# Patient Record
Sex: MALE | Race: WHITE | Employment: OTHER | URBAN - METROPOLITAN AREA
[De-identification: names, ages, dates, MRNs, and addresses within clinical notes are randomized per-mention and may not be internally consistent; named-entity substitution may affect disease eponyms.]

---

## 2017-01-04 ENCOUNTER — OFFICE VISIT (OUTPATIENT)
Dept: VASCULAR SURGERY | Age: 77
End: 2017-01-04

## 2017-01-04 ENCOUNTER — PATIENT OUTREACH (OUTPATIENT)
Dept: FAMILY MEDICINE CLINIC | Age: 77
End: 2017-01-04

## 2017-01-04 VITALS
HEART RATE: 68 BPM | WEIGHT: 216 LBS | DIASTOLIC BLOOD PRESSURE: 70 MMHG | HEIGHT: 70 IN | RESPIRATION RATE: 18 BRPM | BODY MASS INDEX: 30.92 KG/M2 | SYSTOLIC BLOOD PRESSURE: 140 MMHG

## 2017-01-04 DIAGNOSIS — I71.40 AAA (ABDOMINAL AORTIC ANEURYSM) WITHOUT RUPTURE: Primary | ICD-10-CM

## 2017-01-04 DIAGNOSIS — I72.4 FEMORAL ARTERY ANEURYSM, LEFT (HCC): ICD-10-CM

## 2017-01-04 DIAGNOSIS — I73.9 PAD (PERIPHERAL ARTERY DISEASE) (HCC): ICD-10-CM

## 2017-01-04 NOTE — MR AVS SNAPSHOT
Visit Information Date & Time Provider Department Dept. Phone Encounter #  
 1/4/2017  9:45 AM Jan Carballo MD [facility] [phone] [id number] Follow-up Instructions Return in about 4 months (around 5/4/2017). Follow-up and Disposition History Your Appointments 1/10/2017  7:30 AM  
[place] [address] [address] [place] [address]) Appt Note: F/U Mini Stroke [address] [facility] [address]  
  
   
 [address] [facility]  
  
    
 2/22/2017 10:00 AM  
Follow Up with Ritu Guerra MD  
Cardio Specialist at [facility] [place] [address]) Appt Note: 4 month follow up  
 [facility] [address] [place] [address]  
  
    
 2/28/2017  9:00 AM  
[place] [address] [facility] [address]) Appt Note: Return in about 3 months (around 2/28/2017) for rov, labs prior [address] [facility] [address]  
  
   
 [address] [facility] 5/10/2017  8:00 AM  
PROCEDURE with [facility] IMAGING 1  
[facility] (BEATA SCHEDULING) Appt Note: endograft 5 mos moore - same day appt [address] [address]  
[phone] [address] [place] [address] 5/10/2017  9:00 AM  
PROCEDURE with [facility] IMAGING 1  
[facility] (BEATA SCHEDULING) Appt Note: sfa 5 mos moore - same day appt [address] [address]  
[phone] 5/10/2017 10:00 AM  
PROCEDURE with [facility] NONIMAGING  
[facility] (BEATA SCHEDULING) Appt Note: anitra 5 mos moore - same day appt [address] [address]  
[phone] [address] [address] 5/10/2017  2:00 PM  
Follow Up with Tao Weller MD  
[facility] (BEATA SCHEDULING) Appt Note: 4 MONTH FU SAME DAY AS STUDIES AT [facility] WITH PREP; SCHEDULE ERROR; 4 MONTH FU SAME DAY AS STUDIES AT [facility] WITH PREP SCHEDULE ERROR  
 [address] [facility] [address] [address]  
[phone]  
  
   
 [address] [facility] [address] [address] Upcoming Health Maintenance Date Due  
 EYE EXAM RETINAL OR DILATED Q1 9/30/1950 GLAUCOMA SCREENING Q2Y 11/12/2016 HEMOGLOBIN A1C Q6M 3/13/2017 FOOT EXAM Q1 6/13/2017 MEDICARE YEARLY EXAM 6/14/2017 MICROALBUMIN Q1 9/13/2017 LIPID PANEL Q1 9/13/2017 COLONOSCOPY 6/29/2019 DTaP/Tdap/Td series (2 - Td) 9/14/2020 Allergies as of 1/4/2017  Review Complete On: 1/4/2017 By: Tao Weller MD  
  
 Severity Noted Reaction Type Reactions Ace Inhibitors  03/27/2016    Other (comments) Per pt, Cardiologist states he cannot have ACE inhibitors Current Immunizations  Reviewed on 3/28/2016 Name Date Influenza High Dose Vaccine PF 9/13/2016, 11/10/2015, 10/7/2015 Influenza Vaccine 11/12/2014, 12/16/2013  8:04 AM  
 Influenza Vaccine Split 12/14/2012 Influenza Vaccine Whole 9/14/2010 Pneumococcal Conjugate (PCV-13) 10/7/2015 Pneumococcal Vaccine (Unspecified Type) 9/14/2010 TDAP Vaccine 9/14/2010 Zoster 9/14/2010 Not reviewed this visit You Were Diagnosed With   
  
 Codes Comments AAA (abdominal aortic aneurysm) without rupture (HCC)    -  Primary ICD-10-CM: I71.4 ICD-9-CM: 441.4 Femoral artery aneurysm, left (HCC)     ICD-10-CM: I72.4 ICD-9-CM: 442.3 PAD (peripheral artery disease) (HCC)     ICD-10-CM: I73.9 ICD-9-CM: 443. 9 Vitals  BP Pulse Resp Height(growth percentile) Weight(growth percentile) BMI  
 140/70 (BP 1 Location: Left arm, BP Patient Position: Sitting) 68 18 5' 10\" (1.778 m) 216 lb (98 kg) 30.99 kg/m2 Smoking Status Former Smoker Vitals History BMI and BSA Data Body Mass Index Body Surface Area 30.99 kg/m 2 2.2 m 2 Preferred Pharmacy Pharmacy Name Phone Nina [address] [place] [address], [address] [place] [address] [address] [phone] Your Updated Medication List  
  
   
This list is accurate as of: 1/4/17 10:21 AM.  Always use your most recent med list.  
  
  
  
  
 aspirin-dipyridamole  mg per SR capsule Commonly known as:  AGGRENOX Take 1 Cap by mouth two (2) times a day. atorvastatin 80 mg tablet Commonly known as:  LIPITOR Take 1 Tab by mouth nightly. omega-3 fatty acids-vitamin e 1,000 mg Cap Commonly known as:  FISH OIL Take 1 Cap by mouth two (2) times a day. Follow-up Instructions Return in about 4 months (around 5/4/2017). To-Do List   
 05/04/2017 Imaging:  D[facility] SINGLE LEVEL AMB   
  
 05/04/2017 Imaging:  DUPLEX AORTA ILIAC GRAFT COMPLETE AMB   
  
 05/04/2017 Imaging:  LOWER EXT ART PVR MULT LEVEL SEG PRESSURES AMB [facility]! Dear Darren Crenshaw: Thank you for requesting a Squee account. Our records indicate that you already have an active Squee account. You can access your account anytime at [email or web address] Did you know that you can access your hospital and ER discharge instructions at any time in Squee? You can also review all of your test results from your hospital stay or ER visit. Additional Information If you have questions, please visit the Frequently Asked Questions section of the Squee website at [email or web address] Remember, Squee is NOT to be used for urgent needs. For medical emergencies, dial 911. Now available from your iPhone and Android! Please provide this summary of care documentation to your next provider. Your primary care clinician is listed as [id number] [facility]. If you have any questions after today's visit, please call [phone].

## 2017-01-04 NOTE — PROGRESS NOTES
Nurse Navigator  POST HOSPITALIZATION NOTE  Admitted at [facility]/[address] on 12/30/16 for TIA  Discharged to Home on 01/01/17      Attempted to reach patient for post hosp f/u. Unable to reach.  Left message on voicemail with contact info      Noted patient currently scheduled for f/u appt with PCP Dr. Laura Olivera on 01/10/17 at 7:30am      Will call patient again for f/u

## 2017-01-04 NOTE — PROGRESS NOTES
[address] [place]    Chief Complaint   Patient presents with    Leg Pain    Swelling       History and Physical    Kristian Grace is a 64 y.o. 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 Patient also had open left common femoral artery aneurysm repair with an SFA patch endarterectomy. Patient seems to be doing quite well and is walking a lot better than previous. He no longer complains of any claudication. No abdominal discomfort or back discomfort. He did unfortunately recently have a stroke. But this is had very small amounts of residual effects and he is doing very well at this current time.     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 ([address] 3/26/2016     Past Surgical History   Procedure Laterality Date    Endoscopy, colon, diagnostic      Hx orthopaedic  2008     Right Leg Amputated    Pr cardiac surg procedure unlist  2005     Quadruple Bypass    Pr cardiac surg procedure unlist  2011     Aortic pig valve placed    Colonoscopy N/A 6/29/2016     COLONOSCOPY performed by Adrián Chavez MD at [address] [place] [address] Pr abdomen surgery proc unlisted  2011     3 stents placed into abdomen (groin)    Hx aaa repair       Patient Active Problem List   Diagnosis Code    Pure hypercholesterolemia E78.00    Coronary artery disease involving native coronary artery without angina pectoris I25.10    Hypertension I10    Diabetes mellitus type 2, controlled ([facility] E11.9    CKD (chronic kidney disease), stage III N18.3    Advance directive in chart Z78.9    PAD (peripheral artery disease) ([facility]) I73.9    AAA (abdominal aortic aneurysm) without rupture ([facility]) I71.4    Femoral artery aneurysm, left ([facility]) I72.4     Current Outpatient Prescriptions   Medication Sig Dispense Refill    atorvastatin (LIPITOR) 80 mg tablet Take 1 Tab by mouth nightly. 30 Tab 0    aspirin-dipyridamole (AGGRENOX)  mg per SR capsule Take 1 Cap by mouth two (2) times a day. 60 Cap 0    omega-3 fatty acids-vitamin e (FISH OIL) 1,000 mg cap Take 1 Cap by mouth two (2) times a day. 180 Cap 4     Allergies   Allergen Reactions    Ace Inhibitors Other (comments)     Per pt, Cardiologist states he cannot have ACE inhibitors     Social History     Social History    Marital status:      Spouse name: N/A    Number of children: N/A    Years of education: N/A     Occupational History    Not on file. Social History Main Topics    Smoking status: Former Smoker     Quit date: 9/14/1987    Smokeless tobacco: Never Used    Alcohol use Yes      Comment: wine 1-2x per week    Drug use: No    Sexual activity: Not Currently     Other Topics Concern    Not on file     Social History Narrative      Family History   Problem Relation Age of Onset    Hypertension Mother     Hypertension Father        Physical Exam:    Visit Vitals    /70 (BP 1 Location: Left arm, BP Patient Position: Sitting)    Pulse 68    Resp 18    Ht 5' 10\" (1.778 m)    Wt 216 lb (98 kg)    BMI 30.99 kg/m2      General: Well-appearing male in no acute distress  HEENT: EOMI no scleral icterus is noted he is wearing eyeglasses  Pulmonary: No increased work of breathing is noted  Extremities: Warm and perfused bilaterally 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   Neuro: Cranial nerves II through XII grossly intact    Impression and Plan:  Quincy King is a 64 y.o. male with abdominal aortic aneurysm now repaired with extension on the left side. He also had a left common femoral artery aneurysm now repaired open. He had SFA stenosis which is improved. He does continue to have a narrowing that is identified on CTA. But this does not seem to bother him as he is walking much further. Symptomatically he seems to be doing quite well. Overall I will have him come back in 4 months time with an endograft ultrasound and ultrasounds of his lower extremities. We reviewed the plan with the patient and the patient understands. Follow-up Disposition:  Return in about 4 months (around 5/4/2017). Jaskaran Malloy MD    PLEASE NOTE:  This document has been produced using voice recognition software. Unrecognized errors in transcription may be present.

## 2017-01-05 ENCOUNTER — PATIENT OUTREACH (OUTPATIENT)
Dept: FAMILY MEDICINE CLINIC | Age: 77
End: 2017-01-05

## 2017-01-05 NOTE — PROGRESS NOTES
Nurse Navigator  POST HOSPITALIZATION NOTE  Admitted at [facility]/[address] on 12/30/16 for TIA  Discharged to Home on 01/01/17        Attempted to reach patient again for post hosp f/u. Unable to reach.  Left message on voicemail with contact info        Noted patient currently scheduled for f/u appt with PCP Dr. Shelly Palacio on 01/10/17 at 7:30am      Letter sent

## 2017-01-05 NOTE — LETTER
1/5/2017 12:26 PM 
 
Mr. Diaz Matias Du Yinka Buckner [address]  Tomas [place], 
 
 
I am a Nurse Navigator working with your primary care provider (PCP), Dr. Joenathan Meckel. Part of my job is to follow up with patients who have been in the hospital to see how they are feeling, answer any questions they may have about their visit, and also make sure they have a follow-up appointment to see their primary care doctor. I have been unable to reach you by telephone and wanted to make sure that you follow-up with Dr. Joenathan Meckel to discuss about your recent visit to the hospital.   
 
 
You are currently scheduled for an appointment with Dr. Joenathan Meckel on:  
Tuesday, 1/10/17 at 7:30am.  
 
 
If you have any questions or concerns, please call [phone]. Thank you for allowing us to participate in your care! Sincerely, Kareen OAKLEYN, RN   Nurse Navigator [address] Office   [phone] Fax   [phone]

## 2017-01-10 ENCOUNTER — OFFICE VISIT (OUTPATIENT)
Dept: FAMILY MEDICINE CLINIC | Age: 77
End: 2017-01-10

## 2017-01-10 VITALS
HEIGHT: 70 IN | DIASTOLIC BLOOD PRESSURE: 71 MMHG | BODY MASS INDEX: 30.69 KG/M2 | TEMPERATURE: 97.2 F | WEIGHT: 214.4 LBS | RESPIRATION RATE: 18 BRPM | HEART RATE: 67 BPM | OXYGEN SATURATION: 100 % | SYSTOLIC BLOOD PRESSURE: 152 MMHG

## 2017-01-10 DIAGNOSIS — I72.4 FEMORAL ARTERY ANEURYSM, LEFT (HCC): ICD-10-CM

## 2017-01-10 DIAGNOSIS — I73.9 PAD (PERIPHERAL ARTERY DISEASE) (HCC): ICD-10-CM

## 2017-01-10 DIAGNOSIS — I25.10 CORONARY ARTERY DISEASE INVOLVING NATIVE CORONARY ARTERY OF NATIVE HEART WITHOUT ANGINA PECTORIS: ICD-10-CM

## 2017-01-10 DIAGNOSIS — I71.40 AAA (ABDOMINAL AORTIC ANEURYSM) WITHOUT RUPTURE: ICD-10-CM

## 2017-01-10 DIAGNOSIS — I10 ESSENTIAL HYPERTENSION: ICD-10-CM

## 2017-01-10 DIAGNOSIS — E11.21 CONTROLLED TYPE 2 DIABETES MELLITUS WITH DIABETIC NEPHROPATHY, WITHOUT LONG-TERM CURRENT USE OF INSULIN (HCC): Primary | ICD-10-CM

## 2017-01-10 DIAGNOSIS — E78.00 PURE HYPERCHOLESTEROLEMIA: ICD-10-CM

## 2017-01-10 DIAGNOSIS — N18.30 CKD (CHRONIC KIDNEY DISEASE), STAGE III (HCC): ICD-10-CM

## 2017-01-10 RX ORDER — ATORVASTATIN CALCIUM 20 MG/1
20 TABLET, FILM COATED ORAL DAILY
Qty: 90 TAB | Refills: 4 | Status: SHIPPED | OUTPATIENT
Start: 2017-01-10 | End: 2017-08-23 | Stop reason: ALTCHOICE

## 2017-01-10 RX ORDER — ASPIRIN 325 MG
325 TABLET ORAL DAILY
Qty: 90 TAB | Refills: 4 | Status: SHIPPED | OUTPATIENT
Start: 2017-01-10 | End: 2018-04-16

## 2017-01-10 NOTE — PROGRESS NOTES
1. Have you been to the ER, urgent care clinic since your last visit? Hospitalized since your last visit? NO    2. Have you seen or consulted any other health care providers outside of the [address] [place] [address] since your last visit? Include any pap smears or colon screening.  No

## 2017-01-10 NOTE — PROGRESS NOTES
Jazmyne Yin is a 64 y.o.  male and presents with    Chief Complaint   Patient presents with    Ischemic Stroke    Chronic Kidney Disease    Diabetes    Hypertension    Coronary Artery Disease    Cholesterol Problem     Adm to hosp 12/30 with cva  D/c 1/1  Contacted multiple by NN 1/4 and 1/5  Here for transition of care. hosp records reviewed  Med reconciled -- note he is not taking lipitor 80/d and can't afford aggrenox  Currently taking lipitor 20/d and ASA 1/d        Subjective:    Cardiovascular Review:  The patient has diabetes, hypertension, hyperlipidemia, coronary artery disease and history of prior stroke. Diet and Lifestyle: not attempting to follow a low fat, low cholesterol diet, not attempting to follow a low sodium diet  Home BP Monitoring: is not measured at home. Pertinent ROS: taking medications as instructed, no medication side effects noted, no TIA's, no chest pain on exertion, no dyspnea on exertion, no swelling of ankles. Diabetes Mellitus:  He has diabetes mellitus, and  diabetes, hypertension, hyperlipidemia, coronary artery disease and history of prior stroke. Diabetic ROS - diabetic diet compliance: compliant most of the time.    Lab review: labs are reviewed, up to date and normal.   Hx of renal insuff ongoing      Additional Concerns:          Patient Active Problem List    Diagnosis Date Noted    AAA (abdominal aortic aneurysm) without rupture ([facility] 01/04/2017    Femoral artery aneurysm, left ([address] 01/04/2017    PAD (peripheral artery disease) ([facility] 09/21/2016    Advance directive in chart 06/13/2016    Hypertension 03/27/2016    Diabetes mellitus type 2, controlled ([address] 03/27/2016    CKD (chronic kidney disease), stage III 03/27/2016    Coronary artery disease involving native coronary artery without angina pectoris 01/27/2016    Pure hypercholesterolemia 09/14/2012     Current Outpatient Prescriptions   Medication Sig Dispense Refill    atorvastatin (LIPITOR) 80 mg tablet Take 1 Tab by mouth nightly. 30 Tab 0    aspirin-dipyridamole (AGGRENOX)  mg per SR capsule Take 1 Cap by mouth two (2) times a day. 60 Cap 0    omega-3 fatty acids-vitamin e (FISH OIL) 1,000 mg cap Take 1 Cap by mouth two (2) times a day. 180 Cap 4     Allergies   Allergen Reactions    Ace Inhibitors Other (comments)     Per pt, Cardiologist states he cannot have ACE inhibitors     Past Medical History   Diagnosis Date    Advance directive in chart 6/13/2016    CAD (coronary artery disease)     Cancer ([address] 2003     Colon    CKD (chronic kidney disease), stage III 3/27/2016    CVA (cerebral vascular accident) ([address] 3/26/2016    Femoral artery aneurysm, left ([address]     Heart attack ([address] 2008    Hyperlipidemia     Hypertension 3/27/2016    Iliac artery aneurysm, left ([address]     Pure hypercholesterolemia 9/14/2012    PVD (peripheral vascular disease) ([address] 3/27/2016    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 ([address] 3/26/2016     Past Surgical History   Procedure Laterality Date    Endoscopy, colon, diagnostic      Hx orthopaedic  2008     Right Leg Amputated    Pr cardiac surg procedure unlist  2005     Quadruple Bypass    Pr cardiac surg procedure unlist  2011     Aortic pig valve placed    Colonoscopy N/A 6/29/2016     COLONOSCOPY performed by Beatriz Ibarra MD at [address] [place] [address] Pr abdomen surgery proc unlisted  2011     3 stents placed into abdomen (groin)    Hx aaa repair       Family History   Problem Relation Age of Onset    Hypertension Mother     Hypertension Father      Social History   Substance Use Topics    Smoking status: Former Smoker     Quit date: 9/14/1987    Smokeless tobacco: Never Used    Alcohol use Yes      Comment: wine 1-2x per week       ROS       All other systems reviewed and are negative.       Objective:  Vitals:    01/10/17 0749   BP: 152/71   Pulse: 67   Resp: 18   Temp: 97.2 °F (36.2 °C)   TempSrc: Oral SpO2: 100%   Weight: 214 lb 6.4 oz (97.3 kg)   Height: 5' 10\" (1.778 m)   PainSc:   0 - No pain                 alert, well appearing, and in no distress, oriented to person, place, and time and normal appearing weight  Mental status - alert, oriented to person, place, and time  Chest - clear to auscultation, no wheezes, rales or rhonchi, symmetric air entry  Heart - normal rate, regular rhythm, normal S1, S2, no murmurs, rubs, clicks or gallops  Abdomen - soft, nontender, nondistended, no masses or organomegaly  Neurological - alert, oriented, normal speech, no focal findings or movement disorder noted, screening mental status exam normal        LABS   Component      Latest Ref Rng & Units 12/30/2016 12/30/2016 12/30/2016 12/30/2016           6:30 PM  5:26 PM  5:26 PM  5:26 PM   WBC      4.6 - 13.2 K/uL       RBC      4.70 - 5.50 M/uL       HGB      13.0 - 16.0 g/dL       HCT      36.0 - 48.0 %       MCV      74.0 - 97.0 FL       MCH      24.0 - 34.0 PG       MCHC      31.0 - 37.0 g/dL       RDW      11.6 - 14.5 %       PLATELET      513 - 494 K/uL       MPV      9.2 - 11.8 FL       NEUTROPHILS      40 - 73 %       LYMPHOCYTES      21 - 52 %       MONOCYTES      3 - 10 %       EOSINOPHILS      0 - 5 %       BASOPHILS      0 - 2 %       ABS. NEUTROPHILS      1.8 - 8.0 K/UL       ABS. LYMPHOCYTES      0.9 - 3.6 K/UL       ABS. MONOCYTES      0.05 - 1.2 K/UL       ABS. EOSINOPHILS      0.0 - 0.4 K/UL       ABS.  BASOPHILS      0.0 - 0.06 K/UL       DF             Sodium      136 - 145 mmol/L       Potassium      3.5 - 5.5 mmol/L       Chloride      100 - 108 mmol/L       CO2      21 - 32 mmol/L       Anion gap      3.0 - 18 mmol/L       Glucose      74 - 99 mg/dL       BUN      7.0 - 18 MG/DL       Creatinine      0.6 - 1.3 MG/DL       BUN/Creatinine ratio      12 - 20         GFR est AA      >60 ml/min/1.73m2       GFR est non-AA      >60 ml/min/1.73m2       Calcium      8.5 - 10.1 MG/DL       Color       YELLOW Appearance       CLEAR      Specific gravity      1.005 - 1.030   1.017      pH (UA)      5.0 - 8.0   6.5      Protein      NEG mg/dL TRACE (A)      Glucose      NEG mg/dL NEGATIVE      Ketone      NEG mg/dL NEGATIVE      Bilirubin      NEG   NEGATIVE      Blood      NEG   NEGATIVE      Urobilinogen      0.2 - 1.0 EU/dL 0.2      Nitrites      NEG   NEGATIVE      Leukocyte Esterase      NEG   NEGATIVE      Protein, total      6.4 - 8.2 g/dL   7.0    Albumin      3.4 - 5.0 g/dL   4.0    Globulin      2.0 - 4.0 g/dL   3.0    A-G Ratio      0.8 - 1.7     1.3    Bilirubin, total      0.2 - 1.0 MG/DL   0.4    Bilirubin, direct      0.0 - 0.2 MG/DL   <0.1    Alk. phosphatase      45 - 117 U/L   101    AST      15 - 37 U/L   22    ALT      16 - 61 U/L   21    CK      39 - 308 U/L    134   CK - MB      0.5 - 3.6 ng/ml    1.5   CK-MB Index      0.0 - 4.0 %    1.1   Troponin-I, Qt.      0.0 - 0.045 NG/ML    <0.02   Aspirin test      ARU  451       Component      Latest Ref Rng & Units 12/30/2016 12/30/2016           5:26 PM  5:26 PM   WBC      4.6 - 13.2 K/uL  11.8   RBC      4.70 - 5.50 M/uL  3.80 (L)   HGB      13.0 - 16.0 g/dL  12.8 (L)   HCT      36.0 - 48.0 %  37.2   MCV      74.0 - 97.0 FL  97.9 (H)   MCH      24.0 - 34.0 PG  33.7   MCHC      31.0 - 37.0 g/dL  34.4   RDW      11.6 - 14.5 %  14.4   PLATELET      268 - 074 K/uL  267   MPV      9.2 - 11.8 FL  10.9   NEUTROPHILS      40 - 73 %  67   LYMPHOCYTES      21 - 52 %  19 (L)   MONOCYTES      3 - 10 %  9   EOSINOPHILS      0 - 5 %  4   BASOPHILS      0 - 2 %  1   ABS. NEUTROPHILS      1.8 - 8.0 K/UL  8.0   ABS. LYMPHOCYTES      0.9 - 3.6 K/UL  2.3   ABS. MONOCYTES      0.05 - 1.2 K/UL  1.0   ABS. EOSINOPHILS      0.0 - 0.4 K/UL  0.4   ABS.  BASOPHILS      0.0 - 0.06 K/UL  0.1 (H)   DF        AUTOMATED   Sodium      136 - 145 mmol/L 139    Potassium      3.5 - 5.5 mmol/L 4.5    Chloride      100 - 108 mmol/L 104    CO2      21 - 32 mmol/L 29    Anion gap      3.0 - 18 mmol/L 6    Glucose      74 - 99 mg/dL 96    BUN      7.0 - 18 MG/DL 29 (H)    Creatinine      0.6 - 1.3 MG/DL 1.71 (H)    BUN/Creatinine ratio      12 - 20   17    GFR est AA      >60 ml/min/1.73m2 47 (L)    GFR est non-AA      >60 ml/min/1.73m2 39 (L)    Calcium      8.5 - 10.1 MG/DL 9.4    Color           Appearance           Specific gravity      1.005 - 1.030       pH (UA)      5.0 - 8.0       Protein      NEG mg/dL     Glucose      NEG mg/dL     Ketone      NEG mg/dL     Bilirubin      NEG       Blood      NEG       Urobilinogen      0.2 - 1.0 EU/dL     Nitrites      NEG       Leukocyte Esterase      NEG       Protein, total      6.4 - 8.2 g/dL     Albumin      3.4 - 5.0 g/dL     Globulin      2.0 - 4.0 g/dL     A-G Ratio      0.8 - 1.7       Bilirubin, total      0.2 - 1.0 MG/DL     Bilirubin, direct      0.0 - 0.2 MG/DL     Alk. phosphatase      45 - 117 U/L     AST      15 - 37 U/L     ALT      16 - 61 U/L     CK      39 - 308 U/L     CK - MB      0.5 - 3.6 ng/ml     CK-MB Index      0.0 - 4.0 %     Troponin-I, Qt.      0.0 - 0.045 NG/ML     Aspirin test      ARU       TESTS    EKG  NSR    Rhythm NSR    MRI nothing acute    MRA nothing acute    Echo normal    Carotids normal      Assessment/Plan:    Diabetes - well controlled, stable  Hypertension - well controlled, stable  Hyperlipidemia - well controlled, stable  Kidney dz stable  CAD  Stable  vasc diz stable  Stroke -- have had long dw him regarding aggrenox and lipitor. He can't afford aggrenox. Already on lipitor 20 and not taking 80. Lab review: labs are reviewed, up to date and normal      I have discussed the diagnosis with the patient and the intended plan as seen in the above orders. The patient has received an after-visit summary and questions were answered concerning future plans. I have discussed medication side effects and warnings with the patient as well.  I have reviewed the plan of care with the patient, accepted their input and they are in agreement with the treatment goals.      Follow-up Disposition: Not on File

## 2017-01-10 NOTE — MR AVS SNAPSHOT
Visit Information Date & Time Provider Department Dept. Phone Encounter #  
 1/10/2017  7:30 AM Joaquim Mcmahan[address] [phone] [id number] Follow-up Instructions Return in about 7 weeks (around 2/28/2017) for already scheduled, labs prior. Follow-up and Disposition History Your Appointments 2/22/2017 10:00 AM  
Follow Up with Caroline Negro MD  
Cardio Specialist at [facility]) Appt Note: 4 month follow up  
 [address] [address] [facility]  
  
   
 [address]  
  
    
 2/28/2017  9:00 AM  
ROUTINE CARE with Harish JuarezMartin,  [address]) Appt Note: Return in about 3 months (around 2/28/2017) for rov, labs prior [address] 222 [facility]  
  
   
 [address] [facility] 5/10/2017  8:00 AM  
PROCEDURE with BSVVS IMAGING 1  
[facility] (BEATA SCHEDULING) Appt Note: endograft 5 mos moore - same day appt [address] [address]  
[phone] [address] 500 [facility] [address] 5/10/2017  9:00 AM  
PROCEDURE with BSVVS IMAGING 1  
[facility] (BEATA SCHEDULING) Appt Note: sfa 5 mos moore - same day appt [address] [address]  
[phone] 5/10/2017 10:00 AM  
PROCEDURE with BSVVS NONIMAGING  
BS [facility] (BEATA SCHEDULING) Appt Note: anitra 5 mos moore - same day appt [address] [address]  
[phone] [address] 500 [facility] [address] 5/10/2017  2:00 PM  
Follow Up with Michael Pinon MD  
[facility] (BEATA [facility]) Appt Note: 4 MONTH FU SAME DAY AS STUDIES AT [facility]; SCHEDULE ERROR; 4 MONTH FU SAME DAY AS STUDIES AT [facility] WITH PREP SCHEDULE ERROR  
 [address] [place] [address]  
[phone]  
  
   
 [address] [facility] [address] [place] [address] Upcoming Health Maintenance Date Due  
 EYE EXAM RETINAL OR DILATED Q1 9/30/1950 GLAUCOMA SCREENING Q2Y 11/12/2016 HEMOGLOBIN A1C Q6M 3/13/2017 FOOT EXAM Q1 6/13/2017 MEDICARE YEARLY EXAM 6/14/2017 MICROALBUMIN Q1 9/13/2017 LIPID PANEL Q1 9/13/2017 COLONOSCOPY 6/29/2019 DTaP/Tdap/Td series (2 - Td) 9/14/2020 Allergies as of 1/10/2017  Review Complete On: 1/10/2017 By: Crystal Marc.,  Severity Noted Reaction Type Reactions Ace Inhibitors  03/27/2016    Other (comments) Per pt, Cardiologist states he cannot have ACE inhibitors Current Immunizations  Reviewed on 3/28/2016 Name Date Influenza High Dose Vaccine PF 9/13/2016, 11/10/2015, 10/7/2015 Influenza Vaccine 11/12/2014, 12/16/2013  8:04 AM  
 Influenza Vaccine Split 12/14/2012 Influenza Vaccine Whole 9/14/2010 Pneumococcal Conjugate (PCV-13) 10/7/2015 Pneumococcal Vaccine (Unspecified Type) 9/14/2010 TDAP Vaccine 9/14/2010 Zoster 9/14/2010 Not reviewed this visit You Were Diagnosed With   
  
 Codes Comments Controlled type 2 diabetes mellitus with diabetic nephropathy, without long-term current use of insulin (HCC)    -  Primary ICD-10-CM: E11.21 
ICD-9-CM: 250.40, 583.81   
 PAD (peripheral artery disease) ([facility] [address]     ICD-10-CM: I73.9 ICD-9-CM: 443.9 AAA (abdominal aortic aneurysm) without rupture (HCC)     ICD-10-CM: I71.4 ICD-9-CM: 441.4 Femoral artery aneurysm, left (HCC)     ICD-10-CM: I72.4 ICD-9-CM: 219. 3 Pure hypercholesterolemia     ICD-10-CM: E78.00 ICD-9-CM: 272.0 Coronary artery disease involving native coronary artery of native heart without angina pectoris     ICD-10-CM: I25.10 ICD-9-CM: 414.01   
 Essential hypertension     ICD-10-CM: I10 
ICD-9-CM: 401.9 CKD (chronic kidney disease), stage III     ICD-10-CM: N18.3 ICD-9-CM: 225. 3 Vitals BP Pulse Temp Resp Height(growth percentile) Weight(growth percentile) 152/71 (BP 1 Location: Left arm, BP Patient Position: Sitting) 67 97.2 °F (36.2 °C) (Oral) 18 5' 10\" (1.778 m) 214 lb 6.4 oz (97.3 kg) SpO2 BMI Smoking Status 100% 30.76 kg/m2 Former Smoker BMI and BSA Data Body Mass Index Body Surface Area 30.76 kg/m 2 2.19 m 2 Preferred Pharmacy Pharmacy Name Phone [facility] [address] [place] [address]. [address] [phone] Your Updated Medication List  
  
   
This list is accurate as of: 1/10/17  8:18 AM.  Always use your most recent med list.  
  
  
  
  
 aspirin 325 mg tablet Commonly known as:  ASPIRIN Take 1 Tab by mouth daily. atorvastatin 20 mg tablet Commonly known as:  LIPITOR Take 1 Tab by mouth daily. omega-3 fatty acids-vitamin e 1,000 mg Cap Commonly known as:  FISH OIL Take 1 Cap by mouth two (2) times a day. Prescriptions Sent to Pharmacy Refills  
 atorvastatin (LIPITOR) 20 mg tablet 4 Sig: Take 1 Tab by mouth daily. Class: Normal  
 Pharmacy: [facility] [address] [place] [address]. [address] Ph #: [phone] Route: Oral  
 aspirin (ASPIRIN) 325 mg tablet 4 Sig: Take 1 Tab by mouth daily. Class: Normal  
 Pharmacy: [facility] [address] [place] [address]. [address] Ph #: [phone] Route: Oral  
  
Follow-up Instructions Return in about 7 weeks (around 2/28/2017) for already scheduled, labs prior. To-Do List   
 02/09/2017 Lab:  CBC WITH AUTOMATED DIFF   
  
 02/09/2017 Lab:  HEMOGLOBIN A1C WITH EAG   
  
 02/09/2017 Lab:  LIPID PANEL   
  
 02/09/2017 Lab: METABOLIC PANEL, COMPREHENSIVE   
  
 02/09/2017 Lab:  MICROALBUMIN, UR, RAND W/ MICROALBUMIN/CREA RATIO   
  
 02/09/2017 Lab:  TSH 3RD GENERATION   
  
 02/09/2017 Lab:  URINALYSIS W/ RFLX MICROSCOPIC Introducing [facility] SERVICES! Dear Long Davila: Thank you for requesting a Ulule account. Our records indicate that you already have an active Ulule account. You can access your account anytime at [email or web address] Did you know that you can access your hospital and ER discharge instructions at any time in Ulule? You can also review all of your test results from your hospital stay or ER visit. Additional Information If you have questions, please visit the Frequently Asked Questions section of the Ulule website at [email or web address] Remember, Ulule is NOT to be used for urgent needs. For medical emergencies, dial 911. Now available from your iPhone and Android! Please provide this summary of care documentation to your next provider. Your primary care clinician is listed as [phone] [facility]. If you have any questions after today's visit, please call [phone].

## 2017-01-11 ENCOUNTER — TELEPHONE (OUTPATIENT)
Dept: FAMILY MEDICINE CLINIC | Age: 77
End: 2017-01-11

## 2017-01-11 NOTE — TELEPHONE ENCOUNTER
Pt fell in the snow and is requesting an order for in home PT through [address] The fax # is [phone].

## 2017-01-13 NOTE — TELEPHONE ENCOUNTER
Appointment was scheduled for the face to face encounter for Thursday, January 19th @ 8:30am. Awaiting arrival

## 2017-01-18 ENCOUNTER — OFFICE VISIT (OUTPATIENT)
Dept: FAMILY MEDICINE CLINIC | Age: 77
End: 2017-01-18

## 2017-01-18 VITALS
BODY MASS INDEX: 30.75 KG/M2 | SYSTOLIC BLOOD PRESSURE: 136 MMHG | DIASTOLIC BLOOD PRESSURE: 76 MMHG | WEIGHT: 214.8 LBS | TEMPERATURE: 97.3 F | OXYGEN SATURATION: 96 % | RESPIRATION RATE: 18 BRPM | HEIGHT: 70 IN | HEART RATE: 57 BPM

## 2017-01-18 DIAGNOSIS — E11.21 CONTROLLED TYPE 2 DIABETES MELLITUS WITH DIABETIC NEPHROPATHY, WITHOUT LONG-TERM CURRENT USE OF INSULIN (HCC): Primary | ICD-10-CM

## 2017-01-18 DIAGNOSIS — I73.9 PAD (PERIPHERAL ARTERY DISEASE) (HCC): ICD-10-CM

## 2017-01-18 DIAGNOSIS — I25.10 CORONARY ARTERY DISEASE INVOLVING NATIVE CORONARY ARTERY OF NATIVE HEART WITHOUT ANGINA PECTORIS: ICD-10-CM

## 2017-01-18 NOTE — PROGRESS NOTES
Amy Vincent is a 64 y.o.  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  States his left leg was weak because he pulled a muscle doing rehab-- ( he was doing PT in his home by himself)  In past was [facility]    His wife is concerned that if he falls outside she is unable to pick him up so she is interested in techniques for her to be able to transport him    He currently walks with a cane and drives a car    She currently uses a walker and is very unsteady on her feet      Will first try [facility] as outpatient and go from there    More than 1/2 of this 25 minute visit was spent in counselling and coordination of care, as described above.

## 2017-01-18 NOTE — MR AVS SNAPSHOT
Visit Information Date & Time Provider Department Dept. Phone Encounter #  
 1/18/2017  8:30 AM Eugenia Mena, Espinoza [facility] [phone] [id number] Your Appointments 2/22/2017 10:00 AM  
Follow Up with Prince Cheatham MD  
Cardio Specialist at [facility]) Appt Note: 4 month follow up  
 [address]  
  
   
 [address]  
  
    
 2/28/2017  9:00 AM  
[place] [address] [facility]) Appt Note: Return in about 3 months (around 2/28/2017) for rov, labs prior [address] [place]  
  
   
 [address] [place] [address] [address] 5/10/2017  8:00 AM  
PROCEDURE with BSVVS IMAGING 1  
BS Vein/Vascular Spec-[facility] (BEATA SCHEDULING) Appt Note: endograft 5 mos moore - same day appt [address] [address]  
[phone] [address] 500 [facility] [address] 5/10/2017  9:00 AM  
PROCEDURE with BSVVS IMAGING 1  
BS Vein/Vascular Spec-Chesp (BEATA SCHEDULING) Appt Note: sfa 5 mos moore - same day appt [address] [address]  
[phone] 5/10/2017 10:00 AM  
PROCEDURE with BSVVS NONIMAGING  
BS Vein/Vascular Spec-[facility]sp (BEATA SCHEDULING) Appt Note: anitra 5 mos moore - same day appt [address] [address]  
[phone] [address] 500 [facility] [address] 5/10/2017  2:00 PM  
Follow Up with Esperanza Verduzco MD  
[facility] Vein/[facility] (BEATA SCHEDULING) Appt Note: 4 MONTH FU SAME DAY AS STUDIES AT [facility] WITH PREP; SCHEDULE ERROR; 4 MONTH FU SAME DAY AS STUDIES AT [facility] WITH PREP SCHEDULE ERROR  
 333 [facility] 701 [facility] [address] [facility] [address] Upcoming Health Maintenance Date Due  
 EYE EXAM RETINAL OR DILATED Q1 9/30/1950 GLAUCOMA SCREENING Q2Y 11/12/2016 HEMOGLOBIN A1C Q6M 3/13/2017 FOOT EXAM Q1 6/13/2017 MEDICARE YEARLY EXAM 6/14/2017 MICROALBUMIN Q1 9/13/2017 LIPID PANEL Q1 9/13/2017 COLONOSCOPY 6/29/2019 DTaP/Tdap/Td series (2 - Td) 9/14/2020 Allergies as of 1/18/2017  Review Complete On: 1/10/2017 By: Eddie Ballesteros., DO Severity Noted Reaction Type Reactions Ace Inhibitors  03/27/2016    Other (comments) Per pt, Cardiologist states he cannot have ACE inhibitors Current Immunizations  Reviewed on 3/28/2016 Name Date Influenza High Dose Vaccine PF 9/13/2016, 11/10/2015, 10/7/2015 Influenza Vaccine 11/12/2014, 12/16/2013  8:04 AM  
 Influenza Vaccine Split 12/14/2012 Influenza Vaccine Whole 9/14/2010 Pneumococcal Conjugate (PCV-13) 10/7/2015 Pneumococcal Vaccine (Unspecified Type) 9/14/2010 TDAP Vaccine 9/14/2010 Zoster 9/14/2010 Not reviewed this visit You Were Diagnosed With   
  
 Codes Comments Controlled type 2 diabetes mellitus with diabetic nephropathy, without long-term current use of insulin (HCC)    -  Primary ICD-10-CM: E11.21 
ICD-9-CM: 250.40, 583.81   
 PAD (peripheral artery disease) ([facility] [address]     ICD-10-CM: I73.9 ICD-9-CM: 443.9 Coronary artery disease involving native coronary artery of native heart without angina pectoris     ICD-10-CM: I25.10 ICD-9-CM: 414.01 Vitals BP Pulse Temp Resp Height(growth percentile) Weight(growth percentile) 136/76 (BP 1 Location: Left arm, BP Patient Position: Sitting) (!) 57 97.3 °F (36.3 °C) (Oral) 18 5' 10\" (1.778 m) 214 lb 12.8 oz (97.4 kg) SpO2 BMI Smoking Status 96% 30.82 kg/m2 Former Smoker BMI and BSA Data Body Mass Index Body Surface Area  
 30.82 kg/m 2 2.19 m 2 Preferred Pharmacy Pharmacy Name Phone [facility] [address] [place], [place] [address] [phone] Your Updated Medication List  
  
   
This list is accurate as of: 1/18/17  8:59 AM.  Always use your most recent med list.  
  
  
  
  
 aspirin 325 mg tablet Commonly known as:  ASPIRIN Take 1 Tab by mouth daily. atorvastatin 20 mg tablet Commonly known as:  LIPITOR Take 1 Tab by mouth daily. omega-3 fatty acids-vitamin e 1,000 mg Cap Commonly known as:  FISH OIL Take 1 Cap by mouth two (2) times a day. We Performed the Following REFERRAL TO PHYSICAL THERAPY [[id number] Custom] Comments:  
 Please evaluate patient for gait and strength training and rehab . Wilma Barton Referral Information Referral ID Referred By Referred To  
  
 [id number] [facility] PHYSICAL THERAPY   
   [address] Kristie Galeazzi, [address] Phone: [phone] Visits Status Start Date End Date 1 New Request 1/18/17 1/18/18 If your referral has a status of pending review or denied, additional information will be sent to support the outcome of this decision. Introducing [facility] [place] [facility]! Dear Adam Syed: Thank you for requesting a Healthonomy account. Our records indicate that you already have an active Healthonomy account. You can access your account anytime at [email or web address] Did you know that you can access your hospital and ER discharge instructions at any time in Healthonomy? You can also review all of your test results from your hospital stay or ER visit. Additional Information If you have questions, please visit the Frequently Asked Questions section of the Healthonomy website at [email or web address] Remember, Healthonomy is NOT to be used for urgent needs. For medical emergencies, dial 911. Now available from your iPhone and Android! Please provide this summary of care documentation to your next provider. Your primary care clinician is listed as [phone] [facility]. If you have any questions after today's visit, please call [phone].

## 2017-01-18 NOTE — PROGRESS NOTES
Shelby Mcghee 1500 [facility]  Face to Face Encounter    Patients Name: Hermelinda Persaud    YOB: 1940    Primary Diagnosis: amputation    Date of Face to Face:   1/18/2017                                  Face to Face Encounter findings are related to primary reason for home care:   yes. 1. I certify that the patient needs intermittent care as follows: physical therapy: strengthening and balance training    2. I certify that this patient is homebound, that is: 1) patient requires the use of a cane device, special transportation, or assistance of another to leave the home; or 2) patient's condition makes leaving the home medically contraindicated; and 3) patient has a normal inability to leave the home and leaving the home requires considerable and taxing effort. Patient may leave the home for infrequent and short duration for medical reasons, and occasional absences for non-medical reasons. Homebound status is due to the following functional limitations:     3. I certify that this patient is under my care and that I, or a nurse practitioner or  [id number], or clinical nurse specialist, or certified nurse midwife, working with me, had a Face-to-Face Encounter that meets the physician Face-to-Face Encounter requirements. The following are the clinical findings from the [address] [place] [address] encounter that support the need for skilled services and is a summary of the encounter:     See attached progess note      Niko Burgess., DO  1/18/2017      THE FOLLOWING TO BE COMPLETED BY THE COMMUNITY PHYSICIAN:    I concur with the findings described above from the F2F encounter that this patient is homebound and in need of a skilled service.     Certifying Physician: _____________________________________      Printed Certifying Physician Name: _____________________________________    Date: _________________

## 2017-02-06 ENCOUNTER — HOSPITAL ENCOUNTER (OUTPATIENT)
Dept: PHYSICAL THERAPY | Age: 77
Discharge: HOME | End: 2017-02-06
Payer: MEDICARE

## 2017-02-06 PROCEDURE — G8978 MOBILITY CURRENT STATUS: HCPCS

## 2017-02-06 PROCEDURE — G8979 MOBILITY GOAL STATUS: HCPCS

## 2017-02-06 PROCEDURE — 97162 PT EVAL MOD COMPLEX 30 MIN: CPT

## 2017-02-06 NOTE — PROGRESS NOTES
PHYSICAL THERAPY - DAILY TREATMENT NOTE    Patient Name: Cat Lockwood        Date: 2017  : 1940   YES Patient  Verified  Visit #:      8  Insurance: Payor: [address] [place] [address] / Plan: ELISA. [address] / Product Type: Managed Care Medicare /      In time: 8:45 Out time: 9:30   Total Treatment Time: 39     Medicare Time Tracking (below)   Total Timed Codes (min):  0 1:1 Treatment Time:  0     TREATMENT AREA =  DM, PAD, CAD, gait instability, weakness    SUBJECTIVE    Pain Level (on 0 to 10 scale):  0  / 10   Medication Changes/New allergies or changes in medical history, any new surgeries or procedures? NO    If yes, update Summary List   Subjective Functional Status/Changes:  []  No changes reported     Pt reports decreased balance and increased weakness since having 3 TIAs 2016, 1 TIA 2017. Pt reports that he began using [facility] after most recent TIA. Pt reports that he underwent HHPT for 2 weeks. Pt reports that he is still doing some of the exercises prescribed, but has stopped doing some of them because he pulled a muscle in his left calf. Pt reports that he had a fall during snowstorm after returning home from hospital 2017. Pt reports that he had some pain, but no injuries. Pt reports that he has had R BK prosthesis since . Pt reports that he has power ankle on R LE prosthesis. Pt reports that he underwent R BK amputation due to blood clots and PVD. Pt reports that he has had stent placed in R LE due to PVD. Pt reports that he underwent CABG  and AVR . Pt reports that he had colon cancer and underwent resection . Pt reports that he would like to get back to playing golf. Pt reports that he has been unable to play over last 2-3 months.            OBJECTIVE    Physical Therapy Evaluation  Neurologic    Posture: [] Poor    [x] Fair    [] Good    Describe: Protracted head/shoulders, increased thoracic kyphosis, decreased lumbar lordosis    Gait: [] Normal    [x] Abnormal    Device: SPC, R BKA     Describe: Decreased gait speed, decreased step length/otis, wide LAUREL    ROM:                             AROM    PROM   Shoulder Left Right Left Right   [facility]     Ext [facility] [place] WFL     ABD [facility] [place]     ER [facility] [place] WFL     IR WFL WFL              AROM    PROM   Knee Left Right Left Right   Ext [facility] [place]     Flex [facility]               AROM                           PROM  Hip Left Right Left Right   Flex [facility] WFL     Ext [facility] WFL     ABD [facility] [place]     ER WFL WFL     IR WFL WFL                                              AROM      PROM   Ankle Left Right Left Right   Ext WFL NA     Flex WFL NA       Strength (MMT):  Shoulder L (1-5) R (1-5)   Shoulder Flexion 5 5   Shoulder Ext 5 5   Shoulder ABD 5 5   Shoulder ADD 5 5   Shoulder IR 5 5   Shoulder ER 5 5                                             Hip L (1-5) R (1-5)   Hip Flexion 4 4   Hip Ext     Hip ABD     Hip ADD     Hip ER     Hip IR       Knee L (1-5) R (1-5)   Knee Flexion 5 4   Knee Extension 5 4   Ankle PF 5 NA   Ankle DF 5 NA   Other       Tone: WNL    Motor Control: WNL    Sensation: Denies numbness/tingling    Reflexes: [x] Not Tested   Left Right   Biceps (C5)     Brachioradiais (C6)     Triceps (C7)     Knee Jerk (L4)     Ankle Jerk (SI)       Balance/ Equilibrium:              Left            Right  Tracks Across Midline  yes yes   Reaches Across Midline yes yes         Sitting Balance: Static:  [x] Good    [] Fair    [] Poor     Dynamic:   [x] Good    [] Fair    [] Poor        Standing Balance: Static:   [] Good    [x] Fair    [] Poor     Dynamic:   [] Good    [x] Fair    [] Poor        Protective Extension:  [x] Present    [] Delayed    [] Absent         Romber\"/30\"      Foam stance: 8\"/NT      Sharpened Romberg: NT      Rail stance: NT      Single Leg Stance:  NT        Functional Mobility      Bed Mobility:      Scooting: NT       Rolling: NT       Sit-Supine: NT      Transfers: Sit-Stand: mod I       Floor-Stand: NT      Gait:       Tandem: NT       Backwards: NT       Braiding: NT      Elevations:       Curbs: NT      Ramps: NT      Stairs: DGI    Behavior: [x] Cooperative    [] Impulsive    [] Agitated    [] Perseverative    [] Confused   Oriented x: 4    Cognition: [] One Step Commands   [x] Multiple Commands   [] Displays Neglect [] R  [] L    Other:       Impaired Judgement: [] Y    [x] N      Impaired Vision:  [x] Y    [] N      Safety Awareness Deficits  [] Y    [x] N      Impaired Hearing  [x] Y    [] N      Able to Express Needs [x] Y    [] N    Optional Tests:       Dynamic Gait Index (24pt scale): 17/24 (with SPC)       Functional Gait Assessment (30pt scale):       Orantes Balance Scale (56pt scale):     Other test /comments: Decreased L  strength    1 min Patient Education:  YES  Reviewed HEP   []  Progressed/Changed HEP based on:   Cont HEP prescribed by HHPT; requested that pt bring copy of HEP to next visit     Other Objective/Functional Measures:    See eval     Post Treatment Pain Level (on 0 to 10) scale:   0  / 10     ASSESSMENT    Assessment/Changes in Function:     Justification for Eval Code Complexity:  Patient History : HIGH - DM, CAD, MI, CABG, AVR, TIA x 4, visual/hearing impairment, R BKA, L LE stent due to clots, PVD  Examination HIGH - See objective  Clinical Presentation: MEDIUM  Clinical Decision Making : MEDIUM FOTO 56/1000    See plan of care     []  See Progress Note/Recertification   Patient will continue to benefit from skilled PT services: see plan of care   Progress toward goals / Updated goals:    See plan of care     PLAN    [x]  Upgrade activities as tolerated YES Continue plan of care   []  Discharge due to :    []  Other:      Therapist: Alonso Russell, PT    Date: 2/6/2017 Time: 8:34 AM

## 2017-02-06 NOTE — PROGRESS NOTES
[address]  [facility] PHYSICAL THERAPY  [address] [facility] [place] [address], [address] - Phone: [phone]  Fax: [phone] / [address]  Patient Name: Johan Tavarez : 1940   Medical   Diagnosis: Gait instability [R26.81] Treatment Diagnosis: Gait instability [R26.81], muscle weakness [M62.81]   Onset Date: 2016     Referral Source: Nneka Mulligan., DO Start of Care [facility]): 2017   Prior Hospitalization: See medical history Provider #: [id number]   Prior Level of Function: Independent with ADLs, ambulation with R BK prosthesis; playing golf   Comorbidities: DM, PAD, CAD, MI, depression, visual impairment, hearing impairment, colon cancer - s/p chemo and surgical resection, TIA x 4, HA, hernia, urinary frequency, CABG, AVR, L LE stent due to blood clots/PVD, R 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   ===========================================================================================  Assessment / key information:  Patient is a 64 y.o. male who presents with complaints of decreased balance and increased weakness since having 3 TIAs 2016, 1 TIA 2017. Patient demonstrates impaired posture, decreased L  strength, decreased B hip strength (4/5), decreased balance and impaired ADL/IADL function, functional mobility and gait. Romberg EO/EC = 30\"/30\", foam stance EO = 8\" indicating decreased static standing balance. DGI (Dynamic Gait Index) = 17/24 with SPC, indicating decreased dynamic standing balance and increased fall risk. FOTO (Functional Status) = 56/100, indicating 44% functional limitation (moderate). Patient would benefit from skilled PT services to address these issues and improve function.   Thank you for this referral.  ==========================================================================================  Eval Complexity: History: HIGH Complexity :3+ comorbidities / personal factors will impact the outcome/ POC Exam:HIGH Complexity : 4+ Standardized tests and measures addressing body structure, function, activity limitation and / or participation in recreation  Presentation: MEDIUM Complexity : Evolving with changing characteristics  Clinical Decision Making:MEDIUM Complexity : FOTO score of 26-74Overall Complexity:MEDIUM      Problem List: pain affecting function, decrease ROM, decrease strength, impaired gait/ balance, decrease ADL/ functional abilitiies, decrease activity tolerance, decrease flexibility/ joint mobility and decrease transfer abilities   Treatment Plan may include any combination of the following: Therapeutic exercise, Therapeutic activities, Neuromuscular re-education, Physical agent/modality, Gait/balance training, Manual therapy, Patient education, Functional mobility training and Stair training  Patient / Family readiness to learn indicated by: asking questions, trying to perform skills and interest  Persons(s) to be included in education: patient (P)  Barriers to Learning/Limitations: yes;  sensory deficits-vision/hearing/speech, financial and other $40 copay per visit  Measures taken:    Patient Goal (s): \"More strength and balance. \"   Patient self reported health status: fair  Rehabilitation Potential: good   Short Term Goals: To be accomplished in  4  weeks:  1. Patient will demonstrate compliance with HEP. 2. Patient will maintain foam stance eyes open 30\" to increase safety in challenging environments. 3. Patient will score greater than or equal to 19/24 on  DGI to indicate increased balance/decreased fall risk.  Long Term Goals: To be accomplished in  8  weeks:  1. Patient will demonstrate independence with HEP.   2. Patient will maintain foam stance eyes closed 30\" to increase safety in challenging environments. 3. Patient will score greater than or equal to 59/100 on FOTO to indicate increased function/decreased functional limitation. Frequency / Duration:   Patient to be seen  1  times per week for 8  weeks: (frequency limited due to $40 copay and patient schedule)  Patient / Caregiver education and instruction: self care, activity modification and exercises  G-Codes (GP): Mobility [id number] Current  CK= 40-59%   Goal  CK= 40-59%. The severity rating is based on the FOTO Score    Therapist Signature: Evelina Gray PT Date: 3/7/4362   Certification Period: 2/6/2017-5/5/2017 Time: 9:53 AM   ===========================================================================================  I certify that the above Physical Therapy Services are being furnished while the patient is under my care. I agree with the treatment plan and certify that this therapy is necessary. Physician Signature:        Date:       Time:     Please sign and return to [employer] or you may fax the signed copy to [phone]. Thank you.

## 2017-02-09 DIAGNOSIS — I10 ESSENTIAL HYPERTENSION: ICD-10-CM

## 2017-02-09 DIAGNOSIS — I71.40 AAA (ABDOMINAL AORTIC ANEURYSM) WITHOUT RUPTURE: ICD-10-CM

## 2017-02-09 DIAGNOSIS — I25.10 CORONARY ARTERY DISEASE INVOLVING NATIVE CORONARY ARTERY OF NATIVE HEART WITHOUT ANGINA PECTORIS: ICD-10-CM

## 2017-02-09 DIAGNOSIS — N18.30 CKD (CHRONIC KIDNEY DISEASE), STAGE III (HCC): ICD-10-CM

## 2017-02-09 DIAGNOSIS — I73.9 PAD (PERIPHERAL ARTERY DISEASE) (HCC): ICD-10-CM

## 2017-02-09 DIAGNOSIS — I72.4 FEMORAL ARTERY ANEURYSM, LEFT (HCC): ICD-10-CM

## 2017-02-09 DIAGNOSIS — E11.21 CONTROLLED TYPE 2 DIABETES MELLITUS WITH DIABETIC NEPHROPATHY, WITHOUT LONG-TERM CURRENT USE OF INSULIN (HCC): ICD-10-CM

## 2017-02-09 DIAGNOSIS — E78.00 PURE HYPERCHOLESTEROLEMIA: ICD-10-CM

## 2017-02-15 ENCOUNTER — APPOINTMENT (OUTPATIENT)
Dept: PHYSICAL THERAPY | Age: 77
End: 2017-02-15
Payer: MEDICARE

## 2017-02-20 ENCOUNTER — TELEPHONE (OUTPATIENT)
Dept: CARDIOLOGY CLINIC | Age: 77
End: 2017-02-20

## 2017-02-22 ENCOUNTER — OFFICE VISIT (OUTPATIENT)
Dept: CARDIOLOGY CLINIC | Age: 77
End: 2017-02-22

## 2017-02-22 ENCOUNTER — HOSPITAL ENCOUNTER (OUTPATIENT)
Dept: PHYSICAL THERAPY | Age: 77
Discharge: HOME | End: 2017-02-22
Payer: MEDICARE

## 2017-02-22 VITALS
OXYGEN SATURATION: 98 % | WEIGHT: 219 LBS | DIASTOLIC BLOOD PRESSURE: 93 MMHG | BODY MASS INDEX: 31.35 KG/M2 | SYSTOLIC BLOOD PRESSURE: 164 MMHG | HEIGHT: 70 IN | HEART RATE: 88 BPM

## 2017-02-22 DIAGNOSIS — I71.40 AAA (ABDOMINAL AORTIC ANEURYSM) WITHOUT RUPTURE: Primary | ICD-10-CM

## 2017-02-22 DIAGNOSIS — N18.30 CKD (CHRONIC KIDNEY DISEASE), STAGE III (HCC): ICD-10-CM

## 2017-02-22 DIAGNOSIS — E78.00 PURE HYPERCHOLESTEROLEMIA: ICD-10-CM

## 2017-02-22 DIAGNOSIS — I35.0 AORTIC STENOSIS, MODERATE: ICD-10-CM

## 2017-02-22 DIAGNOSIS — I10 ESSENTIAL HYPERTENSION: ICD-10-CM

## 2017-02-22 DIAGNOSIS — E11.21 CONTROLLED TYPE 2 DIABETES MELLITUS WITH DIABETIC NEPHROPATHY, WITHOUT LONG-TERM CURRENT USE OF INSULIN (HCC): ICD-10-CM

## 2017-02-22 DIAGNOSIS — Z95.2 S/P AVR (AORTIC VALVE REPLACEMENT): ICD-10-CM

## 2017-02-22 DIAGNOSIS — I72.4 FEMORAL ARTERY ANEURYSM, LEFT (HCC): ICD-10-CM

## 2017-02-22 DIAGNOSIS — I73.9 PAD (PERIPHERAL ARTERY DISEASE) (HCC): ICD-10-CM

## 2017-02-22 DIAGNOSIS — I51.89 DIASTOLIC DYSFUNCTION: ICD-10-CM

## 2017-02-22 DIAGNOSIS — Z95.1 S/P CABG (CORONARY ARTERY BYPASS GRAFT): ICD-10-CM

## 2017-02-22 PROCEDURE — 97112 NEUROMUSCULAR REEDUCATION: CPT

## 2017-02-22 PROCEDURE — 97110 THERAPEUTIC EXERCISES: CPT

## 2017-02-22 NOTE — PROGRESS NOTES
PHYSICAL THERAPY - DAILY TREATMENT NOTE    Patient Name: Jose Eduardo Stephenson        Date: 2017  : 1940   YES Patient  Verified  Visit #:   2   of   8  Insurance: Payor: [address] [place] [address] / Plan: ELISA. [address] / Product Type: Managed Care Medicare /      In time: 8:05 Out time: 9:00   Total Treatment Time: 54     Medicare Time Tracking (below)   Total Timed Codes (min):  55 1:1 Treatment Time:  55     TREATMENT AREA =  DM, PAD, CAD, gait instability, weakness    SUBJECTIVE    Pain Level (on 0 to 10 scale):  0  / 10   Medication Changes/New allergies or changes in medical history, any new surgeries or procedures? NO    If yes, update Summary List   Subjective Functional Status/Changes:  []  No changes reported     Patient brings in current LE HEP. OBJECTIVE    30 min Therapeutic Exercise:  [x]  See flow sheet   Rationale:      increase ROM, increase strength, improve coordination, improve balance and increase proprioception to improve the patients ability to perform ADL's, gait, and functional mobility with increased safety and independence. 25 min Neuromuscular Re-ed:    Rationale:      increase ROM, increase strength, improve coordination, improve balance and increase proprioception to improve the patients ability to perform ADL's, gait, and functional mobility with increased safety and independence. min Patient Education:  YES  Reviewed HEP   []  Progressed/Changed HEP based on:   EO/EC balance exercises per handout; H/L hip abd     Other Objective/Functional Measures:    Patient ambulates with SBQC but he tends to carry QC instead of using it properly. He also holds it in his R UE frequently. Patient was educated in proper gait with [facility] [place].    VC's for correct form with supine bridge.     EO ROM 30\"  EC MSR(instep) R 30\"/ L 3\", 4\"    EC ROM 30\" (mild sway)   Post Treatment Pain Level (on 0 to 10) scale:   0  / 10     ASSESSMENT    Assessment/Changes in Function:     Tolerated exercises without c/o's.     []  See Progress Note/Recertification   Patient will continue to benefit from skilled PT services to modify and progress therapeutic interventions, address functional mobility deficits, address ROM deficits, address strength deficits, analyze and address soft tissue restrictions, analyze and cue movement patterns, analyze and modify body mechanics/ergonomics, assess and modify postural abnormalities, address imbalance/dizziness and instruct in home and community integration to attain remaining goals. Progress toward goals / Updated goals: · Short Term Goals: To be accomplished in 4 weeks:  1. Patient will demonstrate compliance with HEP. Initiated HEP  2. Patient will maintain foam stance eyes open 30\" to increase safety in challenging environments. EO foam stance 8\"  3. Patient will score greater than or equal to 19/24 on DGI to indicate increased balance/decreased fall risk. Began EO/EC balance exercises to improve safety with gait.      PLAN    [x]  Upgrade activities as tolerated YES Continue plan of care   []  Discharge due to :    []  Other:      Therapist: Beverly Kam PT    Date: 2/22/2017 Time: 8:28 AM     Future Appointments  Date Time Provider Susannah Beltre   2/22/2017 10:00 AM Chrissy Bergman MD [address] [place] [address]   5/10/2017 8:00 AM [facility] 1 [facility] BEATA SCHED   5/10/2017 9:00 AM [facility] IM[facility] [address]   5/10/2017 10:00 AM [facility] NONIMAGING [facility] BE[facility] SCHED   5/10/2017 2:00 PM Promise Pearson  Godwin Lobato

## 2017-02-22 NOTE — MR AVS SNAPSHOT
Visit Information Date & Time Provider Department Dept. Phone Encounter #  
 2/22/2017 10:00 AM Teresa Gutierrez  [facility] Specialist at [facility] [address] [phone] [id number] Follow-up Instructions Return in about 6 months (around 8/22/2017). Your Appointments 5/10/2017  8:00 AM  
PROCEDURE with [facility] 1  
[facility] (BEATA SCHEDULING) Appt Note: endograft 5 mos moore - same day appt [address] [address]  
[phone] [address] [facility] [place] [address] 5/10/2017  9:00 AM  
PROCEDURE with [facility] 1  
[facility] (BEATA SCHEDULING) Appt Note: sfa 5 mos moore - same day appt [address] [address]  
[phone] 5/10/2017 10:00 AM  
PROCEDURE with [facility] NONIMAGING  
[facility] (BEATA SCHEDULING) Appt Note: anitra 5 mos moore - same day appt [address] [address]  
[phone] [address] [facility] [place] [address] 5/10/2017  2:00 PM  
Follow Up with Mike Acosta MD  
[facility] (BEATA SCHEDULING) Appt Note: 4 MONTH FU SAME DAY AS STUDIES AT [facility] WITH PREP; SCHEDULE ERROR; 4 MONTH FU SAME DAY AS STUDIES AT [facility] WITH PREP SCHEDULE ERROR  
 [address] [facility] [address] [address]  
[phone]  
  
   
 [address] [facility] [address] [address] Upcoming Health Maintenance Date Due  
 EYE EXAM RETINAL OR DILATED Q1 9/30/1950 GLAUCOMA SCREENING Q2Y 11/12/2016 HEMOGLOBIN A1C Q6M 3/13/2017 FOOT EXAM Q1 6/13/2017 MEDICARE YEARLY EXAM 6/14/2017 MICROALBUMIN Q1 9/13/2017 LIPID PANEL Q1 9/13/2017 COLONOSCOPY 6/29/2019 DTaP/Tdap/Td series (2 - Td) 9/14/2020 Allergies as of 2/22/2017  Review Complete On: 2/22/2017 By: Aparna Pichardo RN  
  
 Severity Noted Reaction Type Reactions Ace Inhibitors  03/27/2016    Other (comments) Per pt, Cardiologist states he cannot have ACE inhibitors Current Immunizations  Reviewed on 3/28/2016 Name Date Influenza High Dose Vaccine PF 9/13/2016, 11/10/2015, 10/7/2015 Influenza Vaccine 11/12/2014, 12/16/2013  8:04 AM  
 Influenza Vaccine Split 12/14/2012 Influenza Vaccine Whole 9/14/2010 Pneumococcal Conjugate (PCV-13) 10/7/2015 Pneumococcal Vaccine (Unspecified Type) 9/14/2010 TDAP Vaccine 9/14/2010 Zoster 9/14/2010 Not reviewed this visit Vitals BP  
  
  
  
  
  
 (!) 164/93 BMI and BSA Data Body Mass Index Body Surface Area  
 31.42 kg/m 2 2.21 m 2 Preferred Pharmacy Pharmacy Name Phone [facility] [address] [place] [address]. [address] [phone] Your Updated Medication List  
  
   
This list is accurate as of: 2/22/17 10:34 AM.  Always use your most recent med list.  
  
  
  
  
 aspirin 325 mg tablet Commonly known as:  ASPIRIN Take 1 Tab by mouth daily. atorvastatin 20 mg tablet Commonly known as:  LIPITOR Take 1 Tab by mouth daily. omega-3 fatty acids-vitamin e 1,000 mg Cap Commonly known as:  FISH OIL Take 1 Cap by mouth two (2) times a day. Follow-up Instructions Return in about 6 months (around 8/22/2017). To-Do List   
 03/07/2017 8:00 AM  
  Appointment with Angela Amaro at [facility] PT [address] ([phone])  
  
 03/21/2017 8:00 AM  
  Appointment with Kareen Tamayo, PT at [facility] PT [address] ([phone]) Introducing hospitals & HEALTH SERVICES! Dear Khloe Garcia: Thank you for requesting a [employer] account. Our records indicate that you already have an active [employer] account. You can access your account anytime at [email or web address] Did you know that you can access your hospital and ER discharge instructions at any time in [employer]? You can also review all of your test results from your hospital stay or ER visit. Additional Information If you have questions, please visit the Frequently Asked Questions section of the [employer] website at [email or web address] Remember, [employer] is NOT to be used for urgent needs. For medical emergencies, dial 911. Now available from your iPhone and Android! Please provide this summary of care documentation to your next provider. Your primary care clinician is listed as [id number] [facility]. If you have any questions after today's visit, please call [phone].

## 2017-02-22 NOTE — PROGRESS NOTES
1. Have you been to the ER, urgent care clinic since your last visit? Hospitalized since your last visit? No    2. Have you seen or consulted any other health care providers outside of the [address] [place] [address] since your last visit? Include any pap smears or colon screening.  No

## 2017-02-26 PROBLEM — I35.0 AORTIC STENOSIS, MODERATE: Status: ACTIVE | Noted: 2017-02-26

## 2017-02-26 PROBLEM — I51.89 DIASTOLIC DYSFUNCTION: Status: ACTIVE | Noted: 2017-02-26

## 2017-02-26 NOTE — PROGRESS NOTES
Subjective:      Joslyn Dominguez is in the office today for cardiac reevaluation. He is a 66-year-old man that was seen in October of 2016 when he was about to undergo a revascularization of his left lower extremity. He had that procedure done by Dr. Margo Escobar and is much improved. He reports that his lower extremity swelling has decreased to 60-80% of normal, which is a marked improvement. The patient has a history of known coronary artery disease and prior coronary bypass grafting done in 2005. He also had aortic valve replacement 2011. A recent echocardiogram done in December of 2016 demonstrated adequate function of the aortic prosthesis. Calculated area was 0.9 cm. The patient suffered a neurological event in December of 2016. He is followed by Dr. Tabitha Rivers in that regard. The patient had a MRI of brain with contrast on 12/30/2016. There was an old area of infarction involving the right frontal lobe extending into the corona radiata white matter. There was also an area of old infarction in the right parietal region. There was also an abnormal signal in the right sub-insular region, which was felt to possibly represent an area of old hemorrhagic infarct. There was no acute abnormality at that time. The patient has had no chest pain. In fact, he has not used nitroglycerin or sublingual nitroglycerin in two to three years. He experiences shortness of breath with bending over, but otherwise, has no limiting shortness of breath. He says his weight has been stable since December. He is presently doing some physical therapy related to his CVA with hopeful improvement of his weakness and balance problems.                       Patient Active Problem List    Diagnosis Date Noted    Diastolic dysfunction 38/20/0580    AAA (abdominal aortic aneurysm) without rupture ([facility] 01/04/2017    Femoral artery aneurysm, left ([facility] 01/04/2017    PAD (peripheral artery disease) ([facility] 09/21/2016    Advance directive in chart 06/13/2016    S/P CABG (coronary artery bypass graft) 04/13/2016    S/P AVR (aortic valve replacement) 04/13/2016    Hypertension 03/27/2016    Diabetes mellitus type 2, controlled ([address] 03/27/2016    CKD (chronic kidney disease), stage III 03/27/2016    Coronary artery disease involving native coronary artery without angina pectoris 01/27/2016    Pure hypercholesterolemia 09/14/2012     Current Outpatient Prescriptions   Medication Sig Dispense Refill    atorvastatin (LIPITOR) 20 mg tablet Take 1 Tab by mouth daily. 90 Tab 4    aspirin (ASPIRIN) 325 mg tablet Take 1 Tab by mouth daily. 90 Tab 4    omega-3 fatty acids-vitamin e (FISH OIL) 1,000 mg cap Take 1 Cap by mouth two (2) times a day.  180 Cap 4     Allergies   Allergen Reactions    Ace Inhibitors Other (comments)     Per pt, Cardiologist states he cannot have ACE inhibitors     Past Medical History:   Diagnosis Date    Advance directive in chart 6/13/2016    CAD (coronary artery disease)     Cancer ([address] 2003    Colon    CKD (chronic kidney disease), stage III 3/27/2016    CVA (cerebral vascular accident) ([address] 3/26/2016    Femoral artery aneurysm, left ([address]     Heart attack ([address] 2008    Hyperlipidemia     Hypertension 3/27/2016    Iliac artery aneurysm, left ([address]     Pure hypercholesterolemia 9/14/2012    PVD (peripheral vascular disease) ([address] 3/27/2016    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 ([address] 3/26/2016     Past Surgical History:   Procedure Laterality Date    ABDOMEN SURGERY PROC UNLISTED  2011    3 stents placed into abdomen (groin)    CARDIAC SURG PROCEDURE UNLIST  2005    Quadruple Bypass    CARDIAC SURG PROCEDURE UNLIST  2011    Aortic pig valve placed    COLONOSCOPY N/A 6/29/2016    COLONOSCOPY performed by Jaron Torres MD at [facility] ENDOSCOPY    ENDOSCOPY, COLON, DIAGNOSTIC      HX AAA REPAIR      HX ORTHOPAEDIC  2008    Right Leg Amputated     Family History   Problem Relation Age of Onset    Hypertension Mother     Hypertension Father      History   Smoking Status    Former Smoker    Quit date: 1987   Smokeless Tobacco    Never Used          Review of Systems, additional:  Constitutional: negative  Eyes: negative  Respiratory: negative  Cardiovascular: negative  Gastrointestinal: negative  Musculoskeletal:weakness  Neurological: negative  Behvioral/Psych: negative  Endocrine: negative  ENT: negative    Objective:     Visit Vitals    BP (!) 164/93    Pulse 88    Ht 5' 10\" (1.778 m)    Wt 99.3 kg (219 lb)    SpO2 98%    BMI 31.42 kg/m2     General:  alert, cooperative, no distress   Chest Wall: inspection normal - no chest wall deformities or tenderness, respiratory effort normal   Lung: clear to auscultation bilaterally   Heart:  normal rate and regular rhythm, S1 and S2 normal, systolic murmur 2/6 at 2nd right intercostal space and radiates to carotids   Abdomen: soft, non-tender. Bowel sounds normal. No masses,  no organomegaly   Extremities: Left extremity normal. There is a R BKA  no cyanosis . There is 1+ ankle edema on left Skin: no rashes   Neuro: alert, oriented, normal speech, no focal findings or movement disorder noted     EK2016; Sinus rhythm with PACs. LAD. Assessment/Plan:       ICD-10-CM ICD-9-CM    1. AAA (abdominal aortic aneurysm) without rupture ([facility]) I71.4 441.4    2. PAD (peripheral artery disease) ([facility]) I73.9 443.9    3. Femoral artery aneurysm, left ([facility]) I72.4 442.3    4. Pure hypercholesterolemia E78.00 272.0    5. CKD (chronic kidney disease), stage III N18.3 585.3    6. Controlled type 2 diabetes mellitus with diabetic nephropathy, without long-term current use of insulin ([facility]) E11.21 250.40      583.81    7. Essential hypertension, elevated in office today. He will follow up with Dr Mauro Asif 401.9    8.  Aortic stenosis, moderate, reasonable valvular parameters by recent Echo 2016 I35.0 424.1    9. Diastolic dysfunction T30.0 429.9    10. S/P AVR (aortic valve replacement), [place], [place] 2011 Z95.2 V43.3    11. S/P CABG (coronary artery bypass graft), [facility] [place] 2005, stable. No SL NTG times 2 to 3 years per patient. Will continue present cardiac Rx and see in RT in 6 mos.  Z95.1 V45.81    12     CVA, followed by Dr Doyle Bar

## 2017-02-27 DIAGNOSIS — E11.21 CONTROLLED TYPE 2 DIABETES MELLITUS WITH DIABETIC NEPHROPATHY, WITHOUT LONG-TERM CURRENT USE OF INSULIN (HCC): ICD-10-CM

## 2017-02-27 DIAGNOSIS — I25.10 CORONARY ARTERY DISEASE INVOLVING NATIVE CORONARY ARTERY OF NATIVE HEART WITHOUT ANGINA PECTORIS: ICD-10-CM

## 2017-02-27 DIAGNOSIS — E78.00 PURE HYPERCHOLESTEROLEMIA: ICD-10-CM

## 2017-02-27 DIAGNOSIS — I10 ESSENTIAL HYPERTENSION: ICD-10-CM

## 2017-02-27 DIAGNOSIS — N18.30 CKD (CHRONIC KIDNEY DISEASE), STAGE III (HCC): ICD-10-CM

## 2017-03-01 ENCOUNTER — OFFICE VISIT (OUTPATIENT)
Dept: FAMILY MEDICINE CLINIC | Age: 77
End: 2017-03-01

## 2017-03-01 ENCOUNTER — HOSPITAL ENCOUNTER (OUTPATIENT)
Dept: LAB | Age: 77
Discharge: HOME | End: 2017-03-01
Payer: MEDICARE

## 2017-03-01 VITALS
TEMPERATURE: 97.7 F | SYSTOLIC BLOOD PRESSURE: 160 MMHG | HEART RATE: 59 BPM | OXYGEN SATURATION: 97 % | DIASTOLIC BLOOD PRESSURE: 83 MMHG | RESPIRATION RATE: 16 BRPM | HEIGHT: 70 IN

## 2017-03-01 DIAGNOSIS — Z95.1 S/P CABG (CORONARY ARTERY BYPASS GRAFT): ICD-10-CM

## 2017-03-01 DIAGNOSIS — E78.00 PURE HYPERCHOLESTEROLEMIA: ICD-10-CM

## 2017-03-01 DIAGNOSIS — F01.50 VASCULAR DEMENTIA WITHOUT BEHAVIORAL DISTURBANCE (HCC): ICD-10-CM

## 2017-03-01 DIAGNOSIS — E11.21 CONTROLLED TYPE 2 DIABETES MELLITUS WITH DIABETIC NEPHROPATHY, WITHOUT LONG-TERM CURRENT USE OF INSULIN (HCC): Primary | ICD-10-CM

## 2017-03-01 DIAGNOSIS — I10 ESSENTIAL HYPERTENSION: ICD-10-CM

## 2017-03-01 DIAGNOSIS — I25.10 CORONARY ARTERY DISEASE INVOLVING NATIVE CORONARY ARTERY OF NATIVE HEART WITHOUT ANGINA PECTORIS: ICD-10-CM

## 2017-03-01 DIAGNOSIS — N18.30 CKD (CHRONIC KIDNEY DISEASE), STAGE III (HCC): ICD-10-CM

## 2017-03-01 LAB
ALBUMIN SERPL BCP-MCNC: 4.3 G/DL (ref 3.4–5)
ALBUMIN/GLOB SERPL: 1.5 {RATIO} (ref 0.8–1.7)
ALP SERPL-CCNC: 95 U/L (ref 45–117)
ALT SERPL-CCNC: 18 U/L (ref 16–61)
ANION GAP BLD CALC-SCNC: 6 MMOL/L (ref 3–18)
AST SERPL W P-5'-P-CCNC: 16 U/L (ref 15–37)
BASOPHILS # BLD AUTO: 0.1 K/UL (ref 0–0.06)
BASOPHILS # BLD: 1 % (ref 0–2)
BILIRUB SERPL-MCNC: 0.5 MG/DL (ref 0.2–1)
BUN SERPL-MCNC: 23 MG/DL (ref 7–18)
BUN/CREAT SERPL: 15 (ref 12–20)
CALCIUM SERPL-MCNC: 9.8 MG/DL (ref 8.5–10.1)
CHLORIDE SERPL-SCNC: 104 MMOL/L (ref 100–108)
CHOLEST SERPL-MCNC: 139 MG/DL
CO2 SERPL-SCNC: 32 MMOL/L (ref 21–32)
CREAT SERPL-MCNC: 1.52 MG/DL (ref 0.6–1.3)
DIFFERENTIAL METHOD BLD: ABNORMAL
EOSINOPHIL # BLD: 0.2 K/UL (ref 0–0.4)
EOSINOPHIL NFR BLD: 2 % (ref 0–5)
ERYTHROCYTE [DISTWIDTH] IN BLOOD BY AUTOMATED COUNT: 14.7 % (ref 11.6–14.5)
ERYTHROCYTE [SEDIMENTATION RATE] IN BLOOD: 5 MM/HR (ref 0–20)
FOLATE SERPL-MCNC: >20 NG/ML (ref 3.1–17.5)
GLOBULIN SER CALC-MCNC: 2.8 G/DL (ref 2–4)
GLUCOSE SERPL-MCNC: 105 MG/DL (ref 74–99)
HCT VFR BLD AUTO: 42 % (ref 36–48)
HDLC SERPL-MCNC: 36 MG/DL (ref 40–60)
HDLC SERPL: 3.9 {RATIO} (ref 0–5)
HGB BLD-MCNC: 13.9 G/DL (ref 13–16)
LDLC SERPL CALC-MCNC: 53 MG/DL (ref 0–100)
LIPID PROFILE,FLP: ABNORMAL
LYMPHOCYTES # BLD AUTO: 20 % (ref 21–52)
LYMPHOCYTES # BLD: 1.8 K/UL (ref 0.9–3.6)
MCH RBC QN AUTO: 33.2 PG (ref 24–34)
MCHC RBC AUTO-ENTMCNC: 33.1 G/DL (ref 31–37)
MCV RBC AUTO: 100.2 FL (ref 74–97)
MONOCYTES # BLD: 0.8 K/UL (ref 0.05–1.2)
MONOCYTES NFR BLD AUTO: 9 % (ref 3–10)
NEUTS SEG # BLD: 6 K/UL (ref 1.8–8)
NEUTS SEG NFR BLD AUTO: 68 % (ref 40–73)
PLATELET # BLD AUTO: 191 K/UL (ref 135–420)
PMV BLD AUTO: 12 FL (ref 9.2–11.8)
POTASSIUM SERPL-SCNC: 5 MMOL/L (ref 3.5–5.5)
PROT SERPL-MCNC: 7.1 G/DL (ref 6.4–8.2)
RBC # BLD AUTO: 4.19 M/UL (ref 4.7–5.5)
SODIUM SERPL-SCNC: 142 MMOL/L (ref 136–145)
TRIGL SERPL-MCNC: 250 MG/DL (ref ?–150)
TSH SERPL DL<=0.05 MIU/L-ACNC: 1.54 UIU/ML (ref 0.36–3.74)
VIT B12 SERPL-MCNC: 445 PG/ML (ref 211–911)
VLDLC SERPL CALC-MCNC: 50 MG/DL
WBC # BLD AUTO: 8.9 K/UL (ref 4.6–13.2)

## 2017-03-01 PROCEDURE — 87086 URINE CULTURE/COLONY COUNT: CPT | Performed by: FAMILY MEDICINE

## 2017-03-01 PROCEDURE — 82652 VIT D 1 25-DIHYDROXY: CPT | Performed by: FAMILY MEDICINE

## 2017-03-01 PROCEDURE — 84443 ASSAY THYROID STIM HORMONE: CPT | Performed by: FAMILY MEDICINE

## 2017-03-01 PROCEDURE — 80061 LIPID PANEL: CPT | Performed by: FAMILY MEDICINE

## 2017-03-01 PROCEDURE — 80053 COMPREHEN METABOLIC PANEL: CPT | Performed by: FAMILY MEDICINE

## 2017-03-01 PROCEDURE — 85652 RBC SED RATE AUTOMATED: CPT | Performed by: FAMILY MEDICINE

## 2017-03-01 PROCEDURE — 36415 COLL VENOUS BLD VENIPUNCTURE: CPT | Performed by: FAMILY MEDICINE

## 2017-03-01 PROCEDURE — 85025 COMPLETE CBC W/AUTO DIFF WBC: CPT | Performed by: FAMILY MEDICINE

## 2017-03-01 PROCEDURE — 82607 VITAMIN B-12: CPT | Performed by: FAMILY MEDICINE

## 2017-03-01 NOTE — PROGRESS NOTES
Latesha Avila is a 64 y.o. male accompanied by wife to clinic for f/u strokes. Pt was in a wheelchair today instead of self ambulating. Pt states that he has \"fallen quite a few times since his stroke\" and that he is having issues with memory. Pt would like discuss a referral to urology for frequent urination at night.

## 2017-03-01 NOTE — MR AVS SNAPSHOT
Visit Information Date & Time Provider Department Dept. Phone Encounter #  
 3/1/2017  9:00 AM Potter Aus[address] Espinoza [facility] [phone] [id number] Follow-up Instructions Return in about 1 week (around 3/8/2017) for EOV, labs today. Your Appointments 5/10/2017  8:00 AM  
PROCEDURE with [facility]  
[facility] (BEATA SCHEDULING) Appt Note: endograft 5 mos moore - same day appt [address] [address]  
[phone] [address] [address] 5/10/2017  9:00 AM  
PROCEDURE with [facility] 1  
[facility] (BEATA SCHEDULING) Appt Note: sfa 5 mos moore - same day appt [address] [address]  
[phone] 5/10/2017 10:00 AM  
PROCEDURE with [facility] NONIMAGING  
[facility] (BEATA SCHEDULING) Appt Note: anitra 5 mos moore - same day appt [address] [address]  
[phone] [address] [address] 5/10/2017  2:00 PM  
Follow Up with Esau Lesches, MD  
[facility] (BEATA SCHEDULING) Appt Note: 4 MONTH FU SAME DAY AS STUDIES AT [facility] WITH PREP; SCHEDULE ERROR; 4 MONTH FU SAME DAY AS STUDIES AT [facility] WITH PREP SCHEDULE ERROR  
 [facility] [address] [place] [phone]  
[phone]  
  
   
 [facility] [address]  
  
    
 8/23/2017  8:45 AM  
Follow Up with Radha Phoenix MD  
Cardio Specialist at [address]) Appt Note: 6 m f/u  
 [facility] [address] [address] [address] Upcoming Health Maintenance Date Due  
 EYE EXAM RETINAL OR DILATED Q1 9/30/1950 GLAUCOMA SCREENING Q2Y 11/12/2016 HEMOGLOBIN A1C Q6M 3/13/2017 FOOT EXAM Q1 6/13/2017 MEDICARE YEARLY EXAM 6/14/2017 MICROALBUMIN Q1 9/13/2017 LIPID PANEL Q1 9/13/2017 COLONOSCOPY 6/29/2019 DTaP/Tdap/Td series (2 - Td) 9/14/2020 Allergies as of 3/1/2017  Review Complete On: 2/26/2017 By: Niko Carlson MD  
  
 Severity Noted Reaction Type Reactions Ace Inhibitors  03/27/2016    Other (comments) Per pt, Cardiologist states he cannot have ACE inhibitors Current Immunizations  Reviewed on 3/28/2016 Name Date Influenza High Dose Vaccine PF 9/13/2016, 11/10/2015, 10/7/2015 Influenza Vaccine 11/12/2014, 12/16/2013  8:04 AM  
 Influenza Vaccine Split 12/14/2012 Influenza Vaccine Whole 9/14/2010 Pneumococcal Conjugate (PCV-13) 10/7/2015 Pneumococcal Vaccine (Unspecified Type) 9/14/2010 TDAP Vaccine 9/14/2010 Zoster 9/14/2010 Not reviewed this visit You Were Diagnosed With   
  
 Codes Comments Controlled type 2 diabetes mellitus with diabetic nephropathy, without long-term current use of insulin ([facility]    -  Primary ICD-10-CM: E11.21 
ICD-9-CM: 250.40, 583.81 S/P CABG (coronary artery bypass graft)     ICD-10-CM: Z95.1 ICD-9-CM: V45.81 Essential hypertension     ICD-10-CM: I10 
ICD-9-CM: 401.9 CKD (chronic kidney disease), stage III     ICD-10-CM: N18.3 ICD-9-CM: 961. 3 Pure hypercholesterolemia     ICD-10-CM: E78.00 ICD-9-CM: 272.0 Coronary artery disease involving native coronary artery of native heart without angina pectoris     ICD-10-CM: I25.10 ICD-9-CM: 414.01 Vascular dementia without behavioral disturbance     ICD-10-CM: F01.50 ICD-9-CM: 290.40 Vitals BP  
  
  
  
  
  
 160/83 (BP 1 Location: Right arm, BP Patient Position: Sitting) Preferred Pharmacy Pharmacy Name Phone [facility] [address] [phone] Your Updated Medication List  
  
   
 This list is accurate as of: 3/1/17 10:14 AM.  Always use your most recent med list.  
  
  
  
  
 aspirin 325 mg tablet Commonly known as:  ASPIRIN Take 1 Tab by mouth daily. atorvastatin 20 mg tablet Commonly known as:  LIPITOR Take 1 Tab by mouth daily. omega-3 fatty acids-vitamin e 1,000 mg Cap Commonly known as:  FISH OIL Take 1 Cap by mouth two (2) times a day. We Performed the Following REFERRAL TO NEUROLOGY [CYQ90 Custom] Comments:  
 Please evaluate patient for dementia  . Having increasing mobility and memory issues. Note he is still driving. .  1st available is fine. REFERRAL TO PHYSICIAL MEDICINE REHAB [RGD74 Custom] Comments:  
 Please evaluate patient for increasing problems with mobility. Also may want eval for power scooter. Note he also feels he is becomeing demented. . 1st available is fine. Follow-up Instructions Return in about 1 week (around 3/8/2017) for EOV, labs today. To-Do List   
 03/01/2017 Lab:  CBC WITH AUTOMATED DIFF   
  
 03/01/2017 Microbiology:  CULTURE, URINE   
  
 03/01/2017 Lab:  LIPID PANEL   
  
 03/01/2017 Lab:  METABOLIC PANEL, COMPREHENSIVE   
  
 03/01/2017 Lab:  SED RATE (ESR)   
  
 03/01/2017 Lab:  TSH 3RD GENERATION   
  
 03/01/2017 Lab:  URINALYSIS W/ RFLX MICROSCOPIC   
  
 03/01/2017 Lab:  VITAMIN B12 & FOLATE   
  
 03/01/2017 Lab:  VITAMIN D, 1, 25 DIHYDROXY   
  
 03/07/2017 8:00 AM  
  Appointment with Patrick Larkin at [facility] PT [address] ([phone])  
  
 03/21/2017 8:00 AM  
  Appointment with Oracio Fuller PT at [facility] PT [address]  ([phone]) Referral Information Referral ID Referred By Referred To  
  
 [id number] Rach Dominguez, [address] [place] [address], MD   
   [address] [facility] [address] Phone: [phone] Fax: [phone] Visits Status Start Date End Date 1 New Request 3/1/17 3/1/18 If your referral has a status of pending review or denied, additional information will be sent to support the outcome of this decision. Referral ID Referred By Referred To  
 [id number] Bijan Holm   
   [address]  
   [facility] [address] Phone: [phone] Fax: [phone] Visits Status Start Date End Date 1 New Request 3/1/17 3/1/18 If your referral has a status of pending review or denied, additional information will be sent to support the outcome of this decision. Introducing \[facility]\"" & HEALTH SERVICES! Dear Nickolas Solares: Thank you for requesting a [employer] account. Our records indicate that you already have an active [employer] account. You can access your account anytime at [email or web address] Did you know that you can access your hospital and ER discharge instructions at any time in [employer]? You can also review all of your test results from your hospital stay or ER visit. Additional Information If you have questions, please visit the Frequently Asked Questions section of the [employer] website at [email or web address] Remember, [employer] is NOT to be used for urgent needs. For medical emergencies, dial 911. Now available from your iPhone and Android! Please provide this summary of care documentation to your next provider. Your primary care clinician is listed as [address] [facility]. If you have any questions after today's visit, please call [phone].

## 2017-03-01 NOTE — PROGRESS NOTES
Renato Garcia is a 64 y.o.  male and presents with    Chief Complaint   Patient presents with    Ischemic Stroke    Cholesterol Problem    Hypertension    Diabetes    Chronic Kidney Disease    Dementia    Urinary Frequency     In with wife with variety of issues  1. He feels that his mind is going. Forgetful. Poor short term memory. States he has had multiple strokes in past.  k  2. Didn't have stroke in december -- had ?? tia  3. Had a fall - has bee referred to PT and [facility]  4. Has incrasing urinary frequency worried about bladder problems  5. Has qukestions about mobile chair  6. Interested in home visits assistance      Subjective:    Cardiovascular Review:  The patient has diabetes, hypertension and hyperlipidemia. Diet and Lifestyle: not attempting to follow a low fat, low cholesterol diet, not attempting to follow a low sodium diet  Home BP Monitoring: is not measured at home. Pertinent ROS: taking medications as instructed, no medication side effects noted, no TIA's, no chest pain on exertion, no dyspnea on exertion, no swelling of ankles. Diabetes Mellitus:  He has diabetes mellitus, and  diabetes, hypertension and hyperlipidemia. Diabetic ROS - diabetic diet compliance: compliant all of the time.    Lab review: labs are reviewed, up to date and normal.   Renal disease ongoing      Additional Concerns:          Patient Active Problem List    Diagnosis Date Noted    Diastolic dysfunction 42/83/4287    AAA (abdominal aortic aneurysm) without rupture ([address] 01/04/2017    Femoral artery aneurysm, left ([address] 01/04/2017    PAD (peripheral artery disease) ([address] 09/21/2016    Advance directive in chart 06/13/2016    S/P CABG (coronary artery bypass graft) 04/13/2016    S/P AVR (aortic valve replacement) 04/13/2016    Hypertension 03/27/2016    Diabetes mellitus type 2, controlled ([address] 03/27/2016    CKD (chronic kidney disease), stage III 03/27/2016    Coronary artery disease involving native coronary artery without angina pectoris 01/27/2016    Pure hypercholesterolemia 09/14/2012     Current Outpatient Prescriptions   Medication Sig Dispense Refill    atorvastatin (LIPITOR) 20 mg tablet Take 1 Tab by mouth daily. 90 Tab 4    aspirin (ASPIRIN) 325 mg tablet Take 1 Tab by mouth daily. 90 Tab 4    omega-3 fatty acids-vitamin e (FISH OIL) 1,000 mg cap Take 1 Cap by mouth two (2) times a day.  180 Cap 4     Allergies   Allergen Reactions    Ace Inhibitors Other (comments)     Per pt, Cardiologist states he cannot have ACE inhibitors     Past Medical History:   Diagnosis Date    Advance directive in chart 6/13/2016    CAD (coronary artery disease)     Cancer ([address] 2003    Colon    CKD (chronic kidney disease), stage III 3/27/2016    CVA (cerebral vascular accident) ([address] 3/26/2016    Femoral artery aneurysm, left ([address]     Heart attack ([address] 2008    Hyperlipidemia     Hypertension 3/27/2016    Iliac artery aneurysm, left ([address]     Pure hypercholesterolemia 9/14/2012    PVD (peripheral vascular disease) ([address] 3/27/2016    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 ([address] 3/26/2016     Past Surgical History:   Procedure Laterality Date    ABDOMEN SURGERY PROC UNLISTED  2011    3 stents placed into abdomen (groin)    CARDIAC SURG PROCEDURE UNLIST  2005    Quadruple Bypass    CARDIAC SURG PROCEDURE UNLIST  2011    Aortic pig valve placed    COLONOSCOPY N/A 6/29/2016    [facility] ENDOSCOPY    ENDOSCOPY, COLON, DIAGNOSTIC      HX AAA REPAIR      HX ORTHOPAEDIC  2008    Right Leg Amputated     Family History   Problem Relation Age of Onset    Hypertension Mother     Hypertension Father      Social History   Substance Use Topics    Smoking status: Former Smoker     Quit date: 9/14/1987    Smokeless tobacco: Never Used    Alcohol use Yes      Comment: wine 1-2x per week       ROS       All other systems reviewed and are negative. Objective:  Vitals:    03/01/17 0928   BP: 160/83   Pulse: (!) 59   Resp: 16   Temp: 97.7 °F (36.5 °C)   TempSrc: Oral   SpO2: 97%   Height: 5' 10\" (1.778 m)   PainSc:   0 - No pain                 alert, well appearing, and in no distress, oriented to person, place, and time and normal appearing weight  Chest - clear to auscultation, no wheezes, rales or rhonchi, symmetric air entry  Heart - normal rate, regular rhythm, normal S1, S2, no murmurs, rubs, clicks or gallops  Abdomen - soft, nontender, nondistended, no masses or organomegaly        LABS   Not done  TESTS      Assessment/Plan:    Diabetes - control uncertain  Hypertension - control uncertain  Hyperlipidemia - control uncertain  S/p cva in past  Nothing recent  ? ? Dementia will ask neurology to eval  Renal disease ongoing  Worsening mobility will ask PMR to eval he is also interested in power scooter and they can eval for that  Note he is currently getting PT and this may or may not be helpful  Urinary issues will check ua and cs today and f/u 1 wk  May want home instead or [employer] or may need assited living in near future. Lab review: orders written for new lab studies as appropriate; see orders      I have discussed the diagnosis with the patient and the intended plan as seen in the above orders. The patient has received an after-visit summary and questions were answered concerning future plans. I have discussed medication side effects and warnings with the patient as well. I have reviewed the plan of care with the patient, accepted their input and they are in agreement with the treatment goals. Follow-up Disposition:  Return in about 1 week (around 3/8/2017) for EOV, labs today. More than 1/2 of this 45 minute visit was spent in counselling and coordination of care, as described above.

## 2017-03-02 LAB
1,25(OH)2D3 SERPL-MCNC: 38 PG/ML (ref 19.9–79.3)
BACTERIA SPEC CULT: NORMAL
SERVICE CMNT-IMP: NORMAL

## 2017-03-07 ENCOUNTER — HOSPITAL ENCOUNTER (OUTPATIENT)
Dept: PHYSICAL THERAPY | Age: 77
Discharge: HOME | End: 2017-03-07
Payer: MEDICARE

## 2017-03-07 PROCEDURE — 97112 NEUROMUSCULAR REEDUCATION: CPT

## 2017-03-07 PROCEDURE — 97110 THERAPEUTIC EXERCISES: CPT

## 2017-03-07 NOTE — PROGRESS NOTES
PHYSICAL THERAPY - DAILY TREATMENT NOTE    Patient Name: Jese Suarez        Date: 3/7/2017  : 1940   YES Patient  Verified  Visit #:   3   of   8  Insurance: Payor: [address] [place] [address] / Plan: ELISA. [address] / Product Type: Managed Care Medicare /      In time: 8:00 Out time: 8:45   Total Treatment Time: 39     Medicare Time Tracking (below)   Total Timed Codes (min):  45 1:1 Treatment Time:  30     TREATMENT AREA =  DM, PAD, CAD, gait instability, weakness    SUBJECTIVE    Pain Level (on 0 to 10 scale):  0  / 10   Medication Changes/New allergies or changes in medical history, any new surgeries or procedures? NO    If yes, update Summary List   Subjective Functional Status/Changes:  []  No changes reported     Pt reports that he has tried some of the exercises we gave him, but not all of them. Pt states that his L leg gets weak and gives out on him when he's exercising. OBJECTIVE    35  (20) min Therapeutic Exercise:  [x]  See flow sheet   Rationale:      increase ROM, increase strength, improve coordination, improve balance and increase proprioception to promote increased functional mobility and increased activity tolerance with ADL's. 10 min Neuromuscular Re-ed: Static standing EO/EC balance   Rationale:    improve coordination, improve balance and increase proprioception to improve the patients ability to perform ADLs, transfers and gait safely and independently. min Patient Education:  YES  Reviewed HEP   []  Progressed/Changed HEP based on:   Patient with no questions, continue same HEP     Other Objective/Functional Measures:    Pt challenged with current balance HEP, did not progress this visit:   EO ROM 30\" (2 attempts)   EC Stance 30\"     VCing to increase use of L LE with HL hip abd. Pt demo's difficulty with bed mobility and requires min A with positioning of LE's for ther-ex.       Post Treatment Pain Level (on 0 to 10) scale:   0  / 10 ASSESSMENT    Assessment/Changes in Function:     Pt demo's L>R LE ms weakness with ther-ex. []  See Progress Note/Recertification   Patient will continue to benefit from skilled PT services to modify and progress therapeutic interventions, address functional mobility deficits, address ROM deficits, address strength deficits, analyze and address soft tissue restrictions, analyze and cue movement patterns, analyze and modify body mechanics/ergonomics, assess and modify postural abnormalities, address imbalance/dizziness and instruct in home and community integration to attain remaining goals. Progress toward goals / Updated goals:    1. Patient will demonstrate compliance with HEP. Pt reporting partial compliance with HEP  2. Patient will maintain foam stance eyes open 30\" to increase safety in challenging environments. EO foam stance 8\"  3. Patient will score greater than or equal to 19/24 on DGI to indicate increased balance/decreased fall risk. Began EO/EC balance exercises to improve safety with gait.      PLAN    []  Upgrade activities as tolerated YES Continue plan of care   []  Discharge due to :    []  Other:      Therapist: Alvarez Truong PTA    Date: 3/7/2017 Time: 8:23 AM     Future Appointments  Date Time Provider Susannah Beltre   3/8/2017 8:00 AM Caitie Johnson,  [facility] BEATA SCHED   3/21/2017 8:00 AM Jackelin Carbone PT [facility]   5/10/2017 8:00 AM [facility] 1 [address]   5/10/2017 9:00 AM [facility] 1 [address]   5/10/2017 10:00 AM [facility] NONIMAGING [facility] BEATA SCHED   5/10/2017 2:00 PM Monica Davis MD [facility] BEATA SCHED   8/23/2017 8:45 AM Zara Calero MD [address] [place] [address]

## 2017-03-08 ENCOUNTER — OFFICE VISIT (OUTPATIENT)
Dept: FAMILY MEDICINE CLINIC | Age: 77
End: 2017-03-08

## 2017-03-08 VITALS
TEMPERATURE: 97 F | HEIGHT: 70 IN | SYSTOLIC BLOOD PRESSURE: 160 MMHG | DIASTOLIC BLOOD PRESSURE: 74 MMHG | BODY MASS INDEX: 30.72 KG/M2 | OXYGEN SATURATION: 98 % | RESPIRATION RATE: 16 BRPM | HEART RATE: 62 BPM | WEIGHT: 214.6 LBS

## 2017-03-08 DIAGNOSIS — E78.00 PURE HYPERCHOLESTEROLEMIA: ICD-10-CM

## 2017-03-08 DIAGNOSIS — R35.0 URINARY FREQUENCY: Primary | ICD-10-CM

## 2017-03-08 DIAGNOSIS — I10 ESSENTIAL HYPERTENSION: ICD-10-CM

## 2017-03-08 DIAGNOSIS — F01.50 VASCULAR DEMENTIA WITHOUT BEHAVIORAL DISTURBANCE (HCC): ICD-10-CM

## 2017-03-08 DIAGNOSIS — I25.10 CORONARY ARTERY DISEASE INVOLVING NATIVE CORONARY ARTERY OF NATIVE HEART WITHOUT ANGINA PECTORIS: ICD-10-CM

## 2017-03-08 DIAGNOSIS — I71.40 AAA (ABDOMINAL AORTIC ANEURYSM) WITHOUT RUPTURE: ICD-10-CM

## 2017-03-08 DIAGNOSIS — I51.89 DIASTOLIC DYSFUNCTION: ICD-10-CM

## 2017-03-08 DIAGNOSIS — E11.21 CONTROLLED TYPE 2 DIABETES MELLITUS WITH DIABETIC NEPHROPATHY, WITHOUT LONG-TERM CURRENT USE OF INSULIN (HCC): ICD-10-CM

## 2017-03-08 DIAGNOSIS — I73.9 PAD (PERIPHERAL ARTERY DISEASE) (HCC): ICD-10-CM

## 2017-03-08 DIAGNOSIS — N18.30 CKD (CHRONIC KIDNEY DISEASE), STAGE III (HCC): ICD-10-CM

## 2017-03-08 DIAGNOSIS — I72.4 FEMORAL ARTERY ANEURYSM, LEFT (HCC): ICD-10-CM

## 2017-03-08 NOTE — PROGRESS NOTES
1. Have you been to the ER, urgent care clinic since your last visit? Hospitalized since your last visit? No    2. Have you seen or consulted any other health care providers outside of the [address] [place] [address] since your last visit? Include any pap smears or colon screening.  No

## 2017-03-08 NOTE — PROGRESS NOTES
Diaz Bocanegra is a 64 y.o.  male and presents with    Chief Complaint   Patient presents with    Diabetes    Hypertension    Chronic Kidney Disease    Coronary Artery Disease    Cold extremity    Dementia    Urinary Frequency           Subjective:  1. ongiong problems with urinary frequency here for f/u  2. Still worried about dementia has been referred to neurology  3. Balance gettilng PT  Notes reviewed today    Cardiovascular Review:  The patient has diabetes, hypertension, hyperlipidemia, coronary artery disease and peripheral vascular disease. Diet and Lifestyle: not attempting to follow a low fat, low cholesterol diet, not attempting to follow a low sodium diet  Home BP Monitoring: is not measured at home. Pertinent ROS: taking medications as instructed, not taking medications regularly as instructed, no medication side effects noted, no TIA's, no chest pain on exertion, no dyspnea on exertion, no swelling of ankles. Diabetes Mellitus:  He has diabetes mellitus, and  diabetes, hypertension, hyperlipidemia, coronary artery disease and peripheral vascular disease. Diabetic ROS - diabetic diet compliance: compliant most of the time.    Lab review: labs are reviewed, up to date and normal.   Chronic renal disease here for f/u    Additional Concerns:          Patient Active Problem List    Diagnosis Date Noted    Urinary frequency 03/08/2017    Vascular dementia without behavioral disturbance 00/72/4344    Diastolic dysfunction 22/43/1517    AAA (abdominal aortic aneurysm) without rupture ([address] 01/04/2017    Femoral artery aneurysm, left ([address] 01/04/2017    PAD (peripheral artery disease) ([address] 09/21/2016    Advance directive in chart 06/13/2016    Hypertension 03/27/2016    Diabetes mellitus type 2, controlled ([address] 03/27/2016    CKD (chronic kidney disease), stage III 03/27/2016    Coronary artery disease involving native coronary artery without angina pectoris 01/27/2016    Pure hypercholesterolemia 09/14/2012     Current Outpatient Prescriptions   Medication Sig Dispense Refill    atorvastatin (LIPITOR) 20 mg tablet Take 1 Tab by mouth daily. 90 Tab 4    aspirin (ASPIRIN) 325 mg tablet Take 1 Tab by mouth daily. 90 Tab 4    omega-3 fatty acids-vitamin e (FISH OIL) 1,000 mg cap Take 1 Cap by mouth two (2) times a day. 180 Cap 4     Allergies   Allergen Reactions    Ace Inhibitors Other (comments)     Per pt, Cardiologist states he cannot have ACE inhibitors     Past Medical History:   Diagnosis Date    Advance directive in chart 6/13/2016    CAD (coronary artery disease)     Cancer ([address] 2003    Colon    CKD (chronic kidney disease), stage III 3/27/2016    CVA (cerebral vascular accident) ([address] 3/26/2016    Femoral artery aneurysm, left ([address]     Heart attack ([address] 2008    Hyperlipidemia     Hypertension 3/27/2016    Iliac artery aneurysm, left ([address]     Pure hypercholesterolemia 9/14/2012    PVD (peripheral vascular disease) ([address] 3/27/2016    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 ([address] 3/26/2016     Past Surgical History:   Procedure Laterality Date    ABDOMEN SURGERY PROC UNLISTED  2011    3 stents placed into abdomen (groin)    CARDIAC SURG PROCEDURE UNLIST  2005    Quadruple Bypass    CARDIAC SURG PROCEDURE UNLIST  2011    Aortic pig valve placed    COLONOSCOPY N/A 6/29/2016    COLON[facility] performed [facility] Nat Curling[facility] at [facility] ENDOSCOPY    ENDOSCOPY, COLON, DIAGNOSTIC      HX AAA REPAIR      HX ORTHOPAEDIC  2008    Right Leg Amputated     Family History   Problem Relation Age of Onset    Hypertension Mother     Hypertension Father      Social History   Substance Use Topics    Smoking status: Former Smoker     Quit date: 9/14/1987    Smokeless tobacco: Never Used    Alcohol use Yes      Comment: wine 1-2x per week       ROS       All other systems reviewed and are negative.       Objective:  Vitals:    03/08/17 0824   BP: 160/74   Pulse: 62   Resp: 16   Temp: 97 °F (36.1 °C)   TempSrc: Oral   SpO2: 98%   Weight: 214 lb 9.6 oz (97.3 kg)   Height: 5' 10\" (1.778 m)   PainSc:   0 - No pain                 alert, well appearing, and in no distress, oriented to person, place, and time and normal appearing weight  Chest - clear to auscultation, no wheezes, rales or rhonchi, symmetric air entry  Heart - normal rate, regular rhythm, normal S1, S2, no murmurs, rubs, clicks or gallops  Abdomen - soft, nontender, nondistended, no masses or organomegaly        LABS   Component      Latest Ref Rng & Units 3/1/2017 3/1/2017 3/1/2017 3/1/2017          10:33 AM 10:33 AM 10:33 AM 10:33 AM   WBC      4.6 - 13.2 K/uL   8.9    RBC      4.70 - 5.50 M/uL   4.19 (L)    HGB      13.0 - 16.0 g/dL   13.9    HCT      36.0 - 48.0 %   42.0    MCV      74.0 - 97.0 FL   100.2 (H)    MCH      24.0 - 34.0 PG   33.2    MCHC      31.0 - 37.0 g/dL   33.1    RDW      11.6 - 14.5 %   14.7 (H)    PLATELET      869 - 714 K/uL   191    MPV      9.2 - 11.8 FL   12.0 (H)    NEUTROPHILS      40 - 73 %   68    LYMPHOCYTES      21 - 52 %   20 (L)    MONOCYTES      3 - 10 %   9    EOSINOPHILS      0 - 5 %   2    BASOPHILS      0 - 2 %   1    ABS. NEUTROPHILS      1.8 - 8.0 K/UL   6.0    ABS. LYMPHOCYTES      0.9 - 3.6 K/UL   1.8    ABS. MONOCYTES      0.05 - 1.2 K/UL   0.8    ABS. EOSINOPHILS      0.0 - 0.4 K/UL   0.2    ABS.  BASOPHILS      0.0 - 0.06 K/UL   0.1 (H)    DF         AUTOMATED    Sodium      136 - 145 mmol/L       Potassium      3.5 - 5.5 mmol/L       Chloride      100 - 108 mmol/L       CO2      21 - 32 mmol/L       Anion gap      3.0 - 18 mmol/L       Glucose      74 - 99 mg/dL       BUN      7.0 - 18 MG/DL       Creatinine      0.6 - 1.3 MG/DL       BUN/Creatinine ratio      12 - 20         GFR est AA      >60 ml/min/1.73m2       GFR est non-AA      >60 ml/min/1.73m2       Calcium      8.5 - 10.1 MG/DL       Bilirubin, total      0.2 - 1.0 MG/DL       ALT      16 - 61 U/L AST      15 - 37 U/L       Alk. phosphatase      45 - 117 U/L       Protein, total      6.4 - 8.2 g/dL       Albumin      3.4 - 5.0 g/dL       Globulin      2.0 - 4.0 g/dL       A-G Ratio      0.8 - 1.7         Cholesterol, total      <200 MG/DL       Triglyceride      <150 MG/DL       HDL Cholesterol      40 - 60 MG/DL       LDL, calculated      0 - 100 MG/DL       VLDL, calculated      MG/DL       CHOL/HDL Ratio      0 - 5.0         Vitamin B12      211 - 911 pg/mL       Folate      3.10 - 17.50 ng/mL       Sed rate, automated      0 - 20 mm/hr  5     TSH      0.36 - 3.74 uIU/mL    1.54   Calcitriol (Vit D 1, 25 di-OH)      19.9 - 79.3 pg/mL 38.0        Component      Latest Ref Rng & Units 3/1/2017 3/1/2017 3/1/2017          10:33 AM 10:33 AM 10:33 AM   WBC      4.6 - 13.2 K/uL      RBC      4.70 - 5.50 M/uL      HGB      13.0 - 16.0 g/dL      HCT      36.0 - 48.0 %      MCV      74.0 - 97.0 FL      MCH      24.0 - 34.0 PG      MCHC      31.0 - 37.0 g/dL      RDW      11.6 - 14.5 %      PLATELET      941 - 106 K/uL      MPV      9.2 - 11.8 FL      NEUTROPHILS      40 - 73 %      LYMPHOCYTES      21 - 52 %      MONOCYTES      3 - 10 %      EOSINOPHILS      0 - 5 %      BASOPHILS      0 - 2 %      ABS. NEUTROPHILS      1.8 - 8.0 K/UL      ABS. LYMPHOCYTES      0.9 - 3.6 K/UL      ABS. MONOCYTES      0.05 - 1.2 K/UL      ABS. EOSINOPHILS      0.0 - 0.4 K/UL      ABS.  BASOPHILS      0.0 - 0.06 K/UL      DF            Sodium      136 - 145 mmol/L 142     Potassium      3.5 - 5.5 mmol/L 5.0     Chloride      100 - 108 mmol/L 104     CO2      21 - 32 mmol/L 32     Anion gap      3.0 - 18 mmol/L 6     Glucose      74 - 99 mg/dL 105 (H)     BUN      7.0 - 18 MG/DL 23 (H)     Creatinine      0.6 - 1.3 MG/DL 1.52 (H)     BUN/Creatinine ratio      12 - 20   15     GFR est AA      >60 ml/min/1.73m2 54 (L)     GFR est non-AA      >60 ml/min/1.73m2 45 (L)     Calcium      8.5 - 10.1 MG/DL 9.8     Bilirubin, total      0.2 - 1.0 MG/DL 0.5     ALT      16 - 61 U/L 18     AST      15 - 37 U/L 16     Alk. phosphatase      45 - 117 U/L 95     Protein, total      6.4 - 8.2 g/dL 7.1     Albumin      3.4 - 5.0 g/dL 4.3     Globulin      2.0 - 4.0 g/dL 2.8     A-G Ratio      0.8 - 1.7   1.5     Cholesterol, total      <200 MG/DL  139    Triglyceride      <150 MG/DL  250 (H)    HDL Cholesterol      40 - 60 MG/DL  36 (L)    LDL, calculated      0 - 100 MG/DL  53    VLDL, calculated      MG/DL  50    CHOL/HDL Ratio      0 - 5.0    3.9    Vitamin B12      211 - 911 pg/mL   445   Folate      3.10 - 17.50 ng/mL   >20.0 (H)   Sed rate, automated      0 - 20 mm/hr      TSH      0.36 - 3.74 uIU/mL      Calcitriol (Vit D 1, 25 di-OH)      19.9 - 79.3 pg/mL        TESTS      Assessment/Plan:    Diabetes - off all meds doing well on just diet at this time  Hypertension - isolated systolic. Will observe. I don't want to lower and make him pass out. Steph when getting PT  Hyperlipidemia - stable  Kidney dz stable  vasc dz stable  Cad  Stable  Dementia awaiting neurology [place] no evidence of inf will refer to urology      Lab review: labs are reviewed, up to date and normal      I have discussed the diagnosis with the patient and the intended plan as seen in the above orders. The patient has received an after-visit summary and questions were answered concerning future plans. I have discussed medication side effects and warnings with the patient as well. I have reviewed the plan of care with the patient, accepted their input and they are in agreement with the treatment goals. Follow-up Disposition:  Return in about 3 months (around 6/8/2017) for physical, labs prior, EKG next visit. More than 1/2 of this 45 minute visit was spent in counselling and coordination of care, as described above.

## 2017-03-08 NOTE — MR AVS SNAPSHOT
Visit Information Date & Time Provider Department Dept. Phone Encounter #  
 3/8/2017  8:00 AM Espinoza Valdez [facility] [phone] [id number] Follow-up Instructions Return in about 3 months (around 6/8/2017) for physical, labs prior, EKG next visit. Follow-up and Disposition History Your Appointments 5/10/2017  8:00 AM  
PROCEDURE with [facility] IMAGING 1  
[facility] (BEATA SCHEDULING) Appt Note: endograft 5 mos moore - same day appt [address] [address]  
[phone] [address] [facility] [address] 5/10/2017  9:00 AM  
PROCEDURE with [facility] IMAGING 1  
[facility] (BEATA SCHEDULING) Appt Note: sfa 5 mos moore - same day appt [address] [address]  
[phone] 5/10/2017 10:00 AM  
PROCEDURE with [facility] NONIMAGING  
[facility] (BEATA SCHEDULING) Appt Note: anitra 5 mos moore - same day appt [address] [address]  
[phone] [address] [facility] [address] 5/10/2017  2:00 PM  
Follow Up with Katerina Otto MD  
[facility] (BEATA SCHEDULING) Appt Note: 4 MONTH FU SAME DAY AS STUDIES AT [facility] WITH PREP; SCHEDULE ERROR; 4 MONTH FU SAME DAY AS STUDIES AT [facility] WITH PREP SCHEDULE ERROR  
 [address] [facility] [address] [facility] [address]  
[phone]  
  
   
 [facility] [address]  
  
    
 8/23/2017  8:45 AM  
Follow Up with Tracie Reynolds MD  
Cardio Specialist at [facility]) Appt Note: 6 m f/u  
 [address] [address]  
  
   
 [address] Upcoming Health Maintenance Date Due  
 EYE EXAM RETINAL OR DILATED Q1 9/30/1950 GLAUCOMA SCREENING Q2Y 11/12/2016 HEMOGLOBIN A1C Q6M 3/13/2017 FOOT EXAM Q1 6/13/2017 MEDICARE YEARLY EXAM 6/14/2017 MICROALBUMIN Q1 9/13/2017 LIPID PANEL Q1 3/1/2018 COLONOSCOPY 6/29/2019 DTaP/Tdap/Td series (2 - Td) 9/14/2020 Allergies as of 3/8/2017  Review Complete On: 3/8/2017 By: Duke Cash., DO Severity Noted Reaction Type Reactions Ace Inhibitors  03/27/2016    Other (comments) Per pt, Cardiologist states he cannot have ACE inhibitors Current Immunizations  Reviewed on 3/28/2016 Name Date Influenza High Dose Vaccine PF 9/13/2016, 11/10/2015, 10/7/2015 Influenza Vaccine 11/12/2014, 12/16/2013  8:04 AM  
 Influenza Vaccine Split 12/14/2012 Influenza Vaccine Whole 9/14/2010 Pneumococcal Conjugate (PCV-13) 10/7/2015 Pneumococcal Vaccine (Unspecified Type) 9/14/2010 TDAP Vaccine 9/14/2010 Zoster 9/14/2010 Not reviewed this visit You Were Diagnosed With   
  
 Codes Comments Urinary frequency    -  Primary ICD-10-CM: R35.0 ICD-9-CM: 788.41 Vascular dementia without behavioral disturbance     ICD-10-CM: F01.50 ICD-9-CM: 290.40   
 AAA (abdominal aortic aneurysm) without rupture (HCC)     ICD-10-CM: I71.4 ICD-9-CM: 441.4 Femoral artery aneurysm, left (HCC)     ICD-10-CM: I72.4 ICD-9-CM: 442.3 PAD (peripheral artery disease) (HCC)     ICD-10-CM: I73.9 ICD-9-CM: 443.9 Controlled type 2 diabetes mellitus with diabetic nephropathy, without long-term current use of insulin (HCC)     ICD-10-CM: E11.21 
ICD-9-CM: 250.40, 583.81 Diastolic dysfunction     PYM-43-VC: I51.9 ICD-9-CM: 429.9 Essential hypertension     ICD-10-CM: I10 
ICD-9-CM: 401.9 Pure hypercholesterolemia     ICD-10-CM: E78.00 ICD-9-CM: 272.0 Coronary artery disease involving native coronary artery of native heart without angina pectoris     ICD-10-CM: I25.10 ICD-9-CM: 414.01   
 CKD (chronic kidney disease), stage III     ICD-10-CM: N18.3 ICD-9-CM: 136. 3 Vitals BP Pulse Temp Resp Height(growth percentile) Weight(growth percentile) 160/74 (BP 1 Location: Right arm, BP Patient Position: Sitting) 62 97 °F (36.1 °C) (Oral) 16 5' 10\" (1.778 m) 214 lb 9.6 oz (97.3 kg) SpO2 BMI Smoking Status 98% 30.79 kg/m2 Former Smoker BMI and BSA Data Body Mass Index Body Surface Area 30.79 kg/m 2 2.19 m 2 Preferred Pharmacy Pharmacy Name Phone Nina [address] [place], [place] [address] [phone] Your Updated Medication List  
  
   
This list is accurate as of: 3/8/17  8:49 AM.  Always use your most recent med list.  
  
  
  
  
 aspirin 325 mg tablet Commonly known as:  ASPIRIN Take 1 Tab by mouth daily. atorvastatin 20 mg tablet Commonly known as:  LIPITOR Take 1 Tab by mouth daily. omega-3 fatty acids-vitamin e 1,000 mg Cap Commonly known as:  FISH OIL Take 1 Cap by mouth two (2) times a day. We Performed the Following REFERRAL TO UROLOGY [CAQ273 Custom] Comments:  
 Please evaluate patient for urinary frequency and nocturia [place] 1[address] available is fine Follow-up Instructions Return in about 3 months (around 6/8/2017) for physical, labs prior, EKG next visit. To-Do List   
 03/17/2017 9:30 AM  
  Appointment with Chrissy Rothman PT at [facility] ([phone])  
  
 03/21/2017 7:30 AM  
  Appointment with Gregory Morrison PT at [facility] ([phone])  
  
 03/29/2017 7:30 AM  
  Appointment with Chrissy Rothman PT at [facility] ([phone]) Around 06/06/2017 Lab:  CBC WITH AUTOMATED DIFF   
  
 06/06/2017 Lab:  HEMOGLOBIN A1C WITH EAG Around 06/06/2017 Lab:  LIPID PANEL Around 06/06/2017 Lab:  METABOLIC PANEL, COMPREHENSIVE   
  
 06/06/2017 Lab:  MICROALBUMIN, UR, RAND W/ MICROALBUMIN/CREA RATIO Around 06/06/2017 Lab: PROSTATE SPECIFIC AG (PSA) Around 06/06/2017 Lab:  TSH 3RD GENERATION Around 06/06/2017 Lab:  URINALYSIS W/ RFLX MICROSCOPIC Referral Information Referral ID Referred By Referred To  
  
 [id number] Kyle Hurst MD   
   [address] [facility], [address] [place] [address] Phone: [phone] Fax: [phone] Visits Status Start Date End Date 1 New Request 3/8/17 3/8/18 If your referral has a status of pending review or denied, additional information will be sent to support the outcome of this decision. Introducing [facility]! Dear Alphia Sever: Thank you for requesting a [employer] account. Our records indicate that you already have an active [employer] account. You can access your account anytime at [email or web address] Did you know that you can access your hospital and ER discharge instructions at any time in [employer]? You can also review all of your test results from your hospital stay or ER visit. Additional Information If you have questions, please visit the Frequently Asked Questions section of the [employer] website at [email or web address] Remember, [employer] is NOT to be used for urgent needs. For medical emergencies, dial 911. Now available from your iPhone and Android! Please provide this summary of care documentation to your next provider. Your primary care clinician is listed as [address] [place] [address]. If you have any questions after today's visit, please call [phone].

## 2017-03-17 ENCOUNTER — HOSPITAL ENCOUNTER (OUTPATIENT)
Dept: PHYSICAL THERAPY | Age: 77
Discharge: HOME | End: 2017-03-17
Payer: MEDICARE

## 2017-03-17 ENCOUNTER — APPOINTMENT (OUTPATIENT)
Dept: PHYSICAL THERAPY | Age: 77
End: 2017-03-17
Payer: MEDICARE

## 2017-03-17 PROCEDURE — G8979 MOBILITY GOAL STATUS: HCPCS

## 2017-03-17 PROCEDURE — 97112 NEUROMUSCULAR REEDUCATION: CPT

## 2017-03-17 PROCEDURE — G8978 MOBILITY CURRENT STATUS: HCPCS

## 2017-03-17 PROCEDURE — 97530 THERAPEUTIC ACTIVITIES: CPT

## 2017-03-17 NOTE — PROGRESS NOTES
[address]  [facility] PHYSICAL THERAPY  [address] [facility] [address], [address] - Phone: [phone]  Fax: [phone]  Geraldine          Patient Name: Cat Lockwood : 1940   Treatment/Medical Diagnosis: Gait instability [R26.81]   Onset Date: 2016    Referral Source: Poncho Durant, DO Start of Care [facility]): 2017   Prior Hospitalization: See Medical History Provider #: [id number]   Prior Level of Function: Independent with ADLs, ambulation with R BK prosthesis; playing golf   Comorbidities: DM, PAD, CAD, MI, depression, visual impairment, hearing impairment, colon cancer - s/p chemo and surgical resection, TIA x 4, HA, hernia, urinary frequency, CABG, AVR, L LE stent due to blood clots/PVD, 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 1. Patient will demonstrate compliance with HEP. Status at last Eval: Issued HEP Current Status: Reports compliant w/ strength ex at home, however has not been completing balance activities progressing   2. Patient will score greater than or equal to 19/24 on DGI to indicate increased balance/decreased fall risk. Status at last Eval: 17/24 w/ SPC Current Status: 8 w/ SBQC Decline w/ use of SBQC; plan to reassess w/ use of SPC. 3.  Patient will maintain foam stance eyes open 30\" to increase safety in challenging environments. Status at last Eval: 8\" Current Status: 26\" progressing   4. Patient will score greater than or equal to 59/100 on FOTO to indicate increased function/decreased functional limitation. Status at last Eval: 56/100 Current Status: 46/100 no     Key Functional Changes/Progress: Pt has attended x4 PT visits consisting of gait training, balance training, LE strength/ROM, and patient education/safety awareness.  Pt has only attended 4 visits as he is scheduled 1x/wk due to $40 co-pay and reports has not been fully compliant w/ HEP, thus limiting progress w/ PT at this point. Pt reports recent fall in the shower, however denies injury. On initial evaluation pt amb w/ SPC, recently switched to [facility] w/ significant decrease in safety and quality of gait noted . Began today's sesison w/ SBQC on R, however w/ cane set up for use on L; adjusted cane for proper use on R. Pt frequently picking up AD, not using properly, increased safety concerns w/ SBQC vs SPC. Improvement noted w/ cane adjustment, however pt demonstrating continued difficulty and requiring extensive VCs for safety w/ AD management/sequencing. Decreased step length on L and \"freezing\" noted w/ turns and obstacle negt. Performed DGI w/ [facility] w/ score of 8/24; ROM EO/EC 30\"; MSR instep R 30\", L 4\"; Stance on foam EO 26\". Ended session w/ SPC, educating pt to use SPC at home for improved safety. Pt would continue to benefit from skilled PT services in order to address deficits in balance and gait w/ SPC. Problem List: decrease ROM, decrease strength, impaired gait/ balance, decrease ADL/ functional abilitiies, decrease activity tolerance and decrease transfer abilities   Treatment Plan may include any combination of the following: Therapeutic exercise, Therapeutic activities, Neuromuscular re-education, Gait/balance training, Patient education, Self Care training, Functional mobility training, Home safety training and Stair training  Patient Goal(s) has been updated and includes:      Goals for this certification period include and are to be achieved in   2-4  weeks:  1. Patient will demonstrate independence with HEP. 2. Patient will maintain foam stance eyes open 30\" to increase safety in challenging environments. 3. Patient will score greater than or equal to 19/24 on DGI to indicate increased balance/decreased fall risk.   4. Patient will maintain foam stance eyes closed 30\" to increase safety in challenging environments. 5. Patient will score greater than or equal to 59/100 on FOTO to indicate increased function/decreased functional limitation. Frequency / Duration:   Patient to be seen   1   times per week for   2-4    weeks:  G-Codes (GP): Mobility [id number] Current  CK= 40-59%   Goal  CK= 40-59%. The severity rating is based on the FOTO Score    Assessments/Recommendations: Pt would benefit from continued skilled PT services for 2-4 more visits in order to improve safety w/ functional mobility and reassess pt progress w/ use of SPC. If you have any questions/comments please contact us directly at [phone]. Thank you for allowing us to assist in the care of your patient. Therapist Signature: Derek Avendaño PT Date: 8/07/4001   Certification Period:  Reporting Period: 2/6/2017-5/6/2017 2/6/2017-3/17/2017 Time: 10:49 AM   NOTE TO PHYSICIAN:  PLEASE COMPLETE THE ORDERS BELOW AND FAX TO   [facility] Physical Therapy: [phone] If you are unable to process this request in 24 hours please contact our office: [phone].    ___ I have read the above report and request that my patient continue as recommended.   ___ I have read the above report and request that my patient continue therapy with the following changes/special instructions: ________________________________________________   ___ I have read the above report and request that my patient be discharged from therapy.      Physician Signature:        Date:       Time:

## 2017-03-17 NOTE — PROGRESS NOTES
PHYSICAL THERAPY - DAILY TREATMENT NOTE    Patient Name: Ronnie Blood        Date: 3/17/2017  : 1940   YES Patient  Verified  Visit #:   4   of   8  Insurance: Payor: John Gill / Plan: ELISA. [address] / Product Type: Managed Care Medicare /      In time: 9:30 Out time: 10:30   Total Treatment Time: 60     Medicare Time Tracking (below)   Total Timed Codes (min):  60 1:1 Treatment Time:  60     TREATMENT AREA =  Gait instability [R26.81]  SUBJECTIVE    Pain Level (on 0 to 10 scale):  0  / 10   Medication Changes/New allergies or changes in medical history, any new surgeries or procedures? NO    If yes, update Summary List   Subjective Functional Status/Changes:  []  No changes reported     Pt reports R arm L leg soreness after NS 5'  \"I feel a lot better when I finish therapy here. \"  \"It doesn't take me long to wear out though. \"  Pt reports fall off of shower chair on Tuesday, denies pain or injury from fall.           OBJECTIVE      45 min Therapeutic Activity: Transfer training, stair amb, DGI reassessment, multiple trials of amb w/ SBQC, amb w/ SPC; 100' x4, 125' x2         Rationale:   Improve safety w/ functional mobility in the home    15 min Neuromuscular Re-ed:  ROM EO/EC; stance on foam EO   Rationale:   Improve amb ability and increase safety negt environment    T/o tx min Patient Education:  YES  Reviewed HEP   [x]  Progressed/Changed HEP based on:   Pt educated on cont LE exercises and increased balance activities     Other Objective/Functional Measures:    Initially amb w/ SBQC on R, however w/ cane set up for use on L; adjusted cane for proper use on R w/ improved safety noted  Extensive VCs for safety w/ AD management/sequencing  Decreased step length on L and \"freezing\" noted w/ turns; improvement noted w/ constant VCs  Performed DGI w/ SBQC w/ score of ; pt frequently picking up AD, not using properly, increased safety concerns w/ SBQC vs SPC  FOTO scored 46/100, indicating 54% functional limitation. Ended session w/ SPC, educating pt to use SPC at home for improved safety     Post Treatment Pain Level (on 0 to 10) scale:   0  / 10     ASSESSMENT    Assessment/Changes in Function:     See progress note. []  See Progress Note/Recertification   Patient will continue to benefit from skilled PT services to modify and progress therapeutic interventions, address functional mobility deficits, address ROM deficits, address strength deficits, analyze and cue movement patterns, analyze and modify body mechanics/ergonomics, assess and modify postural abnormalities, address imbalance/dizziness and instruct in home and community integration to attain remaining goals. Progress toward goals / Updated goals:  Discuss w/ pt cont strength/balance w/HEP; focusing on gait and balance training during PT sessions. See progress note.      PLAN    [x]  Upgrade activities as tolerated YES Continue plan of care   []  Discharge due to :    []  Other:      Therapist: Sandie Huizar, PT    Date: 3/17/2017 Time: 9:42 AM     Future Appointments  Date Time Provider Susannah Beltre   3/21/2017 7:30 AM Silvestre Acosta, PT [facility]   3/29/2017 7:30 AM Sandie Huizar, PT [facility]   5/10/2017 8:00 AM [facility] [address]   5/10/2017 9:00 AM [facility] [address]   5/10/2017 10:00 AM [facility] NONIMAGING [facility]   5/10/2017 2:00 PM Juanita Padron MD [facility]   6/14/2017 8:00 AM Zandra Rowell., DO [facility]   8/23/2017 8:45 AM Rei Jeffries MD [address] [place] [address]

## 2017-03-21 ENCOUNTER — APPOINTMENT (OUTPATIENT)
Dept: PHYSICAL THERAPY | Age: 77
End: 2017-03-21
Payer: MEDICARE

## 2017-03-29 ENCOUNTER — APPOINTMENT (OUTPATIENT)
Dept: PHYSICAL THERAPY | Age: 77
End: 2017-03-29
Payer: MEDICARE

## 2017-04-06 ENCOUNTER — OFFICE VISIT (OUTPATIENT)
Dept: VASCULAR SURGERY | Age: 77
End: 2017-04-06

## 2017-04-06 ENCOUNTER — HOSPITAL ENCOUNTER (OUTPATIENT)
Dept: VASCULAR SURGERY | Age: 77
Discharge: HOME | End: 2017-04-06
Attending: PHYSICIAN ASSISTANT
Payer: MEDICARE

## 2017-04-06 VITALS
RESPIRATION RATE: 14 BRPM | DIASTOLIC BLOOD PRESSURE: 82 MMHG | WEIGHT: 214 LBS | BODY MASS INDEX: 30.64 KG/M2 | HEIGHT: 70 IN | HEART RATE: 79 BPM | SYSTOLIC BLOOD PRESSURE: 140 MMHG

## 2017-04-06 DIAGNOSIS — I72.4 FEMORAL ARTERY ANEURYSM, LEFT (HCC): ICD-10-CM

## 2017-04-06 DIAGNOSIS — I71.40 AAA (ABDOMINAL AORTIC ANEURYSM) WITHOUT RUPTURE: ICD-10-CM

## 2017-04-06 DIAGNOSIS — I73.9 PAD (PERIPHERAL ARTERY DISEASE) (HCC): ICD-10-CM

## 2017-04-06 DIAGNOSIS — I82.442 ACUTE DEEP VEIN THROMBOSIS (DVT) OF TIBIAL VEIN OF LEFT LOWER EXTREMITY (HCC): Primary | ICD-10-CM

## 2017-04-06 DIAGNOSIS — M79.89 LEG SWELLING: ICD-10-CM

## 2017-04-06 DIAGNOSIS — M79.605 PAIN OF LEFT LOWER EXTREMITY: ICD-10-CM

## 2017-04-06 DIAGNOSIS — I71.40 AAA (ABDOMINAL AORTIC ANEURYSM) WITHOUT RUPTURE: Primary | ICD-10-CM

## 2017-04-06 PROCEDURE — 93971 EXTREMITY STUDY: CPT

## 2017-04-06 NOTE — PROCEDURES
[facility]   *** FINAL REPORT ***    Name: Del Lopez  MRN: [id number]       Outpatient  : 30 Sep 1940  HIS Order #: [id number]  [id number] [facility] Visit #: [id number]  Date: 2017    TYPE OF TEST: Aorto-Iliac Duplex    REASON FOR TEST  AAA    B-Mode:-                 (cm)   1     2     3  Aortic diameter:         AP:                 5.0                           TV:                 4.8  Common iliac diameter:   Right:                           Left:    Abdominal aortic aneuysm:-  Location:                Infrarenal  Type:                    Fusiform  Distance from SMA (cm):    Duplex:-                           PSV  Stenosis                           ----- --------------------  Aorta: (1)         (2)                26.0         (3)                31.0    Right common iliac:      106.0  Right external iliac:     74.0    Left common iliac:        88.0  Left external iliac:     178.0    INTERPRETATION/FINDINGS  Duplex images were obtained using 2-D gray scale, color flow and  spectral doppler analysis. Techinically difficult due to overlying bowel gas. 1. Patent aorta iliac endovascular graft without evidence of leak. 2. Aneurysma sac measures 5.0 x 4.8 cm Trv as compared with previous  study on 10/13/16 with a measurement of 4.9 x 4.9 cm Trv.  3. Bilateral limbs patent in the segments accessible to scan. Unable  to access the left distal limb segment due to overlying gas. 4. Bilateral external iliac arteries patent. Multiphasic signals  noted throughout. 5. Prxomail SMA and distal right renal artery patent. Unable to  access/identify celiac artery, ZAC and left renal artery due to  overlying bowel gas.     ADDITIONAL COMMENTS  Celiac: not accessed, gas SMA: 174 cm/sec  RRA Dst: 58 cm/sec   LRA: not accessed, gas  Prx Ao: not accessed, gas   Sup Attach: 26 cm/sec  Body: 31 cm/sec   ZAC/Collateral: not accessed/identifired, gas  Right Limb:   Prx: 106 cm/sec   Mid: 93 cm/sec   Dst: 47 cm/sec     Inf Attach: 91 cm/sec   RIIA Prx: not accessed, gas        EIA Prx: 58 cm/sec   Mid: 74 cm/sec   Dst: 67 cm/sec  Left Limb:   Prx: 68 cm/sec   Mid: 88 cm/sec   Dst: 41 cm/sec     Inf Attach/LIIA Prx  not accessed, gas        EIA Prx:cm/sec   Mid: 178 cm/sec   Dst: 118 cm/sec  AAA Sac: 5.0 x 4.8 cm Trv  Previous AAA measurement: 4.9 x 4.9 cm  Trv  Unable to access distal segment of left limb, gas. Unable to obtain reliable proximal aortic measurement. I have personally reviewed the data relevant to the interpretation of  this  study. TECHNOLOGIST: Mahogany Hubbard RVT, BS  Signed: 04/06/2017 11:53 AM    PHYSICIAN: Dolores Church D.O.   Signed: 04/07/2017 09:05 AM

## 2017-04-06 NOTE — PROCEDURES
[facility]   *** FINAL REPORT ***    Name: Elizabeth Hall  MRN: [id number]       Outpatient  : 30 Sep 1940  HIS Order #: [id number]  [address] [facility] Visit #: [id number]  Date: 2017    TYPE OF TEST: Peripheral Arterial Testing    REASON FOR TEST  Peripheral vascular dz NOS    Right Leg  Segmentals:                     mmHg  Brachial         160  High thigh  Low thigh  Calf  Posterior tibial  Dorsalis pedis  Peroneal  Metatarsal  Toe pressure  Doppler:    Abnormal  Ankle/Brachial:    Left Leg  Segmentals: Abnormal                     mmHg  Brachial         158  High thigh  Low thigh  Calf             160  Posterior tibial 150  Dorsalis pedis   147  Peroneal  Metatarsal  Toe pressure     118  Doppler:  Ankle/Brachial: 0.94    INTERPRETATION/FINDINGS  Physiologic testing was performed using continuous wave doppler and  segmental pressures. 1. Right BKA noted with biphasic signals noted in the right common  femoral artery level and monophasic signals in the right poptieal  artery. 2. Mild arterial insufficiency on the left at rest, level  undetermined. 3. The left ankle/brachial index is 0.94.  4. The DBI on the left is 0.74. ADDITIONAL COMMENTS    I have personally reviewed the data relevant to the interpretation of  this  study. TECHNOLOGIST: Codi Hubbard RVT, BS  Signed: 2017 11:56 AM    PHYSICIAN: Peter Ludwig D.O.   Signed: 2017 09:05 AM

## 2017-04-06 NOTE — PROCEDURES
[facility]  *** FINAL REPORT ***    Name: German Mccullough  MRN: [id number]    Outpatient  : 30 Sep 1940  HIS Order #: [id number]  [address] [facility] Visit #: [id number]  Date: 2017    TYPE OF TEST: Peripheral Venous Testing    REASON FOR TEST  Pain in limb, Limb swelling    Left Leg:-  Deep venous thrombosis:           Yes  Proximal extent of thrombus:      Posterior Tibial  Superficial venous thrombosis:    No  Deep venous insufficiency:        Not examined  Superficial venous insufficiency: Not examined      INTERPRETATION/FINDINGS  Duplex images were obtained using 2-D gray scale, color flow, and  spectral Doppler analysis. Left leg :  1. Deep veins visualized include the common femoral, femoral,  popliteal, posterior tibial and peroneal veins. 2. Acute and occlusive deep venous thrombosis identified in one of two   posterior tibial veins in the proximal to mid calf. 3. No evidence of deep vein thrombosis in the contralateral common  femoral vein. 4. Superficial veins visualized include the great saphenous vein. 5. No evidence of superficial thrombosis detected. ADDITIONAL COMMENTS  Results called to [place], [employer]. I have personally reviewed the data relevant to the interpretation of  this  study. TECHNOLOGIST: ASHLY Vasquez, RVS  Signed: 2017 03:19 PM    PHYSICIAN: Cee Felix D.O.   Signed: 2017 09:11 AM

## 2017-04-06 NOTE — MR AVS SNAPSHOT
Visit Information Date & Time Provider Department Dept. Phone Encounter #  
 4/6/2017  1:00  [facility] [address] [facility] [id number] Follow-up Instructions Return in about 6 months (around 10/6/2017). Your Appointments 6/14/2017  8:00 AM  
COMPLETE PHYSICAL with Jessy Lopez,  [address] [facility] [address]) Appt Note: Return in about 3 months (around 6/8/2017) for physical, labs prior, EKG next visit. [address] [facility] [address] [facility] [address]  
  
   
 [address] [facility] [address] [facility] [address]  
  
    
 8/23/2017  8:45 AM  
Follow Up with Aury Velazquez MD  
Cardio Specialist at [address]) Appt Note: 6 m f/u  
 [address] [facility] [address] [facility] [address]  
  
   
 [address] [facility] [address] Upcoming Health Maintenance Date Due  
 EYE EXAM RETINAL OR DILATED Q1 9/30/1950 GLAUCOMA SCREENING Q2Y 11/12/2016 HEMOGLOBIN A1C Q6M 3/13/2017 FOOT EXAM Q1 6/13/2017 MEDICARE YEARLY EXAM 6/14/2017 MICROALBUMIN Q1 9/13/2017 LIPID PANEL Q1 3/1/2018 COLONOSCOPY 6/29/2019 DTaP/Tdap/Td series (2 - Td) 9/14/2020 Allergies as of 4/6/2017  Review Complete On: 4/6/2017 By: Viral Saavedra Severity Noted Reaction Type Reactions Ace Inhibitors  03/27/2016    Other (comments) Per pt, Cardiologist states he cannot have ACE inhibitors Current Immunizations  Reviewed on 3/28/2016 Name Date Influenza High Dose Vaccine PF 9/13/2016, 11/10/2015, 10/7/2015 Influenza Vaccine 11/12/2014, 12/16/2013  8:04 AM  
 Influenza Vaccine Split 12/14/2012 Influenza Vaccine Whole 9/14/2010 Pneumococcal Conjugate (PCV-13) 10/7/2015 Pneumococcal Vaccine (Unspecified Type) 9/14/2010 TDAP Vaccine 9/14/2010 Zoster 9/14/2010 Not reviewed this visit You Were Diagnosed With   
  
 Codes Comments Leg swelling    -  Primary ICD-10-CM: M79.89 ICD-9-CM: 729.81 Pain of left lower extremity     ICD-10-CM: M79.605 ICD-9-CM: 729.5 AAA (abdominal aortic aneurysm) without rupture (HCC)     ICD-10-CM: I71.4 ICD-9-CM: 441.4 PAD (peripheral artery disease) (HCC)     ICD-10-CM: I73.9 ICD-9-CM: 443. 9 Vitals BP Pulse Resp Height(growth percentile) Weight(growth percentile) BMI  
 140/82 (BP 1 Location: Left arm, BP Patient Position: Sitting) 79 14 5' 10\" (1.778 m) 214 lb (97.1 kg) 30.71 kg/m2 Smoking Status Former Smoker Vitals History BMI and BSA Data Body Mass Index Body Surface Area 30.71 kg/m 2 2.19 m 2 Preferred Pharmacy Pharmacy Name Phone [address] [phone] Your Updated Medication List  
  
   
This list is accurate as of: 4/6/17  1:45 PM.  Always use your most recent med list.  
  
  
  
  
 aspirin 325 mg tablet Commonly known as:  ASPIRIN Take 1 Tab by mouth daily. atorvastatin 20 mg tablet Commonly known as:  LIPITOR Take 1 Tab by mouth daily. FLOMAX PO Take  by mouth. omega-3 fatty acids-vitamin e 1,000 mg Cap Commonly known as:  FISH OIL Take 1 Cap by mouth two (2) times a day. Follow-up Instructions Return in about 6 months (around 10/6/2017). To-Do List   
 04/06/2017 Imaging:  DUPLEX LOWER EXT VENOUS LEFT   
  
 10/06/2017 Imaging:  DUPLEX AORTA ILIAC GRAFT COMPLETE AMB   
  
 10/06/2017 Imaging:  LOWER EXT ART PVR MULT LEVEL SEG PRESSURES AMB [facility] [place] [facility]! Dear Emilia Miller: Thank you for requesting a [employer] account. Our records indicate that you already have an active [email or web address] account. You can access your account anytime at [email or web address] Did you know that you can access your hospital and ER discharge instructions at any time in [employer]? You can also review all of your test results from your hospital stay or ER visit. Additional Information If you have questions, please visit the Frequently Asked Questions section of the [employer] website at [email or web address] Remember, [employer] is NOT to be used for urgent needs. For medical emergencies, dial 911. Now available from your iPhone and Android! Please provide this summary of care documentation to your next provider. Your primary care clinician is listed as [id number] [facility]. If you have any questions after today's visit, please call [phone].

## 2017-04-06 NOTE — ROUTINE PROCESS
Left leg venous duplex exam completed. Positive results called to Ellie Mcghee. The wristband was removed and shredded and the patient sent to [address] [facility] and Vascular office in the [facility] for review of findings and treatment options.

## 2017-04-06 NOTE — PROGRESS NOTES
Kristian Carvajal    Chief Complaint   Patient presents with    Leg Pain     left leg    Swelling     left leg       History and Physical    Kristian Monzon is a 64 y.o. male who is now 6 months status post redo groin exposure of left common femoral artery, with endovascular aneurysm repair of left common iliac artery with unilateral stent placement, and open repair of common femoral artery aneurysm with endarterectomy of the left superficial femoral artery with patch angioplasty. He denies any abdominal discomfort or back discomfort. He did unfortunately suffer a stroke after his surgery but this has had very small amounts of residual effects per pt and son. He does complain of swelling and pain in his left lower extremity. He states that this started after his surgery. He states that the pain is in his posterior calf and feels this mostly when he leans over to pick something up. He states that his swelling is slowly been worsening over the past month or so as well. He denies any fevers or chills no skin changes or ulcers. He does also have history of right BKA and ambulates with a prosthetic and cane.     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 ENDOSCOPY    ENDOSCOPY, COLON, DIAGNOSTIC      HX AAA REPAIR      HX ORTHOPAEDIC  2008    Right Leg Amputated     Patient Active Problem List   Diagnosis Code    Pure hypercholesterolemia E78.00    Coronary artery disease involving native coronary artery without angina pectoris I25.10    Hypertension I10    Diabetes mellitus type 2, controlled ([facility] E11.9    CKD (chronic kidney disease), stage III N18.3    Advance directive in chart Z78.9    PAD (peripheral artery disease) ([facility] I73.9    AAA (abdominal aortic aneurysm) without rupture ([facility] I71.4    Femoral artery aneurysm, 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  atorvastatin (LIPITOR) 20 mg tablet Take 1 Tab by mouth daily. 90 Tab 4    aspirin (ASPIRIN) 325 mg tablet Take 1 Tab by mouth daily. 90 Tab 4    omega-3 fatty acids-vitamin e (FISH OIL) 1,000 mg cap Take 1 Cap by mouth two (2) times a day. 180 Cap 4     Allergies   Allergen Reactions    Ace Inhibitors Other (comments)     Per pt, Cardiologist states he cannot have ACE inhibitors     Social History     Social History    Marital status:      Spouse name: N/A    Number of children: N/A    Years of education: N/A     Occupational History    Not on file.      Social History Main Topics    Smoking status: Former Smoker     Quit date: 9/14/1987    Smokeless tobacco: Never Used    Alcohol use Yes      Comment: wine 1-2x per week    Drug use: No    Sexual activity: Not Currently     Other Topics Concern    Not on file     Social History Narrative      Family History   Problem Relation Age of Onset    Hypertension Mother     Hypertension Father Physical Exam:    Visit Vitals    /82 (BP 1 Location: Left arm, BP Patient Position: Sitting)    Pulse 79    Resp 14    Ht 5' 10\" (1.778 m)    Wt 214 lb (97.1 kg)    BMI 30.71 kg/m2      General: Well-appearing elderly male in no acute distress  HEENT: EOMI no scleral icterus is noted he is wearing eyeglasses  Pulmonary: No increased work of breathing is noted  Extremities: left leg warm and perfused. Right prosthetic in place. 2-3+ pitting edema on left. Neuro: Cranial nerves II through XII grossly intact    INTERPRETATION/FINDINGS  Physiologic testing was performed using continuous wave doppler and  segmental pressures. 1. Right BKA noted with biphasic signals noted in the right common  femoral artery level and monophasic signals in the right poptieal  artery. 2. Mild arterial insufficiency on the left at rest, level  undetermined. 3. The left ankle/brachial index is 0.94.  4. The DBI on the left is 0.74. INTERPRETATION/FINDINGS  Duplex images were obtained using 2-D gray scale, color flow and  spectral doppler analysis. Techinically difficult due to overlying bowel gas. 1. Patent aorta iliac endovascular graft without evidence of leak. 2. Aneurysma sac measures 5.0 x 4.8 cm Trv as compared with previous  study on 10/13/16 with a measurement of 4.9 x 4.9 cm Trv.  3. Bilateral limbs patent in the segments accessible to scan. Unable  to access the left distal limb segment due to overlying gas. 4. Bilateral external iliac arteries patent. Multiphasic signals  noted throughout. 5. Prxomail SMA and distal right renal artery patent. Unable to  access/identify celiac artery, ZAC and left renal artery due to  overlying bowel gas. Impression and Plan:  Claudia Delacruz is a 64 y.o. male with abdominal aortic aneurysm now repaired with extension on the left side. He also had a left common femoral artery aneurysm now repaired open. He had SFA stenosis which is improved.   He does continue to have a narrowing that was identified on CTA done in December for one month follow up postoperatively. He is not describing any lifestyle limiting claudication. His only complaint in the office today is that of left leg swelling and isolated calf pain which is elicited by bending over and stretching the calf. Discussed that we will obtain a venous ultrasound to rule out DVT and I will call him with these results. I did also discuss the importance of compression therapy along with leg elevation to help with the swelling. As far as his arterial studies are concerned they were reviewed in the office with him today. His aneurysm appears to be stable with no sign of endoleak. His arterial studies show only mild arterial insufficiency on the left at rest. The left ankle/brachial index is 0.94. The DBI on the left is 0.74. Discussed that we can obtain follow-up ultrasounds in 6 months to assess his endograft and arterial perfusion. He will follow-up afterwards sooner as needed. Follow-up Disposition:  Return in about 6 months (around 10/6/2017). Riki Vee    ADDENDUM: Patient went for a venous ultrasound today and was called by the . Patient was positive for acute and occlusive deep venous thrombosis identified in one of two  posterior tibial veins in the proximal to mid calf. Patient was given a starter pack for Xarelto and scheduled for follow-up ultrasound in 1 month. I did discuss at length the risks versus benefits of anticoagulation. Patient and son express understanding and will call the office with any issues. They noted to go directly to the emergency room with any concerns for major bleed. PLEASE NOTE:  This document has been produced using voice recognition software. Unrecognized errors in transcription may be present.

## 2017-04-07 NOTE — PROGRESS NOTES
[address] [facility] PHYSICAL THERAPY  [address] [facility] [address], [place], [address] - Phone: [phone]  Fax: [phone]  DISCHARGE SUMMARY FOR PHYSICAL THERAPY          Patient Name: Renato Garcia : 1940   Treatment/Medical Diagnosis: Gait instability [R26.81]   Onset Date: 2016      Referral Source: Mikel García,  Start of Care [facility]): 2017   Prior Hospitalization: See Medical History Provider #: [id number]   Prior Level of Function: Independent with ADLs, ambulation with R BK prosthesis; playing golf   Comorbidities: DM, PAD, CAD, MI, depression, visual impairment, hearing impairment, colon cancer - s/p chemo and surgical resection, TIA x 4, HA, hernia, urinary frequency, CABG, AVR, L LE stent due to blood clots/PVD, R BKA due to blood clots/PVD   Medications: Verified on Patient Summary List   Visits from [facility]: 4 Missed Visits: 3     Goal/Measure of Progress Goal Met? 1. Patient will demonstrate independence with HEP. Status at last Eval: Compliant with HEP Current Status: NT no   2. Patient will maintain foam stance eyes open 30\" to increase safety in challenging environments. Status at last Eval: Foam stance EO = 26\" Current Status: NT no   3. Patient will score greater than or equal to 19/24 on DGI to indicate increased balance/decreased fall risk. Status at last Eval: DGI = 8/24 Current Status: NT no   4. Patient will maintain foam stance eyes closed 30\" to increase safety in challenging environments. Status at last Eval: Foam stance EC = NT Current Status: NT no     4. Patient will score greater than or equal to 59/100 on FOTO to indicate increased function/decreased functional limitation. Status at last Eval: FOTO = 46/100 Current Status: NT no        Key Functional Changes/Progress: Patient has not been seen in clinic since 3/17/2017. Please refer to 3/17/2017 progress note for status at that time.   Patient cancelled appointments scheduled for 3/21/2017 and 3/29/2017 due to illness. Patient was contacted via telephone and reported that he is not receiving home health services. Will discharge from outpatient PT at this time. G-Codes (GP): NA  Assessments/Recommendations: Other: Discontinue due to illness. Patient now receiving home health services. If you have any questions/comments please contact us directly at [phone]. Thank you for allowing us to assist in the care of your patient. Therapist Signature: Hailey Zepeda PT Date: 4/7/2017   Reporting Period: 3/17/2017-4/7/2017 Time: 10:39 AM     NOTE TO PHYSICIAN:  PLEASE COMPLETE THE ORDERS BELOW AND FAX TO   [facility]: [phone]. If you are unable to process this request in 24 hours please contact our office: [phone].    ___ I have read the above report and request that my patient be discharged from therapy.      Physician Signature:        Date:______Time:

## 2017-04-17 ENCOUNTER — TELEPHONE (OUTPATIENT)
Dept: FAMILY MEDICINE CLINIC | Age: 77
End: 2017-04-17

## 2017-04-17 NOTE — TELEPHONE ENCOUNTER
Pt is requesting a order for a power lift recliner as he is going to get evaluated for a mobile scooter tomorrow. Please advise.

## 2017-04-19 NOTE — TELEPHONE ENCOUNTER
Appointment was scheduled for 4/20/2017 @ 8:00am. Will discuss during appointment.  Awaiting arrival

## 2017-04-20 ENCOUNTER — HOSPITAL ENCOUNTER (OUTPATIENT)
Dept: LAB | Age: 77
Discharge: HOME | End: 2017-04-20
Payer: MEDICARE

## 2017-04-20 ENCOUNTER — OFFICE VISIT (OUTPATIENT)
Dept: FAMILY MEDICINE CLINIC | Age: 77
End: 2017-04-20

## 2017-04-20 VITALS
HEART RATE: 60 BPM | RESPIRATION RATE: 16 BRPM | OXYGEN SATURATION: 97 % | WEIGHT: 204 LBS | BODY MASS INDEX: 29.2 KG/M2 | TEMPERATURE: 98.4 F | SYSTOLIC BLOOD PRESSURE: 156 MMHG | HEIGHT: 70 IN | DIASTOLIC BLOOD PRESSURE: 77 MMHG

## 2017-04-20 DIAGNOSIS — I72.4 FEMORAL ARTERY ANEURYSM, LEFT (HCC): ICD-10-CM

## 2017-04-20 DIAGNOSIS — E78.00 PURE HYPERCHOLESTEROLEMIA: ICD-10-CM

## 2017-04-20 DIAGNOSIS — I10 ESSENTIAL HYPERTENSION: ICD-10-CM

## 2017-04-20 DIAGNOSIS — I73.9 PAD (PERIPHERAL ARTERY DISEASE) (HCC): ICD-10-CM

## 2017-04-20 DIAGNOSIS — R35.0 URINARY FREQUENCY: ICD-10-CM

## 2017-04-20 DIAGNOSIS — I51.89 DIASTOLIC DYSFUNCTION: ICD-10-CM

## 2017-04-20 DIAGNOSIS — N18.30 CKD (CHRONIC KIDNEY DISEASE), STAGE III (HCC): ICD-10-CM

## 2017-04-20 DIAGNOSIS — I25.10 CORONARY ARTERY DISEASE INVOLVING NATIVE CORONARY ARTERY OF NATIVE HEART WITHOUT ANGINA PECTORIS: Primary | ICD-10-CM

## 2017-04-20 DIAGNOSIS — E11.21 CONTROLLED TYPE 2 DIABETES MELLITUS WITH DIABETIC NEPHROPATHY, WITHOUT LONG-TERM CURRENT USE OF INSULIN (HCC): ICD-10-CM

## 2017-04-20 DIAGNOSIS — I25.10 CORONARY ARTERY DISEASE INVOLVING NATIVE CORONARY ARTERY OF NATIVE HEART WITHOUT ANGINA PECTORIS: ICD-10-CM

## 2017-04-20 DIAGNOSIS — F01.50 VASCULAR DEMENTIA WITHOUT BEHAVIORAL DISTURBANCE (HCC): ICD-10-CM

## 2017-04-20 DIAGNOSIS — I71.40 AAA (ABDOMINAL AORTIC ANEURYSM) WITHOUT RUPTURE: ICD-10-CM

## 2017-04-20 DIAGNOSIS — E08.00 DIABETES MELLITUS DUE TO UNDERLYING CONDITION WITH HYPEROSMOLARITY WITHOUT COMA, WITHOUT LONG-TERM CURRENT USE OF INSULIN (HCC): ICD-10-CM

## 2017-04-20 LAB
ALBUMIN SERPL BCP-MCNC: 3.8 G/DL (ref 3.4–5)
ALBUMIN/GLOB SERPL: 1.3 {RATIO} (ref 0.8–1.7)
ALP SERPL-CCNC: 96 U/L (ref 45–117)
ALT SERPL-CCNC: 22 U/L (ref 16–61)
ANION GAP BLD CALC-SCNC: 8 MMOL/L (ref 3–18)
APPEARANCE UR: CLEAR
AST SERPL W P-5'-P-CCNC: 16 U/L (ref 15–37)
BACTERIA URNS QL MICRO: NEGATIVE /HPF
BASOPHILS # BLD AUTO: 0.1 K/UL (ref 0–0.06)
BASOPHILS # BLD: 1 % (ref 0–2)
BILIRUB SERPL-MCNC: 0.4 MG/DL (ref 0.2–1)
BILIRUB UR QL: NEGATIVE
BUN SERPL-MCNC: 21 MG/DL (ref 7–18)
BUN/CREAT SERPL: 15 (ref 12–20)
CALCIUM SERPL-MCNC: 10.1 MG/DL (ref 8.5–10.1)
CHLORIDE SERPL-SCNC: 104 MMOL/L (ref 100–108)
CHOLEST SERPL-MCNC: 211 MG/DL
CO2 SERPL-SCNC: 28 MMOL/L (ref 21–32)
COLOR UR: YELLOW
CREAT SERPL-MCNC: 1.41 MG/DL (ref 0.6–1.3)
DIFFERENTIAL METHOD BLD: ABNORMAL
EOSINOPHIL # BLD: 0.2 K/UL (ref 0–0.4)
EOSINOPHIL NFR BLD: 2 % (ref 0–5)
EPITH CASTS URNS QL MICRO: NEGATIVE /LPF (ref 0–5)
ERYTHROCYTE [DISTWIDTH] IN BLOOD BY AUTOMATED COUNT: 16.2 % (ref 11.6–14.5)
EST. AVERAGE GLUCOSE BLD GHB EST-MCNC: 123 MG/DL
GLOBULIN SER CALC-MCNC: 3 G/DL (ref 2–4)
GLUCOSE SERPL-MCNC: 93 MG/DL (ref 74–99)
GLUCOSE UR STRIP.AUTO-MCNC: NEGATIVE MG/DL
HBA1C MFR BLD HPLC: 5.6 %
HBA1C MFR BLD: 5.9 % (ref 4.2–5.6)
HCT VFR BLD AUTO: 40.3 % (ref 36–48)
HDLC SERPL-MCNC: 31 MG/DL (ref 40–60)
HDLC SERPL: 6.8 {RATIO} (ref 0–5)
HGB BLD-MCNC: 13.6 G/DL (ref 13–16)
HGB UR QL STRIP: NEGATIVE
KETONES UR QL STRIP.AUTO: NEGATIVE MG/DL
LDLC SERPL CALC-MCNC: 100.4 MG/DL (ref 0–100)
LEUKOCYTE ESTERASE UR QL STRIP.AUTO: NEGATIVE
LIPID PROFILE,FLP: ABNORMAL
LYMPHOCYTES # BLD AUTO: 17 % (ref 21–52)
LYMPHOCYTES # BLD: 1.7 K/UL (ref 0.9–3.6)
MCH RBC QN AUTO: 33.5 PG (ref 24–34)
MCHC RBC AUTO-ENTMCNC: 33.7 G/DL (ref 31–37)
MCV RBC AUTO: 99.3 FL (ref 74–97)
MONOCYTES # BLD: 0.5 K/UL (ref 0.05–1.2)
MONOCYTES NFR BLD AUTO: 5 % (ref 3–10)
NEUTS SEG # BLD: 7.7 K/UL (ref 1.8–8)
NEUTS SEG NFR BLD AUTO: 75 % (ref 40–73)
NITRITE UR QL STRIP.AUTO: NEGATIVE
PH UR STRIP: 7 [PH] (ref 5–8)
PLATELET # BLD AUTO: 250 K/UL (ref 135–420)
PMV BLD AUTO: 11.9 FL (ref 9.2–11.8)
POTASSIUM SERPL-SCNC: 5.3 MMOL/L (ref 3.5–5.5)
PROT SERPL-MCNC: 6.8 G/DL (ref 6.4–8.2)
PROT UR STRIP-MCNC: ABNORMAL MG/DL
PSA SERPL-MCNC: 0.9 NG/ML (ref 0–4)
RBC # BLD AUTO: 4.06 M/UL (ref 4.7–5.5)
RBC #/AREA URNS HPF: NEGATIVE /HPF (ref 0–5)
SODIUM SERPL-SCNC: 140 MMOL/L (ref 136–145)
SP GR UR REFRACTOMETRY: 1.02 (ref 1–1.03)
TRIGL SERPL-MCNC: 398 MG/DL (ref ?–150)
TSH SERPL DL<=0.05 MIU/L-ACNC: 1.73 UIU/ML (ref 0.36–3.74)
UROBILINOGEN UR QL STRIP.AUTO: 1 EU/DL (ref 0.2–1)
VLDLC SERPL CALC-MCNC: 79.6 MG/DL
WBC # BLD AUTO: 10.2 K/UL (ref 4.6–13.2)
WBC URNS QL MICRO: NEGATIVE /HPF (ref 0–4)

## 2017-04-20 PROCEDURE — 80053 COMPREHEN METABOLIC PANEL: CPT | Performed by: FAMILY MEDICINE

## 2017-04-20 PROCEDURE — 83036 HEMOGLOBIN GLYCOSYLATED A1C: CPT | Performed by: FAMILY MEDICINE

## 2017-04-20 PROCEDURE — 85025 COMPLETE CBC W/AUTO DIFF WBC: CPT | Performed by: FAMILY MEDICINE

## 2017-04-20 PROCEDURE — 81001 URINALYSIS AUTO W/SCOPE: CPT | Performed by: FAMILY MEDICINE

## 2017-04-20 PROCEDURE — 36415 COLL VENOUS BLD VENIPUNCTURE: CPT | Performed by: FAMILY MEDICINE

## 2017-04-20 PROCEDURE — 82043 UR ALBUMIN QUANTITATIVE: CPT | Performed by: FAMILY MEDICINE

## 2017-04-20 PROCEDURE — 84153 ASSAY OF PSA TOTAL: CPT | Performed by: FAMILY MEDICINE

## 2017-04-20 PROCEDURE — 84443 ASSAY THYROID STIM HORMONE: CPT | Performed by: FAMILY MEDICINE

## 2017-04-20 PROCEDURE — 80061 LIPID PANEL: CPT | Performed by: FAMILY MEDICINE

## 2017-04-20 NOTE — ACP (ADVANCE CARE PLANNING)
Advance Care Planning (ACP) Provider Note - Comprehensive     Date of ACP Conversation: 04/20/17  Persons included in Conversation:  patient and family  Length of ACP Conversation in minutes:  <16 minutes (Non-Billable)    Authorized Decision Maker (if patient is incapable of making informed decisions): This person is:  Healthcare Agent/Medical Power of  under Advance Directive          General ACP for ALL Patients with Decision Making Capacity:   Importance of advance care planning, including choosing a healthcare agent to communicate patient's healthcare decisions if patient lost the ability to make decisions, such as after a sudden illness or accident    Review of Existing Advance Directive:       For Serious or Chronic Illness:  Understanding of medical condition      Interventions Provided:  Reviewed existing Advance Directive

## 2017-04-20 NOTE — MR AVS SNAPSHOT
Visit Information Date & Time Provider Department Dept. Phone Encounter #  
 4/20/2017  8:00 AM Jonnie Morris, [id number] [phone] [id number] Follow-up Instructions Return in about 4 weeks (around 5/18/2017) for EOV, labs today. Your Appointments 5/4/2017 10:00 AM  
New Patient with Josh Scott MD  
[facility]) Appt Note: frequency/Only needs 3 sheets [address] [facility] [address] [address]  
[phone] [phone]  
  
    
 6/14/2017  8:00 AM  
[place] [address] [facility]) Appt Note: Return in about 3 months (around 6/8/2017) for physical, labs prior, EKG next visit. [address] [place] [address] [address]  
  
    
 8/23/2017  8:45 AM  
Follow Up with Susan Marquez MD  
Cardio Specialist at [facility]/[address] [facility]) Appt Note: 6 m f/u  
 [address] [place] [address]  
  
    
 10/11/2017  8:00 AM  
PROCEDURE with [facility] 1 [facility] ([facility]) Appt Note: aaa 6 mos knaak - same day; .  
 2300 [facility] [address] [place] [address]  
[phone] [address] [place] [address]  
  
    
 10/11/2017  9:00 AM  
PROCEDURE with [facility] NONIMAGING [facility] ([facility]) Appt Note: leg art  6 mos knaak - same day; .  
 [address] [facility]  
[phone] [address] [id number]  
  
    
 10/11/2017  2:30 PM  
Office Visit with Peter Howard [facility] ([facility]) Appt Note: 6 month same day pt has prep and seeing lab at [facility] 71 [address] [facility]  
[phone] [address] [place] [address] Upcoming Health Maintenance Date Due  
 EYE EXAM RETINAL OR DILATED Q1 9/30/1950 GLAUCOMA SCREENING Q2Y 11/12/2016 HEMOGLOBIN A1C Q6M 3/13/2017 FOOT EXAM Q1 6/13/2017 MEDICARE YEARLY EXAM 6/14/2017 MICROALBUMIN Q1 9/13/2017 LIPID PANEL Q1 3/1/2018 COLONOSCOPY 6/29/2019 DTaP/Tdap/Td series (2 - Td) 9/14/2020 Allergies as of 4/20/2017  Review Complete On: 4/20/2017 By: Eduardo Paul., DO Severity Noted Reaction Type Reactions Ace Inhibitors  03/27/2016    Other (comments) Per pt, Cardiologist states he cannot have ACE inhibitors Current Immunizations  Reviewed on 3/28/2016 Name Date Influenza High Dose Vaccine PF 9/13/2016, 11/10/2015, 10/7/2015 Influenza Vaccine 11/12/2014, 12/16/2013  8:04 AM  
 Influenza Vaccine Split 12/14/2012 Influenza Vaccine Whole 9/14/2010 Pneumococcal Conjugate (PCV-13) 10/7/2015 Pneumococcal Vaccine (Unspecified Type) 9/14/2010 TDAP Vaccine 9/14/2010 Zoster 9/14/2010 Not reviewed this visit You Were Diagnosed With   
  
 Codes Comments Diabetes mellitus due to underlying condition with hyperosmolarity without coma, without long-term current use of insulin (HCC)    -  Primary ICD-10-CM: E08.00 ICD-9-CM: 249.20 Vitals BP Pulse Temp Resp Height(growth percentile) Weight(growth percentile) 156/77 (BP 1 Location: Left arm, BP Patient Position: Sitting) 60 98.4 °F (36.9 °C) (Oral) 16 5' 10\" (1.778 m) 204 lb (92.5 kg) SpO2 BMI Smoking Status 97% 29.27 kg/m2 Former Smoker BMI and BSA Data Body Mass Index Body Surface Area  
 29.27 kg/m 2 2.14 m 2 Preferred Pharmacy Pharmacy Name Phone  [phone] - [place], [facility] AT [facility] OF [address] [phone] Your Updated Medication List  
  
   
This list is accurate as of: 4/20/17  8:42 AM.  Always use your most recent med list.  
  
  
  
  
 aspirin 325 mg tablet Commonly known as:  ASPIRIN Take 1 Tab by mouth daily. atorvastatin 20 mg tablet Commonly known as:  LIPITOR Take 1 Tab by mouth daily. FLOMAX PO Take  by mouth. omega-3 fatty acids-vitamin e 1,000 mg Cap Commonly known as:  FISH OIL Take 1 Cap by mouth two (2) times a day. XARELTO 15 mg Tab tablet Generic drug:  rivaroxaban Take 15 mg by mouth two (2) times daily (with meals). We Performed the Following AMB POC HEMOGLOBIN A1C [88846 CPT(R)] Follow-up Instructions Return in about 4 weeks (around 5/18/2017) for EOV, labs today. To-Do List   
 05/08/2017 9:00 AM  
  Appointment with [address] [place] [address] VAS LAB RM 2 at [address] [place] [address] VASCULAR [address] [place] [address] ([phone]) Patient Instructions 1. Get labs today 2. Make appt with Dr Shaheen Farmer in 1 mo 3. Make appt with PMR  for power scooter eval  
4. Make appt with  and take son along to get financial, legal, medical power of attny 5. Start looking at assisted living facilities. Alternately if you can afford it consider [facility] 
 
  
Introducing [facility]! Dear Claudine Henriquez: Thank you for requesting a [employer] account. Our records indicate that you already have an active [employer] account. You can access your account anytime at [email or web address] Did you know that you can access your hospital and ER discharge instructions at any time in [employer]? You can also review all of your test results from your hospital stay or ER visit. Additional Information If you have questions, please visit the Frequently Asked Questions section of the [employer] website at [email or web address] Remember, [employer] is NOT to be used for urgent needs. For medical emergencies, dial 911. Now available from your iPhone and Android! Please provide this summary of care documentation to your next provider. Your primary care clinician is listed as [id number] [facility]. If you have any questions after today's visit, please call [phone].

## 2017-04-20 NOTE — PATIENT INSTRUCTIONS
1. Get labs today  2. Make appt with Dr Minna Caceres in 1 mo  3. Make appt with PMR  for power enrique whitaker   4. Make appt with  and take son along to get financial, legal, medical power of attny   5. Start looking at assisted living facilities.  Altern[facility]

## 2017-04-20 NOTE — PROGRESS NOTES
Julian Markham is a 64 y.o.  male and presents with    Chief Complaint   Patient presents with    Cholesterol Problem    Coronary Artery Disease    Hypertension    Diabetes    Chronic Kidney Disease    Circulatory problem    Dementia     Here today with wife. Multiple chronic medical problems discussed at length. Subjective:    Cardiovascular Review:  The patient has diabetes, hypertension, hyperlipidemia, coronary artery disease and peripheral vascular disease. Diet and Lifestyle: not attempting to follow a low fat, low cholesterol diet, not attempting to follow a low sodium diet  Home BP Monitoring: is not measured at home. Pertinent ROS: taking medications as instructed, no medication side effects noted, no TIA's, no chest pain on exertion, no dyspnea on exertion, no swelling of ankles. Diabetes Mellitus:  He has diabetes mellitus, and  diabetes, hypertension, hyperlipidemia and peripheral vascular disease. Diabetic ROS - diabetic diet compliance: compliant most of the time. Lab review: labs are reviewed, up to date and normal.   Renal failure needs eval  Dementia progressive. Additional Concerns: no medical poa. Lives with wife. No support. Unable to drive. Frequent falls.           Patient Active Problem List    Diagnosis Date Noted    Vascular dementia without behavioral disturbance 42/73/5265    Diastolic dysfunction 36/96/1544    AAA (abdominal aortic aneurysm) without rupture ([address] 01/04/2017    Femoral artery aneurysm, left ([address] 01/04/2017    PAD (peripheral artery disease) ([address] 09/21/2016    Advance directive in chart 06/13/2016    Hypertension 03/27/2016    Diabetes mellitus type 2, controlled ([address] 03/27/2016    CKD (chronic kidney disease), stage III 03/27/2016    Coronary artery disease involving native coronary artery without angina pectoris 01/27/2016    Pure hypercholesterolemia 09/14/2012     Current Outpatient Prescriptions   Medication Sig Dispense Refill    rivaroxaban (XARELTO) 15 mg tab tablet Take 15 mg by mouth two (2) times daily (with meals).  TAMSULOSIN HCL (FLOMAX PO) Take  by mouth.  atorvastatin (LIPITOR) 20 mg tablet Take 1 Tab by mouth daily. 90 Tab 4    aspirin (ASPIRIN) 325 mg tablet Take 1 Tab by mouth daily. 90 Tab 4    omega-3 fatty acids-vitamin e (FISH OIL) 1,000 mg cap Take 1 Cap by mouth two (2) times a day.  180 Cap 4     Allergies   Allergen Reactions    Ace Inhibitors Other (comments)     Per pt, Cardiologist states he cannot have ACE inhibitors     Past Medical History:   Diagnosis Date    Advance directive in chart 6/13/2016    CAD (coronary artery disease)     Cancer ([address] 2003    Colon    CKD (chronic kidney disease), stage III 3/27/2016    CVA (cerebral vascular accident) ([address] 3/26/2016    Femoral artery aneurysm, left ([address]     Heart attack ([address] 2008    Hyperlipidemia     Hypertension 3/27/2016    Iliac artery aneurysm, left ([address]     Pure hypercholesterolemia 9/14/2012    PVD (peripheral vascular disease) ([address] 3/27/2016    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 ([address] 3/26/2016     Past Surgical History:   Procedure Laterality Date    ABDOMEN SURGERY PROC UNLISTED  2011    3 stents placed into abdomen (groin)    CARDIAC SURG PROCEDURE UNLIST  2005    Quadruple Bypass    CARDIAC SURG PROCEDURE UNLIST  2011    Aortic pig valve placed    COLONOSCOPY N/A 6/29/2016    COLONOS[facility] by Lauris Lesch[facility] at [facility] ENDOSCOPY    ENDOSCOPY, COLON, DIAGNOSTIC      HX AAA REPAIR      HX ORTHOPAEDIC  2008    Right Leg Amputated     Family History   Problem Relation Age of Onset    Hypertension Mother     Hypertension Father      Social History   Substance Use Topics    Smoking status: Former Smoker     Quit date: 9/14/1987    Smokeless tobacco: Never Used    Alcohol use Yes      Comment: wine 1-2x per week       ROS       All other systems reviewed and are negative. Objective:  Vitals:    04/20/17 0814   BP: 156/77   Pulse: 60   Resp: 16   Temp: 98.4 °F (36.9 °C)   TempSrc: Oral   SpO2: 97%   Weight: 204 lb (92.5 kg)   Height: 5' 10\" (1.778 m)   PainSc:   0 - No pain                 alert, well appearing, and in no distress, oriented to person, place, and time and normal appearing weight  Chest - clear to auscultation, no wheezes, rales or rhonchi, symmetric air entry  Heart - normal rate, regular rhythm, normal S1, S2, no murmurs, rubs, clicks or gallops  Abdomen - soft, nontender, nondistended, no masses or organomegaly        LABS   aic  5.6  Other labs not done  Component      Latest Ref Rng & Units 3/1/2017 3/1/2017 3/1/2017 3/1/2017          10:33 AM 10:33 AM 10:33 AM 10:33 AM   WBC      4.6 - 13.2 K/uL   8.9    RBC      4.70 - 5.50 M/uL   4.19 (L)    HGB      13.0 - 16.0 g/dL   13.9    HCT      36.0 - 48.0 %   42.0    MCV      74.0 - 97.0 FL   100.2 (H)    MCH      24.0 - 34.0 PG   33.2    MCHC      31.0 - 37.0 g/dL   33.1    RDW      11.6 - 14.5 %   14.7 (H)    PLATELET      647 - 897 K/uL   191    MPV      9.2 - 11.8 FL   12.0 (H)    NEUTROPHILS      40 - 73 %   68    LYMPHOCYTES      21 - 52 %   20 (L)    MONOCYTES      3 - 10 %   9    EOSINOPHILS      0 - 5 %   2    BASOPHILS      0 - 2 %   1    ABS. NEUTROPHILS      1.8 - 8.0 K/UL   6.0    ABS. LYMPHOCYTES      0.9 - 3.6 K/UL   1.8    ABS. MONOCYTES      0.05 - 1.2 K/UL   0.8    ABS. EOSINOPHILS      0.0 - 0.4 K/UL   0.2    ABS.  BASOPHILS      0.0 - 0.06 K/UL   0.1 (H)    DF         AUTOMATED    Sodium      136 - 145 mmol/L       Potassium      3.5 - 5.5 mmol/L       Chloride      100 - 108 mmol/L       CO2      21 - 32 mmol/L       Anion gap      3.0 - 18 mmol/L       Glucose      74 - 99 mg/dL       BUN      7.0 - 18 MG/DL       Creatinine      0.6 - 1.3 MG/DL       BUN/Creatinine ratio      12 - 20         GFR est AA      >60 ml/min/1.73m2       GFR est non-AA      >60 ml/min/1.73m2       Calcium 8.5 - 10.1 MG/DL       Bilirubin, total      0.2 - 1.0 MG/DL       ALT      16 - 61 U/L       AST      15 - 37 U/L       Alk. phosphatase      45 - 117 U/L       Protein, total      6.4 - 8.2 g/dL       Albumin      3.4 - 5.0 g/dL       Globulin      2.0 - 4.0 g/dL       A-G Ratio      0.8 - 1.7         Cholesterol, total      <200 MG/DL       Triglyceride      <150 MG/DL       HDL Cholesterol      40 - 60 MG/DL       LDL, calculated      0 - 100 MG/DL       VLDL, calculated      MG/DL       CHOL/HDL Ratio      0 - 5.0         Vitamin B12      211 - 911 pg/mL       Folate      3.10 - 17.50 ng/mL       Sed rate, automated      0 - 20 mm/hr  5     TSH      0.36 - 3.74 uIU/mL    1.54   Calcitriol (Vit D 1, 25 di-OH)      19.9 - 79.3 pg/mL 38.0        Component      Latest Ref Rng & Units 3/1/2017 3/1/2017 3/1/2017          10:33 AM 10:33 AM 10:33 AM   WBC      4.6 - 13.2 K/uL      RBC      4.70 - 5.50 M/uL      HGB      13.0 - 16.0 g/dL      HCT      36.0 - 48.0 %      MCV      74.0 - 97.0 FL      MCH      24.0 - 34.0 PG      MCHC      31.0 - 37.0 g/dL      RDW      11.6 - 14.5 %      PLATELET      913 - 346 K/uL      MPV      9.2 - 11.8 FL      NEUTROPHILS      40 - 73 %      LYMPHOCYTES      21 - 52 %      MONOCYTES      3 - 10 %      EOSINOPHILS      0 - 5 %      BASOPHILS      0 - 2 %      ABS. NEUTROPHILS      1.8 - 8.0 K/UL      ABS. LYMPHOCYTES      0.9 - 3.6 K/UL      ABS. MONOCYTES      0.05 - 1.2 K/UL      ABS. EOSINOPHILS      0.0 - 0.4 K/UL      ABS.  BASOPHILS      0.0 - 0.06 K/UL      DF            Sodium      136 - 145 mmol/L 142     Potassium      3.5 - 5.5 mmol/L 5.0     Chloride      100 - 108 mmol/L 104     CO2      21 - 32 mmol/L 32     Anion gap      3.0 - 18 mmol/L 6     Glucose      74 - 99 mg/dL 105 (H)     BUN      7.0 - 18 MG/DL 23 (H)     Creatinine      0.6 - 1.3 MG/DL 1.52 (H)     BUN/Creatinine ratio      12 - 20   15     GFR est AA      >60 ml/min/1.73m2 54 (L)     GFR est non-AA >60 ml/min/1.73m2 45 (L)     Calcium      8.5 - 10.1 MG/DL 9.8     Bilirubin, total      0.2 - 1.0 MG/DL 0.5     ALT      16 - 61 U/L 18     AST      15 - 37 U/L 16     Alk. phosphatase      45 - 117 U/L 95     Protein, total      6.4 - 8.2 g/dL 7.1     Albumin      3.4 - 5.0 g/dL 4.3     Globulin      2.0 - 4.0 g/dL 2.8     A-G Ratio      0.8 - 1.7   1.5     Cholesterol, total      <200 MG/DL  139    Triglyceride      <150 MG/DL  250 (H)    HDL Cholesterol      40 - 60 MG/DL  36 (L)    LDL, calculated      0 - 100 MG/DL  53    VLDL, calculated      MG/DL  50    CHOL/HDL Ratio      0 - 5.0    3.9    Vitamin B12      211 - 911 pg/mL   445   Folate      3.10 - 17.50 ng/mL   >20.0 (H)   Sed rate, automated      0 - 20 mm/hr      TSH      0.36 - 3.74 uIU/mL      Calcitriol (Vit D 1, 25 di-OH)      19.9 - 79.3 pg/mL          TESTS      Assessment/Plan:    Diabetes - well controlled  Hypertension - well controlled  Hyperlipidemia - well controlled  Renal failure will recheck today  PAD and vasc dz ongoing now on xarelto   Dementia ongoing --   Note he has had frequent falls -- lives with wife -- no other support  Unable to drive  Have d/w him and wife at length they need medical POA for son if he is going to be involved  problably need assisted living. Need to start looking at various facilities,  Also he wants a power wheelchair -- was referred to PMR 2 mo ago for eval. Nothing has been done yet. Lab review: labs are reviewed, up to date and normal      I have discussed the diagnosis with the patient and the intended plan as seen in the above orders. The patient has received an after-visit summary and questions were answered concerning future plans. I have discussed medication side effects and warnings with the patient as well. I have reviewed the plan of care with the patient, accepted their input and they are in agreement with the treatment goals.      Follow-up Disposition:  Return in about 4 weeks (around 5/18/2017) for EOV, labs today. More than 1/2 of this 40 minute visit was spent in counselling and coordination of care, as described above.

## 2017-04-20 NOTE — PROGRESS NOTES
Nyasia Perez is a 64 y.o. male accompanied by wife presented to clinic in a wheel chair and has a cane. Pt's wife states that pt has had two falls since last visit with Dr. Minna Caceres with the most recent being on Saturday 4/15/2017. Pt's wife states that he is having increased memory loss. Pt denies pain at this time but has increased weakness. 1. Have you been to the ER, urgent care clinic since your last visit? Hospitalized since your last visit? No    2. Have you seen or consulted any other health care providers outside of the [address] [place] [address] since your last visit? Include any pap smears or colon screening.  No     Learning Assessment 1/4/2017   PRIMARY LEARNER Patient   CO-LEARNER CAREGIVER No   PRIMARY LANGUAGE ENGLISH   LEARNER PREFERENCE PRIMARY LISTENING   ANSWERED BY patient   RELATIONSHIP SELF

## 2017-04-21 LAB
CREAT UR-MCNC: 114.36 MG/DL (ref 30–125)
MICROALBUMIN UR-MCNC: 9.9 MG/DL (ref 0–3)
MICROALBUMIN/CREAT UR-RTO: 87 MG/G (ref 0–30)

## 2017-04-26 ENCOUNTER — OFFICE VISIT (OUTPATIENT)
Dept: NEUROLOGY | Age: 77
End: 2017-04-26

## 2017-04-26 VITALS
BODY MASS INDEX: 30.09 KG/M2 | HEIGHT: 70 IN | WEIGHT: 210.2 LBS | DIASTOLIC BLOOD PRESSURE: 90 MMHG | RESPIRATION RATE: 18 BRPM | SYSTOLIC BLOOD PRESSURE: 140 MMHG | TEMPERATURE: 98.1 F | OXYGEN SATURATION: 98 % | HEART RATE: 72 BPM

## 2017-04-26 DIAGNOSIS — I73.9 PAD (PERIPHERAL ARTERY DISEASE) (HCC): ICD-10-CM

## 2017-04-26 DIAGNOSIS — F01.50 VASCULAR DEMENTIA WITHOUT BEHAVIORAL DISTURBANCE (HCC): Primary | ICD-10-CM

## 2017-04-26 DIAGNOSIS — I67.9 CEREBROVASCULAR DISEASE: ICD-10-CM

## 2017-04-26 DIAGNOSIS — R29.898 WEAKNESS OF BOTH LEGS: ICD-10-CM

## 2017-04-26 DIAGNOSIS — I69.319 CVA, OLD, COGNITIVE DEFICITS: ICD-10-CM

## 2017-04-26 RX ORDER — DONEPEZIL HYDROCHLORIDE 5 MG/1
5 TABLET, FILM COATED ORAL
Qty: 30 TAB | Refills: 1 | Status: SHIPPED | OUTPATIENT
Start: 2017-04-26 | End: 2017-04-26 | Stop reason: CLARIF

## 2017-04-26 RX ORDER — DONEPEZIL HYDROCHLORIDE 5 MG/1
TABLET, FILM COATED ORAL
Qty: 30 TAB | Refills: 1 | Status: SHIPPED | OUTPATIENT
Start: 2017-04-26 | End: 2017-05-26 | Stop reason: SDUPTHER

## 2017-04-26 NOTE — MR AVS SNAPSHOT
Visit Information Date & Time Provider Department Dept. Phone Encounter #  
 4/26/2017 11:15 AM Pennie Durán MD [address] [phone] [id number] Follow-up Instructions Return in about 1 month (around 5/26/2017). Follow-up and Disposition History Your Appointments 5/4/2017 10:00 AM  
New Patient with Sary Valenzuela MD  
[facility] [address] [place] [address]) Appt Note: frequency/Only needs 3 sheets [address] John A [address] [address]  
[phone] [address] [facility] [address] 5/18/2017  7:30 AM  
[place] [address] [facility] [address]) Appt Note: Return in 1 month (5/18/17) for EOV, labs today. [address] [place] [address] [address] [facility] [address]  
  
   
 [address] [place] [address] [address] 95  
  
    
 5/26/2017  8:15 AM  
Follow Up with Pennie Durán MD  
[address]) [facility] [address] [id number] [phone]  
  
   
 [facility] [id number] 6/14/2017  8:00 AM  
COMPLETE PHYSICAL with Suzzette Boeck., DO [address] [place] [address] [address]) Appt Note: Return in about 3 months (around 6/8/2017) for physical, labs prior, EKG next visit. [address] [place] [address] [address]  [address]  
  
   
 [address] [place] [address] [address]  
  
    
 8/23/2017  8:45 AM  
Follow Up with Larry Horner MD  
Cardio Specialist at [address]) Appt Note: 6 m f/u  
 [address] [place] [address] [address]  
  
    
 10/11/2017  8:00 AM  
PROCEDURE with [facility] 1 [facility] ([address] [place] [address]) Appt Note: aaa 6 mos knaak - same day; . 2300 [facility] Mackenzie Smiley [address] [facility] [address]  
[phone] 2630 [facility]  
  
    
 10/11/2017  9:00 AM  
PROCEDURE with [facility] NONIMAGING [facility] ([address]) Appt Note: leg art  6 mos knaak - same day; .  
 [address] [facility] [address]  
[phone] 2300 [facility], [address] [place] [address]  
  
    
 10/11/2017  2:30 PM  
Office Visit with [facility] ([address] [facility]) Appt Note: 6 month same day pt has prep and seeing lab at Amilcar Flako [address] [facility] [address]  
[phone] 2300 [facility], [place] [address] [facility] [address] Upcoming Health Maintenance Date Due  
 EYE EXAM RETINAL OR DILATED Q1 9/30/1950 GLAUCOMA SCREENING Q2Y 11/12/2016 FOOT EXAM Q1 6/13/2017 MEDICARE YEARLY EXAM 6/14/2017 HEMOGLOBIN A1C Q6M 10/20/2017 MICROALBUMIN Q1 4/20/2018 LIPID PANEL Q1 4/20/2018 COLONOSCOPY 6/29/2019 DTaP/Tdap/Td series (2 - Td) 9/14/2020 Allergies as of 4/26/2017  Review Complete On: 4/26/2017 By: Rogers Mohan MD  
  
 Severity Noted Reaction Type Reactions Ace Inhibitors  03/27/2016    Other (comments) Per pt, Cardiologist states he cannot have ACE inhibitors Current Immunizations  Reviewed on 3/28/2016 Name Date Influenza High Dose Vaccine PF 9/13/2016, 11/10/2015, 10/7/2015 Influenza Vaccine 11/12/2014, 12/16/2013  8:04 AM  
 Influenza Vaccine Split 12/14/2012 Influenza Vaccine Whole 9/14/2010 Pneumococcal Conjugate (PCV-13) 10/7/2015 Pneumococcal Vaccine (Unspecified Type) 9/14/2010 TDAP Vaccine 9/14/2010 Zoster 9/14/2010 Not reviewed this visit You Were Diagnosed With   
  
 Codes Comments Vascular dementia without behavioral disturbance    -  Primary ICD-10-CM: F01.50 ICD-9-CM: 290.40 CVA, old, cognitive deficits     ICD-10-CM: I69.319 ICD-9-CM: 438.0 Weakness of both legs     ICD-10-CM: R29.898 ICD-9-CM: 729.89 PAD (peripheral artery disease) ([facility])     ICD-10-CM: I73.9 ICD-9-CM: 443.9 Cerebrovascular disease     ICD-10-CM: I67.9 ICD-9-CM: 437.9 Vitals BP Pulse Temp Resp Height(growth percentile) Weight(growth percentile) 140/90 72 98.1 °F (36.7 °C) (Oral) 18 5' 10\" (1.778 m) 210 lb 3.2 oz (95.3 kg) SpO2 BMI Smoking Status 98% 30.16 kg/m2 Former Smoker BMI and BSA Data Body Mass Index Body Surface Area  
 30.16 kg/m 2 2.17 m 2 Preferred Pharmacy Pharmacy Name Phone [facility] [phone] Your Updated Medication List  
  
   
This list is accurate as of: 17 12:06 PM.  Always use your most recent med list.  
  
  
  
  
 aspirin 325 mg tablet Commonly known as:  ASPIRIN Take 1 Tab by mouth daily. atorvastatin 20 mg tablet Commonly known as:  LIPITOR Take 1 Tab by mouth daily. donepezil 5 mg tablet Commonly known as:  ARICEPT  
1 q am pc  
  
 FLOMAX PO Take  by mouth. omega-3 fatty acids-vitamin e 1,000 mg Cap Commonly known as:  FISH OIL Take 1 Cap by mouth two (2) times a day. XARELTO 15 mg Tab tablet Generic drug:  rivaroxaban Take 15 mg by mouth two (2) times daily (with meals). Prescriptions Sent to Pharmacy Refills  
 donepezil (ARICEPT) 5 mg tablet 1 Si q am pc  
 Class: Normal  
 Pharmacy: [facility] [address]  #: [phone] Follow-up Instructions Return in about 1 month (around 2017). To-Do List   
 2017 Neurology:  EEG AWAKE AND ASLEEP   
  
 2017 9:00 AM  
  Appointment with [facility] VAS [facility] RM 2 at [facility] ([phone]) Patient Instructions Take vit b12 twice daily Introducing [facility]! Dear Otoniel Palumbo: Thank you for requesting a [employer] account. Our records indicate that you already have an active [employer] account. You can access your account anytime at [email or web address] Did you know that you can access your hospital and ER discharge instructions at any time in [employer]? You can also review all of your test results from your hospital stay or ER visit. Additional Information If you have questions, please visit the Frequently Asked Questions section of the [employer] website at [email or web address] Remember, [employer] is NOT to be used for urgent needs. For medical emergencies, dial 911. Now available from your iPhone and Android! Please provide this summary of care documentation to your next provider. Your primary care clinician is listed as [id number] [place] [address]. If you have any questions after today's visit, please call [phone].

## 2017-04-26 NOTE — PROGRESS NOTES
[facility]             [address] [facility], [address]    4/26/2017    HPI:  Enrique Rodriguez is a 64 y.o., right handed,   male, who presents with cognitive dysfunction worse since December,2016. Patient admits to occasional difficulty with word finding weakness worse in the legs his wife reports that occasionally he gets his days and nights confused he has difficulty telling time which she attributes to poor vision she also states she has poor memory. He was hospitalized in spring of last year at [facility] for stroke. He also reports collapsing in December and was told it was a TIA. Patient did undergo MRI MRA  December. Patient has extensive vascular disease BKA right leg abdominal aortic aneurysm peripheral artery disease as well as evidence of multiple strokes per MRI he also has chronic kidney disease history of diabetes and coronary artery disease. He is on Xarelto in addition to aspirin and statin. Current Outpatient Prescriptions   Medication Sig Dispense Refill    donepezil (ARICEPT) 5 mg tablet 1 q am pc 30 Tab 1    rivaroxaban (XARELTO) 15 mg tab tablet Take 15 mg by mouth two (2) times daily (with meals).  atorvastatin (LIPITOR) 20 mg tablet Take 1 Tab by mouth daily. 90 Tab 4    aspirin (ASPIRIN) 325 mg tablet Take 1 Tab by mouth daily. 90 Tab 4    omega-3 fatty acids-vitamin e (FISH OIL) 1,000 mg cap Take 1 Cap by mouth two (2) times a day. 180 Cap 4    TAMSULOSIN HCL (FLOMAX PO) Take  by mouth.          Past Medical History:   Diagnosis Date    Advance directive in chart 6/13/2016    CAD (coronary artery disease)     Cancer [facility]) 2003    Colon    CKD (chronic kidney disease), stage III 3/27/2016    CVA [facility]cerebral vascular accident) [facility] [address] [facility] 3/26/2016    Femoral artery aneurysm, left ([facility] [address] [facility]     Heart attack ([address] 2008    Hyperlipidemia     Hypertension 3/27/2016    Iliac artery aneurysm, left ([address]     Pure hypercholesterolemia 9/14/2012    PVD (peripheral vascular disease) ([facility] [address] [facility] 3/27/2016    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 ([facility] [address] [facility] 3/26/2016       Past Surgical History:   Procedure Laterality Date    ABDOMEN SURGERY PROC UNLISTED  2011    3 stents placed into abdomen (groin)    CARDIAC SURG PROCEDURE UNLIST  2005    Quadruple Bypass    CARDIAC SURG PROCEDURE UNLIST  2011    Aortic pig valve placed    COLONOSCOPY N/A 6/29/2016    COL[facility] performed by Hansa Rubio [facility] at [facility] ENDOSCOPY    ENDOSCOPY, COLON, DIAGNOSTIC      HX AAA REPAIR      HX ORTHOPAEDIC  2008    Right Leg Amputated     Family History   Problem Relation Age of Onset    Hypertension Mother     Hypertension Father      Allergies   Allergen Reactions    Ace Inhibitors Other (comments)     Per pt, Cardiologist states he cannot have ACE inhibitors       Review of Systems:   Review of Systems - History obtained from spouse and the patient  General ROS: positive for  - fatigue  Psychological ROS: positive for - depression and memory difficulties  ENT ROS: negative  Hematological and Lymphatic ROS: negative  Endocrine ROS: negative  Respiratory ROS: no cough, shortness of breath, or wheezing  Cardiovascular ROS: no chest pain or dyspnea on exertion  Gastrointestinal ROS: no abdominal pain, change in bowel habits, or black or bloody stools  Genito-Urinary ROS: no dysuria, trouble voiding, or hematuria  Musculoskeletal ROS: positive for - gait disturbance, joint pain and muscular weakness  Neurological ROS: positive for - impaired coordination/balance, memory loss, speech problems, visual changes and weakness  Dermatological ROS: negative    PHYSICAL EXAMINATION:    Visit Vitals    /90    Pulse 72    Temp 98.1 °F (36.7 °C) (Oral)    Resp 18    Ht 5' 10\" (1.778 m)    Wt 95.3 kg (210 lb 3.2 oz)    SpO2 98%    BMI 30.16 kg/m2     General:  Well defined, nourished, and groomed individual in no acute distress. Neck: Supple, nontender, thyroid within normal limits, no JVD, no bruits, no pain with resistance to active range of motion. Heart: Regular rate and rhythm, no murmurs, rub, or gallop. Normal S1S2. Lungs:  Clear to auscultation bilaterally with equal chest expansion, no cough, no wheeze  Musculoskeletal:  Extremities revealed no edema right bka  Psych:  Good mood and normal affect    NEUROLOGICAL EXAMINATION:     Mental Status:   Alert and oriented to person, place, and time with fair recent and remote memory. Attention span and concentration are normal. Speech is fluent with a full fund of knowledge. Mild apraxia stm 3/3    Cranial Nerves:    II, III, IV, VI:  Visual acuity grossly intact. Visual fields are normal.    Pupils are equal, round, and reactive to light and accommodation. Extra-ocular movements are full and fluid. Fundoscopic exam was not well visualized no ptosis or nystagmus. V-XII: Hearing is grossly intact. Facial features are symmetric, with normal sensation and strength. The palate rises symmetrically and the tongue protrudes midline. Motor Examination: Normal tone, bulk,muscle strength  Consistent with effortthroughout. No cogwheel rigidity or clonus present. Sensory exam:  Normal throughout to pinprick, temperature, and vibration sense  bue. Coordination:  Heel-to-shin limited. Finger to nose and rapid arm movement testing was normal .   No resting or intention tremor    Gait and Station:  Antalgic slow unsteady gait stressed gait not tested. Normal arm swing. No pronator drift. No muscle wasting or fasiculations noted. Reflexes:  DTRs depressed  throughout. BRAIN AND POSTERIOR FOSSA: The examination is slightly degraded by motion but  remains diagnostic. Mild atrophy and mild to moderate chronic small vessel  changes are noted. There is an old area of infarction involving the right  frontal lobe, extending into the corona radiata white matter.   There is also an  area of old infarct in the right parietal region. There is linear abnormal  signal in the right subinsular region with associated old blood breakdown  products which may represent an area of old hypertensive hemorrhage or old  hemorrhagic infarct. There is no acute intracranial hemorrhage, mass effect, or midline shift. There  are no significant additional areas of abnormal parenchymal signal.  There is no  true restricted diffusion to suggest acute infarct. EXTRA-AXIAL SPACES AND MENINGES: There are no abnormal extra-axial fluid  collections. VASCULAR: The visualized portions of the intracranial carotid and  vertebrobasilar systems demonstrate patent appearing flow voids. CALVARIA: Normal.     SINUSES: Clear. CRANIOCERVICAL JUNCTION: Normal.     OTHER: None.     _______________     IMPRESSION  IMPRESSION:     1. Atrophy and chronic small vessel changes with old areas of infarction, as  described above. 2.  Otherwise, unremarkable evaluation. Specifically, no acute intracranial  abnormalities are identified. Assessment and Plan:   Luisana Sun is a 64 y.o. right handed male whose history and physical are consistent with vascular dementia. Luisana Sun who has risk factors including vascular disease,hypertension,diabetes    Kristian was seen today for establish care. Diagnoses and all orders for this visit:    Vascular dementia without behavioral disturbance  -     EEG AWAKE AND ASLEEP; Future    CVA, old, cognitive deficits  -     EEG AWAKE AND ASLEEP; Future    Weakness of both legs    PAD (peripheral artery disease) (HCC)    Cerebrovascular disease  -     EEG AWAKE AND ASLEEP; Future    Other orders  -     Discontinue: donepezil (ARICEPT) 5 mg tablet; Take 1 Tab by mouth nightly. -     donepezil (ARICEPT) 5 mg tablet; 1 q am pc      Follow-up Disposition:  Return in about 1 month (around 5/26/2017).    Reviewed workup and notes in chart from  pcp and vascular surgery as well as previous neurological evaluations/  Personally reviewed brain imaging. Discussed work up planned need for vit b12 and risks and benefits of aricept  I spent 60 minutes with the patient in face-to-face consultation, of which greater than 50% was spent in counseling and coordination of care as described above.

## 2017-05-04 ENCOUNTER — OFFICE VISIT (OUTPATIENT)
Dept: UROLOGY | Age: 77
End: 2017-05-04

## 2017-05-04 VITALS
HEIGHT: 70 IN | HEART RATE: 60 BPM | TEMPERATURE: 97.5 F | OXYGEN SATURATION: 98 % | WEIGHT: 209 LBS | SYSTOLIC BLOOD PRESSURE: 140 MMHG | BODY MASS INDEX: 29.92 KG/M2 | DIASTOLIC BLOOD PRESSURE: 80 MMHG

## 2017-05-04 DIAGNOSIS — N40.1 BPH NOS W UR OBS/LUTS: Primary | ICD-10-CM

## 2017-05-04 DIAGNOSIS — R35.0 FREQUENT URINATION: ICD-10-CM

## 2017-05-04 LAB
BILIRUB UR QL STRIP: NEGATIVE
GLUCOSE UR-MCNC: NEGATIVE MG/DL
KETONES P FAST UR STRIP-MCNC: NEGATIVE MG/DL
PH UR STRIP: 5.5 [PH] (ref 4.6–8)
PROT UR QL STRIP: NORMAL MG/DL
SP GR UR STRIP: 1.02 (ref 1–1.03)
UA UROBILINOGEN AMB POC: NORMAL (ref 0.2–1)
URINALYSIS CLARITY POC: CLEAR
URINALYSIS COLOR POC: YELLOW
URINE BLOOD POC: NEGATIVE
URINE LEUKOCYTES POC: NEGATIVE
URINE NITRITES POC: NEGATIVE

## 2017-05-04 RX ORDER — OXYBUTYNIN CHLORIDE 5 MG/1
5 TABLET, EXTENDED RELEASE ORAL DAILY
Qty: 60 TAB | Refills: 3 | Status: SHIPPED | OUTPATIENT
Start: 2017-05-04 | End: 2017-08-23 | Stop reason: ALTCHOICE

## 2017-05-04 RX ORDER — LANOLIN ALCOHOL/MO/W.PET/CERES
1000 CREAM (GRAM) TOPICAL DAILY
COMMUNITY
End: 2018-07-02

## 2017-05-04 NOTE — MR AVS SNAPSHOT
Visit Information Date & Time Provider Department Dept. Phone Encounter #  
 5/4/2017 10:00 AM Candida Lewis, Maxime [facility] [id number] [phone] Your Appointments 5/18/2017  7:30 AM  
[place] [address] [address]) Appt Note: Return in 1 month (5/18/17) for EOV, labs today. [address]  [address] [place]  
  
   
 [address]  
  
    
 5/26/2017  8:15 AM  
Follow Up with Jackelyn Moffett MD  
[facility]) Appt Note: 1mon f/u; EEG  
 [address] [place] [id number] [phone]  
  
   
 [place] [id number] 6/14/2017  8:00 AM  
COMPLETE [place] [address] [address]) Appt Note: Return in about 3 months (around 6/8/2017) for physical, labs prior, EKG next visit. [address] [facility] [address] [place]  
  
   
 [id number] [place] [address] [address]  
  
    
 8/23/2017  8:45 AM  
Follow Up with Nick Gar MD  
Cardio Specialist at [facility]) Appt Note: 6 m f/u  
 [place] [address] [address] [facility]  
  
    
 10/11/2017  8:00 AM  
PROCEDURE with [facility] IMAGING 1 [facility] ([facility]) Appt Note: aaa 6 mos knaak - same day; .  
 [address] [facility] [address]  
[phone] [facility] [employer] [facility]  
  
    
 10/11/2017  9:00 AM  
PROCEDURE with [facility] NONIMAGING [facility] ([facility]) Appt Note: leg art  6 mos knaak - same day; .  
 [address] [facility] [address]  
[phone] 2300 [facility], [address] [place] [address]  
  
    
 10/11/2017  2:30 PM  
Office Visit with [address], [place] [facility] ([facility]) Appt Note: 6 month same day pt has prep and seeing lab at [address] [facility] [address]  
[phone] 2300 [facility], [place] [address] [facility] [address] Upcoming Health Maintenance Date Due  
 EYE EXAM RETINAL OR DILATED Q1 9/30/1950 GLAUCOMA SCREENING Q2Y 11/12/2016 FOOT EXAM Q1 6/13/2017 MEDICARE YEARLY EXAM 6/14/2017 INFLUENZA AGE 9 TO ADULT 8/1/2017 HEMOGLOBIN A1C Q6M 10/20/2017 MICROALBUMIN Q1 4/20/2018 LIPID PANEL Q1 4/20/2018 COLONOSCOPY 6/29/2019 DTaP/Tdap/Td series (2 - Td) 9/14/2020 Allergies as of 5/4/2017  Review Complete On: 5/4/2017 By: Tunde Mei MD  
  
 Severity Noted Reaction Type Reactions Ace Inhibitors  03/27/2016    Other (comments) Per pt, Cardiologist states he cannot have ACE inhibitors Current Immunizations  Reviewed on 3/28/2016 Name Date Influenza High Dose Vaccine PF 9/13/2016, 11/10/2015, 10/7/2015 Influenza Vaccine 11/12/2014, 12/16/2013  8:04 AM  
 Influenza Vaccine Split 12/14/2012 Influenza Vaccine Whole 9/14/2010 Pneumococcal Conjugate (PCV-13) 10/7/2015 Pneumococcal Vaccine (Unspecified Type) 9/14/2010 TDAP Vaccine 9/14/2010 Zoster 9/14/2010 Not reviewed this visit You Were Diagnosed With   
  
 Codes Comments Frequent urination    -  Primary ICD-10-CM: R35.0 ICD-9-CM: 788.41 Vitals BP Pulse Temp Height(growth percentile) Weight(growth percentile) SpO2  
 140/80 (BP 1 Location: Left arm, BP Patient Position: Sitting) 60 97.5 °F (36.4 °C) (Oral) 5' 10\" (1.778 m) 209 lb (94.8 kg) 98% BMI Smoking Status 29.99 kg/m2 Former Smoker Vitals History BMI and BSA Data Body Mass Index Body Surface Area 29.99 kg/m 2 2.16 m 2 Preferred Pharmacy Pharmacy Name Phone Nina Farah [address] [place] [address]. [address] [phone] Your Updated Medication List  
  
   
This list is accurate as of: 5/4/17 10:47 AM.  Always use your most recent med list.  
  
  
  
  
 aspirin 325 mg tablet Commonly known as:  ASPIRIN Take 1 Tab by mouth daily. atorvastatin 20 mg tablet Commonly known as:  LIPITOR Take 1 Tab by mouth daily. cyanocobalamin 1,000 mcg tablet Take 1,000 mcg by mouth daily. donepezil 5 mg tablet Commonly known as:  ARICEPT  
1 q am pc  
  
 FLOMAX PO Take  by mouth. omega-3 fatty acids-vitamin e 1,000 mg Cap Commonly known as:  FISH OIL Take 1 Cap by mouth two (2) times a day. oxybutynin chloride XL 5 mg CR tablet Commonly known as:  DITROPAN XL Take 1 Tab by mouth daily. Indications: INCREASED URINARY FREQUENCY  
  
 XARELTO 15 mg Tab tablet Generic drug:  rivaroxaban Take 20 mg by mouth two (2) times daily (with meals). Prescriptions Sent to Pharmacy Refills  
 oxybutynin chloride XL (DITROPAN XL) 5 mg CR tablet 3 Sig: Take 1 Tab by mouth daily. Indications: INCREASED URINARY FREQUENCY Class: Normal  
 Pharmacy: [facility] [address] Ph #: [phone] Route: Oral  
  
We Performed the Following AMB POC URINALYSIS DIP STICK AUTO W/O MICRO [54093 CPT(R)] To-Do List   
 05/08/2017 9:00 AM  
  Appointment with [facility] VAS LAB RM 2 at [facility] VASCULAR LAB ([phone])  
   
  
 05/15/2017 8:00 AM  
  Appointment with [facility] EP/EEG RM at [facility] EEG/EMG ([phone]) All patients (\"Awake only\" and \"Awake & Asleep\"): 1) Hair must be clean, free of oils, gels, spray, mousse.  2) Do not consume any caffeine products (coffee, tea, soda, chocolate) for 24 hours prior to test. 3) Have someone available to drive the patient home if patient is sedated. 4) May take medications or eat meals prior to study as normal.  If doing AWAKE ONLY --- There are no sleep instructions for this test and meals are allowed. If doing AWAKE & ASLEEP:  patient must stay up until 2:00 am and then wake up at 6:00 am on the day of study (sleep 4 hours only), without the aid of Caffeine. Additional instructions for \"Awake & Asleep\" only study: ADULT patients should ONLY get four (4) hours of sleep the night prior to study. (Preferred: Stay awake until 2:00 am and sleep until 6:00 am). CHILDREN should ONLY get five (5) hours of sleep the night prior to study. (Preferred: Stay awake until 12:00 midnight and sleep until 5:00 am)  The EEG Technologist CANNOT sedate any patient. However, the ordering physician may write a prescription and give to the patient for the patient to have filled prior to arriving in the EEG department if needed for sleep. If this method is chosen, the physician's order must state \"okay for patient to self administer (drug name) for sedation\". The patient must also be accompanied by an adult that will drive them home or the patient will not be permitted to take the sedation. Patient Instructions Frequent Urination: Care Instructions Your Care Instructions An urge to urinate frequently but usually passing only small amounts of urine is a common symptom of urinary problems, such as urinary tract infections. The bladder may become inflamed. This can cause the urge to urinate. You may try to urinate more often than usual to try to soothe that urge. Frequent urination also may be caused by sexually transmitted infections (STIs) or kidney stones. Or it may happen when something irritates the tube that carries urine from the bladder to the outside of the body (urethra). It may also be a sign of diabetes. The cause may be hard to find. You may need tests. Follow-up care is a key part of your treatment and safety. Be sure to make and go to all appointments, and call your doctor if you are having problems. It's also a good idea to know your test results and keep a list of the medicines you take. How can you care for yourself at home? · Drink extra water for the next day or two. This will help make the urine less concentrated. (If you have kidney, heart, or liver disease and have to limit fluids, talk with your doctor before you increase the amount of fluids you drink.) · Avoid drinks that are carbonated or have caffeine. They can irritate the bladder. For women: · Urinate right after you have sex. · After you go to the bathroom, wipe from front to back. · Avoid douches, bubble baths, and feminine hygiene sprays. And avoid other feminine hygiene products that have deodorants. When should you call for help? Call your doctor now or seek immediate medical care if: 
· You have new symptoms, such as fever, nausea, or vomiting. · You have new or worse symptoms of a urinary problem. For example: ¨ You have blood or pus in your urine. ¨ You have chills or body aches. ¨ It hurts to urinate. ¨ You have groin or belly pain. ¨ You have pain in your back just below your rib cage (the flank area). Watch closely for changes in your health, and be sure to contact your doctor if you feel thirstier than usual. 
Where can you learn more? Go to http://andrae-raphael.info/. Enter 167 [id number] in the search box to learn more about \"Frequent Urination: Care Instructions. \" Current as of: November 28, 2016 Content Version: 11.2 © 1120-9750 [employer]. Care instructions adapted under license by [employer] (which disclaims liability or warranty for this information).  If you have questions about a medical condition or this instruction, always ask your healthcare professional. [employer] disclaims any warranty or liability for your use of this information. Introducing [facility] & HEALTH SERVICES! Dear Dequan Ulloa: Thank you for requesting a [employer] account. Our records indicate that you already have an active [employer] account. You can access your account anytime at [email or web address]. [facility]/[employer] Did you know that you can access your hospital and ER discharge instructions at any time in [employer]? You can also review all of your test results from your hospital stay or ER visit. Additional Information If you have questions, please visit the Frequently Asked Questions section of the [employer] website at [email or web address] Remember, [employer] is NOT to be used for urgent needs. For medical emergencies, dial 911. Now available from your iPhone and Android! Please provide this summary of care documentation to your next provider. Your primary care clinician is listed as [id number] [facility]. If you have any questions after today's visit, please call [phone].

## 2017-05-04 NOTE — PATIENT INSTRUCTIONS
Frequent Urination: Care Instructions  Your Care Instructions  An urge to urinate frequently but usually passing only small amounts of urine is a common symptom of urinary problems, such as urinary tract infections. The bladder may become inflamed. This can cause the urge to urinate. You may try to urinate more often than usual to try to soothe that urge. Frequent urination also may be caused by sexually transmitted infections (STIs) or kidney stones. Or it may happen when something irritates the tube that carries urine from the bladder to the outside of the body (urethra). It may also be a sign of diabetes. The cause may be hard to find. You may need tests. Follow-up care is a key part of your treatment and safety. Be sure to make and go to all appointments, and call your doctor if you are having problems. It's also a good idea to know your test results and keep a list of the medicines you take. How can you care for yourself at home? · Drink extra water for the next day or two. This will help make the urine less concentrated. (If you have kidney, heart, or liver disease and have to limit fluids, talk with your doctor before you increase the amount of fluids you drink.)  · Avoid drinks that are carbonated or have caffeine. They can irritate the bladder. For women:  · Urinate right after you have sex. · After you go to the bathroom, wipe from front to back. · Avoid douches, bubble baths, and feminine hygiene sprays. And avoid other feminine hygiene products that have deodorants. When should you call for help? Call your doctor now or seek immediate medical care if:  · You have new symptoms, such as fever, nausea, or vomiting. · You have new or worse symptoms of a urinary problem. For example:  ¨ You have blood or pus in your urine. ¨ You have chills or body aches. ¨ It hurts to urinate. ¨ You have groin or belly pain. ¨ You have pain in your back just below your rib cage (the flank area).   Watch closely for changes in your health, and be sure to contact your doctor if you feel thirstier than usual.  Where can you learn more? Go to http://andrae-raphael.info/. Enter 950 [id number] in the search box to learn more about \"Frequent Urination: Care Instructions. \"  Current as of: November 28, 2016  Content Version: 11.2  © 8583-6712 [employer]. Care instructions adapted under license by [employer] (which disclaims liability or warranty for this information). If you have questions about a medical condition or this instruction, always ask your healthcare professional. [place] [address] any warranty or liability for your use of this information.

## 2017-05-04 NOTE — PROGRESS NOTES
MrMartin Chaves has a reminder for a \"due or due soon\" health maintenance. I have asked that he contact his primary care provider for follow-up on this health maintenance.

## 2017-05-04 NOTE — PROGRESS NOTES
Kristian Jacobo Alena 64 y.o. male     Mr. 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    PSA 0.9 in April 2017    Review of Systems:   CNS: TIA ×4 affecting left hemisphere-no seizure no visual changes  Respiratory: No wheezing no shortness of breath no coughing  Cardiovascular: Coronary artery disease status post CABG hypertension/peripheral vascular disease  Intestinal: No dyspepsia diarrhea or constipation  Urinary: Irritative voiding symptoms  Skeletal: Right AKA secondary to peripheral vascular disease  Endocrine: No diabetes or thyroid disease  Other: Large left inguinal hernia    Allergies: Allergies   Allergen Reactions    Ace Inhibitors Other (comments)     Per pt, Cardiologist states he cannot have ACE inhibitors      Medications:    Current Outpatient Prescriptions   Medication Sig Dispense Refill    cyanocobalamin 1,000 mcg tablet Take 1,000 mcg by mouth daily.  oxybutynin chloride XL (DITROPAN XL) 5 mg CR tablet Take 1 Tab by mouth daily. Indications: INCREASED URINARY FREQUENCY 60 Tab 3    rivaroxaban (XARELTO) 15 mg tab tablet Take 20 mg by mouth two (2) times daily (with meals).  TAMSULOSIN HCL (FLOMAX PO) Take  by mouth.  aspirin (ASPIRIN) 325 mg tablet Take 1 Tab by mouth daily. 90 Tab 4    omega-3 fatty acids-vitamin e (FISH OIL) 1,000 mg cap Take 1 Cap by mouth two (2) times a day. 180 Cap 4    donepezil (ARICEPT) 5 mg tablet 1 q am pc 30 Tab 1    atorvastatin (LIPITOR) 20 mg tablet Take 1 Tab by mouth daily.  90 Tab 4       Past Medical History:   Diagnosis Date    Advance directive in chart 6/13/2016    CAD (coronary artery disease)     Cancer ([address] 2003    Colon    CKD (chronic kidney disease), stage III 3/27/2016    CVA (cerebral vascular accident) ([address] 3/26/2016    Femoral artery aneurysm, left ([address]     Heart attack ([address] 2008    Heart attack ([address]     Hernia     History of TIAs     Hyperlipidemia     Hypertension 3/27/2016    Iliac artery aneurysm, left ([address]     Pure hypercholesterolemia 9/14/2012    PVD (peripheral vascular disease) ([address] 3/27/2016    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 ([address] 3/26/2016      Past Surgical History:   Procedure Laterality Date    ABDOMEN SURGERY PROC UNLISTED  2011    3 stents placed into abdomen (groin)    CARDIAC SURG PROCEDURE UNLIST  2005    Quadruple Bypass    CARDIAC SURG PROCEDURE UNLIST  2011    Aortic pig valve placed    COLONOSCOPY N/A 6/29/2016    COLON[facility]ree [facility]l [facility] at [facility] ENDOSCOPY    ENDOSCOPY, COLON, DIAGNOSTIC      HX AAA REPAIR      HX ORTHOPAEDIC  2008    Right Leg Amputated     Family History   Problem Relation Age of Onset    Hypertension Mother     Hypertension Father     Heart Disease Father     Heart Disease Brother         Physical Examination: Mature male ambulating with right leg prosthesis and a of 4 posted aluminum walker    Abdomen is nontender no palpable masses no organomegaly  Large reducible left inguinal hernia  Back-no percussion CVA tenderness on either side  No inguinal hernia or adenopathy  Penis is normal  Testes are small -normal in size shape and consistency bilaterally-no epididymal induration or tenderness on either side  Spermatic cords are palpably normal bilaterally  Scrotum is normal  Prostate by ARCHIE is rounded, smooth, benign in consistency and nontender-no induration no nodularity  No rectal masses tenderness or induration      Urinalysis: Negative dipstick/nitrite negative/+pro    PVR today 30 cc    Impression: BPH with irritable bladder symptoms        Plan: Continue Flomax 0.4 mg daily            Ditropan 5 mg p.o. at bedtime    rtc 3 mo    Described discussed indications for TURP-failure of medical management of symptomatic      Tasha Butler MD  -electronically signed-    PLEASE NOTE:  This document has been produced using voice recognition software. Unrecognized errors in transcription may be present.

## 2017-05-08 ENCOUNTER — HOSPITAL ENCOUNTER (OUTPATIENT)
Dept: VASCULAR SURGERY | Age: 77
Discharge: HOME | End: 2017-05-08
Attending: PHYSICIAN ASSISTANT
Payer: MEDICARE

## 2017-05-08 DIAGNOSIS — I82.442 ACUTE DEEP VEIN THROMBOSIS (DVT) OF TIBIAL VEIN OF LEFT LOWER EXTREMITY (HCC): ICD-10-CM

## 2017-05-08 PROCEDURE — 93971 EXTREMITY STUDY: CPT

## 2017-05-08 NOTE — PROCEDURES
[place]  *** FINAL REPORT ***    Name: Elizabeth Hall  MRN: [id number]    Outpatient  : 30 Sep 1940  HIS Order #: [id number]  [address] [facility] Visit #: [id number]  Date: 08 May 2017    TYPE OF TEST: Peripheral Venous Testing    REASON FOR TEST  history of left posterior tibial vein DVT 17. Left Leg:-  Deep venous thrombosis:           Yes  Proximal extent of thrombus:      Posterior Tibial  Superficial venous thrombosis:    No  Deep venous insufficiency:        Not examined  Superficial venous insufficiency: Not examined      INTERPRETATION/FINDINGS  Duplex images were obtained using 2-D gray scale, color flow, and  spectral Doppler analysis. Left leg :  1. Deep vein(s) visualized include the common femoral, deep femoral,  proximal femoral, mid femoral, distal femoral, popliteal(above knee),  popliteal(fossa), popliteal(below knee), posterior tibial and peroneal   veins. 2. Recanalized chronic nonocclusive deep venous thrombosis identified  in one of the 2 posterior tibial veins in the region of the upper  calf. 3. There is complete resolution of the distal posterior tibial vein  thrombosis. 4. No evidence of deep vein thrombosis in the contralateral common  femoral vein. 5. No evidence of superficial thrombosis detected. ADDITIONAL COMMENTS    I have personally reviewed the data relevant to the interpretation of  this  study. TECHNOLOGIST: Deion Mejia, RTR, RVT  Signed: 2017 10:55 AM    PHYSICIAN: Skyla Nichols MD  Signed: 2017 03:26 PM

## 2017-05-18 ENCOUNTER — OFFICE VISIT (OUTPATIENT)
Dept: FAMILY MEDICINE CLINIC | Age: 77
End: 2017-05-18

## 2017-05-18 VITALS
BODY MASS INDEX: 29.75 KG/M2 | DIASTOLIC BLOOD PRESSURE: 78 MMHG | WEIGHT: 207.8 LBS | HEIGHT: 70 IN | TEMPERATURE: 98.6 F | HEART RATE: 79 BPM | RESPIRATION RATE: 18 BRPM | SYSTOLIC BLOOD PRESSURE: 144 MMHG | OXYGEN SATURATION: 95 %

## 2017-05-18 DIAGNOSIS — I73.9 PAD (PERIPHERAL ARTERY DISEASE) (HCC): ICD-10-CM

## 2017-05-18 DIAGNOSIS — E78.00 PURE HYPERCHOLESTEROLEMIA: ICD-10-CM

## 2017-05-18 DIAGNOSIS — I71.40 AAA (ABDOMINAL AORTIC ANEURYSM) WITHOUT RUPTURE: Primary | ICD-10-CM

## 2017-05-18 DIAGNOSIS — I10 ESSENTIAL HYPERTENSION: ICD-10-CM

## 2017-05-18 DIAGNOSIS — I51.89 DIASTOLIC DYSFUNCTION: ICD-10-CM

## 2017-05-18 DIAGNOSIS — N18.30 CKD (CHRONIC KIDNEY DISEASE), STAGE III (HCC): ICD-10-CM

## 2017-05-18 DIAGNOSIS — E11.21 CONTROLLED TYPE 2 DIABETES MELLITUS WITH DIABETIC NEPHROPATHY, WITHOUT LONG-TERM CURRENT USE OF INSULIN (HCC): ICD-10-CM

## 2017-05-18 DIAGNOSIS — F01.50 VASCULAR DEMENTIA WITHOUT BEHAVIORAL DISTURBANCE (HCC): ICD-10-CM

## 2017-05-18 DIAGNOSIS — I25.10 CORONARY ARTERY DISEASE INVOLVING NATIVE CORONARY ARTERY OF NATIVE HEART WITHOUT ANGINA PECTORIS: ICD-10-CM

## 2017-05-18 PROBLEM — I72.4 FEMORAL ARTERY ANEURYSM, LEFT (HCC): Status: RESOLVED | Noted: 2017-01-04 | Resolved: 2017-05-18

## 2017-05-18 NOTE — PROGRESS NOTES
Abbie Hickman is a 64 y.o.  male and presents with    Chief Complaint   Patient presents with    Cholesterol Problem    Coronary Artery Disease    Hypertension    Diabetes    Chronic Kidney Disease    Abdominal Aortic Aneurysm           Subjective:  Here with son today -- issues discussed inc fall, dementia, aneurysm      Cardiovascular Review:  The patient has diabetes, hypertension, hyperlipidemia and coronary artery disease. Diet and Lifestyle: not attempting to follow a low fat, low cholesterol diet, not attempting to follow a low sodium diet  Home BP Monitoring: is not measured at home. Pertinent ROS: taking medications as instructed, no medication side effects noted, no TIA's, no chest pain on exertion, no dyspnea on exertion, no swelling of ankles. Diabetes Mellitus:  He has diabetes mellitus, and  diabetes, hypertension and hyperlipidemia. Diabetic ROS - diabetic diet compliance: compliant most of the time. Lab review: labs are reviewed, up to date and normal.     Chronic renal dz ongoing    AA ongoing    Dementia ongoing seeing [place]    Additional Concerns:          Patient Active Problem List    Diagnosis Date Noted    Vascular dementia without behavioral disturbance 08/35/4640    Diastolic dysfunction 84/50/9896    AAA (abdominal aortic aneurysm) without rupture ([facility] 01/04/2017    PAD (peripheral artery disease) ([facility] 09/21/2016    Advance directive in chart 06/13/2016    Hypertension 03/27/2016    Diabetes mellitus type 2, controlled ([facility] 03/27/2016    CKD (chronic kidney disease), stage III 03/27/2016    Coronary artery disease involving native coronary artery without angina pectoris 01/27/2016    Pure hypercholesterolemia 09/14/2012     Current Outpatient Prescriptions   Medication Sig Dispense Refill    cyanocobalamin 1,000 mcg tablet Take 1,000 mcg by mouth daily.  oxybutynin chloride XL (DITROPAN XL) 5 mg CR tablet Take 1 Tab by mouth daily. Indications: INCREASED URINARY FREQUENCY 60 Tab 3    donepezil (ARICEPT) 5 mg tablet 1 q am pc 30 Tab 1    TAMSULOSIN HCL (FLOMAX PO) Take  by mouth.  atorvastatin (LIPITOR) 20 mg tablet Take 1 Tab by mouth daily. 90 Tab 4    aspirin (ASPIRIN) 325 mg tablet Take 1 Tab by mouth daily. 90 Tab 4    omega-3 fatty acids-vitamin e (FISH OIL) 1,000 mg cap Take 1 Cap by mouth two (2) times a day.  180 Cap 4     Allergies   Allergen Reactions    Ace Inhibitors Other (comments)     Per pt, Cardiologist states he cannot have ACE inhibitors     Past Medical History:   Diagnosis Date    Advance directive in chart 6/13/2016    CAD (coronary artery disease)     Cancer ([address] 2003    Colon    CKD (chronic kidney disease), stage III 3/27/2016    CVA (cerebral vascular accident) ([address] 3/26/2016    Femoral artery aneurysm, left ([address]     Heart attack ([address] 2008    Heart attack ([address]     Hernia     History of TIAs     Hyperlipidemia     Hypertension 3/27/2016    Iliac artery aneurysm, left ([address]     Pure hypercholesterolemia 9/14/2012    PVD (peripheral vascular disease) ([address] 3/27/2016    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 ([address] 3/26/2016     Past Surgical History:   Procedure Laterality Date    ABDOMEN SURGERY PROC UNLISTED  2011    3 stents placed into abdomen (groin)    CARDIAC SURG PROCEDURE UNLIST  2005    Quadruple Bypass    CARDIAC SURG PROCEDURE UNLIST  2011    Aortic pig valve placed    COLONOSCOPY N/A 6/29/2016    COL[facility] ENDOSCOPY    ENDOSCOPY, COLON, DIAGNOSTIC      HX AAA REPAIR      HX ORTHOPAEDIC  2008    Right Leg Amputated     Family History   Problem Relation Age of Onset    Hypertension Mother     Hypertension Father     Heart Disease Father     Heart Disease Brother      Social History   Substance Use Topics    Smoking status: Former Smoker     Quit date: 9/14/1987    Smokeless tobacco: Never Used    Alcohol use No Comment: wine 1-2x per week       ROS       All other systems reviewed and are negative. Objective:  Vitals:    05/18/17 0741   BP: 144/78   Pulse: 79   Resp: 18   Temp: 98.6 °F (37 °C)   TempSrc: Oral   SpO2: 95%   Weight: 207 lb 12.8 oz (94.3 kg)   Height: 5' 10\" (1.778 m)   PainSc:   0 - No pain                 alert, well appearing, and in no distress, oriented to person, place, and time and normal appearing weight  Chest - clear to auscultation, no wheezes, rales or rhonchi, symmetric air entry  Heart - normal rate, regular rhythm, normal S1, S2, no murmurs, rubs, clicks or gallops  Abdomen - soft, nontender, nondistended, no masses or organomegaly    Diabetic foot exam:     Left: Reflexes 1+     Vibratory sensation diminished    Proprioception diminished   Sharp/dull discrimination diminished    Filament test reduced sensation with micro filament   Pulse DP: 1+ (weak)   Pulse PT: 1+ (weak)   Deformities: None      LABS     Component      Latest Ref Rng & Units 4/20/2017 4/20/2017 4/20/2017 4/20/2017           9:15 AM  9:15 AM  9:15 AM  9:15 AM   WBC      4.6 - 13.2 K/uL       RBC      4.70 - 5.50 M/uL       HGB      13.0 - 16.0 g/dL       HCT      36.0 - 48.0 %       MCV      74.0 - 97.0 FL       MCH      24.0 - 34.0 PG       MCHC      31.0 - 37.0 g/dL       RDW      11.6 - 14.5 %       PLATELET      674 - 962 K/uL       MPV      9.2 - 11.8 FL       NEUTROPHILS      40 - 73 %       LYMPHOCYTES      21 - 52 %       MONOCYTES      3 - 10 %       EOSINOPHILS      0 - 5 %       BASOPHILS      0 - 2 %       ABS. NEUTROPHILS      1.8 - 8.0 K/UL       ABS. LYMPHOCYTES      0.9 - 3.6 K/UL       ABS. MONOCYTES      0.05 - 1.2 K/UL       ABS. EOSINOPHILS      0.0 - 0.4 K/UL       ABS.  BASOPHILS      0.0 - 0.06 K/UL       DF             Sodium      136 - 145 mmol/L       Potassium      3.5 - 5.5 mmol/L       Chloride      100 - 108 mmol/L       CO2      21 - 32 mmol/L       Anion gap      3.0 - 18 mmol/L Glucose      74 - 99 mg/dL       BUN      7.0 - 18 MG/DL       Creatinine      0.6 - 1.3 MG/DL       BUN/Creatinine ratio      12 - 20         GFR est AA      >60 ml/min/1.73m2       GFR est non-AA      >60 ml/min/1.73m2       Calcium      8.5 - 10.1 MG/DL       Bilirubin, total      0.2 - 1.0 MG/DL       ALT      16 - 61 U/L       AST      15 - 37 U/L       Alk. phosphatase      45 - 117 U/L       Protein, total      6.4 - 8.2 g/dL       Albumin      3.4 - 5.0 g/dL       Globulin      2.0 - 4.0 g/dL       A-G Ratio      0.8 - 1.7         Color        YELLOW     Appearance        CLEAR     Specific gravity      1.005 - 1.030    1.020     pH (UA)      5.0 - 8.0    7.0     Protein      NEG mg/dL  TRACE (A)     Glucose      NEG mg/dL  NEGATIVE     Ketone      NEG mg/dL  NEGATIVE     Bilirubin      NEG    NEGATIVE     Blood      NEG    NEGATIVE     Urobilinogen      0.2 - 1.0 EU/dL  1.0     Nitrites      NEG    NEGATIVE     Leukocyte Esterase      NEG    NEGATIVE     Cholesterol, total      <200 MG/DL       Triglyceride      <150 MG/DL       HDL Cholesterol      40 - 60 MG/DL       LDL, calculated      0 - 100 MG/DL       VLDL, calculated      MG/DL       CHOL/HDL Ratio      0 - 5.0         Microalbumin,urine random      0 - 3.0 MG/DL 9.90 (H)      Creatinine, urine      30 - 125 mg/dL 114.36      Microalbumin/Creat.  Ratio      0 - 30 mg/g 87 (H)      Hemoglobin A1c, (calculated)      4.2 - 5.6 %       Est. average glucose      mg/dL       TSH      0.36 - 3.74 uIU/mL    1.73   Prostate Specific Ag      0.0 - 4.0 ng/mL   0.9      Component      Latest Ref Rng & Units 4/20/2017 4/20/2017 4/20/2017 4/20/2017           9:15 AM  9:15 AM  9:15 AM  9:15 AM   WBC      4.6 - 13.2 K/uL    10.2   RBC      4.70 - 5.50 M/uL    4.06 (L)   HGB      13.0 - 16.0 g/dL    13.6   HCT      36.0 - 48.0 %    40.3   MCV      74.0 - 97.0 FL    99.3 (H)   MCH      24.0 - 34.0 PG    33.5   MCHC      31.0 - 37.0 g/dL    33.7   RDW      11.6 - 14.5 %    16.2 (H)   PLATELET      848 - 028 K/uL    250   MPV      9.2 - 11.8 FL    11.9 (H)   NEUTROPHILS      40 - 73 %    75 (H)   LYMPHOCYTES      21 - 52 %    17 (L)   MONOCYTES      3 - 10 %    5   EOSINOPHILS      0 - 5 %    2   BASOPHILS      0 - 2 %    1   ABS. NEUTROPHILS      1.8 - 8.0 K/UL    7.7   ABS. LYMPHOCYTES      0.9 - 3.6 K/UL    1.7   ABS. MONOCYTES      0.05 - 1.2 K/UL    0.5   ABS. EOSINOPHILS      0.0 - 0.4 K/UL    0.2   ABS. BASOPHILS      0.0 - 0.06 K/UL    0.1 (H)   DF          AUTOMATED   Sodium      136 - 145 mmol/L  140     Potassium      3.5 - 5.5 mmol/L  5.3     Chloride      100 - 108 mmol/L  104     CO2      21 - 32 mmol/L  28     Anion gap      3.0 - 18 mmol/L  8     Glucose      74 - 99 mg/dL  93     BUN      7.0 - 18 MG/DL  21 (H)     Creatinine      0.6 - 1.3 MG/DL  1.41 (H)     BUN/Creatinine ratio      12 - 20    15     GFR est AA      >60 ml/min/1.73m2  59 (L)     GFR est non-AA      >60 ml/min/1.73m2  49 (L)     Calcium      8.5 - 10.1 MG/DL  10.1     Bilirubin, total      0.2 - 1.0 MG/DL  0.4     ALT      16 - 61 U/L  22     AST      15 - 37 U/L  16     Alk.  phosphatase      45 - 117 U/L  96     Protein, total      6.4 - 8.2 g/dL  6.8     Albumin      3.4 - 5.0 g/dL  3.8     Globulin      2.0 - 4.0 g/dL  3.0     A-G Ratio      0.8 - 1.7    1.3     Color             Appearance             Specific gravity      1.005 - 1.030         pH (UA)      5.0 - 8.0         Protein      NEG mg/dL       Glucose      NEG mg/dL       Ketone      NEG mg/dL       Bilirubin      NEG         Blood      NEG         Urobilinogen      0.2 - 1.0 EU/dL       Nitrites      NEG         Leukocyte Esterase      NEG         Cholesterol, total      <200 MG/ (H)      Triglyceride      <150 MG/ (H)      HDL Cholesterol      40 - 60 MG/DL 31 (L)      LDL, calculated      0 - 100 MG/.4 (H)      VLDL, calculated      MG/DL 79.6      CHOL/HDL Ratio      0 - 5.0   6.8 (H) Microalbumin,urine random      0 - 3.0 MG/DL       Creatinine, urine      30 - 125 mg/dL       Microalbumin/Creat. Ratio      0 - 30 mg/g       Hemoglobin A1c, (calculated)      4.2 - 5.6 %   5.9 (H)    Est. average glucose      mg/dL   123    TSH      0.36 - 3.74 uIU/mL       Prostate Specific Ag      0.0 - 4.0 ng/mL             TESTS      Assessment/Plan:    Diabetes - stable  Hypertension - stable  Hyperlipidemia - stable  Renal func stable  AAA stable  Dementia nogoing      Lab review: labs are reviewed, up to date and normal      I have discussed the diagnosis with the patient and the intended plan as seen in the above orders. The patient has received an after-visit summary and questions were answered concerning future plans. I have discussed medication side effects and warnings with the patient as well. I have reviewed the plan of care with the patient, accepted their input and they are in agreement with the treatment goals. Follow-up Disposition: Not on File  More than 1/2 of this 45   minute visit was spent in counselling and coordination of care, as described above.

## 2017-05-18 NOTE — MR AVS SNAPSHOT
Visit Information Date & Time Provider Department Dept. Phone Encounter #  
 5/18/2017  7:30 AM Serenity Hendrickson [phone] [id number] Follow-up Instructions Return in about 3 months (around 8/18/2017) for physical, labs at next visit, EKG next visit. Follow-up and Disposition History Your Appointments 5/26/2017  8:15 AM  
Follow Up with Kleber Gutiérrze MD  
[address]) Appt Note: 1mon f/u; EEG  
 [address] [place] [id number] [phone]  
  
   
 [place] [id number] 6/14/2017  8:00 AM  
COMPLETE [place]YSICAL [place] [address] [place] [address]) Appt Note: Return in about 3 months (around 6/8/2017) for physical, labs prior, EKG next visit. [address] [facility] [address] [facility] [address] [place]  
  
   
 [address] [facility] [address] [place] [address] [address]  
  
    
 8/3/2017 10:00 AM  
Office Visit with Sierra Montana MD  
[facility] [address] [place] [address]) Appt Note: check up [address] [address]  
[phone] [address] [address]  
  
    
 8/23/2017  8:45 AM  
Follow Up with Uzair Vazquez MD  
Cardio Specialist at [address]) Appt Note: 6 m f/u  
 [address] [facility] [address] [facility] [address]  
  
   
 [id number] [place] [address] [facility]  
  
    
 10/11/2017  9:00 AM  
PROCEDURE with [facility] NONIMAGING [facility] ([address] [place] [address]) Appt Note: leg art  6 mos knaak - same day; .  
 [address] [place] [address] [facility]  
[phone]  [address] [place] [address],[address]  
  
    
 10/11/2017 10:00 AM  
PROCEDURE with [facility] IMAGING 1  
 [facility] ([address] [facility]) Appt Note: aaa 6 mos knaak - same day; .; pt r/s time [address] [facility] [address] 200 [facility] [address]  
[phone] [address] [facility]  
  
    
 10/11/2017 11:45 AM  
Office Visit with [facility] ([address] [facility]) Appt Note: 6 month same day pt has prep and seeing lab at 8am; pt r/s time needs am appt due to transportation [address] [facility] 995 [address] [facility] [address]  
[phone] 2300 [facility] 200 [facility] [address] Upcoming Health Maintenance Date Due  
 EYE EXAM RETINAL OR DILATED Q1 9/30/1950 GLAUCOMA SCREENING Q2Y 11/12/2016 FOOT EXAM Q1 6/13/2017 MEDICARE YEARLY EXAM 6/14/2017 INFLUENZA AGE 9 TO ADULT 8/1/2017 HEMOGLOBIN A1C Q6M 10/20/2017 MICROALBUMIN Q1 4/20/2018 LIPID PANEL Q1 4/20/2018 COLONOSCOPY 6/29/2019 DTaP/Tdap/Td series (2 - Td) 9/14/2020 Allergies as of 5/18/2017  Review Complete On: 5/18/2017 By: Danielle Hernandez., DO Severity Noted Reaction Type Reactions Ace Inhibitors  03/27/2016    Other (comments) Per pt, Cardiologist states he cannot have ACE inhibitors Current Immunizations  Reviewed on 3/28/2016 Name Date Influenza High Dose Vaccine PF 9/13/2016, 11/10/2015, 10/7/2015 Influenza Vaccine 11/12/2014, 12/16/2013  8:04 AM  
 Influenza Vaccine Split 12/14/2012 Influenza Vaccine Whole 9/14/2010 Pneumococcal Conjugate (PCV-13) 10/7/2015 Pneumococcal Vaccine (Unspecified Type) 9/14/2010 TDAP Vaccine 9/14/2010 Zoster 9/14/2010 Not reviewed this visit You Were Diagnosed With   
  
 Codes Comments AAA (abdominal aortic aneurysm) without rupture (HCC)    -  Primary ICD-10-CM: I71.4 ICD-9-CM: 441.4 PAD (peripheral artery disease) (HCC)     ICD-10-CM: I73.9 ICD-9-CM: 443.9 Controlled type 2 diabetes mellitus with diabetic nephropathy, without long-term current use of insulin (HCC)     ICD-10-CM: E11.21 
ICD-9-CM: 250.40, 583.81 Vascular dementia without behavioral disturbance     ICD-10-CM: F01.50 ICD-9-CM: 290.40 Diastolic dysfunction     JRT-88-PZ: I51.9 ICD-9-CM: 429.9 Essential hypertension     ICD-10-CM: I10 
ICD-9-CM: 401.9 CKD (chronic kidney disease), stage III     ICD-10-CM: N18.3 ICD-9-CM: 726. 3 Coronary artery disease involving native coronary artery of native heart without angina pectoris     ICD-10-CM: I25.10 ICD-9-CM: 414.01   
 Pure hypercholesterolemia     ICD-10-CM: E78.00 ICD-9-CM: 272.0 Vitals BP Pulse Temp Resp Height(growth percentile) Weight(growth percentile) 144/78 (BP 1 Location: Left arm, BP Patient Position: Sitting) 79 98.6 °F (37 °C) (Oral) 18 5' 10\" (1.778 m) 207 lb 12.8 oz (94.3 kg) SpO2 BMI Smoking Status 95% 29.82 kg/m2 Former Smoker BMI and BSA Data Body Mass Index Body Surface Area  
 29.82 kg/m 2 2.16 m 2 Preferred Pharmacy Pharmacy Name Phone [facility] [phone] Your Updated Medication List  
  
   
This list is accurate as of: 5/18/17  8:19 AM.  Always use your most recent med list.  
  
  
  
  
 aspirin 325 mg tablet Commonly known as:  ASPIRIN Take 1 Tab by mouth daily. atorvastatin 20 mg tablet Commonly known as:  LIPITOR Take 1 Tab by mouth daily. cyanocobalamin 1,000 mcg tablet Take 1,000 mcg by mouth daily. donepezil 5 mg tablet Commonly known as:  ARICEPT  
1 q am pc  
  
 FLOMAX PO Take  by mouth. omega-3 fatty acids-vitamin e 1,000 mg Cap Commonly known as:  FISH OIL Take 1 Cap by mouth two (2) times a day. oxybutynin chloride XL 5 mg CR tablet Commonly known as:  DITROPAN XL  
 Take 1 Tab by mouth daily. Indications: INCREASED URINARY FREQUENCY We Performed the Following AMB POC FUNDUS PHOTOGRAPHY WITH INTERP AND REPORT [29727 CPT(R)]  DIABETES EYE EXAM [HM6 Custom]  DIABETES FOOT EXAM [7 Custom] Follow-up Instructions Return in about 3 months (around 8/18/2017) for physical, labs at next visit, EKG next visit. Introducing [facility]! Dear Dequan Ulloa: Thank you for requesting a [employer] account. Our records indicate that you already have an active [employer] account. You can access your account anytime at [email or web address] Did you know that you can access your hospital and ER discharge instructions at any time in [employer]? You can also review all of your test results from your hospital stay or ER visit. Additional Information If you have questions, please visit the Frequently Asked Questions section of the [employer] website at [email or web address] Remember, [employer] is NOT to be used for urgent needs. For medical emergencies, dial 911. Now available from your iPhone and Android! Please provide this summary of care documentation to your next provider. Your primary care clinician is listed as [id number] [facility]. If you have any questions after today's visit, please call [phone].

## 2017-05-18 NOTE — PROGRESS NOTES
Vickie  is a 64 y.o. male presents today for follow up on his CAD. Patient would also like to discuss recent labs. Pt is in Room # 5        1. Have you been to the ER, urgent care clinic since your last visit? Hospitalized since your last visit? No    2. Have you seen or consulted any other health care providers outside of the [address] [place] [address] since your last visit? Include any pap smears or colon screening.  No

## 2017-05-25 ENCOUNTER — DOCUMENTATION ONLY (OUTPATIENT)
Dept: FAMILY MEDICINE CLINIC | Age: 77
End: 2017-05-25

## 2017-05-25 ENCOUNTER — HOSPITAL ENCOUNTER (OUTPATIENT)
Dept: NEUROLOGY | Age: 77
Discharge: HOME | End: 2017-05-25
Attending: PSYCHIATRY & NEUROLOGY
Payer: MEDICARE

## 2017-05-25 DIAGNOSIS — F01.50 VASCULAR DEMENTIA WITHOUT BEHAVIORAL DISTURBANCE (HCC): ICD-10-CM

## 2017-05-25 DIAGNOSIS — I69.319 CVA, OLD, COGNITIVE DEFICITS: ICD-10-CM

## 2017-05-25 DIAGNOSIS — I67.9 CEREBROVASCULAR DISEASE: ICD-10-CM

## 2017-05-25 PROCEDURE — 95819 EEG AWAKE AND ASLEEP: CPT

## 2017-05-26 ENCOUNTER — OFFICE VISIT (OUTPATIENT)
Dept: NEUROLOGY | Age: 77
End: 2017-05-26

## 2017-05-26 VITALS
WEIGHT: 209.6 LBS | SYSTOLIC BLOOD PRESSURE: 120 MMHG | BODY MASS INDEX: 30.01 KG/M2 | HEART RATE: 60 BPM | TEMPERATURE: 98.6 F | DIASTOLIC BLOOD PRESSURE: 78 MMHG | HEIGHT: 70 IN | OXYGEN SATURATION: 98 %

## 2017-05-26 DIAGNOSIS — I67.9 CEREBROVASCULAR DISEASE: ICD-10-CM

## 2017-05-26 DIAGNOSIS — I69.319 CVA, OLD, COGNITIVE DEFICITS: ICD-10-CM

## 2017-05-26 DIAGNOSIS — F01.50 VASCULAR DEMENTIA WITHOUT BEHAVIORAL DISTURBANCE (HCC): Primary | ICD-10-CM

## 2017-05-26 DIAGNOSIS — R29.898 WEAKNESS OF BOTH LEGS: ICD-10-CM

## 2017-05-26 RX ORDER — DONEPEZIL HYDROCHLORIDE 10 MG/1
TABLET, FILM COATED ORAL
Qty: 30 TAB | Refills: 3 | Status: SHIPPED | OUTPATIENT
Start: 2017-05-26 | End: 2017-08-23 | Stop reason: ALTCHOICE

## 2017-05-26 NOTE — PROGRESS NOTES
Reviewed chart and medications with patient, patients wife Yadira Ballesteros and their son Joshua Burkett.

## 2017-05-26 NOTE — MR AVS SNAPSHOT
Visit Information Date & Time Provider Department Dept. Phone Encounter #  
 5/26/2017  8:15 AM Soila Disla  [facility] [address] [address] [id number] Follow-up Instructions Return in about 3 months (around 8/26/2017). Follow-up and Disposition History Your Appointments 8/3/2017 10:00 AM  
Office Visit with Robby Holloway MD  
[facility] [address] [place] [address]) Appt Note: check up [address] [address]  
[phone] [address] [phone]  
  
    
 8/17/2017  8:00 AM  
COMPLETE [place] [address] [address]) Appt Note: Return in about 3 months (around 6/8/2017) for physical, labs prior, EKG next visit.; r/s from 6/14/2017   
 [facility] [address]  
  
   
 [address] [facility] [address]  
  
    
 8/23/2017  8:45 AM  
Follow Up with Antelmo Shirley MD  
Cardio Specialist at [address]) Appt Note: 6 m f/u  
 [address] [facility] [address]  
  
   
 [address] [place] [address] [address]  
  
    
 8/28/2017  9:45 AM  
Follow Up with Soila Disla MD  
[address] [place] [address]) Appt Note: 3mon f/u  
 [address] [address]  
[phone]  
  
   
 [facility] [address]  
  
    
 10/11/2017  9:00 AM  
PROCEDURE with [facility] NONIMAGING [facility] ([address] [place] [address]) Appt Note: leg art  6 mos knaak - same day; .  
 [address] [facility] [address]  
[phone] [address] [place] [address] 1M07  
  
    
 10/11/2017 10:00 AM  
PROCEDURE with [facility] IMAGING 1 [facility] ([address] [place] [address]) Appt Note: aaa 6 mos knaak - same day; .; pt r/s time 1212 [facility] 540 200 [facility] [address]  
[phone] [address] [place] [address]  
  
    
 10/11/2017 11:45 AM  
Office Visit with [facility] ([address] [facility]) Appt Note: 6 month same day pt has prep and seeing lab at 8am; pt r/s time needs am appt due to transportation 1212 [facility] 814 200 [facility] [address]  
[phone] [facility] [address] [facility] [address] Upcoming Health Maintenance Date Due  
 EYE EXAM RETINAL OR DILATED Q1 9/30/1950 GLAUCOMA SCREENING Q2Y 11/12/2016 MEDICARE YEARLY EXAM 6/14/2017 INFLUENZA AGE 9 TO ADULT 8/1/2017 HEMOGLOBIN A1C Q6M 10/20/2017 MICROALBUMIN Q1 4/20/2018 LIPID PANEL Q1 4/20/2018 FOOT EXAM Q1 5/18/2018 COLONOSCOPY 6/29/2019 DTaP/Tdap/Td series (2 - Td) 9/14/2020 Allergies as of 5/26/2017  Review Complete On: 5/26/2017 By: Anabel Mccallum MD  
  
 Severity Noted Reaction Type Reactions Ace Inhibitors  03/27/2016    Other (comments) Per pt, Cardiologist states he cannot have ACE inhibitors Current Immunizations  Reviewed on 3/28/2016 Name Date Influenza High Dose Vaccine PF 9/13/2016, 11/10/2015, 10/7/2015 Influenza Vaccine 11/12/2014, 12/16/2013  8:04 AM  
 Influenza Vaccine Split 12/14/2012 Influenza Vaccine Whole 9/14/2010 Pneumococcal Conjugate (PCV-13) 10/7/2015 Pneumococcal Vaccine (Unspecified Type) 9/14/2010 TDAP Vaccine 9/14/2010 Zoster 9/14/2010 Not reviewed this visit You Were Diagnosed With   
  
 Codes Comments Vascular dementia without behavioral disturbance    -  Primary ICD-10-CM: F01.50 ICD-9-CM: 290.40   
 CVA, old, cognitive deficits     ICD-10-CM: I69.319 ICD-9-CM: 438.0 Cerebrovascular disease     ICD-10-CM: I67.9 ICD-9-CM: 437.9 Weakness of both legs     ICD-10-CM: R29.898 ICD-9-CM: 729.89 Vitals BP Pulse Temp Height(growth percentile) Weight(growth percentile) SpO2  
 120/78 60 98.6 °F (37 °C) (Oral) 5' 10\" (1.778 m) 209 lb 9.6 oz (95.1 kg) 98% BMI Smoking Status 30.07 kg/m2 Former Smoker BMI and BSA Data Body Mass Index Body Surface Area 30.07 kg/m 2 2.17 m 2 Preferred Pharmacy Pharmacy Name Phone Nina Farah [address] [place] [address]. [address] [phone] Your Updated Medication List  
  
   
This list is accurate as of: 17  8:58 AM.  Always use your most recent med list.  
  
  
  
  
 aspirin 325 mg tablet Commonly known as:  ASPIRIN Take 1 Tab by mouth daily. atorvastatin 20 mg tablet Commonly known as:  LIPITOR Take 1 Tab by mouth daily. cyanocobalamin 1,000 mcg tablet Take 1,000 mcg by mouth daily. donepezil 10 mg tablet Commonly known as:  ARICEPT  
1 q am pc  
  
 FLOMAX PO Take  by mouth. omega-3 fatty acids-vitamin e 1,000 mg Cap Commonly known as:  FISH OIL Take 1 Cap by mouth two (2) times a day. oxybutynin chloride XL 5 mg CR tablet Commonly known as:  DITROPAN XL Take 1 Tab by mouth daily. Indications: INCREASED URINARY FREQUENCY Prescriptions Sent to Pharmacy Refills  
 donepezil (ARICEPT) 10 mg tablet 3 Si q am pc  
 Class: Normal  
 Pharmacy: [facility] [address]  #: [phone] Follow-up Instructions Return in about 3 months (around 2017). Introducing [facility]! Dear Sun Ashley: Thank you for requesting a [employer] account. Our records indicate that you already have an active [employer] account. You can access your account anytime at [email or web address] Did you know that you can access your hospital and ER discharge instructions at any time in [facility]? You can also review all of your test results from your hospital stay or ER visit. Additional Information If you have questions, please visit the Frequently Asked Questions section of the [facility] website at [email or web address] Remember, [facility] is NOT to be used for urgent needs. For medical emergencies, dial 911. Now available from your iPhone and Android! Please provide this summary of care documentation to your next provider. Your primary care clinician is listed as [phone] [facility]. If you have any questions after today's visit, please call [phone].

## 2017-05-26 NOTE — PROGRESS NOTES
[facility]             333 [facility], [address]    5/26/2017    HPI:  Vickie  is a 64 y.o., right handed,   male, who presents with cognitive dysfunction worse since December,2016. Patient admits to occasional difficulty with word finding weakness worse in the legs his wife reports that occasionally he gets his days and nights confused he has difficulty telling time which she attributes to poor vision she also states she has poor memory. He was hospitalized in spring of last year at [facility] for stroke. He also reports collapsing in December and was told it was a TIA. Patient did undergo MRI MRA  December. Patient has extensive vascular disease BKA right leg abdominal aortic aneurysm peripheral artery disease as well as evidence of multiple strokes per MRI he also has chronic kidney disease history of diabetes and coronary artery disease. He is on Xarelto in addition to aspirin and statin. Patient returns in follow-up reporting no problems with the aricept he feels he may be able to recall things better. He did undergo the EEG which is reviewed as showing mild slowing. Results and implications are discussed with patient and family  Current Outpatient Prescriptions   Medication Sig Dispense Refill    donepezil (ARICEPT) 10 mg tablet 1 q am pc 30 Tab 3    cyanocobalamin 1,000 mcg tablet Take 1,000 mcg by mouth daily.  oxybutynin chloride XL (DITROPAN XL) 5 mg CR tablet Take 1 Tab by mouth daily. Indications: INCREASED URINARY FREQUENCY 60 Tab 3    TAMSULOSIN HCL (FLOMAX PO) Take  by mouth.  atorvastatin (LIPITOR) 20 mg tablet Take 1 Tab by mouth daily. 90 Tab 4    aspirin (ASPIRIN) 325 mg tablet Take 1 Tab by mouth daily. 90 Tab 4    omega-3 fatty acids-vitamin e (FISH OIL) 1,000 mg cap Take 1 Cap by mouth two (2) times a day.  180 Cap 4       Past Medical History:   Diagnosis Date    Advance directive in chart 6/13/2016    CAD (coronary artery disease)     Cancer [facility]) 2003    Colon    CKD (chronic kidney disease), stage III 3/27/2016    CVA (cerebral vascular accident) ([address] 3/26/2016    Femoral artery aneurysm, left ([address]     Heart attack ([address] 2008    Heart attack ([address]     Hernia     History of TIAs     Hyperlipidemia     Hypertension 3/27/2016    Iliac artery aneurysm, left ([address]     Pure hypercholesterolemia 9/14/2012    PVD (peripheral vascular disease) ([address] 3/27/2016    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 ([address] 3/26/2016       Past Surgical History:   Procedure Laterality Date    ABDOMEN SURGERY PROC UNLISTED  2011    3 stents placed into abdomen (groin)    CARDIAC SURG PROCEDURE UNLIST  2005    Quadruple Bypass    CARDIAC SURG PROCEDURE UNLIST  2011    Aortic pig valve placed    COLONOSCOPY N/A 6/29/2016    COLONOSCOPY performed by Lukas Khan MD at [facility] ENDOSCOPY    ENDOSCOPY, COLON, DIAGNOSTIC      HX AAA REPAIR      HX ORTHOPAEDIC  2008    Right Leg Amputated     Family History   Problem Relation Age of Onset    Hypertension Mother     Hypertension Father     Heart Disease Father     Heart Disease Brother      Allergies   Allergen Reactions    Ace Inhibitors Other (comments)     Per pt, Cardiologist states he cannot have ACE inhibitors       Review of Systems:   Review of Systems - History obtained from spouse and the patient  General ROS: positive for  - fatigue  Psychological ROS: positive for - depression and memory difficulties  ENT ROS: negative  Hematological and Lymphatic ROS: negative  Endocrine ROS: negative  Respiratory ROS: no cough, shortness of breath, or wheezing  Cardiovascular ROS: no chest pain or dyspnea on exertion  Gastrointestinal ROS: no abdominal pain, change in bowel habits, or black or bloody stools  Genito-Urinary ROS: no dysuria, trouble voiding, or hematuria  Musculoskeletal ROS: positive for - gait disturbance, joint pain and muscular weakness  Neurological ROS: positive for - impaired coordination/balance, memory loss, speech problems, visual changes and weakness  Dermatological ROS: negative    PHYSICAL EXAMINATION:    Visit Vitals    /78    Pulse 60    Temp 98.6 °F (37 °C) (Oral)    Ht 5' 10\" (1.778 m)    Wt 95.1 kg (209 lb 9.6 oz)    SpO2 98%    BMI 30.07 kg/m2     General:  Well defined, nourished, and groomed individual in no acute distress. Neck: Supple, nontender, thyroid within normal limits, no JVD, no bruits, no pain with resistance to active range of motion. Heart: Regular rate and rhythm, no murmurs, rub, or gallop. Normal S1S2. Lungs:  Clear to auscultation bilaterally with equal chest expansion, no cough, no wheeze  Musculoskeletal:  Extremities revealed no edema right bka  Psych:  Good mood and normal affect    NEUROLOGICAL EXAMINATION:     Mental Status:   Alert and oriented to person, place, and time with fair recent and remote memory. Attention span and concentration are normal. Speech is fluent with a full fund of knowledge. Mild apraxia stm 3/3    Cranial Nerves:    II, III, IV, VI:  Visual acuity grossly intact. Visual fields are normal.    Pupils are equal, round, and reactive to light and accommodation. Extra-ocular movements are full and fluid. Fundoscopic exam was not well visualized no ptosis or nystagmus. V-XII: Hearing is grossly intact. Facial features are symmetric, with normal sensation and strength. The palate rises symmetrically and the tongue protrudes midline. Motor Examination: Normal tone, bulk,muscle strength  Consistent with effortthroughout. No cogwheel rigidity or clonus present. Sensory exam:  Normal throughout to pinprick, temperature, and vibration sense  bue. Coordination:  Heel-to-shin limited.   Finger to nose and rapid arm movement testing was normal .   No resting or intention tremor    Gait and Station:  Antalgic slow unsteady gait stressed gait not tested. Normal arm swing. No pronator drift. No muscle wasting or fasiculations noted. Reflexes:  DTRs depressed  throughout. BRAIN AND POSTERIOR FOSSA: The examination is slightly degraded by motion but  remains diagnostic. Mild atrophy and mild to moderate chronic small vessel  changes are noted. There is an old area of infarction involving the right  frontal lobe, extending into the corona radiata white matter. There is also an  area of old infarct in the right parietal region. There is linear abnormal  signal in the right subinsular region with associated old blood breakdown  products which may represent an area of old hypertensive hemorrhage or old  hemorrhagic infarct. There is no acute intracranial hemorrhage, mass effect, or midline shift. There  are no significant additional areas of abnormal parenchymal signal.  There is no  true restricted diffusion to suggest acute infarct. EXTRA-AXIAL SPACES AND MENINGES: There are no abnormal extra-axial fluid  collections. VASCULAR: The visualized portions of the intracranial carotid and  vertebrobasilar systems demonstrate patent appearing flow voids. CALVARIA: Normal.     SINUSES: Clear. CRANIOCERVICAL JUNCTION: Normal.     OTHER: None.     _______________     IMPRESSION  IMPRESSION:     1. Atrophy and chronic small vessel changes with old areas of infarction, as  described above. 2.  Otherwise, unremarkable evaluation. Specifically, no acute intracranial  abnormalities are identified. eeg mild slowing no epileptiform discharges    Assessment and Plan:   Molina Dawn is a 64 y.o. right handed male whose history and physical are consistent with vascular dementia. Kristian Taverasryl Alena who has risk factors including vascular disease,hypertension,diabetes    Kristian was seen today for memory loss.     Diagnoses and all orders for this visit:    Vascular dementia without behavioral disturbance    CVA, old, cognitive deficits    Cerebrovascular disease    Weakness of both legs    Other orders  -     donepezil (ARICEPT) 10 mg tablet; 1 q am pc      Follow-up Disposition:  Return in about 3 months (around 8/26/2017). Reviewed workup and notes in chart from  pcp and vascular surgery as well as previous neurological evaluations/  Personally reviewed brain imaging. Discussed work up planned need for vit b12 and risks and benefits of aricept  I spent 30 minutes with the patient in face-to-face consultation, of which greater than 50% was spent in counseling and coordination of care as described above.

## 2017-08-23 ENCOUNTER — OFFICE VISIT (OUTPATIENT)
Dept: CARDIOLOGY CLINIC | Age: 77
End: 2017-08-23

## 2017-08-23 DIAGNOSIS — Z95.1 S/P CABG (CORONARY ARTERY BYPASS GRAFT): ICD-10-CM

## 2017-08-23 DIAGNOSIS — E11.21 CONTROLLED TYPE 2 DIABETES MELLITUS WITH DIABETIC NEPHROPATHY, WITHOUT LONG-TERM CURRENT USE OF INSULIN (HCC): ICD-10-CM

## 2017-08-23 DIAGNOSIS — N18.30 CKD (CHRONIC KIDNEY DISEASE), STAGE III (HCC): ICD-10-CM

## 2017-08-23 DIAGNOSIS — I10 ESSENTIAL HYPERTENSION: ICD-10-CM

## 2017-08-23 DIAGNOSIS — I73.9 PAD (PERIPHERAL ARTERY DISEASE) (HCC): ICD-10-CM

## 2017-08-23 DIAGNOSIS — Z95.2 S/P AVR: ICD-10-CM

## 2017-08-23 DIAGNOSIS — I25.10 CORONARY ARTERY DISEASE INVOLVING NATIVE CORONARY ARTERY OF NATIVE HEART WITHOUT ANGINA PECTORIS: ICD-10-CM

## 2017-08-23 DIAGNOSIS — I71.40 AAA (ABDOMINAL AORTIC ANEURYSM) WITHOUT RUPTURE: Primary | ICD-10-CM

## 2017-08-23 DIAGNOSIS — E78.00 PURE HYPERCHOLESTEROLEMIA: ICD-10-CM

## 2017-08-23 DIAGNOSIS — I51.89 DIASTOLIC DYSFUNCTION: ICD-10-CM

## 2017-08-23 RX ORDER — DONEPEZIL HYDROCHLORIDE 10 MG/1
10 TABLET, FILM COATED ORAL
COMMUNITY
End: 2017-08-28 | Stop reason: SDUPTHER

## 2017-08-23 RX ORDER — TAMSULOSIN HYDROCHLORIDE 0.4 MG/1
0.4 CAPSULE ORAL
COMMUNITY
End: 2018-01-03 | Stop reason: SDUPTHER

## 2017-08-23 RX ORDER — DOCUSATE SODIUM 100 MG/1
100 CAPSULE, LIQUID FILLED ORAL
COMMUNITY
End: 2018-07-02

## 2017-08-23 RX ORDER — ATORVASTATIN CALCIUM 40 MG/1
20 TABLET, FILM COATED ORAL DAILY
COMMUNITY
End: 2017-10-16 | Stop reason: SDUPTHER

## 2017-08-23 NOTE — MR AVS SNAPSHOT
Visit Information Date & Time Provider Department Dept. Phone Encounter #  
 8/23/2017  8:45 AM Marlo Doshi  [facility] at [facility] [phone] Follow-up Instructions Return in about 6 months (around 2/23/2018). Your Appointments 8/28/2017  9:45 AM  
Follow Up with Mayra Sherman MD  
[facility] [address]) Appt Note: 3mon f/u  
 333 [facility] [address] [facility] [id number] [phone]  
  
   
 [place] [phone]  
  
    
 9/25/2017  7:30 AM  
COMPLETE PHYSICAL with [facility] [place] [address] [address] [place] [address] Road) Appt Note: Return in about 3 months (around 6/8/2017) for physical, labs prior, EKG next visit.; r/s from 6/14/2017 ce; Return in about 3 months (around 6/8/2017) for physical, labs prior, EKG next visit. [address] [facility] 1700 [address]  
  
    
 10/11/2017  9:00 AM  
PROCEDURE with B[facility]VS NONIMAGING [facility] ([facility]) Appt Note: leg art  6 mos knaak - same day; .  
 [address] 200 [facility] [address]  
[phone] [address] [place] [address]  
  
    
 10/11/2017 10:00 AM  
PROCEDURE with BSVVS IMAGING 1 [facility] ([facility] [address]) Appt Note: aaa 6 mos knaak - same day; .; pt r/s time 2300 [facility] [address] 200 [facility] [address]  
[phone] 2300 [facility] [address] [facility]  
  
    
 10/11/2017 11:45 AM  
Office Visit with [address] [place], [place] [facility] ([facility]) Appt Note: 6 month same day pt has prep and seeing lab at 8am; pt r/s time needs am appt due to transportation [facility] [facility] 103 200 [facility]  
[phone] 2300 [facility], [address] Upcoming Health Maintenance Date Due  
 EYE EXAM RETINAL OR DILATED Q1 9/30/1950 GLAUCOMA SCREENING Q2Y 11/12/2016 MEDICARE YEARLY EXAM 6/14/2017 INFLUENZA AGE 9 TO ADULT 8/1/2017 HEMOGLOBIN A1C Q6M 10/20/2017 MICROALBUMIN Q1 4/20/2018 LIPID PANEL Q1 4/20/2018 FOOT EXAM Q1 5/18/2018 COLONOSCOPY 6/29/2019 DTaP/Tdap/Td series (2 - Td) 9/14/2020 Allergies as of 8/23/2017  Review Complete On: 8/23/2017 By: Pa Chauhan LPN Severity Noted Reaction Type Reactions Ace Inhibitors  03/27/2016    Other (comments) Per pt, Cardiologist states he cannot have ACE inhibitors Current Immunizations  Reviewed on 3/28/2016 Name Date Influenza High Dose Vaccine PF 9/13/2016, 11/10/2015, 10/7/2015 Influenza Vaccine 11/12/2014, 12/16/2013  8:04 AM  
 Influenza Vaccine Split 12/14/2012 Influenza Vaccine Whole 9/14/2010 Pneumococcal Conjugate (PCV-13) 10/7/2015 TDAP Vaccine 9/14/2010 ZZZ-RETIRED (DO NOT USE) Pneumococcal Vaccine (Unspecified Type) 9/14/2010 Zoster 9/14/2010 Not reviewed this visit Vitals Pulse Height(growth percentile) Weight(growth percentile) SpO2 BMI Smoking Status (!) 55 5' 10\" (1.778 m) 201 lb (91.2 kg) 98% 28.84 kg/m2 Former Smoker BMI and BSA Data Body Mass Index Body Surface Area  
 28.84 kg/m 2 2.12 m 2 Preferred Pharmacy Pharmacy Name Phone [address] [phone] Your Updated Medication List  
  
   
This list is accurate as of: 8/23/17  9:37 AM.  Always use your most recent med list.  
  
  
  
  
 ARICEPT 10 mg tablet Generic drug:  donepezil Take 10 mg by mouth nightly. aspirin 325 mg tablet Commonly known as:  ASPIRIN Take 1 Tab by mouth daily. atorvastatin 40 mg tablet Commonly known as:  LIPITOR Take 20 mg by mouth daily. cyanocobalamin 1,000 mcg tablet Take 1,000 mcg by mouth daily. FLOMAX 0.4 mg capsule Generic drug:  tamsulosin Take 0.4 mg by mouth nightly. omega-3 fatty acids-vitamin e 1,000 mg Cap Commonly known as:  FISH OIL Take 1 Cap by mouth two (2) times a day. STOOL SOFTENER 100 mg capsule Generic drug:  docusate sodium Take 100 mg by mouth nightly. Follow-up Instructions Return in about 6 months (around 2/23/2018). Introducing [facility]! Dear Nestor Cole: Thank you for requesting a [employer] account. Our records indicate that you already have an active [employer] account. You can access your account anytime at [email or web address] Did you know that you can access your hospital and ER discharge instructions at any time in [employer]? You can also review all of your test results from your hospital stay or ER visit. Additional Information If you have questions, please visit the Frequently Asked Questions section of the [employer] website at [email or web address] Remember, [employer] is NOT to be used for urgent needs. For medical emergencies, dial 911. Now available from your iPhone and Android! Please provide this summary of care documentation to your next provider. Your primary care clinician is listed as [phone] [facility]. If you have any questions after today's visit, please call [phone].

## 2017-08-23 NOTE — PROGRESS NOTES
1. Have you been to the ER, urgent care clinic since your last visit? Hospitalized since your last visit? No    2. Have you seen or consulted any other health care providers outside of the [address] [place] [address] since your last visit? Include any pap smears or colon screening.  No

## 2017-08-27 VITALS
HEART RATE: 55 BPM | HEIGHT: 70 IN | WEIGHT: 201 LBS | OXYGEN SATURATION: 98 % | DIASTOLIC BLOOD PRESSURE: 86 MMHG | SYSTOLIC BLOOD PRESSURE: 156 MMHG | BODY MASS INDEX: 28.77 KG/M2

## 2017-08-27 NOTE — PROGRESS NOTES
Subjective:      Delwin Lennox is in the office today for cardiac reevaluation. He is a 77-year-old man that had revascularization of his left lower extremity in October of 2016. He reported that his lower extremity swelling  was markedly improved. The patient has a history of known coronary artery disease and prior coronary bypass grafting done in 2005. He also had aortic valve replacement 2011. The most recent echocardiogram lai done in December of 2016 demonstrated adequate function of the aortic prosthesis. Calculated area was 0.9 cm. The patient suffered a neurological event in December of 2016. He is followed by Dr. Oscar Garvin in that regard. The patient had a MRI of brain with contrast on 12/30/2016. There was an old area of infarction involving the right frontal lobe extending into the corona radiata white matter. There was also an area of old infarction in the right parietal region. There was also an abnormal signal in the right sub-insular region, which was felt to possibly represent an area of old hemorrhagic infarct. There was no acute abnormality at that time. The patient reports that he feels good. He has had no chest pain. He used SL NTG on only one occasion since his last visit in 02/2017. He experiences shortness of breath with bending over, but otherwise, has no limiting shortness of breath. He reports that he cannot walk like he used to and has had a recent fall while vacuuming. He has completed physical therapy related to his CVA. Lastly, he has had DVT of his left posterior tibial vein in 04/2017. He was seen by Vascular surgery in that regard.              Patient Active Problem List    Diagnosis Date Noted    Vascular dementia without behavioral disturbance 44/26/6333    Diastolic dysfunction 00/45/2017    AAA (abdominal aortic aneurysm) without rupture ([facility] 01/04/2017    PAD (peripheral artery disease) ([facility] 09/21/2016    Advance directive in chart 06/13/2016    S/P CABG (coronary artery bypass graft) 04/13/2016    S/P AVR 04/13/2016    Hypertension 03/27/2016    Diabetes mellitus type 2, controlled ([address] 03/27/2016    CKD (chronic kidney disease), stage III 03/27/2016    Coronary artery disease involving native coronary artery without angina pectoris 01/27/2016    Pure hypercholesterolemia 09/14/2012     Current Outpatient Prescriptions   Medication Sig Dispense Refill    donepezil (ARICEPT) 10 mg tablet Take 10 mg by mouth nightly.  atorvastatin (LIPITOR) 40 mg tablet Take 20 mg by mouth daily.  tamsulosin (FLOMAX) 0.4 mg capsule Take 0.4 mg by mouth nightly.  docusate sodium (STOOL SOFTENER) 100 mg capsule Take 100 mg by mouth nightly.  cyanocobalamin 1,000 mcg tablet Take 1,000 mcg by mouth daily.  aspirin (ASPIRIN) 325 mg tablet Take 1 Tab by mouth daily. 90 Tab 4    omega-3 fatty acids-vitamin e (FISH OIL) 1,000 mg cap Take 1 Cap by mouth two (2) times a day.  180 Cap 4     Allergies   Allergen Reactions    Ace Inhibitors Other (comments)     Per pt, Cardiologist states he cannot have ACE inhibitors     Past Medical History:   Diagnosis Date    Advance directive in chart 6/13/2016    CAD (coronary artery disease)     Cancer ([address] 2003    Colon    CKD (chronic kidney disease), stage III 3/27/2016    CVA (cerebral vascular accident) ([address] 3/26/2016    Femoral artery aneurysm, left ([address]     Heart attack ([address] 2008    Heart attack ([address]     Hernia     History of TIAs     Hyperlipidemia     Hypertension 3/27/2016    Iliac artery aneurysm, left ([address]     Pure hypercholesterolemia 9/14/2012    PVD (peripheral vascular disease) ([address] 3/27/2016    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 ([address] 3/26/2016     Past Surgical History:   Procedure Laterality Date    ABDOMEN SURGERY PROC UNLISTED  2011    3 stents placed into abdomen (groin)    CARDIAC SURG PROCEDURE UNLIST  2005    Quadruple Bypass    CARDIAC SURG PROCEDURE UNLIST      Aortic pig valve placed    COLONOSCOPY N/A 2016    [facility]rel[facility] G[facility] ENDOSCOPY    ENDOSCOPY, COLON, DIAGNOSTIC      HX AAA REPAIR      HX ORTHOPAEDIC  2008    Right Leg Amputated     Family History   Problem Relation Age of Onset    Hypertension Mother     Hypertension Father     Heart Disease Father     Heart Disease Brother      History   Smoking Status    Former Smoker    Quit date: 1987   Smokeless Tobacco    Never Used          Review of Systems, additional:  Constitutional: negative  Eyes: negative  Respiratory: negative  Cardiovascular: negative  Gastrointestinal: negative  Musculoskeletal:weakness  Neurological: negative  Behvioral/Psych: negative  Endocrine: negative  ENT: negative    Objective:     Visit Vitals    /86    Pulse (!) 55    Ht 5' 10\" (1.778 m)    Wt 201 lb (91.2 kg)    SpO2 98%    BMI 28.84 kg/m2     General:  alert, cooperative, no distress   Chest Wall: inspection normal - no chest wall deformities or tenderness, respiratory effort normal   Lung: clear to auscultation bilaterally   Heart:  normal rate and regular rhythm, S1 and S2 normal, systolic murmur 2/6 at 2nd right intercostal space and radiates to carotids   Abdomen: soft, non-tender. Bowel sounds normal. No masses,  no organomegaly   Extremities: Left extremity normal. There is a R BKA  no cyanosis . There is 1+ ankle edema on left Skin: no rashes   Neuro: alert, oriented, normal speech, no focal findings or movement disorder noted     EK2016; Sinus rhythm with PACs. LAD. Assessment/Plan:       ICD-10-CM ICD-9-CM    1. AAA (abdominal aortic aneurysm) without rupture ([facility]) I71.4 441.4    2. PAD (peripheral artery disease) ([facility]) I73.9 443.9    3. Femoral artery aneurysm, left ([facility]) I72.4 442.3    4. Pure hypercholesterolemia E78.00 272.0    5. CKD (chronic kidney disease), stage III N18.3 585.3    6.  Controlled type 2 diabetes mellitus with diabetic nephropathy, without long-term current use of insulin ([facility]) E11.21 250.40      583.81    7. Essential hypertension,  Systolic BP elevated in office today. He will follow up with Dr Harris Sever 401.9    8. Aortic stenosis, moderate, reasonable valvular parameters by recent Echo 12/2016. Will repeat in 6 mos at next appointment I35.0 424.1    9. Diastolic dysfunction E72.8 429.9    10. S/P AVR (aortic valve replacement), [place], [place] 2011 Z95.2 V43.3    11. S/P CABG (coronary artery bypass graft), [place], [place] 2005, stable. No SL NTG times 2 to 3 years per patient. Will continue present cardiac Rx and see in RT in 6 mos.  Z95.1 V45.81    12     CVA, followed by Dr Caitlin Villarreal

## 2017-08-28 ENCOUNTER — OFFICE VISIT (OUTPATIENT)
Dept: NEUROLOGY | Age: 77
End: 2017-08-28

## 2017-08-28 VITALS
HEART RATE: 61 BPM | WEIGHT: 200.4 LBS | RESPIRATION RATE: 12 BRPM | TEMPERATURE: 98.2 F | HEIGHT: 70 IN | SYSTOLIC BLOOD PRESSURE: 148 MMHG | DIASTOLIC BLOOD PRESSURE: 88 MMHG | BODY MASS INDEX: 28.69 KG/M2 | OXYGEN SATURATION: 98 %

## 2017-08-28 DIAGNOSIS — R29.898 WEAKNESS OF BOTH LEGS: ICD-10-CM

## 2017-08-28 DIAGNOSIS — F01.50 VASCULAR DEMENTIA WITHOUT BEHAVIORAL DISTURBANCE (HCC): Primary | ICD-10-CM

## 2017-08-28 DIAGNOSIS — I69.319 CVA, OLD, COGNITIVE DEFICITS: ICD-10-CM

## 2017-08-28 DIAGNOSIS — I73.9 PAD (PERIPHERAL ARTERY DISEASE) (HCC): ICD-10-CM

## 2017-08-28 DIAGNOSIS — I67.9 CEREBROVASCULAR DISEASE: ICD-10-CM

## 2017-08-28 RX ORDER — DONEPEZIL HYDROCHLORIDE 10 MG/1
TABLET, FILM COATED ORAL
Qty: 60 TAB | Refills: 5 | Status: SHIPPED | OUTPATIENT
Start: 2017-08-28 | End: 2017-08-28 | Stop reason: SDUPTHER

## 2017-08-28 NOTE — MR AVS SNAPSHOT
Visit Information Date & Time Provider Department Dept. Phone Encounter #  
 8/28/2017  9:45 AM Elsy Aquino  [facility] [address] [address] [id number] Follow-up Instructions Return in about 6 months (around 2/28/2018). Follow-up and Disposition History Your Appointments 9/25/2017  7:30 AM  
COMPLETE PHYSICAL with Migdalia Noelle,  [address] [place] [address]) Appt Note: Return in about 3 months (around 6/8/2017) for physical, labs prior, EKG next visit.; r/s from 6/14/2017 ce; Return in about 3 months (around 6/8/2017) for physical, labs prior, EKG next visit. [id number] [place] [address] [address] [place] [address], [place] [address]  
  
    
 10/11/2017  9:00 AM  
PROCEDURE with [facility] NONIMAGING [facility] ([address] [place] [address]) Appt Note: leg art  6 mos knaak - same day; .  
 [address] [place], [place] [address] [facility]  
[phone] [address] [place] [address]  
  
    
 10/11/2017 10:00 AM  
PROCEDURE with [facility] IMAGING 1 [facility] ([address] [place] [address]) Appt Note: aaa 6 mos knaak - same day; .; pt r/s time 1212 [facility] [address] 200 [facility] [address]  
[phone] [address] [place] [address] [facility]  
  
    
 10/11/2017 11:45 AM  
Office Visit with Riki Rags[place] [facility] ([address] [place] [address]) Appt Note: 6 month same day pt has prep and seeing lab at 8am; pt r/s time needs am appt due to transportation 1212 [facility] [address] 200 [facility] [address]  
[phone] [address] [place] [address] [facility]  
  
    
 2/27/2018  9:45 AM  
Follow Up with Elsy Aquino MD  
[address]) Appt Note: 6mon f/u  
 [address] [place] [address] [phone]  
  
   
 [place] [id number] Upcoming Health Maintenance Date Due  
 EYE EXAM RETINAL OR DILATED Q1 9/30/1950 GLAUCOMA SCREENING Q2Y 11/12/2016 MEDICARE YEARLY EXAM 6/14/2017 INFLUENZA AGE 9 TO ADULT 8/1/2017 HEMOGLOBIN A1C Q6M 10/20/2017 MICROALBUMIN Q1 4/20/2018 LIPID PANEL Q1 4/20/2018 FOOT EXAM Q1 5/18/2018 COLONOSCOPY 6/29/2019 DTaP/Tdap/Td series (2 - Td) 9/14/2020 Allergies as of 8/28/2017  Review Complete On: 8/28/2017 By: Romi Ny LPN Severity Noted Reaction Type Reactions Ace Inhibitors  03/27/2016    Other (comments) Per pt, Cardiologist states he cannot have ACE inhibitors Current Immunizations  Reviewed on 3/28/2016 Name Date Influenza High Dose Vaccine PF 9/13/2016, 11/10/2015, 10/7/2015 Influenza Vaccine 11/12/2014, 12/16/2013  8:04 AM  
 Influenza Vaccine Split 12/14/2012 Influenza Vaccine Whole 9/14/2010 Pneumococcal Conjugate (PCV-13) 10/7/2015 TDAP Vaccine 9/14/2010 ZZZ-RETIRED (DO NOT USE) Pneumococcal Vaccine (Unspecified Type) 9/14/2010 Zoster 9/14/2010 Not reviewed this visit You Were Diagnosed With   
  
 Codes Comments Vascular dementia without behavioral disturbance    -  Primary ICD-10-CM: F01.50 ICD-9-CM: 290.40   
 CVA, old, cognitive deficits     ICD-10-CM: I69.319 ICD-9-CM: 438.0 Cerebrovascular disease     ICD-10-CM: I67.9 ICD-9-CM: 437.9 Weakness of both legs     ICD-10-CM: R29.898 ICD-9-CM: 729.89 PAD (peripheral artery disease) (HCC)     ICD-10-CM: I73.9 ICD-9-CM: 443. 9 Vitals BP Pulse Temp Resp Height(growth percentile) Weight(growth percentile) 148/88 (BP 1 Location: Left arm, BP Patient Position: Sitting) 61 98.2 °F (36.8 °C) (Oral) 12 5' 10\" (1.778 m) 200 lb 6.4 oz (90.9 kg) SpO2 BMI Smoking Status 98% 28.75 kg/m2 Former Smoker Vitals History BMI and BSA Data Body Mass Index Body Surface Area 28.75 kg/m 2 2.12 m 2 Preferred Pharmacy Pharmacy Name Phone Nina [address] [place] [address]. [address] [phone] Your Updated Medication List  
  
   
This list is accurate as of: 17 10:30 AM.  Always use your most recent med list.  
  
  
  
  
 aspirin 325 mg tablet Commonly known as:  ASPIRIN Take 1 Tab by mouth daily. atorvastatin 40 mg tablet Commonly known as:  LIPITOR Take 20 mg by mouth daily. cyanocobalamin 1,000 mcg tablet Take 1,000 mcg by mouth daily. donepezil 10 mg tablet Commonly known as:  ARICEPT  
2 q am pc  
  
 FLOMAX 0.4 mg capsule Generic drug:  tamsulosin Take 0.4 mg by mouth nightly. NITROSTAT SL  
by SubLINGual route. omega-3 fatty acids-vitamin e 1,000 mg Cap Commonly known as:  FISH OIL Take 1 Cap by mouth two (2) times a day. STOOL SOFTENER 100 mg capsule Generic drug:  docusate sodium Take 100 mg by mouth nightly. Prescriptions Sent to Pharmacy Refills  
 donepezil (ARICEPT) 10 mg tablet 5 Si q am pc  
 Class: Normal  
 Pharmacy: [employer] [facility] [employer] [facility] [address] [place] [address]  #: [phone] Follow-up Instructions Return in about 6 months (around 2018). Introducing [facility]! Dear Polly Gordillo: Thank you for requesting a [employer] account. Our records indicate that you already have an active [employer] account. You can access your account anytime at [email or web address] Did you know that you can access your hospital and ER discharge instructions at any time in [employer]? You can also review all of your test results from your hospital stay or ER visit. Additional Information If you have questions, please visit the Frequently Asked Questions section of the [employer] website at [email or web address] Remember, [employer] is NOT to be used for urgent needs. For medical emergencies, dial 911. Now available from your iPhone and Android! Please provide this summary of care documentation to your next provider. Your primary care clinician is listed as [id number] [facility]. If you have any questions after today's visit, please call [phone].

## 2017-08-28 NOTE — PROGRESS NOTES
[address]             [address] [facility], [address]    8/28/2017    HPI:  Sarah Gray is a 64 y.o., right handed,   male, who presents with cognitive dysfunction worse since December,2016. Patient admits to occasional difficulty with word finding weakness worse in the legs his wife reports that occasionally he gets his days and nights confused he has difficulty telling time which she attributes to poor vision she also states she has poor memory. He was hospitalized in spring of last year at [facility] for stroke. He also reports collapsing in December and was told it was a TIA. Patient did undergo MRI MRA  December. Patient has extensive vascular disease BKA right leg abdominal aortic aneurysm peripheral artery disease as well as evidence of multiple strokes per MRI he also has chronic kidney disease history of diabetes and coronary artery disease. He is on Xarelto in addition to aspirin and statin. Patient returns in follow-up reporting no problems with the aricept he feels he may be able to recall things better. He did undergo the EEG which is reviewed as showing mild slowing. Results and implications are discussed with patient and family    Patient reports some mild improvement in memory and is ready to increase the medication he is tolerating theDonepezil well is having trouble with the fall and losing the battery to his orthotic foot he notes no new difficulties otherwise  Current Outpatient Prescriptions   Medication Sig Dispense Refill    NITROGLYCERIN (NITROSTAT SL) by SubLINGual route.  donepezil (ARICEPT) 10 mg tablet 2 q am pc 60 Tab 5    atorvastatin (LIPITOR) 40 mg tablet Take 20 mg by mouth daily.  tamsulosin (FLOMAX) 0.4 mg capsule Take 0.4 mg by mouth nightly.  docusate sodium (STOOL SOFTENER) 100 mg capsule Take 100 mg by mouth nightly.       cyanocobalamin 1,000 mcg tablet Take 1,000 mcg by mouth daily.      aspirin (ASPIRIN) 325 mg tablet Take 1 Tab by mouth daily. 90 Tab 4    omega-3 fatty acids-vitamin e (FISH OIL) 1,000 mg cap Take 1 Cap by mouth two (2) times a day.  180 Cap 4       Past Medical History:   Diagnosis Date    Advance directive in chart 6/13/2016    CAD (coronary artery disease)     Cancer [facility]) 2003    Colon    CKD (chronic kidney disease), stage III 3/27/2016    CVA (cerebral vascular accident) ([address] 3/26/2016    Femoral artery aneurysm, left ([address]     Heart attack ([address] 2008    Heart attack ([address]     Hernia     History of TIAs     Hyperlipidemia     Hypertension 3/27/2016    Iliac artery aneurysm, left ([address]     Pure hypercholesterolemia 9/14/2012    PVD (peripheral vascular disease) ([address] 3/27/2016    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 ([address] 3/26/2016       Past Surgical History:   Procedure Laterality Date    ABDOMEN SURGERY PROC UNLISTED  2011    3 stents placed into abdomen (groin)    CARDIAC SURG PROCEDURE UNLIST  2005    Quadruple Bypass    CARDIAC SURG PROCEDURE UNLIST  2011    Aortic pig valve placed    COLONOSCOPY N/A 6/29/2016    COL[facility] by Hong [facility]iner [facility] at [facility] ENDOSCOPY    ENDOSCOPY, COLON, DIAGNOSTIC      HX AAA REPAIR      HX ORTHOPAEDIC  2008    Right Leg Amputated     Family History   Problem Relation Age of Onset    Hypertension Mother     Hypertension Father     Heart Disease Father     Heart Disease Brother      Allergies   Allergen Reactions    Ace Inhibitors Other (comments)     Per pt, Cardiologist states he cannot have ACE inhibitors       Review of Systems:   Review of Systems - History obtained from spouse and the patient  General ROS: positive for  - fatigue  Psychological ROS: positive for - depression and memory difficulties  ENT ROS: negative  Hematological and Lymphatic ROS: negative  Endocrine ROS: negative  Respiratory ROS: no cough, shortness of breath, or wheezing  Cardiovascular ROS: no chest pain or dyspnea on exertion  Gastrointestinal ROS: no abdominal pain, change in bowel habits, or black or bloody stools  Genito-Urinary ROS: no dysuria, trouble voiding, or hematuria  Musculoskeletal ROS: positive for - gait disturbance, joint pain and muscular weakness  Neurological ROS: positive for - impaired coordination/balance, memory loss, speech problems, visual changes and weakness  Dermatological ROS: negative    PHYSICAL EXAMINATION:    Visit Vitals    /88 (BP 1 Location: Left arm, BP Patient Position: Sitting)    Pulse 61    Temp 98.2 °F (36.8 °C) (Oral)    Resp 12    Ht 5' 10\" (1.778 m)    Wt 90.9 kg (200 lb 6.4 oz)    SpO2 98%    BMI 28.75 kg/m2     General:  Well defined, nourished, and groomed individual in no acute distress. Neck: Supple, nontender, thyroid within normal limits, no JVD, no bruits, no pain with resistance to active range of motion. Heart: Regular rate and rhythm, no murmurs, rub, or gallop. Normal S1S2. Lungs:  Clear to auscultation bilaterally with equal chest expansion, no cough, no wheeze  Musculoskeletal:  Extremities revealed no edema right bka  Psych:  Good mood and normal affect    NEUROLOGICAL EXAMINATION:     Mental Status:   Alert and oriented to person, place, and time with fair recent and remote memory. Attention span and concentration are normal. Speech is fluent with a full fund of knowledge. Mild apraxia stm 3/3    Cranial Nerves:    II, III, IV, VI:  Visual acuity grossly intact. Visual fields are normal.    Pupils are equal, round, and reactive to light and accommodation. Extra-ocular movements are full and fluid. Fundoscopic exam was not well visualized no ptosis or nystagmus. V-XII: Hearing is grossly intact. Facial features are symmetric, with normal sensation and strength. The palate rises symmetrically and the tongue protrudes midline.       Motor Examination: Normal tone, bulk,muscle strength  Consistent with effortthroughout. No cogwheel rigidity or clonus present. Sensory exam:  Normal throughout to pinprick, temperature, and vibration sense  bue. Coordination:  Heel-to-shin limited. Finger to nose and rapid arm movement testing was normal .   No resting or intention tremor    Gait and Station:  Antalgic slow unsteady gait stressed gait not tested. Normal arm swing. No pronator drift. No muscle wasting or fasiculations noted. Reflexes:  DTRs depressed  throughout. BRAIN AND POSTERIOR FOSSA: The examination is slightly degraded by motion but  remains diagnostic. Mild atrophy and mild to moderate chronic small vessel  changes are noted. There is an old area of infarction involving the right  frontal lobe, extending into the corona radiata white matter. There is also an  area of old infarct in the right parietal region. There is linear abnormal  signal in the right subinsular region with associated old blood breakdown  products which may represent an area of old hypertensive hemorrhage or old  hemorrhagic infarct. There is no acute intracranial hemorrhage, mass effect, or midline shift. There  are no significant additional areas of abnormal parenchymal signal.  There is no  true restricted diffusion to suggest acute infarct. EXTRA-AXIAL SPACES AND MENINGES: There are no abnormal extra-axial fluid  collections. VASCULAR: The visualized portions of the intracranial carotid and  vertebrobasilar systems demonstrate patent appearing flow voids. CALVARIA: Normal.     SINUSES: Clear. CRANIOCERVICAL JUNCTION: Normal.     OTHER: None.     _______________     IMPRESSION  IMPRESSION:     1. Atrophy and chronic small vessel changes with old areas of infarction, as  described above. 2.  Otherwise, unremarkable evaluation. Specifically, no acute intracranial  abnormalities are identified.    eeg mild slowing no epileptiform discharges    Assessment and Plan:   Mary Blankenship is a 64 y.o. right handed male whose history and physical are consistent with vascular dementia. Jim Fernandez who has risk factors including vascular disease,hypertension,diabetes    Diagnoses and all orders for this visit:    1. Vascular dementia without behavioral disturbance    2. CVA, old, cognitive deficits    3. Cerebrovascular disease    4. Weakness of both legs    5. PAD (peripheral artery disease) ([facility]    Other orders  -     donepezil (ARICEPT) 10 mg tablet; 2 q am pc      Follow-up Disposition:  Return in about 6 months (around 2/28/2018). Reviewed workup and notes in chart from  pcp and vascular surgery as well as previous neurological evaluations/  Personally reviewed brain imaging. Discussed work up planned need for vit b12 and risks and benefits of aricept  I spent 30 minutes with the patient in face-to-face consultation, of which greater than 50% was spent in counseling and coordination of care as described above.

## 2017-09-20 ENCOUNTER — HOSPITAL ENCOUNTER (OUTPATIENT)
Dept: LAB | Age: 77
Discharge: HOME | End: 2017-09-20
Payer: MEDICARE

## 2017-09-20 ENCOUNTER — TELEPHONE (OUTPATIENT)
Dept: FAMILY MEDICINE CLINIC | Age: 77
End: 2017-09-20

## 2017-09-20 DIAGNOSIS — E11.8 CONTROLLED TYPE 2 DIABETES MELLITUS WITH COMPLICATION, WITHOUT LONG-TERM CURRENT USE OF INSULIN (HCC): ICD-10-CM

## 2017-09-20 DIAGNOSIS — E78.00 PURE HYPERCHOLESTEROLEMIA: ICD-10-CM

## 2017-09-20 DIAGNOSIS — I15.9 SECONDARY HYPERTENSION: ICD-10-CM

## 2017-09-20 DIAGNOSIS — E11.8 CONTROLLED TYPE 2 DIABETES MELLITUS WITH COMPLICATION, WITHOUT LONG-TERM CURRENT USE OF INSULIN (HCC): Primary | ICD-10-CM

## 2017-09-20 LAB
ALBUMIN SERPL-MCNC: 4 G/DL (ref 3.4–5)
ALBUMIN/GLOB SERPL: 1.3 {RATIO} (ref 0.8–1.7)
ALP SERPL-CCNC: 104 U/L (ref 45–117)
ALT SERPL-CCNC: 23 U/L (ref 16–61)
ANION GAP SERPL CALC-SCNC: 6 MMOL/L (ref 3–18)
APPEARANCE UR: CLEAR
AST SERPL-CCNC: 15 U/L (ref 15–37)
BASOPHILS # BLD: 0.1 K/UL (ref 0–0.06)
BASOPHILS NFR BLD: 1 % (ref 0–2)
BILIRUB SERPL-MCNC: 0.5 MG/DL (ref 0.2–1)
BILIRUB UR QL: NEGATIVE
BUN SERPL-MCNC: 25 MG/DL (ref 7–18)
BUN/CREAT SERPL: 15 (ref 12–20)
CALCIUM SERPL-MCNC: 9.6 MG/DL (ref 8.5–10.1)
CHLORIDE SERPL-SCNC: 105 MMOL/L (ref 100–108)
CHOLEST SERPL-MCNC: 138 MG/DL
CO2 SERPL-SCNC: 28 MMOL/L (ref 21–32)
COLOR UR: YELLOW
CREAT SERPL-MCNC: 1.62 MG/DL (ref 0.6–1.3)
CREAT UR-MCNC: 128.58 MG/DL (ref 30–125)
DIFFERENTIAL METHOD BLD: ABNORMAL
EOSINOPHIL # BLD: 0.2 K/UL (ref 0–0.4)
EOSINOPHIL NFR BLD: 2 % (ref 0–5)
ERYTHROCYTE [DISTWIDTH] IN BLOOD BY AUTOMATED COUNT: 14.9 % (ref 11.6–14.5)
EST. AVERAGE GLUCOSE BLD GHB EST-MCNC: 111 MG/DL
GLOBULIN SER CALC-MCNC: 3.1 G/DL (ref 2–4)
GLUCOSE SERPL-MCNC: 91 MG/DL (ref 74–99)
GLUCOSE UR STRIP.AUTO-MCNC: NEGATIVE MG/DL
HBA1C MFR BLD: 5.5 % (ref 4.2–5.6)
HCT VFR BLD AUTO: 41 % (ref 36–48)
HDLC SERPL-MCNC: 42 MG/DL (ref 40–60)
HDLC SERPL: 3.3 {RATIO} (ref 0–5)
HGB BLD-MCNC: 14 G/DL (ref 13–16)
HGB UR QL STRIP: NEGATIVE
KETONES UR QL STRIP.AUTO: NEGATIVE MG/DL
LDLC SERPL CALC-MCNC: 56.4 MG/DL (ref 0–100)
LEUKOCYTE ESTERASE UR QL STRIP.AUTO: NEGATIVE
LIPID PROFILE,FLP: ABNORMAL
LYMPHOCYTES # BLD: 1.8 K/UL (ref 0.9–3.6)
LYMPHOCYTES NFR BLD: 23 % (ref 21–52)
MCH RBC QN AUTO: 33.7 PG (ref 24–34)
MCHC RBC AUTO-ENTMCNC: 34.1 G/DL (ref 31–37)
MCV RBC AUTO: 98.6 FL (ref 74–97)
MICROALBUMIN UR-MCNC: 4.9 MG/DL (ref 0–3)
MICROALBUMIN/CREAT UR-RTO: 38 MG/G (ref 0–30)
MONOCYTES # BLD: 0.6 K/UL (ref 0.05–1.2)
MONOCYTES NFR BLD: 8 % (ref 3–10)
NEUTS SEG # BLD: 5.4 K/UL (ref 1.8–8)
NEUTS SEG NFR BLD: 66 % (ref 40–73)
NITRITE UR QL STRIP.AUTO: NEGATIVE
PH UR STRIP: 6 [PH] (ref 5–8)
PLATELET # BLD AUTO: 238 K/UL (ref 135–420)
PMV BLD AUTO: 12 FL (ref 9.2–11.8)
POTASSIUM SERPL-SCNC: 5.2 MMOL/L (ref 3.5–5.5)
PROT SERPL-MCNC: 7.1 G/DL (ref 6.4–8.2)
PROT UR STRIP-MCNC: NEGATIVE MG/DL
RBC # BLD AUTO: 4.16 M/UL (ref 4.7–5.5)
SODIUM SERPL-SCNC: 139 MMOL/L (ref 136–145)
SP GR UR REFRACTOMETRY: 1.02 (ref 1–1.03)
T4 FREE SERPL-MCNC: 0.8 NG/DL (ref 0.7–1.5)
TRIGL SERPL-MCNC: 198 MG/DL (ref ?–150)
TSH SERPL DL<=0.05 MIU/L-ACNC: 1.98 UIU/ML (ref 0.36–3.74)
UROBILINOGEN UR QL STRIP.AUTO: 0.2 EU/DL (ref 0.2–1)
VLDLC SERPL CALC-MCNC: 39.6 MG/DL
WBC # BLD AUTO: 8.2 K/UL (ref 4.6–13.2)

## 2017-09-20 PROCEDURE — 80061 LIPID PANEL: CPT | Performed by: FAMILY MEDICINE

## 2017-09-20 PROCEDURE — 36415 COLL VENOUS BLD VENIPUNCTURE: CPT | Performed by: FAMILY MEDICINE

## 2017-09-20 PROCEDURE — 84439 ASSAY OF FREE THYROXINE: CPT | Performed by: FAMILY MEDICINE

## 2017-09-20 PROCEDURE — 80053 COMPREHEN METABOLIC PANEL: CPT | Performed by: FAMILY MEDICINE

## 2017-09-20 PROCEDURE — 85025 COMPLETE CBC W/AUTO DIFF WBC: CPT | Performed by: FAMILY MEDICINE

## 2017-09-20 PROCEDURE — 82043 UR ALBUMIN QUANTITATIVE: CPT | Performed by: FAMILY MEDICINE

## 2017-09-20 PROCEDURE — 81003 URINALYSIS AUTO W/O SCOPE: CPT | Performed by: FAMILY MEDICINE

## 2017-09-20 PROCEDURE — 84443 ASSAY THYROID STIM HORMONE: CPT | Performed by: FAMILY MEDICINE

## 2017-09-20 PROCEDURE — 83036 HEMOGLOBIN GLYCOSYLATED A1C: CPT | Performed by: FAMILY MEDICINE

## 2017-09-20 NOTE — TELEPHONE ENCOUNTER
Patient is over at hospital lab wanting labs done for upcoming appointment. Patient reports that he received a call yesterday to come and get labs done. Labs ordered.

## 2017-09-25 ENCOUNTER — OFFICE VISIT (OUTPATIENT)
Dept: FAMILY MEDICINE CLINIC | Age: 77
End: 2017-09-25

## 2017-09-25 VITALS
BODY MASS INDEX: 28.77 KG/M2 | DIASTOLIC BLOOD PRESSURE: 73 MMHG | TEMPERATURE: 96.3 F | WEIGHT: 201 LBS | SYSTOLIC BLOOD PRESSURE: 152 MMHG | HEART RATE: 61 BPM | OXYGEN SATURATION: 97 % | HEIGHT: 70 IN | RESPIRATION RATE: 16 BRPM

## 2017-09-25 DIAGNOSIS — E11.21 CONTROLLED TYPE 2 DIABETES MELLITUS WITH DIABETIC NEPHROPATHY, WITHOUT LONG-TERM CURRENT USE OF INSULIN (HCC): ICD-10-CM

## 2017-09-25 DIAGNOSIS — Z23 ENCOUNTER FOR IMMUNIZATION: ICD-10-CM

## 2017-09-25 DIAGNOSIS — I73.9 PAD (PERIPHERAL ARTERY DISEASE) (HCC): ICD-10-CM

## 2017-09-25 DIAGNOSIS — Z00.00 ROUTINE GENERAL MEDICAL EXAMINATION AT A HEALTH CARE FACILITY: Primary | ICD-10-CM

## 2017-09-25 DIAGNOSIS — I71.40 AAA (ABDOMINAL AORTIC ANEURYSM) WITHOUT RUPTURE: ICD-10-CM

## 2017-09-25 DIAGNOSIS — N18.30 CKD (CHRONIC KIDNEY DISEASE), STAGE III (HCC): ICD-10-CM

## 2017-09-25 DIAGNOSIS — I25.10 CORONARY ARTERY DISEASE INVOLVING NATIVE CORONARY ARTERY OF NATIVE HEART WITHOUT ANGINA PECTORIS: ICD-10-CM

## 2017-09-25 DIAGNOSIS — I10 ESSENTIAL HYPERTENSION: ICD-10-CM

## 2017-09-25 DIAGNOSIS — E78.00 PURE HYPERCHOLESTEROLEMIA: ICD-10-CM

## 2017-09-25 DIAGNOSIS — I51.89 DIASTOLIC DYSFUNCTION: ICD-10-CM

## 2017-09-25 DIAGNOSIS — I72.4 FEMORAL ARTERY ANEURYSM, LEFT (HCC): ICD-10-CM

## 2017-09-25 NOTE — MR AVS SNAPSHOT
Visit Information Date & Time Provider Department Dept. Phone Encounter #  
 9/25/2017  7:30 AM Serenity Hendrickson [phone] [id number] Follow-up Instructions Return in about 3 months (around 12/25/2017) for EOV, HGAIC next visit. Your Appointments 10/11/2017  9:00 AM  
PROCEDURE with [facility] NONIMAGING [facility] (Gulshan Andres) Appt Note: leg art  6 mos knaak - same day; .  
 [address] 200 [facility]  
[phone] [address] [place] [address],[place] [address]  
  
    
 10/11/2017 10:00 AM  
PROCEDURE with [facility] IMAGING 1 [facility] (Gulshan Andres) Appt Note: aaa 6 mos knaak - same day; .; pt r/s time 1212 [facility] 166 200 [facility]  
[phone] [address]  
  
    
 10/11/2017 11:45 AM  
Office Visit with Riki [facility] (Gulshan Andres) Appt Note: 6 month same day pt has prep and seeing lab at 8am; pt r/s time needs am appt due to transportation 1212 [facility] 085 200 [facility]  
[phone] [address]  
  
    
 2/27/2018  9:45 AM  
Follow Up with Sabiha Montiel MD  
[facility] Gulshan Andres) Appt Note: 6mon f/u  
 [address] [address] [phone]  
  
   
 [facility] [address] Upcoming Health Maintenance Date Due  
 EYE EXAM RETINAL OR DILATED Q1 9/30/1950 MEDICARE YEARLY EXAM 6/14/2017 INFLUENZA AGE 9 TO ADULT 8/1/2017 HEMOGLOBIN A1C Q6M 3/20/2018 FOOT EXAM Q1 5/18/2018 MICROALBUMIN Q1 9/20/2018 LIPID PANEL Q1 9/20/2018 COLONOSCOPY 6/29/2019 GLAUCOMA SCREENING Q2Y 9/22/2019 DTaP/Tdap/Td series (2 - Td) 9/14/2020 Allergies as of 9/25/2017  Review Complete On: 9/25/2017 By: Jorge L Bernstein., DO Severity Noted Reaction Type Reactions Ace Inhibitors  03/27/2016    Other (comments) Per pt, Cardiologist states he cannot have ACE inhibitors Current Immunizations  Reviewed on 3/28/2016 Name Date Influenza High Dose Vaccine PF  Incomplete, 9/13/2016, 11/10/2015, 10/7/2015 Influenza Vaccine 11/12/2014, 12/16/2013  8:04 AM  
 Influenza Vaccine Split 12/14/2012 Influenza Vaccine Whole 9/14/2010 Pneumococcal Conjugate (PCV-13) 10/7/2015 TDAP Vaccine 9/14/2010 ZZZ-RETIRED (DO NOT USE) Pneumococcal Vaccine (Unspecified Type) 9/14/2010 Zoster 9/14/2010 Not reviewed this visit You Were Diagnosed With   
  
 Codes Comments Routine general medical examination at a health care facility    -  Primary ICD-10-CM: Z00.00 ICD-9-CM: V70.0 Diastolic dysfunction     ABQ-73-BM: I51.9 ICD-9-CM: 429.9 Pure hypercholesterolemia     ICD-10-CM: E78.00 ICD-9-CM: 272.0 Controlled type 2 diabetes mellitus with diabetic nephropathy, without long-term current use of insulin (HCC)     ICD-10-CM: E11.21 
ICD-9-CM: 250.40, 583.81   
 PAD (peripheral artery disease) ([facility] [address]     ICD-10-CM: I73.9 ICD-9-CM: 443.9 AAA (abdominal aortic aneurysm) without rupture (HCC)     ICD-10-CM: I71.4 ICD-9-CM: 441.4 Femoral artery aneurysm, left (HCC)     ICD-10-CM: I72.4 ICD-9-CM: 677. 3 Coronary artery disease involving native coronary artery of native heart without angina pectoris     ICD-10-CM: I25.10 ICD-9-CM: 414.01 Essential hypertension     ICD-10-CM: I10 
ICD-9-CM: 401.9 CKD (chronic kidney disease), stage III     ICD-10-CM: N18.3 ICD-9-CM: 585.3 Encounter for immunization     ICD-10-CM: H37 ICD-9-CM: V03.89 Vitals BP Pulse Temp Resp Height(growth percentile) Weight(growth percentile) 152/73 (BP 1 Location: Right arm, BP Patient Position: Sitting) 61 96.3 °F (35.7 °C) (Oral) 16 5' 10\" (1.778 m) 201 lb (91.2 kg) SpO2 BMI Smoking Status 97% 28.84 kg/m2 Former Smoker BMI and BSA Data Body Mass Index Body Surface Area  
 28.84 kg/m 2 2.12 m 2 Preferred Pharmacy Pharmacy Name Phone [facility] [address] [place] [address]. [address] [phone] Your Updated Medication List  
  
   
This list is accurate as of: 9/25/17  8:17 AM.  Always use your most recent med list.  
  
  
  
  
 aspirin 325 mg tablet Commonly known as:  ASPIRIN Take 1 Tab by mouth daily. atorvastatin 40 mg tablet Commonly known as:  LIPITOR Take 20 mg by mouth daily. cyanocobalamin 1,000 mcg tablet Take 1,000 mcg by mouth daily. donepezil 10 mg tablet Commonly known as:  ARICEPT  
TAKE 2 TABLETS BY MOUTH EVERY MORNING AFTER A MEAL  
  
 FLOMAX 0.4 mg capsule Generic drug:  tamsulosin Take 0.4 mg by mouth nightly. NITROSTAT SL  
by SubLINGual route. omega-3 fatty acids-vitamin e 1,000 mg Cap Commonly known as:  FISH OIL Take 1 Cap by mouth two (2) times a day. STOOL SOFTENER 100 mg capsule Generic drug:  docusate sodium Take 100 mg by mouth nightly. We Performed the Following ADMIN INFLUENZA VIRUS VAC [ [facility]] AMB POC EKG ROUTINE W/ 12 LEADS, INTER & REP [06148 CPT(R)] INFLUENZA VIRUS VACCINE, HIGH DOSE SEASONAL, PRESERVATIVE FREE [36285 CPT(R)] Follow-up Instructions Return in about 3 months (around 12/25/2017) for EOV, HGAIC next visit. Introducing hospitals & HEALTH SERVICES! Dear Nidia Carrion: Thank you for requesting a [employer] account. Our records indicate that you already have an active [employer] account. You can access your account anytime at [email or web address] Did you know that you can access your hospital and ER discharge instructions at any time in [employer]? You can also review all of your test results from your hospital stay or ER visit. Additional Information If you have questions, please visit the Frequently Asked Questions section of the [employer] website at [email or web address] Remember, [employer] is NOT to be used for urgent needs. For medical emergencies, dial 911. Now available from your iPhone and Android! Please provide this summary of care documentation to your next provider. Your primary care clinician is listed as [id number] [facility]. If you have any questions after today's visit, please call [phone].

## 2017-09-25 NOTE — PROGRESS NOTES
THE SUBSEQUENT MEDICARE ANNUAL WELLNESS VISIT PROGRESS NOTES    This is a Subsequent Medicare Annual Wellness Visit providing Personalized Prevention Plan Services (PPPS) (Performed 12 months after initial AWV and PPPS )    I have reviewed the patient's medical history in detail and updated the computerized patient record. Gaye Marsh is a 64 y.o.  male and presents for an subsequent annual wellness exam     Patient Active Problem List    Diagnosis Date Noted    Vascular dementia without behavioral disturbance 67/05/2292    Diastolic dysfunction 70/68/7472    AAA (abdominal aortic aneurysm) without rupture ([facility] 01/04/2017    PAD (peripheral artery disease) ([facility] 09/21/2016    Advance directive in chart 06/13/2016    S/P CABG (coronary artery bypass graft) 04/13/2016    S/P AVR 04/13/2016    Hypertension 03/27/2016    Diabetes mellitus type 2, controlled ([facility] 03/27/2016    CKD (chronic kidney disease), stage III 03/27/2016    Coronary artery disease involving native coronary artery without angina pectoris 01/27/2016    Pure hypercholesterolemia 09/14/2012     Current Outpatient Prescriptions   Medication Sig Dispense Refill    NITROGLYCERIN (NITROSTAT SL) by SubLINGual route.  donepezil (ARICEPT) 10 mg tablet TAKE 2 TABLETS BY MOUTH EVERY MORNING AFTER A MEAL 180 Tab 1    atorvastatin (LIPITOR) 40 mg tablet Take 20 mg by mouth daily.  tamsulosin (FLOMAX) 0.4 mg capsule Take 0.4 mg by mouth nightly.  docusate sodium (STOOL SOFTENER) 100 mg capsule Take 100 mg by mouth nightly.  cyanocobalamin 1,000 mcg tablet Take 1,000 mcg by mouth daily.  aspirin (ASPIRIN) 325 mg tablet Take 1 Tab by mouth daily. 90 Tab 4    omega-3 fatty acids-vitamin e (FISH OIL) 1,000 mg cap Take 1 Cap by mouth two (2) times a day.  180 Cap 4     Allergies   Allergen Reactions    Ace Inhibitors Other (comments)     Per pt, Cardiologist states he cannot have ACE inhibitors     Past Medical History:   Diagnosis Date    Advance directive in chart 6/13/2016    CAD (coronary artery disease)     Cancer [facility]) 2003    Colon    CKD (chronic kidney disease), stage III 3/27/2016    CVA (cerebral vascular accident) ([address] 3/26/2016    Femoral artery aneurysm, left ([address]     Heart attack ([address] 2008    Heart attack ([address]     Hernia     History of TIAs     Hyperlipidemia     Hypertension 3/27/2016    Iliac artery aneurysm, left ([address]     Pure hypercholesterolemia 9/14/2012    PVD (peripheral vascular disease) ([address] 3/27/2016    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 ([address] 3/26/2016     Past Surgical History:   Procedure Laterality Date    ABDOMEN SURGERY PROC UNLISTED  2011    3 stents placed into abdomen (groin)    CARDIAC SURG PROCEDURE UNLIST  2005    Quadruple Bypass    CARDIAC SURG PROCEDURE UNLIST  2011    Aortic pig valve placed    COLONOSCOPY N/A 6/29/2016    COL[facility] ENDOSCOPY    ENDOSCOPY, COLON, DIAGNOSTIC      HX AAA REPAIR      HX ORTHOPAEDIC  2008    Right Leg Amputated     Family History   Problem Relation Age of Onset    Hypertension Mother     Hypertension Father     Heart Disease Father     Heart Disease Brother      Social History   Substance Use Topics    Smoking status: Former Smoker     Quit date: 9/14/1987    Smokeless tobacco: Never Used    Alcohol use No      Comment: wine 1-2x per week         ROS       All other systems reviewed and are negative.       History     Past Medical History:   Diagnosis Date    Advance directive in chart 6/13/2016    CAD (coronary artery disease)     Cancer [facility]) 2003    Colon    CKD (chronic kidney disease), stage III 3/27/2016    CVA (cerebral vascular accident) ([address] 3/26/2016    Femoral artery aneurysm, left ([address]     Heart attack ([address] 2008    Heart attack ([address]     Hernia     History of TIAs     Hyperlipidemia     Hypertension 3/27/2016    [facility]liac artery [facility] left ([facility]     Pure hypercholesterolemia 9/14/2012    PVD (peripheral vascular disease) ([facility] 3/27/2016    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 ([facility] 3/26/2016      Past Surgical History:   Procedure Laterality Date    ABDOMEN SURGERY PROC UNLISTED  2011    3 stents placed into abdomen (groin)    CARDIAC SURG PROCEDURE UNLIST  2005    Quadruple Bypass    CARDIAC SURG PROCEDURE UNLIST  2011    Aortic pig valve placed    COLONOSCOPY N/A 6/29/2016    COLONOSCOPY performed [facility] ENDOSCOPY    ENDOSCOPY, COLON, DIAGNOSTIC      HX AAA REPAIR      HX ORTHOPAEDIC  2008    Right Leg Amputated     Current Outpatient Prescriptions   Medication Sig Dispense Refill    NITROGLYCERIN (NITROSTAT SL) by SubLINGual route.  donepezil (ARICEPT) 10 mg tablet TAKE 2 TABLETS BY MOUTH EVERY MORNING AFTER A MEAL 180 Tab 1    atorvastatin (LIPITOR) 40 mg tablet Take 20 mg by mouth daily.  tamsulosin (FLOMAX) 0.4 mg capsule Take 0.4 mg by mouth nightly.  docusate sodium (STOOL SOFTENER) 100 mg capsule Take 100 mg by mouth nightly.  cyanocobalamin 1,000 mcg tablet Take 1,000 mcg by mouth daily.  aspirin (ASPIRIN) 325 mg tablet Take 1 Tab by mouth daily. 90 Tab 4    omega-3 fatty acids-vitamin e (FISH OIL) 1,000 mg cap Take 1 Cap by mouth two (2) times a day.  180 Cap 4     Allergies   Allergen Reactions    Ace Inhibitors Other (comments)     Per pt, Cardiologist states he cannot have ACE inhibitors     Family History   Problem Relation Age of Onset    Hypertension Mother     Hypertension Father     Heart Disease Father     Heart Disease Brother      Social History   Substance Use Topics    Smoking status: Former Smoker     Quit date: 9/14/1987    Smokeless tobacco: Never Used    Alcohol use No      Comment: wine 1-2x per week     Patient Active Problem List   Diagnosis Code    Pure hypercholesterolemia E78.00    Coronary artery disease involving native coronary artery without angina pectoris I25.10    Hypertension I10    Diabetes mellitus type 2, controlled ([facility] E11.9    CKD (chronic kidney disease), stage III N18.3    S/P CABG (coronary artery bypass graft) Z95.1    S/P AVR Z95.2    Advance directive in chart Z78.9    PAD (peripheral artery disease) ([facility] I73.9    AAA (abdominal aortic aneurysm) without rupture ([facility]) Z38.8    Diastolic dysfunction F83.7    Vascular dementia without behavioral disturbance F01.50       Health Maintenance History  Immunizations reviewed, dtap utd  , pneumovax utd , flu today , zoster utd   Colonoscopy: utd ,   Chest CT : na ,  Eye exam: dr tracie adair  Also seen at [facility] in 2017 for eye exam    Health Care Directive or Living Will: yes    Depression Risk Factor Screening:      Patient Health Questionnaire (PHQ-2)   Over the last 2 weeks, how often have you been bothered by any of the following problems? · Little interest or pleasure in doing things? · Not at all. [0]  · Feeling down, depressed, or hopeless? · Not at all. [0]    Total Score: 0/6  PHQ-2 Assessment Scoring:   A score of 2 or more requires further screening with the PHQ-9    Alcohol Risk Factor Screening:     Women: On any occasion during the past 3 months, have you had more than 3 drinks containing alcohol? Do you average more than 7 drinks per week? Men: On any occasion during the past 3 months, have you had more than 4 drinks containing alcohol? Do you average more than 14 drinks per week? Functional Ability and Level of Safety:     Hearing Loss    Hearing is good. Activities of Daily Living   Self-care.    Requires assistance with: no ADLs    Fall Risk   No fall risk factors    Abuse Screen   None      Examination   Physical Examination  Vitals:    09/25/17 0741   BP: 152/73   Pulse: 61   Resp: 16   Temp: 96.3 °F (35.7 °C)   TempSrc: Oral   SpO2: 97%   Weight: 201 lb (91.2 kg)   Height: 5' 10\" (1.778 m)   PainSc:   0 - No pain     Body mass index is 28.84 kg/(m^2). Evaluation of Cognitive Function:  Mood/affect:ajppropriate   Appearance: well groomed   Family member/caregiver input: na     alert, well appearing, and in no distress, oriented to person, place, and time and normal appearing weight    Patient Care Team:  Migdalia Drake DO as PCP - General (Family Practice)  Kaylen Hickman MD (Ophthalmology)  Thea Vanegas MD (Colon and Rectal Surgery)  Cherelle aLra MD (Vascular Surgery)  [address] [place] [address] (Physician Assistant)  Margarito Zavala MD as Physician (Urology)    Advice/Referrals/Counseling/Plan:   Education and counseling provided:  Are appropriate based on today's review and evaluation  End-of-Life planning (with patient's consent)  Influenza Vaccine  Include in education list (weight loss, physical activity, smoking cessation, fall prevention, and nutrition)  current treatment plan is effective, no change in therapy. I have discussed the diagnosis with the patient and the intended plan as seen in the above orders. The patient has received an after-visit summary and questions were answered concerning future plans. I have discussed medication side effects and warnings with the patient as well. I have reviewed the plan of care with the patient, accepted their input and they are in agreement with the treatment goals. Follow-up Disposition: Not on File    _____________________________________________________________    Problem Assessment    for treatment of   Chief Complaint   Patient presents with    Cholesterol Problem    Coronary Artery Disease    Hypertension    Diabetes    Chronic Kidney Disease    Dementia    Other     peripheral artery disease         SUBJECTIVE      Cardiovascular Review:  The patient has diabetes, hypertension, hyperlipidemia, history of prior MI and peripheral vascular disease.   Diet and Lifestyle: not attempting to follow a low fat, low cholesterol diet  Home BP Monitoring: is not measured at home. Pertinent ROS: taking medications as instructed, no medication side effects noted, no TIA's, no chest pain on exertion, no dyspnea on exertion, no swelling of ankles. Diabetes Mellitus:  He has diabetes mellitus, and  diabetes, hypertension, hyperlipidemia and peripheral vascular disease. Diabetic ROS - diabetic diet compliance: compliant most of the time. Lab review: labs are reviewed, up to date and normal.   Chronic renal failure ongoing  Dementia ongoing           Additional Concerns:        Visit Vitals    /73 (BP 1 Location: Right arm, BP Patient Position: Sitting)    Pulse 61    Temp 96.3 °F (35.7 °C) (Oral)    Resp 16    Ht 5' 10\" (1.778 m)    Wt 201 lb (91.2 kg)    SpO2 97%    BMI 28.84 kg/m2     General:  Alert, cooperative, no distress, appears stated age. Head:  Normocephalic, without obvious abnormality, atraumatic. Eyes:  Conjunctivae/corneas clear. PERRL, EOMs intact. Fundi benign   Ears:  Normal TMs and external ear canals both ears. Nose: Nares normal. Septum midline. Mucosa normal. No drainage or sinus tenderness. Throat: Lips, mucosa, and tongue normal. Teeth and gums normal.   Neck: Supple, symmetrical, trachea midline, no adenopathy, thyroid: no enlargement/tenderness/nodules, no carotid bruit and no JVD. Back:   Symmetric, no curvature. ROM normal. No CVA tenderness. Lungs:   Clear to auscultation bilaterally. Chest wall:  No tenderness or deformity. Heart:  Regular rate and rhythm, S1, S2 normal, no murmur, click, rub or gallop. Abdomen:   Soft, non-tender. Bowel sounds normal. No masses,  No organomegaly. Genitalia:  Normal male without lesion, discharge or tenderness. Rectal:  Normal tone, normal prostate, no masses or tenderness  Guaiac negative stool. Extremities: Extremities normal, atraumatic, no cyanosis or edema. Pulses: 2+ and symmetric all extremities.    Skin: Skin color, texture, turgor normal. No rashes or lesions Lymph nodes: Cervical, supraclavicular, and axillary nodes normal.   Neurologic: CNII-XII intact. Normal strength, sensation and reflexes throughout. LABS   Component      Latest Ref Rng & Units 9/20/2017 9/20/2017 9/20/2017 9/20/2017           8:18 AM  8:18 AM  8:18 AM  8:18 AM   WBC      4.6 - 13.2 K/uL       RBC      4.70 - 5.50 M/uL       HGB      13.0 - 16.0 g/dL       HCT      36.0 - 48.0 %       MCV      74.0 - 97.0 FL       MCH      24.0 - 34.0 PG       MCHC      31.0 - 37.0 g/dL       RDW      11.6 - 14.5 %       PLATELET      745 - 401 K/uL       MPV      9.2 - 11.8 FL       NEUTROPHILS      40 - 73 %       LYMPHOCYTES      21 - 52 %       MONOCYTES      3 - 10 %       EOSINOPHILS      0 - 5 %       BASOPHILS      0 - 2 %       ABS. NEUTROPHILS      1.8 - 8.0 K/UL       ABS. LYMPHOCYTES      0.9 - 3.6 K/UL       ABS. MONOCYTES      0.05 - 1.2 K/UL       ABS. EOSINOPHILS      0.0 - 0.4 K/UL       ABS. BASOPHILS      0.0 - 0.06 K/UL       DF             Sodium      136 - 145 mmol/L       Potassium      3.5 - 5.5 mmol/L       Chloride      100 - 108 mmol/L       CO2      21 - 32 mmol/L       Anion gap      3.0 - 18 mmol/L       Glucose      74 - 99 mg/dL       BUN      7.0 - 18 MG/DL       Creatinine      0.6 - 1.3 MG/DL       BUN/Creatinine ratio      12 - 20         GFR est AA      >60 ml/min/1.73m2       GFR est non-AA      >60 ml/min/1.73m2       Calcium      8.5 - 10.1 MG/DL       Bilirubin, total      0.2 - 1.0 MG/DL       ALT (SGPT)      16 - 61 U/L       AST      15 - 37 U/L       Alk.  phosphatase      45 - 117 U/L       Protein, total      6.4 - 8.2 g/dL       Albumin      3.4 - 5.0 g/dL       Globulin      2.0 - 4.0 g/dL       A-G Ratio      0.8 - 1.7         Color             Appearance             Specific gravity      1.005 - 1.030         pH (UA)      5.0 - 8.0         Protein      NEG mg/dL       Glucose      NEG mg/dL       Ketone      NEG mg/dL       Bilirubin      NEG Blood      NEG         Urobilinogen      0.2 - 1.0 EU/dL       Nitrites      NEG         Leukocyte Esterase      NEG         Cholesterol, total      <200 MG/DL    138   Triglyceride      <150 MG/DL    198 (H)   HDL Cholesterol      40 - 60 MG/DL    42   LDL, calculated      0 - 100 MG/DL    56.4   VLDL, calculated      MG/DL    39.6   CHOL/HDL Ratio      0 - 5.0      3.3   Microalbumin,urine random      0 - 3.0 MG/DL  4.90 (H)     Creatinine, urine      30 - 125 mg/dL  128.58 (H)     Microalbumin/Creat. Ratio      0 - 30 mg/g  38 (H)     Hemoglobin A1c, (calculated)      4.2 - 5.6 %       Est. average glucose      mg/dL       TSH      0.36 - 3.74 uIU/mL   1.98    T4, Free      0.7 - 1.5 NG/DL 0.8        Component      Latest Ref Rng & Units 9/20/2017 9/20/2017 9/20/2017 9/20/2017           8:18 AM  8:18 AM  8:18 AM  8:18 AM   WBC      4.6 - 13.2 K/uL    8.2   RBC      4.70 - 5.50 M/uL    4.16 (L)   HGB      13.0 - 16.0 g/dL    14.0   HCT      36.0 - 48.0 %    41.0   MCV      74.0 - 97.0 FL    98.6 (H)   MCH      24.0 - 34.0 PG    33.7   MCHC      31.0 - 37.0 g/dL    34.1   RDW      11.6 - 14.5 %    14.9 (H)   PLATELET      594 - 512 K/uL    238   MPV      9.2 - 11.8 FL    12.0 (H)   NEUTROPHILS      40 - 73 %    66   LYMPHOCYTES      21 - 52 %    23   MONOCYTES      3 - 10 %    8   EOSINOPHILS      0 - 5 %    2   BASOPHILS      0 - 2 %    1   ABS. NEUTROPHILS      1.8 - 8.0 K/UL    5.4   ABS. LYMPHOCYTES      0.9 - 3.6 K/UL    1.8   ABS. MONOCYTES      0.05 - 1.2 K/UL    0.6   ABS. EOSINOPHILS      0.0 - 0.4 K/UL    0.2   ABS.  BASOPHILS      0.0 - 0.06 K/UL    0.1 (H)   DF          AUTOMATED   Sodium      136 - 145 mmol/L 139      Potassium      3.5 - 5.5 mmol/L 5.2      Chloride      100 - 108 mmol/L 105      CO2      21 - 32 mmol/L 28      Anion gap      3.0 - 18 mmol/L 6      Glucose      74 - 99 mg/dL 91      BUN      7.0 - 18 MG/DL 25 (H)      Creatinine      0.6 - 1.3 MG/DL 1.62 (H)      BUN/Creatinine ratio      12 - 20   15      GFR est AA      >60 ml/min/1.73m2 50 (L)      GFR est non-AA      >60 ml/min/1.73m2 41 (L)      Calcium      8.5 - 10.1 MG/DL 9.6      Bilirubin, total      0.2 - 1.0 MG/DL 0.5      ALT (SGPT)      16 - 61 U/L 23      AST      15 - 37 U/L 15      Alk. phosphatase      45 - 117 U/L 104      Protein, total      6.4 - 8.2 g/dL 7.1      Albumin      3.4 - 5.0 g/dL 4.0      Globulin      2.0 - 4.0 g/dL 3.1      A-G Ratio      0.8 - 1.7   1.3      Color        YELLOW     Appearance        CLEAR     Specific gravity      1.005 - 1.030    1.019     pH (UA)      5.0 - 8.0    6.0     Protein      NEG mg/dL  NEGATIVE     Glucose      NEG mg/dL  NEGATIVE     Ketone      NEG mg/dL  NEGATIVE     Bilirubin      NEG    NEGATIVE     Blood      NEG    NEGATIVE     Urobilinogen      0.2 - 1.0 EU/dL  0.2     Nitrites      NEG    NEGATIVE     Leukocyte Esterase      NEG    NEGATIVE     Cholesterol, total      <200 MG/DL       Triglyceride      <150 MG/DL       HDL Cholesterol      40 - 60 MG/DL       LDL, calculated      0 - 100 MG/DL       VLDL, calculated      MG/DL       CHOL/HDL Ratio      0 - 5.0         Microalbumin,urine random      0 - 3.0 MG/DL       Creatinine, urine      30 - 125 mg/dL       Microalbumin/Creat. Ratio      0 - 30 mg/g       Hemoglobin A1c, (calculated)      4.2 - 5.6 %   5.5    Est. average glucose      mg/dL   111    TSH      0.36 - 3.74 uIU/mL       T4, Free      0.7 - 1.5 NG/DL         TESTS      Assessment/Plan:      Diabetes - well controlled, stable  Hypertension - well controlled, stable  Hyperlipidemia - well controlled, stable  Renal failure ongoing  PAD ongoing  Dementia mild ongoing     Diagnoses and all orders for this visit:    1. Routine general medical examination at a health care facility    2. Diastolic dysfunction  -     AMB POC EKG ROUTINE W/ 12 LEADS, INTER & REP    3. Pure hypercholesterolemia    4.  Controlled type 2 diabetes mellitus with diabetic nephropathy, without long-term current use of insulin ([address]    5. PAD (peripheral artery disease) ([address]    6. AAA (abdominal aortic aneurysm) without rupture ([address]    7. Femoral artery aneurysm, left (H[facility])    8. Coronary artery disease involving native coronary artery of native heart without angina pectoris    9. Essential hypertension    10. CKD (chronic kidney disease), stage III    11.  Encounter for immunization  -     Influenza virus vaccine ([address]) 72 years and older ([address])  -     Administration fee () for Medicare insured patients          Lab review: labs are reviewed, up to date and normal

## 2017-09-25 NOTE — ACP (ADVANCE CARE PLANNING)
Advance Care Planning (ACP) Provider Note - Comprehensive     Date of ACP Conversation: 09/25/17  Persons included in Conversation:  patient and family  Length of ACP Conversation in minutes:  <16 minutes (Non-Billable)    Authorized Decision Maker (if patient is incapable of making informed decisions): This person is:  Healthcare Agent/Medical Power of  under Advance Directive          General ACP for ALL Patients with Decision Making Capacity:   Importance of advance care planning, including choosing a healthcare agent to communicate patient's healthcare decisions if patient lost the ability to make decisions, such as after a sudden illness or accident    Review of Existing Advance Directive:       For Serious or Chronic Illness:  Understanding of medical condition      Interventions Provided:  Reviewed existing Advance Directive

## 2017-09-25 NOTE — PROGRESS NOTES
Asim Richmond is a 64 y.o. male presented to clinic accompanied by wife for a complete physical. Pt denies any pain or concerns at this time. 1. Have you been to the ER, urgent care clinic since your last visit? Hospitalized since your last visit? No    2. Have you seen or consulted any other health care providers outside of the [address] [place] [address] since your last visit? Include any pap smears or colon screening. No     Learning Assessment 1/4/2017   PRIMARY LEARNER Patient   BARRIERS PRIMARY LEARNER -   CO-LEARNER CAREGIVER No   PRIMARY LANGUAGE ENGLISH   LEARNER PREFERENCE PRIMARY LISTENING   ANSWERED BY patient   RELATIONSHIP SELF      Patient verbally agrees to permit the Students working in [phone] [id number] office to observe and participate in medical care during the appointment today, including, where appropriate, providing direct medical care to patient under the physicians direct supervision. Patient agrees that he/she have been given the opportunity to refuse to give such consent and may withdraw consent at any time during appointment.

## 2017-10-11 ENCOUNTER — OFFICE VISIT (OUTPATIENT)
Dept: VASCULAR SURGERY | Age: 77
End: 2017-10-11

## 2017-10-11 VITALS
DIASTOLIC BLOOD PRESSURE: 68 MMHG | SYSTOLIC BLOOD PRESSURE: 130 MMHG | WEIGHT: 201 LBS | HEIGHT: 70 IN | BODY MASS INDEX: 28.77 KG/M2

## 2017-10-11 DIAGNOSIS — I71.40 AAA (ABDOMINAL AORTIC ANEURYSM) WITHOUT RUPTURE: ICD-10-CM

## 2017-10-11 DIAGNOSIS — I73.9 PAD (PERIPHERAL ARTERY DISEASE) (HCC): ICD-10-CM

## 2017-10-11 DIAGNOSIS — Z89.512 S/P BKA (BELOW KNEE AMPUTATION) UNILATERAL, LEFT (HCC): ICD-10-CM

## 2017-10-11 DIAGNOSIS — I70.212 ATHEROSCLEROSIS OF NATIVE ARTERY OF LEFT LOWER EXTREMITY WITH INTERMITTENT CLAUDICATION (HCC): ICD-10-CM

## 2017-10-11 DIAGNOSIS — I71.40 AAA (ABDOMINAL AORTIC ANEURYSM) WITHOUT RUPTURE: Primary | ICD-10-CM

## 2017-10-11 NOTE — MR AVS SNAPSHOT
Visit Information Date & Time Provider Department Dept. Phone Encounter #  
 10/11/2017 11:45 AM [facility] 21  Follow-up Instructions Return in about 6 months (around 4/11/2018). Your Appointments 10/11/2017 11:45 AM  
Follow Up with 800 JP Farias [facility] ([address] [place] [address]) Appt Note: 6 month same day pt has prep and seeing lab at 8am; pt r/s time needs am appt due to transportation; confirmed appt with pt/reminded of copay amnt 1212 [facility] [employer] Card 160 200 [facility] Se  
[phone] Polly Gardner Sathish [address] [place] [address]  
  
    
 1/3/2018  7:30 AM  
ROUTINE CARE with Elin Ogden.,  [address] [facility] 16 [address]) Appt Note: Return in about 3 months (around 12/25/2017) for Rodney Adkins next visit. [address] [facility] 1700 [address] 83 222 [facility]  
  
   
 [id number] [place] [address]  
  
    
 2/27/2018  9:45 AM  
Follow Up with Mark Pope MD  
1818 [address]) Appt Note: 6mon f/u  
 711 [facility] [address] [facility] [address]  
[phone]  
  
   
 [place] [address] 4/11/2018  8:00 AM  
PROCEDURE with BSVVS IMAGING 2 [facility] ([address] [place] [address]) Appt Note: evar 6 mos wild same day appt 1212 [facility] [employer] Card 535 200 [facility] Se  
[phone] 1212 [facility] [place] 200 [facility] Se 4/11/2018  9:00 AM  
PROCEDURE with BSVVS IMAGING 2 [facility] ([address] [place] [address]) Appt Note: bpg 6 mos wild same day appt 1212 [facility] Luc[facility] Card 631 200 [facility] Se  
[phone]  4/11/2018 10:00 AM  
PROCEDURE with BSVVS NONIMAGING  
 [facility] ([facility]) Appt Note: anitra 6 mos wild same day appt 2300 [facility] Harish Prajapati 277 706 [facility]  
[phone] 2300 [facility] Peter avery, [place] 706 [facility] 4/11/2018  1:15 PM  
Follow Up with [facility] ([facility]) Appt Note: 6 months fup with studies 2300 [facility] Harish Prajapati 099 706 [facility]  
[phone] Upcoming Health Maintenance Date Due  
 EYE EXAM RETINAL OR DILATED Q1 9/30/1950 HEMOGLOBIN A1C Q6M 3/20/2018 FOOT EXAM Q1 5/18/2018 MICROALBUMIN Q1 9/20/2018 LIPID PANEL Q1 9/20/2018 MEDICARE YEARLY EXAM 9/26/2018 COLONOSCOPY 6/29/2019 GLAUCOMA SCREENING Q2Y 9/22/2019 DTaP/Tdap/Td series (2 - Td) 9/14/2020 Allergies as of 10/11/2017  Review Complete On: 10/11/2017 By: Lul Combs LPN Severity Noted Reaction Type Reactions Ace Inhibitors  03/27/2016    Other (comments) Per pt, Cardiologist states he cannot have ACE inhibitors Current Immunizations  Reviewed on 3/28/2016 Name Date Influenza High Dose Vaccine PF 9/25/2017, 9/13/2016, 11/10/2015, 10/7/2015 Influenza Vaccine 11/12/2014, 12/16/2013  8:04 AM  
 Influenza Vaccine Split 12/14/2012 Influenza Vaccine Whole 9/14/2010 Pneumococcal Conjugate (PCV-13) 10/7/2015 TDAP Vaccine 9/14/2010 ZZZ-RETIRED (DO NOT USE) Pneumococcal Vaccine (Unspecified Type) 9/14/2010 Zoster 9/14/2010 Not reviewed this visit You Were Diagnosed With   
  
 Codes Comments AAA (abdominal aortic aneurysm) without rupture (HCC)    -  Primary ICD-10-CM: I71.4 ICD-9-CM: 441.4 PAD (peripheral artery disease) (HCC)     ICD-10-CM: I73.9 ICD-9-CM: 443. 9 Vitals BP Height(growth percentile) Weight(growth percentile) BMI Smoking Status  130/68 (BP 1 Location: Left arm, BP Patient Position: Sitting) 5' 10\" (1.778 m) 201 lb (91.2 kg) 28.84 kg/m2 Former Smoker Vitals History BMI and BSA Data Body Mass Index Body Surface Area  
 28.84 kg/m 2 2.12 m 2 Preferred Pharmacy Pharmacy Name Phone Nina [address] [place], [place] [address]daniel, [address] [place] [address] [place] 136 [phone] Your Updated Medication List  
  
   
This list is accurate as of: 10/11/17 11:39 AM.  Always use your most recent med list.  
  
  
  
  
 aspirin 325 mg tablet Commonly known as:  ASPIRIN Take 1 Tab by mouth daily. atorvastatin 40 mg tablet Commonly known as:  LIPITOR Take 20 mg by mouth daily. cyanocobalamin 1,000 mcg tablet Take 1,000 mcg by mouth daily. donepezil 10 mg tablet Commonly known as:  ARICEPT  
TAKE 2 TABLETS BY MOUTH EVERY MORNING AFTER A MEAL  
  
 FLOMAX 0.4 mg capsule Generic drug:  tamsulosin Take 0.4 mg by mouth nightly. NITROSTAT SL  
by SubLINGual route. omega-3 fatty acids-vitamin e 1,000 mg Cap Commonly known as:  FISH OIL Take 1 Cap by mouth two (2) times a day. STOOL SOFTENER 100 mg capsule Generic drug:  docusate sodium Take 100 mg by mouth nightly. Follow-up Instructions Return in about 6 months (around 4/11/2018). To-Do List   
 04/06/2018 Imaging:  DIXON WBI LTD SINGLE LEVEL AMB   
  
 04/06/2018 Imaging:  DUPLEX AORTA ILIAC GRAFT COMPLETE AMB   
  
 04/06/2018 Imaging:  VAS DUPLEX LOW EXT ARTERY LEFT [facility]! Dear Alisia Hardy: Thank you for requesting a Taglocity account. Our records indicate that you already have an active Taglocity account. You can access your account anytime at [email or web address] Did you know that you can access your hospital and ER discharge instructions at any time in Taglocity? You can also review all of your test results from your hospital stay or ER visit. Additional Information If you have questions, please visit the Frequently Asked Questions section of the Zimoryhart website at [email or web address] Remember, Hull is NOT to be used for urgent needs. For medical emergencies, dial 911. Now available from your iPhone and Android! Please provide this summary of care documentation to your next provider. Your primary care clinician is listed as [id number] [place] [address]. If you have any questions after today's visit, please call [phone].

## 2017-10-11 NOTE — PROCEDURES
[facility]   *** FINAL REPORT ***    Name: Giana Caldwell  MRN: [id number]       Outpatient  : 30 Sep 1940  HIS Order #: [id number]  [address] [facility] Visit #: [id number]  Date: 11 Oct 2017    TYPE OF TEST: Peripheral Arterial Testing    REASON FOR TEST  Peripheral vascular dz NOS    Right Leg  Segmentals:                     mmHg  Brachial         158  High thigh  Low thigh  Calf  Posterior tibial  Dorsalis pedis  Peroneal  Metatarsal  Toe pressure  Doppler:    Abnormal  Ankle/Brachial:    Left Leg  Segmentals: Abnormal                     mmHg  Brachial         163  High thigh  Low thigh  Calf             170  Posterior tibial 147  Dorsalis pedis   120  Peroneal  Metatarsal  Toe pressure      90  Doppler:    Normal  Ankle/Brachial: 0.90    INTERPRETATION/FINDINGS  Physiologic testing was performed using continuous wave doppler and  segmental pressures. 1. Right below knee amputation with biphasic common femoral and  monophasic popliteal artery flow. 2. Mild peripheral arterial insufficiency at rest in the left leg,  level undetermined. 3. The left ankle/brachial index is 0.90 and the left digit/brachial  index is 0.55.  4. Compared to the previous study on 17 there is no significant  change. ADDITIONAL COMMENTS    I have personally reviewed the data relevant to the interpretation of  this  study. TECHNOLOGIST: Ron Vang RVT, RDMS  Signed: 10/11/2017 10:10 AM    PHYSICIAN: Leonel Otto.  Brianna Prabhakar MD  Signed: 10/11/2017 01:54 PM

## 2017-10-11 NOTE — PROGRESS NOTES
Kristian Carvajal    Chief Complaint   Patient presents with    Leg Pain       History and Physical    Kristian Coe is a 76 y.o. 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 He presents today for 6 month follow up. He does also have history of right BKA and ambulates with a prosthetic and cane. He denies any abdominal discomfort or back discomfort. He denies any symptoms of claudication or rest pain in his left leg. He does state that after being on his feet and walking most of the day his legs will get tired but states that this is not pain reminiscent of his arterial disease. He denies any skin changes and no ulcers. At his last visit he was found to have a DVT in the left leg and was treated with Xarelto for one month. Follow up study showed chronic clot and AC was stopped. Follow up imaging shows patent abdominal aortic bi-iliac endovascular graft with no evidence of leak or limb dysfunction. Aneurysm sac measures 4.52cm x 4.94cm compared to 5.0cm x 4.8cm on 4-6-17 duplex. Right below knee amputation with biphasic common femoral and monophasic popliteal artery flow. Mild peripheral arterial insufficiency at rest in the left leg, level undetermined. The left ankle/brachial index is 0.90 and the left digit/brachial index is 0.55.       Past Medical History:   Diagnosis Date    Advance directive in chart 6/13/2016    CAD (coronary artery disease)     Cancer 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 [address], COLON, DIAGNOSTIC      HX AAA REPAIR      HX ORTHOPAEDIC  2008    Right Leg Amputated     Patient Active Problem List   Diagnosis Code    Pure hypercholesterolemia E78.00    Coronary artery disease involving native coronary artery without angina pectoris I25.10    Hypertension I10    Diabetes mellitus type 2, controlled ([facility] E11.9    CKD (chronic kidney disease), stage III N18.3    S/P CABG (coronary artery bypass graft) Z95.1    S/P AVR Z95.2    Advance directive in chart Z78.9    PAD (peripheral artery disease) ([facility] I73.9    AAA (abdominal aortic aneurysm) without rupture (HCC) N09.1    Diastolic dysfunction F71.5    Vascular dementia without behavioral disturbance F01.50     Current Outpatient Prescriptions   Medication Sig Dispense Refill    NITROGLYCERIN (NITROSTAT SL) by SubLINGual route.  donepezil (ARICEPT) 10 mg tablet TAKE 2 TABLETS BY MOUTH EVERY MORNING AFTER A MEAL 180 Tab 1    atorvastatin (LIPITOR) 40 mg tablet Take 20 mg by mouth daily.  tamsulosin (FLOMAX) 0.4 mg capsule Take 0.4 mg by mouth nightly.  docusate sodium (STOOL SOFTENER) 100 mg capsule Take 100 mg by mouth nightly.  cyanocobalamin 1,000 mcg tablet Take 1,000 mcg by mouth daily.  aspirin (ASPIRIN) 325 mg tablet Take 1 Tab by mouth daily.  90 Tab 4    omega-3 fatty acids-vitamin e (FISH OIL) 1,000 mg cap Take 1 Cap by mouth two (2) times a day. 180 Cap 4     Allergies   Allergen Reactions    Ace Inhibitors Other (comments)     Per pt, Cardiologist states he cannot have ACE inhibitors     Social History     Social History    Marital status:      Spouse name: N/A    Number of children: N/A    Years of education: N/A     Occupational History    Not on file. Social History Main Topics    Smoking status: Former Smoker     Quit date: 9/14/1987    Smokeless tobacco: Never Used    Alcohol use No      Comment: wine 1-2x per week    Drug use: No    Sexual activity: Not Currently     Other Topics Concern    Not on file     Social History Narrative      Family History   Problem Relation Age of Onset    Hypertension Mother     Hypertension Father     Heart Disease Father     Heart Disease Brother        Physical Exam:    Visit Vitals    /68 (BP 1 Location: Left arm, BP Patient Position: Sitting)    Ht 5' 10\" (1.778 m)    Wt 201 lb (91.2 kg)    BMI 28.84 kg/m2      General: Well-appearing elderly male in no acute distress  HEENT: EOMI no scleral icterus is noted he is wearing eyeglasses  Pulmonary: No increased work of breathing is noted  Extremities: left leg warm and perfused. No skin changes or ulcers. Trace edema on left. Right prosthetic in place. Neuro: Cranial nerves II through XII grossly intact    Impression and Plan:  Jese Suarez is a 76 y.o. male with abdominal aortic aneurysm now repaired with extension on the left side. He also had a left common femoral artery aneurysm now repaired open. He had SFA stenosis which is improved as well. Imaging was reviewed in office today with patient. His graft is patent with no signs of endoleak. He has mild PAD in the left leg with no complaints of claudication or rest pain. Discussed that we can obtain follow-up ultrasounds in another 6 months. If he remains stable may be able to switch to one year f/u at that time.  He will follow-up afterwards sooner as needed. Follow-up Disposition:  Return in about 6 months (around 4/11/2018). Riki Wilson    ADDENDUM: Patient went for a venous ultrasound today and was called by the . Patient was positive for acute and occlusive deep venous thrombosis identified in one of two  posterior tibial veins in the proximal to mid calf. Patient was given a starter pack for Xarelto and scheduled for follow-up ultrasound in 1 month. I did discuss at length the risks versus benefits of anticoagulation. Patient and son express understanding and will call the office with any issues. They noted to go directly to the emergency room with any concerns for major bleed. PLEASE NOTE:  This document has been produced using voice recognition software. Unrecognized errors in transcription may be present.

## 2017-10-11 NOTE — PROCEDURES
[facility]   *** FINAL REPORT ***    Name: Kristen Damon  MRN: [id number]       Outpatient  : 30 Sep 1940  HIS Order #: [id number]  [address] [facility] Visit #: [id number]  Date: 11 Oct 2017    TYPE OF TEST: Aorto-Iliac Duplex    REASON FOR TEST  AAA    B-Mode:-                 (cm)   1     2     3  Aortic diameter:         AP:                           TV:  Common iliac diameter:   Right:                           Left:    Duplex:-                           PSV  Stenosis                           ----- --------------------  Aorta: (1)                     Normal         (2)         (3)                20.0    Right common iliac:       48.0  Right external iliac:     40.0 Normal    Left common iliac:        70.0 > 50% stenosis  Left external iliac:    INTERPRETATION/FINDINGS  Duplex images were obtained using 2-D gray scale, color flow and  spectral doppler analysis. Technically difficult exam due to excessive overlying bowel gas. Patient was not prepped. 1. Patent abdominal aortic bi-iliac endovascular graft with no  evidence of leak or limb dysfunction. 2. Aneurysm sac measures 4.52cm x 4.94cm compared to 5.0cm x 4.8cm on  17 duplex. 3. Outflow, celiac, superior mesenteric and renal arteries could not  be visualized due to overlying bowel gas. 4. Patent limbs bilaterally. ADDITIONAL COMMENTS  Ao SR 20c/s, Sup Attach 24c/s. Graft Body 25c/s. Lt Limb prox 70c/s,  94c/s, mid 108c/s, dst 27c/s and probably erroneous, dst attach  104c/s. Rt Limb prox 48c/s, 49c/s, mid 47c/s, dst 78c/s, dst attach  47c/s, IIA 77c/s, OF 40c/s. I have personally reviewed the data relevant to the interpretation of  this  study. TECHNOLOGIST: Ally Kemp RVT, KARUNA  Signed: 10/11/2017 10:06 AM    PHYSICIAN: Katie Beth.  Abhilash Rapp MD  Signed: 10/11/2017 01:54 PM

## 2017-10-17 RX ORDER — ATORVASTATIN CALCIUM 20 MG/1
20 TABLET, FILM COATED ORAL DAILY
Qty: 90 TAB | Refills: 4 | Status: SHIPPED | OUTPATIENT
Start: 2017-10-17 | End: 2018-04-16

## 2018-01-03 ENCOUNTER — OFFICE VISIT (OUTPATIENT)
Dept: FAMILY MEDICINE CLINIC | Age: 78
End: 2018-01-03

## 2018-01-03 VITALS
WEIGHT: 207.6 LBS | DIASTOLIC BLOOD PRESSURE: 77 MMHG | OXYGEN SATURATION: 99 % | TEMPERATURE: 96.5 F | RESPIRATION RATE: 16 BRPM | HEIGHT: 70 IN | BODY MASS INDEX: 29.72 KG/M2 | HEART RATE: 59 BPM | SYSTOLIC BLOOD PRESSURE: 150 MMHG

## 2018-01-03 DIAGNOSIS — E78.00 PURE HYPERCHOLESTEROLEMIA: ICD-10-CM

## 2018-01-03 DIAGNOSIS — Z95.2 S/P AVR: ICD-10-CM

## 2018-01-03 DIAGNOSIS — I25.10 CORONARY ARTERY DISEASE INVOLVING NATIVE CORONARY ARTERY OF NATIVE HEART WITHOUT ANGINA PECTORIS: ICD-10-CM

## 2018-01-03 DIAGNOSIS — E11.21 TYPE 2 DIABETES MELLITUS WITH NEPHROPATHY (HCC): ICD-10-CM

## 2018-01-03 DIAGNOSIS — I71.40 AAA (ABDOMINAL AORTIC ANEURYSM) WITHOUT RUPTURE: ICD-10-CM

## 2018-01-03 DIAGNOSIS — I51.89 DIASTOLIC DYSFUNCTION: ICD-10-CM

## 2018-01-03 DIAGNOSIS — R05.9 COUGH: ICD-10-CM

## 2018-01-03 DIAGNOSIS — E11.21 CONTROLLED TYPE 2 DIABETES MELLITUS WITH DIABETIC NEPHROPATHY, WITHOUT LONG-TERM CURRENT USE OF INSULIN (HCC): ICD-10-CM

## 2018-01-03 DIAGNOSIS — R35.0 BENIGN PROSTATIC HYPERPLASIA WITH URINARY FREQUENCY: ICD-10-CM

## 2018-01-03 DIAGNOSIS — N40.1 BENIGN PROSTATIC HYPERPLASIA WITH URINARY FREQUENCY: ICD-10-CM

## 2018-01-03 DIAGNOSIS — I73.9 PAD (PERIPHERAL ARTERY DISEASE) (HCC): ICD-10-CM

## 2018-01-03 DIAGNOSIS — J02.9 SORE THROAT: Primary | ICD-10-CM

## 2018-01-03 DIAGNOSIS — Z95.1 S/P CABG (CORONARY ARTERY BYPASS GRAFT): ICD-10-CM

## 2018-01-03 DIAGNOSIS — N18.30 CKD (CHRONIC KIDNEY DISEASE), STAGE III (HCC): ICD-10-CM

## 2018-01-03 DIAGNOSIS — F01.50 VASCULAR DEMENTIA WITHOUT BEHAVIORAL DISTURBANCE (HCC): ICD-10-CM

## 2018-01-03 DIAGNOSIS — I10 ESSENTIAL HYPERTENSION: ICD-10-CM

## 2018-01-03 LAB
QUICKVUE INFLUENZA TEST: NEGATIVE
S PYO AG THROAT QL: NEGATIVE
VALID INTERNAL CONTROL?: YES
VALID INTERNAL CONTROL?: YES

## 2018-01-03 RX ORDER — PROMETHAZINE HYDROCHLORIDE AND CODEINE PHOSPHATE 6.25; 1 MG/5ML; MG/5ML
5 SOLUTION ORAL
Qty: 120 ML | Refills: 1 | Status: SHIPPED | OUTPATIENT
Start: 2018-01-03 | End: 2018-04-04

## 2018-01-03 RX ORDER — TAMSULOSIN HYDROCHLORIDE 0.4 MG/1
0.4 CAPSULE ORAL
Qty: 90 CAP | Refills: 4 | Status: SHIPPED | OUTPATIENT
Start: 2018-01-03 | End: 2018-10-02 | Stop reason: SDUPTHER

## 2018-01-03 NOTE — MR AVS SNAPSHOT
Visit Information Date & Time Provider Department Dept. Phone Encounter #  
 1/3/2018  7:30 AM Serenity Hendrickson [phone] [id number] Follow-up Instructions Return in about 3 months (around 4/3/2018). Follow-up and Disposition History Your Appointments 2/27/2018  9:45 AM  
Follow Up with Ledy Slater MD  
[facility]8 [address]) Appt Note: 6mon f/u  
 [address] [place] [address] [phone]  
  
   
 [place] [phone] 4/11/2018  8:00 AM  
PROCEDURE with BSVVS IMAGING 2 [facility] ([facility]) Appt Note: evar 6 mos wild same day appt 2300 [facility] [place] 477 200 [facility]  
[phone] 2300 [facility] 200 [facility] Se 4/11/2018  9:00 AM  
PROCEDURE with BSVVS IMAGING 2 [facility] ([facility]) Appt Note: bpg 6 mos wild same day appt 2300 [facility] [place] 727 200 [facility]  
[phone] 4/11/2018 10:00 AM  
PROCEDURE with BSVVS NONIMAGING [facility] ([facility]) Appt Note: anitra 6 mos wild same day appt 2300 [facility] [place] 408 200 [facility]  
[phone] 2300 [facility] 200 [facility] 4/11/2018  1:15 PM  
Follow Up with JP Fox [facility] ([facility]) Appt Note: 6 months fup with studies 2300 [facility] [place] 981 200 [facility]  
[phone] 2300 [facility] 200 [facility] Upcoming Health Maintenance Date Due  
 EYE EXAM RETINAL OR DILATED Q1 9[birth year]30/1950 HEMOGLOBIN A1C Q6M 3/20/2018 FOOT EXAM Q1 5/18/2018 MICROALBUMIN Q1 9/20/2018 LIPID PANEL Q1 9/20/2018 MEDICARE YEARLY EXAM 9/26/2018 COLONOSCOPY 6/29/2019 GLAUCOMA SCREENING Q2Y 9/22/2019 DTaP/Tdap/Td series (2 - Td) 9/14/2020 Allergies as of 1/3/2018  Review Complete On: 10/11/2017 By: JP Enamorado Severity Noted Reaction Type Reactions Ace Inhibitors  03/27/2016    Other (comments) Per pt, Cardiologist states he cannot have ACE inhibitors Current Immunizations  Reviewed on 3/28/2016 Name Date Influenza High Dose Vaccine PF 9/25/2017, 9/13/2016, 11/10/2015, 10/7/2015 Influenza Vaccine 11/12/2014, 12/16/2013  8:04 AM  
 Influenza Vaccine Split 12/14/2012 Influenza Vaccine Whole 9/14/2010 Pneumococcal Conjugate (PCV-13) 10/7/2015 TDAP Vaccine 9/14/2010 ZZZ-RETIRED (DO NOT USE) Pneumococcal Vaccine (Unspecified Type) 9/14/2010 Zoster 9/14/2010 Not reviewed this visit You Were Diagnosed With   
  
 Codes Comments Sore throat    -  Primary ICD-10-CM: J02.9 ICD-9-CM: 893 Cough     ICD-10-CM: R05 ICD-9-CM: 786.2 Benign prostatic hyperplasia with urinary frequency     ICD-10-CM: N40.1, R35.0 ICD-9-CM: 600.01, 788.41   
 AAA (abdominal aortic aneurysm) without rupture (HCC)     ICD-10-CM: I71.4 ICD-9-CM: 441.4 PAD (peripheral artery disease) (HCC)     ICD-10-CM: I73.9 ICD-9-CM: 443.9 Controlled type 2 diabetes mellitus with diabetic nephropathy, without long-term current use of insulin (HCC)     ICD-10-CM: E11.21 
ICD-9-CM: 250.40, 583.81 Type 2 diabetes mellitus with nephropathy (HCC)     ICD-10-CM: E11.21 
ICD-9-CM: 250.40, 583.81 Vascular dementia without behavioral disturbance     ICD-10-CM: F01.50 ICD-9-CM: 290.40 Diastolic dysfunction     FFE-19-ER: I51.9 ICD-9-CM: 429.9 S/P CABG (coronary artery bypass graft)     ICD-10-CM: Z95.1 ICD-9-CM: V45.81 S/P AVR     ICD-10-CM: Z95.2 ICD-9-CM: V43.3 Essential hypertension     ICD-10-CM: I10 
ICD-9-CM: 401.9 CKD (chronic kidney disease), stage III     ICD-10-CM: N18.3 ICD-9-CM: 245. 3 Coronary artery disease involving native coronary artery of native heart without angina pectoris     ICD-10-CM: I25.10 ICD-9-CM: 414.01   
 Pure hypercholesterolemia     ICD-10-CM: E78.00 ICD-9-CM: 272.0 Vitals BP Pulse Temp Resp Height(growth percentile) Weight(growth percentile) 150/77 (BP 1 Location: Right arm, BP Patient Position: Sitting) (!) 59 96.5 °F (35.8 °C) (Oral) 16 5' 10\" (1.778 m) 207 lb 9.6 oz (94.2 kg) SpO2 BMI Smoking Status 99% 29.79 kg/m2 Former Smoker BMI and BSA Data Body Mass Index Body Surface Area  
 29.79 kg/m 2 2.16 m 2 Preferred Pharmacy Pharmacy Name Phone [facility] [address] [phone] Your Updated Medication List  
  
   
This list is accurate as of: 1/3/18  8:27 AM.  Always use your most recent med list.  
  
  
  
  
 aspirin 325 mg tablet Commonly known as:  ASPIRIN Take 1 Tab by mouth daily. atorvastatin 20 mg tablet Commonly known as:  LIPITOR Take 1 Tab by mouth daily. cyanocobalamin 1,000 mcg tablet Take 1,000 mcg by mouth daily. donepezil 10 mg tablet Commonly known as:  ARICEPT  
TAKE 2 TABLETS BY MOUTH EVERY MORNING AFTER A MEAL  
  
 NITROSTAT SL  
by SubLINGual route. omega-3 fatty acids-vitamin e 1,000 mg Cap Commonly known as:  FISH OIL Take 1 Cap by mouth two (2) times a day. promethazine-codeine 6.25-10 mg/5 mL syrup Commonly known as:  PHENERGAN with CODEINE Take 5 mL by mouth four (4) times daily as needed for Cough. Max Daily Amount: 20 mL. STOOL SOFTENER 100 mg capsule Generic drug:  docusate sodium Take 100 mg by mouth nightly. tamsulosin 0.4 mg capsule Commonly known as:  FLOMAX Take 1 Cap by mouth nightly. Prescriptions Printed Refills promethazine-codeine (PHENERGAN WITH CODEINE) 6.25-10 mg/5 mL syrup 1 Sig: Take 5 mL by mouth four (4) times daily as needed for Cough. Max Daily Amount: 20 mL. Class: Print Route: Oral  
  
Prescriptions Sent to Pharmacy Refills  
 tamsulosin (FLOMAX) 0.4 mg capsule 4 Sig: Take 1 Cap by mouth nightly. Class: Normal  
 Pharmacy: [facility] [address] #: [phone] Route: Oral  
  
We Performed the Following AMB POC RAPID INFLUENZA TEST [31567 CPT(R)] AMB POC RAPID STREP A [94708 CPT(R)] Follow-up Instructions Return in about 3 months (around 4/3/2018). To-Do List   
 04/03/2018 Lab:  HEMOGLOBIN A1C WITH EAG Around 04/03/2018 Lab:  LIPID PANEL Around 04/03/2018 Lab:  METABOLIC PANEL, COMPREHENSIVE \[facility] [place] [facility]\"" & HEALTH SERVICES! Dear Tea Thomas: Thank you for requesting a Factyle account. Our records indicate that you already have an active Factyle account. You can access your account anytime at [email or web address] Did you know that you can access your hospital and ER discharge instructions at any time in Factyle? You can also review all of your test results from your hospital stay or ER visit. Additional Information If you have questions, please visit the Frequently Asked Questions section of the Factyle website at [email or web address] Remember, Factyle is NOT to be used for urgent needs. For medical emergencies, dial 911. Now available from your iPhone and Android! Please provide this summary of care documentation to your next provider. Your primary care clinician [facility] [address]. If you have any questions after today's visit, please call [phone].

## 2018-01-03 NOTE — PROGRESS NOTES
Love Kirkland is a 76 y.o. male presents today for follow up on his diabetes, renal failure and dementia. He would also like to discuss having a sore throat and cough for 4-5 days. Pt is in Room # 5        1. Have you been to the ER, urgent care clinic since your last visit? Hospitalized since your last visit? No    2. Have you seen or consulted any other health care providers outside of the [address] [place] [address] since your last visit? Include any pap smears or colon screening.  No

## 2018-01-03 NOTE — PROGRESS NOTES
Nuvia Tadeo is a 76 y.o.  male and presents with    Chief Complaint   Patient presents with    URI    Diabetes    Cold extremity    Abdominal Aortic Aneurysm    Dementia    Cholesterol Problem    Hypertension    Chronic Kidney Disease    Benign Prostatic Hypertrophy           Subjective:  Upper Respiratory Infection  Patient complains of symptoms of a URI. Symptoms include coryza, sore throat and cough. Onset of symptoms was 2 days ago, stable since that time. He also c/o achiness for the past 2 days . He is drinking plenty of fluids. . Evaluation to date: none. Treatment to date: none. Cardiovascular Review:  The patient has diabetes, hypertension, hyperlipidemia and coronary artery disease. Diet and Lifestyle: not attempting to follow a low fat, low cholesterol diet, not attempting to follow a low sodium diet  Home BP Monitoring: is not measured at home. Pertinent ROS: taking medications as instructed, no medication side effects noted, no TIA's, no chest pain on exertion, no dyspnea on exertion, no swelling of ankles. Diabetes Mellitus:  He has diabetes mellitus, and  diabetes, hypertension, hyperlipidemia and coronary artery disease. Diabetic ROS - diabetic diet compliance: compliant all of the time.    Lab review: labs are reviewed, up to date and normal.   Dementia ongiong on aricept donig well  Chronic renal dz stable  bph running out of meds prev supplied by [place] - wants me to write rx      Additional Concerns:          Patient Active Problem List    Diagnosis Date Noted    Type 2 diabetes mellitus with nephropathy ([facility] 01/03/2018    Vascular dementia without behavioral disturbance 86/95/0057    Diastolic dysfunction 41/95/4416    AAA (abdominal aortic aneurysm) without rupture ([facility] 01/04/2017    PAD (peripheral artery disease) ([facility] 09/21/2016    Advance directive in chart 06/13/2016    S/P CABG (coronary artery bypass graft) 04/13/2016    S/P AVR 04/13/2016    Hypertension 03/27/2016    Diabetes mellitus type 2, controlled ([address] 03/27/2016    CKD (chronic kidney disease), stage III 03/27/2016    Coronary artery disease involving native coronary artery without angina pectoris 01/27/2016    Pure hypercholesterolemia 09/14/2012     Current Outpatient Prescriptions   Medication Sig Dispense Refill    tamsulosin (FLOMAX) 0.4 mg capsule Take 1 Cap by mouth nightly. 90 Cap 4    atorvastatin (LIPITOR) 20 mg tablet Take 1 Tab by mouth daily. 90 Tab 4    NITROGLYCERIN (NITROSTAT SL) by SubLINGual route.  donepezil (ARICEPT) 10 mg tablet TAKE 2 TABLETS BY MOUTH EVERY MORNING AFTER A MEAL 180 Tab 1    docusate sodium (STOOL SOFTENER) 100 mg capsule Take 100 mg by mouth nightly.  cyanocobalamin 1,000 mcg tablet Take 1,000 mcg by mouth daily.  aspirin (ASPIRIN) 325 mg tablet Take 1 Tab by mouth daily. 90 Tab 4    omega-3 fatty acids-vitamin e (FISH OIL) 1,000 mg cap Take 1 Cap by mouth two (2) times a day.  180 Cap 4     Allergies   Allergen Reactions    Ace Inhibitors Other (comments)     Per pt, Cardiologist states he cannot have ACE inhibitors     Past Medical History:   Diagnosis Date    Advance directive in chart 6/13/2016    CAD (coronary artery disease)     Cancer ([address] 2003    Colon    CKD (chronic kidney disease), stage III 3/27/2016    CVA (cerebral vascular accident) ([address] 3/26/2016    Femoral artery aneurysm, left ([address]     Heart attack 2008    Heart attack     Hernia     History of TIAs     Hyperlipidemia     Hypertension 3/27/2016    Iliac artery aneurysm, left ([address]     Pure hypercholesterolemia 9/14/2012    PVD (peripheral vascular disease) ([address] 3/27/2016    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 ([address] 3/26/2016     Past Surgical History:   Procedure Laterality Date    ABDOMEN SURGERY PROC UNLISTED  2011    3 stents placed into abdomen (groin)    CARDIAC SURG PROCEDURE UNLIST  2005    Quadruple Bypass    CARDIAC SURG PROCEDURE UNLIST  2011    Aortic pig valve placed    COLONOSCOPY N/A 6/29/2016    COLONOS[facility] at [facility] ENDOSCOPY    ENDOSCOPY, COLON, DIAGNOSTIC      HX AAA REPAIR      HX ORTHOPAEDIC  2008    Right Leg Amputated     Family History   Problem Relation Age of Onset    Hypertension Mother     Hypertension Father     Heart Disease Father     Heart Disease Brother      Social History   Substance Use Topics    Smoking status: Former Smoker     Quit date: 9/14/1987    Smokeless tobacco: Never Used    Alcohol use No      Comment: wine 1-2x per week       ROS       All other systems reviewed and are negative.       Objective:  Vitals:    01/03/18 0755   BP: 150/77   Pulse: (!) 59   Resp: 16   Temp: 96.5 °F (35.8 °C)   TempSrc: Oral   SpO2: 99%   Weight: 207 lb 9.6 oz (94.2 kg)   Height: 5' 10\" (1.778 m)   PainSc:   3   PainLoc: Throat                 alert, well appearing, and in no distress, oriented to person, place, and time and normal appearing weight  Chest - clear to auscultation, no wheezes, rales or rhonchi, symmetric air entry  Heart - normal rate, regular rhythm, normal S1, S2, no murmurs, rubs, clicks or gallops  Abdomen - soft, nontender, nondistended, no masses or organomegaly  Musculoskeletal - no joint tenderness, deformity or swelling  Extremities - peripheral pulses normal, no pedal edema, no clubbing or cyanosis        LABS   Component      Latest Ref Rng & Units 9/20/2017 9/20/2017 9/20/2017 9/20/2017           8:18 AM  8:18 AM  8:18 AM  8:18 AM   WBC      4.6 - 13.2 K/uL       RBC      4.70 - 5.50 M/uL       HGB      13.0 - 16.0 g/dL       HCT      36.0 - 48.0 %       MCV      74.0 - 97.0 FL       MCH      24.0 - 34.0 PG       MCHC      31.0 - 37.0 g/dL       RDW      11.6 - 14.5 %       PLATELET      205 - 768 K/uL       MPV      9.2 - 11.8 FL       NEUTROPHILS      40 - 73 %       LYMPHOCYTES      21 - 52 %       MONOCYTES      3 - 10 % EOSINOPHILS      0 - 5 %       BASOPHILS      0 - 2 %       ABS. NEUTROPHILS      1.8 - 8.0 K/UL       ABS. LYMPHOCYTES      0.9 - 3.6 K/UL       ABS. MONOCYTES      0.05 - 1.2 K/UL       ABS. EOSINOPHILS      0.0 - 0.4 K/UL       ABS. BASOPHILS      0.0 - 0.06 K/UL       DF             Sodium      136 - 145 mmol/L       Potassium      3.5 - 5.5 mmol/L       Chloride      100 - 108 mmol/L       CO2      21 - 32 mmol/L       Anion gap      3.0 - 18 mmol/L       Glucose      74 - 99 mg/dL       BUN      7.0 - 18 MG/DL       Creatinine      0.6 - 1.3 MG/DL       BUN/Creatinine ratio      12 - 20         GFR est AA      >60 ml/min/1.73m2       GFR est non-AA      >60 ml/min/1.73m2       Calcium      8.5 - 10.1 MG/DL       Bilirubin, total      0.2 - 1.0 MG/DL       ALT (SGPT)      16 - 61 U/L       AST      15 - 37 U/L       Alk. phosphatase      45 - 117 U/L       Protein, total      6.4 - 8.2 g/dL       Albumin      3.4 - 5.0 g/dL       Globulin      2.0 - 4.0 g/dL       A-G Ratio      0.8 - 1.7         Color             Appearance             Specific gravity      1.005 - 1.030         pH (UA)      5.0 - 8.0         Protein      NEG mg/dL       Glucose      NEG mg/dL       Ketone      NEG mg/dL       Bilirubin      NEG         Blood      NEG         Urobilinogen      0.2 - 1.0 EU/dL       Nitrites      NEG         Leukocyte Esterase      NEG         Cholesterol, total      <200 MG/DL    138   Triglyceride      <150 MG/DL    198 (H)   HDL Cholesterol      40 - 60 MG/DL    42   LDL, calculated      0 - 100 MG/DL    56.4   VLDL, calculated      MG/DL    39.6   CHOL/HDL Ratio      0 - 5.0      3.3   Microalbumin,urine random      0 - 3.0 MG/DL  4.90 (H)     Creatinine, urine      30 - 125 mg/dL  128.58 (H)     Microalbumin/Creat.  Ratio      0 - 30 mg/g  38 (H)     Hemoglobin A1c, (calculated)      4.2 - 5.6 %       Est. average glucose      mg/dL       TSH      0.36 - 3.74 uIU/mL   1.98    T4, Free      0.7 - 1.5 NG/DL 0. 8        Component      Latest Ref Rng & Units 9/20/2017 9/20/2017 9/20/2017 9/20/2017           8:18 AM  8:18 AM  8:18 AM  8:18 AM   WBC      4.6 - 13.2 K/uL    8.2   RBC      4.70 - 5.50 M/uL    4.16 (L)   HGB      13.0 - 16.0 g/dL    14.0   HCT      36.0 - 48.0 %    41.0   MCV      74.0 - 97.0 FL    98.6 (H)   MCH      24.0 - 34.0 PG    33.7   MCHC      31.0 - 37.0 g/dL    34.1   RDW      11.6 - 14.5 %    14.9 (H)   PLATELET      768 - 945 K/uL    238   MPV      9.2 - 11.8 FL    12.0 (H)   NEUTROPHILS      40 - 73 %    66   LYMPHOCYTES      21 - 52 %    23   MONOCYTES      3 - 10 %    8   EOSINOPHILS      0 - 5 %    2   BASOPHILS      0 - 2 %    1   ABS. NEUTROPHILS      1.8 - 8.0 K/UL    5.4   ABS. LYMPHOCYTES      0.9 - 3.6 K/UL    1.8   ABS. MONOCYTES      0.05 - 1.2 K/UL    0.6   ABS. EOSINOPHILS      0.0 - 0.4 K/UL    0.2   ABS. BASOPHILS      0.0 - 0.06 K/UL    0.1 (H)   DF          AUTOMATED   Sodium      136 - 145 mmol/L 139      Potassium      3.5 - 5.5 mmol/L 5.2      Chloride      100 - 108 mmol/L 105      CO2      21 - 32 mmol/L 28      Anion gap      3.0 - 18 mmol/L 6      Glucose      74 - 99 mg/dL 91      BUN      7.0 - 18 MG/DL 25 (H)      Creatinine      0.6 - 1.3 MG/DL 1.62 (H)      BUN/Creatinine ratio      12 - 20   15      GFR est AA      >60 ml/min/1.73m2 50 (L)      GFR est non-AA      >60 ml/min/1.73m2 41 (L)      Calcium      8.5 - 10.1 MG/DL 9.6      Bilirubin, total      0.2 - 1.0 MG/DL 0.5      ALT (SGPT)      16 - 61 U/L 23      AST      15 - 37 U/L 15      Alk.  phosphatase      45 - 117 U/L 104      Protein, total      6.4 - 8.2 g/dL 7.1      Albumin      3.4 - 5.0 g/dL 4.0      Globulin      2.0 - 4.0 g/dL 3.1      A-G Ratio      0.8 - 1.7   1.3      Color        YELLOW     Appearance        CLEAR     Specific gravity      1.005 - 1.030    1.019     pH (UA)      5.0 - 8.0    6.0     Protein      NEG mg/dL  NEGATIVE     Glucose      NEG mg/dL  NEGATIVE     Ketone      NEG mg/dL NEGATIVE     Bilirubin      NEG    NEGATIVE     Blood      NEG    NEGATIVE     Urobilinogen      0.2 - 1.0 EU/dL  0.2     Nitrites      NEG    NEGATIVE     Leukocyte Esterase      NEG    NEGATIVE     Cholesterol, total      <200 MG/DL       Triglyceride      <150 MG/DL       HDL Cholesterol      40 - 60 MG/DL       LDL, calculated      0 - 100 MG/DL       VLDL, calculated      MG/DL       CHOL/HDL Ratio      0 - 5.0         Microalbumin,urine random      0 - 3.0 MG/DL       Creatinine, urine      30 - 125 mg/dL       Microalbumin/Creat. Ratio      0 - 30 mg/g       Hemoglobin A1c, (calculated)      4.2 - 5.6 %   5.5    Est. average glucose      mg/dL   111    TSH      0.36 - 3.74 uIU/mL       T4, Free      0.7 - 1.5 NG/DL         TESTS  Strep and flu both neg today     Assessment/Plan:    Diabetes - well controlled  Hypertension - well controlled  Hyperlipidemia - well controlled  Pad  Stable  AAA stable  Dementia ongoing   Chronic renal dz stable  bph stable meds refilled  Uri  Viral start phen cod and f/u prn      Lab review: labs are reviewed, up to date and normal, orders written for new lab studies as appropriate; see orders    Diagnoses and all orders for this visit:    1. Sore throat  -     AMB POC RAPID INFLUENZA TEST  -     AMB POC RAPID STREP A  -     tamsulosin (FLOMAX) 0.4 mg capsule; Take 1 Cap by mouth nightly. -     METABOLIC PANEL, COMPREHENSIVE; Future  -     LIPID PANEL; Future  -     HEMOGLOBIN A1C WITH EAG; Future    2. Cough  -     AMB POC RAPID INFLUENZA TEST  -     AMB POC RAPID STREP A  -     tamsulosin (FLOMAX) 0.4 mg capsule; Take 1 Cap by mouth nightly. -     METABOLIC PANEL, COMPREHENSIVE; Future  -     LIPID PANEL; Future  -     HEMOGLOBIN A1C WITH EAG; Future    3. Benign prostatic hyperplasia with urinary frequency  -     AMB POC RAPID INFLUENZA TEST  -     AMB POC RAPID STREP A  -     tamsulosin (FLOMAX) 0.4 mg capsule; Take 1 Cap by mouth nightly.   -     METABOLIC PANEL, COMPREHENSIVE; Future  -     LIPID PANEL; Future  -     HEMOGLOBIN A1C WITH EAG; Future    4. AAA (abdominal aortic aneurysm) without rupture (HCC)  -     AMB POC RAPID INFLUENZA TEST  -     AMB POC RAPID STREP A  -     tamsulosin (FLOMAX) 0.4 mg capsule; Take 1 Cap by mouth nightly. -     METABOLIC PANEL, COMPREHENSIVE; Future  -     LIPID PANEL; Future  -     HEMOGLOBIN A1C WITH EAG; Future    5. PAD (peripheral artery disease) (HCC)  -     AMB POC RAPID INFLUENZA TEST  -     AMB POC RAPID STREP A  -     tamsulosin (FLOMAX) 0.4 mg capsule; Take 1 Cap by mouth nightly. -     METABOLIC PANEL, COMPREHENSIVE; Future  -     LIPID PANEL; Future  -     HEMOGLOBIN A1C WITH EAG; Future    6. Controlled type 2 diabetes mellitus with diabetic nephropathy, without long-term current use of insulin ([facility])  -     AMB POC RAPID INFLUENZA TEST  -     AMB POC RAPID STREP A  -     tamsulosin (FLOMAX) 0.4 mg capsule; Take 1 Cap by mouth nightly. -     METABOLIC PANEL, COMPREHENSIVE; Future  -     LIPID PANEL; Future  -     HEMOGLOBIN A1C WITH EAG; Future    7. Type 2 diabetes mellitus with nephropathy (HCC)  -     AMB POC RAPID INFLUENZA TEST  -     AMB POC RAPID STREP A  -     tamsulosin (FLOMAX) 0.4 mg capsule; Take 1 Cap by mouth nightly. -     METABOLIC PANEL, COMPREHENSIVE; Future  -     LIPID PANEL; Future  -     HEMOGLOBIN A1C WITH EAG; Future    8. Vascular dementia without behavioral disturbance  -     AMB POC RAPID INFLUENZA TEST  -     AMB POC RAPID STREP A  -     tamsulosin (FLOMAX) 0.4 mg capsule; Take 1 Cap by mouth nightly. -     METABOLIC PANEL, COMPREHENSIVE; Future  -     LIPID PANEL; Future  -     HEMOGLOBIN A1C WITH EAG; Future    9. Diastolic dysfunction  -     AMB POC RAPID INFLUENZA TEST  -     AMB POC RAPID STREP A  -     tamsulosin (FLOMAX) 0.4 mg capsule; Take 1 Cap by mouth nightly. -     METABOLIC PANEL, COMPREHENSIVE; Future  -     LIPID PANEL; Future  -     HEMOGLOBIN A1C WITH EAG; Future    10.  S/P CABG (coronary artery bypass graft)  -     AMB POC RAPID INFLUENZA TEST  -     AMB POC RAPID STREP A  -     tamsulosin (FLOMAX) 0.4 mg capsule; Take 1 Cap by mouth nightly. -     METABOLIC PANEL, COMPREHENSIVE; Future  -     LIPID PANEL; Future  -     HEMOGLOBIN A1C WITH EAG; Future    11. S/P AVR  -     AMB POC RAPID INFLUENZA TEST  -     AMB POC RAPID STREP A  -     tamsulosin (FLOMAX) 0.4 mg capsule; Take 1 Cap by mouth nightly. -     METABOLIC PANEL, COMPREHENSIVE; Future  -     LIPID PANEL; Future  -     HEMOGLOBIN A1C WITH EAG; Future    12. Essential hypertension  -     AMB POC RAPID INFLUENZA TEST  -     AMB POC RAPID STREP A  -     tamsulosin (FLOMAX) 0.4 mg capsule; Take 1 Cap by mouth nightly. -     METABOLIC PANEL, COMPREHENSIVE; Future  -     LIPID PANEL; Future  -     HEMOGLOBIN A1C WITH EAG; Future    13. CKD (chronic kidney disease), stage III  -     AMB POC RAPID INFLUENZA TEST  -     AMB POC RAPID STREP A  -     tamsulosin (FLOMAX) 0.4 mg capsule; Take 1 Cap by mouth nightly. -     METABOLIC PANEL, COMPREHENSIVE; Future  -     LIPID PANEL; Future  -     HEMOGLOBIN A1C WITH EAG; Future    14. Coronary artery disease involving native coronary artery of native heart without angina pectoris  -     AMB POC RAPID INFLUENZA TEST  -     AMB POC RAPID STREP A  -     tamsulosin (FLOMAX) 0.4 mg capsule; Take 1 Cap by mouth nightly. -     METABOLIC PANEL, COMPREHENSIVE; Future  -     LIPID PANEL; Future  -     HEMOGLOBIN A1C WITH EAG; Future    15. Pure hypercholesterolemia  -     AMB POC RAPID INFLUENZA TEST  -     AMB POC RAPID STREP A  -     tamsulosin (FLOMAX) 0.4 mg capsule; Take 1 Cap by mouth nightly. -     METABOLIC PANEL, COMPREHENSIVE; Future  -     LIPID PANEL; Future  -     HEMOGLOBIN A1C WITH EAG; Future          I have discussed the diagnosis with the patient and the intended plan as seen in the above orders.   The patient has received an after-visit summary and questions were answered concerning future plans. I have discussed medication side effects and warnings with the patient as well. I have reviewed the plan of care with the patient, accepted their input and they are in agreement with the treatment goals. Follow-up Disposition:  Return in about 3 months (around 4/3/2018).

## 2018-02-27 ENCOUNTER — OFFICE VISIT (OUTPATIENT)
Dept: NEUROLOGY | Age: 78
End: 2018-02-27

## 2018-02-27 VITALS
SYSTOLIC BLOOD PRESSURE: 124 MMHG | HEART RATE: 64 BPM | OXYGEN SATURATION: 98 % | RESPIRATION RATE: 22 BRPM | HEIGHT: 70 IN | DIASTOLIC BLOOD PRESSURE: 80 MMHG | BODY MASS INDEX: 29.86 KG/M2 | TEMPERATURE: 97.4 F | WEIGHT: 208.6 LBS

## 2018-02-27 DIAGNOSIS — F01.50 VASCULAR DEMENTIA WITHOUT BEHAVIORAL DISTURBANCE (HCC): Primary | ICD-10-CM

## 2018-02-27 DIAGNOSIS — R29.898 WEAKNESS OF BOTH LEGS: ICD-10-CM

## 2018-02-27 DIAGNOSIS — I69.319 CVA, OLD, COGNITIVE DEFICITS: ICD-10-CM

## 2018-02-27 DIAGNOSIS — I67.9 CEREBROVASCULAR DISEASE: ICD-10-CM

## 2018-02-27 DIAGNOSIS — I73.9 PAD (PERIPHERAL ARTERY DISEASE) (HCC): ICD-10-CM

## 2018-02-27 RX ORDER — DONEPEZIL HYDROCHLORIDE 10 MG/1
TABLET, FILM COATED ORAL
Qty: 180 TAB | Refills: 1 | Status: SHIPPED | OUTPATIENT
Start: 2018-02-27 | End: 2019-01-08

## 2018-02-27 NOTE — PROGRESS NOTES
Vikki Simmons is a 76 y.o. male in today for follow-up on dementia. Learning assessment previously completed 10/11/2017; primary language is [place]. 1. Have you been to the ER, urgent care clinic since your last visit? Hospitalized since your last visit? No    2. Have you seen or consulted any other health care providers outside of the [address] [place] [address] since your last visit? Include any pap smears or colon screening.  No

## 2018-02-27 NOTE — MR AVS SNAPSHOT
[address] [place] [id number] [phone] Patient: Sam Laughlin MRN: [id number] VCC:[birth year] Visit Information Date & Time Provider Department Dept. Phone Encounter #  
 2/27/2018  9:45 AM Jamaica Martinez MD [facility] [phone] [id number] Follow-up Instructions Return in about 6 months (around 8/27/2018) for Dr Melony Keen. Follow-up and Disposition History Your Appointments 4/4/2018  7:30 AM  
ROUTINE CARE with Harrison Malagon,  [address] [facility] 16 [facility]) Appt Note: Return in about 3 months (around 4/3/2018). [address] [facility] [address] [facility] 83 222 [facility]  
  
   
 [address] [facility] 4/11/2018  8:00 AM  
PROCEDURE with [facility] IMAGING 2 [facility] ([facility]) Appt Note: evar 6 mos wild same day appt 1212 [facility] [phone] [facility]  
[phone] 1212 [facility] [place] 706 [facility] 4/11/2018  9:00 AM  
PROCEDURE with [facility] IMAG[facility] 2 [facility] ([facility]) Appt Note: bpg 6 mos wild same day appt 1212 [facility] [address] 706 [facility]  
[phone] 4/11/2018 10:00 AM  
PROCEDURE with [facility] NONIMAGING [facility] ([facility]) Appt Note: anitra 6 mos wild same day appt 1212 [facility] [phone] [facility]  
[phone] 1212 [facility] [place] 706 [facility] 4/11/2018  1:15 PM  
Follow Up with JP Carreon [facility] ([facility]) Appt Note: 6 months fup with studies 1212 [facility] [address] 040 016 [facility]  
[phone] 1212 [facility], [facility] [address] [place] [address] Upcoming Health Maintenance Date Due  
 EYE EXAM RETINAL OR DILATED Q1 9/30/1950 HEMOGLOBIN A1C Q6M 3/20/2018 FOOT EXAM Q1 5/18/2018 MICROALBUMIN Q1 9/20/2018 LIPID PANEL Q1 9/20/2018 MEDICARE YEARLY EXAM 9/26/2018 COLONOSCOPY 6/29/2019 GLAUCOMA SCREENING Q2Y 9/22/2019 DTaP/Tdap/Td series (2 - Td) 9/14/2020 Allergies as of 2/27/2018  Review Complete On: 2/27/2018 By: Jordan Lowery LPN Severity Noted Reaction Type Reactions Ace Inhibitors  03/27/2016    Other (comments) Per pt, Cardiologist states he cannot have ACE inhibitors Current Immunizations  Reviewed on 3/28/2016 Name Date Influenza High Dose Vaccine PF 9/25/2017, 9/13/2016, 11/10/2015, 10/7/2015 Influenza Vaccine 11/12/2014, 12/16/2013  8:04 AM  
 Influenza Vaccine Split 12/14/2012 Influenza Vaccine Whole 9/14/2010 Pneumococcal Conjugate (PCV-13) 10/7/2015 TDAP Vaccine 9/14/2010 ZZZ-RETIRED (DO NOT USE) Pneumococcal Vaccine (Unspecified Type) 9/14/2010 Zoster 9/14/2010 Not reviewed this visit You Were Diagnosed With   
  
 Codes Comments Vascular dementia without behavioral disturbance    -  Primary ICD-10-CM: F01.50 ICD-9-CM: 290.40   
 CVA, old, cognitive deficits     ICD-10-CM: I69.319 ICD-9-CM: 438.0 Cerebrovascular disease     ICD-10-CM: I67.9 ICD-9-CM: 437.9 Weakness of both legs     ICD-10-CM: R29.898 ICD-9-CM: 729.89 PAD (peripheral artery disease) (HCC)     ICD-10-CM: I73.9 ICD-9-CM: 443. 9 Vitals BP Pulse Temp Resp Height(growth percentile) Weight(growth percentile) 124/80 (BP 1 Location: Left arm, BP Patient Position: Sitting) 64 97.4 °F (36.3 °C) (Oral) 22 5' 10\" (1.778 m) 208 lb 9.6 oz (94.6 kg) SpO2 BMI Smoking Status 98% 29.93 kg/m2 Former Smoker Vitals History BMI and BSA Data Body Mass Index Body Surface Area  
 29.93 kg/m 2 2.16 m 2 Preferred Pharmacy Pharmacy Name Phone [facility] [address] [phone] Your Updated Medication List  
  
   
This list is accurate as of 2/27/18 10:45 AM.  Always use your most recent med list. AMLODIPINE PO Take  by mouth. aspirin 325 mg tablet Commonly known as:  ASPIRIN Take 1 Tab by mouth daily. atorvastatin 20 mg tablet Commonly known as:  LIPITOR Take 1 Tab by mouth daily. cyanocobalamin 1,000 mcg tablet Take 1,000 mcg by mouth daily. donepezil 10 mg tablet Commonly known as:  ARICEPT  
TAKE 2 TABLETS BY MOUTH EVERY MORNING AFTER A MEAL  
  
 NITROSTAT SL  
by SubLINGual route. omega-3 fatty acids-vitamin e 1,000 mg Cap Commonly known as:  FISH OIL Take 1 Cap by mouth two (2) times a day. promethazine-codeine 6.25-10 mg/5 mL syrup Commonly known as:  PHENERGAN with CODEINE Take 5 mL by mouth four (4) times daily as needed for Cough. Max Daily Amount: 20 mL. STOOL SOFTENER 100 mg capsule Generic drug:  docusate sodium Take 100 mg by mouth nightly. tamsulosin 0.4 mg capsule Commonly known as:  FLOMAX Take 1 Cap by mouth nightly. Prescriptions Sent to Pharmacy Refills  
 donepezil (ARICEPT) 10 mg tablet 1 Sig: TAKE 2 TABLETS BY MOUTH EVERY MORNING AFTER A MEAL Class: Normal  
 Pharmacy: [facility] [address] Ph #: [phone] Follow-up Instructions Return in about 6 months (around 8/27/2018) for Dr Cuba Jack. Introducing [facility]! Dear Scooby Number: Thank you for requesting a [employer] account. Our records indicate that you already have an active [employer] account. You can access your account anytime at [email or web address] Did you know that you can access your hospital and ER discharge instructions at any time in [employer]? You can also review all of your test results from your hospital stay or ER visit. Additional Information If you have questions, please visit the Frequently Asked Questions section of the [employer] website at [email or web address] Remember, [employer] is NOT to be used for urgent needs. For medical emergencies, dial 911. Now available from your iPhone and Android! Please provide this summary of care documentation to your next provider. Your primary care clinician is listed as [id number] [facility]. If you have any questions after today's visit, please call [phone].

## 2018-02-27 NOTE — PROGRESS NOTES
[facility]             [address] [facility], [address]    2/27/2018    HPI:  Angie Ramsey is a 76 y.o., right handed,   male, who presents with cognitive dysfunction worse since December,2016. Patient admits to occasional difficulty with word finding weakness worse in the legs his wife reports that occasionally he gets his days and nights confused he has difficulty telling time which she attributes to poor vision she also states she has poor memory. He was hospitalized in spring of last year at [facility] for stroke. He also reports collapsing in December and was told it was a TIA. Patient did undergo MRI MRA  December. Patient has extensive vascular disease BKA right leg abdominal aortic aneurysm peripheral artery disease as well as evidence of multiple strokes per MRI he also has chronic kidney disease history of diabetes and coronary artery disease. He is on Xarelto in addition to aspirin and statin. Patient returns in follow-up reporting no problems with the aricept he feels he may be able to recall things better. He did undergo the EEG which is reviewed as showing mild slowing. Results and implications are discussed with patient and family    Patient reports mprovement in memory  he is tolerating the Donepezil well is having trouble with the fall and losing the battery to his orthotic foot he notes no new difficulties otherwise he denies any difficulty with the medicine and wishes to continue at this therapy. Current Outpatient Prescriptions   Medication Sig Dispense Refill    AMLODIPINE BESYLATE (AMLODIPINE PO) Take  by mouth.  donepezil (ARICEPT) 10 mg tablet TAKE 2 TABLETS BY MOUTH EVERY MORNING AFTER A MEAL 180 Tab 1    tamsulosin (FLOMAX) 0.4 mg capsule Take 1 Cap by mouth nightly. 90 Cap 4    atorvastatin (LIPITOR) 20 mg tablet Take 1 Tab by mouth daily.  90 Tab 4    docusate sodium (STOOL SOFTENER) 100 mg capsule Take 100 mg by mouth nightly.  cyanocobalamin 1,000 mcg tablet Take 1,000 mcg by mouth daily.  aspirin (ASPIRIN) 325 mg tablet Take 1 Tab by mouth daily. 90 Tab 4    omega-3 fatty acids-vitamin e (FISH OIL) 1,000 mg cap Take 1 Cap by mouth two (2) times a day. 180 Cap 4    promethazine-codeine (PHENERGAN WITH CODEINE) 6.25-10 mg/5 mL syrup Take 5 mL by mouth four (4) times daily as needed for Cough. Max Daily Amount: 20 mL. 120 mL 1    NITROGLYCERIN (NITROSTAT SL) by SubLINGual route.          Past Medical History:   Diagnosis Date    Advance directive in chart 6/13/2016    CAD (coronary artery disease)     Cancer [facility] 2003    Colon    CKD (chronic kidney disease), stage III 3/27/2016    CVA (cerebral vascular accident) ([address] 3/26/2016    Femoral artery aneurysm, left ([address]     Heart attack 2008    Heart attack     Hernia     History of TIAs     Hyperlipidemia     Hypertension 3/27/2016    Iliac artery aneurysm, left ([address]     Pure hypercholesterolemia 9/14/2012    PVD (peripheral vascular disease) ([address] 3/27/2016    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 ([address] 3/26/2016       Past Surgical History:   Procedure Laterality Date    ABDOMEN SURGERY PROC UNLISTED  2011    3 stents placed into abdomen (groin)    CARDIAC SURG PROCEDURE UNLIST  2005    Quadruple Bypass    CARDIAC SURG PROCEDURE UNLIST  2011    Aortic pig valve placed    COLONOSCOPY N/A 6/29/2016    COLONOSCOPY performed by Shnathi Mcdowell MD at [facility] ENDOSCOPY    ENDOSCOPY, COLON, DIAGNOSTIC      HX AAA REPAIR      HX ORTHOPAEDIC  2008    Right Leg Amputated     Family History   Problem Relation Age of Onset    Hypertension Mother     Hypertension Father     Heart Disease Father     Heart Disease Brother      Allergies   Allergen Reactions    Ace Inhibitors Other (comments)     Per pt, Cardiologist states he cannot have ACE inhibitors       Review of Systems:   Review of Systems - History obtained from spouse and the patient  General ROS: positive for  - fatigue  Psychological ROS: positive for - depression and memory difficulties  ENT ROS: negative  Hematological and Lymphatic ROS: negative  Endocrine ROS: negative  Respiratory ROS: no cough, shortness of breath, or wheezing  Cardiovascular ROS: no chest pain or dyspnea on exertion  Gastrointestinal ROS: no abdominal pain, change in bowel habits, or black or bloody stools  Genito-Urinary ROS: no dysuria, trouble voiding, or hematuria  Musculoskeletal ROS: positive for - gait disturbance, joint pain and muscular weakness  Neurological ROS: positive for - impaired coordination/balance, memory loss, speech problems, visual changes and weakness  Dermatological ROS: negative    PHYSICAL EXAMINATION:    Visit Vitals    /80 (BP 1 Location: Left arm, BP Patient Position: Sitting)    Pulse 64    Temp 97.4 °F (36.3 °C) (Oral)    Resp 22    Ht 5' 10\" (1.778 m)    Wt 94.6 kg (208 lb 9.6 oz)    SpO2 98%    BMI 29.93 kg/m2     General:  Well defined, nourished, and groomed individual in no acute distress. Neck: Supple, nontender, thyroid within normal limits, no JVD, no bruits, no pain with resistance to active range of motion. Heart: Regular rate and rhythm, no murmurs, rub, or gallop. Normal S1S2. Lungs:  Clear to auscultation bilaterally with equal chest expansion, no cough, no wheeze  Musculoskeletal:  Extremities revealed no edema right bka  Psych:  Good mood and normal affect    NEUROLOGICAL EXAMINATION:     Mental Status:   Alert and oriented to person, place, and time with fair recent and remote memory. Attention span and concentration are normal. Speech is fluent with a full fund of knowledge. Mild apraxia stm 3/3    Cranial Nerves:    II, III, IV, VI:  Visual acuity grossly intact. Visual fields are normal.    Pupils are equal, round, and reactive to light and accommodation. Extra-ocular movements are full and fluid.   Fundoscopic exam was not well visualized no ptosis or nystagmus. V-XII: Hearing is grossly intact. Facial features are symmetric, with normal sensation and strength. The palate rises symmetrically and the tongue protrudes midline. Motor Examination: Normal tone, bulk,muscle strength  Consistent with effortthroughout. No cogwheel rigidity or clonus present. Sensory exam:  Normal throughout to pinprick, temperature, and vibration sense  bue. Coordination: No resting or intention tremor    Gait and Station:  Antalgic slow unsteady gai  Normal arm swing. No pronator drift. No muscle wasting or fasiculations noted. BRAIN AND POSTERIOR FOSSA: The examination is slightly degraded by motion but  remains diagnostic. Mild atrophy and mild to moderate chronic small vessel  changes are noted. There is an old area of infarction involving the right  frontal lobe, extending into the corona radiata white matter. There is also an  area of old infarct in the right parietal region. There is linear abnormal  signal in the right subinsular region with associated old blood breakdown  products which may represent an area of old hypertensive hemorrhage or old  hemorrhagic infarct. There is no acute intracranial hemorrhage, mass effect, or midline shift. There  are no significant additional areas of abnormal parenchymal signal.  There is no  true restricted diffusion to suggest acute infarct. EXTRA-AXIAL SPACES AND MENINGES: There are no abnormal extra-axial fluid  collections. VASCULAR: The visualized portions of the intracranial carotid and  vertebrobasilar systems demonstrate patent appearing flow voids. CALVARIA: Normal.     SINUSES: Clear. CRANIOCERVICAL JUNCTION: Normal.     OTHER: None.     _______________     IMPRESSION  IMPRESSION:     1. Atrophy and chronic small vessel changes with old areas of infarction, as  described above. 2.  Otherwise, unremarkable evaluation.   Specifically, no acute intracranial  abnormalities are identified. eeg mild slowing no epileptiform discharges    Assessment and Plan:   Reynold Smiley is a 76 y.o. right handed male whose history and physical are consistent with vascular dementia. Reynold Smiley who has risk factors including vascular disease,hypertension,diabetes    Diagnoses and all orders for this visit:    1. Vascular dementia without behavioral disturbance    2. CVA, old, cognitive deficits    3. Cerebrovascular disease    4. Weakness of both legs    5. PAD (peripheral artery disease) (HCC)    Other orders  -     donepezil (ARICEPT) 10 mg tablet; TAKE 2 TABLETS BY MOUTH EVERY MORNING AFTER A MEAL      Follow-up Disposition:  Return in about 6 months (around 8/27/2018) for Dr Star Gray. Reviewed workup and notes in chart from  pcp and vascular surgery as well as previous neurological evaluations/  Personally reviewed brain imaging. Discussed work up planned need for vit b12 and risks and benefits of aricept  I spent 20 minutes with the patient in face-to-face consultation, of which greater than 50% was spent in counseling and coordination of care as described above.

## 2018-03-22 ENCOUNTER — TELEPHONE (OUTPATIENT)
Dept: FAMILY MEDICINE CLINIC | Age: 78
End: 2018-03-22

## 2018-03-22 DIAGNOSIS — F01.50 VASCULAR DEMENTIA WITHOUT BEHAVIORAL DISTURBANCE (HCC): Primary | ICD-10-CM

## 2018-03-22 NOTE — TELEPHONE ENCOUNTER
Pt's wife (pt is present and verified SS and ) is asking if Dr. Cassidy Avendano can suggest a different neurology specialist that is closer to their home instead of going all the way to [place]. Asking to be called back.

## 2018-03-23 NOTE — TELEPHONE ENCOUNTER
2 patient identifiers verified. Spoke with [employer]. Patient made aware that a new neurology referral can be generated for Dr. Cuate Sharma with Neurology specialist located at [facility]. [address] [facility], [place] [address] contact #[phone]. Patient was advised that once insurance authorization is generated the specialist will contact him to schedule.  Patient acknowledges understanding and voices no concerns at this time

## 2018-03-27 ENCOUNTER — HOSPITAL ENCOUNTER (OUTPATIENT)
Dept: LAB | Age: 78
Discharge: HOME | End: 2018-03-27
Payer: MEDICARE

## 2018-03-27 LAB
ALBUMIN SERPL-MCNC: 4.1 G/DL (ref 3.4–5)
ALBUMIN/GLOB SERPL: 1.3 {RATIO} (ref 0.8–1.7)
ALP SERPL-CCNC: 99 U/L (ref 45–117)
ALT SERPL-CCNC: 18 U/L (ref 16–61)
ANION GAP SERPL CALC-SCNC: 6 MMOL/L (ref 3–18)
AST SERPL-CCNC: 13 U/L (ref 15–37)
BILIRUB SERPL-MCNC: 0.5 MG/DL (ref 0.2–1)
BUN SERPL-MCNC: 28 MG/DL (ref 7–18)
BUN/CREAT SERPL: 17 (ref 12–20)
CALCIUM SERPL-MCNC: 9.7 MG/DL (ref 8.5–10.1)
CHLORIDE SERPL-SCNC: 107 MMOL/L (ref 100–108)
CHOLEST SERPL-MCNC: 153 MG/DL
CO2 SERPL-SCNC: 29 MMOL/L (ref 21–32)
CREAT SERPL-MCNC: 1.66 MG/DL (ref 0.6–1.3)
EST. AVERAGE GLUCOSE BLD GHB EST-MCNC: 111 MG/DL
GLOBULIN SER CALC-MCNC: 3.1 G/DL (ref 2–4)
GLUCOSE SERPL-MCNC: 91 MG/DL (ref 74–99)
HBA1C MFR BLD: 5.5 % (ref 4.2–5.6)
HDLC SERPL-MCNC: 43 MG/DL (ref 40–60)
HDLC SERPL: 3.6 {RATIO} (ref 0–5)
LDLC SERPL CALC-MCNC: 59.4 MG/DL (ref 0–100)
LIPID PROFILE,FLP: ABNORMAL
POTASSIUM SERPL-SCNC: 5.6 MMOL/L (ref 3.5–5.5)
PROT SERPL-MCNC: 7.2 G/DL (ref 6.4–8.2)
SODIUM SERPL-SCNC: 142 MMOL/L (ref 136–145)
TRIGL SERPL-MCNC: 253 MG/DL (ref ?–150)
VLDLC SERPL CALC-MCNC: 50.6 MG/DL

## 2018-03-27 PROCEDURE — 83036 HEMOGLOBIN GLYCOSYLATED A1C: CPT | Performed by: FAMILY MEDICINE

## 2018-03-27 PROCEDURE — 36415 COLL VENOUS BLD VENIPUNCTURE: CPT | Performed by: FAMILY MEDICINE

## 2018-03-27 PROCEDURE — 80053 COMPREHEN METABOLIC PANEL: CPT | Performed by: FAMILY MEDICINE

## 2018-03-27 PROCEDURE — 80061 LIPID PANEL: CPT | Performed by: FAMILY MEDICINE

## 2018-04-03 DIAGNOSIS — Z95.1 S/P CABG (CORONARY ARTERY BYPASS GRAFT): ICD-10-CM

## 2018-04-03 DIAGNOSIS — R05.9 COUGH: ICD-10-CM

## 2018-04-03 DIAGNOSIS — I10 ESSENTIAL HYPERTENSION: ICD-10-CM

## 2018-04-03 DIAGNOSIS — Z95.2 S/P AVR: ICD-10-CM

## 2018-04-03 DIAGNOSIS — N40.1 BENIGN PROSTATIC HYPERPLASIA WITH URINARY FREQUENCY: ICD-10-CM

## 2018-04-03 DIAGNOSIS — I71.40 AAA (ABDOMINAL AORTIC ANEURYSM) WITHOUT RUPTURE: ICD-10-CM

## 2018-04-03 DIAGNOSIS — I51.89 DIASTOLIC DYSFUNCTION: ICD-10-CM

## 2018-04-03 DIAGNOSIS — E11.21 CONTROLLED TYPE 2 DIABETES MELLITUS WITH DIABETIC NEPHROPATHY, WITHOUT LONG-TERM CURRENT USE OF INSULIN (HCC): ICD-10-CM

## 2018-04-03 DIAGNOSIS — E78.00 PURE HYPERCHOLESTEROLEMIA: ICD-10-CM

## 2018-04-03 DIAGNOSIS — I25.10 CORONARY ARTERY DISEASE INVOLVING NATIVE CORONARY ARTERY OF NATIVE HEART WITHOUT ANGINA PECTORIS: ICD-10-CM

## 2018-04-03 DIAGNOSIS — F01.50 VASCULAR DEMENTIA WITHOUT BEHAVIORAL DISTURBANCE (HCC): ICD-10-CM

## 2018-04-03 DIAGNOSIS — J02.9 SORE THROAT: ICD-10-CM

## 2018-04-03 DIAGNOSIS — I73.9 PAD (PERIPHERAL ARTERY DISEASE) (HCC): ICD-10-CM

## 2018-04-03 DIAGNOSIS — E11.21 TYPE 2 DIABETES MELLITUS WITH NEPHROPATHY (HCC): ICD-10-CM

## 2018-04-03 DIAGNOSIS — R35.0 BENIGN PROSTATIC HYPERPLASIA WITH URINARY FREQUENCY: ICD-10-CM

## 2018-04-03 DIAGNOSIS — N18.30 CKD (CHRONIC KIDNEY DISEASE), STAGE III (HCC): ICD-10-CM

## 2018-04-04 ENCOUNTER — OFFICE VISIT (OUTPATIENT)
Dept: FAMILY MEDICINE CLINIC | Age: 78
End: 2018-04-04

## 2018-04-04 VITALS
SYSTOLIC BLOOD PRESSURE: 118 MMHG | HEART RATE: 54 BPM | HEIGHT: 70 IN | DIASTOLIC BLOOD PRESSURE: 66 MMHG | RESPIRATION RATE: 18 BRPM | WEIGHT: 211.8 LBS | TEMPERATURE: 96.3 F | BODY MASS INDEX: 30.32 KG/M2 | OXYGEN SATURATION: 96 %

## 2018-04-04 DIAGNOSIS — R11.2 NAUSEA AND VOMITING, INTRACTABILITY OF VOMITING NOT SPECIFIED, UNSPECIFIED VOMITING TYPE: Primary | ICD-10-CM

## 2018-04-04 DIAGNOSIS — E11.21 CONTROLLED TYPE 2 DIABETES MELLITUS WITH DIABETIC NEPHROPATHY, WITHOUT LONG-TERM CURRENT USE OF INSULIN (HCC): ICD-10-CM

## 2018-04-04 LAB — GLUCOSE POC: 147 MG/DL

## 2018-04-04 RX ORDER — ONDANSETRON 8 MG/1
8 TABLET, ORALLY DISINTEGRATING ORAL
Qty: 12 TAB | Refills: 1 | Status: SHIPPED | OUTPATIENT
Start: 2018-04-04 | End: 2018-04-24

## 2018-04-04 RX ORDER — PROMETHAZINE HYDROCHLORIDE 50 MG/ML
50 INJECTION, SOLUTION INTRAMUSCULAR; INTRAVENOUS ONCE
Qty: 1 VIAL | Refills: 0
Start: 2018-04-04 | End: 2018-04-04

## 2018-04-04 RX ORDER — AMLODIPINE BESYLATE 5 MG/1
5 TABLET ORAL DAILY
COMMUNITY
End: 2018-04-16

## 2018-04-04 NOTE — PROGRESS NOTES
Abraham Wanger is a 76 y.o. male presents today for follow up on his diabetes, hypertension and dementia. He would also like to discuss having diarrhea, nausea, and sweating. Patient reports that 5 weeks ago him and his family had a case of the stomach virus. Patient reports feeling weak. Pt is in Room # tx        1. Have you been to the ER, urgent care clinic since your last visit? Hospitalized since your last visit? No    2. Have you seen or consulted any other health care providers outside of the [address] [place] [address] since your last visit? Include any pap smears or colon screening. No     Health Maintenance reviewed - .

## 2018-04-04 NOTE — MR AVS SNAPSHOT
[address] [place] [address] [address] [place] [id number] 
[phone] Patient: Elizabet Grover MRN: [id number] FYR:5/17/1840 Visit Information Date & Time Provider Department Dept. Phone Encounter #  
 4/4/2018  7:30  [id number] Follow-up Instructions Return in about 3 months (around 7/4/2018) for EOV, HGAIC next visit. Your Appointments 6/6/2018  8:00 AM  
PROCEDURE with [facility] 2 [facility] ([facility]) Appt Note: evar 6 mos wild same day appt; PATIENT HAD TO RESCHEDULE DUE TO NO TRANSPORTATION PT IS AWARE THAT IT IS FURTHER OUT; .  
 [place], [place] [phone] [facility]  
[phone] [address] [place] [address],[address] [facility]  
  
    
 6/6/2018  9:00 AM  
PROCEDURE with [facility] IMAGING 1 [facility] ([facility]) Appt Note: bpg 6 mos wild same day appt; PATIENT HAD TO RESCHEDULE DUE TO NO TRANSPORTATION PT IS AWARE THAT IT IS FURTHER OUT; .  
 [place], [place] [phone] [facility]  
[phone] [address] [place] [address],[address]  
  
    
 6/6/2018 10:00 AM  
PROCEDURE with [facility] NONIMAGING [facility] ([facility]) Appt Note: anitra 6 mos wild same day appt; PATIENT HAD TO RESCHEDULE DUE TO NO TRANSPORTATION PT IS AWARE THAT IT IS FURTHER OUT; .  
 [place], [place] [phone] [facility]  
[phone] [address] [place] [address],[address]  
  
    
 6/6/2018  1:45 PM  
Follow Up with Darryl Narvaez, [address] [facility] ([facility]) Appt Note: 6 months fup with studies; PATIENT HAD TO RESCHEDULE DUE TO NO TRANSPORTATION PT IS AWARE THAT IT IS FURTHER OUT; .  
 [place], [place] [phone] [facility]  
[phone] [address] [facility], [address] Upcoming Health Maintenance Date Due  
 EYE EXAM RETINAL OR DILATED Q1 9/30/1950 FOOT EXAM Q1 5/18/2018 MICROALBUMIN Q1 9/20/2018 MEDICARE YEARLY EXAM 9/26/2018 HEMOGLOBIN A1C Q6M 9/27/2018 LIPID PANEL Q1 3/27/2019 COLONOSCOPY 6/29/2019 GLAUCOMA SCREENING Q2Y 9/22/2019 DTaP/Tdap/Td series (2 - Td) 9/14/2020 Allergies as of 4/4/2018  Review Complete On: 4/4/2018 By: Jose De La Torre, DO Severity Noted Reaction Type Reactions Ace Inhibitors  03/27/2016    Other (comments) Per pt, Cardiologist states he cannot have ACE inhibitors Current Immunizations  Reviewed on 3/28/2016 Name Date Influenza High Dose Vaccine PF 9/25/2017, 9/13/2016, 11/10/2015, 10/7/2015 Influenza Vaccine 11/12/2014, 12/16/2013  8:04 AM  
 Influenza Vaccine Split 12/14/2012 Influenza Vaccine Whole 9/14/2010 Pneumococcal Conjugate (PCV-13) 10/7/2015 TDAP Vaccine 9/14/2010 ZZZ-RETIRED (DO NOT USE) Pneumococcal Vaccine (Unspecified Type) 9/14/2010 Zoster 9/14/2010 Not reviewed this visit You Were Diagnosed With   
  
 Codes Comments Nausea and vomiting, intractability of vomiting not specified, unspecified vomiting type    -  Primary ICD-10-CM: R11.2 ICD-9-CM: 787.01 Controlled type 2 diabetes mellitus with diabetic nephropathy, without long-term current use of insulin (HCC)     ICD-10-CM: E11.21 
ICD-9-CM: 250.40, 583.81 Vitals BP Pulse Temp Resp Height(growth percentile) Weight(growth percentile)  
 118/66 (BP 1 Location: Right arm, BP Patient Position: Sitting) (!) 54 96.3 °F (35.7 °C) (Oral) 18 5' 10\" (1.778 m) 211 lb 12.8 oz (96.1 kg) SpO2 BMI Smoking Status 96% 30.39 kg/m2 Former Smoker BMI and BSA Data Body Mass Index Body Surface Area  
 30.39 kg/m 2 2.18 m 2 Preferred Pharmacy Pharmacy Name Phone [facility] [address] [phone] Your Updated Medication List  
  
   
This list is accurate as of 4/4/18  8:41 AM.  Always use your most recent med list.  
  
  
  
  
 aspirin 325 mg tablet Commonly known as:  ASPIRIN Take 1 Tab by mouth daily. atorvastatin 20 mg tablet Commonly known as:  LIPITOR Take 1 Tab by mouth daily. cyanocobalamin 1,000 mcg tablet Take 1,000 mcg by mouth daily. donepezil 10 mg tablet Commonly known as:  ARICEPT  
TAKE 2 TABLETS BY MOUTH EVERY MORNING AFTER A MEAL  
  
 NITROSTAT SL  
by SubLINGual route. NORVASC 5 mg tablet Generic drug:  amLODIPine Take 5 mg by mouth daily. omega-3 fatty acids-vitamin e 1,000 mg Cap Commonly known as:  FISH OIL Take 1 Cap by mouth two (2) times a day. ondansetron 8 mg disintegrating tablet Commonly known as:  ZOFRAN ODT Take 1 Tab by mouth every eight (8) hours as needed for Nausea. promethazine 50 mg/mL injection Commonly known as:  PHENERGAN  
1 mL by IntraMUSCular route once for 1 dose. STOOL SOFTENER 100 mg capsule Generic drug:  docusate sodium Take 100 mg by mouth nightly. tamsulosin 0.4 mg capsule Commonly known as:  FLOMAX Take 1 Cap by mouth nightly. Prescriptions Sent to Pharmacy Refills  
 ondansetron (ZOFRAN ODT) 8 mg disintegrating tablet 1 Sig: Take 1 Tab by mouth every eight (8) hours as needed for Nausea. Class: Normal  
 Pharmacy: [facility] [address] Ph #: [phone] Route: Oral  
  
We Performed the Following AMB POC GLUCOSE BLOOD, BY GLUCOSE MONITORING DEVICE [98706 CPT(R)] HI THER/PROPH/DIAG INJECTION, SUBCUT/IM [id number] CPT(R)] PROMETHAZINE HCL INJECTION [ [facility]] Follow-up Instructions Return in about 3 months (around 7/4/2018) for EOV, HGAIC next visit. Introducing [facility]! Dear Makayla Levy: Thank you for requesting a [employer] account. Our records indicate that you already have an active [employer] account. You can access your account anytime at [email or web address] Did you know that you can access your hospital and ER discharge instructions at any time in [employer]? You can also review all of your test results from your hospital stay or ER visit. Additional Information If you have questions, please visit the Frequently Asked Questions section of the O[employer]ace website at [email or web address] Remember, O[employer] is NOT to be used for urgent needs. For medical emergencies, dial 911. Now available from your iPhone and Android! Please provide this summary of care documentation to your next provider. Your primary care clinician is listed as [id number] [facility]. If you have any questions after today's visit, please call [phone].

## 2018-04-04 NOTE — PROGRESS NOTES
Jovita Cervantes is a 76 y.o.  male and presents with    Chief Complaint   Patient presents with    Nausea    Abdominal Aortic Aneurysm    Chronic Kidney Disease    Coronary Artery Disease    Hypertension    Cholesterol Problem    Dementia       Here today with wife for routine f/u. Has a stomach flu. Nausea and diarrhea. Some vomiting today      Subjective:    Cardiovascular Review:  The patient has diabetes, hypertension and hyperlipidemia. Diet and Lifestyle: not attempting to follow a low fat, low cholesterol diet  Home BP Monitoring: is not measured at home. Pertinent ROS: taking medications as instructed, no medication side effects noted, no TIA's, no chest pain on exertion, no dyspnea on exertion, no swelling of ankles. Diabetes Mellitus:  He has diabetes mellitus, and  diabetes, hypertension and hyperlipidemia. Diabetic ROS - diabetic diet compliance: compliant most of the time. Lab review: labs are reviewed, up to date and normal.   Osteoarthritis and Chronic Pain:  Patient has osteoarthritis, primarily affecting the diffuse. Symptoms onset: problem is longstanding. Rheumatological ROS: no current joint or muscle symptoms, essentially pain-free. Response to treatment plan: stable.      Additional Concerns: chronic renal disease improved     Dementia on meds doing well            Patient Active Problem List    Diagnosis Date Noted    Vascular dementia without behavioral disturbance 46/37/6608    Diastolic dysfunction 80/16/2500    AAA (abdominal aortic aneurysm) without rupture ([facility] 01/04/2017    PAD (peripheral artery disease) ([facility] [facility] 09/21/2016    Advance directive in chart 06/13/2016    S/P CABG (coronary artery bypass graft) 04/13/2016    S/P AVR 04/13/2016    Hypertension 03/27/2016    CKD (chronic kidney disease), stage III 03/27/2016    Coronary artery disease involving native coronary artery without angina pectoris 01/27/2016    Pure hypercholesterolemia 09/14/2012     Current Outpatient Prescriptions   Medication Sig Dispense Refill    amLODIPine (NORVASC) 5 mg tablet Take 5 mg by mouth daily.  promethazine (PHENERGAN) 50 mg/mL injection 1 mL by IntraMUSCular route once for 1 dose. 1 Vial 0    ondansetron (ZOFRAN ODT) 8 mg disintegrating tablet Take 1 Tab by mouth every eight (8) hours as needed for Nausea. 12 Tab 1    donepezil (ARICEPT) 10 mg tablet TAKE 2 TABLETS BY MOUTH EVERY MORNING AFTER A MEAL 180 Tab 1    tamsulosin (FLOMAX) 0.4 mg capsule Take 1 Cap by mouth nightly. 90 Cap 4    atorvastatin (LIPITOR) 20 mg tablet Take 1 Tab by mouth daily. 90 Tab 4    NITROGLYCERIN (NITROSTAT SL) by SubLINGual route.  docusate sodium (STOOL SOFTENER) 100 mg capsule Take 100 mg by mouth nightly.  cyanocobalamin 1,000 mcg tablet Take 1,000 mcg by mouth daily.  aspirin (ASPIRIN) 325 mg tablet Take 1 Tab by mouth daily. 90 Tab 4    omega-3 fatty acids-vitamin e (FISH OIL) 1,000 mg cap Take 1 Cap by mouth two (2) times a day.  180 Cap 4     Allergies   Allergen Reactions    Ace Inhibitors Other (comments)     Per pt, Cardiologist states he cannot have ACE inhibitors     Past Medical History:   Diagnosis Date    Advance directive in chart 6/13/2016    CAD (coronary artery disease)     Cancer ([address] 2003    Colon    CKD (chronic kidney disease), stage III 3/27/2016    CVA (cerebral vascular accident) ([address] 3/26/2016    Femoral artery aneurysm, left ([address]     Heart attack ([address] 2008    Heart attack ([address]     Hernia     History of TIAs     Hyperlipidemia     Hypertension 3/27/2016    Iliac artery aneurysm, left ([address]     Pure hypercholesterolemia 9/14/2012    PVD (peripheral vascular disease) ([address] 3/27/2016    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 ([address] 3/26/2016     Past Surgical History:   Procedure Laterality Date    ABDOMEN SURGERY PROC UNLISTED  2011    3 stents placed into abdomen (groin)    CARDIAC SURG PROCEDURE UNLIST  2005    Quadruple Bypass    CARDIAC SURG PROCEDURE UNLIST  2011    Aortic pig valve placed    COLONOSCOPY N/A 6/29/2016    COLONOSCOPY performed by Nitza Tenorio [facility] at [facility] ENDOSCOPY    ENDOSCOPY, COLON, DIAGNOSTIC      HX AAA REPAIR      HX ORTHOPAEDIC  2008    Right Leg Amputated     Family History   Problem Relation Age of Onset    Hypertension Mother     Hypertension Father     Heart Disease Father     Heart Disease Brother      Social History   Substance Use Topics    Smoking status: Former Smoker     Quit date: 9/14/1987    Smokeless tobacco: Never Used    Alcohol use No      Comment: wine 1-2x per week       ROS       All other systems reviewed and are negative.       Objective:  Vitals:    04/04/18 0812   BP: 118/66   Pulse: (!) 54   Resp: 18   Temp: 96.3 °F (35.7 °C)   TempSrc: Oral   SpO2: 96%   Weight: 211 lb 12.8 oz (96.1 kg)   Height: 5' 10\" (1.778 m)   PainSc:   0 - No pain                 alert, well appearing, and in no distress and oriented to person, place, and time  Chest - clear to auscultation, no wheezes, rales or rhonchi, symmetric air entry  Heart - normal rate, regular rhythm, normal S1, S2, no murmurs, rubs, clicks or gallops  Abdomen - soft, nontender, nondistended, no masses or organomegaly        LABS   Component      Latest Ref Rng & Units 3/27/2018 3/27/2018 3/27/2018           9:43 AM  9:43 AM  9:42 AM   Sodium      136 - 145 mmol/L 142     Potassium      3.5 - 5.5 mmol/L 5.6 (H)     Chloride      100 - 108 mmol/L 107     CO2      21 - 32 mmol/L 29     Anion gap      3.0 - 18 mmol/L 6     Glucose      74 - 99 mg/dL 91     BUN      7.0 - 18 MG/DL 28 (H)     Creatinine      0.6 - 1.3 MG/DL 1.66 (H)     BUN/Creatinine ratio      12 - 20   17     GFR est AA      >60 ml/min/1.73m2 49 (L)     GFR est non-AA      >60 ml/min/1.73m2 40 (L)     Calcium      8.5 - 10.1 MG/DL 9.7     Bilirubin, total      0.2 - 1.0 MG/DL 0.5     ALT (SGPT)      16 - 61 U/L 18     AST 15 - 37 U/L 13 (L)     Alk. phosphatase      45 - 117 U/L 99     Protein, total      6.4 - 8.2 g/dL 7.2     Albumin      3.4 - 5.0 g/dL 4.1     Globulin      2.0 - 4.0 g/dL 3.1     A-G Ratio      0.8 - 1.7   1.3     Cholesterol, total      <200 MG/DL  153    Triglyceride      <150 MG/DL  253 (H)    HDL Cholesterol      40 - 60 MG/DL  43    LDL, calculated      0 - 100 MG/DL  59.4    VLDL, calculated      MG/DL  50.6    CHOL/HDL Ratio      0 - 5.0    3.6    Hemoglobin A1c, (calculated)      4.2 - 5.6 %   5.5   Est. average glucose      mg/dL   111     TESTS      Assessment/Plan:    Diabetes - aic is now non diabetic range. Totally diet controlled. Just latent  Hypertension - stable  Hyperlipidemia - stable  AAA under observation  Chronic renal dz improved  demtnai on tx    Mild gastroenteritis today will give shot phenergan and home on bland diet     Lab review: labs are reviewed, up to date and normal    Diagnoses and all orders for this visit:    1. Nausea and vomiting, intractability of vomiting not specified, unspecified vomiting type  -     AMB POC GLUCOSE BLOOD, BY GLUCOSE MONITORING DEVICE  -     PROMETHAZINE HCL INJECTION  -     MD THER/PROPH/DIAG INJECTION, SUBCUT/IM  -     promethazine (PHENERGAN) 50 mg/mL injection; 1 mL by IntraMUSCular route once for 1 dose. -     ondansetron (ZOFRAN ODT) 8 mg disintegrating tablet; Take 1 Tab by mouth every eight (8) hours as needed for Nausea. 2. Controlled type 2 diabetes mellitus with diabetic nephropathy, without long-term current use of insulin (HCC)  -     AMB POC GLUCOSE BLOOD, BY GLUCOSE MONITORING DEVICE  -     PROMETHAZINE HCL INJECTION  -     MD THER/PROPH/DIAG INJECTION, SUBCUT/IM  -     promethazine (PHENERGAN) 50 mg/mL injection; 1 mL by IntraMUSCular route once for 1 dose. I have discussed the diagnosis with the patient and the intended plan as seen in the above orders.   The patient has received an after-visit summary and questions were answered concerning future plans. I have discussed medication side effects and warnings with the patient as well. I have reviewed the plan of care with the patient, accepted their input and they are in agreement with the treatment goals. Follow-up Disposition:  Return in about 3 months (around 7/4/2018) for EOV, HGAIC next visit.

## 2018-04-14 ENCOUNTER — APPOINTMENT (OUTPATIENT)
Dept: CT IMAGING | Age: 78
End: 2018-04-14
Attending: EMERGENCY MEDICINE
Payer: MEDICARE

## 2018-04-14 ENCOUNTER — HOSPITAL ENCOUNTER (OUTPATIENT)
Age: 78
Setting detail: OBSERVATION
Discharge: HOME HEALTH | End: 2018-04-16
Attending: EMERGENCY MEDICINE | Admitting: HOSPITALIST
Payer: MEDICARE

## 2018-04-14 ENCOUNTER — APPOINTMENT (OUTPATIENT)
Dept: MRI IMAGING | Age: 78
End: 2018-04-14
Attending: HOSPITALIST
Payer: MEDICARE

## 2018-04-14 ENCOUNTER — APPOINTMENT (OUTPATIENT)
Dept: GENERAL RADIOLOGY | Age: 78
End: 2018-04-14
Attending: EMERGENCY MEDICINE
Payer: MEDICARE

## 2018-04-14 DIAGNOSIS — G45.9 TRANSIENT CEREBRAL ISCHEMIA, UNSPECIFIED TYPE: Primary | ICD-10-CM

## 2018-04-14 DIAGNOSIS — R11.0 NAUSEA IN ADULT: ICD-10-CM

## 2018-04-14 DIAGNOSIS — R61 DIAPHORESIS: ICD-10-CM

## 2018-04-14 LAB
ALBUMIN SERPL-MCNC: 4 G/DL (ref 3.4–5)
ALBUMIN/GLOB SERPL: 1.3 {RATIO} (ref 0.8–1.7)
ALP SERPL-CCNC: 98 U/L (ref 45–117)
ALT SERPL-CCNC: 18 U/L (ref 16–61)
ANION GAP SERPL CALC-SCNC: 8 MMOL/L (ref 3–18)
APPEARANCE UR: CLEAR
APTT PPP: 29.7 SEC (ref 23–36.4)
AST SERPL-CCNC: 13 U/L (ref 15–37)
BACTERIA URNS QL MICRO: NEGATIVE /HPF
BASOPHILS # BLD: 0.1 K/UL (ref 0–0.06)
BASOPHILS NFR BLD: 1 % (ref 0–2)
BILIRUB SERPL-MCNC: 0.6 MG/DL (ref 0.2–1)
BILIRUB UR QL: NEGATIVE
BUN SERPL-MCNC: 19 MG/DL (ref 7–18)
BUN/CREAT SERPL: 11 (ref 12–20)
CALCIUM SERPL-MCNC: 9.6 MG/DL (ref 8.5–10.1)
CHLORIDE SERPL-SCNC: 105 MMOL/L (ref 100–108)
CHOLEST SERPL-MCNC: 129 MG/DL
CO2 SERPL-SCNC: 27 MMOL/L (ref 21–32)
COLOR UR: YELLOW
CREAT SERPL-MCNC: 1.68 MG/DL (ref 0.6–1.3)
DIFFERENTIAL METHOD BLD: ABNORMAL
EOSINOPHIL # BLD: 0.3 K/UL (ref 0–0.4)
EOSINOPHIL NFR BLD: 2 % (ref 0–5)
EPITH CASTS URNS QL MICRO: NEGATIVE /LPF (ref 0–5)
ERYTHROCYTE [DISTWIDTH] IN BLOOD BY AUTOMATED COUNT: 14.4 % (ref 11.6–14.5)
ERYTHROCYTE [SEDIMENTATION RATE] IN BLOOD: 6 MM/HR (ref 0–20)
EST. AVERAGE GLUCOSE BLD GHB EST-MCNC: 114 MG/DL
GLOBULIN SER CALC-MCNC: 3 G/DL (ref 2–4)
GLUCOSE SERPL-MCNC: 129 MG/DL (ref 74–99)
GLUCOSE UR STRIP.AUTO-MCNC: NEGATIVE MG/DL
HBA1C MFR BLD: 5.6 % (ref 4.2–5.6)
HCT VFR BLD AUTO: 39.4 % (ref 36–48)
HDLC SERPL-MCNC: 38 MG/DL (ref 40–60)
HDLC SERPL: 3.4 {RATIO} (ref 0–5)
HGB BLD-MCNC: 13.8 G/DL (ref 13–16)
HGB UR QL STRIP: NEGATIVE
INR PPP: 1.1 (ref 0.8–1.2)
KETONES UR QL STRIP.AUTO: ABNORMAL MG/DL
LDLC SERPL CALC-MCNC: 49.6 MG/DL (ref 0–100)
LEUKOCYTE ESTERASE UR QL STRIP.AUTO: NEGATIVE
LIPID PROFILE,FLP: ABNORMAL
LYMPHOCYTES # BLD: 3.3 K/UL (ref 0.9–3.6)
LYMPHOCYTES NFR BLD: 26 % (ref 21–52)
MAGNESIUM SERPL-MCNC: 2.1 MG/DL (ref 1.6–2.6)
MCH RBC QN AUTO: 34.2 PG (ref 24–34)
MCHC RBC AUTO-ENTMCNC: 35 G/DL (ref 31–37)
MCV RBC AUTO: 97.8 FL (ref 74–97)
MONOCYTES # BLD: 0.8 K/UL (ref 0.05–1.2)
MONOCYTES NFR BLD: 7 % (ref 3–10)
NEUTS SEG # BLD: 8.1 K/UL (ref 1.8–8)
NEUTS SEG NFR BLD: 64 % (ref 40–73)
NITRITE UR QL STRIP.AUTO: NEGATIVE
PH UR STRIP: 7 [PH] (ref 5–8)
PHOSPHATE SERPL-MCNC: 2.9 MG/DL (ref 2.5–4.9)
PLATELET # BLD AUTO: 241 K/UL (ref 135–420)
PMV BLD AUTO: 11.3 FL (ref 9.2–11.8)
POTASSIUM SERPL-SCNC: 4.1 MMOL/L (ref 3.5–5.5)
PROT SERPL-MCNC: 7 G/DL (ref 6.4–8.2)
PROT UR STRIP-MCNC: 100 MG/DL
PROTHROMBIN TIME: 13.2 SEC (ref 11.5–15.2)
RBC # BLD AUTO: 4.03 M/UL (ref 4.7–5.5)
RBC #/AREA URNS HPF: NORMAL /HPF (ref 0–5)
SODIUM SERPL-SCNC: 140 MMOL/L (ref 136–145)
SP GR UR REFRACTOMETRY: 1.02 (ref 1–1.03)
T3FREE SERPL-MCNC: 2.4 PG/ML (ref 2.18–3.98)
T4 FREE SERPL-MCNC: 0.9 NG/DL (ref 0.7–1.5)
TRIGL SERPL-MCNC: 207 MG/DL (ref ?–150)
TROPONIN I SERPL-MCNC: <0.02 NG/ML (ref 0–0.04)
TSH SERPL DL<=0.05 MIU/L-ACNC: 3.22 UIU/ML (ref 0.36–3.74)
TSH SERPL DL<=0.05 MIU/L-ACNC: 3.31 UIU/ML (ref 0.36–3.74)
UROBILINOGEN UR QL STRIP.AUTO: 1 EU/DL (ref 0.2–1)
VLDLC SERPL CALC-MCNC: 41.4 MG/DL
WBC # BLD AUTO: 12.5 K/UL (ref 4.6–13.2)
WBC URNS QL MICRO: NORMAL /HPF (ref 0–4)

## 2018-04-14 PROCEDURE — 85730 THROMBOPLASTIN TIME PARTIAL: CPT | Performed by: EMERGENCY MEDICINE

## 2018-04-14 PROCEDURE — 74011250636 HC RX REV CODE- 250/636: Performed by: HOSPITALIST

## 2018-04-14 PROCEDURE — 85652 RBC SED RATE AUTOMATED: CPT | Performed by: HOSPITALIST

## 2018-04-14 PROCEDURE — 96361 HYDRATE IV INFUSION ADD-ON: CPT

## 2018-04-14 PROCEDURE — 84484 ASSAY OF TROPONIN QUANT: CPT | Performed by: EMERGENCY MEDICINE

## 2018-04-14 PROCEDURE — 77030021566 MRI BRAIN W WO CONT

## 2018-04-14 PROCEDURE — 93005 ELECTROCARDIOGRAM TRACING: CPT

## 2018-04-14 PROCEDURE — 71045 X-RAY EXAM CHEST 1 VIEW: CPT

## 2018-04-14 PROCEDURE — 86141 C-REACTIVE PROTEIN HS: CPT | Performed by: HOSPITALIST

## 2018-04-14 PROCEDURE — 99218 HC RM OBSERVATION: CPT

## 2018-04-14 PROCEDURE — 81001 URINALYSIS AUTO W/SCOPE: CPT | Performed by: EMERGENCY MEDICINE

## 2018-04-14 PROCEDURE — 74011250637 HC RX REV CODE- 250/637: Performed by: HOSPITALIST

## 2018-04-14 PROCEDURE — A9575 INJ GADOTERATE MEGLUMI 0.1ML: HCPCS | Performed by: HOSPITALIST

## 2018-04-14 PROCEDURE — 84439 ASSAY OF FREE THYROXINE: CPT | Performed by: HOSPITALIST

## 2018-04-14 PROCEDURE — 70450 CT HEAD/BRAIN W/O DYE: CPT

## 2018-04-14 PROCEDURE — 80061 LIPID PANEL: CPT | Performed by: HOSPITALIST

## 2018-04-14 PROCEDURE — 77030020245 HC SOL INJ 5% D/0.9%NACL

## 2018-04-14 PROCEDURE — 96374 THER/PROPH/DIAG INJ IV PUSH: CPT

## 2018-04-14 PROCEDURE — 84443 ASSAY THYROID STIM HORMONE: CPT | Performed by: HOSPITALIST

## 2018-04-14 PROCEDURE — 83735 ASSAY OF MAGNESIUM: CPT | Performed by: EMERGENCY MEDICINE

## 2018-04-14 PROCEDURE — 74011000258 HC RX REV CODE- 258: Performed by: HOSPITALIST

## 2018-04-14 PROCEDURE — 99285 EMERGENCY DEPT VISIT HI MDM: CPT

## 2018-04-14 PROCEDURE — 84481 FREE ASSAY (FT-3): CPT | Performed by: HOSPITALIST

## 2018-04-14 PROCEDURE — 74011000250 HC RX REV CODE- 250: Performed by: HOSPITALIST

## 2018-04-14 PROCEDURE — 84443 ASSAY THYROID STIM HORMONE: CPT | Performed by: EMERGENCY MEDICINE

## 2018-04-14 PROCEDURE — 85610 PROTHROMBIN TIME: CPT | Performed by: EMERGENCY MEDICINE

## 2018-04-14 PROCEDURE — 70544 MR ANGIOGRAPHY HEAD W/O DYE: CPT

## 2018-04-14 PROCEDURE — 84100 ASSAY OF PHOSPHORUS: CPT | Performed by: EMERGENCY MEDICINE

## 2018-04-14 PROCEDURE — 80053 COMPREHEN METABOLIC PANEL: CPT | Performed by: EMERGENCY MEDICINE

## 2018-04-14 PROCEDURE — 96372 THER/PROPH/DIAG INJ SC/IM: CPT

## 2018-04-14 PROCEDURE — 83036 HEMOGLOBIN GLYCOSYLATED A1C: CPT | Performed by: HOSPITALIST

## 2018-04-14 PROCEDURE — 85025 COMPLETE CBC W/AUTO DIFF WBC: CPT | Performed by: EMERGENCY MEDICINE

## 2018-04-14 PROCEDURE — 70549 MR ANGIOGRAPH NECK W/O&W/DYE: CPT

## 2018-04-14 RX ORDER — DONEPEZIL HYDROCHLORIDE 5 MG/1
10 TABLET, FILM COATED ORAL DAILY
Status: DISCONTINUED | OUTPATIENT
Start: 2018-04-15 | End: 2018-04-16 | Stop reason: HOSPADM

## 2018-04-14 RX ORDER — GUAIFENESIN 100 MG/5ML
324 LIQUID (ML) ORAL
Status: DISPENSED | OUTPATIENT
Start: 2018-04-14 | End: 2018-04-14

## 2018-04-14 RX ORDER — ASPIRIN 325 MG
325 TABLET ORAL DAILY
Status: DISCONTINUED | OUTPATIENT
Start: 2018-04-15 | End: 2018-04-16 | Stop reason: HOSPADM

## 2018-04-14 RX ORDER — TAMSULOSIN HYDROCHLORIDE 0.4 MG/1
0.4 CAPSULE ORAL
Status: DISCONTINUED | OUTPATIENT
Start: 2018-04-14 | End: 2018-04-16 | Stop reason: HOSPADM

## 2018-04-14 RX ORDER — DEXTROSE MONOHYDRATE AND SODIUM CHLORIDE 5; .9 G/100ML; G/100ML
0.75 INJECTION, SOLUTION INTRAVENOUS CONTINUOUS
Status: DISCONTINUED | OUTPATIENT
Start: 2018-04-14 | End: 2018-04-15

## 2018-04-14 RX ORDER — GADOTERATE MEGLUMINE 376.9 MG/ML
20 INJECTION INTRAVENOUS
Status: COMPLETED | OUTPATIENT
Start: 2018-04-14 | End: 2018-04-14

## 2018-04-14 RX ORDER — ACETAMINOPHEN 650 MG/1
650 SUPPOSITORY RECTAL
Status: DISCONTINUED | OUTPATIENT
Start: 2018-04-14 | End: 2018-04-16 | Stop reason: HOSPADM

## 2018-04-14 RX ORDER — ALBUTEROL SULFATE 0.83 MG/ML
2.5 SOLUTION RESPIRATORY (INHALATION)
Status: DISCONTINUED | OUTPATIENT
Start: 2018-04-14 | End: 2018-04-16 | Stop reason: HOSPADM

## 2018-04-14 RX ORDER — ENOXAPARIN SODIUM 100 MG/ML
40 INJECTION SUBCUTANEOUS EVERY 24 HOURS
Status: DISCONTINUED | OUTPATIENT
Start: 2018-04-14 | End: 2018-04-16 | Stop reason: HOSPADM

## 2018-04-14 RX ORDER — ONDANSETRON 2 MG/ML
2 INJECTION INTRAMUSCULAR; INTRAVENOUS
Status: DISCONTINUED | OUTPATIENT
Start: 2018-04-14 | End: 2018-04-16 | Stop reason: HOSPADM

## 2018-04-14 RX ORDER — LANOLIN ALCOHOL/MO/W.PET/CERES
1000 CREAM (GRAM) TOPICAL DAILY
Status: DISCONTINUED | OUTPATIENT
Start: 2018-04-15 | End: 2018-04-16 | Stop reason: HOSPADM

## 2018-04-14 RX ORDER — ATORVASTATIN CALCIUM 40 MG/1
80 TABLET, FILM COATED ORAL DAILY
Status: DISCONTINUED | OUTPATIENT
Start: 2018-04-15 | End: 2018-04-16 | Stop reason: HOSPADM

## 2018-04-14 RX ORDER — DOCUSATE SODIUM 100 MG/1
100 CAPSULE, LIQUID FILLED ORAL
Status: DISCONTINUED | OUTPATIENT
Start: 2018-04-14 | End: 2018-04-16 | Stop reason: HOSPADM

## 2018-04-14 RX ADMIN — FOLIC ACID: 5 INJECTION, SOLUTION INTRAMUSCULAR; INTRAVENOUS; SUBCUTANEOUS at 15:36

## 2018-04-14 RX ADMIN — DEXTROSE MONOHYDRATE AND SODIUM CHLORIDE 0.75 ML/KG/HR: 5; .9 INJECTION, SOLUTION INTRAVENOUS at 15:36

## 2018-04-14 RX ADMIN — DOCUSATE SODIUM 100 MG: 100 CAPSULE, LIQUID FILLED ORAL at 23:31

## 2018-04-14 RX ADMIN — GADOTERATE MEGLUMINE 20 ML: 376.9 INJECTION INTRAVENOUS at 14:31

## 2018-04-14 RX ADMIN — TAMSULOSIN HYDROCHLORIDE 0.4 MG: 0.4 CAPSULE ORAL at 23:31

## 2018-04-14 RX ADMIN — ENOXAPARIN SODIUM 40 MG: 40 INJECTION SUBCUTANEOUS at 17:37

## 2018-04-14 NOTE — H&P
Medicine History and Physical    Patient: Edgar Farrell   Age:  76 y.o. Assessment   Active Problems:    TIA (transient ischemic attack) (4/14/2018)          Plan     1)  TIA    - very likely vs other diagnosis   - history of several others and vascular dementia   - MRI/MRA   - stroke protocol, speech, pt/ot   - ASA, statin   - echo    2)  CAD, HTN, HLD, hx colon cancer   - home medications, hold bp meds currently restart in am    3)  BPH   - flomax    4)  Sinus dallin   -Monitor    DISPO    Anticipated Date of Discharge: 2 -3 days  Anticipated Disposition (home, SNF) : home vs snf    Chief Complaint:   Chief Complaint   Patient presents with    Altered mental status         HPI:   Edgar Farrell is a 76y.o. year old male who presents with Stroke like symptoms. All information comes from family. Patient has vascular dementia, CAD s/p CABG, HTN, HLD BPH, hx colon cancer per family. They had noted flu like symptoms in last week. Today wife found patient slumped over in lift chair with R facial droop. He has had previous tia with last 1 about a yr ago. His baseline is walking with walker. The symptoms lasted about 5-10 min and resolved as ems arrived.       Review of Systems - Unable to assess    Past Medical History:  Past Medical History:   Diagnosis Date    Advance directive in chart 6/13/2016    CAD (coronary artery disease)     Cancer ([address] 2003    Colon    CKD (chronic kidney disease), stage III 3/27/2016    CVA (cerebral vascular accident) ([address] 3/26/2016    Femoral artery aneurysm, left ([address]     Heart attack ([address] 2008    Heart attack ([address]     Hernia     History of TIAs     Hyperlipidemia     Hypertension 3/27/2016    Iliac artery aneurysm, left ([address]     Pure hypercholesterolemia 9/14/2012    PVD (peripheral vascular disease) ([address] 3/27/2016    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 ([address] 3/26/2016       Past Surgical History:  Past Surgical History: Procedure Laterality Date    ABDOMEN SURGERY PROC UNLISTED  2011    3 stents placed into abdomen (groin)    CARDIAC SURG PROCEDURE UNLIST  2005    Quadruple Bypass    CARDIAC SURG PROCEDURE UNLIST  2011    Aortic pig valve placed    COLONOSCOPY N/A 6/29/2016    [facility] El[facility] ENDOSCOPY    ENDOSCOPY, COLON, DIAGNOSTIC      HX AAA REPAIR      HX ORTHOPAEDIC  2008    Right Leg Amputated       Family History:  Family History   Problem Relation Age of Onset    Hypertension Mother     Hypertension Father     Heart Disease Father     Heart Disease Brother        Social History:  Social History     Social History    Marital status:      Spouse name: N/A    Number of children: N/A    Years of education: N/A     Social History Main Topics    Smoking status: Former Smoker     Quit date: 9/14/1987    Smokeless tobacco: Never Used    Alcohol use No      Comment: wine 1-2x per week    Drug use: No    Sexual activity: Not Currently     Other Topics Concern    None     Social History Narrative       Home Medications:  Prior to Admission medications    Medication Sig Start Date End Date Taking? Authorizing Provider   amLODIPine (NORVASC) 5 mg tablet Take 5 mg by mouth daily. Luanne Hodgkin III, MD   ondansetron (ZOFRAN ODT) 8 mg disintegrating tablet Take 1 Tab by mouth every eight (8) hours as needed for Nausea. 4/4/18   Cris Necessary., DO   donepezil (ARICEPT) 10 mg tablet TAKE 2 TABLETS BY MOUTH EVERY MORNING AFTER A MEAL 2/27/18   Sadiq Dickerson MD   tamsulosin [facility]) 0.4 mg capsule Take 1 Cap by mouth nightly. 1/3/18   Cris Necessary., DO   atorvastatin (LIPITOR) 20 mg tablet Take 1 Tab by mouth daily. 10/17/17   Cris Necessary., DO   NITROGLYCERIN [facility]) by SubLINGual route. Historical Provider   docusate sodium (STOOL SOFTENER) 100 mg capsule Take 100 mg by mouth nightly.     Historical Provider   cyanocobalamin 1,000 mcg tablet Take 1,000 mcg by mouth daily. Historical Provider   aspirin (ASPIRIN) 325 mg tablet Take 1 Tab by mouth daily. 1/10/17   Carla Countess., DO   omega-3 fatty acids-vitamin e (FISH OIL) 1,000 mg cap Take 1 Cap by mouth two (2) times a day. 6/13/16   Carla Countess., DO       Allergies: Allergies   Allergen Reactions    Ace Inhibitors Other (comments)     Per pt, Cardiologist states he cannot have ACE inhibitors           Physical Exam:     Visit Vitals    /67    Pulse (!) 54    Temp 97.8 °F (36.6 °C)    Resp 15    Ht 5' 10\" (1.778 m)    Wt 99.8 kg (220 lb)    SpO2 100%    BMI 31.57 kg/m2       Physical Exam:  General appearance: alert, cooperative, no distress, appears stated age  Head: Normocephalic, without obvious abnormality, atraumatic  Neck: supple, trachea midline  Lungs: clear to auscultation bilaterally  Heart: sinus dallin  Abdomen: soft, non-tender.  Bowel sounds normal. No masses,  no organomegaly  Extremities: BKA  Skin: Skin color, texture, turgor normal. No rashes or lesions  Neurologic: Grossly normal     Intake and Output:  Current Shift:     Last three shifts:       Lab/Data Reviewed:  CMP:   Lab Results   Component Value Date/Time     04/14/2018 09:27 AM    K 4.1 04/14/2018 09:27 AM     04/14/2018 09:27 AM    CO2 27 04/14/2018 09:27 AM    AGAP 8 04/14/2018 09:27 AM     (H) 04/14/2018 09:27 AM    BUN 19 (H) 04/14/2018 09:27 AM    CREA 1.68 (H) 04/14/2018 09:27 AM    GFRAA 48 (L) 04/14/2018 09:27 AM    GFRNA 40 (L) 04/14/2018 09:27 AM    CA 9.6 04/14/2018 09:27 AM    MG 2.1 04/14/2018 09:27 AM    PHOS 2.9 04/14/2018 09:27 AM    ALB 4.0 04/14/2018 09:27 AM    TP 7.0 04/14/2018 09:27 AM    GLOB 3.0 04/14/2018 09:27 AM    AGRAT 1.3 04/14/2018 09:27 AM    SGOT 13 (L) 04/14/2018 09:27 AM    ALT 18 04/14/2018 09:27 AM     CBC:   Lab Results   Component Value Date/Time    WBC 12.5 04/14/2018 09:27 AM    HGB 13.8 04/14/2018 09:27 AM    HCT 39.4 04/14/2018 09:27 AM     04/14/2018 09:27 AM     All Cardiac Markers in the last 24 hours:   Lab Results   Component Value Date/Time    TROIQ <0.02 04/14/2018 09:27 AM         Simon Harley MD    April 14, 2018

## 2018-04-14 NOTE — IP AVS SNAPSHOT
[address] [address] 
[phone] Patient: Malissa Delvalle MRN: [id number] LFK:[birth year] A check james indicates which time of day the medication should be taken. My Medications START taking these medications Instructions Each Dose to Equal  
 Morning Noon Evening Bedtime  
 hydroCHLOROthiazide 25 mg tablet Commonly known as:  HYDRODIURIL Your last dose was: Your next dose is: Take 1 Tab by mouth daily. 25 mg CHANGE how you take these medications Instructions Each Dose to Equal  
 Morning Noon Evening Bedtime  
 amLODIPine 10 mg tablet Commonly known as:  Antonieta Gil What changed:   
- medication strength 
- how much to take Your last dose was: Your next dose is: Take 1 Tab by mouth daily. 10 mg  
    
   
   
   
  
 atorvastatin 80 mg tablet Commonly known as:  LIPITOR What changed:   
- medication strength 
- how much to take Your last dose was: Your next dose is: Take 1 Tab by mouth daily. 80 mg CONTINUE taking these medications Instructions Each Dose to Equal  
 Morning Noon Evening Bedtime  
 aspirin 325 mg tablet Commonly known as:  ASPIRIN Your last dose was: Your next dose is: Take 1 Tab by mouth daily. 325 mg  
    
   
   
   
  
 cyanocobalamin 1,000 mcg tablet Your last dose was: Your next dose is: Take 1,000 mcg by mouth daily. 1000 mcg  
    
   
   
   
  
 donepezil 10 mg tablet Commonly known as:  ARICEPT Your last dose was: Your next dose is: TAKE 2 TABLETS BY MOUTH EVERY MORNING AFTER A MEAL  
     
   
   
   
  
 NITROSTAT SL Your last dose was: Your next dose is:    
   
   
 by SubLINGual route. omega-3 fatty acids-vitamin e 1,000 mg Cap Commonly known as:  FISH OIL Your last dose was: Your next dose is: Take 1 Cap by mouth two (2) times a day. 1 Cap  
    
   
   
   
  
 ondansetron 8 mg disintegrating tablet Commonly known as:  ZOFRAN ODT Your last dose was: Your next dose is: Take 1 Tab by mouth every eight (8) hours as needed for Nausea. 8 mg STOOL SOFTENER 100 mg capsule Generic drug:  docusate sodium Your last dose was: Your next dose is: Take 100 mg by mouth nightly. 100 mg  
    
   
   
   
  
 tamsulosin 0.4 mg capsule Commonly known as:  FLOMAX Your last dose was: Your next dose is: Take 1 Cap by mouth nightly. 0.4 mg Where to Get Your Medications Information on where to get these meds will be given to you by the nurse or doctor. ! Ask your nurse or doctor about these medications  
  amLODIPine 10 mg tablet  
 aspirin 325 mg tablet  
 atorvastatin 80 mg tablet  
 hydroCHLOROthiazide 25 mg tablet

## 2018-04-14 NOTE — PROGRESS NOTES
1403: PT order received and chart reviewed. Off floor for MRI at this time. Will follow up.      Thank you,     Chandana January, PT, DPT

## 2018-04-14 NOTE — PROGRESS NOTES
Problem: Falls - Risk of  Goal: *Absence of Falls  Document Sheba Fall Risk and appropriate interventions in the flowsheet.    Outcome: Progressing Towards Goal  Fall Risk Interventions:  Mobility Interventions: Bed/chair exit alarm

## 2018-04-14 NOTE — PROGRESS NOTES
Nutrition initial assessment/Plan of care      RECOMMENDATIONS:     1. Cardiac diet  2. Monitor weight, labs and PO intake  3. RD to follow     GOALS:     1. PO intake meets >75% of protein/calorie needs by 4/22  2. Weight Maintenance (+/- 1-2 lb by 4/22)      ASSESSMENT:     Weight status is classified as obese per BMI of 31.9. Patient appears well nourished. Labs noted. Nutrition recommendations listed. RD to follow. Nutrition Diagnoses:   Obesity related to prior excessive energy intake as evidenced by BMI of 31.9. Altered nutrition related lab values related to hyperlipidemia as evidenced by triglyceride level of 207. Nutrition Risk:  [] High  [] Moderate [x]  Low    SUBJECTIVE/OBJECTIVE:      Admitted for TIA work-up. Has history of hyperlipidemia,CAD and prior CVA and TIA's. Patient reports having a good appetite and is anxious for meal to be delivered. Reports weight has been stable at 220 lb. Denies food allergies and problems with chewing/swallowing. Discussed high triglycerides nutrition therapy and provided handout. Will monitor.     Information Obtained from:    [x] Chart Review   [x] Patient   [] Family/Caregiver   [] Nurse/Physician   [] Interdisciplinary Meeting/Rounds    Diet: Cardiac diet  Medications: [x] Reviewed  (Lipitor)  Allergies: [x] Reviewed   Patient Active Problem List   Diagnosis Code    Pure hypercholesterolemia E78.00    Coronary artery disease involving native coronary artery without angina pectoris I25.10    Hypertension I10    CKD (chronic kidney disease), stage III N18.3    S/P CABG (coronary artery bypass graft) Z95.1    S/P AVR Z95.2    Advance directive in chart Z78.9    PAD (peripheral artery disease) ([address] I73.9    AAA (abdominal aortic aneurysm) without rupture ([address] L74.0    Diastolic dysfunction V31.9    Vascular dementia without behavioral disturbance F01.50    TIA (transient ischemic attack) G45.9     Past Medical History:   Diagnosis Date    Advance directive in chart 6/13/2016    CAD (coronary artery disease)     Cancer [facility]) 2003    Colon    CKD (chronic kidney disease), stage III 3/27/2016    CVA (cerebral vascular accident) ([facility] 3/26/2016    Femoral artery aneurysm, left ([address]     Heart attack ([facility] 2008    Heart attack ([facility]     Hernia     History of TIAs     Hyperlipidemia     Hypertension 3/27/2016    Iliac artery aneurysm, left ([address]     Pure hypercholesterolemia 9/14/2012    PVD (peripheral vascular disease) ([facility] 3/27/2016    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 ([facility] [address] 3/26/2016      Labs:    Lab Results   Component Value Date/Time    Sodium 140 04/14/2018 09:27 AM    Potassium 4.1 04/14/2018 09:27 AM    Chloride 105 04/14/2018 09:27 AM    CO2 27 04/14/2018 09:27 AM    Anion gap 8 04/14/2018 09:27 AM    Glucose 129 (H) 04/14/2018 09:27 AM    BUN 19 (H) 04/14/2018 09:27 AM    Creatinine 1.68 (H) 04/14/2018 09:27 AM    Calcium 9.6 04/14/2018 09:27 AM    Magnesium 2.1 04/14/2018 09:27 AM    Phosphorus 2.9 04/14/2018 09:27 AM    Albumin 4.0 04/14/2018 09:27 AM     Lab Results   Component Value Date/Time    Cholesterol, total 129 04/14/2018 09:27 AM    HDL Cholesterol 38 (L) 04/14/2018 09:27 AM    LDL, calculated 49.6 04/14/2018 09:27 AM    VLDL, calculated 41.4 04/14/2018 09:27 AM    Triglyceride 207 (H) 04/14/2018 09:27 AM    CHOL/HDL Ratio 3.4 04/14/2018 09:27 AM     Anthropometrics: BMI (calculated): 31.9  Last 3 Recorded Weights in this Encounter    04/14/18 0923   Weight: 100.7 kg (222 lb)      Ht Readings from Last 1 Encounters:   04/14/18 5' 10\" (1.778 m)     No data found.     IBW: 166 lb %IBW: 132% UBW: 220 lb %UBW: 101%   [] Weight Loss [] Weight Gain [x] Weight Stable    Estimated Nutrition Needs: [x] MSJ  [] Other:  Calories: 3879-6569 Kcal Based on:   [x] Actual BW    Protein:    g Based on:   [x] Actual BW    Fluid:      2300 ml Based on:   [x] Actual BW      [x] No Cultural, Anglican or ethnic dietary need identified.     [] Cultural, Orthodox and ethnic food preferences identified and addressed     Wt Status:  [] Normal (18.6 - 24.9) [] Underweight (<18.5) [] Overweight (25 - 29.9) [x] Mild Obesity (30 - 34.9)  [] Moderate Obesity (35 - 39.9) [] Morbid Obesity (40+)     Nutrition Problems Identified:   [] Suboptimal PO intake   [] Food Allergies  [] Difficulty chewing/swallowing/poor dentition  [] Constipation/Diarrhea   [] Nausea/Vomiting   [] None  [x] Other: Obesity/Excessive energy intake     Plan:   [x] Therapeutic Diet  []  Obtained/adjusted food preferences/tolerances and/or snacks options   []  Supplements added   [] Occupational therapy following for feeding techniques  []  HS snack added   []  Modify diet texture   []  Modify diet for food allergies   [x]  Educate patient (High triglycerides nutrition therapy)  []  Assist with menu selection   [x]  Monitor PO intake on meal rounds   [x]  Continue inpatient monitoring and intervention   []  Participated in discharge planning/Interdisciplinary rounds/Team meetings   []  Other:     Education Needs:   [] Not appropriate for teaching at this time due to:   [x] Identified and addressed    Nutrition Monitoring and Evaluation:  [x] Continue ongoing monitoring and intervention  [] Other    Fartun Voodoo

## 2018-04-14 NOTE — ED PROVIDER NOTES
EMERGENCY DEPARTMENT HISTORY AND PHYSICAL EXAM    9:26 AM      Date: 4/14/2018  Patient Name: Olive Perez    History of Presenting Illness     Chief Complaint   Patient presents with    Altered mental status         History Provided By: Patient    Chief Complaint: altered mental status   Duration:  several hours since this morning  Timing:  transient   Severity: Mild  Modifying Factors: none  Associated Symptoms: right sided facial droop, generalized weakness, lethargy       Additional History (Context): Olive Perez, a 76 y.o. Male, former smoker, non-substance abuser with h/o open heart surgery, aortic valve replacement and few TIAs in the past, compliant with his medication, presents to the ED via EMS for sudden onset, short duration of increased confusion from baseline, associated with right sided facial droop and lethargy, that was noticed by pt's family this morning at 7:30. Pt, with no associated numbness or HA but with generalized weakness and upset stomach for the past 1.5 month, denies increased confusion but reports new onset nausea and diaphoresis from this morning that was not associated with cp. EMS reports that pt, A&Ox3, did not seem confused upon their arrival on scene but had mild right facial droop which resolved within a short period of time after their arrival.     Pt was bradycardic on scene per EMS. No other complaints are reported at this time. PCP: Ronnie Laboy, DO    Current Facility-Administered Medications   Medication Dose Route Frequency Provider Last Rate Last Dose    aspirin chewable tablet 324 mg  324 mg Oral PRIYANK Blanchard DO   Stopped at 04/14/18 6748     Current Outpatient Prescriptions   Medication Sig Dispense Refill    amLODIPine (NORVASC) 5 mg tablet Take 5 mg by mouth daily.  ondansetron (ZOFRAN ODT) 8 mg disintegrating tablet Take 1 Tab by mouth every eight (8) hours as needed for Nausea.  12 Tab 1    donepezil (ARICEPT) 10 mg tablet TAKE 2 TABLETS BY MOUTH EVERY MORNING AFTER A MEAL 180 Tab 1    tamsulosin (FLOMAX) 0.4 mg capsule Take 1 Cap by mouth nightly. 90 Cap 4    atorvastatin (LIPITOR) 20 mg tablet Take 1 Tab by mouth daily. 90 Tab 4    NITROGLYCERIN (NITROSTAT SL) by SubLINGual route.  docusate sodium (STOOL SOFTENER) 100 mg capsule Take 100 mg by mouth nightly.  cyanocobalamin 1,000 mcg tablet Take 1,000 mcg by mouth daily.  aspirin (ASPIRIN) 325 mg tablet Take 1 Tab by mouth daily. 90 Tab 4    omega-3 fatty acids-vitamin e (FISH OIL) 1,000 mg cap Take 1 Cap by mouth two (2) times a day.  180 Cap 4       Past History     Past Medical History:  Past Medical History:   Diagnosis Date    Advance directive in chart 6/13/2016    CAD (coronary artery disease)     Cancer ([address] 2003    Colon    CKD (chronic kidney disease), stage III 3/27/2016    CVA (cerebral vascular accident) ([address] 3/26/2016    Femoral artery aneurysm, left ([address]     Heart attack ([address] 2008    Heart attack ([address]     Hernia     History of TIAs     Hyperlipidemia     Hypertension 3/27/2016    Iliac artery aneurysm, left ([address]     Pure hypercholesterolemia 9/14/2012    PVD (peripheral vascular disease) ([address] 3/27/2016    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 ([address] 3/26/2016       Past Surgical History:  Past Surgical History:   Procedure Laterality Date    ABDOMEN SURGERY PROC UNLISTED  2011    3 stents placed into abdomen (groin)    CARDIAC SURG PROCEDURE UNLIST  2005    Quadruple Bypass    CARDIAC SURG PROCEDURE UNLIST  2011    Aortic pig valve placed    COLONOSCOPY N/A 6/29/2016    COLONOS[facility]iel [facility] ENDOSCOPY    ENDOSCOPY, COLON, DIAGNOSTIC      HX AAA REPAIR      HX ORTHOPAEDIC  2008    Right Leg Amputated       Family History:  Family History   Problem Relation Age of Onset    Hypertension Mother     Hypertension Father     Heart Disease Father     Heart Disease Brother Social History:  Social History   Substance Use Topics    Smoking status: Former Smoker     Quit date: 9/14/1987    Smokeless tobacco: Never Used    Alcohol use No      Comment: wine 1-2x per week       Allergies: Allergies   Allergen Reactions    Ace Inhibitors Other (comments)     Per pt, Cardiologist states he cannot have ACE inhibitors         Review of Systems     Review of Systems   Constitutional: Positive for diaphoresis. Negative for chills and fever. Lethargy    HENT: Negative for ear pain and sore throat. Eyes: Negative for visual disturbance. Respiratory: Negative for cough and shortness of breath. Cardiovascular: Negative for chest pain and palpitations. Gastrointestinal: Positive for nausea. Negative for abdominal pain, diarrhea and vomiting. Genitourinary: Negative for flank pain. Musculoskeletal: Negative for back pain and neck pain. Neurological: Positive for weakness (generalized). Negative for syncope, numbness and headaches. Right facial droop (per family)   Psychiatric/Behavioral: Positive for confusion. Negative for agitation. The patient is not nervous/anxious. All other systems reviewed and are negative. Physical Exam     Visit Vitals    /67    Pulse (!) 48    Temp 97.8 °F (36.6 °C)    Resp 13    Ht 5' 10\" (1.778 m)    Wt 100.7 kg (222 lb)    SpO2 94%    BMI 31.85 kg/m2       Physical Exam   Constitutional: He is oriented to person, place, and time. He appears well-developed and well-nourished. HENT:   Head: Normocephalic and atraumatic. Mouth/Throat: Oropharynx is clear and moist.   Eyes: Pupils are equal, round, and reactive to light. No scleral icterus. Neck: Neck supple. No tracheal deviation present. Cardiovascular: Intact distal pulses. Exam reveals no friction rub. No murmur heard. Bradycardia    Pulmonary/Chest: Effort normal and breath sounds normal. No respiratory distress. Abdominal: Soft.  Bowel sounds are normal. There is no tenderness. Musculoskeletal: Normal range of motion. He exhibits no deformity. Neurological: He is alert and oriented to person, place, and time. No cranial nerve deficit. Coordination normal.   No gross neuro deficit   Skin: Skin is warm and dry. No rash noted. He is not diaphoretic. Psychiatric: He has a normal mood and affect. Nursing note and vitals reviewed. Diagnostic Study Results     Labs -  Labs Reviewed   CBC WITH AUTOMATED DIFF - Abnormal; Notable for the following:        Result Value    RBC 4.03 (*)     MCV 97.8 (*)     MCH 34.2 (*)     ABS. NEUTROPHILS 8.1 (*)     ABS. BASOPHILS 0.1 (*)     All other components within normal limits   METABOLIC PANEL, COMPREHENSIVE - Abnormal; Notable for the following:     Glucose 129 (*)     BUN 19 (*)     Creatinine 1.68 (*)     BUN/Creatinine ratio 11 (*)     GFR est AA 48 (*)     GFR est non-AA 40 (*)     AST (SGOT) 13 (*)     All other components within normal limits   PHOSPHORUS   MAGNESIUM   TROPONIN I   PROTHROMBIN TIME + INR   PTT   TSH 3RD GENERATION   URINALYSIS W/ RFLX MICROSCOPIC       Radiologic Studies -   XR CHEST PORT   Final Result      CT HEAD WO CONT   Final Result        Ct Head Wo Cont  -----------------------------------------------------------------------------------------------------------------------------  IMPRESSION: 1. Stable CT scan of the head. No CT findings of an acute intracranial abnormality. Please note that noncontrast head CT may be normal in early acute infarct. 2. Stable CT appearance of multifocal chronic areas of right hemispheric infarction. Atrophy and chronic small vessel changes are not advanced to the prior exam.    Xr Chest Port  --------------------------------------------------------------------------------------------------------------------  IMPRESSION: Areas of linear left base subsegmental atelectasis and/or scarring. No other acute cardiopulmonary findings.          Medical Decision Making   I am the first provider for this patient. I reviewed the vital signs, available nursing notes, past medical history, past surgical history, family history and social history. Vital Signs-Reviewed the patient's vital signs. Pulse Oximetry Analysis -  96% on room air (Interpretation) Non-hypoxic     Cardiac Monitor:  Rate: 46  Rhythm:  SB    EKG: Interpreted by the EP. Time 9:26     Rhythm: SB  Rate: 47 bpm  Interpretation: prolonged QRS, left axis deviation, incomplete LBBB, no acute ST elevation or depression noted. EKG interpret by Roberta Castaneda DO 9:26 AM    Records Reviewed: Nursing Notes and Old Medical Records (Time of Review: 9:26 AM)    ED Course: Progress Notes, Reevaluation, and Consults:  ED Course       Provider Notes (Medical Decision Making):     DDX: TIA, decreased brain perfusion due to bradycardia, occult ACS, infection, dementia    76 y.o. male with noted past medical history who presented with confusion and right facial droop starting sometime this morning that was resolved upon arrival. Concerned for TIA but pt also resported nauea and diaphoresis this morning with bradycardia that appears new on exam. Medical record review shows physical HR of 59-61. Vitals were notable for bradycardia. The differential above was considered. The patient was given monitoring and ASA  Diagnostics notable for bun and creatinine elevation consistent with baseline. 9:28 AM Consult: I discussed care with Dr. Marci Larkin (Teleneurologist). It was a standard discussion including patient history, chief complaint, available diagnostic results, and predicted treatment course. Agrees this is likely TIA until proven otherwise. Will evaluate pt. Chart review shows HR of 59-61 in prior visits. 9:34 AM Dr. Tacos Pettit evaluated the pt. Agrees with admitting the pt for TIA work up. 11:46 AM Consult: I discussed care with Dr. Natasha Hobson (Hospitalist).   It was a standard discussion including patient history, chief complaint, available diagnostic results, and predicted treatment course. Will admit pt. For Hospitalized Patients:    1. Hospitalization Decision Time:  The decision to hospitalize the patient was made by Dr. Mary Garcias at 11:47 AM on 4/14/2018    2. Aspirin: Aspirin was given    Diagnosis     Clinical Impression:   1. Transient cerebral ischemia, unspecified type    2. Nausea in adult    3. Diaphoresis        Disposition: admitted    Follow-up Information     None           Patient's Medications   Start Taking    No medications on file   Continue Taking    AMLODIPINE (NORVASC) 5 MG TABLET    Take 5 mg by mouth daily. ASPIRIN (ASPIRIN) 325 MG TABLET    Take 1 Tab by mouth daily. ATORVASTATIN (LIPITOR) 20 MG TABLET    Take 1 Tab by mouth daily. CYANOCOBALAMIN 1,000 MCG TABLET    Take 1,000 mcg by mouth daily. DOCUSATE SODIUM (STOOL SOFTENER) 100 MG CAPSULE    Take 100 mg by mouth nightly. DONEPEZIL (ARICEPT) 10 MG TABLET    TAKE 2 TABLETS BY MOUTH EVERY MORNING AFTER A MEAL    NITROGLYCERIN (NITROSTAT SL)    by SubLINGual route. OMEGA-3 FATTY ACIDS-VITAMIN E (FISH OIL) 1,000 MG CAP    Take 1 Cap by mouth two (2) times a day. ONDANSETRON (ZOFRAN ODT) 8 MG DISINTEGRATING TABLET    Take 1 Tab by mouth every eight (8) hours as needed for Nausea. TAMSULOSIN (FLOMAX) 0.4 MG CAPSULE    Take 1 Cap by mouth nightly. These Medications have changed    No medications on file   Stop Taking    No medications on file     _______________________________  Reginald Olivas acting as a scribe for and in the presence of Khadra Pastor DO      April 14, 2018 at 9:27 AM       Provider Attestation:      I personally performed the services described in the documentation, reviewed the documentation, as recorded by the scribe in my presence, and it accurately and completely records my words and actions.  April 14, 2018 at 9:27 AM - Khadra Pastor DO

## 2018-04-14 NOTE — PROGRESS NOTES
Pt arrived to the floor from ED via stretcher to room accompanied by Analia Atkins RN. Pt is A&Ox4, denies pain. No distress noted. Pt oriented to room. Call bell and frequently used items in reach with bed locked in lowest position. Dual [facility] performed. 1516 Pt passed dysphagia screen. Telephone orders received to put pt on cardiac diet. 1930 Bedside and Verbal shift change report given to Angelica Torres RN (oncoming nurse) by Natalia Lanier RN (offgoing nurse). Report included the following information SBAR, Kardex, Intake/Output, MAR and Recent Results. [facility] performed.

## 2018-04-14 NOTE — IP AVS SNAPSHOT
[address] [address] 
[phone] Patient: Prisca Delatorre MRN: [id number] AMIE:[id number] About your hospitalization You were admitted on:  April 14, 2018 You last received care in the:  [address] NEURO [facility] You were discharged on:  April 16, 2018 Why you were hospitalized Your primary diagnosis was:  Tia (Transient Ischemic Attack) Follow-up Information Follow up With Details Comments Contact Info Vincent Hubbard., DO In 1 week To discuss recent hospitalization [address] [facility] [facility] [id number] 
[phone] Your Scheduled Appointments Wednesday June 06, 2018  8:00 AM EDT PROCEDURE with [facility] IMAGING 2 [facility] ([address] [place] [address]) [address] [place] [address] 292 200 [facility]  
[phone] Wednesday June 06, 2018  9:00 AM EDT PROCEDURE with [facility] IMAGING 1 [facility] ([address] [place] [address]) [address] [place] [address] 498 200 [facility]  
[phone] Wednesday June 06, 2018 10:00 AM EDT PROCEDURE with [facility] NONIMAGING [facility] ([address] [place] [address]) [address] [place] [address] 770 200 [facility]  
[phone] Wednesday June 06, 2018  1:45 PM EDT Follow Up with Riki Delacruz [facility] ([address] [place] [address]) [address] [place] [address] 093 200 [facility]  
[phone] Discharge Orders None A check james indicates which time of day the medication should be taken. My Medications START taking these medications Instructions Each Dose to Equal  
 Morning Noon Evening Bedtime  
 hydroCHLOROthiazide 25 mg tablet Commonly known as:  HYDRODIURIL Your last dose was: Your next dose is: Take 1 Tab by mouth daily. 25 mg CHANGE how you take these medications Instructions Each Dose to Equal  
 Morning Noon Evening Bedtime  
 amLODIPine 10 mg tablet Commonly known as:  Nicole Petty What changed:   
- medication strength 
- how much to take Your last dose was: Your next dose is: Take 1 Tab by mouth daily. 10 mg  
    
   
   
   
  
 atorvastatin 80 mg tablet Commonly known as:  LIPITOR What changed:   
- medication strength 
- how much to take Your last dose was: Your next dose is: Take 1 Tab by mouth daily. 80 mg CONTINUE taking these medications Instructions Each Dose to Equal  
 Morning Noon Evening Bedtime  
 aspirin 325 mg tablet Commonly known as:  ASPIRIN Your last dose was: Your next dose is: Take 1 Tab by mouth daily. 325 mg  
    
   
   
   
  
 cyanocobalamin 1,000 mcg tablet Your last dose was: Your next dose is: Take 1,000 mcg by mouth daily. 1000 mcg  
    
   
   
   
  
 donepezil 10 mg tablet Commonly known as:  ARICEPT Your last dose was: Your next dose is: TAKE 2 TABLETS BY MOUTH EVERY MORNING AFTER A MEAL  
     
   
   
   
  
 NITROSTAT SL Your last dose was: Your next dose is:    
   
   
 by SubLINGual route. omega-3 fatty acids-vitamin e 1,000 mg Cap Commonly known as:  FISH OIL Your last dose was: Your next dose is: Take 1 Cap by mouth two (2) times a day. 1 Cap  
    
   
   
   
  
 ondansetron 8 mg disintegrating tablet Commonly known as:  ZOFRAN ODT Your last dose was: Your next dose is: Take 1 Tab by mouth every eight (8) hours as needed for Nausea. 8 mg STOOL SOFTENER 100 mg capsule Generic drug:  docusate sodium Your last dose was: Your next dose is: Take 100 mg by mouth nightly. 100 mg  
    
   
   
   
  
 tamsulosin 0.4 mg capsule Commonly known as:  FLOMAX Your last dose was: Your next dose is: Take 1 Cap by mouth nightly. 0.4 mg Where to Get Your Medications Information on where to get these meds will be given to you by the nurse or doctor. ! Ask your nurse or doctor about these medications  
  amLODIPine 10 mg tablet  
 aspirin 325 mg tablet  
 atorvastatin 80 mg tablet  
 hydroCHLOROthiazide 25 mg tablet Discharge Instructions DISCHARGE SUMMARY from Nurse PATIENT INSTRUCTIONS: 
 
 
F-face looks uneven A-arms unable to move or move unevenly S-speech slurred or non-existent T-time-call 911 as soon as signs and symptoms begin-DO NOT go Back to bed or wait to see if you get better-TIME IS BRAIN. Warning Signs of HEART ATTACK Call 911 if you have these symptoms: 
? Chest discomfort. Most heart attacks involve discomfort in the center of the chest that lasts more than a few minutes, or that goes away and comes back. It can feel like uncomfortable pressure, squeezing, fullness, or pain. ? Discomfort in other areas of the upper body. Symptoms can include pain or discomfort in one or both arms, the back, neck, jaw, or stomach. ? Shortness of breath with or without chest discomfort. ? Other signs may include breaking out in a cold sweat, nausea, or lightheadedness. Don't wait more than five minutes to call [address]! Fast action can save your life.  Calling 911 is almost always the fastest way to get lifesaving treatment. Emergency Medical Services staff can begin treatment when they arrive  up to an hour sooner than if someone gets to the hospital by car. The discharge information has been reviewed with the patient and spouse. The patient and spouse verbalized understanding. Discharge medications reviewed with the patient and spouse and appropriate educational materials and side effects teaching were provided. ___________________________________________________________________________________________________________________________________ Transient Ischemic Attack: Care Instructions Your Care Instructions A transient ischemic attack (TIA) is when blood flow to a part of your brain is blocked for a short time. A TIA is like a stroke but usually lasts only a few minutes. A TIA does not cause lasting brain damage. Any vision problems, slurred speech, or other symptoms usually go away in 10 to 20 minutes. But they may last for up to 24 hours. TIAs are often warning signs of a stroke. Some people who have a TIA may have a stroke in the future. A stroke can cause symptoms like those of a TIA. But a stroke causes lasting damage to your brain. You can take steps to help prevent a stroke. One thing you can do is get early treatment. If you have other new symptoms, or if your symptoms do not get better, go back to the emergency room or call your doctor right away. Getting treatment right away may prevent long-term brain damage caused by a stroke. The doctor has checked you carefully, but problems can develop later. If you notice any problems or new symptoms, get medical treatment right away. Follow-up care is a key part of your treatment and safety. Be sure to make and go to all appointments, and call your doctor if you are having problems. It's also a good idea to know your test results and keep a list of the medicines you take. How can you care for yourself at home? Medicines ? · Be safe with medicines. Take your medicines exactly as prescribed. Call your doctor if you think you are having a problem with your medicine. ? · If you take a blood thinner, such as aspirin, be sure you get instructions about how to take your medicine safely. Blood thinners can cause serious bleeding problems. ? · Call your doctor if you are not able to take your medicines for any reason. ? · Do not take any over-the-counter medicines or herbal products without talking to your doctor first.  
? · If you take birth control pills or hormone therapy, talk to your doctor. Ask if these treatments are right for you. ? Lifestyle changes ? · Do not smoke. If you need help quitting, talk to your doctor about stop-smoking programs and medicines. ? · Be active. If your doctor recommends it, get more exercise. Walking is a good choice. Bit by bit, increase the amount you walk every day. Try for at least 30 minutes on most days of the week. You also may want to swim, bike, or do other activities. ? · Eat heart-healthy foods. These include fruits, vegetables, high-fiber foods, fish, and foods that are low in sodium, saturated fat, and trans fat. ? · Stay at a healthy weight. Lose weight if you need to.  
? · Limit alcohol to 2 drinks a day for men and 1 drink a day for women. ?Staying healthy ? · Manage other health problems such as diabetes, high blood pressure, and high cholesterol. ? · Get the flu vaccine every year. When should you call for help? Call 911 anytime you think you may need emergency care. For example, call if: 
? · You have new or worse symptoms of a stroke. These may include: 
¨ Sudden numbness, tingling, weakness, or loss of movement in your face, arm, or leg, especially on only one side of your body. ¨ Sudden vision changes. ¨ Sudden trouble speaking. ¨ Sudden confusion or trouble understanding simple statements. ¨ Sudden problems with walking or balance. ¨ A sudden, severe headache that is different from past headaches. Call 911 even if these symptoms go away in a few minutes. ? · You feel like you are having another TIA. ? Watch closely for changes in your health, and be sure to contact your doctor if you have any problems. Where can you learn more? Go to http://andrae-raphael.info/. Enter [phone] in the search box to learn more about \"Transient Ischemic Attack: Care Instructions. \" Current as of: March 20, 2017 Content Version: 11.4 © 6895-8921 [employer]. Care instructions adapted under license by [employer] (which disclaims liability or warranty for this information). If you have questions about a medical condition or this instruction, always ask your healthcare professional. [address] any warranty or liability for your use of this information. Introducing [facility]! Dear Jackelin Hood: Thank you for requesting a [employer] account. Our records indicate that you already have an active [employer] account. You can access your account anytime at [email or web address] Did you know that you can access your hospital and ER discharge instructions at any time in [employer]? You can also review all of your test results from your hospital stay or ER visit. Additional Information If you have questions, please visit the Frequently Asked Questions section of the [employer] website at [email or web address] Remember, [employer] is NOT to be used for urgent needs. For medical emergencies, dial 911. Now available from your iPhone and Android! Introducing Manuel Griffith As a [facility] patient, I wanted to make you aware of our electronic visit tool called Manuel Griffith. [facility] 24/7 allows you to connect within minutes with a medical provider 24 hours a day, seven days a week via a mobile device or tablet or logging into a secure website from your computer. You can access [employer] from anywhere in the [place]. A virtual visit might be right for you when you have a simple condition and feel like you just dont want to get out of bed, or cant get away from work for an appointment, when your regular [facility] provider is not available (evenings, weekends or holidays), or when youre out of town and need minor care. Electronic visits cost only $49 and if the [employer] 24/7 provider determines a prescription is needed to treat your condition, one can be electronically transmitted to a nearby pharmacy*. Please take a moment to enroll today if you have not already done so. The enrollment process is free and takes just a few minutes. To enroll, please download the [employer] miki to your tablet or phone, or visit [email or web address] to enroll on your computer. And, as an [address] [place] [address] patient with a [employer] account, the results of your visits will be scanned into your electronic medical record and your primary care provider will be able to view the scanned results. We urge you to continue to see your regular [facility] provider for your ongoing medical care. And while your primary care provider may not be the one available when you seek a [employer] virtual visit, the peace of mind you get from getting a real diagnosis real time can be priceless. For more information on [employer], view our Frequently Asked Questions (FAQs) at [email or web address]. Sincerely, 
 
Delgado Howard MD 
Chief Medical Officer Guicho[facility] Irene Hendrickson *:  certain medications cannot be prescribed via [employer] Unresulted Labs-Please follow up with your PCP about these lab tests Order Current Status CRP, HIGH SENSITIVITY In process Providers Seen During Your Hospitalization Provider Specialty Primary office phone Donnell Johnston DO Emergency Medicine [phone] Bell Stearns MD Internal Medicine [phone] Your Primary Care Physician (PCP) Primary Care Physician Office Phone Office Fax Qamar Collazo [phone] You are allergic to the following Allergen Reactions Ace Inhibitors Other (comments) Per pt, Cardiologist states he cannot have ACE inhibitors Recent Documentation Height Weight BMI Smoking Status 1.778 m 99.8 kg 31.57 kg/m2 Former Smoker Emergency Contacts Name Discharge Info Relation Home Work Mobile Dana Carvajal DISCHARGE CAREGIVER [3] Spouse [3] [phone] Sukhdev Klein [22] [phone] Patient Belongings The following personal items are in your possession at time of discharge: 
  Dental Appliances: None  Visual Aid: Glasses      Home Medications: Sent home   Jewelry: None  Clothing: None    Other Valuables: Cell Phone Discharge Instructions Attachments/References STROKE: PRIMARY PREVENTION: GENERAL INFO (ENGLISH) STROKE: SECONDARY PREVENTION: GENERAL INFO (ENGLISH) AMLODIPINE (BY MOUTH) (ENGLISH) ASPIRIN (BY MOUTH) (ENGLISH) ATORVASTATIN (BY MOUTH) (ENGLISH) HYDROCHLOROTHIAZIDE (BY MOUTH) (ENGLISH) Patient Handouts Learning About How to Prevent a Stroke What is a stroke? A stroke occurs when a blood vessel in the brain bursts or is blocked by a blood clot. Without blood and the oxygen it carries, part of the brain starts to die. The part of the body controlled by the damaged area of the brain can't work properly. But there are many things you can do to help lower your stroke risk. What increases your risk for stroke? A risk factor is anything that makes you more likely to have a particular health problem.  
Risk factors for stroke that you can treat or change include: 
· Health problems like high blood pressure, atrial fibrillation, diabetes, and high cholesterol. · Smoking. · Heavy use of alcohol. · Being overweight. · Not getting enough physical activity. Risk factors you can't change include: · Age. The risk of stroke goes up as you get older. · Race.  Americans, Native Americans, and [place] Natives have a higher risk than those of other races. · Being female. Women have a higher risk of stroke than men. · Family history of stroke. Your doctor can help you know your risk. Then you and your can doctor talk about whether you need to lower it. What can you do to prevent a stroke? · Treat any health problems you have that raise your risk. · Adopt a heart-healthy lifestyle: ¨ Don't smoke. If you need help quitting, talk to your doctor about stop-smoking programs and medicines. These can increase your chances of quitting for good. ¨ Limit alcohol to 2 drinks a day for men and 1 drink a day for women. ¨ Stay at a healthy weight. Lose weight if you need to. ¨ If your doctor recommends it, get more exercise. Walking is a good choice. Bit by bit, increase the amount you walk every day. Try for at least 30 minutes on most days of the week. ¨ Eat heart-healthy foods. These include fruits, vegetables, high-fiber foods, and fish. Choose foods that are low in sodium, saturated fat, and trans fat. · Decide with your doctor whether you will also take medicines to help lower your risk. For example, you and your doctor may decide you will take a medicine that prevents blood clots. What are the symptoms of a stroke? The brain damage from a stroke starts within minutes. That's why it's so important to know the symptoms of stroke and to act fast. Quick treatment can help limit damage to the brain so that you have fewer problems after the stroke. FAST is a simple way to remember the main symptoms of stroke. Recognizing these symptoms helps you know when to call for medical help. FAST stands for: 
· Face drooping. · Arm weakness. · Speech difficulty. · Time to call 911. Follow-up care is a key part of your treatment and safety. Be sure to make and go to all appointments, and call your doctor if you are having problems. It's also a good idea to know your test results and keep a list of the medicines you take. Where can you learn more? Go to http://andrae-raphael.info/. Enter G757 in the search box to learn more about \"Learning About How to Prevent a Stroke. \" Current as of: March 20, 2017 Content Version: 11.4 © 8364-4385 [employer]. Care instructions adapted under license by [employer] (which disclaims liability or warranty for this information). If you have questions about a medical condition or this instruction, always ask your healthcare professional. [address] any warranty or liability for your use of this information. Learning About How to Prevent Another Stroke What can you do to prevent another stroke? After a stroke, people feel lots of different emotions. Some people are worried that they could have another stroke. Or they may feel overwhelmed by how much there is to learn and do. Some people feel sad or depressed. No matter what emotions you are feeling, you can give yourself some control and peace of mind by making a plan to lower your risk of having another stroke. Take your medicines You'll need to take medicines to help prevent another stroke. Be sure to take your medicines exactly as prescribed. And don't stop taking them unless your doctor tells you to. If you stop taking your medicines, you can increase your risk of having another stroke. Some of the medicines your doctor may prescribe include: · Aspirin or some other blood thinner to prevent blood clots. · Statins to lower cholesterol. · Blood pressure medicines to lower blood pressure. Manage other health problems You can help lower your chance of having another stroke by managing certain other health problems. Problems that increase your risk of having another stroke include: · High blood pressure. · High cholesterol. · Atrial fibrillation. · Diabetes. If you have any of these health problems, you can manage them with healthy lifestyle changes along with medicine. Adopt a healthy lifestyle · Do not smoke or allow others to smoke around you. If you need help quitting, talk to your doctor about stop-smoking programs and medicines. These can increase your chances of quitting for good. Smoking makes a stroke more likely. · Limit alcohol to 2 drinks a day for men and 1 drink a day for women. · Lose weight if you need to. Controlling your weight will help you keep your heart and body healthy. · Be active. Ask your doctor what type and level of activity is safe for you. · Eat heart-healthy foods, like fruits, vegetables, and high-fiber foods. It's also important to: · Get a flu shot every year. · Ask for help if you think you are depressed. Do stroke rehab Taking part in a stroke rehabilitation (rehab) program will help you to regain skills you lost or make the most of your abilities after a stroke. It also helps you take steps to prevent another stroke. Your rehab team will give you education and support to help you build new, healthy habits. You'll learn how to manage any other health problems that you might have. Marcela Rodrigues also learn how to exercise safely, eat a healthy diet, and quit smoking if you smoke. Marcela Rodrigues work with your team to decide what lifestyle choices are best for you. If your doctor hasn't already suggested it, ask him or her if stroke rehab is right for you. Know stroke symptoms Make sure you know the symptoms of stroke. FAST is a simple way to remember. Recognizing these symptoms helps you know when to call for medical help. FAST stands for: 
· Face drooping. · Arm weakness. · Speech difficulty. · Time to call 911. Follow-up care is a key part of your treatment and safety. Be sure to make and go to all appointments, and call your doctor if you are having problems. It's also a good idea to know your test results and keep a list of the medicines you take. Where can you learn more? Go to http://andrae-raphael.info/. Enter R856 in the search box to learn more about \"Learning About How to Prevent Another Stroke. \" Current as of: March 20, 2017 Content Version: 11.4 © 2651-2942 [employer]. Care instructions adapted under license by [employer] (which disclaims liability or warranty for this information). If you have questions about a medical condition or this instruction, always ask your healthcare professional. [address] any warranty or liability for your use of this information. Amlodipine (By mouth) Amlodipine ([id number]-peen) Treats high blood pressure and angina (chest pain). This medicine is a calcium channel blocker. Brand Name(s): Norvasc There may be other brand names for this medicine. When This Medicine Should Not Be Used: This medicine is not right for everyone. Do not use it if you had an allergic reaction to amlodipine. How to Use This Medicine:  
Tablet, Dissolving Tablet · Take your medicine as directed. Your dose may need to be changed several times to find what works best for you. Take this medicine at the same time each day. · Read and follow the patient instructions that come with this medicine. Talk to your doctor or pharmacist if you have any questions. · Missed dose: Take a dose as soon as you remember. If it has been more than 12 hours since you were supposed to take your dose, skip the missed dose and take your next regular dose at the regular time. · Store the medicine in a closed container at room temperature, away from heat, moisture, and direct light. Drugs and Foods to Avoid: Ask your doctor or pharmacist before using any other medicine, including over-the-counter medicines, vitamins, and herbal products. · Some medicines can affect how amlodipine works. Tell your doctor if you are also using any of the following: ¨ Clarithromycin, cyclosporine, diltiazem, itraconazole, ritonavir, sildenafil, simvastatin, tacrolimus Warnings While Using This Medicine: · Tell your doctor if you are pregnant or breastfeeding, or if you have liver disease, heart disease, coronary artery disease, or aortic stenosis. · This medicine could lower your blood pressure too much, especially when you first use it or if you are dehydrated. Stand or sit up slowly if you feel lightheaded or dizzy. · Your doctor will check your progress and the effects of this medicine at regular visits. Keep all appointments. · Do not stop using this medicine without asking your doctor, even if you feel well. This medicine will not cure high blood pressure, but it will help keep it in normal range. You may have to take blood pressure medicine for the rest of your life. · Keep all medicine out of the reach of children. Never share your medicine with anyone. Possible Side Effects While Using This Medicine:  
Call your doctor right away if you notice any of these side effects: · Allergic reaction: Itching or hives, swelling in your face or hands, swelling or tingling in your mouth or throat, chest tightness, trouble breathing · Lightheadedness, dizziness · New or worsening chest pain · Swelling in your hands, ankles, or legs · Trouble breathing, nausea, unusual sweating, fainting If you notice other side effects that you think are caused by this medicine, tell your doctor. Call your doctor for medical advice about side effects. You may report side effects to FDA at 8-315-FDA-8508 © 2017 [address] Information is for End User's use only and may not be sold, redistributed or otherwise used for commercial purposes. The above information is an  only. It is not intended as medical advice for individual conditions or treatments. Talk to your doctor, nurse or pharmacist before following any medical regimen to see if it is safe and effective for you. Aspirin (By mouth) Aspirin (AS-pir-in) Treats pain, fever, and inflammation. May lower risk of heart attack and stroke. Brand Name(s): Ascriptin Regular Strength, Aspergum, Aspir Low, Aspirin Adult Low Dose, Aspirin Low Dose, Perla Aspirin Children's, Perla Aspirin Regimen, Perla Extra Strength, Perla Genuine Aspirin, Perla Low Dose, Bufferin, Bufferin Low Dose, Durlaza, Ecotrin, Ecpirin There may be other brand names for this medicine. When This Medicine Should Not Be Used: This medicine is not right for everyone. Do not use it if you had an allergic reaction to aspirin or other NSAIDs, or if you have a history of asthma with nasal polyps and rhinitis. How to Use This Medicine:  
Delayed Release Capsule, Long Acting Capsule, Gum, Tablet, Chewable Tablet, Fizzy Tablet, Coated Tablet, Long Acting Tablet, 24 Hour Capsule · Your doctor will tell you how much medicine to use. Do not use more than directed. · It is best to take this medicine with food or milk. · Capsule, tablet, or coated tablet: Swallow whole. Do not crush, break, or chew it. · Chewable tablet: You may chew it completely or swallow it whole. · Gum: Chew completely to make sure you get as much medicine as possible. Drink a full glass (8 ounces) of water after chewing the gum. · Swallow the extended-release capsule whole. Do not crush, break, or chew it. Take the capsule with a full glass of water at the same time each day. · Follow the instructions on the medicine label if you are using this medicine without a prescription. · Missed dose:  If you miss a dose of Durlaza, skip the missed dose and go back to your regular dosing schedule. Do not take extra medicine to make up for a missed dose. · Store the medicine in a closed container at room temperature, away from heat, moisture, and direct light. Drugs and Foods to Avoid: Ask your doctor or pharmacist before using any other medicine, including over-the-counter medicines, vitamins, and herbal products. · Some foods and medicines can affect how aspirin works. Tell your doctor if you are using any of the following: ¨ Dipyridamole, methotrexate, probenecid, sulfinpyrazone, ticlopidine ¨ Blood thinner (including clopidogrel, prasugrel, ticagrelor, warfarin) ¨ Blood pressure medicine ¨ Medicine to treat seizures (including phenytoin, valproic acid) ¨ NSAID pain or arthritis medicine (including celecoxib, diclofenac, ibuprofen, naproxen) ¨ Steroid medicine (including dexamethasone, hydrocortisone, methylprednisolone, prednisolone, prednisone) · Do not take Durlaza 2 hours before or 1 hour after you drink alcohol or take medicines that contain alcohol. Warnings While Using This Medicine: · Tell your doctor if you are pregnant or breastfeeding. Do not use this medicine during the later part of a pregnancy unless your doctor tells you to. · Tell your doctor if you have kidney disease, liver disease, high blood pressure, heart disease, or a history of stomach bleeding or ulcers. · This medicine may increase your risk for bleeding, including stomach ulcers. · Do not give aspirin to a child or teenager who has chickenpox or flu symptoms, unless the doctor says it is okay. Aspirin can cause a life-threatening reaction called Reye syndrome. · Tell any doctor or dentist who treats you that you are using this medicine. This medicine may affect certain medical test results. · Keep all medicine out of the reach of children. Never share your medicine with anyone. Possible Side Effects While Using This Medicine: Call your doctor right away if you notice any of these side effects: · Allergic reaction: Itching or hives, swelling in your face or hands, swelling or tingling in your mouth or throat, chest tightness, trouble breathing · Bloody or black stools, bloody vomit or vomit that looks like coffee grounds · Chest tightness, wheezing · Ringing in the ears · Severe stomach pain · Unusual bleeding, bruising, or weakness If you notice other side effects that you think are caused by this medicine, tell your doctor. Call your doctor for medical advice about side effects. You may report side effects to FDA at 2-353-YKJ-1538 © 2017 [facility] Information is for End User's use only and may not be sold, redistributed or otherwise used for commercial purposes. The above information is an  only. It is not intended as medical advice for individual conditions or treatments. Talk to your doctor, nurse or pharmacist before following any medical regimen to see if it is safe and effective for you. Atorvastatin (By mouth) Atorvastatin (a-tor-va-STAT-in) Treats high cholesterol and triglyceride levels. Reduces the risk of angina, stroke, heart attack, or certain heart and blood vessel problems. This medicine is a statin. Brand Name(s): Lipitor There may be other brand names for this medicine. When This Medicine Should Not Be Used: This medicine is not right for everyone. Do not use it if you had an allergic reaction to atorvastatin, if you have active liver disease, or if you are pregnant or breastfeeding. How to Use This Medicine:  
Tablet · Take your medicine as directed. Your dose may need to be changed several times to find what works best for you. · Take this medicine at the same time each day. · Swallow the tablet whole. Do not break it. · Missed dose: Take a dose as soon as you remember.  If it is less than 12 hours until your next dose, skip the missed dose and take the next dose at the regular time. Do not take 2 doses of this medicine within 12 hours. · Read and follow the patient instructions that come with this medicine. Talk to your doctor or pharmacist if you have any questions. · Store the medicine in a closed container at room temperature, away from heat, moisture, and direct light. Drugs and Foods to Avoid: Ask your doctor or pharmacist before using any other medicine, including over-the-counter medicines, vitamins, and herbal products. · Some medicines can affect how atorvastatin works. Tell your doctor if you also use birth control pills, boceprevir, cimetidine, colchicine, cyclosporine, digoxin, niacin, rifampin, spironolactone, telaprevir, medicine to treat an infection, or medicine to treat HIV/AIDS. Warnings While Using This Medicine: · It is not safe to take this medicine during pregnancy. It could harm an unborn baby. Tell your doctor right away if you become pregnant. · Tell your doctor if you have kidney disease, diabetes, muscle pain or weakness, thyroid problems, have recently had a stroke or TIA (transient ischemic attack), or have a history of liver disease. Tell your doctor if you drink grapefruit juice or drink alcohol regularly. · This medicine can cause muscle problems, which can lead to kidney problems. · Tell any doctor or dentist who treats you that you use this medicine. You may need to stop using it if you have surgery, have an injury, or develop serious health problems. · Your doctor will do lab tests at regular visits to check on the effects of this medicine. Keep all appointments. · Keep all medicine out of the reach of children. Never share your medicine with anyone. Possible Side Effects While Using This Medicine:  
Call your doctor right away if you notice any of these side effects: · Allergic reaction: Itching or hives, swelling in your face or hands, swelling or tingling in your mouth or throat, chest tightness, trouble breathing · Blistering, peeling, red skin rash · Change in how much or how often you urinate · Dark urine or pale stools, nausea, vomiting, loss of appetite, stomach pain, yellow skin or eyes · Fever · Muscle pain, tenderness, or weakness · Unusual tiredness If you notice these less serious side effects, talk with your doctor: · Diarrhea · Joint pain If you notice other side effects that you think are caused by this medicine, tell your doctor. Call your doctor for medical advice about side effects. You may report side effects to FDA at 0-263-FDA-9596 © 2017 [address] Information is for End User's use only and may not be sold, redistributed or otherwise used for commercial purposes. The above information is an  only. It is not intended as medical advice for individual conditions or treatments. Talk to your doctor, nurse or pharmacist before following any medical regimen to see if it is safe and effective for you. Hydrochlorothiazide (By mouth) Hydrochlorothiazide (alfreda-droe-klor-ng-MUAX-i-zide) Treats high blood pressure and fluid retention (edema). This medicine is a diuretic (water pill). Brand Name(s): Microzide There may be other brand names for this medicine. When This Medicine Should Not Be Used: This medicine is not right for everyone. Do not use this medicine if you had an allergic reaction to hydrochlorothiazide or a sulfa drug. How to Use This Medicine:  
Capsule, Liquid, Tablet · Take your medicine as directed. Your dose may need to be changed several times to find what works best for you. · Measure the oral liquid medicine with a marked measuring spoon, oral syringe, or medicine cup. · Missed dose: Take a dose as soon as you remember. If it is almost time for your next dose, wait until then and take a regular dose.  Do not take extra medicine to make up for a missed dose. · Store the medicine in a closed container at room temperature, away from heat, moisture, and direct light. Drugs and Foods to Avoid: Ask your doctor or pharmacist before using any other medicine, including over-the-counter medicines, vitamins, and herbal products. · Some medicines and foods can affect how hydrochlorothiazide works. Tell your doctor if you are also using any of the following: ¨ Cholestyramine, colestipol, digoxin, lithium, insulin or other diabetes medicine ¨ An NSAID pain or arthritis medicine (such as aspirin, diclofenac, ibuprofen, naproxen, celecoxib), or a steroid medicine (such as hydrocortisone, methylprednisolone, prednisone, prednisolone, dexamethasone) Warnings While Using This Medicine: · Tell your doctor if you are pregnant or breastfeeding, or if you have kidney disease, liver disease, heart disease or heart failure, high cholesterol, diabetes, gout, trouble urinating, or lupus. · This medicine may cause the following problems: ¨ Glaucoma and other vision problems ¨ Acute gout ¨ Damage to the parathyroid gland ¨ Low or high levels of minerals in your blood (including potassium and sodium) · This medicine may make you dizzy. Do not drive or do anything else that could be dangerous until you know how this medicine affects you. · This medicine could lower your blood pressure too much, especially when you first use it or if you are dehydrated. Stand or sit up slowly if you feel lightheaded or dizzy. Alcohol may make this problem worse. · Tell any doctor or dentist who treats you that you are using this medicine. · Your doctor will check your progress and the effects of this medicine at regular visits. Keep all appointments. · Keep all medicine out of the reach of children. Never share your medicine with anyone. Possible Side Effects While Using This Medicine: Call your doctor right away if you notice any of these side effects: · Allergic reaction: Itching or hives, swelling in your face or hands, swelling or tingling in your mouth or throat, chest tightness, trouble breathing · Blistering, peeling, or red skin rash · Confusion, weakness, and muscle twitching · Dry mouth, increased thirst, muscle cramps, nausea or vomiting, uneven heartbeat · Lightheadedness, dizziness, or fainting · Nausea, vomiting, unusual tiredness or weakness, muscle cramps, confusion · Trouble seeing, eye pain, blurred vision or other vision changes If you notice these less serious side effects, talk with your doctor:  
· Headache · Mild diarrhea, constipation, nausea If you notice other side effects that you think are caused by this medicine, tell your doctor. Call your doctor for medical advice about side effects. You may report side effects to FDA at 3-546-FDA-4944 © 2017 [address] Information is for End User's use only and may not be sold, redistributed or otherwise used for commercial purposes. The above information is an  only. It is not intended as medical advice for individual conditions or treatments. Talk to your doctor, nurse or pharmacist before following any medical regimen to see if it is safe and effective for you. Please provide this summary of care documentation to your next provider. Signatures-by signing, you are acknowledging that this After Visit Summary has been reviewed with you and you have received a copy. Patient Signature:  ____________________________________________________________ Date:  ____________________________________________________________  
  
Arnoldo Santos Provider Signature:  ____________________________________________________________ Date:  ____________________________________________________________

## 2018-04-15 LAB
ATRIAL RATE: 47 BPM
CALCULATED P AXIS, ECG09: 102 DEGREES
CALCULATED R AXIS, ECG10: -37 DEGREES
CALCULATED T AXIS, ECG11: 26 DEGREES
DIAGNOSIS, 93000: NORMAL
GLUCOSE BLD STRIP.AUTO-MCNC: 106 MG/DL (ref 70–110)
GLUCOSE BLD STRIP.AUTO-MCNC: 111 MG/DL (ref 70–110)
GLUCOSE BLD STRIP.AUTO-MCNC: 88 MG/DL (ref 70–110)
GLUCOSE BLD STRIP.AUTO-MCNC: 94 MG/DL (ref 70–110)
P-R INTERVAL, ECG05: 164 MS
Q-T INTERVAL, ECG07: 496 MS
QRS DURATION, ECG06: 116 MS
QTC CALCULATION (BEZET), ECG08: 438 MS
VENTRICULAR RATE, ECG03: 47 BPM

## 2018-04-15 PROCEDURE — 97530 THERAPEUTIC ACTIVITIES: CPT

## 2018-04-15 PROCEDURE — 82962 GLUCOSE BLOOD TEST: CPT

## 2018-04-15 PROCEDURE — 77030020245 HC SOL INJ 5% D/0.9%NACL

## 2018-04-15 PROCEDURE — 74011000258 HC RX REV CODE- 258: Performed by: HOSPITALIST

## 2018-04-15 PROCEDURE — 96374 THER/PROPH/DIAG INJ IV PUSH: CPT

## 2018-04-15 PROCEDURE — 97162 PT EVAL MOD COMPLEX 30 MIN: CPT

## 2018-04-15 PROCEDURE — 74011000250 HC RX REV CODE- 250: Performed by: HOSPITALIST

## 2018-04-15 PROCEDURE — 99218 HC RM OBSERVATION: CPT

## 2018-04-15 PROCEDURE — 74011250636 HC RX REV CODE- 250/636: Performed by: HOSPITALIST

## 2018-04-15 PROCEDURE — 93880 EXTRACRANIAL BILAT STUDY: CPT

## 2018-04-15 PROCEDURE — 96372 THER/PROPH/DIAG INJ SC/IM: CPT

## 2018-04-15 PROCEDURE — 96376 TX/PRO/DX INJ SAME DRUG ADON: CPT

## 2018-04-15 PROCEDURE — 96361 HYDRATE IV INFUSION ADD-ON: CPT

## 2018-04-15 PROCEDURE — 74011250637 HC RX REV CODE- 250/637: Performed by: HOSPITALIST

## 2018-04-15 RX ORDER — AMLODIPINE BESYLATE 10 MG/1
10 TABLET ORAL DAILY
Status: DISCONTINUED | OUTPATIENT
Start: 2018-04-15 | End: 2018-04-16 | Stop reason: HOSPADM

## 2018-04-15 RX ORDER — HYDROCHLOROTHIAZIDE 25 MG/1
25 TABLET ORAL DAILY
Status: DISCONTINUED | OUTPATIENT
Start: 2018-04-15 | End: 2018-04-16 | Stop reason: HOSPADM

## 2018-04-15 RX ADMIN — CYANOCOBALAMIN TAB 1000 MCG 1000 MCG: 1000 TAB at 09:21

## 2018-04-15 RX ADMIN — ASPIRIN 325 MG ORAL TABLET 325 MG: 325 PILL ORAL at 09:20

## 2018-04-15 RX ADMIN — AMLODIPINE BESYLATE 10 MG: 10 TABLET ORAL at 12:49

## 2018-04-15 RX ADMIN — TAMSULOSIN HYDROCHLORIDE 0.4 MG: 0.4 CAPSULE ORAL at 22:23

## 2018-04-15 RX ADMIN — DEXTROSE MONOHYDRATE AND SODIUM CHLORIDE 0.75 ML/KG/HR: 5; .9 INJECTION, SOLUTION INTRAVENOUS at 07:23

## 2018-04-15 RX ADMIN — ENOXAPARIN SODIUM 40 MG: 40 INJECTION SUBCUTANEOUS at 13:27

## 2018-04-15 RX ADMIN — HYDROCHLOROTHIAZIDE 25 MG: 25 TABLET ORAL at 12:49

## 2018-04-15 RX ADMIN — FOLIC ACID: 5 INJECTION, SOLUTION INTRAMUSCULAR; INTRAVENOUS; SUBCUTANEOUS at 16:34

## 2018-04-15 RX ADMIN — DOCUSATE SODIUM 100 MG: 100 CAPSULE, LIQUID FILLED ORAL at 22:23

## 2018-04-15 RX ADMIN — DONEPEZIL HYDROCHLORIDE 10 MG: 5 TABLET, FILM COATED ORAL at 09:20

## 2018-04-15 RX ADMIN — ATORVASTATIN CALCIUM 80 MG: 40 TABLET, FILM COATED ORAL at 09:20

## 2018-04-15 NOTE — PROGRESS NOTES
Problem: Falls - Risk of  Goal: *Absence of Falls  Document Sheba Fall Risk and appropriate interventions in the flowsheet.    Outcome: Progressing Towards Goal  Fall Risk Interventions:  Mobility Interventions: Communicate number of staff needed for ambulation/transfer, Patient to call before getting OOB         Medication Interventions: Evaluate medications/consider consulting pharmacy    Elimination Interventions: Call light in reach

## 2018-04-15 NOTE — PROGRESS NOTES
Assumed care of pt from  julienne [facility] pt awake in bed A&O x 4 , no distress noted and denies pain. Frequently used items and call bell within reach. Patient verbalized understanding to use call bell for any needs or assistance. Bed locked in lowest position. Dual NIH performed. Bedside and Verbal shift change report given to [address] julienne RN (oncoming nurse) by Tabatha Templeton RN (offgoing nurse). Report included the following information SBAR, Kardex, Intake/Output, MAR and Recent Results. Dual NIH performed.

## 2018-04-15 NOTE — PROGRESS NOTES
Report received from Fernando Adams RN,including sbar,mar,kardex. Bathed and bed changed. 0086 Bedside shift change report given to [address] (oncoming nurse) by Regina Fam (offgoing nurse). Report included the following information SBAR, Kardex and MAR.

## 2018-04-15 NOTE — PROGRESS NOTES
Medicine Progress Note    Patient: Nan Parker   Age:  76 y.o.  DOA: 4/14/2018   Admit Dx / CC: TIA (transient ischemic attack)  LOS:  LOS: 0 days     Assessment/Plan   Active Problems:    TIA (transient ischemic attack) (4/14/2018)        Additional Plan notes     1)  TIA   - history of previous ones   - MRI brain negative   - some plagues/stenosis seen on MRA's will get US neck   - Cartoid US pending   - PT/ot   - Echo Grade I diastolic   - Plan on reviewing Carotid US tomorrow with likely dc after. DISPO       Anticipated Date of Discharge: tomorrow  Anticipated Disposition (home, SNF) : home     Subjective:   Patient seen and examined. NAD, no complaints, he is anxious to get home    Objective:     Visit Vitals    /82 (BP 1 Location: Left arm, BP Patient Position: At rest;Supine)    Pulse (!) 53    Temp 97.4 °F (36.3 °C)    Resp 16    Ht 5' 10\" (1.778 m)    Wt 99.8 kg (220 lb)    SpO2 95%    BMI 31.57 kg/m2       Physical Exam:  General appearance: alert, cooperative, no distress, appears stated age  Head: Normocephalic, without obvious abnormality, atraumatic  Neck: supple, trachea midline  Lungs: clear to auscultation bilaterally  Heart: regular rate and rhythm, S1, S2 normal, no murmur, click, rub or gallop  Abdomen: soft, non-tender.  Bowel sounds normal. No masses,  no organomegaly  Extremities: extremities normal, atraumatic, no cyanosis or edema  Skin: Skin color, texture, turgor normal. No rashes or lesions  Neurologic: Grossly normal, has RLE prosthesis    Intake and Output:  Current Shift:  04/15 0701 - 04/15 1900  In: -   Out: 100 [Urine:100]  Last three shifts:  04/13 1901 - 04/15 0700  In: 1063.3 [I.V.:1063.3]  Out: 700 [Urine:700]    Lab/Data Reviewed:  CMP: No results found for: NA, K, CL, CO2, AGAP, GLU, BUN, CREA, GFRAA, GFRNA, CA, MG, PHOS, ALB, TBIL, TP, ALB, GLOB, AGRAT, SGOT, ALT, GPT  CBC: No results found for: WBC, HGB, HGBEXT, HCT, HCTEXT, PLT, PLTEXT, HGBEXT, HCTEXT, PLTEXT  All Cardiac Markers in the last 24 hours: No results found for: CPK, CK, CKMMB, CKMB, RCK3, CKMBT, CKNDX, CKND1, ANNE, TROPT, TROIQ, NALINI, TROPT, TNIPOC, BNP, BNPP    Medications Reviewed:  Current Facility-Administered Medications   Medication Dose Route Frequency    amLODIPine (NORVASC) tablet 10 mg  10 mg Oral DAILY    hydroCHLOROthiazide (HYDRODIURIL) tablet 25 mg  25 mg Oral DAILY    atorvastatin (LIPITOR) tablet 80 mg  80 mg Oral DAILY    cyanocobalamin tablet 1,000 mcg  1,000 mcg Oral DAILY    docusate sodium (COLACE) capsule 100 mg  100 mg Oral QHS    donepezil (ARICEPT) tablet 10 mg  10 mg Oral DAILY    tamsulosin (FLOMAX) capsule 0.4 mg  0.4 mg Oral QHS    ondansetron (ZOFRAN) injection 2 mg  2 mg IntraVENous Q6H PRN    aspirin (ASPIRIN) tablet 325 mg  325 mg Oral DAILY    acetaminophen (TYLENOL) suppository 650 mg  650 mg Rectal Q4H PRN    albuterol (PROVENTIL VENTOLIN) nebulizer solution 2.5 mg  2.5 mg Nebulization R7U PRN    folic acid (FOLVITE) 1 mg, thiamine (B-1) 100 mg in 0.9% sodium chloride 50 mL ivpb   IntraVENous ACD    enoxaparin (LOVENOX) injection 40 mg  40 mg SubCUTAneous Q24H       Ny Harris MD    April 15, 2018

## 2018-04-15 NOTE — PROGRESS NOTES
Report received from Fernando Adams RN,including sbar,mar,kardex. 0100 incontinent, Bathed and bed changed. Urinal at bedside. 0715 Bedside shift change report given to Chip (oncoming nurse) by Lance Ryan RN (offgoing nurse). Report included the following information SBAR, Kardex and MAR.

## 2018-04-15 NOTE — PROGRESS NOTES
Care Management Interventions  PCP Verified by CM: Yes  Palliative Care Criteria Met (RRAT>21 & CHF Dx)?: No  Mode of Transport at Discharge: Self  Transition of Care Consult (CM Consult): Discharge Planning  Discharge Durable Medical Equipment: No  Physical Therapy Consult: Yes  Occupational Therapy Consult: No  Speech Therapy Consult: No  Current Support Network: Lives with Spouse, Relative's Home (Son)  Confirm Follow Up Transport: Family  Plan discussed with Pt/Family/Caregiver: Yes  Discharge Location  Discharge Placement: Home     Spoke with patient in room, He stated that he lives with his wife and son in the son's home. Patient stated that he used a walker, power scooter and cane at home and was mostly independent with ADL's. HX right BKA with prothesis  Patient has designated __________spouse and Son______________ to participate in his/her discharge plan and to receive any needed information. Jhoan Britton [phone] and Cynthia King [phone]  Patient stated that he had been in a rehab before and was not interested in one now. Inquired about home health and again he declined the need. Patient is to be evaluated by PT pending evaluation. He plans to return home at this time with family. He verified his demographics, insurance and PCP as correct on the facesheet. Reason for Admission:     OBS   TIA (transient ischemic attack            RRAT Score:    21            Do you (patient/family) have any concerns for transition/discharge? He expressed no concern            Plan for utilizing home health:     Patient denies need  Likelihood of readmission?    moderate   to high    Transition of Care Plan:      Home        Patient and/or next of kin has been given the Outpatient Observation Information and Notification letter and all questions answered. Patient and/or next of kin has been given and has signed the [facility] Outpatient Observation  Notification letter and all questions answered.  Copy of this notice given to patient and copy placed on chart. Patient and/or next of kin has been given the Outpatient Observation Information and Notification letter and all questions answered.

## 2018-04-15 NOTE — PROCEDURES
[facility]  *** FINAL REPORT ***    Name: Manoj Aguilar  MRN: [id number]    Inpatient  : 30 Sep 1940  HIS Order #: [id number]  [address] [facility] Visit #: [id number]  Date: 15 Apr 2018    TYPE OF TEST: Cerebrovascular Duplex    REASON FOR TEST  Transient ischemic attacks < 6 weeks    Right Carotid:-             Proximal               Mid                 Distal  cm/s  Systolic  Diastolic  Systolic  Diastolic  Systolic  Diastolic  CCA:     82.3      12.5       66.0      11.2       46.7      10.0  Bulb:  ECA:     85.4      10.3  ICA:    156.1      35.0      176.8      44.6       83.6      22.5  ICA/CCA:  2.7       4.0    ICA Stenosis: 50-69%    Right Vertebral:-  Finding: Antegrade  Sys:       46.1  Aziza:       13.1    Right Subclavian: Normal    Left Carotid:-            Proximal                Mid                 Distal  cm/s  Systolic  Diastolic  Systolic  Diastolic  Systolic  Diastolic  CCA:     78.0      13.1       67.3      14.3       47.9      10.8  Bulb:  ECA:     69.0      11.7  ICA:     84.5      25.8       67.1      22.5       85.0      31.9  ICA/CCA:  1.1       2.4    ICA Stenosis: <50%    Left Vertebral:-  Finding: Antegrade  Sys:       37.1  Aziza:       10.8    Left Subclavian: Normal    INTERPRETATION/FINDINGS  Duplex images were obtained using 2-D gray scale, color flow, and  spectral Doppler analysis. 1. 50-69% stenosis in the right internal carotid artery. 2. <50% stenosis in the left internal carotid artery. 3. No significant stenosis in the external carotid arteries  bilaterally. 4. Antegrade flow in both vertebral arteries. 5. Normal flow in both subclavian arteries. Plaque Morphology:  1. Irregular varying density plaque in the bulb and right ICA. 2. Calcified plaque in the bulb and left ICA. Retrospective Comparison:  1. There has been a significant increase in the degree of stenosis in  the right internal carotid artery as compared to the previous exam on  2016.     ADDITIONAL COMMENTS    I have personally reviewed the data relevant to the interpretation of  this  study. TECHNOLOGIST: Antonia Monzon RDMS, RVT  Signed: 04/15/2018 12:03 PM    PHYSICIAN: Susan Jerez.  Mackenzie Gottlieb MD  Signed: 04/16/2018 12:45 AM

## 2018-04-15 NOTE — PROGRESS NOTES
Problem: Falls - Risk of  Goal: *Absence of Falls  Document Sheba Fall Risk and appropriate interventions in the flowsheet.    Outcome: Progressing Towards Goal  Fall Risk Interventions:  Mobility Interventions: Communicate number of staff needed for ambulation/transfer, Patient to call before getting OOB         Medication Interventions: Evaluate medications/consider consulting pharmacy    Elimination Interventions: Call light in reach             Problem: TIA/CVA Stroke: 0-24 hours  Goal: Monitor for complications post-thrombolytic infusion  Outcome: Resolved/Met Date Met: 04/15/18  Did not have thrombolytics

## 2018-04-15 NOTE — PROGRESS NOTES
Problem: Mobility Impaired (Adult and Pediatric)  Goal: *Acute Goals and Plan of Care (Insert Text)  Physical Therapy Goals  Initiated 4/15/2018 and to be accomplished within 7 day(s)  1. Patient will move from supine to sit and sit to supine , scoot up and down and roll side to side in bed with modified independence. 2.  Patient will transfer from bed to chair and chair to bed with modified independence using the least restrictive device. 3.  Patient will perform sit to stand with modified independence. 4.  Patient will ambulate with modified independence for >/= 100 feet with the least restrictive device. 5.  Patient will ascend/descend 1 stairs with 1 handrail(s) with modified independence. 6.  Patient will demonstrate independence with performance of home exercise program.     physical Therapy EVALUATION    Patient: Malissa Delvalle (76 y.o. male)  Date: 4/15/2018  Primary Diagnosis: TIA (transient ischemic attack)        Precautions:       PLOF: Ambulatory with rollator    ASSESSMENT :   Patient requires between minimal assistance/contact guard assist for bed mobility, transfers and ambulation. C/o generalized weakness with decrease activity tolerance and functional mobility independence. Required min Assist supine to sit. Sitting edge of bed, scoot up towards head of bed with CGA, increased time and effort to complete task. Min Assist sit to stand x 2 attempts. 1st attempt, stood with rolling walker x 10 seconds with CGA. 2nd attempt, ambulated with rolling walker 2 feet with Min Assist.  Will benefit from skilled PT intervention to increase overall functional mobility independence and safety. Patient presents with deficits in:   Bed Mobility, Transfers, Gait, Balance and Stairs    Patient will benefit from skilled intervention to address the above impairments.   Patients rehabilitation potential is considered to be Good  Factors which may influence rehabilitation potential include:   [] None noted  []         Mental ability/status  [x]         Medical condition  []         Home/family situation and support systems  []         Safety awareness  []         Pain tolerance/management  []         Other:     Recommendations for nursing:   Written on communication board: OOB with assist of 2 for safety  Verbally communicated to: nurse Saunders     PLAN :  Recommendations and Planned Interventions:  [x]           Bed Mobility Training             []    Neuromuscular Re-Education  [x]           Transfer Training                   []    Orthotic/Prosthetic Training  [x]           Gait Training                          []    Modalities  [x]           Therapeutic Exercises          []    Edema Management/Control  []           Therapeutic Activities            [x]    Patient and Family Training/Education*  [x]           Other (comment):Plan of care, bed mobility, transfers, gait with rolling walker    Frequency/Duration: Patient will be followed by physical therapy 1 time per day/4-7 days per week to address goals. Discharge Recommendations: Home Health versus [facility] Equipment Recommendations for Discharge: NA     SUBJECTIVE:   Patient stated [place] feeling weak, fighting stomach flu.     OBJECTIVE DATA SUMMARY:     Past Medical History:   Diagnosis Date    Advance directive in chart 6/13/2016    CAD (coronary artery disease)     Cancer ([address] 2003    Colon    CKD (chronic kidney disease), stage III 3/27/2016    CVA (cerebral vascular accident) ([address] 3/26/2016    Femoral artery aneurysm, left ([address]     Heart attack ([address] 2008    Heart attack ([address]     Hernia     History of TIAs     Hyperlipidemia     Hypertension 3/27/2016    Iliac artery aneurysm, left ([address]     Pure hypercholesterolemia 9/14/2012    PVD (peripheral vascular disease) ([address] 3/27/2016    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 ([address] 3/26/2016     Past Surgical History:   Procedure Laterality Date    ABDOMEN SURGERY PROC UNLISTED  2011    3 stents placed into abdomen (groin)    CARDIAC SURG PROCEDURE UNLIST  2005    Quadruple Bypass    CARDIAC SURG PROCEDURE UNLIST  2011    Aortic pig valve placed    COLONOSCOPY N/A 6/29/2016    COLON[facility]re [facility] ENDOSCOPY    ENDOSCOPY, COLON, DIAGNOSTIC      HX AAA REPAIR      HX ORTHOPAEDIC  2008    Right Leg Amputated     Barriers to Learning/Limitations: none  Compensate with: visual, verbal, tactile, kinesthetic cues/model    G CODE:Mobility  Current  CL= 60-79%   Goal  CI= 1-19%. The severity rating is based on the Other [employer] Balance Scale    Eval Complexity: History: MEDIUM  Complexity : 1-2 comorbidities / personal factors will impact the outcome/ POC Exam:MEDIUM Complexity : 3 Standardized tests and measures addressing body structure, function, activity limitation and / or participation in recreation  Presentation: MEDIUM Complexity : Evolving with changing characteristics  Clinical Decision Making:Medium Complexity [facility] Standing Balance Scale Overall Complexity:MEDIUM    [employer] Balance Scale  0: Pt performs 25% or less of standing activity (Max assist) CN, 100% impaired. 1: Pt supports self with upper extremities but requires therapist assistance. Pt performs 25-50% of effort (Mod assist) CM, 80% to <100% impaired. 1+: Pt supports self with upper extremities but requires therapist assistance. Pt performs >50% effort. (Min assist). CL, 60% to <80% impaired. 2: Pt supports self independently with both upper extremities (walker, crutches, parallel bars). CL, 60% to <80% impaired. 2+: Pt support self independently with 1 upper extremity (cane, crutch, 1 parallel bar). CK, 40% to <60% impaired. 3: Pt stands without upper extremity support for up to 30 seconds. CK, 40% to <60% impaired.   3+: Pt stands without upper extremity support for 30 seconds or greater. CJ, 20% to <40% impaired. 4: Pt independently moves and returns center of gravity 1-2 inches in one plane. CJ, 20% to <40% impaired. 4+: Pt independently moves and returns center of gravity 1-2 inches in multiple planes. CI, 1% to <20% impaired. 5: Pt independently moves and returns center of gravity in all planes greater than 2 inches. CH, 0% impaired. Prior Level of Function/Home Situation: Ambulatory with rollator  Home Situation  Home Environment: Private residence  # Steps to Enter: 1  Rails to Enter: Yes  Hand Rails : Left  One/Two Story Residence: Split level  Living Alone: No  Support Systems: Child(harpreet), Family member(s)  Patient Expects to be Discharged to[de-identified] Private residence  Current DME Used/Available at Home: [address], [address], quad, Walker, rollator  Critical Behavior:  Neurologic State: Alert  Orientation Level: Oriented X4  Cognition: Follows commands  Safety/Judgement: Awareness of environment; Fall prevention  Psychosocial  Patient Behaviors: Calm; Cooperative  Family  Behaviors: Calm;Supportive  Needs Expressed: Educational  Purposeful Interaction: Yes  Pt Identified Daily Priority: Clinical issues (comment)  Caritas Process: Attend basic human needs  Caring Interventions: Reassure  Reassure: Caring rounds  Therapeutic Modalities: Humor  Family  Behaviors: Calm;Supportive      Strength:    Strength: Generally decreased, functional (both LE)      Tone & Sensation:   Tone: Normal (Both LE)       Range Of Motion:  AROM: Generally decreased, functional (both LE)      Functional Mobility:  Bed Mobility:  Supine to Sit: Minimum assistance  Sit to Supine: Contact guard assistance;Minimum assistance  Scooting: Stand-by assistance (bed antigravity position)  Transfers:  Sit to Stand: Minimum assistance  Stand to Sit: Minimum assistance  Balance:   Sitting: Impaired; With support  Sitting - Static: Fair (occasional)  Sitting - Dynamic: Fair (occasional) (Minus)  Standing: Impaired; With support  Standing - Static: Fair (Minus)  Standing - Dynamic : Poor (Plus)  Ambulation/Gait Training:  Distance (ft): 2 Feet (ft) (taking very short steps sideways)  Assistive Device: Walker, rolling  Ambulation - Level of Assistance: Minimal assistance  Gait Abnormalities: Decreased step clearance  Base of Support: Center of gravity altered  Speed/Ebony: Slow    Pain:  Pre treatment pain level:  0  Post treatment pain level:  0  Pain Scale 1: Numeric (0 - 10)  Pain Intensity 1: 0      Activity Tolerance:  Poor + Fatigue easily with exertion    Please refer to the flowsheet for vital signs taken during this treatment. After treatment:    []         Patient left in no apparent distress sitting up in chair  [x]         Patient left in no apparent distress in bed  [x]         Call bell left within reach  [x]         Nursing notified  []         Caregiver present  []         Bed alarm activated    COMMUNICATION/EDUCATION:*   [x]         Fall prevention education was provided and the patient/caregiver indicated understanding. [x]         Patient/family have participated as able in goal setting and plan of care. [x]         Patient/family agree to work toward stated goals and plan of care. []         Patient understands intent and goals of therapy, but is neutral about his/her participation. []         Patient is unable to participate in goal setting and plan of care.     Thank you for this referral.  Audie Long, PT   Time Calculation: 25 mins

## 2018-04-16 ENCOUNTER — HOME HEALTH ADMISSION (OUTPATIENT)
Dept: HOME HEALTH SERVICES | Facility: HOME HEALTH | Age: 78
End: 2018-04-16
Payer: MEDICARE

## 2018-04-16 VITALS
OXYGEN SATURATION: 97 % | HEIGHT: 70 IN | RESPIRATION RATE: 16 BRPM | DIASTOLIC BLOOD PRESSURE: 69 MMHG | HEART RATE: 52 BPM | SYSTOLIC BLOOD PRESSURE: 119 MMHG | WEIGHT: 220 LBS | TEMPERATURE: 97.3 F | BODY MASS INDEX: 31.5 KG/M2

## 2018-04-16 LAB
GLUCOSE BLD STRIP.AUTO-MCNC: 118 MG/DL (ref 70–110)
GLUCOSE BLD STRIP.AUTO-MCNC: 97 MG/DL (ref 70–110)

## 2018-04-16 PROCEDURE — 77030010545

## 2018-04-16 PROCEDURE — 74011250637 HC RX REV CODE- 250/637: Performed by: HOSPITALIST

## 2018-04-16 PROCEDURE — 99218 HC RM OBSERVATION: CPT

## 2018-04-16 PROCEDURE — 82962 GLUCOSE BLOOD TEST: CPT

## 2018-04-16 RX ORDER — HYDROCHLOROTHIAZIDE 25 MG/1
25 TABLET ORAL DAILY
Qty: 30 TAB | Refills: 0 | Status: SHIPPED | OUTPATIENT
Start: 2018-04-16 | End: 2018-04-24 | Stop reason: SDUPTHER

## 2018-04-16 RX ORDER — AMLODIPINE BESYLATE 10 MG/1
10 TABLET ORAL DAILY
Qty: 30 TAB | Refills: 0 | Status: SHIPPED | OUTPATIENT
Start: 2018-04-16 | End: 2018-04-24 | Stop reason: SDUPTHER

## 2018-04-16 RX ORDER — ASPIRIN 325 MG/1
100 TABLET, FILM COATED ORAL DAILY
Status: DISCONTINUED | OUTPATIENT
Start: 2018-04-16 | End: 2018-04-16 | Stop reason: HOSPADM

## 2018-04-16 RX ORDER — ASPIRIN 325 MG
325 TABLET ORAL DAILY
Qty: 30 TAB | Refills: 0 | Status: SHIPPED | OUTPATIENT
Start: 2018-04-16 | End: 2018-04-24 | Stop reason: SDUPTHER

## 2018-04-16 RX ORDER — ATORVASTATIN CALCIUM 80 MG/1
80 TABLET, FILM COATED ORAL DAILY
Qty: 30 TAB | Refills: 0 | Status: SHIPPED | OUTPATIENT
Start: 2018-04-16 | End: 2018-04-24 | Stop reason: DRUGHIGH

## 2018-04-16 RX ORDER — FOLIC ACID 1 MG/1
1 TABLET ORAL DAILY
Status: DISCONTINUED | OUTPATIENT
Start: 2018-04-16 | End: 2018-04-16 | Stop reason: HOSPADM

## 2018-04-16 RX ADMIN — AMLODIPINE BESYLATE 10 MG: 10 TABLET ORAL at 10:04

## 2018-04-16 RX ADMIN — CYANOCOBALAMIN TAB 1000 MCG 1000 MCG: 1000 TAB at 10:04

## 2018-04-16 RX ADMIN — FOLIC ACID 1 MG: 1 TABLET ORAL at 10:04

## 2018-04-16 RX ADMIN — DONEPEZIL HYDROCHLORIDE 10 MG: 5 TABLET, FILM COATED ORAL at 10:04

## 2018-04-16 RX ADMIN — ATORVASTATIN CALCIUM 80 MG: 40 TABLET, FILM COATED ORAL at 10:04

## 2018-04-16 RX ADMIN — Medication 100 MG: at 10:04

## 2018-04-16 RX ADMIN — ASPIRIN 325 MG ORAL TABLET 325 MG: 325 PILL ORAL at 10:04

## 2018-04-16 RX ADMIN — HYDROCHLOROTHIAZIDE 25 MG: 25 TABLET ORAL at 10:04

## 2018-04-16 NOTE — PROGRESS NOTES
Problem: Falls - Risk of  Goal: *Absence of Falls  Document Sheba Fall Risk and appropriate interventions in the flowsheet.    Outcome: Progressing Towards Goal  Fall Risk Interventions:  Mobility Interventions: Communicate number of staff needed for ambulation/transfer         Medication Interventions: Evaluate medications/consider consulting pharmacy, Patient to call before getting OOB    Elimination Interventions: Call light in reach, Urinal in reach

## 2018-04-16 NOTE — PROGRESS NOTES
Interdisciplinary STROKE Rounds AIS/TIA       Recommendations:     1. No acute stroke on MRI, non-significant ipselateral carotid stenosis. Herlinda Moore MD        Estimated discharge date: 04/16/2018  Issues pending for discharge: none    Stroke Risk Factors: HLD, CAD, HTN, PAD, DM, TIA, CVA    BP on arrival: 149/66   Antithrombotic by Day 2:  Yes   Statin:  Yes   VTE Prophylaxis:  Yes   Anticoagulant for a-fib (if indicated):  NA   Dysphagia Screen:  Yes   Therapies:       1. Physical Therapy consult:  Bedrest: no  Frequency/Duration: Patient will be followed by physical therapy 1 time per day/4-7 days per week to address goals. Discharge Recommendations: Home Health versus [facility]  Further Equipment Recommendations for Discharge: NA        2. Occupational Therapy consult:  Pending eval        3. Speech Therapy consult:   Pending eval   Nutrition Consult 1. Cardiac diet  2. Monitor weight, labs and PO intake  3. RD to follow    Stroke Care Plan:  Yes   Stroke Education Book Given: Yes   Smoking cessation given: no   Current NIHSS Total: 0 (04/16/18 0805)   Pre-mRS 0   Current mRS 4     TOAST CRITERIA Ischemic /TIA     Large-Artery Atherosclerosis: Greater than 50% stenosis/occlusion ;  Infarcts greater than 1.5 CM in diameter on scan; exlusion of cardiac sources of embolus.     Cardioembolism: greater than or equal to 1 cardiac source identified;potential large artery sources eliminated; a medium risk source of cardioembolism and no other cause of stroke is classified as possible cardioembolism.     Small-Artery Occulsion (lacune):traditional clinical lacunar syndromes; infarcts less than 1.5 cm; cardiac source excluded; extracranial large-artery stenosis less than 50%     Stroke of Other Etiology:rare causes;hypercoagulability with large-artery and cardiac sources excluded     Stroke of Unknown Etiology: causes cannot be determined with confidence     Stroke of Multiple Etiologies: 2+ potential causes so that the physician is unable to determine final diagnosis    x N/A: diagnosis other than stroke is made       Discipline Participation:   Interdisciplinary rounds were conducted by: [employer], Stroke Coordinator and PT/OT/SLP Rep

## 2018-04-16 NOTE — PROGRESS NOTES
Problem: Falls - Risk of  Goal: *Absence of Falls  Document Sheba Fall Risk and appropriate interventions in the flowsheet.    Outcome: Progressing Towards Goal  Fall Risk Interventions:  Mobility Interventions: Communicate number of staff needed for ambulation/transfer         Medication Interventions: Patient to call before getting OOB, Evaluate medications/consider consulting pharmacy    Elimination Interventions: Patient to call for help with toileting needs, Call light in reach

## 2018-04-16 NOTE — PROGRESS NOTES
Pt discharged home, accompanied by CNA via wheelchair. Pt has all discharge instructions, along with prescriptions and belongings. Voiced understanding of all discharge instructions.

## 2018-04-16 NOTE — PROGRESS NOTES
Offered the pt FOC for home care, he chose [facility]. Pt verified the contact information on the face sheet is correct. Referral sent to intake via [facility] and called to the [address] [place] [address] for f/u. Care Management Interventions  PCP Verified by CM:  Yes  Palliative Care Criteria Met (RRAT>21 & CHF Dx)?: No  Mode of Transport at Discharge: Self  Transition of Care Consult (CM Consult): [address]: Yes  Discharge Durable Medical Equipment: No  Physical Therapy Consult: Yes  Occupational Therapy Consult: Yes  Speech Therapy Consult: No  Current Support Network: Lives with Spouse, Relative's Home (Son)  Confirm Follow Up Transport: Family  Plan discussed with Pt/Family/Caregiver: Yes  Freedom of Choice Offered: Yes  Discharge Location  Discharge Placement: Home with home health

## 2018-04-16 NOTE — DISCHARGE INSTRUCTIONS
DISCHARGE SUMMARY from Nurse    PATIENT INSTRUCTIONS:    After general anesthesia or intravenous sedation, for 24 hours or while taking prescription Narcotics:  · Limit your activities  · Do not drive and operate hazardous machinery  · Do not make important personal or business decisions  · Do  not drink alcoholic beverages  · If you have not urinated within 8 hours after discharge, please contact your surgeon on call. Report the following to your surgeon:  · Excessive pain, swelling, redness or odor of or around the surgical area  · Temperature over 100.5  · Nausea and vomiting lasting longer than 4 hours or if unable to take medications  · Any signs of decreased circulation or nerve impairment to extremity: change in color, persistent  numbness, tingling, coldness or increase pain  · Any questions    What to do at Home:  Recommended activity: Activity as tolerated    If you experience any of the following symptoms as listed in the discharge instruction as \"When to Call for Help\", please follow up with your primary care physician and/or call 911. *  Please give a list of your current medications to your Primary Care Provider. *  Please update this list whenever your medications are discontinued, doses are      changed, or new medications (including over-the-counter products) are added. *  Please carry medication information at all times in case of emergency situations. These are general instructions for a healthy lifestyle:    No smoking/ No tobacco products/ Avoid exposure to second hand smoke  Surgeon General's Warning:  Quitting smoking now greatly reduces serious risk to your health.     Obesity, smoking, and sedentary lifestyle greatly increases your risk for illness    A healthy diet, regular physical exercise & weight monitoring are important for maintaining a healthy lifestyle    You may be retaining fluid if you have a history of heart failure or if you experience any of the following symptoms:  Weight gain of 3 pounds or more overnight or 5 pounds in a week, increased swelling in our hands or feet or shortness of breath while lying flat in bed. Please call your doctor as soon as you notice any of these symptoms; do not wait until your next office visit. Recognize signs and symptoms of STROKE:    F-face looks uneven    A-arms unable to move or move unevenly    S-speech slurred or non-existent    T-time-call 911 as soon as signs and symptoms begin-DO NOT go       Back to bed or wait to see if you get better-TIME IS BRAIN. Warning Signs of HEART ATTACK     Call 911 if you have these symptoms:   Chest discomfort. Most heart attacks involve discomfort in the center of the chest that lasts more than a few minutes, or that goes away and comes back. It can feel like uncomfortable pressure, squeezing, fullness, or pain.  Discomfort in other areas of the upper body. Symptoms can include pain or discomfort in one or both arms, the back, neck, jaw, or stomach.  Shortness of breath with or without chest discomfort.  Other signs may include breaking out in a cold sweat, nausea, or lightheadedness. Don't wait more than five minutes to call 911 - MINUTES MATTER! Fast action can save your life. Calling 911 is almost always the fastest way to get lifesaving treatment. Emergency Medical Services staff can begin treatment when they arrive -- up to an hour sooner than if someone gets to the hospital by car. The discharge information has been reviewed with the patient and spouse. The patient and spouse verbalized understanding. Discharge medications reviewed with the patient and spouse and appropriate educational materials and side effects teaching were provided.   ___________________________________________________________________________________________________________________________________           Transient Ischemic Attack: Care Instructions  Your Care Instructions    A transient ischemic attack (TIA) is when blood flow to a part of your brain is blocked for a short time. A TIA is like a stroke but usually lasts only a few minutes. A TIA does not cause lasting brain damage. Any vision problems, slurred speech, or other symptoms usually go away in 10 to 20 minutes. But they may last for up to 24 hours. TIAs are often warning signs of a stroke. Some people who have a TIA may have a stroke in the future. A stroke can cause symptoms like those of a TIA. But a stroke causes lasting damage to your brain. You can take steps to help prevent a stroke. One thing you can do is get early treatment. If you have other new symptoms, or if your symptoms do not get better, go back to the emergency room or call your doctor right away. Getting treatment right away may prevent long-term brain damage caused by a stroke. The doctor has checked you carefully, but problems can develop later. If you notice any problems or new symptoms, get medical treatment right away. Follow-up care is a key part of your treatment and safety. Be sure to make and go to all appointments, and call your doctor if you are having problems. It's also a good idea to know your test results and keep a list of the medicines you take. How can you care for yourself at home? Medicines  ? · Be safe with medicines. Take your medicines exactly as prescribed. Call your doctor if you think you are having a problem with your medicine. ? · If you take a blood thinner, such as aspirin, be sure you get instructions about how to take your medicine safely. Blood thinners can cause serious bleeding problems. ? · Call your doctor if you are not able to take your medicines for any reason. ? · Do not take any over-the-counter medicines or herbal products without talking to your doctor first.   ? · If you take birth control pills or hormone therapy, talk to your doctor. Ask if these treatments are right for you. ? Lifestyle changes  ? · Do not smoke.  If you need help quitting, talk to your doctor about stop-smoking programs and medicines. ? · Be active. If your doctor recommends it, get more exercise. Walking is a good choice. Bit by bit, increase the amount you walk every day. Try for at least 30 minutes on most days of the week. You also may want to swim, bike, or do other activities. ? · Eat heart-healthy foods. These include fruits, vegetables, high-fiber foods, fish, and foods that are low in sodium, saturated fat, and trans fat. ? · Stay at a healthy weight. Lose weight if you need to.   ? · Limit alcohol to 2 drinks a day for men and 1 drink a day for women. ?Staying healthy  ? · Manage other health problems such as diabetes, high blood pressure, and high cholesterol. ? · Get the flu vaccine every year. When should you call for help? Call 911 anytime you think you may need emergency care. For example, call if:  ? · You have new or worse symptoms of a stroke. These may include:  ¨ Sudden numbness, tingling, weakness, or loss of movement in your face, arm, or leg, especially on only one side of your body. ¨ Sudden vision changes. ¨ Sudden trouble speaking. ¨ Sudden confusion or trouble understanding simple statements. ¨ Sudden problems with walking or balance. ¨ A sudden, severe headache that is different from past headaches. Call 911 even if these symptoms go away in a few minutes. ? · You feel like you are having another TIA. ? Watch closely for changes in your health, and be sure to contact your doctor if you have any problems. Where can you learn more? Go to http://andrae-raphael.info/. Enter ([phone] in the search box to learn more about \"Transient Ischemic Attack: Care Instructions. \"  Current as of: March 20, 2017  Content Version: 11.4  © 5710-1199 [employer]. Care instructions adapted under license by [employer] (which disclaims liability or warranty for this information).  If you have questions about a medical condition or this instruction, always ask your healthcare professional. [place] [address] any warranty or liability for your use of this information.

## 2018-04-16 NOTE — PROGRESS NOTES
conducted an initial consultation and Spiritual Assessment for [employer], who is a 76 y.o.,male. Patients Primary Language is: [place]. According to the patients EMR Mu-ism Affiliation is: [place]. The reason the Patient came to the hospital is:   Patient Active Problem List    Diagnosis Date Noted    TIA (transient ischemic attack) 04/14/2018    Vascular dementia without behavioral disturbance 09/44/2926    Diastolic dysfunction 55/51/4722    AAA (abdominal aortic aneurysm) without rupture ([facility] 01/04/2017    PAD (peripheral artery disease) ([facility] 09/21/2016    Advance directive in chart 06/13/2016    S/P CABG (coronary artery bypass graft) 04/13/2016    S/P AVR 04/13/2016    Hypertension 03/27/2016    CKD (chronic kidney disease), stage III 03/27/2016    Coronary artery disease involving native coronary artery without angina pectoris 01/27/2016    Pure hypercholesterolemia 09/14/2012        The  provided the following Interventions:  Initiated a relationship of care and support with patient in room 2109 this morning. Listened empathically as he talked and prayed about his time here and his hopes for a short recovery time. Patient is a retired . Provided information about Spiritual Care Services. Offered prayer and assurance of continued prayers on patients behalf. The following outcomes were achieved:  Patient shared limited information about his medical narrative and spiritual journey/beliefs. Patient processed feeling about current hospitalization. Patient expressed gratitude for pastoral care visit. Assessment:  Patient does not have any Mu-ism/cultural needs that will affect patients preferences in health care. There are no further spiritual or Mu-ism issues which require Spiritual Care Services interventions at this time.        Plan:  Chaplains will continue to follow and will provide pastoral care on an as needed/requested leeanne Downs [facility] Certified [address]   [phone]

## 2018-04-16 NOTE — DISCHARGE SUMMARY
[facility] [place]    [place] Summary    Patient: Jose Luis Sparks MRN: [id number]  CSN: [id number]    YOB: 1940  Age: 76 y.o. Sex: male    DOA: 4/14/2018 LOS:  LOS: 0 days   Discharge Date: 4/16/2018     Admission Diagnoses: TIA (transient ischemic attack)    Discharge Diagnoses:    Problem List as of 4/16/2018  Date Reviewed: 4/4/2018          Codes Class Noted - Resolved    * (Principal)TIA (transient ischemic attack) ICD-10-CM: G45.9  ICD-9-CM: 435.9  4/14/2018 - Present        Vascular dementia without behavioral disturbance ICD-10-CM: F01.50  ICD-9-CM: 290.40  3/8/2017 - Present        Diastolic dysfunction [address]: I51.9  ICD-9-CM: 429.9  2/26/2017 - Present    Overview Signed 2/26/2017  9:29 AM by Ashlee Pena MD     Grade 1 on echo 12/2016             AAA (abdominal aortic aneurysm) without rupture ([facility] ICD-10-CM: I71.4  ICD-9-CM: 441.4  1/4/2017 - Present        PAD (peripheral artery disease) ([facility] ICD-10-CM: I73.9  ICD-9-CM: 443.9  9/21/2016 - Present        Advance directive in chart ICD-10-CM: Z78.9  ICD-9-CM: V49.89  6/13/2016 - Present        S/P CABG (coronary artery bypass graft) ICD-10-CM: Z95.1  ICD-9-CM: V45.81  4/13/2016 - Present    Overview Addendum 4/13/2016  4:42 PM by Arlene Amor MD      [place], [place], 2005             S/P AVR ICD-10-CM: Z95.2  ICD-9-CM: V43.3  4/13/2016 - Present    Overview Addendum 2/26/2017  9:32 AM by Ashlee Pena MD     [address] [place] [address] porcine bioprostheses  Transvalvular velocity 2.8 m/s, mean gradient 15 mmHg on 12/31/2016             Hypertension ICD-10-CM: I10  ICD-9-CM: 401.9  3/27/2016 - Present        CKD (chronic kidney disease), stage III ICD-10-CM: N18.3  ICD-9-CM: 585.3  3/27/2016 - Present        Coronary artery disease involving native coronary artery without angina pectoris ICD-10-CM: I25.10  ICD-9-CM: 414.01  1/27/2016 - Present        Pure hypercholesterolemia ICD-10-CM: E78.00  ICD-9-CM: 272.0  9/14/2012 - Present        RESOLVED: Type 2 diabetes mellitus with nephropathy ([facility] ICD-10-CM: E11.21  ICD-9-CM: 250.40, 583.81  1/3/2018 - 4/4/2018        RESOLVED: Urinary frequency ICD-10-CM: R35.0  ICD-9-CM: 788.41  3/8/2017 - 4/20/2017        RESOLVED: Femoral artery aneurysm, left (HCC) ICD-10-CM: I72.4  ICD-9-CM: 442.3  1/4/2017 - 5/18/2017        RESOLVED: Iliac aneurysm ([facility] ICD-10-CM: I72.3  ICD-9-CM: 442.2  11/8/2016 - 11/29/2016        RESOLVED: Femoral artery aneurysm (HCC) ICD-10-CM: I72.4  ICD-9-CM: 442.3  11/8/2016 - 11/29/2016        RESOLVED: Atherosclerosis of both lower extremities with intermittent claudication ([facility] ICD-10-CM: I70.219  ICD-9-CM: 440.21  11/8/2016 - 11/29/2016        RESOLVED: Preoperative cardiovascular examination ICD-10-CM: Z01.810  ICD-9-CM: V72.81  10/30/2016 - 11/29/2016        RESOLVED: Iliac artery aneurysm, left (HCC) ICD-10-CM: I72.3  ICD-9-CM: 442.2  10/19/2016 - 11/29/2016        RESOLVED: Femoral artery aneurysm, left (HCC) ICD-10-CM: I72.4  ICD-9-CM: 442.3  10/19/2016 - 11/29/2016        RESOLVED: Hemispheric carotid artery syndrome ICD-10-CM: G45.1  ICD-9-CM: 435.8  9/21/2016 - 11/29/2016        RESOLVED: Carotid stenosis, symptomatic w/o infarct ICD-10-CM: I65.29  ICD-9-CM: 433.10  9/21/2016 - 11/29/2016        RESOLVED: Atherosclerosis of native arteries of extremities with intermittent claudication, bilateral legs ([facility] ICD-10-CM: I70.213  ICD-9-CM: 440.21  9/21/2016 - 11/29/2016        RESOLVED: History of AAA (abdominal aortic aneurysm) repair ICD-10-CM: S31.041  ICD-9-CM: V45.89  9/21/2016 - 11/29/2016        RESOLVED: Colon cancer ([facility] ICD-10-CM: C18.9  ICD-9-CM: 153.9  4/13/2016 - 6/13/2016        RESOLVED: Diabetes mellitus type 2, controlled ([facility] ICD-10-CM: E11.9  ICD-9-CM: 250.00  3/27/2016 - 4/4/2018        RESOLVED: 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 ([facility] ICD-10-CM: I63.411  ICD-9-CM: 434.11  3/26/2016 - 6/13/2016 RESOLVED: Essential hypertension, malignant ICD-10-CM: I10  ICD-9-CM: 401.0  9/14/2012 - 12/16/2013              Discharge Condition: Stable    Discharge To: Home    Consults: [facility] Course:  76 y.o.  male, former smoker, non-substance abuser with h/o open heart surgery, aortic valve replacement and few TIAs in the past, compliant with his medication, presented to the ED on 4/14/2018 via EMS for sudden onset, short duration of increased confusion from baseline, associated with right sided facial droop and lethargy, that was noticed by pt's family the same morning at 7:30am. Pt, with no associated numbness or HA but with generalized weakness and upset stomach for the past 1.5 months, denies increased confusion but reports new onset nausea and diaphoresis from this morning that was not associated with chest pain. EMS reports that pt, A&Ox3, did not seem confused upon their arrival on scene but had mild right facial droop which resolved within a short period of time after their arrival. Pt was bradycardic on scene per EMS. CT head negative for acute intracranial abnormalities. MRI brain note atrophy and chronic small vessel changes with scattered areas of old infarction without significant interval change, innumerable foci of old hemorrhage with differential considerations, otherwise, unremarkable evaluation. MRA brain no focal high grade intracranial stenosis given motion degradation, mild irregularity involving carotid siphons. MRA neck motion degraded evaluation, irregularity of right carotid bulb w/ moderate stenosis, mild irregularity of left carotid bulb w/ ulcerated configuration. Bilateral carotid duplex with 50-69% stenosis in right internal carotid artery, <50% stenosis in the left internal carotid artery. Lipitor increased to 80 mg daily. Labs and vital signs WNL. PT worked with patient during hospitalization. No recurrence of symptoms while admitted under observation. He is stable for discharge home w/ home health. He is to follow up with his PCP within one week.       Physical Exam:  General appearance: alert, cooperative, no distress, appears stated age  Head: Normocephalic, without obvious abnormality, atraumatic  Lungs: clear to auscultation bilaterally  Heart: regular rate and rhythm, S1, S2 normal, no murmur, click, rub or gallop  Abdomen: soft, non-tender. Bowel sounds normal. No masses,  no organomegaly  Extremities: right BKA with prosthesis, no edema or cyanosis  Skin: Skin color, texture, turgor normal. No rashes or lesions  Neurologic: Grossly normal  PSY: Mood and affect normal, appropriately behaved    Significant Diagnostic Studies: labs:   Recent Results (from the past 24 hour(s))   GLUCOSE, POC    Collection Time: 04/15/18  5:20 PM   Result Value Ref Range    Glucose (POC) 111 (H) 70 - 110 mg/dL   GLUCOSE, POC    Collection Time: 04/15/18 11:32 PM   Result Value Ref Range    Glucose (POC) 88 70 - 110 mg/dL   GLUCOSE, POC    Collection Time: 04/16/18  8:28 AM   Result Value Ref Range    Glucose (POC) 97 70 - 110 mg/dL   GLUCOSE, POC    Collection Time: 04/16/18 11:21 AM   Result Value Ref Range    Glucose (POC) 118 (H) 70 - 110 mg/dL         Discharge Medications:     Discharge Medication List as of 4/16/2018 11:36 AM      START taking these medications    Details   hydroCHLOROthiazide (HYDRODIURIL) 25 mg tablet Take 1 Tab by mouth daily. , Print, Disp-30 Tab, R-0         CONTINUE these medications which have CHANGED    Details   aspirin (ASPIRIN) 325 mg tablet Take 1 Tab by mouth daily. , Print, Disp-30 Tab, R-0      amLODIPine (NORVASC) 10 mg tablet Take 1 Tab by mouth daily. , Print, Disp-30 Tab, R-0      atorvastatin (LIPITOR) 80 mg tablet Take 1 Tab by mouth daily. , Print, Disp-30 Tab, R-0         CONTINUE these medications which have NOT CHANGED    Details   ondansetron (ZOFRAN ODT) 8 mg disintegrating tablet Take 1 Tab by mouth every eight (8) hours as needed for Nausea., Normal, Disp-12 Tab, R-1      donepezil (ARICEPT) 10 mg tablet TAKE 2 TABLETS BY MOUTH EVERY MORNING AFTER A MEAL, Normal**Patient requests 90 days supply**Disp-180 Tab, R-1      tamsulosin (FLOMAX) 0.4 mg capsule Take 1 Cap by mouth nightly., Normal, Disp-90 Cap, R-4      NITROGLYCERIN (NITROSTAT SL) by SubLINGual route., Historical Med      docusate sodium (STOOL SOFTENER) 100 mg capsule Take 100 mg by mouth nightly., Historical Med      cyanocobalamin 1,000 mcg tablet Take 1,000 mcg by mouth daily. , Historical Med      omega-3 fatty acids-vitamin e (FISH OIL) 1,000 mg cap Take 1 Cap by mouth two (2) times a day., Print, Disp-180 Cap, R-4               Activity: Activity as tolerated and PT/OT per Home Health    Diet: Cardiac Diet    Wound Care: None needed    Follow-up: Follow up with PCP within one week.       Discharge time: > 35 mins  Ck Fritz NP  4/16/2018, 2:24 PM

## 2018-04-16 NOTE — PROGRESS NOTES
Clinical Pharmacy Note: IV to PO Automatic Conversion    Please note: Kristian Moraes medications (thiamine 909 mg and folic acid 1 mg) have been changed from IV to PO based on the following critiera:    - Patient is taking scheduled oral medications  - Patient is tolerating tube feeds at goal rate or a full liquid, soft or regular diet    This IV to PO conversion is based on the P&[employer] approved automatic conversion policy for eligible patients. Please call with questions.     Thanks,  Kavin Santiago, PHARMD

## 2018-04-17 ENCOUNTER — HOME CARE VISIT (OUTPATIENT)
Dept: SCHEDULING | Facility: HOME HEALTH | Age: 78
End: 2018-04-17
Payer: MEDICARE

## 2018-04-17 ENCOUNTER — DOCUMENTATION ONLY (OUTPATIENT)
Dept: FAMILY MEDICINE CLINIC | Age: 78
End: 2018-04-17

## 2018-04-17 ENCOUNTER — PATIENT OUTREACH (OUTPATIENT)
Dept: FAMILY MEDICINE CLINIC | Age: 78
End: 2018-04-17

## 2018-04-17 VITALS
DIASTOLIC BLOOD PRESSURE: 78 MMHG | OXYGEN SATURATION: 95 % | HEART RATE: 51 BPM | SYSTOLIC BLOOD PRESSURE: 130 MMHG | TEMPERATURE: 97.1 F

## 2018-04-17 LAB — CRP SERPL HS-MCNC: 1.38 MG/L (ref 0–3)

## 2018-04-17 PROCEDURE — 400013 HH SOC

## 2018-04-17 PROCEDURE — G0151 HHCP-SERV OF PT,EA 15 MIN: HCPCS

## 2018-04-17 NOTE — Clinical Note
Dr. Soo Martinez- -pt admitted 04/14 - 04/16 at [facility] for TIA -PCP f/u 04/24/18 -pt also follow Joseph Moreno -noted several medication changes. HCTZ also newly prescribed  -Home Physical Therapy ordered from the hospital. Pt declined as he is not homebound; however he is open to going to outpatient PT. Will need referral, if recommended. Thank you!

## 2018-04-17 NOTE — PROGRESS NOTES
Patient was informed that Per the nurse navigator, he will be contacted for evaluation or MIRNA. Per nurse Shweta pt cannot be scheduled without being evaluated and the nurse navigator will be the one to do decide when to schedule the patient. In addition MIRNA will be need to be done first in order for DMA/physician to be credited.

## 2018-04-18 ENCOUNTER — HOME CARE VISIT (OUTPATIENT)
Dept: HOME HEALTH SERVICES | Facility: HOME HEALTH | Age: 78
End: 2018-04-18
Payer: MEDICARE

## 2018-04-18 NOTE — PROGRESS NOTES
Nurse Fallon 10 Follow Up Note  -18 Admitted at [facility] for TIA  -18 Discharged to Home   -18 NN contact          Hospital Discharge Follow-Up      Date/Time:  2018 11:43 AM    Patient was admitted to [facility] on 18 and discharged on 18 for TIA. The physician discharge summary was available at the time of outreach. Patient was contacted within 2 business days of discharge. Top Challenges reviewed with the provider   1. TIA  2. Neuro f/u? 3. Cardio f/u  4. Outpatient referral to PT         Method of communication with provider :chart routing    Inpatient RRAT score: 21  Was this a readmission? no   Patient stated reason for the readmission:    Nurse Navigator (NN) contacted the patient by telephone to perform post hospital discharge assessment. Verified name and  with patient as identifiers. Provided introduction to self, and explanation of the Nurse Navigator role. Pt denied any CP, SOB, NVD, fever/chills, HA/dizzines, difficulty eating/swallowing/talking. Pt c/o still feeling  \"weak. \" Pt reported no other concerns/questions at this time. Pt reported he declined home PT as he is not homebound; however he is open to attending outpatient PT. Will inform PCP    Reviewed discharge instructions and red flags with patient who verbalized understanding. Patient given an opportunity to ask questions and does not have any further questions or concerns at this time. The patient agrees to contact the PCP office for questions related to their healthcare. NN provided contact information for future reference. Summary of patients top problems:  1. TIA  2. CAD  3. AAA  4.  S/p CABG    Home Health orders at discharge: [address]: [facility]  Date of initial visit: 18    Durable Medical Equipment ordered/company:   Durable Medical Equipment received:     Barriers to care? lack of knowledge about disease    Advance Care Planning:   Does patient have an Advance Directive:  not reviewed       Medication:     New Medications at Discharge: HCTz  Changed Medications at Discharge: ASA, norvasc, lipitor  Discontinued Medications at Discharge:     Medication reconciliation was performed with patient, who verbalizes understanding of administration of home medications. There were no barriers to obtaining medications identified at this time. Referral to Pharm D needed: no     Current Outpatient Prescriptions   Medication Sig    aspirin (ASPIRIN) 325 mg tablet Take 1 Tab by mouth daily.  amLODIPine (NORVASC) 10 mg tablet Take 1 Tab by mouth daily.  atorvastatin (LIPITOR) 80 mg tablet Take 1 Tab by mouth daily.  hydroCHLOROthiazide (HYDRODIURIL) 25 mg tablet Take 1 Tab by mouth daily.  ondansetron (ZOFRAN ODT) 8 mg disintegrating tablet Take 1 Tab by mouth every eight (8) hours as needed for Nausea.  donepezil (ARICEPT) 10 mg tablet TAKE 2 TABLETS BY MOUTH EVERY MORNING AFTER A MEAL    tamsulosin (FLOMAX) 0.4 mg capsule Take 1 Cap by mouth nightly.  NITROGLYCERIN (NITROSTAT SL) 1 Tab by SubLINGual route as needed (chest pain).  cyanocobalamin 1,000 mcg tablet Take 1,000 mcg by mouth daily.  omega-3 fatty acids-vitamin e (FISH OIL) 1,000 mg cap Take 1 Cap by mouth two (2) times a day.  docusate sodium (STOOL SOFTENER) 100 mg capsule Take 100 mg by mouth nightly. No current facility-administered medications for this visit. There are no discontinued medications.     PCP/Specialist follow up:   Future Appointments  Date Time Provider Susannah Beltre   4/24/2018 8:30 AM Cris Necessary., DO [facility] BEATA SCHED   6/6/2018 8:00 AM [facility] 2 [facility] SCHED   6/6/2018 9:00 AM [facility] 1 [facility] SCHED   6/6/2018 10:00 AM [facility] NONIMAGING [facility] SCHED   6/6/2018 1:45 PM [address] [place] [address] [facility] SCHED   7/3/2018 7:30 AM Cris Necessary., DO JAY BEATA SCHED   Previously discussed with PCP and nurse Rock, 04/24/18 is earliest appt available for MIRNA. Appt verified with the patient. Pt also follows Joseph Cheek and aware to f/u    Goals      Attends follow-up appointments as directed. -pt will follow up with PCP and other specialists as direct  -pt will contact PCP office with any barriers in attending appts       Establish PCP relationships and regularly scheduled appointments.  Knowledge and adherence of prescribed medication (ie. action, side effects, missed dose, etc.).            -pt will take all meds as directed  -pt will report any barriers in obtaining medications       Prevent complications post hospitalization.             -pt will attend all appts as directed  -pt will recognize red flags and report to PCP office or seek emergency assistance  -pt will adhere to low sodium diet

## 2018-04-24 ENCOUNTER — OFFICE VISIT (OUTPATIENT)
Dept: FAMILY MEDICINE CLINIC | Age: 78
End: 2018-04-24

## 2018-04-24 VITALS
OXYGEN SATURATION: 98 % | DIASTOLIC BLOOD PRESSURE: 67 MMHG | SYSTOLIC BLOOD PRESSURE: 119 MMHG | HEART RATE: 74 BPM | BODY MASS INDEX: 29.52 KG/M2 | RESPIRATION RATE: 17 BRPM | TEMPERATURE: 97.1 F | HEIGHT: 70 IN | WEIGHT: 206.2 LBS

## 2018-04-24 DIAGNOSIS — I71.40 AAA (ABDOMINAL AORTIC ANEURYSM) WITHOUT RUPTURE: ICD-10-CM

## 2018-04-24 DIAGNOSIS — I51.89 DIASTOLIC DYSFUNCTION: ICD-10-CM

## 2018-04-24 DIAGNOSIS — N18.30 CKD (CHRONIC KIDNEY DISEASE), STAGE III (HCC): ICD-10-CM

## 2018-04-24 DIAGNOSIS — E78.00 PURE HYPERCHOLESTEROLEMIA: ICD-10-CM

## 2018-04-24 DIAGNOSIS — G45.9 TRANSIENT CEREBRAL ISCHEMIA, UNSPECIFIED TYPE: Primary | ICD-10-CM

## 2018-04-24 DIAGNOSIS — I25.10 CORONARY ARTERY DISEASE INVOLVING NATIVE CORONARY ARTERY OF NATIVE HEART WITHOUT ANGINA PECTORIS: ICD-10-CM

## 2018-04-24 DIAGNOSIS — I10 ESSENTIAL HYPERTENSION: ICD-10-CM

## 2018-04-24 DIAGNOSIS — F01.50 VASCULAR DEMENTIA WITHOUT BEHAVIORAL DISTURBANCE (HCC): ICD-10-CM

## 2018-04-24 RX ORDER — ASPIRIN 325 MG
325 TABLET ORAL DAILY
Qty: 90 TAB | Refills: 4 | Status: SHIPPED | OUTPATIENT
Start: 2018-04-24 | End: 2019-01-08 | Stop reason: SDUPTHER

## 2018-04-24 RX ORDER — HYDROCHLOROTHIAZIDE 25 MG/1
25 TABLET ORAL DAILY
Qty: 90 TAB | Refills: 4 | Status: SHIPPED | OUTPATIENT
Start: 2018-04-24 | End: 2018-07-02 | Stop reason: SDUPTHER

## 2018-04-24 RX ORDER — AMLODIPINE BESYLATE 10 MG/1
10 TABLET ORAL DAILY
Qty: 90 TAB | Refills: 4 | Status: SHIPPED | OUTPATIENT
Start: 2018-04-24 | End: 2018-10-02 | Stop reason: SDUPTHER

## 2018-04-24 RX ORDER — ATORVASTATIN CALCIUM 20 MG/1
20 TABLET, FILM COATED ORAL DAILY
Qty: 90 TAB | Refills: 4 | Status: SHIPPED | OUTPATIENT
Start: 2018-04-24 | End: 2018-10-02 | Stop reason: SDUPTHER

## 2018-04-24 NOTE — MR AVS SNAPSHOT
[address] [facility] 
 
 
 [address] [facility] [address] [facility] [address] [address] 
[phone] Patient: Malissa Delvalle MRN: [id number] KRL:[id number]/99/0130 Visit Information Date & Time Provider Department Dept. Phone Encounter #  
 4/24/2018  8:30 AM Cami Dennis., [address] [place] [address] [phone] [id number] Follow-up Instructions Return in about 3 months (around 7/24/2018) for already scheduled, labs at next visit. Follow-up and Disposition History Your Appointments 6/6/2018  8:00 AM  
PROCEDURE with [facility] 2 [facility] ([facility] [address]) Appt Note: evar 6 mos wild same day appt; PATIENT HAD TO RESCHEDULE DUE TO NO TRANSPORTATION PT IS AWARE THAT IT IS FURTHER OUT; .  
 [address] [place] [phone] [facility] [address]  
[phone] [address] [place] [address],[place] [address]  
  
    
 6/6/2018  9:00 AM  
PROCEDURE with [facility] 1 [facility] ([address] [place], [place] [address]) Appt Note: bpg 6 mos wild same day appt; PATIENT HAD TO RESCHEDULE DUE TO NO TRANSPORTATION PT IS AWARE THAT IT IS FURTHER OUT; .  
 [address] [place] [phone] [facility] [address]  
[phone] [address] [place] [address],[place] [address]  
  
    
 6/6/2018 10:00 AM  
PROCEDURE with [facility] NONIMAGING [facility] ([address] [place], [place] [address]) Appt Note: anitra 6 mos wild same day appt; PATIENT HAD TO RESCHEDULE DUE TO NO TRANSPORTATION PT IS AWARE THAT IT IS FURTHER OUT; .  
 [address] [place] [phone] [facility] [address]  
[phone] [address] [place] [address],[address]  
  
    
 6/6/2018  1:45 PM  
Follow Up with Riki Salinas [facility] ([address] [place], [place] [address]) Appt Note: 6 months fup with studies; PATIENT HAD TO RESCHEDULE DUE TO NO TRANSPORTATION PT IS AWARE THAT IT IS FURTHER OUT; .  
 [facility] [place] [address] [address] [facility]  
[phone] [address] [facility]  
  
    
 7/3/2018  7:30 AM  
[place] [address] [address]) Appt Note: Return in about 3 months (around 7/4/2018) for EOV, HGAIC next visit. [address] [facility] [address] [facility] [address]  
  
   
 [address] [place] [address] [address] Upcoming Health Maintenance Date Due  
 EYE EXAM RETINAL OR DILATED Q1 9/30/1950 FOOT EXAM Q1 5/18/2018 MICROALBUMIN Q1 9/20/2018 MEDICARE YEARLY EXAM 9/26/2018 HEMOGLOBIN A1C Q6M 10/14/2018 LIPID PANEL Q1 4/14/2019 COLONOSCOPY 6/29/2019 GLAUCOMA SCREENING Q2Y 9/22/2019 DTaP/Tdap/Td series (2 - Td) 9/14/2020 Allergies as of 4/24/2018  Review Complete On: 4/24/2018 By: Kan Regalado DO Severity Noted Reaction Type Reactions Ace Inhibitors  03/27/2016    Other (comments) Per pt, Cardiologist states he cannot have ACE inhibitors Current Immunizations  Reviewed on 4/15/2018 Name Date Influenza High Dose Vaccine PF 9/25/2017, 9/13/2016, 11/10/2015, 10/7/2015 Influenza Vaccine 11/12/2014, 12/16/2013  8:04 AM  
 Influenza Vaccine Split 12/14/2012 Influenza Vaccine Whole 9/14/2010 Pneumococcal Conjugate (PCV-13) 10/7/2015 TDAP Vaccine 9/14/2010 ZZZ-RETIRED (DO NOT USE) Pneumococcal Vaccine (Unspecified Type) 9/14/2010 Zoster 9/14/2010 Not reviewed this visit You Were Diagnosed With   
  
 Codes Comments Transient cerebral ischemia, unspecified type    -  Primary ICD-10-CM: G45.9 ICD-9-CM: 435.9 Coronary artery disease involving native coronary artery of native heart without angina pectoris     ICD-10-CM: I25.10 ICD-9-CM: 414.01   
 Pure hypercholesterolemia     ICD-10-CM: E78.00 ICD-9-CM: 272.0   
 AAA (abdominal aortic aneurysm) without rupture (HCC)     ICD-10-CM: I71.4 ICD-9-CM: 441.4 Essential hypertension     ICD-10-CM: I10 
ICD-9-CM: 401.9 CKD (chronic kidney disease), stage III     ICD-10-CM: N18.3 ICD-9-CM: 385. 3 Diastolic dysfunction     [facility]: I51.9 ICD-9-CM: 429.9 Vascular dementia without behavioral disturbance     ICD-10-CM: F01.50 ICD-9-CM: 290.40 Vitals BP Pulse Temp Resp Height(growth percentile) Weight(growth percentile)  
 119/67 (BP 1 Location: Left arm, BP Patient Position: Sitting) 74 97.1 °F (36.2 °C) (Oral) 17 5' 10\" (1.778 m) 206 lb 3.2 oz (93.5 kg) SpO2 BMI Smoking Status 98% 29.59 kg/m2 Former Smoker BMI and BSA Data Body Mass Index Body Surface Area  
 29.59 kg/m 2 2.15 m 2 Preferred Pharmacy Pharmacy Name Phone [facility] [address] [phone] Your Updated Medication List  
  
   
This list is accurate as of 4/24/18  9:50 AM.  Always use your most recent med list. amLODIPine 10 mg tablet Commonly known as:  Al Rings Take 1 Tab by mouth daily. aspirin 325 mg tablet Commonly known as:  ASPIRIN Take 1 Tab by mouth daily. atorvastatin 20 mg tablet Commonly known as:  LIPITOR Take 1 Tab by mouth daily. cyanocobalamin 1,000 mcg tablet Take 1,000 mcg by mouth daily. donepezil 10 mg tablet Commonly known as:  ARICEPT  
TAKE 2 TABLETS BY MOUTH EVERY MORNING AFTER A MEAL  
  
 hydroCHLOROthiazide 25 mg tablet Commonly known as:  HYDRODIURIL Take 1 Tab by mouth daily. NITROSTAT SL  
1 Tab by SubLINGual route as needed (chest pain). omega-3 fatty acids-vitamin e 1,000 mg Cap Commonly known as:  FISH OIL Take 1 Cap by mouth two (2) times a day. STOOL SOFTENER 100 mg capsule Generic drug:  docusate sodium Take 100 mg by mouth nightly. tamsulosin 0.4 mg capsule Commonly known as:  FLOMAX Take 1 Cap by mouth nightly. Prescriptions Sent to Pharmacy Refills  
 aspirin (ASPIRIN) 325 mg tablet 4 Sig: Take 1 Tab by mouth daily. Class: Normal  
 Pharmacy: [address] Ph #: [phone] Route: Oral  
 amLODIPine (NORVASC) 10 mg tablet 4 Sig: Take 1 Tab by mouth daily. Class: Normal  
 Pharmacy: [address] Ph #: [phone] Route: Oral  
 hydroCHLOROthiazide (HYDRODIURIL) 25 mg tablet 4 Sig: Take 1 Tab by mouth daily. Class: Normal  
 Pharmacy: [address] Ph #: [phone] Route: Oral  
 atorvastatin (LIPITOR) 20 mg tablet 4 Sig: Take 1 Tab by mouth daily. Class: Normal  
 Pharmacy: [address] Ph #: [phone] Route: Oral  
  
Follow-up Instructions Return in about 3 months (around 7/24/2018) for already scheduled, labs at next visit. Introducing [facility]! Dear Juancarlos Pleitez: Thank you for requesting a [employer] account. Our records indicate that you already have an active [employer] account. You can access your account anytime at [email or web address] Did you know that you can access your hospital and ER discharge instructions at any time in [employer]? You can also review all of your test results from your hospital stay or ER visit. Additional Information If you have questions, please visit the Frequently Asked Questions section of the [employer] website at [email or web address] Remember, [employer] is NOT to be used for urgent needs. For medical emergencies, dial 911. Now available from your iPhone and Android! Please provide this summary of care documentation to your next provider. Your primary care clinician is listed as [id number] [facility]. If you have any questions after today's visit, please call [phone].

## 2018-04-24 NOTE — PROGRESS NOTES
Sarah Gray is a 76 y.o.  male and presents with    Chief Complaint   Patient presents with    TIA    Chronic Kidney Disease    Abdominal Aortic Aneurysm    Coronary Artery Disease    CHF    Hypertension    Cholesterol Problem    Dementia     Admitted 4/13 for tia  D/c 4/14  hosp records, consults reviewed  meds reviewed  anushka has f/u with neurology and vasc surg      Subjective:    Cardiovascular Review:  The patient has hypertension, hyperlipidemia, coronary artery disease, CHF and history of prior stroke. Diet and Lifestyle: not attempting to follow a low fat, low cholesterol diet, not attempting to follow a low sodium diet  Home BP Monitoring: is not measured at home. Pertinent ROS: taking medications as instructed, no medication side effects noted, no TIA's, no chest pain on exertion, no dyspnea on exertion, no swelling of ankles. Chronic renal dz stable  Dementia [facility] now seeing neuro at [facility]      Additional Concerns:          Patient Active Problem List    Diagnosis Date Noted    TIA (transient ischemic attack) 04/14/2018    Vascular dementia without behavioral disturbance 62/89/2430    Diastolic dysfunction 76/64/0315    AAA (abdominal aortic aneurysm) without rupture ([facility] [address] 01/04/2017    PAD (peripheral artery disease) ([facility] 09/21/2016    Advance directive in chart 06/13/2016    S/P CABG (coronary artery bypass graft) 04/13/2016    S/P AVR 04/13/2016    Hypertension 03/27/2016    CKD (chronic kidney disease), stage III 03/27/2016    Coronary artery disease involving native coronary artery without angina pectoris 01/27/2016    Pure hypercholesterolemia 09/14/2012     Current Outpatient Prescriptions   Medication Sig Dispense Refill    aspirin (ASPIRIN) 325 mg tablet Take 1 Tab by mouth daily. 90 Tab 4    amLODIPine (NORVASC) 10 mg tablet Take 1 Tab by mouth daily. 90 Tab 4    hydroCHLOROthiazide (HYDRODIURIL) 25 mg tablet Take 1 Tab by mouth daily.  90 Tab 4  atorvastatin (LIPITOR) 20 mg tablet Take 1 Tab by mouth daily. 90 Tab 4    donepezil (ARICEPT) 10 mg tablet TAKE 2 TABLETS BY MOUTH EVERY MORNING AFTER A MEAL 180 Tab 1    tamsulosin (FLOMAX) 0.4 mg capsule Take 1 Cap by mouth nightly. 90 Cap 4    NITROGLYCERIN (NITROSTAT SL) 1 Tab by SubLINGual route as needed (chest pain).  docusate sodium (STOOL SOFTENER) 100 mg capsule Take 100 mg by mouth nightly.  cyanocobalamin 1,000 mcg tablet Take 1,000 mcg by mouth daily.  omega-3 fatty acids-vitamin e (FISH OIL) 1,000 mg cap Take 1 Cap by mouth two (2) times a day.  180 Cap 4     Allergies   Allergen Reactions    Ace Inhibitors Other (comments)     Per pt, Cardiologist states he cannot have ACE inhibitors     Past Medical History:   Diagnosis Date    Advance directive in chart 6/13/2016    CAD (coronary artery disease)     Cancer ([address] 2003    Colon    CKD (chronic kidney disease), stage III 3/27/2016    CVA (cerebral vascular accident) ([address] 3/26/2016    Femoral artery aneurysm, left ([address]     Heart attack ([address] 2008    Heart attack ([address]     Hernia     History of TIAs     Hyperlipidemia     Hypertension 3/27/2016    Iliac artery aneurysm, left ([address]     Pure hypercholesterolemia 9/14/2012    PVD (peripheral vascular disease) ([address] 3/27/2016    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 ([address] 3/26/2016     Past Surgical History:   Procedure Laterality Date    ABDOMEN SURGERY PROC UNLISTED  2011    3 stents placed into abdomen (groin)    CARDIAC SURG PROCEDURE UNLIST  2005    Quadruple Bypass    CARDIAC SURG PROCEDURE UNLIST  2011    Aortic pig valve placed    COLONOSCOPY N/A 6/29/2016    COLONOSCOPY performed by Rhona Laughlin [facility] at [facility] ENDOSCOPY    ENDOSCOPY, COLON, DIAGNOSTIC      HX AAA REPAIR      HX ORTHOPAEDIC  2008    Right Leg Amputated     Family History   Problem Relation Age of Onset    Hypertension Mother     Hypertension Father     Heart Disease Father     Heart Disease Brother      Social History   Substance Use Topics    Smoking status: Former Smoker     Quit date: 9/14/1987    Smokeless tobacco: Never Used    Alcohol use No      Comment: wine 1-2x per week       ROS       All other systems reviewed and are negative. Objective:  Vitals:    04/24/18 0833   BP: 119/67   Pulse: 74   Resp: 17   Temp: 97.1 °F (36.2 °C)   TempSrc: Oral   SpO2: 98%   Weight: 206 lb 3.2 oz (93.5 kg)   Height: 5' 10\" (1.778 m)   PainSc:   0 - No pain                 alert, well appearing, and in no distress, oriented to person, place, and time and normal appearing weight  Chest - clear to auscultation, no wheezes, rales or rhonchi, symmetric air entry  Heart - normal rate, regular rhythm, normal S1, S2, no murmurs, rubs, clicks or gallops  Abdomen - soft, nontender, nondistended, no masses or organomegaly        LABS   Component      Latest Ref Rng & Units 4/14/2018 4/14/2018 4/14/2018 4/14/2018          12:25 PM  9:27 AM  9:27 AM  9:27 AM   WBC      4.6 - 13.2 K/uL       RBC      4.70 - 5.50 M/uL       HGB      13.0 - 16.0 g/dL       HCT      36.0 - 48.0 %       MCV      74.0 - 97.0 FL       MCH      24.0 - 34.0 PG       MCHC      31.0 - 37.0 g/dL       RDW      11.6 - 14.5 %       PLATELET      975 - 147 K/uL       MPV      9.2 - 11.8 FL       NEUTROPHILS      40 - 73 %       LYMPHOCYTES      21 - 52 %       MONOCYTES      3 - 10 %       EOSINOPHILS      0 - 5 %       BASOPHILS      0 - 2 %       ABS. NEUTROPHILS      1.8 - 8.0 K/UL       ABS. LYMPHOCYTES      0.9 - 3.6 K/UL       ABS. MONOCYTES      0.05 - 1.2 K/UL       ABS. EOSINOPHILS      0.0 - 0.4 K/UL       ABS.  BASOPHILS      0.0 - 0.06 K/UL       DF             Sodium      136 - 145 mmol/L       Potassium      3.5 - 5.5 mmol/L       Chloride      100 - 108 mmol/L       CO2      21 - 32 mmol/L       Anion gap      3.0 - 18 mmol/L       Glucose      74 - 99 mg/dL       BUN      7.0 - 18 MG/DL       Creatinine 0.6 - 1.3 MG/DL       BUN/Creatinine ratio      12 - 20         GFR est AA      >60 ml/min/1.73m2       GFR est non-AA      >60 ml/min/1.73m2       Calcium      8.5 - 10.1 MG/DL       Bilirubin, total      0.2 - 1.0 MG/DL       ALT (SGPT)      16 - 61 U/L       AST      15 - 37 U/L       Alk.  phosphatase      45 - 117 U/L       Protein, total      6.4 - 8.2 g/dL       Albumin      3.4 - 5.0 g/dL       Globulin      2.0 - 4.0 g/dL       A-G Ratio      0.8 - 1.7         Color       YELLOW      Appearance       CLEAR      Specific gravity      1.005 - 1.030   1.022      pH (UA)      5.0 - 8.0   7.0      Protein      NEG mg/dL 100 (A)      Glucose      NEG mg/dL NEGATIVE      Ketone      NEG mg/dL TRACE (A)      Bilirubin      NEG   NEGATIVE      Blood      NEG   NEGATIVE      Urobilinogen      0.2 - 1.0 EU/dL 1.0      Nitrites      NEG   NEGATIVE      Leukocyte Esterase      NEG   NEGATIVE      Cholesterol, total      <200 MG/DL   129    Triglyceride      <150 MG/DL   207 (H)    HDL Cholesterol      40 - 60 MG/DL   38 (L)    LDL, calculated      0 - 100 MG/DL   49.6    VLDL, calculated      MG/DL   41.4    CHOL/HDL Ratio      0 - 5.0     3.4    Prothrombin time      11.5 - 15.2 sec       INR      0.8 - 1.2         Hemoglobin A1c, (calculated)      4.2 - 5.6 %       Est. average glucose      mg/dL       Phosphorus      2.5 - 4.9 MG/DL       Magnesium      1.6 - 2.6 mg/dL       Troponin-I, Qt.      0.0 - 0.045 NG/ML       aPTT      23.0 - 36.4 SEC       TSH      0.36 - 3.74 uIU/mL       Sed rate, automated      0 - 20 mm/hr       C-Reactive Protein, Cardiac      0.00 - 3.00 mg/L  1.38     Triiodothyronine (T3), free      2.18 - 3.98 PG/ML       T4, Free      0.7 - 1.5 NG/DL    0.9     Component      Latest Ref Rng & Units 4/14/2018 4/14/2018 4/14/2018 4/14/2018           9:27 AM  9:27 AM  9:27 AM  9:27 AM   WBC      4.6 - 13.2 K/uL       RBC      4.70 - 5.50 M/uL       HGB      13.0 - 16.0 g/dL       HCT      36.0 - 48.0 %       MCV      74.0 - 97.0 FL       MCH      24.0 - 34.0 PG       MCHC      31.0 - 37.0 g/dL       RDW      11.6 - 14.5 %       PLATELET      875 - 300 K/uL       MPV      9.2 - 11.8 FL       NEUTROPHILS      40 - 73 %       LYMPHOCYTES      21 - 52 %       MONOCYTES      3 - 10 %       EOSINOPHILS      0 - 5 %       BASOPHILS      0 - 2 %       ABS. NEUTROPHILS      1.8 - 8.0 K/UL       ABS. LYMPHOCYTES      0.9 - 3.6 K/UL       ABS. MONOCYTES      0.05 - 1.2 K/UL       ABS. EOSINOPHILS      0.0 - 0.4 K/UL       ABS. BASOPHILS      0.0 - 0.06 K/UL       DF             Sodium      136 - 145 mmol/L       Potassium      3.5 - 5.5 mmol/L       Chloride      100 - 108 mmol/L       CO2      21 - 32 mmol/L       Anion gap      3.0 - 18 mmol/L       Glucose      74 - 99 mg/dL       BUN      7.0 - 18 MG/DL       Creatinine      0.6 - 1.3 MG/DL       BUN/Creatinine ratio      12 - 20         GFR est AA      >60 ml/min/1.73m2       GFR est non-AA      >60 ml/min/1.73m2       Calcium      8.5 - 10.1 MG/DL       Bilirubin, total      0.2 - 1.0 MG/DL       ALT (SGPT)      16 - 61 U/L       AST      15 - 37 U/L       Alk.  phosphatase      45 - 117 U/L       Protein, total      6.4 - 8.2 g/dL       Albumin      3.4 - 5.0 g/dL       Globulin      2.0 - 4.0 g/dL       A-G Ratio      0.8 - 1.7         Color             Appearance             Specific gravity      1.005 - 1.030         pH (UA)      5.0 - 8.0         Protein      NEG mg/dL       Glucose      NEG mg/dL       Ketone      NEG mg/dL       Bilirubin      NEG         Blood      NEG         Urobilinogen      0.2 - 1.0 EU/dL       Nitrites      NEG         Leukocyte Esterase      NEG         Cholesterol, total      <200 MG/DL       Triglyceride      <150 MG/DL       HDL Cholesterol      40 - 60 MG/DL       LDL, calculated      0 - 100 MG/DL       VLDL, calculated      MG/DL       CHOL/HDL Ratio      0 - 5.0         Prothrombin time      11.5 - 15.2 sec       INR 0.8 - 1.2         Hemoglobin A1c, (calculated)      4.2 - 5.6 %   5.6    Est. average glucose      mg/dL   114    Phosphorus      2.5 - 4.9 MG/DL       Magnesium      1.6 - 2.6 mg/dL       Troponin-I, Qt.      0.0 - 0.045 NG/ML       aPTT      23.0 - 36.4 SEC       TSH      0.36 - 3.74 uIU/mL  3.31     Sed rate, automated      0 - 20 mm/hr    6   C-Reactive Protein, Cardiac      0.00 - 3.00 mg/L       Triiodothyronine (T3), free      2.18 - 3.98 PG/ML 2.4      T4, Free      0.7 - 1.5 NG/DL         Component      Latest Ref Rng & Units 4/14/2018 4/14/2018 4/14/2018 4/14/2018           9:27 AM  9:27 AM  9:27 AM  9:27 AM   WBC      4.6 - 13.2 K/uL       RBC      4.70 - 5.50 M/uL       HGB      13.0 - 16.0 g/dL       HCT      36.0 - 48.0 %       MCV      74.0 - 97.0 FL       MCH      24.0 - 34.0 PG       MCHC      31.0 - 37.0 g/dL       RDW      11.6 - 14.5 %       PLATELET      021 - 339 K/uL       MPV      9.2 - 11.8 FL       NEUTROPHILS      40 - 73 %       LYMPHOCYTES      21 - 52 %       MONOCYTES      3 - 10 %       EOSINOPHILS      0 - 5 %       BASOPHILS      0 - 2 %       ABS. NEUTROPHILS      1.8 - 8.0 K/UL       ABS. LYMPHOCYTES      0.9 - 3.6 K/UL       ABS. MONOCYTES      0.05 - 1.2 K/UL       ABS. EOSINOPHILS      0.0 - 0.4 K/UL       ABS. BASOPHILS      0.0 - 0.06 K/UL       DF             Sodium      136 - 145 mmol/L   140    Potassium      3.5 - 5.5 mmol/L   4.1    Chloride      100 - 108 mmol/L   105    CO2      21 - 32 mmol/L   27    Anion gap      3.0 - 18 mmol/L   8    Glucose      74 - 99 mg/dL   129 (H)    BUN      7.0 - 18 MG/DL   19 (H)    Creatinine      0.6 - 1.3 MG/DL   1.68 (H)    BUN/Creatinine ratio      12 - 20     11 (L)    GFR est AA      >60 ml/min/1.73m2   48 (L)    GFR est non-AA      >60 ml/min/1.73m2   40 (L)    Calcium      8.5 - 10.1 MG/DL   9.6    Bilirubin, total      0.2 - 1.0 MG/DL   0.6    ALT (SGPT)      16 - 61 U/L   18    AST      15 - 37 U/L   13 (L)    Alk. phosphatase      45 - 117 U/L   98    Protein, total      6.4 - 8.2 g/dL   7.0    Albumin      3.4 - 5.0 g/dL   4.0    Globulin      2.0 - 4.0 g/dL   3.0    A-G Ratio      0.8 - 1.7     1.3    Color             Appearance             Specific gravity      1.005 - 1.030         pH (UA)      5.0 - 8.0         Protein      NEG mg/dL       Glucose      NEG mg/dL       Ketone      NEG mg/dL       Bilirubin      NEG         Blood      NEG         Urobilinogen      0.2 - 1.0 EU/dL       Nitrites      NEG         Leukocyte Esterase      NEG         Cholesterol, total      <200 MG/DL       Triglyceride      <150 MG/DL       HDL Cholesterol      40 - 60 MG/DL       LDL, calculated      0 - 100 MG/DL       VLDL, calculated      MG/DL       CHOL/HDL Ratio      0 - 5.0         Prothrombin time      11.5 - 15.2 sec       INR      0.8 - 1.2         Hemoglobin A1c, (calculated)      4.2 - 5.6 %       Est. average glucose      mg/dL       Phosphorus      2.5 - 4.9 MG/DL       Magnesium      1.6 - 2.6 mg/dL       Troponin-I, Qt.      0.0 - 0.045 NG/ML  <0.02     aPTT      23.0 - 36.4 SEC    29.7   TSH      0.36 - 3.74 uIU/mL 3.22      Sed rate, automated      0 - 20 mm/hr       C-Reactive Protein, Cardiac      0.00 - 3.00 mg/L       Triiodothyronine (T3), free      2.18 - 3.98 PG/ML       T4, Free      0.7 - 1.5 NG/DL         Component      Latest Ref Rng & Units 4/14/2018 4/14/2018 4/14/2018 4/14/2018           9:27 AM  9:27 AM  9:27 AM  9:27 AM   WBC      4.6 - 13.2 K/uL    12.5   RBC      4.70 - 5.50 M/uL    4.03 (L)   HGB      13.0 - 16.0 g/dL    13.8   HCT      36.0 - 48.0 %    39.4   MCV      74.0 - 97.0 FL    97.8 (H)   MCH      24.0 - 34.0 PG    34.2 (H)   MCHC      31.0 - 37.0 g/dL    35.0   RDW      11.6 - 14.5 %    14.4   PLATELET      166 - 953 K/uL    241   MPV      9.2 - 11.8 FL    11.3   NEUTROPHILS      40 - 73 %    64   LYMPHOCYTES      21 - 52 %    26   MONOCYTES      3 - 10 %    7   EOSINOPHILS      0 - 5 %    2 BASOPHILS      0 - 2 %    1   ABS. NEUTROPHILS      1.8 - 8.0 K/UL    8.1 (H)   ABS. LYMPHOCYTES      0.9 - 3.6 K/UL    3.3   ABS. MONOCYTES      0.05 - 1.2 K/UL    0.8   ABS. EOSINOPHILS      0.0 - 0.4 K/UL    0.3   ABS. BASOPHILS      0.0 - 0.06 K/UL    0.1 (H)   DF          AUTOMATED   Sodium      136 - 145 mmol/L       Potassium      3.5 - 5.5 mmol/L       Chloride      100 - 108 mmol/L       CO2      21 - 32 mmol/L       Anion gap      3.0 - 18 mmol/L       Glucose      74 - 99 mg/dL       BUN      7.0 - 18 MG/DL       Creatinine      0.6 - 1.3 MG/DL       BUN/Creatinine ratio      12 - 20         GFR est AA      >60 ml/min/1.73m2       GFR est non-AA      >60 ml/min/1.73m2       Calcium      8.5 - 10.1 MG/DL       Bilirubin, total      0.2 - 1.0 MG/DL       ALT (SGPT)      16 - 61 U/L       AST      15 - 37 U/L       Alk.  phosphatase      45 - 117 U/L       Protein, total      6.4 - 8.2 g/dL       Albumin      3.4 - 5.0 g/dL       Globulin      2.0 - 4.0 g/dL       A-G Ratio      0.8 - 1.7         Color             Appearance             Specific gravity      1.005 - 1.030         pH (UA)      5.0 - 8.0         Protein      NEG mg/dL       Glucose      NEG mg/dL       Ketone      NEG mg/dL       Bilirubin      NEG         Blood      NEG         Urobilinogen      0.2 - 1.0 EU/dL       Nitrites      NEG         Leukocyte Esterase      NEG         Cholesterol, total      <200 MG/DL       Triglyceride      <150 MG/DL       HDL Cholesterol      40 - 60 MG/DL       LDL, calculated      0 - 100 MG/DL       VLDL, calculated      MG/DL       CHOL/HDL Ratio      0 - 5.0         Prothrombin time      11.5 - 15.2 sec 13.2      INR      0.8 - 1.2   1.1      Hemoglobin A1c, (calculated)      4.2 - 5.6 %       Est. average glucose      mg/dL       Phosphorus      2.5 - 4.9 MG/DL   2.9    Magnesium      1.6 - 2.6 mg/dL  2.1     Troponin-I, Qt.      0.0 - 0.045 NG/ML       aPTT      23.0 - 36.4 SEC       TSH      0.36 - 3.74 uIU/mL       Sed rate, automated      0 - 20 mm/hr       C-Reactive Protein, Cardiac      0.00 - 3.00 mg/L       Triiodothyronine (T3), free      2.18 - 3.98 PG/ML       T4, Free      0.7 - 1.5 NG/DL       TESTS      Assessment/Plan:    1. tia -- very minor -- completely resolved  2. Chronic renal dz stable  3. AAA followed by [facility]  4. CAD ongoing  5. chf stable  6. htn stable  7. Cholesterol -- was well controlled on lipitor 20/d. Will let him finish out 30 days prescribed at [facility] then reduce back to 20/d   8. Dementia mild ongoing on tx with neuro      Lab review: labs are reviewed, up to date and normal    Diagnoses and all orders for this visit:    1. Transient cerebral ischemia, unspecified type  -     aspirin (ASPIRIN) 325 mg tablet; Take 1 Tab by mouth daily. -     amLODIPine (NORVASC) 10 mg tablet; Take 1 Tab by mouth daily. -     hydroCHLOROthiazide (HYDRODIURIL) 25 mg tablet; Take 1 Tab by mouth daily. -     atorvastatin (LIPITOR) 20 mg tablet; Take 1 Tab by mouth daily. 2. Coronary artery disease involving native coronary artery of native heart without angina pectoris  -     aspirin (ASPIRIN) 325 mg tablet; Take 1 Tab by mouth daily. -     amLODIPine (NORVASC) 10 mg tablet; Take 1 Tab by mouth daily. -     hydroCHLOROthiazide (HYDRODIURIL) 25 mg tablet; Take 1 Tab by mouth daily. -     atorvastatin (LIPITOR) 20 mg tablet; Take 1 Tab by mouth daily. 3. Pure hypercholesterolemia  -     aspirin (ASPIRIN) 325 mg tablet; Take 1 Tab by mouth daily. -     amLODIPine (NORVASC) 10 mg tablet; Take 1 Tab by mouth daily. -     hydroCHLOROthiazide (HYDRODIURIL) 25 mg tablet; Take 1 Tab by mouth daily. -     atorvastatin (LIPITOR) 20 mg tablet; Take 1 Tab by mouth daily. 4. AAA (abdominal aortic aneurysm) without rupture (HCC)  -     aspirin (ASPIRIN) 325 mg tablet; Take 1 Tab by mouth daily. -     amLODIPine (NORVASC) 10 mg tablet; Take 1 Tab by mouth daily.   -     hydroCHLOROthiazide (HYDRODIURIL) 25 mg tablet; Take 1 Tab by mouth daily. -     atorvastatin (LIPITOR) 20 mg tablet; Take 1 Tab by mouth daily. 5. Essential hypertension  -     aspirin (ASPIRIN) 325 mg tablet; Take 1 Tab by mouth daily. -     amLODIPine (NORVASC) 10 mg tablet; Take 1 Tab by mouth daily. -     hydroCHLOROthiazide (HYDRODIURIL) 25 mg tablet; Take 1 Tab by mouth daily. -     atorvastatin (LIPITOR) 20 mg tablet; Take 1 Tab by mouth daily. 6. CKD (chronic kidney disease), stage III  -     aspirin (ASPIRIN) 325 mg tablet; Take 1 Tab by mouth daily. -     amLODIPine (NORVASC) 10 mg tablet; Take 1 Tab by mouth daily. -     hydroCHLOROthiazide (HYDRODIURIL) 25 mg tablet; Take 1 Tab by mouth daily. -     atorvastatin (LIPITOR) 20 mg tablet; Take 1 Tab by mouth daily. 7. Diastolic dysfunction  -     aspirin (ASPIRIN) 325 mg tablet; Take 1 Tab by mouth daily. -     amLODIPine (NORVASC) 10 mg tablet; Take 1 Tab by mouth daily. -     hydroCHLOROthiazide (HYDRODIURIL) 25 mg tablet; Take 1 Tab by mouth daily. -     atorvastatin (LIPITOR) 20 mg tablet; Take 1 Tab by mouth daily. 8. Vascular dementia without behavioral disturbance  -     aspirin (ASPIRIN) 325 mg tablet; Take 1 Tab by mouth daily. -     amLODIPine (NORVASC) 10 mg tablet; Take 1 Tab by mouth daily. -     hydroCHLOROthiazide (HYDRODIURIL) 25 mg tablet; Take 1 Tab by mouth daily. -     atorvastatin (LIPITOR) 20 mg tablet; Take 1 Tab by mouth daily. I have discussed the diagnosis with the patient and the intended plan as seen in the above orders. The patient has received an after-visit summary and questions were answered concerning future plans. I have discussed medication side effects and warnings with the patient as well. I have reviewed the plan of care with the patient, accepted their input and they are in agreement with the treatment goals.      Follow-up Disposition:  Return in about 3 months (around 7/24/2018) for already scheduled, labs at next visit. More than 1/2 of this 45 minute visit was spent in counselling and coordination of care, as described above.

## 2018-04-24 NOTE — PROGRESS NOTES
Room #  5    SUBJECTIVE:    Marifer Gordon is a 76 y.o. male who presents today for hospital follow up    1. Have you been to the ER, urgent care clinic since your last visit? Hospitalized since your last visit? YES    2. Have you seen or consulted any other health care providers outside of the [facility] since your last visit? Include any pap smears or colon screening. NO  When :  Reason:    Health Maintenance reviewed  Yes    Health Maintenance Due   Topic Date Due    EYE EXAM RETINAL OR DILATED Q1  09/30/1950    FOOT EXAM Q1  05/18/2018

## 2018-05-07 ENCOUNTER — TELEPHONE (OUTPATIENT)
Dept: CARDIOLOGY CLINIC | Age: 78
End: 2018-05-07

## 2018-07-02 ENCOUNTER — HOSPITAL ENCOUNTER (OUTPATIENT)
Dept: LAB | Age: 78
Discharge: HOME | End: 2018-07-02
Payer: MEDICARE

## 2018-07-02 ENCOUNTER — OFFICE VISIT (OUTPATIENT)
Dept: FAMILY MEDICINE CLINIC | Age: 78
End: 2018-07-02

## 2018-07-02 VITALS
TEMPERATURE: 96.5 F | HEIGHT: 70 IN | WEIGHT: 212 LBS | SYSTOLIC BLOOD PRESSURE: 128 MMHG | DIASTOLIC BLOOD PRESSURE: 69 MMHG | RESPIRATION RATE: 16 BRPM | OXYGEN SATURATION: 97 % | HEART RATE: 69 BPM | BODY MASS INDEX: 30.35 KG/M2

## 2018-07-02 DIAGNOSIS — I51.89 DIASTOLIC DYSFUNCTION: ICD-10-CM

## 2018-07-02 DIAGNOSIS — I10 ESSENTIAL HYPERTENSION: ICD-10-CM

## 2018-07-02 DIAGNOSIS — G45.9 TRANSIENT CEREBRAL ISCHEMIA, UNSPECIFIED TYPE: ICD-10-CM

## 2018-07-02 DIAGNOSIS — I25.10 CORONARY ARTERY DISEASE INVOLVING NATIVE CORONARY ARTERY OF NATIVE HEART WITHOUT ANGINA PECTORIS: ICD-10-CM

## 2018-07-02 DIAGNOSIS — R35.0 URINARY FREQUENCY: ICD-10-CM

## 2018-07-02 DIAGNOSIS — R29.898 WEAKNESS OF BOTH LEGS: ICD-10-CM

## 2018-07-02 DIAGNOSIS — E78.00 PURE HYPERCHOLESTEROLEMIA: ICD-10-CM

## 2018-07-02 DIAGNOSIS — N18.30 CKD (CHRONIC KIDNEY DISEASE), STAGE III (HCC): ICD-10-CM

## 2018-07-02 DIAGNOSIS — I71.40 AAA (ABDOMINAL AORTIC ANEURYSM) WITHOUT RUPTURE: ICD-10-CM

## 2018-07-02 DIAGNOSIS — F01.50 VASCULAR DEMENTIA WITHOUT BEHAVIORAL DISTURBANCE (HCC): ICD-10-CM

## 2018-07-02 DIAGNOSIS — R29.898 WEAKNESS OF BOTH LEGS: Primary | ICD-10-CM

## 2018-07-02 LAB
ALBUMIN SERPL-MCNC: 4.3 G/DL (ref 3.4–5)
ALBUMIN/GLOB SERPL: 1.4 {RATIO} (ref 0.8–1.7)
ALP SERPL-CCNC: 106 U/L (ref 45–117)
ALT SERPL-CCNC: 21 U/L (ref 16–61)
ANION GAP SERPL CALC-SCNC: 8 MMOL/L (ref 3–18)
APPEARANCE UR: CLEAR
AST SERPL-CCNC: 18 U/L (ref 15–37)
BACTERIA URNS QL MICRO: NEGATIVE /HPF
BILIRUB SERPL-MCNC: 0.6 MG/DL (ref 0.2–1)
BILIRUB UR QL: NEGATIVE
BUN SERPL-MCNC: 25 MG/DL (ref 7–18)
BUN/CREAT SERPL: 15 (ref 12–20)
CALCIUM SERPL-MCNC: 9.6 MG/DL (ref 8.5–10.1)
CHLORIDE SERPL-SCNC: 105 MMOL/L (ref 100–108)
CO2 SERPL-SCNC: 29 MMOL/L (ref 21–32)
COLOR UR: ABNORMAL
CREAT SERPL-MCNC: 1.66 MG/DL (ref 0.6–1.3)
EPITH CASTS URNS QL MICRO: NORMAL /LPF (ref 0–5)
GLOBULIN SER CALC-MCNC: 3 G/DL (ref 2–4)
GLUCOSE SERPL-MCNC: 120 MG/DL (ref 74–99)
GLUCOSE UR STRIP.AUTO-MCNC: NEGATIVE MG/DL
HGB UR QL STRIP: NEGATIVE
KETONES UR QL STRIP.AUTO: ABNORMAL MG/DL
LEUKOCYTE ESTERASE UR QL STRIP.AUTO: NEGATIVE
NITRITE UR QL STRIP.AUTO: NEGATIVE
PH UR STRIP: 5 [PH] (ref 5–8)
POTASSIUM SERPL-SCNC: 4.3 MMOL/L (ref 3.5–5.5)
PROT SERPL-MCNC: 7.3 G/DL (ref 6.4–8.2)
PROT UR STRIP-MCNC: ABNORMAL MG/DL
RBC #/AREA URNS HPF: 0 /HPF (ref 0–5)
SODIUM SERPL-SCNC: 142 MMOL/L (ref 136–145)
SP GR UR REFRACTOMETRY: 1.03 (ref 1–1.03)
UROBILINOGEN UR QL STRIP.AUTO: 0.2 EU/DL (ref 0.2–1)
WBC URNS QL MICRO: NORMAL /HPF (ref 0–4)

## 2018-07-02 PROCEDURE — 87086 URINE CULTURE/COLONY COUNT: CPT | Performed by: FAMILY MEDICINE

## 2018-07-02 PROCEDURE — 87077 CULTURE AEROBIC IDENTIFY: CPT | Performed by: FAMILY MEDICINE

## 2018-07-02 PROCEDURE — 80053 COMPREHEN METABOLIC PANEL: CPT | Performed by: FAMILY MEDICINE

## 2018-07-02 PROCEDURE — 81001 URINALYSIS AUTO W/SCOPE: CPT | Performed by: FAMILY MEDICINE

## 2018-07-02 PROCEDURE — 87186 SC STD MICRODIL/AGAR DIL: CPT | Performed by: FAMILY MEDICINE

## 2018-07-02 PROCEDURE — 36415 COLL VENOUS BLD VENIPUNCTURE: CPT | Performed by: FAMILY MEDICINE

## 2018-07-02 PROCEDURE — 83036 HEMOGLOBIN GLYCOSYLATED A1C: CPT | Performed by: FAMILY MEDICINE

## 2018-07-02 RX ORDER — HYDROCHLOROTHIAZIDE 25 MG/1
25 TABLET ORAL DAILY
Qty: 90 TAB | Refills: 4 | Status: SHIPPED | OUTPATIENT
Start: 2018-07-02 | End: 2018-10-02

## 2018-07-02 NOTE — PROGRESS NOTES
Shawnee Barney is a 76 y.o.  male and presents with    Chief Complaint   Patient presents with    Other     leg weakness    Dementia    Ischemic Stroke    Hypertension    Cholesterol Problem           Subjective:  Having incrased problems with left leg weakness. . 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 Has appt with vasc surg in a few days. Also has had strokes. Cardiovascular Review:  The patient has hypertension and hyperlipidemia. Diet and Lifestyle: not attempting to follow a low fat, low cholesterol diet, not attempting to follow a low sodium diet  Home BP Monitoring: is not measured at home. Pertinent ROS: taking medications as instructed, no medication side effects noted, no TIA's, no chest pain on exertion, no dyspnea on exertion, no swelling of ankles. Dementia ongoing      Additional Concerns:          Patient Active Problem List    Diagnosis Date Noted    TIA (transient ischemic attack) 04/14/2018    Vascular dementia without behavioral disturbance 16/79/5190    Diastolic dysfunction 56/75/8302    AAA (abdominal aortic aneurysm) without rupture ([facility] 01/04/2017    PAD (peripheral artery disease) ([facility] 09/21/2016    Advance directive in chart 06/13/2016    S/P CABG (coronary artery bypass graft) 04/13/2016    S/P AVR 04/13/2016    Hypertension 03/27/2016    CKD (chronic kidney disease), stage III 03/27/2016    Coronary artery disease involving native coronary artery without angina pectoris 01/27/2016    Pure hypercholesterolemia 09/14/2012     Current Outpatient Prescriptions   Medication Sig Dispense Refill    hydroCHLOROthiazide (HYDRODIURIL) 25 mg tablet Take 1 Tab by mouth daily. 90 Tab 4    aspirin (ASPIRIN) 325 mg tablet Take 1 Tab by mouth daily. 90 Tab 4    amLODIPine (NORVASC) 10 mg tablet Take 1 Tab by mouth daily. 90 Tab 4    atorvastatin (LIPITOR) 20 mg tablet Take 1 Tab by mouth daily.  90 Tab 4    donepezil (ARICEPT) 10 mg tablet TAKE 2 TABLETS BY MOUTH EVERY MORNING AFTER A MEAL 180 Tab 1    tamsulosin (FLOMAX) 0.4 mg capsule Take 1 Cap by mouth nightly. 90 Cap 4    NITROGLYCERIN (NITROSTAT SL) 1 Tab by SubLINGual route as needed (chest pain).  omega-3 fatty acids-vitamin e (FISH OIL) 1,000 mg cap Take 1 Cap by mouth two (2) times a day.  180 Cap 4     Allergies   Allergen Reactions    Ace Inhibitors Other (comments)     Per pt, Cardiologist states he cannot have ACE inhibitors     Past Medical History:   Diagnosis Date    Advance directive in chart 6/13/2016    CAD (coronary artery disease)     Cancer ([address] 2003    Colon    CKD (chronic kidney disease), stage III 3/27/2016    CVA (cerebral vascular accident) ([address] 3/26/2016    Femoral artery aneurysm, left ([address]     Heart attack ([address] 2008    Heart attack ([address]     Hernia     History of TIAs     Hyperlipidemia     Hypertension 3/27/2016    Iliac artery aneurysm, left ([address]     Pure hypercholesterolemia 9/14/2012    PVD (peripheral vascular disease) ([address] 3/27/2016    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 ([address] 3/26/2016     Past Surgical History:   Procedure Laterality Date    ABDOMEN SURGERY PROC UNLISTED  2011    3 stents placed into abdomen (groin)    CARDIAC SURG PROCEDURE UNLIST  2005    Quadruple Bypass    CARDIAC SURG PROCEDURE UNLIST  2011    Aortic pig valve placed    COLONOSCOPY N/A 6/29/2016    COLONOSCOPY performed by Alpesh Soares MD at [address] ENDOSCOPY    ENDOSCOPY, COLON, DIAGNOSTIC      HX AAA REPAIR      HX ORTHOPAEDIC  2008    Right Leg Amputated     Family History   Problem Relation Age of Onset    Hypertension Mother     Hypertension Father     Heart Disease Father     Heart Disease Brother      Social History   Substance Use Topics    Smoking status: Former Smoker     Quit date: 9/14/1987    Smokeless tobacco: Never Used    Alcohol use No      Comment: wine 1-2x per week       ROS       All other systems reviewed and are negative. Objective:  Vitals:    07/02/18 0754   BP: 128/69   Pulse: 69   Resp: 16   Temp: 96.5 °F (35.8 °C)   TempSrc: Oral   SpO2: 97%   Weight: 212 lb (96.2 kg)   Height: 5' 10\" (1.778 m)   PainSc:   0 - No pain                 alert, well appearing, and in no distress, oriented to person, place, and time and normal appearing weight  Chest - clear to auscultation, no wheezes, rales or rhonchi, symmetric air entry  Heart - normal rate, regular rhythm, normal S1, S2, no murmurs, rubs, clicks or gallops  Abdomen - soft, nontender, nondistended, no masses or organomegaly        LABS     TESTS      Assessment/Plan:    Hypertension - stable  Hyperlipidemia - stable  Dementia ongoing   Leg weakness  Not sure if this is vascular or s/p cva  Will ask vascular to eval and then go ahead with PMR referral    Lab review: labs are reviewed, up to date and normal    Diagnoses and all orders for this visit:    1. Weakness of both legs  -     REFERRAL TO PHYSICIAL MEDICINE REHAB    2. Coronary artery disease involving native coronary artery of native heart without angina pectoris  -     hydroCHLOROthiazide (HYDRODIURIL) 25 mg tablet; Take 1 Tab by mouth daily. 3. Pure hypercholesterolemia  -     hydroCHLOROthiazide (HYDRODIURIL) 25 mg tablet; Take 1 Tab by mouth daily. 4. AAA (abdominal aortic aneurysm) without rupture (HCC)  -     hydroCHLOROthiazide (HYDRODIURIL) 25 mg tablet; Take 1 Tab by mouth daily. 5. Essential hypertension  -     hydroCHLOROthiazide (HYDRODIURIL) 25 mg tablet; Take 1 Tab by mouth daily. 6. CKD (chronic kidney disease), stage III  -     hydroCHLOROthiazide (HYDRODIURIL) 25 mg tablet; Take 1 Tab by mouth daily. 7. Diastolic dysfunction  -     hydroCHLOROthiazide (HYDRODIURIL) 25 mg tablet; Take 1 Tab by mouth daily. 8. Vascular dementia without behavioral disturbance  -     hydroCHLOROthiazide (HYDRODIURIL) 25 mg tablet; Take 1 Tab by mouth daily.     9. Transient cerebral ischemia, unspecified type  -     hydroCHLOROthiazide (HYDRODIURIL) 25 mg tablet; Take 1 Tab by mouth daily. I have discussed the diagnosis with the patient and the intended plan as seen in the above orders. The patient has received an after-visit summary and questions were answered concerning future plans. I have discussed medication side effects and warnings with the patient as well. I have reviewed the plan of care with the patient, accepted their input and they are in agreement with the treatment goals. Follow-up Disposition:  Return in about 3 months (around 10/2/2018) for MWE, physical, labs at next visit, EKG next visit. Oh bye the way he is now complaining that he has 3 or more times nightly nocutureia.   Will check ua and cs and aic and cmp and f/u  If a problem otherwise 3 mo physical  May need to go back to urologist

## 2018-07-02 NOTE — MR AVS SNAPSHOT
[address] [facility] 
 
 
 [address] [facility] [address] [place] [id number] 
[phone] Patient: Camacho Bellamy MRN: [id number] VPF:7/13/0880 Visit Information Date & Time Provider Department Dept. Phone Encounter #  
 7/2/2018  7:30 AM Serenity Hendrickson [phone] [id number] Follow-up Instructions Return in about 3 months (around 10/2/2018) for MWE, physical, labs at next visit, EKG next visit. Follow-up and Disposition History Your Appointments 7/9/2018  8:00 AM  
PROCEDURE with [facility] NONIMAGING [facility] ([facility]) Appt Note: anitra 6 mos wild same day appt; PATIENT HAD TO RESCHEDULE DUE TO NO TRANSPORTATION PT IS AWARE THAT IT IS FURTHER OUT; .; pt r/s all appts same day, only has trans port on mondays [address] [facility] [address] 706 [facility]  
[phone] [address] [place] [address]  
  
    
 7/9/2018  9:00 AM  
PROCEDURE with [facility] IMAGING 1 [facility] ([facility]) Appt Note: evar 6 mos wild same day appt; PATIENT HAD TO RESCHEDULE DUE TO NO TRANSPORTATION PT IS AWARE THAT IT IS FURTHER OUT; .; pt r/s all appts same day, only has trans port on mondays 2300 [facility] [address] 706 [facility]  
[phone] [address] [place] [address]  
  
    
 7/9/2018 10:00 AM  
PROCEDURE with [facility] IMAGING 1 [facility] ([facility]) Appt Note: bpg 6 mos wild same day appt; PATIENT HAD TO RESCHEDULE DUE TO NO TRANSPORTATION PT IS AWARE THAT IT IS FURTHER OUT; .; pt r/s all appts same day, only has trans port on mondays 2300 [facility] [address] 706 [facility]  
[phone]  
  
    
 7/9/2018  1:30 PM  
Follow Up with Ria Alvarado MD  
600 [facility]) Appt Note: 6 months fup with studies; PATIENT HAD TO RESCHEDULE DUE TO NO TRANSPORTATION PT IS AWARE THAT IT IS FURTHER OUT; .; pt r/s all appts same day, only has trans port on mondays and want to see moore 2300 [facility] [address] [place] [address]  
[phone] [address]  
  
    
 8/22/2018  8:30 AM  
Follow Up with Ritu Guerra MD  
Cardio Specialist at [facility] Yannick Antonio) Appt Note: yearly f/up with Dr. Mega Haskins  
 [facility] [address] Upcoming Health Maintenance Date Due  
 EYE EXAM RETINAL OR DILATED Q1 9/30/1950 FOOT EXAM Q1 5/18/2018 Influenza Age 5 to Adult 8/1/2018 MICROALBUMIN Q1 9/20/2018 MEDICARE YEARLY EXAM 9/26/2018 HEMOGLOBIN A1C Q6M 10/14/2018 LIPID PANEL Q1 4/14/2019 COLONOSCOPY 6/29/2019 GLAUCOMA SCREENING Q2Y 9/22/2019 DTaP/Tdap/Td series (2 - Td) 9/14/2020 Allergies as of 7/2/2018  Review Complete On: 7/2/2018 By: Ankur Hernandez., DO Severity Noted Reaction Type Reactions Ace Inhibitors  03/27/2016    Other (comments) Per pt, Cardiologist states he cannot have ACE inhibitors Current Immunizations  Reviewed on 4/15/2018 Name Date Influenza High Dose Vaccine PF 9/25/2017, 9/13/2016, 11/10/2015, 10/7/2015 Influenza Vaccine 11/12/2014, 12/16/2013  8:04 AM  
 Influenza Vaccine Split 12/14/2012 Influenza Vaccine Whole 9/14/2010 Pneumococcal Conjugate (PCV-13) 10/7/2015 TDAP Vaccine 9/14/2010 ZZZ-RETIRED (DO NOT USE) Pneumococcal Vaccine (Unspecified Type) 9/14/2010 Zoster 9/14/2010 Not reviewed this visit You Were Diagnosed With   
  
 Codes Comments Weakness of both legs    -  Primary ICD-10-CM: R29.898 ICD-9-CM: 729.89  Coronary artery disease involving native coronary artery of native heart without angina pectoris     ICD-10-CM: I25.10 ICD-9-CM: 414.01   
 Pure hypercholesterolemia     ICD-10-CM: E78.00 ICD-9-CM: 272.0   
 AAA (abdominal aortic aneurysm) without rupture (HCC)     ICD-10-CM: I71.4 ICD-9-CM: 441.4 Essential hypertension     ICD-10-CM: I10 
ICD-9-CM: 401.9 CKD (chronic kidney disease), stage III     ICD-10-CM: N18.3 ICD-9-CM: 237. 3 Diastolic dysfunction     XAD-40-MA: I51.9 ICD-9-CM: 429.9 Vascular dementia without behavioral disturbance     ICD-10-CM: F01.50 ICD-9-CM: 290.40 Transient cerebral ischemia, unspecified type     ICD-10-CM: G45.9 ICD-9-CM: 435.9 Urinary frequency     ICD-10-CM: R35.0 ICD-9-CM: 788.41 Vitals BP Pulse Temp Resp Height(growth percentile) Weight(growth percentile) 128/69 (BP 1 Location: Left arm, BP Patient Position: Sitting) 69 96.5 °F (35.8 °C) (Oral) 16 5' 10\" (1.778 m) 212 lb (96.2 kg) SpO2 BMI Smoking Status 97% 30.42 kg/m2 Former Smoker Vitals History BMI and BSA Data Body Mass Index Body Surface Area  
 30.42 kg/m 2 2.18 m 2 Preferred Pharmacy Pharmacy Name Phone [facility] [address] [phone] Your Updated Medication List  
  
   
This list is accurate as of 7/2/18  9:04 AM.  Always use your most recent med list. amLODIPine 10 mg tablet Commonly known as:  Gretel Spruce Take 1 Tab by mouth daily. aspirin 325 mg tablet Commonly known as:  ASPIRIN Take 1 Tab by mouth daily. atorvastatin 20 mg tablet Commonly known as:  LIPITOR Take 1 Tab by mouth daily. donepezil 10 mg tablet Commonly known as:  ARICEPT  
TAKE 2 TABLETS BY MOUTH EVERY MORNING AFTER A MEAL  
  
 hydroCHLOROthiazide 25 mg tablet Commonly known as:  HYDRODIURIL Take 1 Tab by mouth daily. NITROSTAT SL  
1 Tab by SubLINGual route as needed (chest pain). omega-3 fatty acids-vitamin e 1,000 mg Cap Commonly known as:  FISH OIL Take 1 Cap by mouth two (2) times a day. tamsulosin 0.4 mg capsule Commonly known as:  FLOMAX Take 1 Cap by mouth nightly. Prescriptions Sent to Pharmacy Refills  
 hydroCHLOROthiazide (HYDRODIURIL) 25 mg tablet 4 Sig: Take 1 Tab by mouth daily. Class: Normal  
 Pharmacy: [facility] [address] #: [phone] Route: Oral  
  
We Performed the Following REFERRAL TO PHYSICIAL MEDICINE REHAB [EGK63 Custom] Comments:  
 Please evaluate patient for increasing left leg weakness in right leg amputee. 1st available is fine. Follow-up Instructions Return in about 3 months (around 10/2/2018) for MWE, physical, labs at next visit, EKG next visit. To-Do List   
 07/02/2018 Microbiology:  CULTURE, URINE   
  
 07/02/2018 Lab:  HEMOGLOBIN A1C WITH EAG   
  
 07/02/2018 Lab:  METABOLIC PANEL, COMPREHENSIVE   
  
 07/02/2018 Lab:  URINALYSIS W/ RFLX MICROSCOPIC Referral Information Referral ID Referred By Referred To  
  
 [id number] DINA UHBER, 406 Nik Ordonez MD   
   [address] [place] [address] Phone: [phone] Fax: [phone] Visits Status Start Date End Date 1 New Request 7/2/18 7/2/19 If your referral has a status of pending review or denied, additional information will be sent to support the outcome of this decision. Introducing [facility] HE[facility]! Dear Yousif Santizo: Thank you for requesting a [employer] account. Our records indicate that you already have an active [employer] account. You can access your account anytime at [email or web address]. [email or web address] Did you know that you can access your hospital and ER discharge instructions at any time in [employer]?   You can also review all of your test results from your hospital stay or ER visit. Additional Information If you have questions, please visit the Frequently Asked Questions section of the [employer] website at [email or web address] Remember, [employer] is NOT to be used for urgent needs. For medical emergencies, dial 911. Now available from your iPhone and Android! Please provide this summary of care documentation to your next provider. Your primary care clinician is listed as [id number] [facility]. If you have any questions after today's visit, please call [phone].

## 2018-07-02 NOTE — PROGRESS NOTES
Latesha Avila is a 76 y.o. male presented to clinic for a routine f/u multi cardiac issues. Pt denies any pain at this time but states that he went to urgent car x days ago for ear cleaning. 1. Have you been to the ER, urgent care clinic since your last visit? Hospitalized since your last visit? Urgent care     2. Have you seen or consulted any other health care providers outside of the [address] [place] [address] since your last visit? Include any pap smears or colon screening.  No    Learning Assessment 10/11/2017   PRIMARY LEARNER Patient   BARRIERS PRIMARY LEARNER -   CO-LEARNER CAREGIVER -   PRIMARY LANGUAGE ENGLISH   LEARNER PREFERENCE PRIMARY LISTENING   ANSWERED BY patient   RELATIONSHIP SELF     Health Maintenance Due   Topic Date Due    EYE EXAM RETINAL OR DILATED Q1  09/30/1950    FOOT EXAM Q1  05/18/2018

## 2018-07-03 LAB
EST. AVERAGE GLUCOSE BLD GHB EST-MCNC: 108 MG/DL
HBA1C MFR BLD: 5.4 % (ref 4.2–5.6)

## 2018-07-04 LAB
BACTERIA SPEC CULT: ABNORMAL
SERVICE CMNT-IMP: ABNORMAL

## 2018-07-09 ENCOUNTER — OFFICE VISIT (OUTPATIENT)
Dept: VASCULAR SURGERY | Age: 78
End: 2018-07-09

## 2018-07-09 VITALS
HEART RATE: 72 BPM | WEIGHT: 212 LBS | DIASTOLIC BLOOD PRESSURE: 82 MMHG | HEIGHT: 70 IN | RESPIRATION RATE: 18 BRPM | BODY MASS INDEX: 30.35 KG/M2 | SYSTOLIC BLOOD PRESSURE: 132 MMHG

## 2018-07-09 DIAGNOSIS — I71.40 AAA (ABDOMINAL AORTIC ANEURYSM) WITHOUT RUPTURE: ICD-10-CM

## 2018-07-09 DIAGNOSIS — I73.9 PAD (PERIPHERAL ARTERY DISEASE) (HCC): ICD-10-CM

## 2018-07-09 DIAGNOSIS — I70.212 ATHEROSCLEROSIS OF NATIVE ARTERY OF LEFT LOWER EXTREMITY WITH INTERMITTENT CLAUDICATION (HCC): Primary | ICD-10-CM

## 2018-07-09 DIAGNOSIS — G45.9 TRANSIENT CEREBRAL ISCHEMIA, UNSPECIFIED TYPE: ICD-10-CM

## 2018-07-09 NOTE — MR AVS SNAPSHOT
[address] [place] [address] [address] [facility] [address] 
[phone] Patient: Filomena Wilkinson MRN: [id number] KEX:2/38/0348 Visit Information Date & Time Provider Department Dept. Phone Encounter #  
 7/9/2018  1:30 PM [facility] [phone] [id number] Your Appointments 7/30/2018  9:30 AM  
HOSPITAL DISCHARGE with [facility] ([facility]) Appt Note: DC LLE angio [address] [facility] [address] [facility]  
[phone] [address] [facility] 47 [facility]  
  
    
 8/22/2018  8:30 AM  
Follow Up with Vinh Gonsalves MD  
Cardio Specialist at [facility] Appt Note: yearly f/up with Dr. [address]  
  
   
 [address]  
  
    
 10/2/2018  7:30 AM  
[place] [address] [address]) Appt Note: Return in about 3 months (around 10/2/2018) for MWE, physical, labs at next visit, EKG next visit. [address] [facility] [address] [facility] 83 222 [facility]  
  
   
 [address] [facility] [address] Upcoming Health Maintenance Date Due  
 EYE EXAM RETINAL OR DILATED Q1 9/30/1950 FOOT EXAM Q1 5/18/2018 Influenza Age 5 to Adult 8/1/2018 MICROALBUMIN Q1 9/20/2018 MEDICARE YEARLY EXAM 9/26/2018 HEMOGLOBIN A1C Q6M 1/2/2019 LIPID PANEL Q1 4/14/2019 COLONOSCOPY 6/29/2019 GLAUCOMA SCREENING Q2Y 9/22/2019 DTaP/Tdap/Td series (2 - Td) 9/14/2020 Allergies as of 7/9/2018  Review Complete On: 7/9/2018 By: Leticia Gallegos MD  
  
 Severity Noted Reaction Type Reactions Ace Inhibitors  03/27/2016    Other (comments) Per pt, Cardiologist states he cannot have ACE inhibitors Current Immunizations  Reviewed on 4/15/2018 Name Date Influenza High Dose Vaccine PF 9/25/2017, 9/13/2016, 11/10/2015, 10/7/2015 Influenza Vaccine 11/12/2014, 12/16/2013  8:04 AM  
 Influenza Vaccine Split 12/14/2012 Influenza Vaccine Whole 9/14/2010 Pneumococcal Conjugate (PCV-13) 10/7/2015 TDAP Vaccine 9/14/2010 ZZZ-RETIRED (DO NOT USE) Pneumococcal Vaccine (Unspecified Type) 9/14/2010 Zoster 9/14/2010 Not reviewed this visit You Were Diagnosed With   
  
 Codes Comments Atherosclerosis of native artery of left lower extremity with intermittent claudication ([facility]    -  Primary ICD-10-CM: E19.823 ICD-9-CM: 440.21 PAD (peripheral artery disease) (HCC)     ICD-10-CM: I73.9 ICD-9-CM: 443.9 AAA (abdominal aortic aneurysm) without rupture (HCC)     ICD-10-CM: I71.4 ICD-9-CM: 441.4 Transient cerebral ischemia, unspecified type     ICD-10-CM: G45.9 ICD-9-CM: 435.9 Vitals BP Pulse Resp Height(growth percentile) Weight(growth percentile) BMI  
 132/82 (BP 1 Location: Left arm, BP Patient Position: Sitting) 72 18 5' 10\" (1.778 m) 212 lb (96.2 kg) 30.42 kg/m2 Smoking Status Former Smoker Vitals History BMI and BSA Data Body Mass Index Body Surface Area  
 30.42 kg/m 2 2.18 m 2 Preferred Pharmacy Pharmacy Name Phone [facility] [address] [phone] Your Updated Medication List  
  
   
This list is accurate as of 7/9/18  2:02 PM.  Always use your most recent med list. amLODIPine 10 mg tablet Commonly known as:  Yara Demetria Take 1 Tab by mouth daily. aspirin 325 mg tablet Commonly known as:  ASPIRIN Take 1 Tab by mouth daily. atorvastatin 20 mg tablet Commonly known as:  LIPITOR Take 1 Tab by mouth daily. donepezil 10 mg tablet Commonly known as:  ARICEPT  
TAKE 2 TABLETS BY MOUTH EVERY MORNING AFTER A MEAL  
  
 hydroCHLOROthiazide 25 mg tablet Commonly known as:  HYDRODIURIL Take 1 Tab by mouth daily. NITROSTAT SL  
1 Tab by SubLINGual route as needed (chest pain). omega-3 fatty acids-vitamin e 1,000 mg Cap Commonly known as:  FISH OIL Take 1 Cap by mouth two (2) times a day. tamsulosin 0.4 mg capsule Commonly known as:  FLOMAX Take 1 Cap by mouth nightly. Introducing [facility]! Dear Zachary Patel: Thank you for requesting a MeetMe account. Our records indicate that you already have an active MeetMe account. You can access your account anytime at [email or web address] Did you know that you can access your hospital and ER discharge instructions at any time in MeetMe? You can also review all of your test results from your hospital stay or ER visit. Additional Information If you have questions, please visit the Frequently Asked Questions section of the MeetMe website at [email or web address] Remember, MeetMe is NOT to be used for urgent needs. For medical emergencies, dial 911. Now available from your iPhone and Android! Please provide this summary of care documentation to your next provider. Your primary care clinician is listed as [id number] [facility]. If you have any questions after today's visit, please call [phone].

## 2018-07-09 NOTE — PROGRESS NOTES
1. Have you been to an emergency room or urgent care clinic since your last visit? April. TIA    Hospitalized since your last visit? If yes, where, when, and reason for visit? Admitted in [place] for TIA    2. Have you seen or consulted any other health care providers outside of the [facility] since your last visit including any procedures, health maintenance items.  If yes, where, when and reason for visit? no

## 2018-07-09 NOTE — PROGRESS NOTES
Yvan Silveiraman Complaint   Patient presents with    Abdominal Aortic Aneurysm       History and Physical    Damon Edward Loosen Merlin Boy is a 76 y.o. male with fairly extensive history. Patient has AAA repaired with EVAR. Recently had extension stent from the left common iliac artery to the left external iliac artery. His aneurysm is stable in size without any endoleak. No abdominal or back pain. 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 Patient is also had an open left superficial femoral artery endarterectomy with patch angioplasty this portion seems to show a 50-75% narrowing on repeat duplex imaging. Patient also recently had TIA involving slowed mental status and right facial droop but he was only found to have right internal carotid artery stenosis so this is asymptomatic moderate stenosis of the right internal carotid artery. He continues to be asymptomatic. He is complaining of lifestyle limiting claudication of the left lower extremity without any rest pain or ulcerations. No fevers or chills.     Past Medical History:   Diagnosis Date    Advance directive in chart 6/13/2016    CAD (coronary artery disease)     [facility]) 2003    Colon    CKD (chronic kidney disease), stage III 3/27/2016    CVA (cerebral vascular accident) ([address] 3/26/2016    Femoral artery aneurysm, left ([address]     Heart attack ([address] 2008    Heart attack ([address]     Hernia     History of TIAs     Hyperlipidemia     Hypertension 3/27/2016    Iliac artery aneurysm, left ([address]     Pure hypercholesterolemia 9/14/2012    PVD (peripheral vascular disease) ([address] 3/27/2016    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 ([address] 3/26/2016     Past Surgical History:   Procedure Laterality Date    ABDOMEN SURGERY PROC UNLISTED  2011    3 stents placed into abdomen (groin)    CARDIAC SURG PROCEDURE UNLIST  2005    Quadruple Bypass    CARDIAC SURG PROCEDURE UNLIST  2011    Aortic pig valve placed    COLONOSCOPY N/A 6/29/2016    COLONOSCOPY performed by Osiris Eastman MD at [facility] ENDOSCOPY    ENDOSCOPY, COLON, DIAGNOSTIC      HX AAA REPAIR      HX ORTHOPAEDIC  2008    Right Leg Amputated     Patient Active Problem List   Diagnosis Code    Pure hypercholesterolemia E78.00    Coronary artery disease involving native coronary artery without angina pectoris I25.10    Hypertension I10    CKD (chronic kidney disease), stage III N18.3    S/P CABG (coronary artery bypass graft) Z95.1    S/P AVR Z95.2    Advance directive in chart Z78.9    PAD (peripheral artery disease) ([facility] I73.9    AAA (abdominal aortic aneurysm) without rupture ([facility] B90.8    Diastolic dysfunction N63.8    Vascular dementia without behavioral disturbance F01.50    TIA (transient ischemic attack) G45.9    Atherosclerosis of native artery of left lower extremity with intermittent claudication (HCC) Q01.747     Current Outpatient Prescriptions   Medication Sig Dispense Refill    hydroCHLOROthiazide (HYDRODIURIL) 25 mg tablet Take 1 Tab by mouth daily. 90 Tab 4    aspirin (ASPIRIN) 325 mg tablet Take 1 Tab by mouth daily. 90 Tab 4    amLODIPine (NORVASC) 10 mg tablet Take 1 Tab by mouth daily. 90 Tab 4    atorvastatin (LIPITOR) 20 mg tablet Take 1 Tab by mouth daily. 90 Tab 4    donepezil (ARICEPT) 10 mg tablet TAKE 2 TABLETS BY MOUTH EVERY MORNING AFTER A MEAL 180 Tab 1    tamsulosin (FLOMAX) 0.4 mg capsule Take 1 Cap by mouth nightly. 90 Cap 4    NITROGLYCERIN (NITROSTAT SL) 1 Tab by SubLINGual route as needed (chest pain).  omega-3 fatty acids-vitamin e (FISH OIL) 1,000 mg cap Take 1 Cap by mouth two (2) times a day.  180 Cap 4     Allergies   Allergen Reactions    Ace Inhibitors Other (comments)     Per pt, Cardiologist states he cannot have ACE inhibitors     Social History     Social History    Marital status:      Spouse name: N/A    Number of children: N/A    Years of education: N/A     Occupational History    Not on file. Social History Main Topics    Smoking status: Former Smoker     Quit date: 9/14/1987    Smokeless tobacco: Never Used    Alcohol use No      Comment: wine 1-2x per week    Drug use: No    Sexual activity: Not Currently     Other Topics Concern    Not on file     Social History Narrative      Family History   Problem Relation Age of Onset    Hypertension Mother     Hypertension Father     Heart Disease Father     Heart Disease Brother        Physical Exam:    Visit Vitals    /82 (BP 1 Location: Left arm, BP Patient Position: Sitting)    Pulse 72    Resp 18    Ht 5' 10\" (1.778 m)    Wt 212 lb (96.2 kg)    BMI 30.42 kg/m2      General: Well-appearing male in no acute distress  HEENT: EOMI no scleral icterus is noted patient is wearing eyeglasses  Pulmonary: No increased work of breathing is noted  Extremities: Left lower extremity is warm and perfused the right lower extremity is absent with known BKA  Neuro: Cranial nerves II through XII grossly intact    Impression and Plan:  Keith Coats is a 76 y.o. male with asymptomatic right internal carotid artery stenosis is can be followed on a yearly basis patient also has EVAR which seems to be doing very well based on ultrasound imaging can be followed on a yearly basis he also has PAD and claudication which can be followed with now an angiogram antegrade stick on the left lower extremity given his lifestyle limiting claudication. Patient was given risks and benefits of the procedure including but not limited to bleeding infection damage to adjacent structures MI stroke and death. Patient is understanding all the risks including but not limited to loss of lower extremity and need for further surgery. He is willing to undergo the surgery. Patient will require yearly follow-up of all other issues. We reviewed the plan with the patient and the patient understands.   We also gave the patient appropriate instructions on their disease process and when to call back. Follow-up Disposition: Not on [facility], [place]    PLEASE NOTE:  This document has been produced using voice recognition software. Unrecognized errors in transcription may be present.

## 2018-07-19 ENCOUNTER — HOSPITAL ENCOUNTER (OUTPATIENT)
Age: 78
Setting detail: OUTPATIENT SURGERY
Discharge: HOME | End: 2018-07-19
Attending: SURGERY | Admitting: SURGERY
Payer: MEDICARE

## 2018-07-19 VITALS
HEIGHT: 70 IN | TEMPERATURE: 97.8 F | BODY MASS INDEX: 31.5 KG/M2 | HEART RATE: 52 BPM | RESPIRATION RATE: 16 BRPM | DIASTOLIC BLOOD PRESSURE: 73 MMHG | SYSTOLIC BLOOD PRESSURE: 121 MMHG | OXYGEN SATURATION: 96 % | WEIGHT: 220 LBS

## 2018-07-19 DIAGNOSIS — I70.212 ATHEROSCLER OF NATIVE ARTERY OF LEFT LEG WITH INTERMIT CLAUDICATION (HCC): ICD-10-CM

## 2018-07-19 DIAGNOSIS — I73.9 PERIPHERAL VASCULAR DISEASE, UNSPECIFIED (HCC): ICD-10-CM

## 2018-07-19 LAB
ACT BLD: 164 SECS (ref 79–138)
ACT BLD: 202 SECS (ref 79–138)
BUN BLD-MCNC: 28 MG/DL (ref 7–18)
CHLORIDE BLD-SCNC: 100 MMOL/L (ref 100–108)
GLUCOSE BLD STRIP.AUTO-MCNC: 100 MG/DL (ref 74–106)
HCT VFR BLD CALC: 40 % (ref 36–49)
HGB BLD-MCNC: 13.6 G/DL (ref 12–16)
POTASSIUM BLD-SCNC: 3.9 MMOL/L (ref 3.5–5.5)
SODIUM BLD-SCNC: 138 MMOL/L (ref 136–145)

## 2018-07-19 PROCEDURE — 77030013744: Performed by: SURGERY

## 2018-07-19 PROCEDURE — 85347 COAGULATION TIME ACTIVATED: CPT

## 2018-07-19 PROCEDURE — 99152 MOD SED SAME PHYS/QHP 5/>YRS: CPT | Performed by: SURGERY

## 2018-07-19 PROCEDURE — 74011636320 HC RX REV CODE- 636/320: Performed by: SURGERY

## 2018-07-19 PROCEDURE — C1769 GUIDE WIRE: HCPCS | Performed by: SURGERY

## 2018-07-19 PROCEDURE — C1894 INTRO/SHEATH, NON-LASER: HCPCS | Performed by: SURGERY

## 2018-07-19 PROCEDURE — C1725 CATH, TRANSLUMIN NON-LASER: HCPCS | Performed by: SURGERY

## 2018-07-19 PROCEDURE — 77030002996 HC SUT SLK J&J -A: Performed by: SURGERY

## 2018-07-19 PROCEDURE — 75710 ARTERY X-RAYS ARM/LEG: CPT | Performed by: SURGERY

## 2018-07-19 PROCEDURE — 74011000250 HC RX REV CODE- 250: Performed by: SURGERY

## 2018-07-19 PROCEDURE — 77030010603 HC BLN DEV INFL BSC -B: Performed by: SURGERY

## 2018-07-19 PROCEDURE — 76937 US GUIDE VASCULAR ACCESS: CPT | Performed by: SURGERY

## 2018-07-19 PROCEDURE — 84295 ASSAY OF SERUM SODIUM: CPT

## 2018-07-19 PROCEDURE — 37224 HC PTA FEMPOP UNI: CPT | Performed by: SURGERY

## 2018-07-19 PROCEDURE — C1887 CATHETER, GUIDING: HCPCS | Performed by: SURGERY

## 2018-07-19 PROCEDURE — 99153 MOD SED SAME PHYS/QHP EA: CPT | Performed by: SURGERY

## 2018-07-19 PROCEDURE — 74011250636 HC RX REV CODE- 250/636: Performed by: SURGERY

## 2018-07-19 PROCEDURE — 74011250637 HC RX REV CODE- 250/637: Performed by: SURGERY

## 2018-07-19 PROCEDURE — 74011250636 HC RX REV CODE- 250/636

## 2018-07-19 RX ORDER — VERAPAMIL HYDROCHLORIDE 2.5 MG/ML
INJECTION, SOLUTION INTRAVENOUS
Status: DISPENSED
Start: 2018-07-19 | End: 2018-07-19

## 2018-07-19 RX ORDER — MORPHINE SULFATE 10 MG/ML
1 INJECTION, SOLUTION INTRAMUSCULAR; INTRAVENOUS
Status: DISCONTINUED | OUTPATIENT
Start: 2018-07-19 | End: 2018-07-20 | Stop reason: HOSPADM

## 2018-07-19 RX ORDER — CLOPIDOGREL BISULFATE 75 MG/1
75 TABLET ORAL DAILY
Qty: 30 TAB | Refills: 3 | Status: SHIPPED | OUTPATIENT
Start: 2018-07-19 | End: 2018-07-19 | Stop reason: SDUPTHER

## 2018-07-19 RX ORDER — LIDOCAINE HYDROCHLORIDE 10 MG/ML
INJECTION, SOLUTION EPIDURAL; INFILTRATION; INTRACAUDAL; PERINEURAL AS NEEDED
Status: DISCONTINUED | OUTPATIENT
Start: 2018-07-19 | End: 2018-07-19 | Stop reason: HOSPADM

## 2018-07-19 RX ORDER — MIDAZOLAM HYDROCHLORIDE 1 MG/ML
INJECTION, SOLUTION INTRAMUSCULAR; INTRAVENOUS
Status: DISPENSED
Start: 2018-07-19 | End: 2018-07-19

## 2018-07-19 RX ORDER — DIPHENHYDRAMINE HYDROCHLORIDE 50 MG/ML
12.5 INJECTION, SOLUTION INTRAMUSCULAR; INTRAVENOUS
Status: DISCONTINUED | OUTPATIENT
Start: 2018-07-19 | End: 2018-07-20 | Stop reason: HOSPADM

## 2018-07-19 RX ORDER — FENTANYL CITRATE 50 UG/ML
INJECTION, SOLUTION INTRAMUSCULAR; INTRAVENOUS AS NEEDED
Status: DISCONTINUED | OUTPATIENT
Start: 2018-07-19 | End: 2018-07-19 | Stop reason: HOSPADM

## 2018-07-19 RX ORDER — ONDANSETRON 2 MG/ML
4 INJECTION INTRAMUSCULAR; INTRAVENOUS
Status: DISCONTINUED | OUTPATIENT
Start: 2018-07-19 | End: 2018-07-20 | Stop reason: HOSPADM

## 2018-07-19 RX ORDER — MORPHINE SULFATE 10 MG/ML
1 INJECTION, SOLUTION INTRAMUSCULAR; INTRAVENOUS
Status: DISCONTINUED | OUTPATIENT
Start: 2018-07-19 | End: 2018-07-19

## 2018-07-19 RX ORDER — SODIUM CHLORIDE 0.9 % (FLUSH) 0.9 %
5-10 SYRINGE (ML) INJECTION EVERY 8 HOURS
Status: DISCONTINUED | OUTPATIENT
Start: 2018-07-19 | End: 2018-07-20 | Stop reason: HOSPADM

## 2018-07-19 RX ORDER — SODIUM CHLORIDE 0.9 % (FLUSH) 0.9 %
5-10 SYRINGE (ML) INJECTION AS NEEDED
Status: DISCONTINUED | OUTPATIENT
Start: 2018-07-19 | End: 2018-07-20 | Stop reason: HOSPADM

## 2018-07-19 RX ORDER — FENTANYL CITRATE 50 UG/ML
INJECTION, SOLUTION INTRAMUSCULAR; INTRAVENOUS
Status: DISPENSED
Start: 2018-07-19 | End: 2018-07-19

## 2018-07-19 RX ORDER — MIDAZOLAM HYDROCHLORIDE 1 MG/ML
INJECTION, SOLUTION INTRAMUSCULAR; INTRAVENOUS AS NEEDED
Status: DISCONTINUED | OUTPATIENT
Start: 2018-07-19 | End: 2018-07-19 | Stop reason: HOSPADM

## 2018-07-19 RX ORDER — HEPARIN SODIUM 1000 [USP'U]/ML
INJECTION, SOLUTION INTRAVENOUS; SUBCUTANEOUS AS NEEDED
Status: DISCONTINUED | OUTPATIENT
Start: 2018-07-19 | End: 2018-07-19 | Stop reason: HOSPADM

## 2018-07-19 RX ORDER — CLOPIDOGREL 300 MG/1
300 TABLET, FILM COATED ORAL ONCE
Status: COMPLETED | OUTPATIENT
Start: 2018-07-19 | End: 2018-07-19

## 2018-07-19 RX ORDER — OXYCODONE AND ACETAMINOPHEN 5; 325 MG/1; MG/1
1 TABLET ORAL
Status: DISCONTINUED | OUTPATIENT
Start: 2018-07-19 | End: 2018-07-20 | Stop reason: HOSPADM

## 2018-07-19 RX ORDER — ACETAMINOPHEN 325 MG/1
650 TABLET ORAL
Status: DISCONTINUED | OUTPATIENT
Start: 2018-07-19 | End: 2018-07-20 | Stop reason: HOSPADM

## 2018-07-19 RX ORDER — CLOPIDOGREL BISULFATE 75 MG/1
TABLET ORAL
Qty: 90 TAB | Refills: 3 | Status: SHIPPED | OUTPATIENT
Start: 2018-07-19 | End: 2018-10-02 | Stop reason: SDUPTHER

## 2018-07-19 RX ORDER — LIDOCAINE HYDROCHLORIDE 10 MG/ML
INJECTION, SOLUTION EPIDURAL; INFILTRATION; INTRACAUDAL; PERINEURAL
Status: DISPENSED
Start: 2018-07-19 | End: 2018-07-19

## 2018-07-19 RX ADMIN — CLOPIDOGREL BISULFATE 300 MG: 300 TABLET, FILM COATED ORAL at 15:10

## 2018-07-19 NOTE — Clinical Note
Peripheral Lesion 1. Balloon inserted. Balloon inflated using single inflation technique. Lesion 1: Pressure = 8 paz; Duration = 120 sec.

## 2018-07-19 NOTE — Clinical Note
TRANSFER - OUT REPORT:  
 
Verbal report given to: Yehuda Seo RN. Cheryl Dallas Report consisted of patient's Situation, Background, Assessment and  
Recommendations(SBAR). Opportunity for questions and clarification was provided. Patient transported with a Cardiac Cath Tech / Patient Care Tech. Patient transported to: [address]

## 2018-07-19 NOTE — PROGRESS NOTES
07/19/18 0741   Vitals   Temp 97.8 °F (36.6 °C)   Temp Source Oral   Pulse (Heart Rate) 74   Heart Rate Source Monitor   Resp Rate 16   O2 Sat (%) 97 %   Level of Consciousness Alert   /66   MAP (Monitor) 102   BP 1 Location Left arm   BP 1 Method Automatic   BP Patient Position At rest   Cardiac Rhythm NSR   MEWS Score 1   Pt brought to [facility] ambulatory. Pt placed in bed 6 and connected to monitor. Baseline VS taken and PIV started x 1. Admission database completed. Pt ready for ordered procedure.
A+Ox4, denied any complaints, placed in trendelenburg and gave X1 bolus  ml.
Left FAS aspirated and pulled @ 1325, by JT. Pressure held for 20 min. Sterile hemostatic dressing applied. No bleeding or swelling. Safety instructions reviewed with the patient.
Pt provided discharge instructions. All questions answered and pt verbalized understanding. PIV removed. No hematoma or bleeding noted at this time. Procedure site within normal limits. Pt escorted to front of building for discharge.
TRANSFER - IN REPORT:    Verbal report received from  Emelina Adams (name) on [employer]  being received from (unit) for routine progression of care      Report consisted of patients Situation, Background, Assessment and   Recommendations(SBAR). Information from the following report(s) SBAR, Kardex and Procedure Summary was reviewed with the receiving nurse. Opportunity for questions and clarification was provided. Assessment completed upon patients arrival to unit and care assumed.
TRANSFER - OUT REPORT:    Verbal report given to Anuradha Alex (name) on [employer]  being transferred to [address] [place] [address] (unit) for ordered procedure       Report consisted of patients Situation, Background, Assessment and   Recommendations(SBAR). Information from the following report(s) SBAR, Kardex and MAR was reviewed with the receiving nurse. Lines:       Opportunity for questions and clarification was provided.       Patient transported with:   [employer]
No

## 2018-07-19 NOTE — INTERVAL H&P NOTE
H&P Update:  Damon Elam Buerger was seen and examined. History and physical has been reviewed. The patient has been examined.  There have been no significant clinical changes since the completion of the originally dated History and Physical.    Signed By: Ozzy Ashraf MD     July 19, 2018 7:44 AM

## 2018-07-19 NOTE — Clinical Note
Peripheral Lesion 2. Balloon inflated using single inflation technique. Lesion 2: Pressure = 8 paz; Duration = 120 sec.

## 2018-07-19 NOTE — Clinical Note
TRANSFER - IN REPORT:  
 
Verbal report received from: Jose M Morel RN. Colt Haywood Report consisted of patient's Situation, Background, Assessment and  
Recommendations(SBAR). Opportunity for questions and clarification was provided. Assessment completed upon patient's arrival to unit and care assumed. Patient transported with a Cardiac Cath Tech / Patient Care Tech.

## 2018-07-19 NOTE — DISCHARGE INSTRUCTIONS
Angiography Discharge Instructions    *Check the puncture site frequently for swelling or bleeding. If you see any bleeding, lie down and apply pressure over the area with a clean town or washcloth. Notify your doctor for any redness, swelling, drainage or oozing from the puncture site. Notify your doctor for any fever or chills. *If the leg or arm with the puncture becomes cold, numb or painful, call Dr Soha Butcher at      *Activity should be limited for the next 48 hours. Climb stairs as little as possible and avoid any stooping, bending or strenuous activity for 48 hours. No heavy lifting (anything over 10 pounds) for three days. *Do not drive for 48 hours. *You may resume your usual diet. Drink more fluids than usual.    *Have a responsible person drive you home and stay with you for at least 24 hours after your angiography. *You may remove the bandage from your Left and Groin in 24 hours. You may shower in 24 hours. No tub baths, hot tubs or swimming for one week. Do not place any lotions, creams, powders, ointments over the puncture site for one week. You may place a clean band-aid over the puncture site each day for 5 days. Change this daily. DISCHARGE SUMMARY from Nurse    The following personal items are in your possession at time of discharge:       Visual Aid: None     Home Medications: None  Jewelry: None  Clothing: At bedside, Shorts, Shirt, Socks, Footwear  Other Valuables: Eyeglasses                           PATIENT INSTRUCTIONS:    After general anesthesia or intravenous sedation, for 24 hours or while taking prescription Narcotics:  · Limit your activities  · Do not drive and operate hazardous machinery  · Do not make important personal or business decisions  · Do  not drink alcoholic beverages  · If you have not urinated within 8 hours after discharge, please contact your surgeon on call.     Report the following to your surgeon:  · Excessive pain, swelling, redness or odor of or around the surgical area  · Temperature over 100.5  · Nausea and vomiting lasting longer than 4 hours or if unable to take medications  · Any signs of decreased circulation or nerve impairment to extremity: change in color, persistent  numbness, tingling, coldness or increase pain  · Any questions        What to do at Home:  Recommended activity: Activity as tolerated and no driving for today    *  Please give a list of your current medications to your Primary Care Provider. *  Please update this list whenever your medications are discontinued, doses are      changed, or new medications (including over-the-counter products) are added. *  Please carry medication information at all times in case of emergency situations. These are general instructions for a healthy lifestyle:    No smoking/ No tobacco products/ Avoid exposure to second hand smoke    Surgeon General's Warning:  Quitting smoking now greatly reduces serious risk to your health. Obesity, smoking, and sedentary lifestyle greatly increases your risk for illness    A healthy diet, regular physical exercise & weight monitoring are important for maintaining a healthy lifestyle    You may be retaining fluid if you have a history of heart failure or if you experience any of the following symptoms:  Weight gain of 3 pounds or more overnight or 5 pounds in a week, increased swelling in our hands or feet or shortness of breath while lying flat in bed. Please call your doctor as soon as you notice any of these symptoms; do not wait until your next office visit. Recognize signs and symptoms of STROKE:    F-face looks uneven    A-arms unable to move or move unevenly    S-speech slurred or non-existent    T-time-call 911 as soon as signs and symptoms begin-DO NOT go       Back to bed or wait to see if you get better-TIME IS BRAIN. Warning Signs of HEART ATTACK     Call 911 if you have these symptoms:   Chest discomfort.  Most heart attacks involve discomfort in the center of the chest that lasts more than a few minutes, or that goes away and comes back. It can feel like uncomfortable pressure, squeezing, fullness, or pain.  Discomfort in other areas of the upper body. Symptoms can include pain or discomfort in one or both arms, the back, neck, jaw, or stomach.  Shortness of breath with or without chest discomfort.  Other signs may include breaking out in a cold sweat, nausea, or lightheadedness. Don't wait more than five minutes to call 911 - MINUTES MATTER! Fast action can save your life. Calling 911 is almost always the fastest way to get lifesaving treatment. Emergency Medical Services staff can begin treatment when they arrive -- up to an hour sooner than if someone gets to the hospital by car. The discharge information has been reviewed with the patient. The patient verbalized understanding. Discharge medications reviewed with the patient and appropriate educational materials and side effects teaching were provided.       Patient armband removed and shredded

## 2018-07-19 NOTE — OP NOTES
[facility]  OPERATIVE REPORT    Maritza Cam  MR#: [id number]  : 1940  ACCOUNT #: [de-identified]   DATE OF SERVICE: 2018    SURGEON:  Joaquin Ott MD     PREOPERATIVE DIAGNOSIS:  Left lower extremity claudication with peripheral arterial disease. POSTOPERATIVE DIAGNOSIS:  Left lower extremity claudication with peripheral arterial disease, with left popliteal artery aneurysm. CULTURES:  None. SPECIMENS REMOVED:  None. DRAINS:  None. ESTIMATED BLOOD LOSS:  Less than 50 mL. ANESTHESIA:  Moderate conscious sedation 41 minutes. This was provided by an independent provider giving the medication per my discretion and monitoring the vital signs throughout the case. IMPLANTS:  None. ASSISTANTS:  None. COMPLICATIONS:  None. PROCEDURES PERFORMED:  1. Moderate conscious sedation of 41 minutes. 2.  Ultrasound-guided access of left common femoral artery. 3.  Left lower extremity selective angiography. 4.  Balloon angioplasty of the superficial femoral artery. INDICATION FOR THE PROCEDURE:  The patient is a 75-year-old gentleman with claudication of the left lower extremity with known arterial disease and a previous abdominal aortic aneurysm. Patient was given the risks and benefits of the procedure, including but not limited to bleeding, infection, damage to adjacent structures, MI, stroke, and death as well as loss of lower extremity and need for further surgery. The patient was understanding of all the risks and underwent the procedure. OPERATIVE FINDINGS:  Left lower extremity:  1. Common femoral artery is patent without stenosis. 2.  Profunda femoris artery is patent without stenosis. 3.  Superficial femoral artery shows a 90% narrowing at its takeoff, then shows a large patched area and then a normal appearing superficial femoral artery until the distal portion, where it does show another 80% narrowing, short segment.   4.  The above-knee popliteal artery is patent and aneurysmal without stenosis. 5.  The below-knee popliteal artery is patent without stenosis. 6.  The anterior tibial artery is occluded. 7.  The tibioperoneal trunk artery is patent without stenosis. 8.  The posterior tibial artery is patent without stenosis. 9.  The peroneal artery is patent without stenosis. PROCEDURE:  The patient was correctly identified in the pre-cath area and taken to the cath lab in stable condition. The patient had a preincision timeout per Anesthesia. The patient was prepped and draped in normal sterile fashion according to CDC guidelines for aseptic technique. We then were able to use an ultrasound. We visualized what appeared to be the common femoral artery. We were able to gain access into this area, but appeared that we were within the patched portion of the superficial femoral artery, so we then took our ultrasound up higher. We were able to visualize where the common femoral artery was. We then numbed this area up appropriately, took a micropuncture needle to gain access into the common femoral artery. A picture was taken and copied to the patient's chart. We then were able to finally get our wire to go down after multiple attempts. We had to use an 18-gauge needle and then a 0.035 wire that was placed. We then were able to place a 4-Yoruba sheath in. We then were able to using a Kumpe catheter and a Bentson wire, gaining access into the above-knee popliteal artery, confirmed with angiography. We heparinized the patient appropriately. We exchanged our wire for a V-18 wire and then ballooned with a 7 x 4 balloon, both the lesions of the SFA. Repeat angiography shows complete resolution of the distal SFA lesion, with a 10-20% still narrowing of the proximal SFA, but massive improvement of blood flow, with no distal embolization. Decision was then made to conclude the case. All wires and catheters were removed.   The 4-Yoruba sheath will be pulled in the holding area. The patient tolerated the procedure well without any issues.       MD JOSETTE Tejeda / KATY  D: 07/19/2018 12:30     T: 07/19/2018 12:58  JOB #: [id number]

## 2018-07-19 NOTE — IP AVS SNAPSHOT
Denisa Ackerman 
 
 
 [address] Patient: Jese Suarez MRN: [id number] WE A check james indicates which time of day the medication should be taken. My Medications CONTINUE taking these medications Instructions Each Dose to Equal  
 Morning Noon Evening Bedtime  
 amLODIPine 10 mg tablet Commonly known as:  [place] Your last dose was: Your next dose is: Take 1 Tab by mouth daily. 10 mg  
    
   
   
   
  
 aspirin 325 mg tablet Commonly known as:  ASPIRIN Your last dose was: Your next dose is: Take 1 Tab by mouth daily. 325 mg  
    
   
   
   
  
 atorvastatin 20 mg tablet Commonly known as:  LIPITOR Your last dose was: Your next dose is: Take 1 Tab by mouth daily. 20 mg  
    
   
   
   
  
 donepezil 10 mg tablet Commonly known as:  ARICEPT Your last dose was: Your next dose is: TAKE 2 TABLETS BY MOUTH EVERY MORNING AFTER A MEAL  
     
   
   
   
  
 hydroCHLOROthiazide 25 mg tablet Commonly known as:  HYDRODIURIL Your last dose was: Your next dose is: Take 1 Tab by mouth daily. 25 mg NITROSTAT SL Your last dose was: Your next dose is:    
   
   
 1 Tab by SubLINGual route as needed (chest pain). 1 Tab  
    
   
   
   
  
 omega-3 fatty acids-vitamin e 1,000 mg Cap Commonly known as:  FISH OIL Your last dose was: Your next dose is: Take 1 Cap by mouth two (2) times a day. 1 Cap  
    
   
   
   
  
 tamsulosin 0.4 mg capsule Commonly known as:  FLOMAX Your last dose was: Your next dose is: Take 1 Cap by mouth nightly. 0.4 mg ASK your doctor about these medications  Instructions Each Dose to Equal  
 Morning Noon Evening Bedtime  
 clopidogrel 75 mg Tab Commonly known as:  PLAVIX Your last dose was: Your next dose is: TAKE 1 TABLET BY MOUTH DAILY Where to Get Your Medications These medications were sent to [address] [place] [address] [address] AT [address] [place] [address]  Kim Napoles 1937, [address] Phone:  [phone]  
  clopidogrel 75 mg Tab

## 2018-07-19 NOTE — Clinical Note
MACD (Max Contrast Calc Dose): 
Patient weight (kg) = 99.8. Creatinine level (mg/dL) = 1.7. Contrast concentration (mg/mL) = 300. MACD = 293.53 mL.

## 2018-07-19 NOTE — Clinical Note
Left femoral artery. accessed successfully using ultrasound guidance. Number of attempts =  2.  ANTEGRADE STICK, LEFT FEMORAL

## 2018-07-19 NOTE — IP AVS SNAPSHOT
303 [facility] Ne 
 
 
 [address] Patient: Guy Thomas MRN: [id number] SUN:[birth year] About your hospitalization You were admitted on:  July 19, 2018 You last received care in the:  [facility] CATH HOLDING You were discharged on:  July 19, 2018 Why you were hospitalized Your primary diagnosis was:  Not on File Follow-up Information Follow up With Details Comments [place]   [address] [facility] [address] [facility] [facility] [address] 
[phone] Jorge L Chowdhury MD Follow up in 3 day(s) please call office  for follow op a appointment  [address] John D 
[facility]  [facility] Se 
[phone] Your Scheduled Appointments Monday July 30, 2018  9:30 AM EDT HOSPITAL DISCHARGE with [address] [place], [place] [facility] ([address]) [address] [place] [address] Domingo 148 200 [facility] Se  
[phone] Wednesday August 22, 2018  8:30 AM EDT Follow Up with Colton Mcmanus MD  
Cardio Specialist at [facility] [address] [phone] Discharge Orders None A check james indicates which time of day the medication should be taken. My Medications CONTINUE taking these medications Instructions Each Dose to Equal  
 Morning Noon Evening Bedtime  
 amLODIPine 10 mg tablet Commonly known as:  Yara Demetria Your last dose was: Your next dose is: Take 1 Tab by mouth daily. 10 mg  
    
   
   
   
  
 aspirin 325 mg tablet Commonly known as:  ASPIRIN Your last dose was: Your next dose is: Take 1 Tab by mouth daily. 325 mg  
    
   
   
   
  
 atorvastatin 20 mg tablet Commonly known as:  LIPITOR Your last dose was: Your next dose is: Take 1 Tab by mouth daily. 20 mg  
    
   
   
   
  
 donepezil 10 mg tablet Commonly known as:  ARICEPT Your last dose was: Your next dose is: TAKE 2 TABLETS BY MOUTH EVERY MORNING AFTER A MEAL  
     
   
   
   
  
 hydroCHLOROthiazide 25 mg tablet Commonly known as:  HYDRODIURIL Your last dose was: Your next dose is: Take 1 Tab by mouth daily. 25 mg NITROSTAT SL Your last dose was: Your next dose is:    
   
   
 1 Tab by SubLINGual route as needed (chest pain). 1 Tab  
    
   
   
   
  
 omega-3 fatty acids-vitamin e 1,000 mg Cap Commonly known as:  FISH OIL Your last dose was: Your next dose is: Take 1 Cap by mouth two (2) times a day. 1 Cap  
    
   
   
   
  
 tamsulosin 0.4 mg capsule Commonly known as:  FLOMAX Your last dose was: Your next dose is: Take 1 Cap by mouth nightly. 0.4 mg ASK your doctor about these medications Instructions Each Dose to Equal  
 Morning Noon Evening Bedtime  
 clopidogrel 75 mg Tab Commonly known as:  PLAVIX Your last dose was: Your next dose is: TAKE 1 TABLET BY MOUTH DAILY Where to Get Your Medications These medications were sent to [address] [place] [address] Alfreda Dyer, [address] AT [address] [place] [address]  Kimjose Hirschamira Napoles 1937, [address] Phone:  [phone]  
  clopidogrel 75 mg Tab Discharge Instructions Angiography Discharge Instructions *Check the puncture site frequently for swelling or bleeding. If you see any bleeding, lie down and apply pressure over the area with a clean town or washcloth.  Notify your doctor for any redness, swelling, drainage or oozing from the puncture site. Notify your doctor for any fever or chills. *If the leg or arm with the puncture becomes cold, numb or painful, call Dr Trixie Chu at *Activity should be limited for the next 48 hours. Climb stairs as little as possible and avoid any stooping, bending or strenuous activity for 48 hours. No heavy lifting (anything over 10 pounds) for three days. *Do not drive for 48 hours. *You may resume your usual diet. Drink more fluids than usual. 
 
*Have a responsible person drive you home and stay with you for at least 24 hours after your angiography. *You may remove the bandage from your Left and Groin in 24 hours. You may shower in 24 hours. No tub baths, hot tubs or swimming for one week. Do not place any lotions, creams, powders, ointments over the puncture site for one week. You may place a clean band-aid over the puncture site each day for 5 days. Change this daily. DISCHARGE SUMMARY from Nurse The following personal items are in your possession at time of discharge: 
 
  
Visual Aid: None Home Medications: None Jewelry: None Clothing: At bedside, Shorts, [address], [address], Footwear Other Valuables: Bj Matthews PATIENT INSTRUCTIONS: 
 
 
F-face looks uneven A-arms unable to move or move unevenly S-speech slurred or non-existent T-time-call 911 as soon as signs and symptoms begin-DO NOT go Back to bed or wait to see if you get better-TIME IS BRAIN. Warning Signs of HEART ATTACK Call 911 if you have these symptoms: 
? Chest discomfort. Most heart attacks involve discomfort in the center of the chest that lasts more than a few minutes, or that goes away and comes back. It can feel like uncomfortable pressure, squeezing, fullness, or pain. ? Discomfort in other areas of the upper body. Symptoms can include pain or discomfort in one or both arms, the back, neck, jaw, or stomach. ? Shortness of breath with or without chest discomfort. ? Other signs may include breaking out in a cold sweat, nausea, or lightheadedness. Don't wait more than five minutes to call [address]! Fast action can save your life. Calling 911 is almost always the fastest way to get lifesaving treatment. Emergency Medical Services staff can begin treatment when they arrive  up to an hour sooner than if someone gets to the hospital by car. The discharge information has been reviewed with the patient. The patient verbalized understanding. Discharge medications reviewed with the patient and appropriate educational materials and side effects teaching were provided. Patient armband removed and shredded Introducing [facility]! Dear Alphia Sever: Thank you for requesting a [employer] account. Our records indicate that you already have an active [employer] account. You can access your account anytime at [email or web address]. Did you know that you can access your hospital and ER discharge instructions at any time in [employer]? You can also review all of your test results from your hospital stay or ER visit. Additional Information If you have questions, please visit the Frequently Asked Questions section of the [employer] website at [email or web address]. Remember, [employer] is NOT to be used for urgent needs. For medical emergencies, dial 911. Now available from your iPhone and Android! Introducing Manuel Griffith As a [facility] patient, I wanted to make you aware of our electronic visit tool called Manuel Griffith. [facility] 24/7 allows you to connect within minutes with a medical provider 24 hours a day, seven days a week via a mobile device or tablet or logging into a secure website from your computer. You can access TastyKhana from anywhere in the [place]. A virtual visit might be right for you when you have a simple condition and feel like you just dont want to get out of bed, or cant get away from work for an appointment, when your regular [facility] Rodriguez provider is not available (evenings, weekends or holidays), or when youre out of town and need minor care. Electronic visits cost only $49 and if the [facility] provider determines a prescription is needed to treat your condition, one can be electronically transmitted to a nearby pharmacy*. Please take a moment to enroll today if you have not already done so. The enrollment process is free and takes just a few minutes. To enroll, please download the [email or web address] miki to your tablet or phone, or visit [email or web address] to enroll on your computer. And, as an [address] [place] [address] patient with a [email or web address] account, the results of your visits will be scanned into your electronic medical record and your primary care provider will be able to view the scanned results. We urge you to continue to see your regular Charles Rodriguez provider for your ongoing medical care. And while your primary care provider may not be the one available when you seek a [employer] virtual visit, the peace of mind you get from getting a real diagnosis real time can be priceless. For more information on TastyKhana, view our Frequently Asked Questions (FAQs) at [email or web address]. Sincerely, 
 
Consuelo Edwards MD 
Chief Medical Officer 8 Irene Hendrickson *:  certain medications cannot be prescribed via [employer] Providers Seen During Your Hospitalization Provider Specialty Primary office phone Regina Campos MD Vascular Surgery [phone] Your Primary Care Physician (PCP) Primary Care Physician Office Phone Office Fax Roberth Gómeznéstor [phone] You are allergic to the following Allergen Reactions Ace Inhibitors Other (comments) Per pt, Cardiologist states he cannot have ACE inhibitors Recent Documentation Height Weight BMI Smoking Status 1.778 m 99.8 kg 31.57 kg/m2 Former Smoker Emergency Contacts Name Discharge Info Relation Home Work Mobile Dana Carvajal CAREGIVER [3] Spouse [3] [phone] Mark Eugenio [22] [phone] Gerardo Ochoa  Child [2] [phone] Patient Belongings The following personal items are in your possession at time of discharge: 
     Visual Aid: None      Home Medications: None   Jewelry: None  Clothing: At bedside, Shorts, Shirt, Socks, Footwear    Other Valuables: Eyeglasses Please provide this summary of care documentation to your next provider. Signatures-by signing, you are acknowledging that this After Visit Summary has been reviewed with you and you have received a copy. Patient Signature:  ____________________________________________________________ Date:  ____________________________________________________________  
  
Kenton Noriega Provider Signature:  ____________________________________________________________ Date:  ____________________________________________________________

## 2018-07-19 NOTE — H&P (VIEW-ONLY)
Rosemary Watts Complaint   Patient presents with    Abdominal Aortic Aneurysm       History and Physical    Kristian Benitez is a 76 y.o. male with fairly extensive history. Patient has AAA repaired with EVAR. Recently had extension stent from the left common iliac artery to the left external iliac artery. His aneurysm is stable in size without any endoleak. No abdominal or back pain. 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 Patient is also had an open left superficial femoral artery endarterectomy with patch angioplasty this portion seems to show a 50-75% narrowing on repeat duplex imaging. Patient also recently had TIA involving slowed mental status and right facial droop but he was only found to have right internal carotid artery stenosis so this is asymptomatic moderate stenosis of the right internal carotid artery. He continues to be asymptomatic. He is complaining of lifestyle limiting claudication of the left lower extremity without any rest pain or ulcerations. No fevers or chills.     Past Medical History:   Diagnosis Date    Advance directive in chart 6/13/2016    CAD (coronary artery disease)     [facility]) 2003    Colon    CKD (chronic kidney disease), stage III 3/27/2016    CVA (cerebral vascular accident) ([address] 3/26/2016    Femoral artery aneurysm, left ([address]     Heart attack ([address] 2008    Heart attack ([address]     Hernia     History of TIAs     Hyperlipidemia     Hypertension 3/27/2016    Iliac artery aneurysm, left ([address]     Pure hypercholesterolemia 9/14/2012    PVD (peripheral vascular disease) ([address] 3/27/2016    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 ([address] 3/26/2016     Past Surgical History:   Procedure Laterality Date    ABDOMEN SURGERY PROC UNLISTED  2011    3 stents placed into abdomen (groin)    CARDIAC SURG PROCEDURE UNLIST  2005    Quadruple Bypass    CARDIAC SURG PROCEDURE UNLIST  2011    Aortic pig valve placed    COLONOSCOPY N/A 6/29/2016    COLON[facility] Cliff [facility]re [facility] at [facility] ENDOSCOPY    ENDOSCOPY, COLON, DIAGNOSTIC      HX AAA REPAIR      HX ORTHOPAEDIC  2008    Right Leg Amputated     Patient Active Problem List   Diagnosis Code    Pure hypercholesterolemia E78.00    Coronary artery disease involving native coronary artery without angina pectoris I25.10    Hypertension I10    CKD (chronic kidney disease), stage III N18.3    S/P CABG (coronary artery bypass graft) Z95.1    S/P AVR Z95.2    Advance directive in chart Z78.9    PAD (peripheral artery disease) ([facility] I73.9    AAA (abdominal aortic aneurysm) without rupture ([facility] O28.3    Diastolic dysfunction E99.0    Vascular dementia without behavioral disturbance F01.50    TIA (transient ischemic attack) G45.9    Atherosclerosis of native artery of left lower extremity with intermittent claudication (HCC) O61.079     Current Outpatient Prescriptions   Medication Sig Dispense Refill    hydroCHLOROthiazide (HYDRODIURIL) 25 mg tablet Take 1 Tab by mouth daily. 90 Tab 4    aspirin (ASPIRIN) 325 mg tablet Take 1 Tab by mouth daily. 90 Tab 4    amLODIPine (NORVASC) 10 mg tablet Take 1 Tab by mouth daily. 90 Tab 4    atorvastatin (LIPITOR) 20 mg tablet Take 1 Tab by mouth daily. 90 Tab 4    donepezil (ARICEPT) 10 mg tablet TAKE 2 TABLETS BY MOUTH EVERY MORNING AFTER A MEAL 180 Tab 1    tamsulosin (FLOMAX) 0.4 mg capsule Take 1 Cap by mouth nightly. 90 Cap 4    NITROGLYCERIN (NITROSTAT SL) 1 Tab by SubLINGual route as needed (chest pain).  omega-3 fatty acids-vitamin e (FISH OIL) 1,000 mg cap Take 1 Cap by mouth two (2) times a day.  180 Cap 4     Allergies   Allergen Reactions    Ace Inhibitors Other (comments)     Per pt, Cardiologist states he cannot have ACE inhibitors     Social History     Social History    Marital status:      Spouse name: N/A    Number of children: N/A    Years of education: N/A     Occupational History    Not on file. Social History Main Topics    Smoking status: Former Smoker     Quit date: 9/14/1987    Smokeless tobacco: Never Used    Alcohol use No      Comment: wine 1-2x per week    Drug use: No    Sexual activity: Not Currently     Other Topics Concern    Not on file     Social History Narrative      Family History   Problem Relation Age of Onset    Hypertension Mother     Hypertension Father     Heart Disease Father     Heart Disease Brother        Physical Exam:    Visit Vitals    /82 (BP 1 Location: Left arm, BP Patient Position: Sitting)    Pulse 72    Resp 18    Ht 5' 10\" (1.778 m)    Wt 212 lb (96.2 kg)    BMI 30.42 kg/m2      General: Well-appearing male in no acute distress  HEENT: EOMI no scleral icterus is noted patient is wearing eyeglasses  Pulmonary: No increased work of breathing is noted  Extremities: Left lower extremity is warm and perfused the right lower extremity is absent with known BKA  Neuro: Cranial nerves II through XII grossly intact    Impression and Plan:  Laz Ram is a 76 y.o. male with asymptomatic right internal carotid artery stenosis is can be followed on a yearly basis patient also has EVAR which seems to be doing very well based on ultrasound imaging can be followed on a yearly basis he also has PAD and claudication which can be followed with now an angiogram antegrade stick on the left lower extremity given his lifestyle limiting claudication. Patient was given risks and benefits of the procedure including but not limited to bleeding infection damage to adjacent structures MI stroke and death. Patient is understanding all the risks including but not limited to loss of lower extremity and need for further surgery. He is willing to undergo the surgery. Patient will require yearly follow-up of all other issues. We reviewed the plan with the patient and the patient understands.   We also gave the patient appropriate instructions on their disease process and when to call back. Follow-up Disposition: Not on [facility], MD    PLEASE NOTE:  This document has been produced using voice recognition software. Unrecognized errors in transcription may be present.

## 2018-07-30 ENCOUNTER — OFFICE VISIT (OUTPATIENT)
Dept: VASCULAR SURGERY | Age: 78
End: 2018-07-30

## 2018-07-30 VITALS
RESPIRATION RATE: 16 BRPM | BODY MASS INDEX: 31.5 KG/M2 | HEIGHT: 70 IN | WEIGHT: 220 LBS | DIASTOLIC BLOOD PRESSURE: 78 MMHG | SYSTOLIC BLOOD PRESSURE: 130 MMHG | HEART RATE: 74 BPM

## 2018-07-30 DIAGNOSIS — I71.40 AAA (ABDOMINAL AORTIC ANEURYSM) WITHOUT RUPTURE: ICD-10-CM

## 2018-07-30 DIAGNOSIS — I73.9 PAD (PERIPHERAL ARTERY DISEASE) (HCC): ICD-10-CM

## 2018-07-30 DIAGNOSIS — I72.4 POPLITEAL ARTERY ANEURYSM (HCC): ICD-10-CM

## 2018-07-30 DIAGNOSIS — I65.23 BILATERAL CAROTID ARTERY STENOSIS: ICD-10-CM

## 2018-07-30 DIAGNOSIS — I70.212 ATHEROSCLEROSIS OF NATIVE ARTERY OF LEFT LOWER EXTREMITY WITH INTERMITTENT CLAUDICATION (HCC): Primary | ICD-10-CM

## 2018-07-30 NOTE — MR AVS SNAPSHOT
303 [facility] Ne 
 
 
 [address] [place], [place] [address] [facility] Se 
[phone] Patient: Cat Lockwood MRN: [id number] ZOA:0/29/0373 Visit Information Date & Time Provider Department Dept. Phone Encounter #  
 7/30/2018  9:30 AM [facility] [id number] Follow-up Instructions Return in about 1 month (around 8/30/2018). Your Appointments 8/22/2018  8:30 AM  
Follow Up with Radha Medley MD  
Cardio Specialist at [address]) Appt Note: yearly f/up with Dr. Jillian Reddy  
 [facility] 400 Located within [facility] 83 [address] 1334  
  
    
 9/5/2018 11:00 AM  
PROCEDURE with [facility]ING 1 [facility] ([address] [place] [address]) Appt Note: sfa angio poplital artery 2300 [facility] 009 200 [facility] Se  
[phone] 2300 [facility] 47 [facility]  
  
    
 9/5/2018  2:15 PM  
Follow Up with [facility] ([address] [place] [address]) Appt Note: 1 month follow up after study same day [facility] 200 [facility] Se  
[phone] [facility] 47 [facility]  
  
    
 10/2/2018  7:30 AM  
Medicare Physical with Chery Freeman,  [address] [facility] 16 [address]) Appt Note: Return in about 3 months (around 10/2/2018) for MWE, physical, labs at next visit, EKG next visit. [address] [facility] [address] [facility] 83 222 [facility]  
  
   
 [address] [facility] [address] [facility] Upcoming Health Maintenance Date Due  
 EYE EXAM RETINAL OR DILATED Q1 9/30/1950 FOOT EXAM Q1 5/18/2018 Influenza Age 5 to Adult 8/1/2018 MICROALBUMIN Q1 9/20/2018 MEDICARE YEARLY EXAM 9/26/2018 HEMOGLOBIN A1C Q6M 1/2/2019 LIPID PANEL Q1 4/14/2019 COLONOSCOPY 6/29/2019 GLAUCOMA SCREENING Q2Y 9/22/2019 DTaP/Tdap/Td series (2 - Td) 9/14/2020 Allergies as of 7/30/2018  Review Complete On: 7/30/2018 By: JP Palmer Severity Noted Reaction Type Reactions Ace Inhibitors  03/27/2016    Other (comments) Per pt, Cardiologist states he cannot have ACE inhibitors Current Immunizations  Reviewed on 4/15/2018 Name Date Influenza High Dose Vaccine PF 9/25/2017, 9/13/2016, 11/10/2015, 10/7/2015 Influenza Vaccine 11/12/2014, 12/16/2013  8:04 AM  
 Influenza Vaccine Split 12/14/2012 Influenza Vaccine Whole 9/14/2010 Pneumococcal Conjugate (PCV-13) 10/7/2015 TDAP Vaccine 9/14/2010 ZZZ-RETIRED (DO NOT USE) Pneumococcal Vaccine (Unspecified Type) 9/14/2010 Zoster 9/14/2010 Not reviewed this visit You Were Diagnosed With   
  
 Codes Comments Atherosclerosis of native artery of left lower extremity with intermittent claudication ([facility]    -  Primary ICD-10-CM: P62.794 ICD-9-CM: 440.21   
 AAA (abdominal aortic aneurysm) without rupture (HCC)     ICD-10-CM: I71.4 ICD-9-CM: 385. 4 Bilateral carotid artery stenosis     ICD-10-CM: I65.23 ICD-9-CM: 433.10, 433.30 Popliteal artery aneurysm (HCC)     ICD-10-CM: I72.4 ICD-9-CM: 442.3 PAD (peripheral artery disease) (HCC)     ICD-10-CM: I73.9 ICD-9-CM: 443. 9 Vitals BP Pulse Resp Height(growth percentile) Weight(growth percentile) BMI  
 130/78 (BP 1 Location: Left arm, BP Patient Position: Sitting) 74 16 5' 10\" (1.778 m) 220 lb (99.8 kg) 31.57 kg/m2 Smoking Status Former Smoker Vitals History BMI and BSA Data Body Mass Index Body Surface Area  
 31.57 kg/m 2 2.22 m 2 Preferred Pharmacy Pharmacy Name Phone [facility] [place] [phone] Your Updated Medication List  
  
   
This list is accurate as of 7/30/18  9:56 AM.  Always use your most recent med list. amLODIPine 10 mg tablet Commonly known as:  Herminia Pace Take 1 Tab by mouth daily. aspirin 325 mg tablet Commonly known as:  ASPIRIN Take 1 Tab by mouth daily. atorvastatin 20 mg tablet Commonly known as:  LIPITOR Take 1 Tab by mouth daily. clopidogrel 75 mg Tab Commonly known as:  PLAVIX TAKE 1 TABLET BY MOUTH DAILY  
  
 donepezil 10 mg tablet Commonly known as:  ARICEPT  
TAKE 2 TABLETS BY MOUTH EVERY MORNING AFTER A MEAL  
  
 hydroCHLOROthiazide 25 mg tablet Commonly known as:  HYDRODIURIL Take 1 Tab by mouth daily. NITROSTAT SL  
1 Tab by SubLINGual route as needed (chest pain). omega-3 fatty acids-vitamin e 1,000 mg Cap Commonly known as:  FISH OIL Take 1 Cap by mouth two (2) times a day. tamsulosin 0.4 mg capsule Commonly known as:  FLOMAX Take 1 Cap by mouth nightly. Follow-up Instructions Return in about 1 month (around 8/30/2018). To-Do List   
 09/07/2018 Vascular/US:  DUPLEX LOWER EXT ARTERY LEFT Introducing [facility]! Dear Poonam Reynoso: Thank you for requesting a [employer] account. Our records indicate that you already have an active [employer] account. You can access your account anytime at [email or web address] Did you know that you can access your hospital and ER discharge instructions at any time in [employer]? You can also review all of your test results from your hospital stay or ER visit. Additional Information If you have questions, please visit the Frequently Asked Questions section of the [employer] website at [email or web address] Remember, [employer] is NOT to be used for urgent needs. For medical emergencies, dial 911. Now available from your iPhone and Android! Please provide this summary of care documentation to your next provider. Your primary care clinician is listed as [address] [facility]. If you have any questions after today's visit, please call [phone].

## 2018-07-30 NOTE — PROGRESS NOTES
1. Have you been to an emergency room or urgent care clinic since your last visit? No  Hospitalized since your last visit? If yes, where, when, and reason for visit? No  2. Have you seen or consulted any other health care providers outside of the [facility] since your last visit including any procedures, health maintenance items. If yes, where, when and reason for visit?

## 2018-07-30 NOTE — PROGRESS NOTES
Phylicia Painter   Patient presents with    Surgical Follow-up       History and Physical    Kristian Law is a 76 y.o. male with history of AAA status post Montemayor Alston as well as PAD. He presents today with his son in follow-up after recent left leg angiogram with balloon angioplasty of the SFA due to lifestyle limiting claudication. He is also status post right AKA. Patient states that his claudication is dramatically improved. He denies any pain at this time. No fevers or chills. He does have some mild swelling in the left lower extremity but states this is his usual baseline edema. Otherwise he is completely without complaint. Angio findings:  OPERATIVE FINDINGS:  Left lower extremity:  1. Common femoral artery is patent without stenosis. 2.  Profunda femoris artery is patent without stenosis. 3.  Superficial femoral artery shows a 90% narrowing at its takeoff, then shows a large patched area and then a normal appearing superficial femoral artery until the distal portion, where it does show another 80% narrowing, short segment. 4.  The above-knee popliteal artery is patent and aneurysmal without stenosis. 5.  The below-knee popliteal artery is patent without stenosis. 6.  The anterior tibial artery is occluded. 7.  The tibioperoneal trunk artery is patent without stenosis. 8.  The posterior tibial artery is patent without stenosis.   9.  The peroneal artery is patent without stenosis.       Past Medical History:   Diagnosis Date    Advance directive in chart 6/13/2016    CAD (coronary artery disease)     Cancer ([address] 2003    Colon    CKD (chronic kidney disease), stage III 3/27/2016    CVA (cerebral vascular accident) ([address] 3/26/2016    Femoral artery aneurysm, left ([address]     Heart attack ([address] 2008    Heart attack ([address]     Hernia     History of TIAs     Hyperlipidemia     Hypertension 3/27/2016    Iliac artery aneurysm, left ([address]     Pure hypercholesterolemia 9/14/2012    PVD (peripheral vascular disease) ([facility] 3/27/2016    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 ([facility] 3/26/2016     Past Surgical History:   Procedure Laterality Date    ABDOMEN SURGERY PROC UNLISTED  2011    3 stents placed into abdomen (groin)    CARDIAC SURG PROCEDURE UNLIST  2005    Quadruple Bypass    CARDIAC SURG PROCEDURE UNLIST  2011    Aortic pig valve placed    COLONOSCOPY N/A 6/29/2016    COLON[facility], COLON, DIAGNOSTIC      HX AAA REPAIR      HX ORTHOPAEDIC  2008    Right Leg Amputated     Patient Active Problem List   Diagnosis Code    Pure hypercholesterolemia E78.00    Coronary artery disease involving native coronary artery without angina pectoris I25.10    Hypertension I10    CKD (chronic kidney disease), stage III N18.3    S/P CABG (coronary artery bypass graft) Z95.1    S/P AVR Z95.2    Advance directive in chart Z78.9    PAD (peripheral artery disease) ([facility] I73.9    AAA (abdominal aortic aneurysm) without rupture ([facility] S87.8    Diastolic dysfunction B67.4    Vascular dementia without behavioral disturbance F01.50    TIA (transient ischemic attack) G45.9    Atherosclerosis of native artery of left lower extremity with intermittent claudication (HCC) C79.674     Current Outpatient Prescriptions   Medication Sig Dispense Refill    clopidogrel (PLAVIX) 75 mg tab TAKE 1 TABLET BY MOUTH DAILY 90 Tab 3    hydroCHLOROthiazide (HYDRODIURIL) 25 mg tablet Take 1 Tab by mouth daily. 90 Tab 4    aspirin (ASPIRIN) 325 mg tablet Take 1 Tab by mouth daily. 90 Tab 4    amLODIPine (NORVASC) 10 mg tablet Take 1 Tab by mouth daily. 90 Tab 4    atorvastatin (LIPITOR) 20 mg tablet Take 1 Tab by mouth daily. 90 Tab 4    donepezil (ARICEPT) 10 mg tablet TAKE 2 TABLETS BY MOUTH EVERY MORNING AFTER A MEAL 180 Tab 1    tamsulosin (FLOMAX) 0.4 mg capsule Take 1 Cap by mouth nightly.  90 Cap 4  NITROGLYCERIN (NITROSTAT SL) 1 Tab by SubLINGual route as needed (chest pain).  omega-3 fatty acids-vitamin e (FISH OIL) 1,000 mg cap Take 1 Cap by mouth two (2) times a day. 180 Cap 4     Allergies   Allergen Reactions    Ace Inhibitors Other (comments)     Per pt, Cardiologist states he cannot have ACE inhibitors       Physical Exam:    Visit Vitals    /78 (BP 1 Location: Left arm, BP Patient Position: Sitting)    Pulse 74    Resp 16    Ht 5' 10\" (1.778 m)    Wt 220 lb (99.8 kg)    BMI 31.57 kg/m2      General: Well-appearing elderly male in no acute distress   HEENT: EOMI, no scleral icterus is noted. Pulmonary: No increased work or breathing is noted. Abdomen: nondistended. Extremities: right leg prosthetic in place. Pt ambulates with cane. +edema LLE. Foot is warm and well perfused. No ulcers  Neuro: Cranial nerves II through XII are grossly intact   Integument: No ulcerations are identified visibly      Impression and Plan:  Myles Santos is a 76 y.o. male with history of AAA status post EVAR as well as moderate right ICA stenosis (which is followed annually) and PAD s/p right AKA. He presents today in follow-up after left leg angiogram for lifestyle limiting claudication. He states that his claudication is mostly resolved at this point. He denies any rest pain. Angiogram findings were discussed with patient and his son as above. There was report of small popliteal artery aneurysm seen by angio. This is asymptomatic. Discussed that we will obtain a follow-up ultrasound in 3-4 weeks and he will follow-up afterwards. He will continue his daily aspirin and statin. He will call the office sooner as needed. Plan was discussed. Patient expresses understanding and agrees. Follow-up Disposition:  Return in about 1 month (around 8/30/2018). Rajiv Rivera  [phone]        PLEASE NOTE:  This document has been produced using voice recognition software.  Unrecognized errors in transcription may be present.

## 2018-08-07 ENCOUNTER — TELEPHONE (OUTPATIENT)
Dept: FAMILY MEDICINE CLINIC | Age: 78
End: 2018-08-07

## 2018-08-07 DIAGNOSIS — I73.9 PERIPHERAL ARTERY DISEASE (HCC): ICD-10-CM

## 2018-08-07 DIAGNOSIS — G45.9 TRANSIENT CEREBRAL ISCHEMIA, UNSPECIFIED TYPE: ICD-10-CM

## 2018-08-07 DIAGNOSIS — I10 ESSENTIAL HYPERTENSION: Primary | ICD-10-CM

## 2018-08-07 DIAGNOSIS — F01.50 VASCULAR DEMENTIA WITHOUT BEHAVIORAL DISTURBANCE (HCC): ICD-10-CM

## 2018-08-07 NOTE — TELEPHONE ENCOUNTER
Pt called in requesting to be referred to a Neurology specialist on [address] Dr. Manuel Robles is no longer seeing patients per patients. Dr. Manuel Robles referred patient and her  Dr. Marie Gale. Patient stated that she did not have a problem with Dr. Marie Gale but patient does not want to go to [facility].  Please assist.

## 2018-09-07 ENCOUNTER — OFFICE VISIT (OUTPATIENT)
Dept: CARDIOLOGY CLINIC | Age: 78
End: 2018-09-07

## 2018-09-07 VITALS
OXYGEN SATURATION: 97 % | HEART RATE: 57 BPM | WEIGHT: 205 LBS | DIASTOLIC BLOOD PRESSURE: 73 MMHG | HEIGHT: 70 IN | BODY MASS INDEX: 29.35 KG/M2 | SYSTOLIC BLOOD PRESSURE: 122 MMHG

## 2018-09-07 DIAGNOSIS — E78.00 PURE HYPERCHOLESTEROLEMIA: ICD-10-CM

## 2018-09-07 DIAGNOSIS — N18.30 CKD (CHRONIC KIDNEY DISEASE), STAGE III (HCC): ICD-10-CM

## 2018-09-07 DIAGNOSIS — Z95.1 S/P CABG (CORONARY ARTERY BYPASS GRAFT): ICD-10-CM

## 2018-09-07 DIAGNOSIS — I25.10 CORONARY ARTERY DISEASE INVOLVING NATIVE CORONARY ARTERY OF NATIVE HEART WITHOUT ANGINA PECTORIS: ICD-10-CM

## 2018-09-07 DIAGNOSIS — G45.9 TRANSIENT CEREBRAL ISCHEMIA, UNSPECIFIED TYPE: ICD-10-CM

## 2018-09-07 DIAGNOSIS — I73.9 PAD (PERIPHERAL ARTERY DISEASE) (HCC): ICD-10-CM

## 2018-09-07 DIAGNOSIS — I70.212 ATHEROSCLEROSIS OF NATIVE ARTERY OF LEFT LOWER EXTREMITY WITH INTERMITTENT CLAUDICATION (HCC): ICD-10-CM

## 2018-09-07 DIAGNOSIS — I71.40 AAA (ABDOMINAL AORTIC ANEURYSM) WITHOUT RUPTURE: Primary | ICD-10-CM

## 2018-09-07 DIAGNOSIS — I10 ESSENTIAL HYPERTENSION: ICD-10-CM

## 2018-09-07 DIAGNOSIS — I51.89 DIASTOLIC DYSFUNCTION: ICD-10-CM

## 2018-09-07 DIAGNOSIS — Z95.2 S/P AVR: ICD-10-CM

## 2018-09-07 NOTE — MR AVS SNAPSHOT
[facility] 
 
 
 [address] [facility] [address] [facility] [id number] 
[phone] Patient: Cuong Ann MRN: [id number] VLR:[id number] Visit Information Date & Time Provider Department Dept. Phone Encounter #  
 9/7/2018  2:00 PM Dee [facility] [phone] [id number] Follow-up Instructions Return in about 1 year (around 9/7/2019). Your Appointments 9/24/2018 11:30 AM  
Follow Up with JP [facility] ([address] [place] [address]) Appt Note: 1 month follow up after study same day; have called all numbers and left messages for patient or someone to call concerning this appt and the follow up /pt needs to have an anitra done along with the ultrasound; .  
 [address] [place] [phone] [address] [place] [address]  
  
    
 10/2/2018  7:30 AM  
[place] [address] [facility] [address]) Appt Note: Return in about 3 months (around 10/2/2018) for MWE, physical, labs at next visit, EKG next visit. [id number] [facility] [address] [facility] [address] [facility]  
  
   
 [address] [facility] [address] [facility] Upcoming Health Maintenance Date Due  
 EYE EXAM RETINAL OR DILATED Q1 9/30/1950 FOOT EXAM Q1 5/18/2018 Influenza Age 5 to Adult 8/1/2018 MICROALBUMIN Q1 9/20/2018 MEDICARE YEARLY EXAM 9/26/2018 HEMOGLOBIN A1C Q6M 1/2/2019 LIPID PANEL Q1 4/14/2019 COLONOSCOPY 6/29/2019 GLAUCOMA SCREENING Q2Y 9/22/2019 DTaP/Tdap/Td series (2 - Td) 9/14/2020 Allergies as of 9/7/2018  Review Complete On: 9/7/2018 By: Eldon Acosta RN Severity Noted Reaction Type Reactions Ace Inhibitors  03/27/2016    Other (comments) Per pt, Cardiologist states he cannot have ACE inhibitors Current Immunizations  Reviewed on 4/15/2018 Name Date Influenza High Dose Vaccine PF 9/25/2017, 9/13/2016, 11/10/2015, 10/7/2015 Influenza Vaccine 11/12/2014, 12/16/2013  8:04 AM  
 Influenza Vaccine Split 12/14/2012 Influenza Vaccine Whole 9/14/2010 Pneumococcal Conjugate (PCV-13) 10/7/2015 TDAP Vaccine 9/14/2010 ZZZ-RETIRED (DO NOT USE) Pneumococcal Vaccine (Unspecified Type) 9/14/2010 Zoster 9/14/2010 Not reviewed this visit Vitals BP Pulse Height(growth percentile) Weight(growth percentile) SpO2 BMI  
 122/73 (!) 57 5' 10\" (1.778 m) 205 lb (93 kg) 97% 29.41 kg/m2 Smoking Status Former Smoker Vitals History BMI and BSA Data Body Mass Index Body Surface Area  
 29.41 kg/m 2 2.14 m 2 Preferred Pharmacy Pharmacy Name Phone [facility] [address] [phone] Your Updated Medication List  
  
   
This list is accurate as of 9/7/18  2:34 PM.  Always use your most recent med list. amLODIPine 10 mg tablet Commonly known as:  Elspeth Bakes Take 1 Tab by mouth daily. aspirin 325 mg tablet Commonly known as:  ASPIRIN Take 1 Tab by mouth daily. atorvastatin 20 mg tablet Commonly known as:  LIPITOR Take 1 Tab by mouth daily. clopidogrel 75 mg Tab Commonly known as:  PLAVIX TAKE 1 TABLET BY MOUTH DAILY  
  
 donepezil 10 mg tablet Commonly known as:  ARICEPT  
TAKE 2 TABLETS BY MOUTH EVERY MORNING AFTER A MEAL  
  
 hydroCHLOROthiazide 25 mg tablet Commonly known as:  HYDRODIURIL Take 1 Tab by mouth daily. NITROSTAT SL  
1 Tab by SubLINGual route as needed (chest pain). omega-3 fatty acids-vitamin e 1,000 mg Cap Commonly known as:  FISH OIL Take 1 Cap by mouth two (2) times a day. tamsulosin 0.4 mg capsule Commonly known as:  FLOMAX Take 1 Cap by mouth nightly. Follow-up Instructions Return in about 1 year (around 9/7/2019). Introducing [facility] SERVICES! Dear Aishwarya Huffman: Thank you for requesting a [employer] account. Our records indicate that you already have an active [employer] account. You can access your account anytime at [email or web address] Did you know that you can access your hospital and ER discharge instructions at any time in [employer]Now? You can also review all of your test results from your hospital stay or ER visit. Additional Information If you have questions, please visit the Frequently Asked Questions section of the [employer] website at [email or web address] Remember, [employer]Now is NOT to be used for urgent needs. For medical emergencies, dial 911. Now available from your iPhone and Android! Please provide this summary of care documentation to your next provider. Your primary care clinician is listed as [id number] [facility]. If you have any questions after today's visit, please call [phone].

## 2018-09-17 NOTE — PROGRESS NOTES
Subjective:  
   Mahogany Oviedo is in the office today for cardiac reevaluation. He is a 58-year-old man that had revascularization of his left lower extremity in October of 2016, aorto bi iliac endografting, prior angiplasty of the left SFA, and AKA of the RLE. Epifanio Barraza He has also had DVT of the left posterior tibial vein in 04/207 The patient has a history of known coronary artery disease and prior coronary bypass grafting done in 2005. He also had aortic valve replacement 2011. The most recent echocardiogram was done in December of 2016 which demonstrated adequate function of the aortic prosthesis. Calculated area was 0.9 cm. The patient suffered a neurological event in December of 2016. He is followed by Dr. Yannick Anderson in that regard. The patient had a MRI of brain with contrast on 12/30/2016. There was an old area of infarction involving the right frontal lobe extending into the corona radiata white matter. There was also an area of old infarction in the right parietal region. There was also an abnormal signal in the right sub-insular region, which was felt to possibly represent an area of old hemorrhagic infarct. There was no acute abnormality at that time. In the office today, he reports no CP. He has not used SL NTG for several years. He experiences SOB when bending over. He fell a couple of weeks ago when trying to hit golf balls. He has had dizziness , near-syncope or syncope. Patient Active Problem List  
 Diagnosis Date Noted  Atherosclerosis of native artery of left lower extremity with intermittent claudication ([facility] 07/09/2018  TIA (transient ischemic attack) 04/14/2018  Vascular dementia without behavioral disturbance 03/08/2017  Diastolic dysfunction 66/06/5744  AAA (abdominal aortic aneurysm) without rupture ([facility] 01/04/2017  PAD (peripheral artery disease) ([facility] 09/21/2016  Advance directive in chart 06/13/2016  S/P CABG (coronary artery bypass graft) 04/13/2016  S/P AVR 04/13/2016  Hypertension 03/27/2016  CKD (chronic kidney disease), stage III 03/27/2016  Coronary artery disease involving native coronary artery without angina pectoris 01/27/2016  Pure hypercholesterolemia 09/14/2012 Current Outpatient Prescriptions Medication Sig Dispense Refill  clopidogrel (PLAVIX) 75 mg tab TAKE 1 TABLET BY MOUTH DAILY 90 Tab 3  
 hydroCHLOROthiazide (HYDRODIURIL) 25 mg tablet Take 1 Tab by mouth daily. 90 Tab 4  
 aspirin (ASPIRIN) 325 mg tablet Take 1 Tab by mouth daily. 90 Tab 4  
 amLODIPine (NORVASC) 10 mg tablet Take 1 Tab by mouth daily. 90 Tab 4  
 atorvastatin (LIPITOR) 20 mg tablet Take 1 Tab by mouth daily. 90 Tab 4  
 donepezil (ARICEPT) 10 mg tablet TAKE 2 TABLETS BY MOUTH EVERY MORNING AFTER A MEAL 180 Tab 1  
 tamsulosin (FLOMAX) 0.4 mg capsule Take 1 Cap by mouth nightly. 90 Cap 4  
 NITROGLYCERIN (NITROSTAT SL) 1 Tab by SubLINGual route as needed (chest pain).  omega-3 fatty acids-vitamin e (FISH OIL) 1,000 mg cap Take 1 Cap by mouth two (2) times a day. 180 Cap 4 Allergies Allergen Reactions  Ace Inhibitors Other (comments) Per pt, Cardiologist states he cannot have ACE inhibitors Past Medical History:  
Diagnosis Date  Advance directive in chart 6/13/2016  CAD (coronary artery disease)  Cancer [facility]) 2003 Colon  CKD (chronic kidney disease), stage III 3/27/2016  CVA (cerebral vascular accident) ([address] 3/26/2016  Femoral artery aneurysm, left ([address]  Heart attack [facility]) 2008  Heart attack ([address]  Hernia  History of TIAs  Hyperlipidemia  Hypertension 3/27/2016  Iliac artery aneurysm, left ([address]  Pure hypercholesterolemia 9/14/2012  PVD (peripheral vascular disease) ([address] 3/27/2016  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  
Procedure Laterality Date  ABDOMEN SURGERY PROC UNLISTED    
 3 stents placed into abdomen (groin)  CARDIAC SURG PROCEDURE UNLIST   Quadruple Bypass  CARDIAC SURG PROCEDURE UNLIST   Aortic pig valve placed  COLONOSCOPY N/A 2016 [facility] ENDOSCOPY  ENDOSCOPY, COLON, DIAGNOSTIC    
 HX AAA REPAIR    
 HX ORTHOPAEDIC   Right Leg Amputated Family History Problem Relation Age of Onset  Hypertension Mother  Hypertension Father  Heart Disease Father  Heart Disease Brother History Smoking Status  Former Smoker  Quit date: 1987 Smokeless Tobacco  
 Never Used Review of Systems, additional: 
Constitutional: negative Eyes: negative Respiratory: negative Cardiovascular: negative Gastrointestinal: negative Musculoskeletal:weakness Neurological: negative Behvioral/Psych: negative Endocrine: negative ENT: negative Objective:  
 
Visit Vitals  /73  Pulse (!) 57  Ht 5' 10\" (1.778 m)  Wt 205 lb (93 kg)  SpO2 97%  BMI 29.41 kg/m2 General:  alert, cooperative, no distress Chest Wall: inspection normal - no chest wall deformities or tenderness, respiratory effort normal  
Lung: clear to auscultation bilaterally Heart:  normal rate and regular rhythm, S1 and S2 normal, systolic murmur 2/6 at 2nd right intercostal space and radiates to carotids Abdomen: soft, non-tender. Bowel sounds normal. No masses,  no organomegaly Extremities: Left extremity normal. There is a R BKA  no cyanosis . There is 1+ ankle edema on left Skin: no rashes Neuro: alert, oriented, normal speech, no focal findings or movement disorder noted [id number]; Sinus rhythm with PACs. LAD. Assessment/Plan: ICD-10-CM ICD-9-CM 1. AAA (abdominal aortic aneurysm) without rupture ([address] S/P aorta bi iliac endograft I71.4 441.4 2. PAD (peripheral artery disease) ([facility]), S/P R AKA, S/P L SFA angioplasty I73.9 443.9 3. Femoral artery aneurysm, left ([facility]) I72.4 442.3 4. Pure hypercholesterolemia E78.00 272.0 5. CKD (chronic kidney disease), stage III N18.3 585.3 6. Controlled type 2 diabetes mellitus with diabetic nephropathy, without long-term current use of insulin ([facility]) E11.21 250.40   
  583.81   
7. Essential hypertension,  controlled in office today. He will follow up with Dr Franklin Ballesteros 401.9 8. Aortic stenosis, moderate, reasonable valvular parameters by recent Echo 12/2016. Will repeat in 6 mos at next appointment I35.0 424.1 9. Diastolic dysfunction, grade 1 I51.9 429.9 10. S/P AVR (aortic valve replacement), [place], [place] 2011 Z95.2 V43.3 11. S/P CABG (coronary artery bypass graft), [place], [place] 2005, stable. No SL NTG used for several years . Will continue present cardiac Rx and see in RT in 1 year. Z95.1 V45.81   
12     CVA, followed by Dr Alize Núñez

## 2018-09-18 ENCOUNTER — TELEPHONE (OUTPATIENT)
Dept: VASCULAR SURGERY | Age: 78
End: 2018-09-18

## 2018-09-18 DIAGNOSIS — I71.40 AAA (ABDOMINAL AORTIC ANEURYSM) WITHOUT RUPTURE: Primary | ICD-10-CM

## 2018-09-18 DIAGNOSIS — I72.4 POPLITEAL ARTERY ANEURYSM (HCC): ICD-10-CM

## 2018-09-18 DIAGNOSIS — I70.212 ATHEROSCLEROSIS OF NATIVE ARTERY OF LEFT LOWER EXTREMITY WITH INTERMITTENT CLAUDICATION (HCC): ICD-10-CM

## 2018-09-18 DIAGNOSIS — I77.9 CAROTID DISEASE, BILATERAL (HCC): ICD-10-CM

## 2018-09-18 NOTE — TELEPHONE ENCOUNTER
----- Message from Dru Tirado MD sent at 9/18/2018 11:05 AM EDT -----  One year don't repair until at least 2cm.  ----- Message -----     From: [address] [place], [place]     Sent: 9/18/2018   9:30 AM       To: Dru Tirado MD    Please review recent duplex. Popliteal aneurysm 1.18cm. Pt asymptomatic. You OK with duplex in one year or want at 6 months?

## 2018-09-18 NOTE — TELEPHONE ENCOUNTER
Called and spoke with pt wife and reviewed ultrasound results. 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 Discussed we will continue him on routine surveillance yearly. Will schedule EVAR, carotid, and popliteal aneurysm studies for April/may of next year. Patient to call sooner if needed.

## 2018-10-02 ENCOUNTER — OFFICE VISIT (OUTPATIENT)
Dept: FAMILY MEDICINE CLINIC | Age: 78
End: 2018-10-02

## 2018-10-02 VITALS
HEART RATE: 66 BPM | TEMPERATURE: 97.2 F | OXYGEN SATURATION: 97 % | BODY MASS INDEX: 29.6 KG/M2 | HEIGHT: 70 IN | WEIGHT: 206.8 LBS | SYSTOLIC BLOOD PRESSURE: 107 MMHG | RESPIRATION RATE: 16 BRPM | DIASTOLIC BLOOD PRESSURE: 62 MMHG

## 2018-10-02 DIAGNOSIS — N40.1 BENIGN PROSTATIC HYPERPLASIA WITH URINARY FREQUENCY: ICD-10-CM

## 2018-10-02 DIAGNOSIS — I71.40 AAA (ABDOMINAL AORTIC ANEURYSM) WITHOUT RUPTURE: ICD-10-CM

## 2018-10-02 DIAGNOSIS — I73.9 PAD (PERIPHERAL ARTERY DISEASE) (HCC): ICD-10-CM

## 2018-10-02 DIAGNOSIS — G45.9 TIA (TRANSIENT ISCHEMIC ATTACK): ICD-10-CM

## 2018-10-02 DIAGNOSIS — F01.50 VASCULAR DEMENTIA WITHOUT BEHAVIORAL DISTURBANCE (HCC): ICD-10-CM

## 2018-10-02 DIAGNOSIS — R35.0 BENIGN PROSTATIC HYPERPLASIA WITH URINARY FREQUENCY: ICD-10-CM

## 2018-10-02 DIAGNOSIS — N18.30 CKD (CHRONIC KIDNEY DISEASE), STAGE III (HCC): ICD-10-CM

## 2018-10-02 DIAGNOSIS — E78.00 PURE HYPERCHOLESTEROLEMIA: ICD-10-CM

## 2018-10-02 DIAGNOSIS — I25.10 CORONARY ARTERY DISEASE INVOLVING NATIVE CORONARY ARTERY OF NATIVE HEART WITHOUT ANGINA PECTORIS: ICD-10-CM

## 2018-10-02 DIAGNOSIS — I51.89 DIASTOLIC DYSFUNCTION: ICD-10-CM

## 2018-10-02 DIAGNOSIS — I10 ESSENTIAL HYPERTENSION: ICD-10-CM

## 2018-10-02 DIAGNOSIS — Z23 ENCOUNTER FOR IMMUNIZATION: ICD-10-CM

## 2018-10-02 DIAGNOSIS — Z00.00 ROUTINE GENERAL MEDICAL EXAMINATION AT A HEALTH CARE FACILITY: Primary | ICD-10-CM

## 2018-10-02 RX ORDER — CLOPIDOGREL BISULFATE 75 MG/1
TABLET ORAL
Qty: 90 TAB | Refills: 4 | Status: SHIPPED | OUTPATIENT
Start: 2018-10-02 | End: 2018-12-21

## 2018-10-02 RX ORDER — TAMSULOSIN HYDROCHLORIDE 0.4 MG/1
0.4 CAPSULE ORAL
Qty: 90 CAP | Refills: 4 | Status: SHIPPED | OUTPATIENT
Start: 2018-10-02 | End: 2019-01-08 | Stop reason: SDUPTHER

## 2018-10-02 RX ORDER — AMLODIPINE BESYLATE 10 MG/1
10 TABLET ORAL DAILY
Qty: 90 TAB | Refills: 4 | Status: SHIPPED | OUTPATIENT
Start: 2018-10-02 | End: 2018-11-28

## 2018-10-02 RX ORDER — ATORVASTATIN CALCIUM 20 MG/1
20 TABLET, FILM COATED ORAL DAILY
Qty: 90 TAB | Refills: 4 | Status: SHIPPED | OUTPATIENT
Start: 2018-10-02 | End: 2019-01-08 | Stop reason: SDUPTHER

## 2018-10-02 NOTE — PROGRESS NOTES
THE SUBSEQUENT MEDICARE ANNUAL WELLNESS VISIT PROGRESS NOTES This is a Subsequent Medicare Annual Wellness Visit providing Personalized Prevention Plan Services (PPPS) (Performed 12 months after initial AWV and PPPS ) I have reviewed the patient's medical history in detail and updated the computerized patient record. Alpesh Longo is a [de-identified] y.o.  male and presents for an subsequent annual wellness exam  
 
Patient Active Problem List  
 Diagnosis Date Noted  Atherosclerosis of native artery of left lower extremity with intermittent claudication ([facility] [address] 07/09/2018  Vascular dementia without behavioral disturbance 03/08/2017  Diastolic dysfunction 24/91/7347  AAA (abdominal aortic aneurysm) without rupture ([facility] [address] 01/04/2017  PAD (peripheral artery disease) ([facility] [address] 09/21/2016  Advance directive in chart 06/13/2016  S/P CABG (coronary artery bypass graft) 04/13/2016  S/P AVR 04/13/2016  Hypertension 03/27/2016  CKD (chronic kidney disease), stage III ([facility] [address] 03/27/2016  Coronary artery disease involving native coronary artery without angina pectoris 01/27/2016  Pure hypercholesterolemia 09/14/2012 Current Outpatient Prescriptions Medication Sig Dispense Refill  clopidogrel (PLAVIX) 75 mg tab TAKE 1 TABLET BY MOUTH DAILY 90 Tab 4  
 amLODIPine (NORVASC) 10 mg tablet Take 1 Tab by mouth daily. 90 Tab 4  
 atorvastatin (LIPITOR) 20 mg tablet Take 1 Tab by mouth daily. 90 Tab 4  
 tamsulosin (FLOMAX) 0.4 mg capsule Take 1 Cap by mouth nightly. 90 Cap 4  
 aspirin (ASPIRIN) 325 mg tablet Take 1 Tab by mouth daily. 90 Tab 4  
 donepezil (ARICEPT) 10 mg tablet TAKE 2 TABLETS BY MOUTH EVERY MORNING AFTER A MEAL 180 Tab 1  
 NITROGLYCERIN (NITROSTAT SL) 1 Tab by SubLINGual route as needed (chest pain).  omega-3 fatty acids-vitamin e (FISH OIL) 1,000 mg cap Take 1 Cap by mouth two (2) times a day. 180 Cap 4 Allergies Allergen Reactions  Ace Inhibitors Other (comments) Per pt, Cardiologist states he cannot have ACE inhibitors Past Medical History:  
Diagnosis Date  Advance directive in chart 6/13/2016  CAD (coronary artery disease)  Cancer [facility]) 2003 Colon  CKD (chronic kidney disease), stage III ([address] 3/27/2016  CVA (cerebral vascular accident) ([address] 3/26/2016  Femoral artery aneurysm, left ([address]  Heart attack [facility]) 2008  Heart attack ([address]  Hernia  History of TIAs  Hyperlipidemia  Hypertension 3/27/2016  Iliac artery aneurysm, left ([address]  Pure hypercholesterolemia 9/14/2012  PVD (peripheral vascular disease) ([address] 3/27/2016  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  
Procedure Laterality Date  ABDOMEN SURGERY PROC UNLISTED  2011  
 3 stents placed into abdomen (groin)  CARDIAC SURG PROCEDURE UNLIST  2005 Quadruple Bypass  CARDIAC SURG PROCEDURE UNLIST  2011 Aortic pig valve placed  COLONOSCOPY N/A 6/29/2016 COLONOSCOPY performed by Odette [facility]ieves MD at [facility] ENDOSCOPY  ENDOSCOPY, COLON, DIAGNOSTIC    
 HX AAA REPAIR    
 HX ORTHOPAEDIC  2008 Right Leg Amputated Family History Problem Relation Age of Onset  Hypertension Mother  Hypertension Father  Heart Disease Father  Heart Disease Brother Social History Substance Use Topics  Smoking status: Former Smoker Quit date: 9/14/1987  Smokeless tobacco: Never Used  Alcohol use No  
   Comment: wine 1-2x per week ROS All other systems reviewed and are negative. History Past Medical History:  
Diagnosis Date  Advance directive in chart 6/13/2016  CAD (coronary artery disease)  Cancer [facility]) 2003 Colon  CKD (chronic kidney disease), stage III ([address] 3/27/2016  CVA (cerebral vascular accident) ([address] 3/26/2016  Femoral artery aneurysm, left ([address]  Heart attack [facility]) 2008  Heart attack ([facility] [address]  Hernia  History of TIAs  Hyperlipidemia  Hypertension 3/27/2016  Iliac artery aneurysm, left ([address]  Pure hypercholesterolemia 9/14/2012  PVD (peripheral vascular disease) ([facility] [address] 3/27/2016  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  
Procedure Laterality Date  ABDOMEN SURGERY PROC UNLISTED  2011  
 3 stents placed into abdomen (groin)  CARDIAC SURG PROCEDURE UNLIST  2005 Quadruple Bypass  CARDIAC SURG PROCEDURE UNLIST  2011 Aortic pig valve placed  COLONOSCOPY N/A 6/29/2016 COLONOSCOPY performed [facility] Mellisa Diehl MD at [facility] ENDOSCOPY  ENDOSCOPY, COLON, DIAGNOSTIC    
 HX AAA REPAIR    
 HX ORTHOPAEDIC  2008 Right Leg Amputated Current Outpatient Prescriptions Medication Sig Dispense Refill  clopidogrel (PLAVIX) 75 mg tab TAKE 1 TABLET BY MOUTH DAILY 90 Tab 4  
 amLODIPine (NORVASC) 10 mg tablet Take 1 Tab by mouth daily. 90 Tab 4  
 atorvastatin (LIPITOR) 20 mg tablet Take 1 Tab by mouth daily. 90 Tab 4  
 tamsulosin (FLOMAX) 0.4 mg capsule Take 1 Cap by mouth nightly. 90 Cap 4  
 aspirin (ASPIRIN) 325 mg tablet Take 1 Tab by mouth daily. 90 Tab 4  
 donepezil (ARICEPT) 10 mg tablet TAKE 2 TABLETS BY MOUTH EVERY MORNING AFTER A MEAL 180 Tab 1  
 NITROGLYCERIN (NITROSTAT SL) 1 Tab by SubLINGual route as needed (chest pain).  omega-3 fatty acids-vitamin e (FISH OIL) 1,000 mg cap Take 1 Cap by mouth two (2) times a day. 180 Cap 4 Allergies Allergen Reactions  Ace Inhibitors Other (comments) Per pt, Cardiologist states he cannot have ACE inhibitors Family History Problem Relation Age of Onset  Hypertension Mother  Hypertension Father  Heart Disease Father  Heart Disease Brother Social History Substance Use Topics  Smoking status: Former Smoker Quit date: 9/14/1987  Smokeless tobacco: Never Used  Alcohol use No  
 Comment: wine 1-2x per week Patient Active Problem List  
Diagnosis Code  Pure hypercholesterolemia E78.00  Coronary artery disease involving native coronary artery without angina pectoris I25.10  Hypertension I10  
 CKD (chronic kidney disease), stage III ([facility] N18.3  
 S/P CABG (coronary artery bypass graft) Z95.1  
 S/P AVR Z95.2  Advance directive in chart Z68.5  
 PAD (peripheral artery disease) ([facility]) I73.9  AAA (abdominal aortic aneurysm) without rupture ([facility]) I71.4  Diastolic dysfunction D45.6  Vascular dementia without behavioral disturbance F01.50  Atherosclerosis of native artery of left lower extremity with intermittent claudication ([facility]) G64.373 Health Maintenance History Immunizations reviewed, dtap utd  , pneumovax utd , flu today , zoster utd Colonoscopy: utd , AAA screening  utd  (male over 72 smoker) Chest CT : na , Eye exam: utd Mammo na Dexascan na Health Care Directive or Living Will: yes Depression Risk Factor Screening:  
  
Patient Health Questionnaire (PHQ-2) Over the last 2 weeks, how often have you been bothered by any of the following problems? · Little interest or pleasure in doing things? · Not at all. [0] · Feeling down, depressed, or hopeless? · Not at all. [0] Total Score: 0/6 PHQ-2 Assessment Scoring: A score of 2 or more requires further screening with the PHQ-9 Alcohol Risk Factor Screening:  
 
Women: On any occasion during the past 3 months, have you had more than 3 drinks containing alcohol? Do you average more than 7 drinks per week? Men: On any occasion during the past 3 months, have you had more than 4 drinks containing alcohol? Do you average more than 14 drinks per week? Functional Ability and Level of Safety:  
 
Hearing Loss Hearing is good. Activities of Daily Living Self-care. Requires assistance with: no ADLs Fall Risk No fall risk factors Abuse Screen None Examination Physical Examination Vitals:  
 10/02/18 1107 BP: 107/62 Pulse: 66 Resp: 16 Temp: 97.2 °F (36.2 °C) TempSrc: Oral  
SpO2: 97% Weight: 206 lb 12.8 oz (93.8 kg) Height: 5' 10\" (1.778 m) PainSc:   0 - No pain Body mass index is 29.67 kg/(m^2). Evaluation of Cognitive Function: 
Mood/affect:appropriate Appearance: well groomed Family member/caregiver input: na  
 
alert, well appearing, and in no distress, oriented to person, place, and time and normal appearing weight Patient Care Team: 
Yaima Thorpe.,  [place] PCP [place] [facility] Patricia White MD (Ophthalmology) Alcon Cervantes MD (Colon and Rectal Surgery) Letty Marsh MD as Physician (Urology) Anupama Weaver MD (Cardiology) Sue Gold MD (Neurology) JP Marie (Physician Assistant) Advice/Referrals/Counseling/Plan:  
Education and counseling provided: 
Are appropriate based on today's review and evaluation Include in education list (weight loss, physical activity, smoking cessation, fall prevention, and nutrition) current treatment plan is effective, no change in therapy. I have discussed the diagnosis with the patient and the intended plan as seen in the above orders. The patient has received an after-visit summary and questions were answered concerning future plans. I have discussed medication side effects and warnings with the patient as well. I have reviewed the plan of care with the patient, accepted their input and they are in agreement with the treatment goals. Follow-up Disposition: 
Return in about 3 months (around 1/2/2019) for EOV. 
 
_____________________________________________________________ Problem Assessment 
 
for treatment of  
Chief Complaint Patient presents with  Cholesterol Problem  Hypertension  CHF  Chronic Kidney Disease  Coronary Artery Disease  Dementia SUBJECTIVE Cardiovascular Review: The patient has hypertension, hyperlipidemia, coronary artery disease and peripheral vascular disease. Diet and Lifestyle: not attempting to follow a low fat, low cholesterol diet, not attempting to follow a low sodium diet Home BP Monitoring: is not measured at home. Pertinent ROS: taking medications as instructed, no medication side effects noted, no TIA's, no chest pain on exertion, no dyspnea on exertion, no swelling of ankles. Dementia ongoing needs new neurologist 
Renal failure ongiong Additional Concerns:   
 
 
Visit Vitals  /62 (BP 1 Location: Right arm, BP Patient Position: Sitting)  Pulse 66  Temp 97.2 °F (36.2 °C) (Oral)  Resp 16  
 Ht 5' 10\" (1.778 m)  Wt 206 lb 12.8 oz (93.8 kg)  SpO2 97%  BMI 29.67 kg/m2 General:  Alert, cooperative, no distress, appears stated age. Head:  Normocephalic, without obvious abnormality, atraumatic. Eyes:  Conjunctivae/corneas clear. PERRL, EOMs intact. Fundi benign Ears:  Normal TMs and external ear canals both ears. Nose: Nares normal. Septum midline. Mucosa normal. No drainage or sinus tenderness. Throat: Lips, mucosa, and tongue normal. Teeth and gums normal.  
Neck: Supple, symmetrical, trachea midline, no adenopathy, thyroid: no enlargement/tenderness/nodules, no carotid bruit and no JVD. Back:   Symmetric, no curvature. ROM normal. No CVA tenderness. Lungs:   Clear to auscultation bilaterally. Chest wall:  No tenderness or deformity. Heart:  Regular rate and rhythm, S1, S2 normal, no murmur, click, rub or gallop. Abdomen:   Soft, non-tender. Bowel sounds normal. No masses,  No organomegaly. Genitalia:  Normal male without lesion, discharge or tenderness. Rectal:  Normal tone, normal prostate, no masses or tenderness Guaiac negative stool. Extremities: Extremities normal, atraumatic, no cyanosis or edema. Pulses: 2+ and symmetric all extremities. Skin: Skin color, texture, turgor normal. No rashes or lesions Lymph nodes: Cervical, supraclavicular, and axillary nodes normal.  
Neurologic: CNII-XII intact. Normal strength, sensation and reflexes throughout. LABS Component Latest Ref Rng & Units 7/2/2018 7/2/2018 7/2/2018 4/14/2018 9:15 AM  9:15 AM  9:15 AM  9:27 AM  
WBC 
    4.6 - 13.2 K/uL      
RBC 
    4.70 - 5.50 M/uL      
HGB 13.0 - 16.0 g/dL HCT 
    36.0 - 48.0 % MCV 
    74.0 - 97.0 FL      
MCH 
    24.0 - 34.0 PG      
MCHC 31.0 - 37.0 g/dL      
RDW 
    11.6 - 14.5 % PLATELET 
    215 - 652 K/uL MPV 
    9.2 - 11.8 FL      
NEUTROPHILS 
    40 - 73 % LYMPHOCYTES 
    21 - 52 % MONOCYTES 
    3 - 10 % EOSINOPHILS 
    0 - 5 % BASOPHILS 
    0 - 2 %      
ABS. NEUTROPHILS 
    1.8 - 8.0 K/UL      
ABS. LYMPHOCYTES 
    0.9 - 3.6 K/UL      
ABS. MONOCYTES 
    0.05 - 1.2 K/UL      
ABS. EOSINOPHILS 
    0.0 - 0.4 K/UL      
ABS. BASOPHILS 
    0.0 - 0.06 K/UL      
DF Sodium 136 - 145 mmol/L   142 Potassium 3.5 - 5.5 mmol/L   4.3 Chloride 100 - 108 mmol/L   105 CO2 
    21 - 32 mmol/L   29 Anion gap 3.0 - 18 mmol/L   8 Glucose 74 - 99 mg/dL   120 (H) BUN 
    7.0 - 18 MG/DL   25 (H) Creatinine 
    0.6 - 1.3 MG/DL   1.66 (H) BUN/Creatinine ratio 12 - 20     15 GFR est AA 
    >60 ml/min/1.73m2   49 (L) GFR est non-AA 
    >60 ml/min/1.73m2   40 (L) Calcium 8.5 - 10.1 MG/DL   9.6 Bilirubin, total 
    0.2 - 1.0 MG/DL   0.6 ALT (SGPT) 16 - 61 U/L   21 AST 
    15 - 37 U/L   18 Alk. phosphatase 45 - 117 U/L   106 Protein, total 
    6.4 - 8.2 g/dL   7.3 Albumin 3.4 - 5.0 g/dL   4.3 Globulin 2.0 - 4.0 g/dL   3.0 A-G Ratio 
    0.8 - 1.7     1.4 Color DARK YELLOW Appearance CLEAR Specific gravity 1.005 - 1.030    1.026    
pH (UA) 
    5.0 - 8.0    5.0 Protein NEG mg/dL  TRACE (A) Glucose NEG mg/dL  NEGATIVE Ketone NEG mg/dL  TRACE (A) Bilirubin NEG    NEGATIVE Blood NEG    NEGATIVE Urobilinogen 0.2 - 1.0 EU/dL  0.2 Nitrites NEG    NEGATIVE Leukocyte Esterase NEG    NEGATIVE Cholesterol, total 
    <200 MG/DL Triglyceride 
    <150 MG/DL      
HDL Cholesterol 40 - 60 MG/DL      
LDL, calculated 0 - 100 MG/DL VLDL, calculated MG/DL      
CHOL/HDL Ratio 0 - 5.0 Hemoglobin A1c, (calculated) 4.2 - 5.6 % 5.4 Est. average glucose 
    mg/dL 108 TSH 
    0.36 - 3.74 uIU/mL Sed rate, automated 0 - 20 mm/hr C-Reactive Protein, Cardiac 
    0.00 - 3.00 mg/L    1.38 Triiodothyronine (T3), free 2.18 - 3.98 PG/ML      
T4, Free 0.7 - 1.5 NG/DL Component Latest Ref Rng & Units 4/14/2018 4/14/2018 4/14/2018 4/14/2018  
 
      9:27 AM  9:27 AM  9:27 AM  9:27 AM  
WBC 
    4.6 - 13.2 K/uL      
RBC 
    4.70 - 5.50 M/uL      
HGB 13.0 - 16.0 g/dL HCT 
    36.0 - 48.0 % MCV 
    74.0 - 97.0 FL      
MCH 
    24.0 - 34.0 PG      
MCHC 31.0 - 37.0 g/dL      
RDW 
    11.6 - 14.5 % PLATELET 
    571 - 176 K/uL MPV 
    9.2 - 11.8 FL      
NEUTROPHILS 
    40 - 73 % LYMPHOCYTES 
    21 - 52 % MONOCYTES 
    3 - 10 % EOSINOPHILS 
    0 - 5 % BASOPHILS 
    0 - 2 %      
ABS. NEUTROPHILS 
    1.8 - 8.0 K/UL      
ABS. LYMPHOCYTES 
    0.9 - 3.6 K/UL      
ABS. MONOCYTES 
    0.05 - 1.2 K/UL      
ABS. EOSINOPHILS 
    0.0 - 0.4 K/UL      
ABS. BASOPHILS 
    0.0 - 0.06 K/UL      
DF Sodium 136 - 145 mmol/L Potassium 3.5 - 5.5 mmol/L Chloride 100 - 108 mmol/L      
CO2 
    21 - 32 mmol/L Anion gap 3.0 - 18 mmol/L Glucose 74 - 99 mg/dL BUN 
    7.0 - 18 MG/DL Creatinine 
    0.6 - 1.3 MG/DL      
BUN/Creatinine ratio 12 - 20 GFR est AA 
    >60 ml/min/1.73m2 GFR est non-AA 
    >60 ml/min/1.73m2 Calcium 8.5 - 10.1 MG/DL Bilirubin, total 
    0.2 - 1.0 MG/DL      
ALT (SGPT) 16 - 61 U/L      
AST 
    15 - 37 U/L Alk. phosphatase 45 - 117 U/L Protein, total 
    6.4 - 8.2 g/dL Albumin 3.4 - 5.0 g/dL Globulin 2.0 - 4.0 g/dL A-G Ratio 
    0.8 - 1.7 Color Appearance Specific gravity 1.005 - 1.030        
pH (UA) 
    5.0 - 8.0 Protein NEG mg/dL Glucose NEG mg/dL Ketone NEG mg/dL Bilirubin NEG Blood NEG Urobilinogen 0.2 - 1.0 EU/dL Nitrites NEG Leukocyte Esterase NEG Cholesterol, total 
    <200 MG/ Triglyceride 
    <150 MG/ (H) HDL Cholesterol 40 - 60 MG/DL 38 (L)     
LDL, calculated 0 - 100 MG/DL 49.6 VLDL, calculated MG/DL 41.4 CHOL/HDL Ratio 0 - 5.0   3.4 Hemoglobin A1c, (calculated) 4.2 - 5.6 % Est. average glucose 
    mg/dL TSH 
    0.36 - 3.74 uIU/mL    3.31 Sed rate, automated 0 - 20 mm/hr C-Reactive Protein, Cardiac 
    0.00 - 3.00 mg/L Triiodothyronine (T3), free 2.18 - 3.98 PG/ML   2.4   
T4, Free 0.7 - 1.5 NG/DL  0.9 Component Latest Ref Rng & Units 4/14/2018 4/14/2018 4/14/2018 4/14/2018  
 
      9:27 AM  9:27 AM  9:27 AM  9:27 AM  
WBC 
    4.6 - 13.2 K/uL    12.5 RBC 
    4.70 - 5.50 M/uL    4.03 (L) HGB 13.0 - 16.0 g/dL    13.8 HCT 
    36.0 - 48.0 %    39.4 MCV 
    74.0 - 97.0 FL    97.8 (H) MCH 
    24.0 - 34.0 PG    34.2 (H) MCHC 31.0 - 37.0 g/dL    35.0  
RDW 
    11.6 - 14.5 %    14.4 PLATELET 
    621 - 593 K/uL    241 MPV 
    9.2 - 11.8 FL    11.3 NEUTROPHILS 
    40 - 73 %    64 LYMPHOCYTES 
    21 - 52 %    26 MONOCYTES 
    3 - 10 %    7 EOSINOPHILS 
    0 - 5 %    2  
BASOPHILS 
    0 - 2 %    1  
ABS. NEUTROPHILS 
    1.8 - 8.0 K/UL    8.1 (H)  
ABS. LYMPHOCYTES 
    0.9 - 3.6 K/UL    3.3  
ABS. MONOCYTES 
    0.05 - 1.2 K/UL    0.8 ABS. EOSINOPHILS 
    0.0 - 0.4 K/UL    0.3  
ABS. BASOPHILS 
    0.0 - 0.06 K/UL    0.1 (H) DF 
        AUTOMATED Sodium 136 - 145 mmol/L Potassium 3.5 - 5.5 mmol/L Chloride 100 - 108 mmol/L      
CO2 
    21 - 32 mmol/L Anion gap 3.0 - 18 mmol/L Glucose 74 - 99 mg/dL BUN 
    7.0 - 18 MG/DL Creatinine 
    0.6 - 1.3 MG/DL      
BUN/Creatinine ratio 12 - 20 GFR est AA 
    >60 ml/min/1.73m2 GFR est non-AA 
    >60 ml/min/1.73m2 Calcium 8.5 - 10.1 MG/DL Bilirubin, total 
    0.2 - 1.0 MG/DL      
ALT (SGPT) 16 - 61 U/L      
AST 
    15 - 37 U/L Alk. phosphatase 45 - 117 U/L Protein, total 
    6.4 - 8.2 g/dL Albumin 3.4 - 5.0 g/dL Globulin 2.0 - 4.0 g/dL A-G Ratio 
    0.8 - 1.7 Color Appearance Specific gravity 1.005 - 1.030        
pH (UA) 
    5.0 - 8.0 Protein NEG mg/dL Glucose NEG mg/dL Ketone NEG mg/dL Bilirubin NEG Blood NEG Urobilinogen 0.2 - 1.0 EU/dL Nitrites NEG Leukocyte Esterase NEG Cholesterol, total 
    <200 MG/DL Triglyceride 
    <150 MG/DL      
HDL Cholesterol 40 - 60 MG/DL      
LDL, calculated 0 - 100 MG/DL VLDL, calculated MG/DL      
CHOL/HDL Ratio 0 - 5.0 Hemoglobin A1c, (calculated) 4.2 - 5.6 % 5.6 Est. average glucose 
    mg/dL 114 TSH 
    0.36 - 3.74 uIU/mL   3.22 Sed rate, automated 0 - 20 mm/hr  6    
C-Reactive Protein, Cardiac 0.00 - 3.00 mg/L Triiodothyronine (T3), free 2.18 - 3.98 PG/ML      
T4, Free 0.7 - 1.5 NG/DL Component Latest Ref Rng & Units 3/27/2018 3/27/2018 3/27/2018  
 
      9:43 AM  9:43 AM  9:42 AM  
WBC 
    4.6 - 13.2 K/uL     
RBC 
    4.70 - 5.50 M/uL     
HGB 13.0 - 16.0 g/dL HCT 
    36.0 - 48.0 % MCV 
    74.0 - 97.0 FL     
MCH 
    24.0 - 34.0 PG     
MCHC 31.0 - 37.0 g/dL     
RDW 
    11.6 - 14.5 % PLATELET 
    975 - 531 K/uL MPV 
    9.2 - 11.8 FL     
NEUTROPHILS 
    40 - 73 % LYMPHOCYTES 
    21 - 52 % MONOCYTES 
    3 - 10 % EOSINOPHILS 
    0 - 5 % BASOPHILS 
    0 - 2 %     
ABS. NEUTROPHILS 
    1.8 - 8.0 K/UL     
ABS. LYMPHOCYTES 
    0.9 - 3.6 K/UL     
ABS. MONOCYTES 
    0.05 - 1.2 K/UL     
ABS. EOSINOPHILS 
    0.0 - 0.4 K/UL     
ABS. BASOPHILS 
    0.0 - 0.06 K/UL     
DF Sodium 136 - 145 mmol/L 142 Potassium 3.5 - 5.5 mmol/L 5.6 (H) Chloride 100 - 108 mmol/L 107 CO2 
    21 - 32 mmol/L 29 Anion gap 3.0 - 18 mmol/L 6 Glucose 74 - 99 mg/dL 91 BUN 
    7.0 - 18 MG/DL 28 (H) Creatinine 
    0.6 - 1.3 MG/DL 1.66 (H) BUN/Creatinine ratio 12 - 20   17 GFR est AA 
    >60 ml/min/1.73m2 49 (L) GFR est non-AA 
    >60 ml/min/1.73m2 40 (L) Calcium 8.5 - 10.1 MG/DL 9.7 Bilirubin, total 
    0.2 - 1.0 MG/DL 0.5 ALT (SGPT) 16 - 61 U/L 18 AST 
    15 - 37 U/L 13 (L) Alk. phosphatase 45 - 117 U/L 99 Protein, total 
    6.4 - 8.2 g/dL 7.2 Albumin 3.4 - 5.0 g/dL 4.1 Globulin 2.0 - 4.0 g/dL 3.1 A-G Ratio 
    0.8 - 1.7   1.3 Color Appearance Specific gravity 1.005 - 1.030       
pH (UA) 
    5.0 - 8.0 Protein NEG mg/dL Glucose NEG mg/dL Ketone NEG mg/dL Bilirubin NEG Blood NEG Urobilinogen 0.2 - 1.0 EU/dL Nitrites NEG Leukocyte Esterase NEG Cholesterol, total 
    <200 MG/DL  153 Triglyceride 
    <150 MG/DL  253 (H) HDL Cholesterol 40 - 60 MG/DL  43 LDL, calculated 0 - 100 MG/DL  59.4 VLDL, calculated MG/DL  50.6 CHOL/HDL Ratio 0 - 5.0    3.6 Hemoglobin A1c, (calculated) 4.2 - 5.6 %   5.5 Est. average glucose 
    mg/dL   111 TSH 
    0.36 - 3.74 uIU/mL Sed rate, automated 0 - 20 mm/hr C-Reactive Protein, Cardiac 
    0.00 - 3.00 mg/L Triiodothyronine (T3), free 2.18 - 3.98 PG/ML     
T4, Free 0.7 - 1.5 NG/DL     
 
TESTS 
 
ekg  Sinus dallin Assessment/Plan: Hypertension - stable Hyperlipidemia - stable Coronary artery disease - stable Peripheral vascular disease - stable Renal insuf  Ongoing Dementia ongiong 
chf  Stable Diagnoses and all orders for this visit: 1. Routine general medical examination at a health care facility 2. TIA (transient ischemic attack) -     AMB POC EKG ROUTINE W/ 12 LEADS, INTER & REP 3. Encounter for immunization -     Influenza Vaccine Inactivated (IIV)(FLUAD), Subunit, Adjuvanted, IM, (13707) -     Administration fee () for Medicare insured patients 4. Coronary artery disease involving native coronary artery of native heart without angina pectoris 
-     amLODIPine (NORVASC) 10 mg tablet; Take 1 Tab by mouth daily. -     atorvastatin (LIPITOR) 20 mg tablet; Take 1 Tab by mouth daily. -     tamsulosin (FLOMAX) 0.4 mg capsule; Take 1 Cap by mouth nightly. 5. Pure hypercholesterolemia 
-     amLODIPine (NORVASC) 10 mg tablet; Take 1 Tab by mouth daily. -     atorvastatin (LIPITOR) 20 mg tablet; Take 1 Tab by mouth daily. -     tamsulosin (FLOMAX) 0.4 mg capsule; Take 1 Cap by mouth nightly. 6. AAA (abdominal aortic aneurysm) without rupture (HCC) 
-     amLODIPine (NORVASC) 10 mg tablet; Take 1 Tab by mouth daily. -     atorvastatin (LIPITOR) 20 mg tablet; Take 1 Tab by mouth daily. -     tamsulosin (FLOMAX) 0.4 mg capsule; Take 1 Cap by mouth nightly. 7. Essential hypertension 
-     amLODIPine (NORVASC) 10 mg tablet; Take 1 Tab by mouth daily. -     atorvastatin (LIPITOR) 20 mg tablet; Take 1 Tab by mouth daily. -     tamsulosin (FLOMAX) 0.4 mg capsule; Take 1 Cap by mouth nightly. 8. CKD (chronic kidney disease), stage III ([facility]) 
-     amLODIPine (NORVASC) 10 mg tablet; Take 1 Tab by mouth daily. -     atorvastatin (LIPITOR) 20 mg tablet; Take 1 Tab by mouth daily. -     tamsulosin (FLOMAX) 0.4 mg capsule; Take 1 Cap by mouth nightly. 9. Diastolic dysfunction 
-     amLODIPine (NORVASC) 10 mg tablet; Take 1 Tab by mouth daily. -     atorvastatin (LIPITOR) 20 mg tablet; Take 1 Tab by mouth daily. -     tamsulosin (FLOMAX) 0.4 mg capsule; Take 1 Cap by mouth nightly. 10. Vascular dementia without behavioral disturbance 
-     amLODIPine (NORVASC) 10 mg tablet; Take 1 Tab by mouth daily. -     atorvastatin (LIPITOR) 20 mg tablet; Take 1 Tab by mouth daily. -     tamsulosin (FLOMAX) 0.4 mg capsule; Take 1 Cap by mouth nightly. 11. Benign prostatic hyperplasia with urinary frequency -     tamsulosin (FLOMAX) 0.4 mg capsule; Take 1 Cap by mouth nightly. 12. PAD (peripheral artery disease) ([facility] [address] -     tamsulosin (FLOMAX) 0.4 mg capsule; Take 1 Cap by mouth nightly. Other orders 
-     clopidogrel (PLAVIX) 75 mg tab; TAKE 1 TABLET BY MOUTH DAILY Lab review: labs are reviewed, up to date and normal

## 2018-10-02 NOTE — PROGRESS NOTES
Krystal Olson is a [de-identified] y.o. male presents today for his medicare wellness exam. Patient reports of no current pain. Pt is in Room # 5 Learning Assessment (baseline): Completed Depression Screening: Completed Fall Risk Screening: Completed Abuse screening: Completed ADL Assessment: Completed 1. Have you been to the ER, urgent care clinic since your last visit? Hospitalized since your last visit? No 
 
2. Have you seen or consulted any other health care providers outside of the [facility] since your last visit? Include any pap smears or colon screening. No  
 
Health Maintenance reviewed - . Krystal Olson is a [de-identified] y.o. male who presents for routine immunizations. He denies any symptoms , reactions or allergies that would exclude them from being immunized today. Risks and adverse reactions were discussed and the VIS was given to them. All questions were addressed. He was observed for 10 min post injection. There were no reactions observed.  
 
Sharmila Cespedes LPN

## 2018-11-24 ENCOUNTER — HOSPITAL ENCOUNTER (INPATIENT)
Age: 78
LOS: 4 days | Discharge: HOME HEALTH | DRG: 282 | End: 2018-11-28
Attending: EMERGENCY MEDICINE | Admitting: HOSPITALIST
Payer: MEDICARE

## 2018-11-24 ENCOUNTER — APPOINTMENT (OUTPATIENT)
Dept: GENERAL RADIOLOGY | Age: 78
DRG: 282 | End: 2018-11-24
Attending: EMERGENCY MEDICINE
Payer: MEDICARE

## 2018-11-24 DIAGNOSIS — I20.0 UNSTABLE ANGINA (HCC): Primary | ICD-10-CM

## 2018-11-24 PROBLEM — I25.10 CAD (CORONARY ARTERY DISEASE): Status: ACTIVE | Noted: 2018-11-24

## 2018-11-24 PROBLEM — I95.9 HYPOTENSION: Status: ACTIVE | Noted: 2018-11-24

## 2018-11-24 PROBLEM — R07.9 CHEST PAIN: Status: ACTIVE | Noted: 2018-11-24

## 2018-11-24 LAB
ALBUMIN SERPL-MCNC: 3.8 G/DL (ref 3.4–5)
ALBUMIN/GLOB SERPL: 1.2 {RATIO} (ref 0.8–1.7)
ALP SERPL-CCNC: 116 U/L (ref 45–117)
ALT SERPL-CCNC: 32 U/L (ref 16–61)
ANION GAP SERPL CALC-SCNC: 7 MMOL/L (ref 3–18)
APTT PPP: 33.3 SEC (ref 23–36.4)
AST SERPL-CCNC: 18 U/L (ref 15–37)
BASOPHILS # BLD: 0.1 K/UL (ref 0–0.1)
BASOPHILS NFR BLD: 0 % (ref 0–2)
BILIRUB SERPL-MCNC: 0.4 MG/DL (ref 0.2–1)
BUN SERPL-MCNC: 36 MG/DL (ref 7–18)
BUN/CREAT SERPL: 18 (ref 12–20)
CALCIUM SERPL-MCNC: 9.2 MG/DL (ref 8.5–10.1)
CHLORIDE SERPL-SCNC: 107 MMOL/L (ref 100–108)
CO2 SERPL-SCNC: 27 MMOL/L (ref 21–32)
CREAT SERPL-MCNC: 1.98 MG/DL (ref 0.6–1.3)
DIFFERENTIAL METHOD BLD: ABNORMAL
EOSINOPHIL # BLD: 0.5 K/UL (ref 0–0.4)
EOSINOPHIL NFR BLD: 4 % (ref 0–5)
ERYTHROCYTE [DISTWIDTH] IN BLOOD BY AUTOMATED COUNT: 15 % (ref 11.6–14.5)
GLOBULIN SER CALC-MCNC: 3.3 G/DL (ref 2–4)
GLUCOSE SERPL-MCNC: 134 MG/DL (ref 74–99)
HCT VFR BLD AUTO: 36.7 % (ref 36–48)
HGB BLD-MCNC: 12.8 G/DL (ref 13–16)
LYMPHOCYTES # BLD: 3.2 K/UL (ref 0.9–3.6)
LYMPHOCYTES NFR BLD: 27 % (ref 21–52)
MCH RBC QN AUTO: 34 PG (ref 24–34)
MCHC RBC AUTO-ENTMCNC: 34.9 G/DL (ref 31–37)
MCV RBC AUTO: 97.6 FL (ref 74–97)
MONOCYTES # BLD: 0.8 K/UL (ref 0.05–1.2)
MONOCYTES NFR BLD: 7 % (ref 3–10)
NEUTS SEG # BLD: 7.3 K/UL (ref 1.8–8)
NEUTS SEG NFR BLD: 62 % (ref 40–73)
PLATELET # BLD AUTO: 245 K/UL (ref 135–420)
PMV BLD AUTO: 11 FL (ref 9.2–11.8)
POTASSIUM SERPL-SCNC: 4.2 MMOL/L (ref 3.5–5.5)
PROT SERPL-MCNC: 7.1 G/DL (ref 6.4–8.2)
RBC # BLD AUTO: 3.76 M/UL (ref 4.7–5.5)
SODIUM SERPL-SCNC: 141 MMOL/L (ref 136–145)
TROPONIN I BLD-MCNC: 0.05 NG/ML (ref 0–0.08)
TROPONIN I SERPL-MCNC: <0.02 NG/ML (ref 0–0.04)
WBC # BLD AUTO: 11.8 K/UL (ref 4.6–13.2)

## 2018-11-24 PROCEDURE — 96375 TX/PRO/DX INJ NEW DRUG ADDON: CPT

## 2018-11-24 PROCEDURE — 65660000001 HC RM ICU INTERMED STEPDOWN

## 2018-11-24 PROCEDURE — 74011250636 HC RX REV CODE- 250/636: Performed by: EMERGENCY MEDICINE

## 2018-11-24 PROCEDURE — 84484 ASSAY OF TROPONIN QUANT: CPT

## 2018-11-24 PROCEDURE — 80053 COMPREHEN METABOLIC PANEL: CPT

## 2018-11-24 PROCEDURE — 93005 ELECTROCARDIOGRAM TRACING: CPT

## 2018-11-24 PROCEDURE — 82550 ASSAY OF CK (CPK): CPT

## 2018-11-24 PROCEDURE — 99285 EMERGENCY DEPT VISIT HI MDM: CPT

## 2018-11-24 PROCEDURE — 85730 THROMBOPLASTIN TIME PARTIAL: CPT

## 2018-11-24 PROCEDURE — 74011250637 HC RX REV CODE- 250/637: Performed by: HOSPITALIST

## 2018-11-24 PROCEDURE — 74011250637 HC RX REV CODE- 250/637: Performed by: EMERGENCY MEDICINE

## 2018-11-24 PROCEDURE — 99218 HC RM OBSERVATION: CPT

## 2018-11-24 PROCEDURE — 96365 THER/PROPH/DIAG IV INF INIT: CPT

## 2018-11-24 PROCEDURE — 85025 COMPLETE CBC W/AUTO DIFF WBC: CPT

## 2018-11-24 PROCEDURE — 71045 X-RAY EXAM CHEST 1 VIEW: CPT

## 2018-11-24 PROCEDURE — 36415 COLL VENOUS BLD VENIPUNCTURE: CPT

## 2018-11-24 RX ORDER — ASPIRIN 325 MG
325 TABLET ORAL DAILY
Status: DISCONTINUED | OUTPATIENT
Start: 2018-11-25 | End: 2018-11-26

## 2018-11-24 RX ORDER — HEPARIN SODIUM 10000 [USP'U]/100ML
10-25 INJECTION, SOLUTION INTRAVENOUS
Status: DISPENSED | OUTPATIENT
Start: 2018-11-24 | End: 2018-11-27

## 2018-11-24 RX ORDER — CLOPIDOGREL BISULFATE 75 MG/1
75 TABLET ORAL DAILY
Status: DISCONTINUED | OUTPATIENT
Start: 2018-11-25 | End: 2018-11-28 | Stop reason: HOSPADM

## 2018-11-24 RX ORDER — AMLODIPINE BESYLATE 10 MG/1
10 TABLET ORAL DAILY
Status: DISCONTINUED | OUTPATIENT
Start: 2018-11-25 | End: 2018-11-26

## 2018-11-24 RX ORDER — DONEPEZIL HYDROCHLORIDE 5 MG/1
10 TABLET, FILM COATED ORAL DAILY
Status: DISCONTINUED | OUTPATIENT
Start: 2018-11-25 | End: 2018-11-28 | Stop reason: HOSPADM

## 2018-11-24 RX ORDER — ATORVASTATIN CALCIUM 20 MG/1
20 TABLET, FILM COATED ORAL DAILY
Status: DISCONTINUED | OUTPATIENT
Start: 2018-11-25 | End: 2018-11-28 | Stop reason: HOSPADM

## 2018-11-24 RX ORDER — HEPARIN SODIUM 1000 [USP'U]/ML
4000 INJECTION, SOLUTION INTRAVENOUS; SUBCUTANEOUS ONCE
Status: COMPLETED | OUTPATIENT
Start: 2018-11-24 | End: 2018-11-24

## 2018-11-24 RX ORDER — MORPHINE SULFATE 2 MG/ML
2 INJECTION, SOLUTION INTRAMUSCULAR; INTRAVENOUS
Status: COMPLETED | OUTPATIENT
Start: 2018-11-24 | End: 2018-11-24

## 2018-11-24 RX ORDER — TAMSULOSIN HYDROCHLORIDE 0.4 MG/1
0.4 CAPSULE ORAL
Status: DISCONTINUED | OUTPATIENT
Start: 2018-11-24 | End: 2018-11-28 | Stop reason: HOSPADM

## 2018-11-24 RX ADMIN — NITROGLYCERIN 0.5 INCH: 20 OINTMENT TOPICAL at 15:49

## 2018-11-24 RX ADMIN — TAMSULOSIN HYDROCHLORIDE 0.4 MG: 0.4 CAPSULE ORAL at 23:00

## 2018-11-24 RX ADMIN — MORPHINE SULFATE 2 MG: 2 INJECTION, SOLUTION INTRAMUSCULAR; INTRAVENOUS at 16:52

## 2018-11-24 RX ADMIN — HEPARIN SODIUM 4000 UNITS: 1000 INJECTION INTRAVENOUS; SUBCUTANEOUS at 18:51

## 2018-11-24 RX ADMIN — HEPARIN SODIUM AND DEXTROSE 10 UNITS/KG/HR: 10000; 5 INJECTION INTRAVENOUS at 18:50

## 2018-11-24 RX ADMIN — NITROGLYCERIN 1 INCH: 20 OINTMENT TOPICAL at 16:52

## 2018-11-24 NOTE — ED TRIAGE NOTES
Pt arrived by [facility]. Pt complaining of left sided chest pain that radiates to left arm. Pt took 2 SL nitro at home; EMS gave 324 chewable aspirin. Pt has hx of MI, quadruple bypass and stent placement. Pt takes clopidigrel, norvasc, daily aspirin. Pt states that he is compliant on all of his medications.

## 2018-11-24 NOTE — ED PROVIDER NOTES
EMERGENCY DEPARTMENT HISTORY AND PHYSICAL EXAM 
 
2:09 PM 
 
 
Date: 11/24/2018 Patient Name: Betito Pratt History of Presenting Illness Chief Complaint Patient presents with  Chest Pain History Provided By: Patient and EMS Chief Complaint: Chest Pain Duration:  45 minutes PTA Timing:  Acute Location: left-sided chest  
Quality: Dull Severity: Mild Modifying Factors: Per EMS, the patient received 324mg Aspirin en route to the ED. Patient also notes taking 2 of his Nitro. Currently reports alleviation in his presentation. Associated Symptoms: Reports the pain radiated to the left arm. Denies other associated symptoms. Additional History (Context): Betito Pratt is a [de-identified] y.o. male presents with dull radiating left-sided chest pain to the left arm onset 45 minutes PTA. Per EMS, the patient received 324mg Aspirin en route to the ED. Patient also notes taking 2 of his Nitro. Currently reports alleviation in his presentation. Denies other associated symptoms. EMS reports patient's hx of CAD, MI, PSHx of a Right leg amputation, CVA in 2016, AAA Repair, and a CABG. Reports NKDA. No other concerns were expressed at this time. PCP: Trinity Burdick., DO 
 
Current Facility-Administered Medications Medication Dose Route Frequency Provider Last Rate Last Dose  heparin (porcine) 1,000 unit/mL injection 4,000 Units  4,000 Units IntraVENous ONCE Mirza Owens MD      
 heparin 25,000 units in D5W 250 ml infusion  10-25 Units/kg/hr IntraVENous TITRATE Thi Bliss MD      
 
Current Outpatient Medications Medication Sig Dispense Refill  clopidogrel (PLAVIX) 75 mg tab TAKE 1 TABLET BY MOUTH DAILY 90 Tab 4  
 amLODIPine (NORVASC) 10 mg tablet Take 1 Tab by mouth daily. 90 Tab 4  
 atorvastatin (LIPITOR) 20 mg tablet Take 1 Tab by mouth daily. 90 Tab 4  
 tamsulosin (FLOMAX) 0.4 mg capsule Take 1 Cap by mouth nightly.  90 Cap 4  
  aspirin (ASPIRIN) 325 mg tablet Take 1 Tab by mouth daily. 90 Tab 4  
 donepezil (ARICEPT) 10 mg tablet TAKE 2 TABLETS BY MOUTH EVERY MORNING AFTER A MEAL 180 Tab 1  
 NITROGLYCERIN (NITROSTAT SL) 1 Tab by SubLINGual route as needed (chest pain).  omega-3 fatty acids-vitamin e (FISH OIL) 1,000 mg cap Take 1 Cap by mouth two (2) times a day. 180 Cap 4 Past History Past Medical History: 
Past Medical History:  
Diagnosis Date  Advance directive in chart 2016  CAD (coronary artery disease)  Cancer [facility])  Colon  CKD (chronic kidney disease), stage III ([address] 3/27/2016  CVA (cerebral vascular accident) [facility] 3/26/2016  Femoral artery aneurysm, left ([address]  Heart attack [facility])   Heart attack ([facility]  Hernia  History of TIAs  Hyperlipidemia  Hypertension 3/27/2016  Iliac artery aneurysm, left ([address]  Pure hypercholesterolemia 2012  PVD (peripheral vascular disease) ([address] 3/27/2016  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 
Past Surgical History:  
Procedure Laterality Date  ABDOMEN SURGERY PROC UNLISTED    
 3 stents placed into abdomen (groin)  CARDIAC SURG PROCEDURE UNLIST   Quadruple Bypass  CARDIAC SURG PROCEDURE UNLIST   Aortic pig valve placed  ENDOSCOPY, COLON, DIAGNOSTIC    
 HX AAA REPAIR    
 HX ORTHOPAEDIC   Right Leg Amputated Family History: 
Family History Problem Relation Age of Onset  Hypertension Mother  Hypertension Father  Heart Disease Father  Heart Disease Brother Social History: 
Social History Tobacco Use  Smoking status: Former Smoker Last attempt to quit: 1987 Years since quittin.2  Smokeless tobacco: Never Used Substance Use Topics  Alcohol use: No  
  Comment: wine 1-2x per week  Drug use: No  
 
 
Allergies: Allergies Allergen Reactions  Ace Inhibitors Other (comments) Per pt, Cardiologist states he cannot have ACE inhibitors Review of Systems Review of Systems Constitutional: Negative for diaphoresis and fever. HENT: Negative for congestion and sore throat. Eyes: Negative for pain and itching. Respiratory: Negative for cough and shortness of breath. Cardiovascular: Positive for chest pain. Negative for palpitations. Gastrointestinal: Negative for abdominal pain and diarrhea. Endocrine: Negative for polydipsia and polyuria. Genitourinary: Negative for dysuria and hematuria. Musculoskeletal: Positive for myalgias (left arm pain ). Negative for arthralgias. Skin: Negative for rash and wound. Neurological: Negative for seizures and syncope. Hematological: Does not bruise/bleed easily. Psychiatric/Behavioral: Negative for agitation and hallucinations. Physical Exam  
 
Patient Vitals for the past 12 hrs: 
 Temp Pulse Resp BP SpO2  
11/24/18 1700  74 14 98/40 95 % 11/24/18 1650  76 16 118/59 97 % 11/24/18 1640  70 12 115/61 97 % 11/24/18 1630  67 14 119/67 95 % 11/24/18 1620  73 14 123/66 96 % 11/24/18 1610  66 13 114/68 95 % 11/24/18 1600  67 12 130/71 97 % 11/24/18 1550  71 12 137/72 98 % 11/24/18 1540  69 13 146/75 97 % 11/24/18 1530  72 20 135/72 98 % 11/24/18 1520  68 14 137/77 97 % 11/24/18 1510  66 14 118/73 97 % 11/24/18 1500  69 15 124/69 99 % 11/24/18 1450  69 12 117/60 97 % 11/24/18 1410 97.8 °F (36.6 °C) 65 12 114/73 99 % Physical Exam  
Constitutional: He appears well-developed and well-nourished. Patient has an artificial right leg HENT:  
Head: Normocephalic and atraumatic. Eyes: Conjunctivae are normal. No scleral icterus. Neck: Normal range of motion. Neck supple. No JVD present. Cardiovascular: Normal rate, regular rhythm and normal heart sounds. 4 intact extremity pulses Pulmonary/Chest: Effort normal and breath sounds normal.  
Abdominal: Soft. He exhibits no mass. There is no tenderness. Musculoskeletal: Normal range of motion. Lymphadenopathy:  
  He has no cervical adenopathy. Neurological: He is alert. Skin: Skin is warm and dry. Nursing note and vitals reviewed. Diagnostic Study Results Labs - Recent Results (from the past 12 hour(s)) EKG, 12 LEAD, INITIAL Collection Time: 11/24/18  2:11 PM  
Result Value Ref Range Ventricular Rate 66 BPM  
 Atrial Rate 66 BPM  
 P-R Interval 156 ms QRS Duration 116 ms  
 Q-T Interval 450 ms QTC Calculation (Bezet) 471 ms Calculated P Axis 90 degrees Calculated R Axis -28 degrees Calculated T Axis 78 degrees Diagnosis Sinus rhythm with premature supraventricular complexes Incomplete left bundle branch block ST abnormality, possible digitalis effect Abnormal ECG When compared with ECG of 14-APR-2018 09:26, No significant change was found CBC WITH AUTOMATED DIFF Collection Time: 11/24/18  2:36 PM  
Result Value Ref Range WBC 11.8 4.6 - 13.2 K/uL  
 RBC 3.76 (L) 4.70 - 5.50 M/uL  
 HGB 12.8 (L) 13.0 - 16.0 g/dL HCT 36.7 36.0 - 48.0 % MCV 97.6 (H) 74.0 - 97.0 FL  
 MCH 34.0 24.0 - 34.0 PG  
 MCHC 34.9 31.0 - 37.0 g/dL  
 RDW 15.0 (H) 11.6 - 14.5 % PLATELET 980 185 - 339 K/uL MPV 11.0 9.2 - 11.8 FL  
 NEUTROPHILS 62 40 - 73 % LYMPHOCYTES 27 21 - 52 % MONOCYTES 7 3 - 10 % EOSINOPHILS 4 0 - 5 % BASOPHILS 0 0 - 2 %  
 ABS. NEUTROPHILS 7.3 1.8 - 8.0 K/UL  
 ABS. LYMPHOCYTES 3.2 0.9 - 3.6 K/UL  
 ABS. MONOCYTES 0.8 0.05 - 1.2 K/UL  
 ABS. EOSINOPHILS 0.5 (H) 0.0 - 0.4 K/UL  
 ABS. BASOPHILS 0.1 0.0 - 0.1 K/UL  
 DF AUTOMATED METABOLIC PANEL, COMPREHENSIVE Collection Time: 11/24/18  2:36 PM  
Result Value Ref Range Sodium 141 136 - 145 mmol/L Potassium 4.2 3.5 - 5.5 mmol/L  Chloride 107 100 - 108 mmol/L  
 CO2 27 21 - 32 mmol/L  
 Anion gap 7 3.0 - 18 mmol/L Glucose 134 (H) 74 - 99 mg/dL BUN 36 (H) 7.0 - 18 MG/DL Creatinine 1.98 (H) 0.6 - 1.3 MG/DL  
 BUN/Creatinine ratio 18 12 - 20 GFR est AA 40 (L) >60 ml/min/1.73m2 GFR est non-AA 33 (L) >60 ml/min/1.73m2 Calcium 9.2 8.5 - 10.1 MG/DL Bilirubin, total 0.4 0.2 - 1.0 MG/DL  
 ALT (SGPT) 32 16 - 61 U/L  
 AST (SGOT) 18 15 - 37 U/L Alk. phosphatase 116 45 - 117 U/L Protein, total 7.1 6.4 - 8.2 g/dL Albumin 3.8 3.4 - 5.0 g/dL Globulin 3.3 2.0 - 4.0 g/dL A-G Ratio 1.2 0.8 - 1.7    
TROPONIN I Collection Time: 11/24/18  2:36 PM  
Result Value Ref Range Troponin-I, Qt. <0.02 0.0 - 0.045 NG/ML  
POC TROPONIN-I Collection Time: 11/24/18  4:26 PM  
Result Value Ref Range Troponin-I (POC) 0.05 0.00 - 0.08 ng/mL EKG, 12 LEAD, SUBSEQUENT Collection Time: 11/24/18  4:48 PM  
Result Value Ref Range Ventricular Rate 70 BPM  
 Atrial Rate 70 BPM  
 P-R Interval 184 ms QRS Duration 118 ms Q-T Interval 424 ms QTC Calculation (Bezet) 457 ms Calculated R Axis -21 degrees Calculated T Axis 102 degrees Diagnosis Sinus rhythm with premature atrial complexes Incomplete left bundle branch block T wave abnormality, consider lateral ischemia Abnormal ECG When compared with ECG of 24-NOV-2018 14:11, No significant change was found Radiologic Studies -  
XR CHEST PORT Final Result Xr Chest [facility] Result Date: 11/24/2018 EXAM: CHEST RADIOGRAPH, SINGLE VIEW CLINICAL INDICATION/HISTORY: Chest pain, shortness of breath COMPARISON: 4/14/2018 TECHNIQUE: Portable frontal view of the chest was obtained. _______________ FINDINGS: SUPPORT DEVICES: The patient is status post median sternotomy with sternal wires and surgical clips in the mediastinum, suggestive of prior CABG. HEART AND MEDIASTINUM: Cardiomediastinal silhouette appears within normal limits. Tortuous and atherosclerotic thoracic aorta.   No central vascular congestion. LUNGS AND PLEURAL SPACES: Atelectasis is again seen within left and right lung bases. No acute or confluent airspace opacity otherwise. No pleural effusion or pneumothorax. BONY THORAX AND SOFT TISSUES: No acute osseous abnormality. _______________ IMPRESSION: Unchanged with no active cardiopulmonary disease. Medications ordered:  
Medications  
heparin (porcine) 1,000 unit/mL injection 4,000 Units (not administered)  
heparin 25,000 units in D5W 250 ml infusion (not administered)  
nitroglycerin (NITROBID) 2 % ointment 0.5 Inch (0.5 Inches Topical Given 11/24/18 1549) morphine injection 2 mg (2 mg IntraVENous Given 11/24/18 1652) nitroglycerin (NITROBID) 2 % ointment 1 Inch (1 Inch Topical Given 11/24/18 1652) Medical Decision Making Initial Medical Decision Making and DDx: 
Concerning for acute coronary syndrome. Less likely pulmonary embolism, pneumonia, and pneumothorax. ED Course: Progress Notes, Reevaluation, and Consults: 
  
3:30 PM 
I discussed the patient's results with the patient and family. Troponin and EKG are negative. His pain has improved to a 3/10. His blood pressure will now tolerate Nitro. If it does not relieve his pain, will give Morphine. Will plan to get a 3-hour Troponin, consult cardiology, and admit.  
5:26 PM 
Patient is sleeping, but awakens easily. Pain is a 1/10. Will admit. 5:30 PM 
I spoke with Dr. Aury Brown (Hospitalist). We will start the patient on Heparin, and accepts the patient for admission. I am the first provider for this patient. I reviewed the vital signs, available nursing notes, past medical history, past surgical history, family history and social history. Vital Signs-Reviewed the patient's vital signs. Pulse Oximetry Analysis - 100% on Room Air Cardiac Monitor: 
Rate/Rhythm:  65bpm/Normal Sinus Rhythm EKG: Interpreted by the EP. Time Interpreted: 2:11PM 
 Rate: 66bpm 
 Rhythm: Normal Sinus Rhythm Interpretation: Left bundle branch block Comparison: When compared with EKG of 4/14/18, no significant change was found. Repeat-EKG: Interpreted by the EP. Time Interpreted: 4:48PM 
 Rate: 70bpm 
 Rhythm: Normal Sinus Rhythm Interpretation: No acute process Comparison: When compared with EKG of 11/24/18, no significant change was found. Records Reviewed: Nursing Notes, Old Medical Records and Previous electrocardiograms (Time of Review: 2:09 PM) Critical Care Time: The services I provided to this patient were to treat and/or prevent clinically significant deterioration that could result in the failure of one or more body systems and/or organ systems due to treatment of unstable angina. Services included the following: 
-reviewing nursing notes and old charts 
-vital sign assessments 
-direct patient care 
-medication orders and management 
-interpreting and reviewing diagnostic studies/labs 
-re-evaluations 
-documentation time Aggregate critical care time was 32 minutes, which includes only time during which I was engaged in work directly related to the patient's care as described above, whether I was at bedside or elsewhere in the Emergency Department. It did not include time spent performing other reported procedures or the services of residents, students, nurses, or advance practice providers. Kari Roblero MD 
 
5:31 PM 
 
 
For Hospitalized Patients: 
 
2. Aspirin: Aspirin was given by EMS en route to the ED. Diagnosis Clinical Impression: 1. Unstable angina (HCC) Disposition: Admission Follow-up Information None Medication List  
  
ASK your doctor about these medications   
amLODIPine 10 mg tablet Commonly known as:  Joy [place] Take 1 Tab by mouth daily. aspirin 325 mg tablet Commonly known as:  ASPIRIN Take 1 Tab by mouth daily. atorvastatin 20 mg tablet Commonly known as:  LIPITOR Take 1 Tab by mouth daily. clopidogrel 75 mg Tab Commonly known as:  PLAVIX TAKE 1 TABLET BY MOUTH DAILY 
  
donepezil 10 mg tablet Commonly known as:  ARICEPT 
TAKE 2 TABLETS BY MOUTH EVERY MORNING AFTER A MEAL 
  
NITROSTAT SL 
  
omega-3 fatty acids-vitamin e 1,000 mg Cap Commonly known as:  FISH OIL Take 1 Cap by mouth two (2) times a day. tamsulosin 0.4 mg capsule Commonly known as:  FLOMAX Take 1 Cap by mouth nightly. 
  
  
 
_______________________________ Attestations: 
Scribe Attestation [facility] acting as a scribe for and in the presence of Kari Roblero MD     
November 24, 2018 at 2:09 PM 
    
Provider Attestation:     
I personally performed the services described in the documentation, reviewed the documentation, as recorded by the scribe in my presence, and it accurately and completely records my words and actions. November 24, 2018 at 2:09 PM - Kari Roblero MD   
____________________________ 
___

## 2018-11-25 PROBLEM — I67.9 CEREBROVASCULAR DISEASE: Status: ACTIVE | Noted: 2018-11-25

## 2018-11-25 PROBLEM — Z95.1 S/P CABG X 4: Status: ACTIVE | Noted: 2018-11-25

## 2018-11-25 PROBLEM — I21.4 NSTEMI (NON-ST ELEVATED MYOCARDIAL INFARCTION) (HCC): Status: ACTIVE | Noted: 2018-11-25

## 2018-11-25 LAB
ANION GAP SERPL CALC-SCNC: 7 MMOL/L (ref 3–18)
APTT PPP: 73.8 SEC (ref 23–36.4)
APTT PPP: 93.7 SEC (ref 23–36.4)
ATRIAL RATE: 66 BPM
ATRIAL RATE: 70 BPM
BASOPHILS # BLD: 0 K/UL (ref 0–0.1)
BASOPHILS NFR BLD: 0 % (ref 0–2)
BUN SERPL-MCNC: 30 MG/DL (ref 7–18)
BUN/CREAT SERPL: 18 (ref 12–20)
CALCIUM SERPL-MCNC: 9.2 MG/DL (ref 8.5–10.1)
CALCULATED P AXIS, ECG09: 90 DEGREES
CALCULATED R AXIS, ECG10: -21 DEGREES
CALCULATED R AXIS, ECG10: -28 DEGREES
CALCULATED T AXIS, ECG11: 102 DEGREES
CALCULATED T AXIS, ECG11: 78 DEGREES
CHLORIDE SERPL-SCNC: 106 MMOL/L (ref 100–108)
CK MB CFR SERPL CALC: 10.2 % (ref 0–4)
CK MB CFR SERPL CALC: 13.5 % (ref 0–4)
CK MB CFR SERPL CALC: 15.6 % (ref 0–4)
CK MB SERPL-MCNC: 136.7 NG/ML (ref 5–25)
CK MB SERPL-MCNC: 231.2 NG/ML (ref 5–25)
CK MB SERPL-MCNC: 255.6 NG/ML (ref 5–25)
CK SERPL-CCNC: 1888 U/L (ref 39–308)
CK SERPL-CCNC: 2271 U/L (ref 39–308)
CK SERPL-CCNC: 879 U/L (ref 39–308)
CO2 SERPL-SCNC: 26 MMOL/L (ref 21–32)
CREAT SERPL-MCNC: 1.68 MG/DL (ref 0.6–1.3)
DIAGNOSIS, 93000: NORMAL
DIAGNOSIS, 93000: NORMAL
DIFFERENTIAL METHOD BLD: ABNORMAL
EOSINOPHIL # BLD: 0.2 K/UL (ref 0–0.4)
EOSINOPHIL NFR BLD: 2 % (ref 0–5)
ERYTHROCYTE [DISTWIDTH] IN BLOOD BY AUTOMATED COUNT: 15.2 % (ref 11.6–14.5)
GLUCOSE SERPL-MCNC: 109 MG/DL (ref 74–99)
HCT VFR BLD AUTO: 36.1 % (ref 36–48)
HGB BLD-MCNC: 12.2 G/DL (ref 13–16)
LYMPHOCYTES # BLD: 2.1 K/UL (ref 0.9–3.6)
LYMPHOCYTES NFR BLD: 15 % (ref 21–52)
MCH RBC QN AUTO: 32.7 PG (ref 24–34)
MCHC RBC AUTO-ENTMCNC: 33.8 G/DL (ref 31–37)
MCV RBC AUTO: 96.8 FL (ref 74–97)
MONOCYTES # BLD: 1.1 K/UL (ref 0.05–1.2)
MONOCYTES NFR BLD: 8 % (ref 3–10)
NEUTS SEG # BLD: 10.4 K/UL (ref 1.8–8)
NEUTS SEG NFR BLD: 75 % (ref 40–73)
P-R INTERVAL, ECG05: 156 MS
P-R INTERVAL, ECG05: 184 MS
PLATELET # BLD AUTO: 248 K/UL (ref 135–420)
PMV BLD AUTO: 11.4 FL (ref 9.2–11.8)
POTASSIUM SERPL-SCNC: 4.1 MMOL/L (ref 3.5–5.5)
Q-T INTERVAL, ECG07: 424 MS
Q-T INTERVAL, ECG07: 450 MS
QRS DURATION, ECG06: 116 MS
QRS DURATION, ECG06: 118 MS
QTC CALCULATION (BEZET), ECG08: 457 MS
QTC CALCULATION (BEZET), ECG08: 471 MS
RBC # BLD AUTO: 3.73 M/UL (ref 4.7–5.5)
SODIUM SERPL-SCNC: 139 MMOL/L (ref 136–145)
TROPONIN I SERPL-MCNC: 13.2 NG/ML (ref 0–0.04)
TROPONIN I SERPL-MCNC: 65.5 NG/ML (ref 0–0.04)
TROPONIN I SERPL-MCNC: 71.8 NG/ML (ref 0–0.04)
VENTRICULAR RATE, ECG03: 66 BPM
VENTRICULAR RATE, ECG03: 70 BPM
WBC # BLD AUTO: 13.8 K/UL (ref 4.6–13.2)

## 2018-11-25 PROCEDURE — 36415 COLL VENOUS BLD VENIPUNCTURE: CPT

## 2018-11-25 PROCEDURE — 77030010547 HC BG URIN W/UMETER -A

## 2018-11-25 PROCEDURE — 85730 THROMBOPLASTIN TIME PARTIAL: CPT

## 2018-11-25 PROCEDURE — 82553 CREATINE MB FRACTION: CPT

## 2018-11-25 PROCEDURE — 85025 COMPLETE CBC W/AUTO DIFF WBC: CPT

## 2018-11-25 PROCEDURE — 74011250636 HC RX REV CODE- 250/636: Performed by: EMERGENCY MEDICINE

## 2018-11-25 PROCEDURE — 74011250637 HC RX REV CODE- 250/637: Performed by: HOSPITALIST

## 2018-11-25 PROCEDURE — 80048 BASIC METABOLIC PNL TOTAL CA: CPT

## 2018-11-25 PROCEDURE — 65660000001 HC RM ICU INTERMED STEPDOWN

## 2018-11-25 PROCEDURE — 77030037878 HC DRSG MEPILEX >48IN BORD MOLN -B

## 2018-11-25 RX ADMIN — ATORVASTATIN CALCIUM 20 MG: 20 TABLET, FILM COATED ORAL at 08:25

## 2018-11-25 RX ADMIN — DONEPEZIL HYDROCHLORIDE 10 MG: 5 TABLET, FILM COATED ORAL at 08:25

## 2018-11-25 RX ADMIN — AMLODIPINE BESYLATE 10 MG: 10 TABLET ORAL at 08:25

## 2018-11-25 RX ADMIN — HEPARIN SODIUM AND DEXTROSE 10 UNITS/KG/HR: 10000; 5 INJECTION INTRAVENOUS at 16:56

## 2018-11-25 RX ADMIN — ASPIRIN 325 MG ORAL TABLET 325 MG: 325 PILL ORAL at 08:25

## 2018-11-25 RX ADMIN — CLOPIDOGREL BISULFATE 75 MG: 75 TABLET ORAL at 08:25

## 2018-11-25 RX ADMIN — TAMSULOSIN HYDROCHLORIDE 0.4 MG: 0.4 CAPSULE ORAL at 22:44

## 2018-11-25 NOTE — PROGRESS NOTES
Problem: Falls - Risk of 
Goal: *Absence of Falls Document [place] Fall Risk and appropriate interventions in the flowsheet. Outcome: Progressing Towards Goal 
Fall Risk Interventions: 
 side rails up x3, exit alarm activated, bed locked and in lowest position, call bell and bedside table within reach, personal belongings within reach, gripper socks

## 2018-11-25 NOTE — CONSULTS
CARDIOLOGY CONSULT Patient: Niesha Valenzuela MR #: [id number] Reason for consult: Chest pain, elevated cardiac enzymes Assessment/Plan:  
 
Hospital Problems  Date Reviewed: 10/2/2018 Codes Class Noted POA Cerebrovascular disease ICD-10-CM: I67.9 ICD-9-CM: 437.9  11/25/2018 Yes NSTEMI (non-ST elevated myocardial infarction) ([address] inferior ST elevation consistent with injury. Troponin 65.5 0 this morning. Patient is chest pain-free and hemodynamically stable at present. In great likelihood, he has completed his infarct. Discussed conservative versus invasive procedure with the patient. We will continue present cardiac Rx and follow closely. Plan to continue heparin for 48 hours. ICD-10-CM: I21.4 ICD-9-CM: 410.70  11/25/2018 Unknown S/P CABG x 4 ICD-10-CM: Z95.1 ICD-9-CM: V45.81  11/25/2018 Unknown Overview Signed 11/25/2018 10:41 AM by Armando Haider MD  
  2007, [place], [place] * (Principal) Chest pain ICD-10-CM: R07.9 ICD-9-CM: 786.50  11/24/2018 Unknown Hypotension, in initial presentation. ICD-10-CM: I95.9 ICD-9-CM: 458.9  11/24/2018 Unknown Atherosclerosis of native artery of left lower extremity with intermittent claudication (HCC) ICD-10-CM: I88.112 ICD-9-CM: 440.21  7/9/2018 Yes Vascular dementia without behavioral disturbance ICD-10-CM: F01.50 ICD-9-CM: 290.40  3/8/2017 Yes  
   
 AAA (abdominal aortic aneurysm) without rupture (HCC) ICD-10-CM: I71.4 ICD-9-CM: 441.4  1/4/2017 Yes S/P AVR, will order echocardiogram ICD-10-CM: Z95.2 ICD-9-CM: V43.3  4/13/2016 Yes Overview Addendum 2/26/2017  9:32 AM by Armando Haider MD  
  [facility] bioprost[facility] Transvalvular velocity 2.8 m/s, mean gradient 15 mmHg on 12/31/2016 Hypertension ICD-10-CM: I10 
ICD-9-CM: 401.9  3/27/2016 Yes CKD (chronic kidney disease), stage III (HCC) ICD-10-CM: N18.3 ICD-9-CM: 585.3  3/27/2016 Yes History of Present Illness Belenda Siemens is a [de-identified] y.o. man that was admitted on [id number] with chest pain. The patient has a history of known coronary artery disease and had four-vessel bypass in 2005 in [place]. Additionally, the patient had aortic valve replacement in [place] in 2011. Yesterday about midday, the patient developed a \"heavy pain \"in his chest.  He took 2 sublingual nitroglycerin and sat in his chair. The severe pain lasted about 15 minutes. The discomfort went down to the 2 or 3/10 in intensity for several hours. He is somewhat vague in that regard. He had no associated diaphoresis or nausea but did have some mild shortness of breath. There is no radiation of the discomfort. He has no chest pain at present. Serum troponin was initially mildly elevated but became very elevated on this morning's determination at 65.50. Electrocardiogram shows some ST elevation in the inferior and to a lesser extent anterolateral leads consistent with inferior lateral injury. Cardiac risk factors are as listed below. Past Medical History Past Medical History:  
Diagnosis Date  Advance directive in chart 6/13/2016  CAD (coronary artery disease)  Cancer [facility]) 2003 Colon  CKD (chronic kidney disease), stage III ([address] 3/27/2016  CVA (cerebral vascular accident) ([address] 3/26/2016  Femoral artery aneurysm, left ([address]  Heart attack [facility]) 2008  Heart attack ([address]  Hernia  History of TIAs  Hyperlipidemia  Hypertension 3/27/2016  Iliac artery aneurysm, left ([address]  Pure hypercholesterolemia 9/14/2012  PVD (peripheral vascular disease) ([address] 3/27/2016  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 ([address] 3/26/2016 Past Surgical History Social History Socioeconomic History  Marital status:  Spouse name: Not on file  Number of children: Not on file  Years of education: Not on file  Highest education level: Not on file Social Needs  Financial resource strain: Not on file  Food insecurity - worry: Not on file  Food insecurity - inability: Not on file  Transportation needs - medical: Not on file  Transportation needs - non-medical: Not on file Occupational History  Not on file Tobacco Use  Smoking status: Former Smoker Last attempt to quit: 1987 Years since quittin.2  Smokeless tobacco: Never Used Substance and Sexual Activity  Alcohol use: No  
  Comment: wine 1-2x per week  Drug use: No  
 Sexual activity: Not Currently Other Topics Concern  Not on file Social History Narrative  Not on file Meds Current Facility-Administered Medications Medication Dose Route Frequency  heparin 25,000 units in D5W 250 ml infusion  10-25 Units/kg/hr IntraVENous TITRATE  amLODIPine (NORVASC) tablet 10 mg  10 mg Oral DAILY  aspirin tablet 325 mg  325 mg Oral DAILY  atorvastatin (LIPITOR) tablet 20 mg  20 mg Oral DAILY  clopidogrel (PLAVIX) tablet 75 mg  75 mg Oral DAILY  donepezil (ARICEPT) tablet 10 mg  10 mg Oral DAILY  tamsulosin (FLOMAX) capsule 0.4 mg  0.4 mg Oral QHS Allergies Allergies Allergen Reactions  Ace Inhibitors Other (comments) Per pt, Cardiologist states he cannot have ACE inhibitors Social History Social History Socioeconomic History  Marital status:  Spouse name: Not on file  Number of children: Not on file  Years of education: Not on file  Highest education level: Not on file Social Needs  Financial resource strain: Not on file  Food insecurity - worry: Not on file  Food insecurity - inability: Not on file  Transportation needs - medical: Not on file  Transportation needs - non-medical: Not on file Occupational History  Not on file Tobacco Use  Smoking status: Former Smoker Last attempt to quit: 1987 Years since quittin.2  Smokeless tobacco: Never Used Substance and Sexual Activity  Alcohol use: No  
  Comment: wine 1-2x per week  Drug use: No  
 Sexual activity: Not Currently Other Topics Concern  Not on file Social History Narrative  Not on file Family History Family History Problem Relation Age of Onset  Hypertension Mother  Hypertension Father  Heart Disease Father  Heart Disease Brother Review of systems General: No fever, chills. HEENT: Denies frequent headaches, cataracts, sinus issues. Pulmonary: Denies cough, wheezing. Cardiac: See HPI 
GI: No nausea, vomiting, frequent diarrhea or constipation. : Denies dysuria, hematuria, nocturia. Neuro: Positive for history of stroke. Alicia Morton Musculoskeletal: Positive for right BKA. Heme: Denies easy bruising or bleeding. Psych: Denies history of depression or anxiety issues. Physical Exam 
Visit Vitals /68 Pulse 61 Temp 98.2 °F (36.8 °C) Resp 15 Ht 5' 8\" (1.727 m) Wt 220 lb (99.8 kg) SpO2 97% BMI 33.45 kg/m² Darwin singh who is alert, oriented and in no acute distress. Head is normocephalic and atraumatic. Trachea appears midline with no noted thyromegaly. Carotids are full without definite bruits. There is no JVD. Chest is clear to auscultation bilaterally. Cardiac auscultation reveals regular rate and rhythm without significant murmur. Abdomen is soft and nontender. LLE has 2+ edema. Status post right  BKA. Dorsalis pedis and posterior tibial pulses are not palpable on left. . Skin is warm and dry. Diagnostic Tests EK. Sinus rhythm. PACs. Inferolateral  ST elevation Labs:  
Recent Labs  
  18 
0600 18 
1436 WBC 13.8* 11.8 HGB 12.2* 12.8* HCT 36.1 36.7  245 Recent Labs  
  18 
0600 18 
1436  141  
K 4.1 4.2  107 CO2 26 27 * 134* BUN 30* 36* CREA 1.68* 1.98* CA 9.2 9.2 ALB  --  3.8 SGOT  --  18 ALT  --  32 Recent Labs  
  11/25/18 
0600 11/24/18 
2300 11/24/18 
1436 TROIQ 65.50* 13.20* <0.02  
CPK 1,888* 879*  --   
CKMB 255.6* 136.7*  --   
 
Last Lipid:   
Lab Results Component Value Date/Time Cholesterol, total 129 04/14/2018 09:27 AM  
 HDL Cholesterol 38 (L) 04/14/2018 09:27 AM  
 LDL, calculated 49.6 04/14/2018 09:27 AM  
 Triglyceride 207 (H) 04/14/2018 09:27 AM  
 CHOL/HDL Ratio 3.4 04/14/2018 09:27 AM  
 
 
 
 
We appreciate the opportunity to see Lili Cox with you. We will follow along. Micheal Cardona MD 
11/25/2018

## 2018-11-25 NOTE — PROGRESS NOTES
Physical Exam  
Skin: Skin is warm, dry and intact. Bedside transfer report was given to me, Efraín Rivers RN (ICU nurse), by Patti Walker RN (ED nurse). Report included the following information:  SBAR, kardex, consultations, hemodynamic monitoring, intake/output, MAR, lab results, VS trends, cardiac rhythm noted SR w/ frequent PAC's and PVC's, LBBB. Skin, neuro, respiratory status, and order verification have been dually noted and verified at the bedside with: Danielle Valenzuela, RESHMA                                               
                      
ICU Nurse's Note: 
2145: Received care of pt via stretcher transfer from ED in stable condition. Pt is alert x 3. 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 Pt requires assist with all ADLs and repositioning. Neurological: as noted in assessment Cardiovascular:  SR w/ frequent PAC's and PVC's, LBBB on the monitor. Pulmonary: breath sounds clear, diminished, saturations maintained at 100% on room air GI/: Abdomen is soft, non-distended, active bowel sounds. Last BM noted prior to admission on 11/23/2018. External condom catheter intact with clear yellow urine output. IVF/Critical gtts: IV Heparin gtt Skin: as noted above 
 
0000: VSS, pt is afebrile. Current PTT resulted 93.7, this is therapeutic so there will be no change in the current heparin gtt rate, continue to infuse at 10 units/kg/hr=10 ml/hr. Next PTT due at 0600 on 11/25/2018. Dr. Osmar Denis was also paged and notified re: recent cardiac enzymes results. Most notably is the elevated troponin result 13.20. Pt is currently asymptomatic and continues to deny c/o CP or any other discomfort. Dr. Osmar Denis is aware of these lab results, and has not prescribed any new orders or changes. Interventions are ongoing, will continue to monitor 0400: No reportable changes noted. VSS, pt remains afebrile 97.9. Condom cath has fallen off. It was replaced with a size small, pt was bathed and tolerated another complete bed bath and linen change. Pt is now resting quietly in bed, call bell and bedside table are within reach, 
0600: Scheduled AM labs drawn at this time. Current PTT resulted 73.8, this is therapeutic so there will be no change in the current heparin gtt rate, continue to infuse heparin gtt at 10 units/kg/hr=10 ml/hr. Next PTT due at 0400 on 11/26/2018. Pt is awake, reading a book. Bed is locked and in lowest position. Call bell and bedside table are within reach, bed exit alarm activated. Interventions are ongoing, will continue to monitor.  
0710: Bedside change of shift report given to: Jonathan Noriega RN

## 2018-11-25 NOTE — ED NOTES
Informed Dr. Benji Elizabeth that pt BP was 89/50 despite liter of fluid and laying pt flat. Dr. Benji Elizabeth ordered nitropaste patch to be removed. Will continue to monitor pt.

## 2018-11-25 NOTE — PROGRESS NOTES
Patient admitting with chest pressure. Known severe vascular disorders. CABG x 4 vessels as well as 3 TIAs and heart valve replacement. Chest pressure in ER. Going to stepdown because of low BP in ER after NTG Cardiac enzymes reassuring thusfar H+P coming

## 2018-11-25 NOTE — H&P
History and Physical 
 
Patient: Krystal Olson               Sex: male          DOA: 2018 YOB: 1940      Age:  [de-identified] y.o.        LOS:  LOS: 1 day HPI:  
 
Krystal Olson is a [de-identified] y.o. male who has a known history of CAD and Cerebrovascular disease. He has previous 4 vessel CABG and heart valve replacement and he sees Dr Violette Garcia regularly. He also has had three TIA's. He presented to the ER with chest pressure. It felt at [facility] a sledge hammer pushing down on me. \"  He does not have SOB. No light headedness or dizziness. He was started on Nitroglycerin in the ER and his chest pressure resolved. In the ER he subsequently developed hypotension. His chest pain remains improved. His initial troponin is normal and he does not have EKG changes. He will be admitted to the [facility] unit for ongoing management. Past Medical History:  
Diagnosis Date  Advance directive in chart 2016  CAD (coronary artery disease)  Cancer [facility])  Colon  CKD (chronic kidney disease), stage III ([facility] 3/27/2016  CVA (cerebral vascular accident) ([facility] 3/26/2016  Femoral artery aneurysm, left ([address]  Heart attack [facility])   Heart attack ([facility]  Hernia  History of TIAs  Hyperlipidemia  Hypertension 3/27/2016  Iliac artery aneurysm, left ([facility]  Pure hypercholesterolemia 2012  PVD (peripheral vascular disease) ([facility] 3/27/2016  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 
 Tobacco use:  Patient does not smoke Alcohol use:  Patient does not use alcohol Occupation:  Patient is retired. He lives with his wife Family History: Mother  in her 66's with CAD Father  in his 52's with CAD Review of Systems Constitutional:  No fever or weight loss HEENT:  No headache or visual changes Cardiovascular:  Chest pressure as above, improved with NTG, no diaphoresis Respiratory:  No coughing, wheezing, or shortness of breath. GI:  No nausea or vomitting. No diarrhea :  No hematuria or dysuria Skin:  No rashes or moles Neuro:  No seizures or syncope Hematological:  No bruising or bleeding Endocrine:  No diabetes or thyroid disease Physical Exam:  
  
Visit Vitals BP 94/74 (BP 1 Location: Left arm, BP Patient Position: At rest) Pulse 62 Temp 97.9 °F (36.6 °C) Resp 13 Ht 5' 8\" (1.727 m) Wt 99.8 kg (220 lb) SpO2 97% BMI 33.45 kg/m² Physical Exam: 
 
Gen:  No distress, alert HEENT:  Normal cephalic atraumatic, extra-occular movements are intact. Neck:  Supple, No JVD Lungs:  Clear bilaterally, no wheeze, no rales, normal effort Heart:  Regular Rate and Rhythm, normal S1 and S2, no edema Abdomen:  Soft, non tender, normal bowel sounds, no guarding. Extremities:  Well perfused, no cyanosis or edema Neurological:  Awake and alert, CN's are intact, normal strength throughout extremities Skin:  No rashes or moles Mental Status:  Normal thought process, does not appear anxious Laboratory Studies: All lab results for the last 24 hours reviewed. Assessment/Plan Principal Problem: 
  Chest pain (11/24/2018) Active Problems: S/P CABG (coronary artery bypass graft) (4/13/2016) Overview:  [facility] in [place], [place], [address] Cerebrovascular disease (11/25/2018) Hypertension (3/27/2016) Atherosclerosis of native artery of left lower extremity with intermittent claudication ([address] (7/9/2018) Hypotension (11/24/2018) CKD (chronic kidney disease), stage III ([address] (3/27/2016) S/P AVR (4/13/2016) Overview: 2011 [place] porcine bioprostheses Transvalvular velocity 2.8 m/s, mean gradient 15 mmHg on 12/31/2016 AAA (abdominal aortic aneurysm) without rupture ([address] (1/4/2017) Vascular dementia without behavioral disturbance (3/8/2017) PLAN: 
 
 Follow serial cardiac enzymes NTG discontinued for low BP in the setting of HTN history. Will admit to [facility] for close management Heparin drip Follow renal function Cardiology consult Monitor mental status Discussed with family friend and obtained history from family friend.

## 2018-11-25 NOTE — PROGRESS NOTES
Progress Note Patient: Jorge Luis Carballo               Sex: male          DOA: 11/24/2018 YOB: 1940      Age:  [de-identified] y.o.        LOS:  LOS: 1 day CHIEF COMPLAINT: NSTEMI in the setting of CAD Subjective:  
 
Patient is awake and smiling Talkative. No chest pain No SOB Objective:  
  
Visit Vitals /65 Pulse 64 Temp 98.2 °F (36.8 °C) Resp 15 Ht 5' 8\" (1.727 m) Wt 99.8 kg (220 lb) SpO2 94% BMI 33.45 kg/m² Physical Exam: 
Gen:  Patient is in no distress. No complaint HEENT and Neck:  PERRL, No cervical tenderness or masses Lungs:  Clear bilaterally. No rales, no wheeze. Normal effort. Heart:  Regular Rate and Rhythm. No murmur or gallop. No JVD. No edema. Abdomen:  Soft, non tender, no masses, no Hepatosplenomegally Extremities:  No digital clubbing, no cyanosis Neuro:  Alert and oriented, Normal affect, Cranial nerves intact, No sensory deficits Lab/Data Reviewed: 
BMP:  
Lab Results Component Value Date/Time  11/25/2018 06:00 AM  
 K 4.1 11/25/2018 06:00 AM  
  11/25/2018 06:00 AM  
 CO2 26 11/25/2018 06:00 AM  
 AGAP 7 11/25/2018 06:00 AM  
  (H) 11/25/2018 06:00 AM  
 BUN 30 (H) 11/25/2018 06:00 AM  
 CREA 1.68 (H) 11/25/2018 06:00 AM  
 GFRAA 48 (L) 11/25/2018 06:00 AM  
 GFRNA 40 (L) 11/25/2018 06:00 AM  
 
CBC:  
Lab Results Component Value Date/Time WBC 13.8 (H) 11/25/2018 06:00 AM  
 HGB 12.2 (L) 11/25/2018 06:00 AM  
 HCT 36.1 11/25/2018 06:00 AM  
  11/25/2018 06:00 AM  
 
 
 
 
Assessment/Plan Principal Problem: 
  Chest pain (11/24/2018) Active Problems: 
  Cerebrovascular disease (11/25/2018) Hypertension (3/27/2016) Atherosclerosis of native artery of left lower extremity with intermittent claudication ([address] (7/9/2018) Hypotension (11/24/2018) CKD (chronic kidney disease), stage III ([facility] (3/27/2016) S/P AVR (4/13/2016) Overview: 2011 [place], [place] porcine bioprostheses Transvalvular velocity 2.8 m/s, mean gradient 15 mmHg on 12/31/2016 AAA (abdominal aortic aneurysm) without rupture ([facility] (1/4/2017) Vascular dementia without behavioral disturbance (3/8/2017) NSTEMI (non-ST elevated myocardial infarction) ([address] (11/25/2018) S/P CABG x 4 (11/25/2018) [place] [address] [place] Plan: 
Discussed with Cardiology, Troponin peaked over 60 Monitor, and continue on heparin drip BP control Follow kidney function Continue monitoring in stepdown Discussed with patient, wife and family friend at the bedside.

## 2018-11-25 NOTE — ED NOTES
TRANSFER - ED to INPATIENT REPORT: 
 
SBAR report made available to receiving floor on this patient being transferred to  [phone] /[phone])  for routine progression of care Admitting diagnosis Chest pain CAD (coronary artery disease) Chest pain Hypotension Information from the following report(s) SBAR, ED Summary, Procedure Summary, MAR, Recent Results, Med Rec Status and Cardiac Rhythm Sinus rhythm with incomplete left bundle branch block and PACs was made available to receiving floor. Lines:  
Peripheral IV 11/24/18 Right Antecubital (Active) Site Assessment Clean, dry, & intact 11/24/2018  2:59 PM  
Phlebitis Assessment 0 11/24/2018  2:59 PM  
Infiltration Assessment 0 11/24/2018  2:59 PM  
Dressing Status Clean, dry, & intact 11/24/2018  2:59 PM  
Dressing Type Transparent 11/24/2018  2:59 PM  
Hub Color/Line Status Pink;Flushed;Patent 11/24/2018  2:59 PM  
  
 
Medication list confirmed with patient Opportunity for questions and clarification was provided. Patient is oriented to time, place, person and situation High alert med Patient is  continent and ambulatory with assist 
  
Valuables transported with patient Patient transported with: 
 Monitor Registered Nurse JESUS Vitals:  
 11/24/18 1930 11/24/18 1936 11/24/18 1940 11/24/18 1950 BP: (!) 88/35 120/54 122/63 118/49 Pulse: 90 87 81 73 Resp: 22 20 15 14 Temp:      
SpO2: 96% 94% 94% 93% Weight:      
Height:

## 2018-11-25 NOTE — PROGRESS NOTES
Reason for Admission:   Chest pain RRAT Score:  23 Resources/supports as identified by patient/family:  Divya Luke spouse [phone], Lidia Velez [phone] Top Challenges facing patient (as identified by patient/family and CM): Finances/Medication cost?   [employer] complete Transportation?  family Support system or lack thereof? See above Living arrangements? [place] with spouse Self-care/ADLs/Cognition? AAO x3 Current Advanced Directive/Advance Care Plan: On file Plan for utilizing home health:    As indicated Likelihood of readmission:  mod Transition of Care Plan:  Home vs hh    
Spoke with patient in room, He stated that he lives with his wife and son in the son's home. Patient stated that he used a walker, power scooter and cane at home and was mostly independent with ADL's. HX right BKA with prothesis  Patient has designated spouse and Son to participate in his/her discharge plan and to receive any needed information. Divya Luke [phone] and Lidia Velez [phone]. He plans to return home at this time with family. He verified his demographics, insurance and PCP as correct on the facesheet. Care Management Interventions PCP Verified by CM: Yes 
Palliative Care Criteria Met (RRAT>21 & CHF Dx)?: No 
Mode of Transport at Discharge: Other (see comment)(spouse) Transition of Care Consult (CM Consult): Discharge Planning MyChart Signup: No 
Discharge Durable Medical Equipment: No 
Health Maintenance Reviewed: Yes Physical Therapy Consult: No 
Occupational Therapy Consult: No 
Speech Therapy Consult: No 
Current Support Network: Lives with Spouse Confirm Follow Up Transport: Family Plan discussed with Pt/Family/Caregiver: Yes [employer] Information Provided?: No 
Discharge Location Discharge Placement: Other:(home vs )

## 2018-11-25 NOTE — PROGRESS NOTES
12 - Received pt at bedside from [facility], dual skin assessment performed and double RN verified, pt resting in bed quietly, VSS on RA and Heparin gtt, no apparent signs of distress at this time, will continue to monitor 
 
0900 - Eating breakfast, tolerating well, no s/s of distress 1230 - Sitting up in bed, eating lunch, VSS on RA and Heparin gtt, family present at bedside 1400 - Resting quietly in bed, VSS on RA 
 
1800 - VSS on RA, resting in bed, watching television, no s/s of distress 1925 - Bedside and Verbal shift change report given to [address] [place] [address] (oncoming nurse) by Bobby Armenta (offgoing nurse). Report included the following information SBAR, Kardex, Intake/Output, MAR, Recent Results and Cardiac Rhythm NSR.

## 2018-11-25 NOTE — PROGRESS NOTES
Problem: Falls - Risk of 
Goal: *Absence of Falls Document Marc Levels Fall Risk and appropriate interventions in the flowsheet. Outcome: Progressing Towards Goal 
Fall Risk Interventions: 
Mobility Interventions: Bed/chair exit alarm, Assess mobility with egress test, Patient to call before getting OOB, Strengthening exercises (ROM-active/passive), Utilize walker, cane, or other assistive device Medication Interventions: Bed/chair exit alarm, Evaluate medications/consider consulting pharmacy, Patient to call before getting OOB Elimination Interventions: Bed/chair exit alarm, Call light in reach, Patient to call for help with toileting needs, Toilet paper/wipes in reach, Toileting schedule/hourly rounds, Urinal in reach Problem: Pressure Injury - Risk of 
Goal: *Prevention of pressure injury Document Sylvester Scale and appropriate interventions in the flowsheet. Outcome: Progressing Towards Goal 
Pressure Injury Interventions: 
Sensory Interventions: Assess changes in LOC, Assess need for specialty bed, Avoid rigorous massage over bony prominences, Keep linens dry and wrinkle-free, Float heels, Minimize linen layers, Monitor skin under medical devices, Pad between skin to skin, Pressure redistribution bed/mattress (bed type), Turn and reposition approx. every two hours (pillows and wedges if needed) Moisture Interventions: Apply protective barrier, creams and emollients, Assess need for specialty bed, Internal/External urinary devices, Minimize layers, Moisture barrier, Offer toileting Q_hr, Maintain skin hydration (lotion/cream) Activity Interventions: Pressure redistribution bed/mattress(bed type) Mobility Interventions: Float heels, HOB 30 degrees or less, Pressure redistribution bed/mattress (bed type), Turn and reposition approx. every two hours(pillow and wedges) Nutrition Interventions: Document food/fluid/supplement intake, Discuss nutritional consult with provider, Offer support with meals,snacks and hydration Friction and Shear Interventions: Apply protective barrier, creams and emollients, HOB 30 degrees or less, Minimize layers, Lift team/patient mobility team, Transfer aides:transfer board/Deo lift/ceiling lift, Transferring/repositioning devices

## 2018-11-26 ENCOUNTER — APPOINTMENT (OUTPATIENT)
Dept: NON INVASIVE DIAGNOSTICS | Age: 78
DRG: 282 | End: 2018-11-26
Attending: PHYSICIAN ASSISTANT
Payer: MEDICARE

## 2018-11-26 PROBLEM — I21.9 ACUTE MI (HCC): Status: ACTIVE | Noted: 2018-11-25

## 2018-11-26 LAB
ANION GAP SERPL CALC-SCNC: 10 MMOL/L (ref 3–18)
APTT PPP: 51.4 SEC (ref 23–36.4)
APTT PPP: 84.2 SEC (ref 23–36.4)
BASOPHILS # BLD: 0.1 K/UL (ref 0–0.1)
BASOPHILS NFR BLD: 1 % (ref 0–2)
BUN SERPL-MCNC: 28 MG/DL (ref 7–18)
BUN/CREAT SERPL: 15 (ref 12–20)
CALCIUM SERPL-MCNC: 9.3 MG/DL (ref 8.5–10.1)
CHLORIDE SERPL-SCNC: 105 MMOL/L (ref 100–108)
CO2 SERPL-SCNC: 25 MMOL/L (ref 21–32)
CREAT SERPL-MCNC: 1.81 MG/DL (ref 0.6–1.3)
DIFFERENTIAL METHOD BLD: ABNORMAL
EOSINOPHIL # BLD: 0.5 K/UL (ref 0–0.4)
EOSINOPHIL NFR BLD: 4 % (ref 0–5)
ERYTHROCYTE [DISTWIDTH] IN BLOOD BY AUTOMATED COUNT: 15.3 % (ref 11.6–14.5)
GLUCOSE SERPL-MCNC: 99 MG/DL (ref 74–99)
HCT VFR BLD AUTO: 35.5 % (ref 36–48)
HGB BLD-MCNC: 12.3 G/DL (ref 13–16)
LYMPHOCYTES # BLD: 2.3 K/UL (ref 0.9–3.6)
LYMPHOCYTES NFR BLD: 18 % (ref 21–52)
MCH RBC QN AUTO: 33.6 PG (ref 24–34)
MCHC RBC AUTO-ENTMCNC: 34.6 G/DL (ref 31–37)
MCV RBC AUTO: 97 FL (ref 74–97)
MONOCYTES # BLD: 1.3 K/UL (ref 0.05–1.2)
MONOCYTES NFR BLD: 10 % (ref 3–10)
NEUTS SEG # BLD: 8.6 K/UL (ref 1.8–8)
NEUTS SEG NFR BLD: 67 % (ref 40–73)
PLATELET # BLD AUTO: 238 K/UL (ref 135–420)
PMV BLD AUTO: 11.6 FL (ref 9.2–11.8)
POTASSIUM SERPL-SCNC: 4.4 MMOL/L (ref 3.5–5.5)
RBC # BLD AUTO: 3.66 M/UL (ref 4.7–5.5)
SODIUM SERPL-SCNC: 140 MMOL/L (ref 136–145)
WBC # BLD AUTO: 12.7 K/UL (ref 4.6–13.2)

## 2018-11-26 PROCEDURE — 85730 THROMBOPLASTIN TIME PARTIAL: CPT

## 2018-11-26 PROCEDURE — 74011250636 HC RX REV CODE- 250/636: Performed by: HOSPITALIST

## 2018-11-26 PROCEDURE — 85025 COMPLETE CBC W/AUTO DIFF WBC: CPT

## 2018-11-26 PROCEDURE — 74011250636 HC RX REV CODE- 250/636: Performed by: PHYSICIAN ASSISTANT

## 2018-11-26 PROCEDURE — 80048 BASIC METABOLIC PNL TOTAL CA: CPT

## 2018-11-26 PROCEDURE — 93306 TTE W/DOPPLER COMPLETE: CPT

## 2018-11-26 PROCEDURE — 74011250637 HC RX REV CODE- 250/637: Performed by: HOSPITALIST

## 2018-11-26 PROCEDURE — 77030010545

## 2018-11-26 PROCEDURE — 65660000000 HC RM CCU STEPDOWN

## 2018-11-26 PROCEDURE — 36415 COLL VENOUS BLD VENIPUNCTURE: CPT

## 2018-11-26 RX ORDER — PANTOPRAZOLE SODIUM 40 MG/1
40 TABLET, DELAYED RELEASE ORAL
Status: DISCONTINUED | OUTPATIENT
Start: 2018-11-27 | End: 2018-11-26

## 2018-11-26 RX ORDER — HEPARIN SODIUM 1000 [USP'U]/ML
3000 INJECTION, SOLUTION INTRAVENOUS; SUBCUTANEOUS ONCE
Status: COMPLETED | OUTPATIENT
Start: 2018-11-26 | End: 2018-11-26

## 2018-11-26 RX ORDER — PANTOPRAZOLE SODIUM 40 MG/1
40 TABLET, DELAYED RELEASE ORAL
Status: DISCONTINUED | OUTPATIENT
Start: 2018-11-27 | End: 2018-11-28 | Stop reason: HOSPADM

## 2018-11-26 RX ORDER — CARVEDILOL 3.12 MG/1
3.12 TABLET ORAL 2 TIMES DAILY WITH MEALS
Status: DISCONTINUED | OUTPATIENT
Start: 2018-11-27 | End: 2018-11-28 | Stop reason: HOSPADM

## 2018-11-26 RX ORDER — GUAIFENESIN 100 MG/5ML
81 LIQUID (ML) ORAL DAILY
Status: DISCONTINUED | OUTPATIENT
Start: 2018-11-27 | End: 2018-11-28 | Stop reason: HOSPADM

## 2018-11-26 RX ORDER — HEPARIN SODIUM 1000 [USP'U]/ML
INJECTION, SOLUTION INTRAVENOUS; SUBCUTANEOUS
Status: DISPENSED
Start: 2018-11-26 | End: 2018-11-26

## 2018-11-26 RX ADMIN — AMLODIPINE BESYLATE 10 MG: 10 TABLET ORAL at 09:50

## 2018-11-26 RX ADMIN — DONEPEZIL HYDROCHLORIDE 10 MG: 5 TABLET, FILM COATED ORAL at 09:50

## 2018-11-26 RX ADMIN — ASPIRIN 325 MG ORAL TABLET 325 MG: 325 PILL ORAL at 09:50

## 2018-11-26 RX ADMIN — TAMSULOSIN HYDROCHLORIDE 0.4 MG: 0.4 CAPSULE ORAL at 21:16

## 2018-11-26 RX ADMIN — ATORVASTATIN CALCIUM 20 MG: 20 TABLET, FILM COATED ORAL at 09:50

## 2018-11-26 RX ADMIN — CLOPIDOGREL BISULFATE 75 MG: 75 TABLET ORAL at 09:51

## 2018-11-26 RX ADMIN — HEPARIN SODIUM 3000 UNITS: 1000 INJECTION INTRAVENOUS; SUBCUTANEOUS at 07:25

## 2018-11-26 RX ADMIN — HEPARIN SODIUM AND DEXTROSE 12 UNITS/KG/HR: 10000; 5 INJECTION INTRAVENOUS at 15:49

## 2018-11-26 NOTE — PROGRESS NOTES
[facility]  -  Progress Note Patient: Faye Stover MRN: [id number]  SSN: [id number] YOB: 1940  Age: [de-identified] y.o. Sex: male Admit Date: 11/24/2018 Patient seen and examined independently. Echo ordered. No CP or other complaints. Agree with start of BB as BP seems to be stable. Agree with assessment and plan as noted below. Casa Dennison MD 
Assessment:  
 
-Acute MI with inferior ST elevation c/w injury 11/24/2018, troponin up to 71.80 with CK-MB peaking at 255.6, on medical therapy with heparin gtts, ASA, Lipitor, Plavix, Amlodipine 
-Moderate aortic stenosis,  
-JULIA, Cr 1.98 --> 1.68 --> 1.81 (Cr 1.66 7/2/2018) -Hx AAA, s/p aorta bi iliac endograft 
-Hx PAD, s/p R AKA, s/p L SFA angioplasty 
-Hx left femoral artery aneurysm -HTN 
-DMII with diabetic nephropathy 
-Pure hypercholesterolemia Primary cardiologist is Dr. Korey Law Plan:  
 
-Will discontinue Amlodipine, start on low-dose BB tomorrow AM. 
-Will decrease dose of ASA to 81 mg starting tomorrow, d/c Heparin gtts tomorrow AM. 
-Pending echocardiogram today. Subjective: No new complaints. Feeling well, asking about when he can go home. Objective:  
  
Patient Vitals for the past 8 hrs: 
 Pulse Resp BP SpO2  
11/26/18 0700 60 8 107/62 95 % 11/26/18 0600 61 13 111/68 95 % 11/26/18 0500 60 15 106/67 94 % 11/26/18 0400 66 14 118/59 93 % 11/26/18 0300 64 16  95 % 11/26/18 0200 66 13 109/60 94 % Patient Vitals for the past 96 hrs: 
 Weight 11/24/18 1410 220 lb (99.8 kg) Intake/Output Summary (Last 24 hours) at 11/26/2018 0236 Last data filed at 11/26/2018 0200 Gross per 24 hour Intake 170 ml Output 1580 ml Net -1410 ml Physical Exam: 
General:  alert, cooperative, no distress, appears stated age Neck:  No JVD Lungs:  clear to auscultation bilaterally to anterolateral lung fields Heart:  Regular rate and rhythm Abdomen:  abdomen is soft without significant tenderness, masses, organomegaly or guarding Extremities:  Atraumatic, no edema Data Review:  
 
Labs: Results:  
   
Chemistry Recent Labs  
  11/26/18 0345 11/25/18 
0600 11/24/18 
1436 GLU 99 109* 134*  139 141  
K 4.4 4.1 4.2  106 107 CO2 25 26 27 BUN 28* 30* 36* CREA 1.81* 1.68* 1.98* CA 9.3 9.2 9.2 AGAP 10 7 7 BUCR 15 18 18 AP  --   --  116 TP  --   --  7.1 ALB  --   --  3.8 GLOB  --   --  3.3 AGRAT  --   --  1.2  
  
CBC w/Diff Recent Labs  
  11/26/18 0345 11/25/18 
0600 11/24/18 
1436 WBC 12.7 13.8* 11.8  
RBC 3.66* 3.73* 3.76* HGB 12.3* 12.2* 12.8* HCT 35.5* 36.1 36.7  248 245 GRANS 67 75* 62  
LYMPH 18* 15* 27 EOS 4 2 4 Cardiac Enzymes Lab Results Component Value Date/Time CPK 2,271 (H) 11/25/2018 10:15 AM  
 CKMB 231.2 (H) 11/25/2018 10:15 AM  
 CKND1 10.2 (H) 11/25/2018 10:15 AM  
 TROIQ 71.80 ([facility]) 11/25/2018 10:15 AM  
  
Coagulation Recent Labs  
  11/26/18 0345 11/25/18 
0600 APTT 51.4* 73.8* Lipid Panel Lab Results Component Value Date/Time Cholesterol, total 129 04/14/2018 09:27 AM  
 HDL Cholesterol 38 (L) 04/14/2018 09:27 AM  
 LDL, calculated 49.6 04/14/2018 09:27 AM  
 VLDL, calculated 41.4 04/14/2018 09:27 AM  
 Triglyceride 207 (H) 04/14/2018 09:27 AM  
 CHOL/HDL Ratio 3.4 04/14/2018 09:27 AM  
  
Liver Enzymes Recent Labs  
  11/24/18 
1436 TP 7.1 ALB 3.8  SGOT 18 Thyroid Studies Lab Results Component Value Date/Time  TSH 3.22 04/14/2018 09:27 AM  
 TSH 3.31 04/14/2018 09:27 AM

## 2018-11-26 NOTE — PROGRESS NOTES
Problem: Falls - Risk of 
Goal: *Absence of Falls Document Pennie Heart Fall Risk and appropriate interventions in the flowsheet. Outcome: Progressing Towards Goal 
Fall Risk Interventions: 
Mobility Interventions: Bed/chair exit alarm, Strengthening exercises (ROM-active/passive), Patient to call before getting OOB Medication Interventions: Evaluate medications/consider consulting pharmacy, Patient to call before getting OOB, Bed/chair exit alarm Elimination Interventions: Bed/chair exit alarm, Call light in reach, Patient to call for help with toileting needs, Toilet paper/wipes in reach, Toileting schedule/hourly rounds, Urinal in reach

## 2018-11-26 NOTE — PROGRESS NOTES
Problem: Pressure Injury - Risk of 
Goal: *Prevention of pressure injury Document Sylvester Scale and appropriate interventions in the flowsheet. Outcome: Progressing Towards Goal 
Pressure Injury Interventions: 
Sensory Interventions: Assess changes in LOC, Assess need for specialty bed, Avoid rigorous massage over bony prominences, Minimize linen layers, Monitor skin under medical devices, Maintain/enhance activity level, Keep linens dry and wrinkle-free, Turn and reposition approx. every two hours (pillows and wedges if needed) Moisture Interventions: Absorbent underpads, Minimize layers, Moisture barrier, Offer toileting Q_hr Activity Interventions: Pressure redistribution bed/mattress(bed type) Mobility Interventions: Float heels, HOB 30 degrees or less, PT/OT evaluation, Turn and reposition approx. every two hours(pillow and wedges) Nutrition Interventions: Document food/fluid/supplement intake, Discuss nutritional consult with provider, Offer support with meals,snacks and hydration Friction and Shear Interventions: Apply protective barrier, creams and emollients, Feet elevated on foot rest, Lift sheet, Lift team/patient mobility team, Minimize layers

## 2018-11-26 NOTE — ROUTINE PROCESS
1400 Bedside, Verbal and Written shift change report given to Travis (oncoming nurse) by Lul Nash RN (offgoing nurse). Report included the following information SBAR and Kardex. Patient currently resting in bed, alert and oriented to all spheres, denies pain or shortness of breath, call bell in reach, 5 Ps and hourly rounding completed, bed locked in low position Tele box #[id number] NSR, with PACs

## 2018-11-26 NOTE — DIABETES MGMT
Nutrition/Glycemic Control: SUBJECTIVE/OBJECTIVE: 
 Pt screened for nutritional status. He is 5'8\" 220 lbs  Ideal wt 154 lbs. Pt is overweight related to excess caloric intake as evidenced by 142% ideal weight and BMI= 33.5kg/m2. Pt meets criteria for obesity. Pt is well nourished at this time. Diet:Cardiac No data found. Medications: [x] Reviewed Labs:   
Lab Results Component Value Date/Time Sodium 140 11/26/2018 03:45 AM  
 Potassium 4.4 11/26/2018 03:45 AM  
 Chloride 105 11/26/2018 03:45 AM  
 CO2 25 11/26/2018 03:45 AM  
 Anion gap 10 11/26/2018 03:45 AM  
 Glucose 99 11/26/2018 03:45 AM  
 BUN 28 (H) 11/26/2018 03:45 AM  
 Creatinine 1.81 (H) 11/26/2018 03:45 AM  
 Calcium 9.3 11/26/2018 03:45 AM  
 Magnesium 2.1 04/14/2018 09:27 AM  
 Phosphorus 2.9 04/14/2018 09:27 AM  
 Albumin 3.8 11/24/2018 02:36 PM  
 
 
Anthropometrics: BMI (calculated): 33.5 Last 3 Recorded Weights in this Encounter 11/24/18 1410 Weight: 99.8 kg (220 lb) Ht Readings from Last 1 Encounters:  
11/24/18 5' 8\" (1.727 m) Goal: 
[x] Weight Loss post dc by 12/5 [] Weight Gain [] Weight Stable Intake will meet >75% of energy and protein requirements by 12/1/18. Nutrition Risk:  []   High []  Moderate [x] Low Dora Hollis RD, RD

## 2018-11-26 NOTE — INTERDISCIPLINARY ROUNDS
Interdisciplinary team rounds were held 11/26/2018 with the following team members:Diabetes Treatment Specialist, Nursing, Pharmacy, Physician, Physician's Assistant and Clinical Coordinator and the patient. Plan of care discussed. See clinical pathway and/or care plan for interventions and desired outcomes.

## 2018-11-26 NOTE — PROGRESS NOTES
Physical Exam  
Skin: Skin is warm, dry and intact. Bedside change of shift report was given to me, Tika Hawkins RN (ICU night shift nurse), by Kary Sykes RN (ICU day shift nurse). Report included the following information:  SBAR, kardex, consultations, hemodynamic monitoring, intake/output, MAR, lab results, VS trends, cardiac rhythm noted SR w/ frequent PAC's and PVC's, LBBB. Skin, neuro, respiratory status, and order verification have been dually noted and verified at the bedside with: Kary Sykes RN                                               
                      
ICU Nurse's Note: 
2000: Received care of pt awake, alert  x 3. Family members are present and attentive at the bedside. 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 Pt requires assist with all ADLs and repositioning. He denies current c/o pain or discomfort. Bed is locked and in lowest position, side rails up x3, exit alarm activated, bedside table and call bell are within reach. Will continue to monitor. Neurological: as noted in assessment Cardiovascular:  SR w/ frequent PAC's and PVC's, LBBB on the monitor. Pulmonary: breath sounds clear, diminished, saturations maintained at 100% on room air GI/: Abdomen is soft, non-distended, active bowel sounds. Last BM noted prior to admission on 11/23/2018. External condom catheter intact with clear yellow urine output. IVF/Critical gtts: IV Heparin gtt Skin: as noted above 
 
0000: VSS, pt is afebrile. Heparin gtt continues to infuse at prescribed rate 10 units/kg/hr=10 ml/hr. Next PTT due at 0600 on 11/26/2018. Pt is currently asymptomatic and continues to deny c/o CP or any other discomfort. Interventions are ongoing, will continue to monitor 
0400: No reportable changes noted. VSS, pt remains afebrile 97.9.  Condom cath has fallen off. It was replaced with a size small, pt was bathed and tolerated another complete bed bath and linen change. Pt is now resting quietly in bed, call bell and bedside table are within reach, 
0600: Scheduled AM labs drawn at this time. Current PTT resulted 51.4, protocol stipulates that heparin gtt rate is to be increased to infuse heparin gtt at 12 units/kg/hr=12 ml/hr, and pt is to be given a 3000 unit bolus. Next PTT due at 1330 on 11/26/2018. Bed is locked and in lowest position. Call bell and bedside table are within reach, bed exit alarm activated. Interventions are ongoing, will continue to monitor.  
0710: Bedside change of shift report given to: Bill Colunga RN

## 2018-11-26 NOTE — PROGRESS NOTES
0730  Assumed care of pt from [facility]. Heparin drip adjusted for subtherapeutic PTT, will recheck at 1330. POt alert, oriented x4, no c/o voiced except IV site positional. 
0900  Pt dangled for breakfast meal at bedside, prosthesis NOT applied. Pt weak, difficulty maintaining upright posture without support. 450 [facility] Interdisciplinary team rounds were held 11/26/2018 with the following team members:Nursing, Nutrition, Physician and Physician's Assistant and the patient and spouse. Plan of care discussed. See clinical pathway and/or care plan for interventions and desired outcomes. 1200  Family at bedside, informed of transfer room 3007. 
1311 TRANSFER - OUT REPORT: 
 
Verbal report given to RESHMA Rabago (name) on [employer]  being transferred to  [id number] (unit) for routine progression of care Report consisted of patients Situation, Background, Assessment and  
Recommendations(SBAR). Information from the following report(s) SBAR, Kardex, ED Summary, Intake/Output, MAR, Accordion, Recent Results, Cardiac Rhythm SR with PACs and Quality Measures was reviewed with the receiving nurse. Lines:  
Peripheral IV 11/24/18 Right Antecubital (Active) Site Assessment Clean, dry, & intact; Ecchymotic (bruised) 11/26/2018 10:00 AM  
Phlebitis Assessment 0 11/26/2018 10:00 AM  
Infiltration Assessment 0 11/26/2018 10:00 AM  
Dressing Status Clean, dry, & intact 11/26/2018  8:00 AM  
Dressing Type Transparent;Tape 11/26/2018  4:00 AM  
Hub Color/Line Status Pink;Patent 11/26/2018  4:00 AM  
Action Taken Open ports on tubing capped 11/26/2018  4:00 AM  
Alcohol Cap Used Yes 11/26/2018  4:00 AM  
  
 
Opportunity for questions and clarification was provided. Patient transported with: 
 Registered Nurse

## 2018-11-26 NOTE — PROGRESS NOTES
Internal Medicine Progress Note Patient's Name: Lili Cox Admit Date: 11/24/2018 Length of Stay: 2 Assessment/Plan Active Hospital Problems Diagnosis Date Noted  Cerebrovascular disease 11/25/2018  [facility] 11/25/2018  S/P CABG x 4 11/25/2018  
  Jesusita, Ron Newsome  Chest pain 11/24/2018  Hypotension 11/24/2018  Atherosclerosis of native artery of left lower extremity with intermittent claudication ([facility] 07/09/2018  Vascular dementia without behavioral disturbance 03/08/2017  AAA (abdominal aortic aneurysm) without rupture ([facility] 01/04/2017  S/P AVR 04/13/2016 2011 [facility] [place] porcine bioprostheses Transvalvular velocity 2.8 m/s, mean gradient 15 mmHg on 12/31/2016  Hypertension 03/27/2016  CKD (chronic kidney disease), stage III ([facility] 03/27/2016 Acute MI with inferior ST elevation - Cardiology consulted - appreciated assistance - Trop up to 71.8, CK-.6 
- echo 11/26 
- Heparin gtt - ASA, lipitor, plavix, and amlodipine - Will d/c amlodipine and start low dose BB tomorrow - d/c heparin tomorrow also Aortic stenosis 
- monitor s/sx CKD 
- monitor BMP 
 
HTN 
- monitor BP 
- BB tomorrow am  
 
 
 
- Cont acceptable home medications for chronic conditions  
- DVT protocol I have personally reviewed all pertinent labs and films that have officially resulted over the last 24 hours. I have personally checked for all pending labs that are awaiting final results. Subjective Pt is a [de-identified] yo male who has a known history of CAD and Cerebrovascular disease. He has had 4 vessel CABG and heart valve replacement and sees Dr Tena Eisenmenger regularly. He presented to the ER with chest pressure. It felt at [facility] a sledge hammer pushing down on me. \" He does not have SOB. No light headedness or dizziness. He was started on NTG in the ER and his chest pressure resolved. In the ER he subsequently developed hypotension. His chest pain remains improved. His initial troponin is normal and he does not have EKG changes. He was admitted to stepdown unit for ongoing management. Cardiology consulted - continue current cardiac Rx and follow. Cont heparin for 48 hours. Trop peaked at 71.8.  
 
 
 
11/26/18: Pt s/e @ bedside. No major events overnight. Pt offers no complaints this AM. Denies CP or SOB. Denies abd pain, N/V. Objective Visit Vitals /59 Pulse 72 Temp 98.6 °F (37 °C) Resp 13 Ht 5' 8\" (1.727 m) Wt 99.8 kg (220 lb) SpO2 94% BMI 33.45 kg/m² Physical Exam: 
General Appearance: NAD, conversant HENT: normocephalic/atraumatic, moist mucus membranes Neck: No JVD, supple Lungs: CTA with normal respiratory effort CV: RRR Abdomen: soft, non-tender, normal bowel sounds Extremities: no cyanosis, no peripheral edema Neuro: No focal deficits, motor/sensory intact Skin: Normal color, intact Intake and Output: 
Current Shift:  11/26 0701 - 11/26 1900 In: 311.2 [P.O.:240; I.V.:71.2] Out: - Last three shifts:  11/24 1901 - 11/26 0700 In: 361.7 [I.V.:361.7] Out: 3405 [TNFBE:1718] Lab/Data Reviewed: 
BMP:  
Lab Results Component Value Date/Time  11/26/2018 03:45 AM  
 K 4.4 11/26/2018 03:45 AM  
  11/26/2018 03:45 AM  
 CO2 25 11/26/2018 03:45 AM  
 AGAP 10 11/26/2018 03:45 AM  
 GLU 99 11/26/2018 03:45 AM  
 BUN 28 (H) 11/26/2018 03:45 AM  
 CREA 1.81 (H) 11/26/2018 03:45 AM  
 GFRAA 44 (L) 11/26/2018 03:45 AM  
 GFRNA 36 (L) 11/26/2018 03:45 AM  
 
CBC:  
Lab Results Component Value Date/Time WBC 12.7 11/26/2018 03:45 AM  
 HGB 12.3 (L) 11/26/2018 03:45 AM  
 HCT 35.5 (L) 11/26/2018 03:45 AM  
  11/26/2018 03:45 AM  
 
COAGS:  
Lab Results Component Value Date/Time APTT 51.4 (H) 11/26/2018 03:45 AM  
 
 
Imaging Reviewed: 
No results found. Medications Reviewed: 
Current Facility-Administered Medications Medication Dose Route Frequency  heparin (porcine) 1,000 unit/mL injection  [START ON 11/27/2018] pantoprazole (PROTONIX) tablet 40 mg  40 mg Oral ACB  [START ON 11/27/2018] aspirin chewable tablet 81 mg  81 mg Oral DAILY  [START ON 11/27/2018] carvedilol (COREG) tablet 3.125 mg  3.125 mg Oral BID WITH MEALS  
 heparin 25,000 units in D5W 250 ml infusion  10-25 Units/kg/hr IntraVENous TITRATE  atorvastatin (LIPITOR) tablet 20 mg  20 mg Oral DAILY  clopidogrel (PLAVIX) tablet 75 mg  75 mg Oral DAILY  donepezil (ARICEPT) tablet 10 mg  10 mg Oral DAILY  tamsulosin (FLOMAX) capsule 0.4 mg  0.4 mg Oral QHS Kaylene Kraft PA-C [facility] Office:  [phone] Pager: [phone]

## 2018-11-26 NOTE — PROGRESS NOTES
Problem: Falls - Risk of 
Goal: *Absence of Falls Document Mina Roblero Fall Risk and appropriate interventions in the flowsheet. Outcome: Progressing Towards Goal 
Fall Risk Interventions: 
Mobility Interventions: Bed/chair exit alarm Medication Interventions: Bed/chair exit alarm Elimination Interventions: Bed/chair exit alarm

## 2018-11-27 LAB
ANION GAP SERPL CALC-SCNC: 10 MMOL/L (ref 3–18)
APTT PPP: 88.2 SEC (ref 23–36.4)
BASOPHILS # BLD: 0.1 K/UL (ref 0–0.1)
BASOPHILS NFR BLD: 1 % (ref 0–2)
BUN SERPL-MCNC: 29 MG/DL (ref 7–18)
BUN/CREAT SERPL: 18 (ref 12–20)
CALCIUM SERPL-MCNC: 9 MG/DL (ref 8.5–10.1)
CHLORIDE SERPL-SCNC: 107 MMOL/L (ref 100–108)
CO2 SERPL-SCNC: 24 MMOL/L (ref 21–32)
CREAT SERPL-MCNC: 1.62 MG/DL (ref 0.6–1.3)
DIFFERENTIAL METHOD BLD: ABNORMAL
ECHO AO ROOT DIAM: 3.24 CM
ECHO AV AREA VTI: 2.4 CM2
ECHO AV AREA/BSA VTI: 1.1 CM2/M2
ECHO AV MEAN GRADIENT: 11.4 MMHG
ECHO AV PEAK GRADIENT: 0 MMHG
ECHO AV PEAK VELOCITY: 0 CM/S
ECHO AV VTI: 44.31 CM
ECHO EST RA PRESSURE: 10 MMHG
ECHO LA VOL 2C: 107.4 ML (ref 18–58)
ECHO LA VOL 4C: 114.08 ML (ref 18–58)
ECHO LA VOLUME INDEX A2C: 50.46 ML/M2 (ref 16–28)
ECHO LA VOLUME INDEX A4C: 53.6 ML/M2 (ref 16–28)
ECHO LV EDV A2C: 96.8 ML
ECHO LV EDV A4C: 126.2 ML
ECHO LV EDV BP: 111.5 ML (ref 67–155)
ECHO LV EDV INDEX A4C: 59.3 ML/M2
ECHO LV EDV INDEX BP: 52.4 ML/M2
ECHO LV EDV NDEX A2C: 45.5 ML/M2
ECHO LV EJECTION FRACTION A2C: 61 %
ECHO LV EJECTION FRACTION A4C: 60 %
ECHO LV EJECTION FRACTION BIPLANE: 58.9 % (ref 55–100)
ECHO LV ESV A2C: 37.4 ML
ECHO LV ESV A4C: 50.9 ML
ECHO LV ESV BP: 45.8 ML (ref 22–58)
ECHO LV ESV INDEX A2C: 17.6 ML/M2
ECHO LV ESV INDEX A4C: 23.9 ML/M2
ECHO LV ESV INDEX BP: 21.5 ML/M2
ECHO LV INTERNAL DIMENSION DIASTOLIC: 6.09 CM (ref 4.2–5.9)
ECHO LV INTERNAL DIMENSION SYSTOLIC: 4.28 CM
ECHO LV IVSD: 1.13 CM (ref 0.6–1)
ECHO LV MASS 2D: 337.2 G (ref 88–224)
ECHO LV MASS INDEX 2D: 158.4 G/M2 (ref 49–115)
ECHO LV POSTERIOR WALL DIASTOLIC: 1.03 CM (ref 0.6–1)
ECHO LVOT CARDIAC OUTPUT: 22.4 L/MIN
ECHO LVOT DIAM: 2.25 CM
ECHO LVOT PEAK GRADIENT: 8.2 MMHG
ECHO LVOT PEAK VELOCITY: 143.07 CM/S
ECHO LVOT SV: 105.5 ML
ECHO LVOT VTI: 26.57 CM
ECHO MV A VELOCITY: 88.81 CM/S
ECHO MV AREA PHT: 5.6 CM2
ECHO MV E DECELERATION TIME (DT): 135.1 MS
ECHO MV E VELOCITY: 0.75 CM/S
ECHO MV E/A RATIO: 0.01
ECHO MV PRESSURE HALF TIME (PHT): 39.2 MS
ECHO PULMONARY ARTERY SYSTOLIC PRESSURE (PASP): 33 MMHG
ECHO RIGHT VENTRICULAR SYSTOLIC PRESSURE (RVSP): 14.4 MMHG
ECHO TV REGURGITANT MAX VELOCITY: -104.48 CM/S
ECHO TV REGURGITANT PEAK GRADIENT: 4.4 MMHG
EOSINOPHIL # BLD: 0.5 K/UL (ref 0–0.4)
EOSINOPHIL NFR BLD: 4 % (ref 0–5)
ERYTHROCYTE [DISTWIDTH] IN BLOOD BY AUTOMATED COUNT: 15.2 % (ref 11.6–14.5)
GLUCOSE BLD STRIP.AUTO-MCNC: 101 MG/DL (ref 70–110)
GLUCOSE BLD STRIP.AUTO-MCNC: 108 MG/DL (ref 70–110)
GLUCOSE BLD STRIP.AUTO-MCNC: 114 MG/DL (ref 70–110)
GLUCOSE BLD STRIP.AUTO-MCNC: 125 MG/DL (ref 70–110)
GLUCOSE BLD STRIP.AUTO-MCNC: 93 MG/DL (ref 70–110)
GLUCOSE SERPL-MCNC: 103 MG/DL (ref 74–99)
HCT VFR BLD AUTO: 35.3 % (ref 36–48)
HGB BLD-MCNC: 12 G/DL (ref 13–16)
LYMPHOCYTES # BLD: 1.7 K/UL (ref 0.9–3.6)
LYMPHOCYTES NFR BLD: 13 % (ref 21–52)
MCH RBC QN AUTO: 32.7 PG (ref 24–34)
MCHC RBC AUTO-ENTMCNC: 34 G/DL (ref 31–37)
MCV RBC AUTO: 96.2 FL (ref 74–97)
MONOCYTES # BLD: 1.5 K/UL (ref 0.05–1.2)
MONOCYTES NFR BLD: 11 % (ref 3–10)
NEUTS SEG # BLD: 9.4 K/UL (ref 1.8–8)
NEUTS SEG NFR BLD: 71 % (ref 40–73)
PLATELET # BLD AUTO: 243 K/UL (ref 135–420)
PMV BLD AUTO: 11.3 FL (ref 9.2–11.8)
POTASSIUM SERPL-SCNC: 4.2 MMOL/L (ref 3.5–5.5)
RBC # BLD AUTO: 3.67 M/UL (ref 4.7–5.5)
SODIUM SERPL-SCNC: 141 MMOL/L (ref 136–145)
WBC # BLD AUTO: 13.1 K/UL (ref 4.6–13.2)

## 2018-11-27 PROCEDURE — 74011250637 HC RX REV CODE- 250/637: Performed by: HOSPITALIST

## 2018-11-27 PROCEDURE — 97162 PT EVAL MOD COMPLEX 30 MIN: CPT

## 2018-11-27 PROCEDURE — 80048 BASIC METABOLIC PNL TOTAL CA: CPT

## 2018-11-27 PROCEDURE — 97530 THERAPEUTIC ACTIVITIES: CPT

## 2018-11-27 PROCEDURE — 36415 COLL VENOUS BLD VENIPUNCTURE: CPT

## 2018-11-27 PROCEDURE — 65660000000 HC RM CCU STEPDOWN

## 2018-11-27 PROCEDURE — 85730 THROMBOPLASTIN TIME PARTIAL: CPT

## 2018-11-27 PROCEDURE — 85025 COMPLETE CBC W/AUTO DIFF WBC: CPT

## 2018-11-27 PROCEDURE — 82962 GLUCOSE BLOOD TEST: CPT

## 2018-11-27 PROCEDURE — 74011250637 HC RX REV CODE- 250/637: Performed by: PHYSICIAN ASSISTANT

## 2018-11-27 RX ADMIN — DONEPEZIL HYDROCHLORIDE 10 MG: 5 TABLET, FILM COATED ORAL at 10:10

## 2018-11-27 RX ADMIN — ASPIRIN 81 MG 81 MG: 81 TABLET ORAL at 10:11

## 2018-11-27 RX ADMIN — ATORVASTATIN CALCIUM 20 MG: 20 TABLET, FILM COATED ORAL at 10:10

## 2018-11-27 RX ADMIN — CARVEDILOL 3.12 MG: 3.12 TABLET, FILM COATED ORAL at 10:11

## 2018-11-27 RX ADMIN — PANTOPRAZOLE SODIUM 40 MG: 40 TABLET, DELAYED RELEASE ORAL at 10:11

## 2018-11-27 RX ADMIN — TAMSULOSIN HYDROCHLORIDE 0.4 MG: 0.4 CAPSULE ORAL at 21:35

## 2018-11-27 RX ADMIN — CLOPIDOGREL BISULFATE 75 MG: 75 TABLET ORAL at 10:11

## 2018-11-27 NOTE — PROGRESS NOTES
Problem: Pressure Injury - Risk of 
Goal: *Prevention of pressure injury Document Sylvester Scale and appropriate interventions in the flowsheet. Pressure Injury Interventions: 
Sensory Interventions: Assess changes in LOC, Check visual cues for pain, Keep linens dry and wrinkle-free, Maintain/enhance activity level, Minimize linen layers Moisture Interventions: Absorbent underpads, Internal/External urinary devices, Maintain skin hydration (lotion/cream), Minimize layers Activity Interventions: Increase time out of bed, Pressure redistribution bed/mattress(bed type) Mobility Interventions: HOB 30 degrees or less, Pressure redistribution bed/mattress (bed type) Nutrition Interventions: Document food/fluid/supplement intake Friction and Shear Interventions: Minimize layers, HOB 30 degrees or less

## 2018-11-27 NOTE — PROGRESS NOTES
Bedside shift change report given to Angela ORTIZ RN (oncoming nurse) by Rita Todd RN (offgoing nurse). Report included the following information SBAR, Kardex, Intake/Output, MAR and Cardiac Rhythm NSR w/PACs. Heparin drip verified with Rita Todd RN 
 
1430  Shift assessment, denies pain. 2015  Assisted to [facility], tolerated well. 2120  Pt in bed resting. 0015  Pt asleep in bed, no distress noted. 0430  Denies pain, resting in bed. Bedside shift change report given to JORDEN (oncoming nurse) by Amanda Vizcaino RN (offgoing nurse). Report included the following information SBAR, Kardex, Intake/Output and MAR.

## 2018-11-27 NOTE — PROGRESS NOTES
1036: PT orders received and chart reviewed. Per chart review patient's last troponin value 71.80 on 11/25. Per discussion with RESHMA Fry Tiffany cardiology to f/u with patient today; agreeable to holding PT until further lab values provided or until patient cleared for activity per cardiology. Will continue to f/u with patient. Thank you for this referral, Vira Ochoa PT, DPT Office extension: [phone] Pager #: [phone]

## 2018-11-27 NOTE — CDMP QUERY
Cardiology documentation of 11/26-   ' JULIA, Cr 1.98 --> 1.68 --> 1.81 (Cr 1.66 7/2/2018) ' 
PMH CKD 3 Documentation of Acute Renal Des Rodriguez is noted in the progress notes. Currently the patient does not meet RIFLE criteria (BSV approved) to support this diagnosis. If you are using another criteria to support this diagnosis, please document this in your progress note. Otherwise, please document in the progress notes the clinical indicators that support this diagnosis or state that the diagnosis has been ruled out. RIFLE  (BSV Approved) RISK:  Increased SCr x 1.5 or GFR decrease > 25% (within 7 days) INJURY:  Increased SCr x 2.0 or GFR decreased > 50% FAILURE:  Increased SCr x 3.0 or GFR decrease > 75% or SCr >4.0 mg/dL or acute increase >0.5 mg/dL LOSS:  Persistent acute renal failure = complete loss of kidney function > 4 weeks END STAGE:  End stage of kidney disease > 3 months Please clarify and document your clinical opinion in the progress notes and discharge summary. Thank you, [address] [facility], [address]

## 2018-11-27 NOTE — PROGRESS NOTES
Bedside and Verbal shift change report given to JORDEN (oncoming nurse) by Vince Gonzales (offgoing nurse). Report included the following information SBAR, Kardex and MAR. Pt is AOx4 on room air with no complaints at this time. He was on a Heparin gtt that was DC'd this am as per  orders. Cardiology is following

## 2018-11-27 NOTE — PROGRESS NOTES
Problem: Mobility Impaired (Adult and Pediatric) Goal: *Acute Goals and Plan of Care (Insert Text) Physical Therapy Goals Initiated 11/27/2018 and to be accomplished within 7 days. 1.  Patient will complete all bed mobility with supervision/set-up in order to prepare for EOB/OOB activity. 2.  Patient will perform sit <> stand with minimal assistance/contact guard assist in order to prepare for OOB/gait activity. 3.  Patient will perform bed to chair transfers with minimal assistance/contact guard assist in order to promote mobility and encourage seated activity to progress towards their prior level of function. 4.  Patient will ambulate 25 feet with minimal assistance/contact guard assist using LRAD in order to prepare for safe negotiation of their environment. Outcome: Progressing Towards Goal 
PHYSICAL THERAPY: Initial Assessment INPATIENT: Medicare: Hospital Day: 4 Patient: Ronnell Chu (32 y.o. male)    Date: 11/27/2018 Primary Diagnosis: Chest pain CAD (coronary artery disease) Chest pain Hypotension  
,    
Precautions: Fall PLOF: R BKA; ambulation with prosthethic and RW 
 
ASSESSMENT : 
 
Per cardiology notes, patient able to mobilize slowly; D/W RN AJ and agreeable to OOB to chair. Patient supine in bed, agreeable to participation with PT. Family present. Patient reports that he lives with family in a single story home with 0 ALEXYS. Home is apparently handicap accessible with grab bars throughout the house. Patient denies hx of falls; however wife reports several falls in the past 6 months. MIN A x 1 for supine <> sit, demo's good seated balance. MIN A x 1 for sit <> stand; initially demo's poor standing balance initially and required MIN A  X 1 for return to sit. Fair standing balance on second attempt, able to ambulate x 3 ft towards chair.  Near posterior LOB upon getting into chair; patient required MOD  A x 1 to lower to chair. Patient left sitting up with lunch tray in front and all needs within reach. Patient has no c/o CP or any other pain with mobility. RN AJ notified that patient is up in chair and requires assist for balance. Recommend d/c to SNF Patient presents with deficits in: 
Bed Mobility, Transfers, Gait, Strength and Balance Patient will benefit from skilled intervention to address the above impairments. Patients rehabilitation potential is considered to be Fair Factors which may influence rehabilitation potential include:  
[]         None noted 
[]         Mental ability/status [x]         Medical condition 
[]         Home/family situation and support systems 
[x]         Safety awareness 
[]         Pain tolerance/management 
[]         Other: PLAN : 
Recommendations and Planned Interventions: 
[x]           Bed Mobility Training             [x]    Neuromuscular Re-Education 
[x]           Transfer Training                   []    Orthotic/Prosthetic Training 
[x]           Gait Training                          []    Modalities [x]           Therapeutic Exercises          []    Edema Management/Control 
[x]           Therapeutic Activities            [x]    Patient and Family Training/Education 
[]           Other (comment): EDUCATION:  
Education:  Patient was educated on the following topics: Purpose of PT, PT POC, safety with mobility, bed mobility, transfers, safety with gait. Verbalizes understanding. Barriers to Learning/Limitations: None Compensate with: visual, verbal, tactile, kinesthetic cues/model Recommendations for the next treatment session: Gait Frequency/Duration: Patient will be followed by physical therapy 3 - 5 times a week to address goals. Discharge Recommendations: Nik Fonseca Further Equipment Recommendations for Discharge: rolling walker Factors which may impact discharge planning: N/A  
 
SUBJECTIVE:  
 Patient stated I'll have to get my leg on. OBJECTIVE DATA SUMMARY:  
 
Past Medical History:  
Diagnosis Date  Advance directive in chart 6/13/2016  CAD (coronary artery disease)  Cancer [facility]) 2003 Colon  CKD (chronic kidney disease), stage III ([facility] 3/27/2016  CVA (cerebral vascular accident) ([facility] 3/26/2016  Femoral artery aneurysm, left ([facility]  Heart attack [facility]) 2008  Heart attack ([facility]  Hernia  History of TIAs  Hyperlipidemia  Hypertension 3/27/2016  Iliac artery aneurysm, left ([facility]  Pure hypercholesterolemia 9/14/2012  [facility] 3/27[facility]2016  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  
Procedure Laterality Date  ABDOMEN SURGERY PROC UNLISTED  2011  
 3 stents placed into abdomen (groin)  CARDIAC SURG PROCEDURE UNLIST  2005 Quadruple Bypass  CARDIAC SURG PROCEDURE UNLIST  2011 Aortic pig valve placed  ENDOSCOPY, COLON, DIAGNOSTIC    
 HX AAA REPAIR    
 HX ORTHOPAEDIC  2008 Right Leg Amputated 3 Eval Complexity: History: MEDIUM  Complexity : 1-2 comorbidities / personal factors will impact the outcome/ POC Exam:MEDIUM Complexity : 3 Standardized tests and measures addressing body structure, function, activity limitation and / or participation in recreation  Presentation: MEDIUM Complexity : Evolving with changing characteristics  Clinical Decision Making:Medium Complexity MIN - MOD A for mobility Overall Complexity:MEDIUM 
 
G CODES:Mobility  Current  CK= 40-59%   Goal  CJ= 20-39%. The severity rating is based on the Other MIN - MOD  A for mobility, impaired balance, limited activity tolerance Prior Level of Function/Home Situation:  
Home Situation Home Environment: Private residence # Steps to Enter: 0 One/Two Story Residence: One story Interior Rails: None Lift Chair Available: No 
Living Alone: No 
 Support Systems: Family member(s) Patient Expects to be Discharged to[de-identified] Private residence Current DME Used/Available at Home: [address], [place], straight, Walker, rollator, Grab bars(motorized scooter)Critical Behavior: 
Neurologic State: Alert Orientation Level: Oriented X4 Cognition: Follows commands; Lack of insight into deficits. Psychosocial 
Patient Behaviors: Calm; Cooperative Purposeful Interaction: Yes Tone : NormalSensation: NT 
Range Of Motion: WFL on L Functional Mobility: 
 
 
Functional Status Indep (I) Mod I Super-vision Min A Mod A Max A Total A Assist x2 Verbal cues Additional time Not tested Comments Rolling []  []  [] []    []    []  []  [] [] [] [] Supine to sit []  []  [] []  []  []  []  [] [] [] [] Sit to supine []  []  [] []  []  []  []  [] [] [] [] Sit to stand []  []  [] []  []  []  []  [] [] [] []   
Stand to sit []  []  [] []  []  []  []  [] [] [] [] Bed to chair transfers []  []  [] []  []  []  []  [] [] [] [] Balance Good [address] Poor Unable Not tested Comments Sitting static [x]  []  []  []  [] Sitting dynamic [x]  []  []  []  []   
Standing static []  [x]  []  []  [] Poor on first attempt, Fair on 2nd attempt Standing dynamic []  [x]  []  []  [] Mobility/Gait:  
Level of Assistance: Contact guard assistance Assistive Device: rolling walker Distance Ambulated: 3 feet Left Lower Extremity: FWB Right Lower Extremity: FWB Base of Support: center of gravity altered Speed/Ebony: pace decreased (<100 feet/min) Step Length: left shortened and right shortened Swing Pattern: right asymmetrical 
Stance: [facility] Gait Abnormalities: none, altered arm swing and decreased step clearance Pain: 
None Vital Signs Temp: 97.9 °F (36.6 °C) Pulse (Heart Rate): 63    
BP: 95/55 Resp Rate: 16    
O2 Sat (%): 97 % Activity Tolerance:  
Fair Please refer to the flowsheet for vital signs taken during this treatment. After treatment:  
[x]         Patient left in no apparent distress sitting up in chair 
[]         Patient left in no apparent distress in bed 
[x]         Call bell left within reach [x]         Nursing notified 
[]         Caregiver present 
[]         Bed alarm activated COMMUNICATION/EDUCATION:  
[x]         Fall prevention education was provided and the patient/caregiver indicated understanding. []         Patient/family have participated as able in goal setting and plan of care. [x]         Patient/family agree to work toward stated goals and plan of care. []         Patient understands intent and goals of therapy, but is neutral about his/her participation. []         Patient is unable to participate in goal setting and plan of care. Thank you for this referral. 
Doug Carvalho Time Calculation: 20 mins

## 2018-11-27 NOTE — PROGRESS NOTES
Internal Medicine Progress Note Patient's Name: Madelin Boykin Admit Date: 11/24/2018 Length of Stay: 3 Assessment/Plan Active Hospital Problems Diagnosis Date Noted  Cerebrovascular disease 11/25/2018  [facility] ([facility] 11/25/2018  S/P CABG x 4 11/25/2018 2005, Ron Newsome  Chest pain 11/24/2018  Hypotension 11/24/2018  Atherosclerosis of native artery of left lower extremity with intermittent claudication ([facility] 07/09/2018  Vascular dementia without behavioral disturbance 03/08/2017  AAA (abdominal aortic aneurysm) without rupture ([facility] 01/04/2017  S/P AVR 04/13/2016 [place] porcine bioprostheses Transvalvular velocity 2.8 m/s, mean gradient 15 mmHg on 12/31/2016  Hypertension 03/27/2016  CKD (chronic kidney disease), stage III ([facility] 03/27/2016 Acute MI with inferior ST elevation - Cardiology consulted - appreciated assistance - Trop up to 71.8, CK-.6 
- echo 11/26: - Heparin gtt d/c'd 11/27 
- Cont ASA, lipitor, coreg, plavix per cardiology Aortic stenosis 
- monitor s/sx CKD 
- monitor BMP 
 
HTN 
- monitor BP 
- BB tomorrow am  
 
HLD 
- cont lipitor 
 
- PT/OT 
- Cont acceptable home medications for chronic conditions  
- DVT protocol I have personally reviewed all pertinent labs and films that have officially resulted over the last 24 hours. I have personally checked for all pending labs that are awaiting final results. Subjective Pt is a [de-identified] yo male who has a known history of CAD and Cerebrovascular disease. He has had 4 vessel CABG and heart valve replacement and sees Dr Janet Tadeo regularly. He presented to the ER with chest pressure. It felt at [facility] a sledge hammer pushing down on me. \" No SOB. No light headedness or dizziness. He was started on NTG in the ER and his chest pressure resolved. In the ER he subsequently developed hypotension.   His chest pain improved. His initial troponin was normal and he does not have EKG changes. He was admitted to stepdown unit for ongoing management. Cardiology consulted - continue current cardiac Rx and follow. Cont heparin for 48 hours. Trop peaked at 71.8. Started PT/OT on 11/27.  
 
 
11/27/18: Pt s/e @ bedside. No major events overnight. Pt offers no complaints this AM. Denies CP or SOB. Denies abd pain, N/V. Objective Visit Vitals BP 95/55 Pulse 63 Temp 97.9 °F (36.6 °C) Resp 16 Ht 5' 8\" (1.727 m) Wt 99.8 kg (220 lb 0.3 oz) SpO2 97% BMI 33.45 kg/m² Physical Exam: 
General Appearance: NAD, conversant HENT: normocephalic/atraumatic, moist mucus membranes Neck: No JVD, supple Lungs: CTA with normal respiratory effort CV: RRR Abdomen: soft, non-tender, normal bowel sounds Extremities: no cyanosis, no peripheral edema Neuro: No focal deficits, motor/sensory intact Skin: Normal color, intact Intake and Output: 
Current Shift:  11/27 0701 - 11/27 1900 In: 120 [P.O.:120] Out: - Last three shifts:  11/25 1901 - 11/27 0700 In: 1336.2 [P.O.:930; I.V.:406.2] Out: 6526 [KIAML:[id number]] Lab/Data Reviewed: 
BMP:  
Lab Results Component Value Date/Time  11/27/2018 06:11 AM  
 K 4.2 11/27/2018 06:11 AM  
  11/27/2018 06:11 AM  
 CO2 24 11/27/2018 06:11 AM  
 AGAP 10 11/27/2018 06:11 AM  
  (H) 11/27/2018 06:11 AM  
 BUN 29 (H) 11/27/2018 06:11 AM  
 CREA 1.62 (H) 11/27/2018 06:11 AM  
 GFRAA 50 (L) 11/27/2018 06:11 AM  
 GFRNA 41 (L) 11/27/2018 06:11 AM  
 
CBC:  
Lab Results Component Value Date/Time WBC 13.1 11/27/2018 06:11 AM  
 HGB 12.0 (L) 11/27/2018 06:11 AM  
 HCT 35.3 (L) 11/27/2018 06:11 AM  
  11/27/2018 06:11 AM  
 
COAGS:  
Lab Results Component Value Date/Time APTT 88.2 (H) 11/27/2018 06:11 AM  
 
 
Imaging Reviewed: 
No results found. Medications Reviewed: 
Current Facility-Administered Medications Medication Dose Route Frequency  aspirin chewable tablet 81 mg  81 mg Oral DAILY  carvedilol (COREG) tablet 3.125 mg  3.125 mg Oral BID WITH MEALS  pantoprazole (PROTONIX) tablet 40 mg  40 mg Oral ACB  atorvastatin (LIPITOR) tablet 20 mg  20 mg Oral DAILY  clopidogrel (PLAVIX) tablet 75 mg  75 mg Oral DAILY  donepezil (ARICEPT) tablet 10 mg  10 mg Oral DAILY  tamsulosin (FLOMAX) capsule 0.4 mg  0.4 mg Oral QHS Fredi Brittle, PA-C [address] [place], [place] [address]pecialty Group Hospitalist Division Office:  [phone] Pager: [phone]

## 2018-11-27 NOTE — PROGRESS NOTES
[facility] - Progress Note Admit Date: 11/24/2018 Patient seen and examined independently. Heparin off. No CP or other complaints. Agree with assessment and plan as noted below. Lacinda Pallas MD 
Assessment:  
 
-Acute MI with inferior ST elevation c/w injury 11/24/2018, troponin up to 71.80 with CK-MB peaking at 255.6, medical therapy was pursued 
-Moderate aortic stenosis  
-JULIA, Cr 1.98 --> 1.68 --> 1.81 (Cr 1.66 7/2/2018) -Hx AAA, s/p aorta bi iliac endograft 
-Hx PAD, s/p R AKA, s/p L SFA angioplasty 
-Hx left femoral artery aneurysm -HTN 
-DMII with diabetic nephropathy 
-Pure hypercholesterolemia 
  
Primary cardiologist is Dr. Shaikh  Plan: - On cardiac regimen of ASA, stain, Coreg and Plavix 
- Ok to mobilize slowly today with assistance 
- Echo report to follow today Subjective: No new complaints. Objective:  
  
Patient Vitals for the past 8 hrs: 
 Temp Pulse Resp BP SpO2  
11/27/18 1011  71  101/65   
11/27/18 0748 98.2 °F (36.8 °C) 65 16 107/65 95 % 11/27/18 0510 98.6 °F (37 °C) 74 18 112/72 94 % Patient Vitals for the past 96 hrs: 
 Weight 11/27/18 0510 220 lb 0.3 oz (99.8 kg)  
11/26/18 1303 220 lb (99.8 kg) 11/24/18 1410 220 lb (99.8 kg) Intake/Output Summary (Last 24 hours) at 11/27/2018 1058 Last data filed at 11/27/2018 6654 Gross per 24 hour Intake 1061 ml Output 1850 ml Net -789 ml Physical Exam: 
General:  alert, cooperative, no distress Neck:  nontender, no JVD Lungs:  clear to auscultation bilaterally Heart:  regular rate and rhythm, S1, S2 normal, no murmur, click, rub or gallop Abdomen:  abdomen is soft without significant tenderness, masses, organomegaly or guarding Extremities:  extremities normal, atraumatic, no cyanosis or edema Data Review:  
 
Labs: Results:  
   
Chemistry Recent Labs  
  11/27/18 
0611 11/26/18 
0345 11/25/18 
0600 11/24/18 
1436 * 99 109* 134*  140 139 141  
K 4.2 4.4 4.1 4.2  105 106 107 CO2 24 25 26 27 BUN 29* 28* 30* 36* CREA 1.62* 1.81* 1.68* 1.98* CA 9.0 9.3 9.2 9.2 AGAP 10 10 7 7 BUCR 18 15 18 18 AP  --   --   --  116 TP  --   --   --  7.1 ALB  --   --   --  3.8 GLOB  --   --   --  3.3 AGRAT  --   --   --  1.2  
  
CBC w/Diff Recent Labs  
  11/27/18 
0611 11/26/18 
0345 11/25/18 
0600 WBC 13.1 12.7 13.8*  
RBC 3.67* 3.66* 3.73* HGB 12.0* 12.3* 12.2* HCT 35.3* 35.5* 36.1  238 248 GRANS 71 67 75* LYMPH 13* 18* 15* EOS 4 4 2 Cardiac Enzymes No results found for: CPK, CK, CKMMB, CKMB, RCK3, CKMBT, CKNDX, CKND1, ANNE, TROPT, TROIQ, NALINI, TROPT, TNIPOC, BNP, BNPP Coagulation Recent Labs  
  11/27/18 
0611 11/26/18 
1358 APTT 88.2* 84.2* Lipid Panel Lab Results Component Value Date/Time Cholesterol, total 129 04/14/2018 09:27 AM  
 HDL Cholesterol 38 (L) 04/14/2018 09:27 AM  
 LDL, calculated 49.6 04/14/2018 09:27 AM  
 VLDL, calculated 41.4 04/14/2018 09:27 AM  
 Triglyceride 207 (H) 04/14/2018 09:27 AM  
 CHOL/HDL Ratio 3.4 04/14/2018 09:27 AM  
  
BNP No results found for: BNP, BNPP, XBNPT Liver Enzymes Recent Labs  
  11/24/18 
1436 TP 7.1 ALB 3.8  SGOT 18 Digoxin Thyroid Studies Lab Results Component Value Date/Time TSH 3.22 04/14/2018 09:27 AM  
 TSH 3.31 04/14/2018 09:27 AM  
    
 
Signed By: Lester Mckeon. Ramos Acevedo PA-C November 27, 2018

## 2018-11-27 NOTE — PROGRESS NOTES
Problem: Falls - Risk of 
Goal: *Absence of Falls Document Apolinar Kemp Fall Risk and appropriate interventions in the flowsheet. Outcome: Progressing Towards Goal 
Fall Risk Interventions: 
Mobility Interventions: Patient to call before getting OOB, Utilize walker, cane, or other assistive device Medication Interventions: Patient to call before getting OOB, Teach patient to arise slowly Elimination Interventions: Call light in reach, Toilet paper/wipes in reach, Toileting schedule/hourly rounds Problem: Pressure Injury - Risk of 
Goal: *Prevention of pressure injury Document Sylvester Scale and appropriate interventions in the flowsheet. Outcome: Progressing Towards Goal 
Pressure Injury Interventions: 
Sensory Interventions: Assess changes in LOC, Check visual cues for pain, Keep linens dry and wrinkle-free, Maintain/enhance activity level, Minimize linen layers Moisture Interventions: Absorbent underpads, Internal/External urinary devices, Maintain skin hydration (lotion/cream), Minimize layers Activity Interventions: Increase time out of bed, Pressure redistribution bed/mattress(bed type) Mobility Interventions: HOB 30 degrees or less, Pressure redistribution bed/mattress (bed type) Nutrition Interventions: Document food/fluid/supplement intake Friction and Shear Interventions: Minimize layers, HOB 30 degrees or less

## 2018-11-28 ENCOUNTER — HOME HEALTH ADMISSION (OUTPATIENT)
Dept: HOME HEALTH SERVICES | Facility: HOME HEALTH | Age: 78
End: 2018-11-28
Payer: MEDICARE

## 2018-11-28 VITALS
BODY MASS INDEX: 33.35 KG/M2 | OXYGEN SATURATION: 96 % | HEART RATE: 68 BPM | DIASTOLIC BLOOD PRESSURE: 64 MMHG | SYSTOLIC BLOOD PRESSURE: 111 MMHG | RESPIRATION RATE: 16 BRPM | TEMPERATURE: 98.8 F | WEIGHT: 220.02 LBS | HEIGHT: 68 IN

## 2018-11-28 LAB
ANION GAP SERPL CALC-SCNC: 12 MMOL/L (ref 3–18)
BASOPHILS # BLD: 0 K/UL (ref 0–0.1)
BASOPHILS NFR BLD: 0 % (ref 0–2)
BUN SERPL-MCNC: 31 MG/DL (ref 7–18)
BUN/CREAT SERPL: 18 (ref 12–20)
CALCIUM SERPL-MCNC: 9.5 MG/DL (ref 8.5–10.1)
CHLORIDE SERPL-SCNC: 106 MMOL/L (ref 100–108)
CO2 SERPL-SCNC: 22 MMOL/L (ref 21–32)
CREAT SERPL-MCNC: 1.68 MG/DL (ref 0.6–1.3)
DIFFERENTIAL METHOD BLD: ABNORMAL
EOSINOPHIL # BLD: 0.5 K/UL (ref 0–0.4)
EOSINOPHIL NFR BLD: 3 % (ref 0–5)
ERYTHROCYTE [DISTWIDTH] IN BLOOD BY AUTOMATED COUNT: 15.2 % (ref 11.6–14.5)
GLUCOSE BLD STRIP.AUTO-MCNC: 119 MG/DL (ref 70–110)
GLUCOSE BLD STRIP.AUTO-MCNC: 99 MG/DL (ref 70–110)
GLUCOSE SERPL-MCNC: 96 MG/DL (ref 74–99)
HCT VFR BLD AUTO: 36.6 % (ref 36–48)
HGB BLD-MCNC: 12.2 G/DL (ref 13–16)
LYMPHOCYTES # BLD: 1.6 K/UL (ref 0.9–3.6)
LYMPHOCYTES NFR BLD: 12 % (ref 21–52)
MCH RBC QN AUTO: 32.6 PG (ref 24–34)
MCHC RBC AUTO-ENTMCNC: 33.3 G/DL (ref 31–37)
MCV RBC AUTO: 97.9 FL (ref 74–97)
MONOCYTES # BLD: 1.4 K/UL (ref 0.05–1.2)
MONOCYTES NFR BLD: 10 % (ref 3–10)
NEUTS SEG # BLD: 10 K/UL (ref 1.8–8)
NEUTS SEG NFR BLD: 75 % (ref 40–73)
PLATELET # BLD AUTO: 228 K/UL (ref 135–420)
PMV BLD AUTO: 11.5 FL (ref 9.2–11.8)
POTASSIUM SERPL-SCNC: 4.5 MMOL/L (ref 3.5–5.5)
RBC # BLD AUTO: 3.74 M/UL (ref 4.7–5.5)
SODIUM SERPL-SCNC: 140 MMOL/L (ref 136–145)
WBC # BLD AUTO: 13.5 K/UL (ref 4.6–13.2)

## 2018-11-28 PROCEDURE — 82962 GLUCOSE BLOOD TEST: CPT

## 2018-11-28 PROCEDURE — 36415 COLL VENOUS BLD VENIPUNCTURE: CPT

## 2018-11-28 PROCEDURE — 80048 BASIC METABOLIC PNL TOTAL CA: CPT

## 2018-11-28 PROCEDURE — 97530 THERAPEUTIC ACTIVITIES: CPT

## 2018-11-28 PROCEDURE — 74011250637 HC RX REV CODE- 250/637: Performed by: PHYSICIAN ASSISTANT

## 2018-11-28 PROCEDURE — 74011250637 HC RX REV CODE- 250/637: Performed by: HOSPITALIST

## 2018-11-28 PROCEDURE — 85025 COMPLETE CBC W/AUTO DIFF WBC: CPT

## 2018-11-28 RX ORDER — CARVEDILOL 3.12 MG/1
3.12 TABLET ORAL 2 TIMES DAILY WITH MEALS
Qty: 30 TAB | Refills: 0 | Status: SHIPPED | OUTPATIENT
Start: 2018-11-28 | End: 2018-12-21

## 2018-11-28 RX ADMIN — CARVEDILOL 3.12 MG: 3.12 TABLET, FILM COATED ORAL at 09:42

## 2018-11-28 RX ADMIN — ATORVASTATIN CALCIUM 20 MG: 20 TABLET, FILM COATED ORAL at 09:42

## 2018-11-28 RX ADMIN — PANTOPRAZOLE SODIUM 40 MG: 40 TABLET, DELAYED RELEASE ORAL at 09:42

## 2018-11-28 RX ADMIN — CLOPIDOGREL BISULFATE 75 MG: 75 TABLET ORAL at 09:42

## 2018-11-28 RX ADMIN — DONEPEZIL HYDROCHLORIDE 10 MG: 5 TABLET, FILM COATED ORAL at 09:42

## 2018-11-28 RX ADMIN — ASPIRIN 81 MG 81 MG: 81 TABLET ORAL at 09:42

## 2018-11-28 NOTE — DISCHARGE SUMMARY
[address] [place], [place] [address] Hospitalist Division Discharge SummaryPatient: Sidney Rushing MRN: [id number]  CSN: [id number] YOB: 1940  Age: [de-identified] y.o. Sex: male DOA: 11/24/2018 LOS:  LOS: 4 days   Discharge Date:   
 
Admission Diagnoses: Chest pain CAD (coronary artery disease) Chest pain Hypotension Discharge Diagnoses:   
Problem List as of 11/28/2018 Date Reviewed: 10/2/2018 Codes Class Noted - Resolved Cerebrovascular disease ICD-10-CM: I67.9 ICD-9-CM: 437.9  11/25/2018 - Present * (Principal) Acute MI ([facility] ICD-10-CM: I21.9 ICD-9-CM: 410.90  11/25/2018 - Present S/P CABG x 4 ICD-10-CM: Z95.1 ICD-9-CM: V45.81  11/25/2018 - Present Overview Signed 11/25/2018 10:41 AM by Angelia Mcgill MD  
  [facility], [place], [place] Chest pain ICD-10-CM: R07.9 ICD-9-CM: 786.50  11/24/2018 - Present CAD (coronary artery disease) ICD-10-CM: I25.10 ICD-9-CM: 414.00  11/24/2018 - Present Hypotension ICD-10-CM: I95.9 ICD-9-CM: 458.9  11/24/2018 - Present Atherosclerosis of native artery of left lower extremity with intermittent claudication (HCC) ICD-10-CM: W94.981 ICD-9-CM: 440.21  7/9/2018 - Present Vascular dementia without behavioral disturbance ICD-10-CM: F01.50 ICD-9-CM: 290.40  3/8/2017 - Present Diastolic dysfunction JYB-86-HR: I51.9 ICD-9-CM: 429.9  2/26/2017 - Present Overview Signed 2/26/2017  9:29 AM by Angelia Mcgill MD  
  Grade 1 on echo 12/2016 AAA (abdominal aortic aneurysm) without rupture (HCC) ICD-10-CM: I71.4 ICD-9-CM: 441.4  1/4/2017 - Present PAD (peripheral artery disease) (HCC) ICD-10-CM: I73.9 ICD-9-CM: 443.9  9/21/2016 - Present Advance directive in chart ICD-10-CM: Z78.9 ICD-9-CM: V49.89  6/13/2016 - Present S/P AVR ICD-10-CM: Z95.2 ICD-9-CM: V43.3  4/13/2016 - Present Overview Addendum 2/26/2017  9:32 AM by Micky Le MD  
  2011 [facility] [place] porcine bioprostheses Transvalvular velocity 2.8 m/s, mean gradient 15 mmHg on 12/31/2016 Hypertension ICD-10-CM: I10 
ICD-9-CM: 401.9  3/27/2016 - Present CKD (chronic kidney disease), stage III (HCC) ICD-10-CM: N18.3 ICD-9-CM: 585.3  3/27/2016 - Present Coronary artery disease involving native coronary artery without angina pectoris ICD-10-CM: I25.10 ICD-9-CM: 414.01  1/27/2016 - Present Pure hypercholesterolemia ICD-10-CM: E78.00 ICD-9-CM: 272.0  9/14/2012 - Present RESOLVED: TIA (transient ischemic attack) ICD-10-CM: G45.9 ICD-9-CM: 435.9  4/14/2018 - 10/2/2018 RESOLVED: Type 2 diabetes mellitus with nephropathy ([facility] ICD-10-CM: E11.21 
ICD-9-CM: 250.40, 583.81  1/3/2018 - 4/4/2018 RESOLVED: Urinary frequency ICD-10-CM: R35.0 ICD-9-CM: 788.41  3/8/2017 - 4/20/2017 RESOLVED: Femoral artery aneurysm, left (HCC) ICD-10-CM: I72.4 ICD-9-CM: 442.3  1/4/2017 - 5/18/2017 RESOLVED: Iliac aneurysm ([facility] ICD-10-CM: I72.3 ICD-9-CM: 442.2  11/8/2016 - 11/29/2016 RESOLVED: Femoral artery aneurysm (HCC) ICD-10-CM: I72.4 ICD-9-CM: 442.3  11/8/2016 - 11/29/2016 RESOLVED: Atherosclerosis of both lower extremities with intermittent claudication ([facility] ICD-10-CM: R12.132 ICD-9-CM: 440.21  11/8/2016 - 11/29/2016 RESOLVED: Preoperative cardiovascular examination ICD-10-CM: Z01.810 ICD-9-CM: V72.81  10/30/2016 - 11/29/2016 RESOLVED: Iliac artery aneurysm, left (HCC) ICD-10-CM: I72.3 ICD-9-CM: 442.2  10/19/2016 - 11/29/2016 RESOLVED: Femoral artery aneurysm, left (HCC) ICD-10-CM: I72.4 ICD-9-CM: 442.3  10/19/2016 - 11/29/2016 RESOLVED: Hemispheric carotid artery syndrome ICD-10-CM: G45.1 ICD-9-CM: 435.8  9/21/2016 - 11/29/2016 RESOLVED: Carotid stenosis, symptomatic w/o infarct ICD-10-CM: I65.29 ICD-9-CM: 433.10  9/21/2016 - 11/29/2016 RESOLVED: Atherosclerosis of native arteries of extremities with intermittent claudication, bilateral legs ([facility] ICD-10-CM: H05.688 ICD-9-CM: 440.21  9/21/2016 - 11/29/2016 RESOLVED: History of AAA (abdominal aortic aneurysm) repair ICD-10-CM: M23.769 ICD-9-CM: V45.89  9/21/2016 - 11/29/2016 RESOLVED: Colon cancer ([facility] ICD-10-CM: C18.9 ICD-9-CM: 153.9  4/13/2016 - 6/13/2016 RESOLVED: Diabetes mellitus type 2, controlled ([facility] ICD-10-CM: E11.9 ICD-9-CM: 250.00  3/27/2016 - 4/4/2018 RESOLVED: 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 (HCC) ICD-10-CM: Y06.525 ICD-9-CM: 434.11  3/26/2016 - 6/13/2016 RESOLVED: Essential hypertension, malignant ICD-10-CM: I10 
ICD-9-CM: 401.0  9/14/2012 - 12/16/2013 Discharge Condition: Stable Discharge To: Home with Home Health Consults: Cardiology Hospital Course: Pt is a [de-identified] yo male who has a known history of CAD and Cerebrovascular disease. He has had 4 vessel CABG and heart valve replacement and sees Dr Shyanne Robbins regularly. He presented to the ER with chest pressure. It felt at [facility] a sledge hammer pushing down on me. \" No SOB. No light headedness or dizziness. He was started on NTG in the ER and his chest pressure resolved. In the ER he subsequently developed hypotension.  His chest pain improved. His initial troponin was normal and he did not have EKG changes. He was admitted to stepdown unit for ongoing management. Cardiology consulted - continue current cardiac Rx and follow. Cont heparin for 48 hours. Norvasc was d/c'd and he was started on Coreg. Trop peaked at 71.8. Started PT/OT on 11/27. Pt requesting home health instead of SNF.  
  
  
Inpatient Treatment: 
Acute MI with inferior ST elevation - Cardiology consulted - appreciated assistance - Trop up to 71.8, CK-.6 
- echo 11/26 
- Heparin gtt d/c'd 11/27 - Cont ASA, lipitor, coreg, plavix per cardiology 
  
Aortic stenosis 
- monitor s/sx 
  
CKD 
- monitor BMP 
  
HTN 
- monitor BP  
  
HLD 
- cont lipitor 
  
- PT/OT 
- Cont acceptable home medications for chronic conditions  
- DVT protocol Physical Exam: 
General appearance: alert, cooperative, no distress, appears stated age Head: Normocephalic, without obvious abnormality, atraumatic Lungs: clear to auscultation bilaterally Heart: regular rate and rhythm, S1, S2 normal, no murmur, click, rub or gallop Abdomen: soft, non-tender. Bowel sounds normal. No masses,  no organomegaly Extremities: extremities normal, atraumatic, no cyanosis or edema Skin: Skin color, texture, turgor normal. No rashes or lesions Neurologic: Grossly normal 
PSY: Mood and affect normal, appropriately behaved Significant Diagnostic Studies:  
 
BMP:  
Lab Results Component Value Date/Time  11/28/2018 06:20 AM  
 K 4.5 11/28/2018 06:20 AM  
  11/28/2018 06:20 AM  
 CO2 22 11/28/2018 06:20 AM  
 AGAP 12 11/28/2018 06:20 AM  
 GLU 96 11/28/2018 06:20 AM  
 BUN 31 (H) 11/28/2018 06:20 AM  
 CREA 1.68 (H) 11/28/2018 06:20 AM  
 GFRAA 48 (L) 11/28/2018 06:20 AM  
 GFRNA 40 (L) 11/28/2018 06:20 AM  
 
CBC:  
Lab Results Component Value Date/Time WBC 13.5 (H) 11/28/2018 06:20 AM  
 HGB 12.2 (L) 11/28/2018 06:20 AM  
 HCT 36.6 11/28/2018 06:20 AM  
  11/28/2018 06:20 AM  
 
 
Echo 11/26 Impression: · Left ventricular mild-to-moderately decreased systolic function. Estimated left ventricular ejection fraction is 41 - 45%. Visually measured ejection fraction. Left ventricular cavity size is dilated. Abnormal left ventricular wall motion as described on the wall scoring diagram below. Mid anteroseptal, basal anteroseptal, inferoseptal, and inferior hypokinesis. · Left atrial cavity size is moderately dilated. · Right ventricular cavity size is mildly dilated.  Right ventricular global systolic function is borderline low. · Right atrial cavity size is mildly dilated. · Aortic valve is prosthetic. Mild aortic valve leaflet calcification present with reduced excursion. Mild aortic valve stenosis is present. · Mitral valve non-specific thickening. Mild mitral annular calcification. Mild mitral valve regurgitation. · Mild tricuspid valve regurgitation is present. There is no evidence of pulmonary hypertension. Discharge Medications:    
Current Discharge Medication List  
  
START taking these medications Details  
carvedilol (COREG) 3.125 mg tablet Take 1 Tab by mouth two (2) times daily (with meals). Qty: 30 Tab, Refills: 0 CONTINUE these medications which have NOT CHANGED Details  
clopidogrel (PLAVIX) 75 mg tab TAKE 1 TABLET BY MOUTH DAILY Qty: 90 Tab, Refills: 4 Comments: **Patient requests 90 days supply**  
  
atorvastatin (LIPITOR) 20 mg tablet Take 1 Tab by mouth daily. Qty: 90 Tab, Refills: 4 Associated Diagnoses: Coronary artery disease involving native coronary artery of native heart without angina pectoris; Pure hypercholesterolemia; AAA (abdominal aortic aneurysm) without rupture ([address] Essential hypertension; CKD (chronic kidney disease), stage III ([address] Diastolic dysfunction; Vascular dementia without behavioral disturbance  
  
tamsulosin (FLOMAX) 0.4 mg capsule Take 1 Cap by mouth nightly. Qty: 90 Cap, Refills: 4 Associated Diagnoses: Benign prostatic hyperplasia with urinary frequency; AAA (abdominal aortic aneurysm) without rupture (HCC); PAD (peripheral artery disease) ([address] Vascular dementia without behavioral disturbance; Diastolic dysfunction; Essential hypertension; CKD (chronic kidney disease), stage III ([address] Coronary artery disease involving native coronary artery of native heart without angina pectoris; Pure hypercholesterolemia  
  
aspirin (ASPIRIN) 325 mg tablet Take 1 Tab by mouth daily. Qty: 90 Tab, Refills: 4 Associated Diagnoses: Coronary artery disease involving native coronary artery of native heart without angina pectoris; Pure hypercholesterolemia; AAA (abdominal aortic aneurysm) without rupture ([facility] Essential hypertension; CKD (chronic kidney disease), stage III ([facility] Diastolic dysfunction; Vascular dementia without behavioral disturbance; Transient cerebral ischemia, unspecified type  
  
donepezil (ARICEPT) 10 mg tablet TAKE 2 TABLETS BY MOUTH EVERY MORNING AFTER A MEAL Qty: 180 Tab, Refills: 1 Comments: **Patient requests 90 days supply** NITROGLYCERIN (NITROSTAT SL) 1 Tab by SubLINGual route as needed (chest pain). omega-3 fatty acids-vitamin e (FISH OIL) 1,000 mg cap Take 1 Cap by mouth two (2) times a day. Qty: 180 Cap, Refills: 4 Associated Diagnoses: Pure hypercholesterolemia; Routine general medical examination at a health care facility STOP taking these medications  
  
 amLODIPine (NORVASC) 10 mg tablet Comments:  
Reason for Stopping:   
   
  
 
 
Activity: PT/OT per [address] Diet: Cardiac Diet Wound Care: None needed Follow-up: 1 week with Cardiology and PCP Discharge time: >35 minutes Marco A Prabhakar PA-C [address] [place], [place] [address] Office:  [phone] Pager: [phone] 
 
 
11/28/2018, 11:44 AM 
 
Clarification:  Patient had acute renal failure ruled out during hospitalization.

## 2018-11-28 NOTE — PROGRESS NOTES
Offered the pt FOC for home care, he chose [facility]. FOC form placed in the unit chart; copy given to the pt. Pt verified the contact information on the face sheet is correct. Referral sent to intake via [facility] and called to intake nurse,  Marti Borges. Care Management Interventions PCP Verified by CM: Yes 
Palliative Care Criteria Met (RRAT>21 & CHF Dx)?: No 
Mode of Transport at Discharge: Other (see comment)(spouse) Transition of Care Consult (CM Consult): Home Health [address] [place] [address] Road: Yes MyChart Signup: No 
Discharge Durable Medical Equipment: No 
Health Maintenance Reviewed: Yes Physical Therapy Consult: No 
Occupational Therapy Consult: No 
Speech Therapy Consult: No 
Current Support Network: Lives with Spouse Confirm Follow Up Transport: Family Plan discussed with Pt/Family/Caregiver: Yes Freedom of Choice Offered: Yes [employer] Information Provided?: No 
Discharge Location Discharge Placement: Home with home health

## 2018-11-28 NOTE — ANCILLARY DISCHARGE INSTRUCTIONS
Patient and/or next of kin has been given the [facility] Important Message From Medicare About Your Rights\" letter and all questions were answered. Copy of signed original letter given.

## 2018-11-28 NOTE — DISCHARGE INSTRUCTIONS
Heart Attack: Care Instructions  Your Care Instructions    A heart attack (myocardial infarction, or MI) occurs when one or more of the coronary arteries, which supply the heart with oxygen-rich blood, is blocked. A blockage usually occurs when plaque inside the artery breaks open and a blood clot forms in the artery. After a heart attack, you may be worried about your future. Over the next several weeks, your heart will start to heal. Though it can be hard to break old habits, you can prevent another heart attack by making some lifestyle changes and by taking medicines. You may use this information for ideas about what to do at home to speed your recovery. Follow-up care is a key part of your treatment and safety. Be sure to make and go to all appointments, and call your doctor if you are having problems. It's also a good idea to know your test results and keep a list of the medicines you take. How can you care for yourself at home? Activity    · Until your doctor says it is okay, do not do strenuous exercise. And do not lift, pull, or push anything heavy. Ask your doctor what types of activities are safe for you.     · If your doctor has not set you up with a cardiac rehabilitation (rehab) program, talk to him or her about whether that is right for you. Cardiac rehab includes supervised exercise. It also includes help with diet and lifestyle changes and emotional support. It may reduce your risk of future heart problems.     · Increase your activities slowly. Take short rest breaks when you get tired.     · If your doctor recommends it, get more exercise. Walking is a good choice. Bit by bit, increase the amount you walk every day. Try for at least 30 minutes on most days of the week. You also may want to swim, bike, or do other activities.  Talk with your doctor before you start an exercise program to make sure it is safe for you.     · Ask your doctor when you can drive, go back to work, and do other daily activities again.     · You can have sex as soon as you feel ready for it. Often this means when you can easily walk around or climb stairs. Talk with your doctor if you have any concerns. If you are taking nitroglycerin, do not take erection-enhancing medicine such as sildenafil (Viagra), tadalafil (Cialis), or vardenafil (Levitra) .    Lifestyle changes    · Do not smoke. Smoking increases your risk of another heart attack. If you need help quitting, talk to your doctor about stop-smoking programs and medicines. These can increase your chances of quitting for good.     · Eat a heart-healthy diet that is low in saturated fat and salt, and is full of fruits, vegetables and whole-grains. Eat at least two servings of fish each week. You may get more details about how to eat healthy. But these tips can help you get started.     · Stay at a healthy weight, or lose weight if you need to. Medicines    · Be safe with medicines. Take your medicines exactly as prescribed. Call your doctor if you think you are having a problem with your medicine. You will get more details on the specific medicines your doctor prescribes. Do not stop taking your medicine unless your doctor tells you to. Not taking your medicine might raise your risk of having another heart attack.     · You may need several medicines to help lower your risk of another heart attack. These include:  ? Blood pressure medicines such as angiotensin-converting enzyme (ACE) inhibitors, ARBs (angiotensin II receptor blockers), and beta-blockers. ? Cholesterol medicine called statins. ? Aspirin and other blood thinners. These prevent blood clots that can cause a heart attack.     · If your doctor has given you nitroglycerin, keep it with you at all times. If you have angina symptoms, such as chest pain or pressure, sit down and rest. Take the first dose of nitroglycerin as directed.  If symptoms get worse or are not getting better within 5 minutes, call 911 right away. Stay on the phone. The emergency  will tell you what to do.     · Do not take any over-the-counter medicines, vitamins, or herbal products without talking to your doctor first.    Staying healthy    · Manage other health conditions such as high blood pressure and diabetes.     · Avoid colds and flu. Get a pneumococcal vaccine shot. If you have had one before, ask your doctor whether you need another dose. Get the flu vaccine every year. If you must be around people with colds or flu, wash your hands often.     · Be sure to tell your doctor about any angina symptoms you have had, even if they went away. Pay attention to your angina symptoms. Know what is typical for you and learn how to control it. Know when to call for help.     · Talk to your family, friends, or a counselor about your feelings. It is normal to feel frightened, angry, hopeless, helpless, and even guilty. Talking openly about bad feelings can help you cope. If you have symptoms of depression, talk to your doctor. When should you call for help? Call 911 anytime you think you may need emergency care. For example, call if:    · You have symptoms of a heart attack. These may include:  ? Chest pain or pressure, or a strange feeling in the chest.  ? Sweating. ? Shortness of breath. ? Nausea or vomiting. ? Pain, pressure, or a strange feeling in the back, neck, jaw, or upper belly or in one or both shoulders or arms. ? Lightheadedness or sudden weakness. ? A fast or irregular heartbeat. After you call 911, the  may tell you to chew 1 adult-strength or 2 to 4 low-dose aspirin. Wait for an ambulance.  Do not try to drive yourself.     · You have angina symptoms (such as chest pain or pressure) that do not go away with rest or are not getting better within 5 minutes after you take a dose of nitroglycerin.     · You passed out (lost consciousness).     · You feel like you are having another heart attack.    Call your doctor now or seek immediate medical care if:    · You are having angina symptoms, such as chest pain or pressure, more often than usual, or the symptoms are different or worse than usual.     · You have new or increased shortness of breath.     · You are dizzy or lightheaded, or you feel like you may faint.    Watch closely for changes in your health, and be sure to contact your doctor if you have any problems. Where can you learn more? Go to http://andrae-raphael.info/. Enter [id number] in the search box to learn more about \"Heart Attack: Care Instructions. \"  Current as of: December 6, 2017  Content Version: 11.8  © 7479-4163 [employer]. Care instructions adapted under license by [employer] (which disclaims liability or warranty for this information). If you have questions about a medical condition or this instruction, always ask your healthcare professional. [place] [address] any warranty or liability for your use of this information. Reducing Heart Attack Risk With Daily Medicine: Care Instructions  Your Care Instructions    Heart disease is the number one cause of death. If you are at risk for heart disease, there are many medicines that can reduce your risk. These include:  · ACE inhibitors or ARBs. These are types of blood pressure medicines. They can reduce the risk of heart attacks and strokes if you are at high risk. · Statin medicines. These lower cholesterol. They can also reduce the risk of heart disease and strokes. · Aspirin and other antiplatelets. These prevent blood clots. They can help certain people lower their risk of a heart attack or stroke. · Beta-blocker medicines. These are a type of blood pressure and heart medicine. They can reduce the chance of early death if you have had a heart attack. All medicines can cause side effects. So it is important to understand the pros and cons of any medicine you take.  It is also important to take your medicines exactly as your doctor tells you to. Follow-up care is a key part of your treatment and safety. Be sure to make and go to all appointments, and call your doctor if you are having problems. It's also a good idea to know your test results and keep a list of the medicines you take. ACE inhibitors  ACE (angiotensin-converting enzyme) inhibitors are used for three main reasons. They lower blood pressure, protect the kidneys, and prevent heart attacks and strokes. Examples include benazepril (Lotensin), lisinopril (Prinivil, Zestril), and ramipril (Altace). An angiotensin II receptor blocker (ARB) may be used instead of an ACE inhibitor. ARBs help you in the same ways as ACE inhibitors. Examples include candesartan (Atacand), irbesartan (Avapro), losartan (Cozaar). Before you start taking an ACE inhibitor or an ARB, make sure your doctor knows if:  · You are taking a water pill (diuretic). · You are taking potassium pills or using salt substitutes. · You are pregnant or breastfeeding. · You have had a kidney transplant or other kidney problems. ACE inhibitors and ARBs can cause side effects. Call your doctor right away if you have:  · Trouble breathing. · Swelling in your face, head, neck, or tongue. · Dizziness or lightheadedness. · A dry cough. Statins  Statins lower cholesterol. Examples include atorvastatin (Lipitor), lovastatin (Mevacor), pravastatin (Pravachol), and simvastatin (Zocor). Before you start taking a statin, make sure your doctor knows if:  · You have had a kidney transplant or other kidney problems. · You have liver disease. · You take any other prescription medicine, over-the-counter medicine, vitamins, supplements, or herbal remedies. · You are pregnant or breastfeeding. Statins can cause side effects. Call your doctor right away if you have:  · New, severe muscle aches. · Brown urine. Aspirin  Taking an aspirin every day can lower your risk for a heart attack.  A heart attack occurs when a blood vessel in the heart gets blocked. When this happens, oxygen can't get to the heart muscle, and part of the heart dies. Aspirin can help prevent blood clots that can block the blood vessels. Talk to your doctor before you start taking aspirin every day. He or she may recommend that you take one low-dose aspirin (81 mg) tablet each day, with a meal and a full glass of water. Taking aspirin isn't right for everyone. This is because it can cause serious bleeding. And you may not be able to use aspirin if you:  · Have asthma. · Have an ulcer or other stomach problem. · Take some other medicine (called a blood thinner) that prevents blood clots. · Are allergic to aspirin. Before having a surgery or procedure, tell your doctor or dentist that you take aspirin. He or she will tell you if you should stop taking aspirin beforehand. Make sure that you understand exactly what your doctor wants you to do. Aspirin can cause side effects. Call your doctor right away if you have:  · Unusual bleeding or bruising. · Nausea, vomiting, or heartburn. · Black or bloody stools. Beta-blockers  Beta-blockers are used for three main reasons. They lower blood pressure, relieve angina symptoms (such as chest pain or pressure), and reduce the chances of a second heart attack. They include atenolol (Tenormin), carvedilol (Coreg), and metoprolol (Lopressor). Before you start taking a beta-blocker, make sure your doctor knows if you have:  · Severe asthma or frequent asthma attacks. · A very slow pulse (less than 55 beats a minute). Beta-blockers can cause side effects. Call your doctor right away if you have:  · Wheezing or trouble breathing. · Dizziness or lightheadedness. · Asthma that gets worse. When should you call for help? Watch closely for changes in your health, and be sure to contact your doctor if you have any problems. Where can you learn more?   Go to http://andrae-raphael.info/. Enter R428 in the search box to learn more about \"Reducing Heart Attack Risk With Daily Medicine: Care Instructions. \"  Current as of: December 6, 2017  Content Version: 11.8  © 3770-4410 [employer]. Care instructions adapted under license by [employer] (which disclaims liability or warranty for this information). If you have questions about a medical condition or this instruction, always ask your healthcare professional. [address] any warranty or liability for your use of this information. DISCHARGE SUMMARY from Nurse    PATIENT INSTRUCTIONS:    After general anesthesia or intravenous sedation, for 24 hours or while taking prescription Narcotics:  · Limit your activities  · Do not drive and operate hazardous machinery  · Do not make important personal or business decisions  · Do  not drink alcoholic beverages  · If you have not urinated within 8 hours after discharge, please contact your surgeon on call. Report the following to your surgeon:  · Excessive pain, swelling, redness or odor of or around the surgical area  · Temperature over 100.5  · Nausea and vomiting lasting longer than 4 hours or if unable to take medications  · Any signs of decreased circulation or nerve impairment to extremity: change in color, persistent  numbness, tingling, coldness or increase pain  · Any questions    What to do at Home:  Recommended activity: Activity as tolerated    *  Please give a list of your current medications to your Primary Care Provider. *  Please update this list whenever your medications are discontinued, doses are      changed, or new medications (including over-the-counter products) are added. *  Please carry medication information at all times in case of emergency situations.     These are general instructions for a healthy lifestyle:    No smoking/ No tobacco products/ Avoid exposure to second hand smoke  Surgeon General's Warning:  Quitting smoking now greatly reduces serious risk to your health. Obesity, smoking, and sedentary lifestyle greatly increases your risk for illness    A healthy diet, regular physical exercise & weight monitoring are important for maintaining a healthy lifestyle    You may be retaining fluid if you have a history of heart failure or if you experience any of the following symptoms:  Weight gain of 3 pounds or more overnight or 5 pounds in a week, increased swelling in our hands or feet or shortness of breath while lying flat in bed. Please call your doctor as soon as you notice any of these symptoms; do not wait until your next office visit. Recognize signs and symptoms of STROKE:    F-face looks uneven    A-arms unable to move or move unevenly    S-speech slurred or non-existent    T-time-call 911 as soon as signs and symptoms begin-DO NOT go       Back to bed or wait to see if you get better-TIME IS BRAIN. Warning Signs of HEART ATTACK     Call 911 if you have these symptoms:   Chest discomfort. Most heart attacks involve discomfort in the center of the chest that lasts more than a few minutes, or that goes away and comes back. It can feel like uncomfortable pressure, squeezing, fullness, or pain.  Discomfort in other areas of the upper body. Symptoms can include pain or discomfort in one or both arms, the back, neck, jaw, or stomach.  Shortness of breath with or without chest discomfort.  Other signs may include breaking out in a cold sweat, nausea, or lightheadedness. Don't wait more than five minutes to call 911 - MINUTES MATTER! Fast action can save your life. Calling 911 is almost always the fastest way to get lifesaving treatment. Emergency Medical Services staff can begin treatment when they arrive -- up to an hour sooner than if someone gets to the hospital by car. The discharge information has been reviewed with the patient.   The patient verbalized understanding. Discharge medications reviewed with the patient and appropriate educational materials and side effects teaching were provided. ___________________________________________________________________________________________________________________________________    Patient armband removed and shredded    MyChart Activation    Thank you for requesting access to [employer]. Please follow the instructions below to securely access and download your online medical record. [employer] allows you to send messages to your doctor, view your test results, renew your prescriptions, schedule appointments, and more. How Do I Sign Up? 1. In your internet browser, go to [email or web address]  2. Click on the First Time User? Click Here link in the Sign In box. You will be redirect to the New Member Sign Up page. 3. Enter your [employer] Access Code exactly as it appears below. You will not need to use this code after youve completed the sign-up process. If you do not sign up before the expiration date, you must request a new code. [employer] Access Code: Activation code not generated  Current [employer] Status: Active (This is the date your [employer] access code will )    4. Enter the last four digits of your Social Security Number (xxxx) and Date of Birth (mm/dd/yyyy) as indicated and click Submit. You will be taken to the next sign-up page. 5. Create a [employer] ID. This will be your [employer] login ID and cannot be changed, so think of one that is secure and easy to remember. 6. Create a [employer] password. You can change your password at any time. 7. Enter your Password Reset Question and Answer. This can be used at a later time if you forget your password. 8. Enter your e-mail address. You will receive e-mail notification when new information is available in [address] 9. Click Sign Up. You can now view and download portions of your medical record.   10. Click the Download Summary menu link to download a portable copy of your medical information. Additional Information    If you have questions, please visit the Frequently Asked Questions section of the [employer] website at [email or web address] Remember, [employer] is NOT to be used for urgent needs. For medical emergencies, dial 911.

## 2018-11-28 NOTE — PROGRESS NOTES
Bedside shift change report given to Angela ORTIZ(oncoming nurse) by Aishwarya Ellison (offgoing nurse). Report included the following information SBAR, Kardex, MAR and Cardiac Rhythm NSR. Pt in bed resting. 1950  Shift assessment, denies pain. Lying in bed states he wants to get some sleep. 0455  In bed resting, denies pain. Slept through the night. Bedside shift change report given to JORDEN (oncoming nurse) by Suzie Vidal RN (offgoing nurse). Report included the following information SBAR, Kardex, Intake/Output and MAR.

## 2018-11-28 NOTE — PROGRESS NOTES
Problem: Mobility Impaired (Adult and Pediatric) Goal: *Acute Goals and Plan of Care (Insert Text) Physical Therapy Goals Initiated 11/27/2018 and to be accomplished within 7 days. 1.  Patient will complete all bed mobility with supervision/set-up in order to prepare for EOB/OOB activity. 2.  Patient will perform sit <> stand with minimal assistance/contact guard assist in order to prepare for OOB/gait activity. 3.  Patient will perform bed to chair transfers with minimal assistance/contact guard assist in order to promote mobility and encourage seated activity to progress towards their prior level of function. 4.  Patient will ambulate 25 feet with minimal assistance/contact guard assist using LRAD in order to prepare for safe negotiation of their environment. Outcome: Progressing Towards Goal 
 
PHYSICAL THERAPY: Daily TREATMENT Note INPATIENT: Medicare: Hospital Day: 5 Patient: Ayaka Marie (85 y.o. male)    Date: 11/28/2018 Primary Diagnosis: Chest pain CAD (coronary artery disease) Chest pain Hypotension  
,  ,  
Precautions: Fall Chart, physical therapy assessment, plan of care and goals were reviewed. PLOF: Independent with rollator ASSESSMENT: 
Patient supine in bed, agreeable to participation with PT. R LE prosthetic on. MIN A x 1 for supine <> sit with MIN A for seated balance d/t posterior trunk lean. Sit <> stand with MOD A x 1 with fair standing balance with RW. Ambulation x 15 ft with RW and CGA. Verbal cuing for safe use of RW with gait. Patient left sitting up in chair with tray in front and all needs within reach. No c/o pain with activity. RN AJ notified. Progression toward goals: 
      Improving slowly and progressing toward goals PLAN: 
Patient continues to benefit from skilled intervention to address the above impairments. Continue treatment per established plan of care.  
 
EDUCATION:  
 Education:  Patient was educated on the following topics: gait training with RW, safety with RW. Verbalizes understanding. Barriers to Learning/Limitations: None Compensate with: visual, verbal, tactile, kinesthetic cues/model Discharge Recommendations:  Nik Fonseca Further Equipment Recommendations for Discharge:  rolling walker Factors which may impact discharge planning: Safety awareness SUBJECTIVE:  
Patient stated I want to get in the chair.  OBJECTIVE DATA SUMMARY:  
Critical Behavior: 
Neurologic State: Alert Orientation Level: Oriented X4 Cognition: Follows commands G CODE:Mobility [id number] Current  CK= 40-59%   Goal  CJ= 20-39%. The severity rating is based on the Other MIN - CGA for mobility, fair standing balance, MIN A for seated balance. Functional Mobility: 
 
 
Functional Status Indep (I) Mod I Super-vision Min A Mod A Max A Total A Assist x2 Verbal cues Additional time Not tested Comments Rolling []  []  [] []    []    []  []  [] [] [] [x] Supine to sit []  []  [] [x]  []  []  []  [] [] [] [] Sit to supine []  []  [] []  []  []  []  [] [] [] [x] Sit to stand []  []  [] [x]  []  []  []  [] [] [] []   
Stand to sit []  []  [] [x]  []  []  []  [] [] [] [] Bed to chair transfers []  []  [] []  []  []  []  [] [] [] [x] Balance Good Susan Jennifer Poor Unable Not tested Comments Sitting static []  [x]  []  []  [] Sitting dynamic []  [x]  []  []  []   
Standing static []  [x]  []  []  []   
Standing dynamic []  [x]  []  []  [] Mobility/Gait:  
Level of Assistance: Contact guard assistance Assistive Device: rolling walker Distance Ambulated: 15 feet Left Lower Extremity: FWB Right Lower Extremity: FWB Base of Support: center of gravity altered Speed/Ebony: pace decreased (<100 feet/min) and slow Step Length: [facility] Swing Pattern: right asymmetrical 
Stance: [facility] Gait Abnormalities: altered arm swing and decreased step clearance Vital Signs Temp: 98.8 °F (37.1 °C) Pulse (Heart Rate): 68    
BP: 111/64 Resp Rate: 16    
O2 Sat (%): 96 % Pain: 
None Activity Tolerance:  
Fair After treatment:  
Patient left in no apparent distress sitting up in chair Call bell left within reach Nursing notified [facility] Time Calculation: 18 mins

## 2018-11-28 NOTE — PROGRESS NOTES
Problem: Falls - Risk of 
Goal: *Absence of Falls Document Mary Hurleyippo Fall Risk and appropriate interventions in the flowsheet. Outcome: Progressing Towards Goal 
Fall Risk Interventions: 
Mobility Interventions: Patient to call before getting OOB, Communicate number of staff needed for ambulation/transfer Medication Interventions: Patient to call before getting OOB, Teach patient to arise slowly Elimination Interventions: Call light in reach, Patient to call for help with toileting needs, Toilet paper/wipes in reach, Toileting schedule/hourly rounds Problem: Pressure Injury - Risk of 
Goal: *Prevention of pressure injury Document Sylvester Scale and appropriate interventions in the flowsheet. Outcome: Progressing Towards Goal 
Pressure Injury Interventions: 
Sensory Interventions: Assess changes in LOC, Check visual cues for pain, Keep linens dry and wrinkle-free, Maintain/enhance activity level, Minimize linen layers Moisture Interventions: Internal/External urinary devices, Absorbent underpads, Minimize layers, Maintain skin hydration (lotion/cream) Activity Interventions: Pressure redistribution bed/mattress(bed type), Increase time out of bed Mobility Interventions: HOB 30 degrees or less, Pressure redistribution bed/mattress (bed type) Nutrition Interventions: Document food/fluid/supplement intake Friction and Shear Interventions: HOB 30 degrees or less, Minimize layers

## 2018-11-28 NOTE — PROGRESS NOTES
[facility] - Progress Note Admit Date: 11/24/2018 Assessment:  
 
-Acute MI8, troponin up to 71.80 with CK-MB peaking at 255.6, medical therapy was pursued 
-Moderate aortic stenosis  
-JULIA, Cr 1.98 --> 1.68 --> 1.81 (Cr 1.66 7/2/2018) -Hx AAA, s/p aorta bi iliac endograft 
-Hx PAD, s/p R AKA, s/p L SFA angioplasty 
-Hx left femoral artery aneurysm -HTN 
-DMII with diabetic nephropathy 
-Pure hypercholesterolemia 
  
Primary cardiologist is Dr. Janet Tadeo Plan: - On cardiac regimen of ASA, stain, Coreg and Plavix 
- Ok to mobilize slowly today with assistance - No ACE-I for now because of recent JULIA. Creat improving. Consider ACE-I as outpatient. - Plan of medical management noted by primary cardiologist. Patient wants to go home. No angina symptoms.  
- No further cardiac work up planned at this time unless clinical status changes. Call us back if needed. Will be available as needed. Will need to follow up in cardiology clinic with Madi Salcedo in 2-3 weeks after discharge. Thank you. Subjective: No new complaints. No CP or SOB 
watns to go home Objective:  
  
Patient Vitals for the past 8 hrs: 
 Temp Pulse Resp BP SpO2  
11/28/18 1226 98.8 °F (37.1 °C) 68 16 111/64 96 % 11/28/18 0813 98.6 °F (37 °C) 64 18 114/74 96 % Patient Vitals for the past 96 hrs: 
 Weight 11/27/18 0510 220 lb 0.3 oz (99.8 kg)  
11/26/18 1303 220 lb (99.8 kg) 11/24/18 1410 220 lb (99.8 kg) Intake/Output Summary (Last 24 hours) at 11/28/2018 1318 Last data filed at 11/28/2018 1033 Gross per 24 hour Intake 810 ml Output 1200 ml Net -390 ml Physical Exam: 
General:  alert, cooperative, no distress Neck:  nontender, no JVD Lungs:  clear to auscultation bilaterally Heart:  regular rate and rhythm, S1, S2 normal, no murmur, click, rub or gallop Abdomen:  abdomen is soft without significant tenderness, masses, organomegaly or guarding Extremities: Righe below knee prosthesis. Left trace edema. Data Review:  
 
Labs: Results:  
   
Chemistry Recent Labs  
  11/28/18 
2690 11/27/18 
0368 11/26/18 
0345 GLU 96 103* 99  141 140  
K 4.5 4.2 4.4  
 107 105 CO2 22 24 25 BUN 31* 29* 28* CREA 1.68* 1.62* 1.81* CA 9.5 9.0 9.3 AGAP 12 10 10 BUCR 18 18 15 CBC w/Diff Recent Labs  
  11/28/18 
0620 11/27/18 
2896 11/26/18 
0345 WBC 13.5* 13.1 12.7 RBC 3.74* 3.67* 3.66* HGB 12.2* 12.0* 12.3*  
HCT 36.6 35.3* 35.5*  243 238 GRANS 75* 71 67 LYMPH 12* 13* 18* EOS 3 4 4 Cardiac Enzymes No results found for: CPK, CK, CKMMB, CKMB, RCK3, CKMBT, CKNDX, CKND1, ANNE, TROPT, TROIQ, NALINI, TROPT, TNIPOC, BNP, BNPP Coagulation Recent Labs  
  11/27/18 
0611 11/26/18 
1358 APTT 88.2* 84.2* Lipid Panel Lab Results Component Value Date/Time Cholesterol, total 129 04/14/2018 09:27 AM  
 HDL Cholesterol 38 (L) 04/14/2018 09:27 AM  
 LDL, calculated 49.6 04/14/2018 09:27 AM  
 VLDL, calculated 41.4 04/14/2018 09:27 AM  
 Triglyceride 207 (H) 04/14/2018 09:27 AM  
 CHOL/HDL Ratio 3.4 04/14/2018 09:27 AM  
  
BNP No results found for: BNP, BNPP, XBNPT Liver Enzymes No results for input(s): TP, ALB, TBIL, AP, SGOT, GPT in the last 72 hours. No lab exists for component: DBIL Digoxin Thyroid Studies Lab Results Component Value Date/Time TSH 3.22 04/14/2018 09:27 AM  
 TSH 3.31 04/14/2018 09:27 AM  
    
 
Signed By: Marilee Pratt MD   
 November 28, 2018

## 2018-11-28 NOTE — PROGRESS NOTES
Bedside and Verbal shift change report given to JORDEN (oncoming nurse) by Yvrose Stephen (offgoing nurse). Report included the following information SBAR, Kardex and MAR. Pt is AOx4 on room air. He has a BKA R. Leg. He is up with assist x1 but is very unsteady and a fall risk. Fall prevention device needed. Pt also uses a cane to amputate. Pt has no needs at this time. Possible DC home today.

## 2018-11-29 ENCOUNTER — HOME CARE VISIT (OUTPATIENT)
Dept: SCHEDULING | Facility: HOME HEALTH | Age: 78
End: 2018-11-29
Payer: MEDICARE

## 2018-11-29 ENCOUNTER — PATIENT OUTREACH (OUTPATIENT)
Dept: FAMILY MEDICINE CLINIC | Age: 78
End: 2018-11-29

## 2018-11-29 PROCEDURE — 400013 HH SOC

## 2018-11-29 PROCEDURE — G0299 HHS/HOSPICE OF RN EA 15 MIN: HCPCS

## 2018-11-29 NOTE — PROGRESS NOTES
[facility] Discharge Follow-Up Date/Time:  2018 3:43 PM 
 
Patient was admitted to [facility] on 18 and discharged on 18 for MI. The physician discharge summary was available at the time of outreach. Patient was contacted within 1 business days of discharge. Top Challenges reviewed with the provider Acute MI 
S/p CABG x 4 S/p AVR Method of communication with provider : chart routing Inpatient RRAT score: High Risk 39 Total Score 3 Has Seen PCP in Last 6 Months (Yes=3, No=0)  
 4 IP Visits Last 12 Months (1-3=4, 4=9, >4=11) 38 Charlson Comorbidity Score (Age + Comorbid Conditions) Criteria that do not apply:  
 . Living with Significant Other. Assisted Living. LTAC. SNF. or  
Rehab Patient Length of Stay (>5 days = 3) Pt. Coverage (Medicare=5 , Medicaid, or Self-Pay=4) Was this a readmission? no  
Patient stated reason for the readmission: NA 
 
Nurse Navigator (NN) contacted the patient by telephone to perform post hospital discharge assessment. Verified name and  with patient as identifiers. Provided introduction to self, and explanation of the Nurse Navigator role. Reviewed discharge instructions and red flags with patient who verbalized understanding. Patient given an opportunity to ask questions and does not have any further questions or concerns at this time. The patient agrees to contact the PCP office for questions related to their healthcare. NN provided contact information for future reference. Disease Specific:   NI 
 
 
Summary of patient's top problems: 1. MI 
2. S/p Cabg X 4 
3. S/p AVR Home Health orders at discharge: PT, OT Home Health company: [facility] Date of initial visit: 18 Durable Medical Equipment ordered/company: NA 
Durable Medical Equipment received: NA Barriers to care? medication management, utilization of services Advance Care Planning: Does patient have an Advance Directive:  not on file Medication(s):  
New Medications at Discharge: coreg Changed Medications at Discharge: none Discontinued Medications at Discharge: [facility] Medication reconciliation was performed with unable to perform, ,   There were no barriers to obtaining medications identified at this time. Referral to Pharm D needed: no  
 
Current Outpatient Medications Medication Sig  carvedilol (COREG) 3.125 mg tablet Take 1 Tab by mouth two (2) times daily (with meals).  clopidogrel (PLAVIX) 75 mg tab TAKE 1 TABLET BY MOUTH DAILY  atorvastatin (LIPITOR) 20 mg tablet Take 1 Tab by mouth daily.  tamsulosin (FLOMAX) 0.4 mg capsule Take 1 Cap by mouth nightly.  aspirin (ASPIRIN) 325 mg tablet Take 1 Tab by mouth daily.  donepezil (ARICEPT) 10 mg tablet TAKE 2 TABLETS BY MOUTH EVERY MORNING AFTER A MEAL  
 NITROGLYCERIN (NITROSTAT SL) 1 Tab by SubLINGual route as needed (chest pain).  omega-3 fatty acids-vitamin e (FISH OIL) 1,000 mg cap Take 1 Cap by mouth two (2) times a day. No current facility-administered medications for this visit. [facility] follow up appointment(s):  
Future Appointments Date Time Provider Susannah Beltre 12/10/2018  1:30 PM Nasreen Asp., DO [facility] SCHED  
1/8/2019  8:30 AM Nasreen Asp., DO [facility] SCHED  
5/9/2019  8:00 AM [facility] 2 [facility] SCHED  
5/9/2019  9:00 AM [facility] 2 [facility] SCHED  
5/9/2019 10:00 AM [facility] 2 [facility] SCHED  
5/9/2019 11:00 AM [facility] NONIMAGING 2VV [facility] SCHED  
5/23/2019 10:45 AM Anoop Robertson Non-[facility] follow up appointment(s): [facility]:  n/a  
 
 
Goals  Attends follow-up appointments as directed. -pt will follow up with PCP and other specialists as direct 
-pt will contact PCP office with any barriers in attending appts  Establish PCP relationships and regularly scheduled appointments.  Knowledge and adherence of prescribed medication (ie. action, side effects, missed dose, etc.).   
  -pt will take all meds as directed 
-pt will report any barriers in obtaining medications  Patient verbalizes understanding of self -management goals after a Myocardial Infaraction. Pt will regain strength through PT Pt will adhere to cardiac diet Pt will recognize red flags  Prevent complications post hospitalization. -pt will attend all appts as directed 
-pt will recognize red flags and report to PCP office or seek emergency assistance 
-pt will adhere to low sodium diet

## 2018-11-29 NOTE — Clinical Note
Pt admitted to [facility] 11/24-11/28 for acute MI, d/c home w/ PT, OT. Norvasc was d/c, started on Coreg 3.125. Pt aware, unable to do med rec, wife not at home. Has apt w/ you 12/10at 1:30, still needs to sched with Dr. Rudene Babinski.

## 2018-11-30 VITALS
RESPIRATION RATE: 18 BRPM | SYSTOLIC BLOOD PRESSURE: 130 MMHG | HEART RATE: 90 BPM | TEMPERATURE: 98.8 F | OXYGEN SATURATION: 96 % | DIASTOLIC BLOOD PRESSURE: 70 MMHG

## 2018-12-01 ENCOUNTER — APPOINTMENT (OUTPATIENT)
Dept: MRI IMAGING | Age: 78
DRG: 871 | End: 2018-12-01
Attending: EMERGENCY MEDICINE
Payer: MEDICARE

## 2018-12-01 ENCOUNTER — APPOINTMENT (OUTPATIENT)
Dept: CT IMAGING | Age: 78
DRG: 871 | End: 2018-12-01
Attending: PHYSICIAN ASSISTANT
Payer: MEDICARE

## 2018-12-01 ENCOUNTER — APPOINTMENT (OUTPATIENT)
Dept: GENERAL RADIOLOGY | Age: 78
DRG: 871 | End: 2018-12-01
Attending: EMERGENCY MEDICINE
Payer: MEDICARE

## 2018-12-01 ENCOUNTER — HOSPITAL ENCOUNTER (INPATIENT)
Age: 78
LOS: 3 days | Discharge: NURSING, REHAB OR LONG-TERM CARE | DRG: 871 | End: 2018-12-04
Attending: EMERGENCY MEDICINE | Admitting: HOSPITALIST
Payer: MEDICARE

## 2018-12-01 ENCOUNTER — APPOINTMENT (OUTPATIENT)
Dept: GENERAL RADIOLOGY | Age: 78
DRG: 871 | End: 2018-12-01
Attending: PHYSICIAN ASSISTANT
Payer: MEDICARE

## 2018-12-01 DIAGNOSIS — R77.8 ELEVATED TROPONIN: ICD-10-CM

## 2018-12-01 DIAGNOSIS — R31.9 URINARY TRACT INFECTION WITH HEMATURIA, SITE UNSPECIFIED: ICD-10-CM

## 2018-12-01 DIAGNOSIS — D72.829 LEUKOCYTOSIS, UNSPECIFIED TYPE: ICD-10-CM

## 2018-12-01 DIAGNOSIS — R53.1 WEAKNESS: Primary | ICD-10-CM

## 2018-12-01 DIAGNOSIS — N39.0 URINARY TRACT INFECTION WITH HEMATURIA, SITE UNSPECIFIED: ICD-10-CM

## 2018-12-01 DIAGNOSIS — R47.9 SPEECH DISTURBANCE, UNSPECIFIED TYPE: ICD-10-CM

## 2018-12-01 PROBLEM — A41.9 SEPSIS (HCC): Status: ACTIVE | Noted: 2018-12-01

## 2018-12-01 PROBLEM — G93.41 ACUTE METABOLIC ENCEPHALOPATHY: Status: ACTIVE | Noted: 2018-12-01

## 2018-12-01 PROBLEM — I63.9 CVA (CEREBRAL VASCULAR ACCIDENT) (HCC): Status: ACTIVE | Noted: 2018-12-01

## 2018-12-01 PROBLEM — I25.2 HISTORY OF MI (MYOCARDIAL INFARCTION): Status: ACTIVE | Noted: 2018-12-01

## 2018-12-01 PROBLEM — Z86.73 HISTORY OF CVA (CEREBROVASCULAR ACCIDENT): Status: ACTIVE | Noted: 2018-12-01

## 2018-12-01 LAB
ABO + RH BLD: NORMAL
ALBUMIN SERPL-MCNC: 3.4 G/DL (ref 3.4–5)
ALBUMIN/GLOB SERPL: 0.7 {RATIO} (ref 0.8–1.7)
ALP SERPL-CCNC: 223 U/L (ref 45–117)
ALT SERPL-CCNC: 116 U/L (ref 16–61)
AMPHET UR QL SCN: NEGATIVE
ANION GAP SERPL CALC-SCNC: 5 MMOL/L (ref 3–18)
APPEARANCE UR: ABNORMAL
ASPIRIN TEST, ASPIRN: 385 ARU (ref 620–672)
AST SERPL-CCNC: 65 U/L (ref 15–37)
BACTERIA URNS QL MICRO: ABNORMAL /HPF
BARBITURATES UR QL SCN: NEGATIVE
BASOPHILS # BLD: 0.1 K/UL (ref 0–0.1)
BASOPHILS NFR BLD: 0 % (ref 0–2)
BENZODIAZ UR QL: NEGATIVE
BILIRUB SERPL-MCNC: 0.8 MG/DL (ref 0.2–1)
BILIRUB UR QL: NEGATIVE
BLOOD GROUP ANTIBODIES SERPL: NORMAL
BUN SERPL-MCNC: 34 MG/DL (ref 7–18)
BUN/CREAT SERPL: 17 (ref 12–20)
CALCIUM SERPL-MCNC: 9.6 MG/DL (ref 8.5–10.1)
CANNABINOIDS UR QL SCN: NEGATIVE
CHLORIDE SERPL-SCNC: 103 MMOL/L (ref 100–108)
CO2 SERPL-SCNC: 27 MMOL/L (ref 21–32)
COCAINE UR QL SCN: NEGATIVE
COLOR UR: ABNORMAL
CREAT SERPL-MCNC: 2.03 MG/DL (ref 0.6–1.3)
DIFFERENTIAL METHOD BLD: ABNORMAL
EOSINOPHIL # BLD: 0 K/UL (ref 0–0.4)
EOSINOPHIL NFR BLD: 0 % (ref 0–5)
ERYTHROCYTE [DISTWIDTH] IN BLOOD BY AUTOMATED COUNT: 14.9 % (ref 11.6–14.5)
ERYTHROCYTE [SEDIMENTATION RATE] IN BLOOD: 80 MM/HR (ref 0–20)
FIBRINOGEN PPP-MCNC: 988 MG/DL (ref 210–451)
GLOBULIN SER CALC-MCNC: 4.6 G/DL (ref 2–4)
GLUCOSE BLD STRIP.AUTO-MCNC: 110 MG/DL (ref 70–110)
GLUCOSE SERPL-MCNC: 113 MG/DL (ref 74–99)
GLUCOSE UR STRIP.AUTO-MCNC: NEGATIVE MG/DL
HCT VFR BLD AUTO: 37.4 % (ref 36–48)
HDSCOM,HDSCOM: NORMAL
HGB BLD-MCNC: 12.7 G/DL (ref 13–16)
HGB UR QL STRIP: ABNORMAL
INR PPP: 1.2 (ref 0.8–1.2)
KETONES UR QL STRIP.AUTO: NEGATIVE MG/DL
LACTATE BLD-SCNC: 0.89 MMOL/L (ref 0.4–2)
LEUKOCYTE ESTERASE UR QL STRIP.AUTO: ABNORMAL
LYMPHOCYTES # BLD: 0.9 K/UL (ref 0.9–3.6)
LYMPHOCYTES NFR BLD: 4 % (ref 21–52)
MAGNESIUM SERPL-MCNC: 2.4 MG/DL (ref 1.6–2.6)
MCH RBC QN AUTO: 33.2 PG (ref 24–34)
MCHC RBC AUTO-ENTMCNC: 34 G/DL (ref 31–37)
MCV RBC AUTO: 97.9 FL (ref 74–97)
METHADONE UR QL: NEGATIVE
MONOCYTES # BLD: 1.8 K/UL (ref 0.05–1.2)
MONOCYTES NFR BLD: 9 % (ref 3–10)
NEUTS SEG # BLD: 18.4 K/UL (ref 1.8–8)
NEUTS SEG NFR BLD: 87 % (ref 40–73)
NITRITE UR QL STRIP.AUTO: NEGATIVE
OPIATES UR QL: NEGATIVE
PCP UR QL: NEGATIVE
PH UR STRIP: 6 [PH] (ref 5–8)
PLATELET # BLD AUTO: 312 K/UL (ref 135–420)
PMV BLD AUTO: 11 FL (ref 9.2–11.8)
POTASSIUM SERPL-SCNC: 3.8 MMOL/L (ref 3.5–5.5)
PROT SERPL-MCNC: 8 G/DL (ref 6.4–8.2)
PROT UR STRIP-MCNC: 100 MG/DL
PROTHROMBIN TIME: 14.8 SEC (ref 11.5–15.2)
RBC # BLD AUTO: 3.82 M/UL (ref 4.7–5.5)
RBC #/AREA URNS HPF: ABNORMAL /HPF (ref 0–5)
SODIUM SERPL-SCNC: 135 MMOL/L (ref 136–145)
SP GR UR REFRACTOMETRY: 1.02 (ref 1–1.03)
SPECIMEN EXP DATE BLD: NORMAL
T3FREE SERPL-MCNC: 1.9 PG/ML (ref 2.18–3.98)
T4 FREE SERPL-MCNC: 1 NG/DL (ref 0.7–1.5)
THROMBIN TIME: 15.1 SECS (ref 13.8–18.2)
TROPONIN I SERPL-MCNC: 1.13 NG/ML (ref 0–0.04)
TSH SERPL DL<=0.05 MIU/L-ACNC: 2 UIU/ML (ref 0.36–3.74)
UROBILINOGEN UR QL STRIP.AUTO: 1 EU/DL (ref 0.2–1)
WBC # BLD AUTO: 21.1 K/UL (ref 4.6–13.2)
WBC URNS QL MICRO: ABNORMAL /HPF (ref 0–4)

## 2018-12-01 PROCEDURE — 84481 FREE ASSAY (FT-3): CPT

## 2018-12-01 PROCEDURE — 85652 RBC SED RATE AUTOMATED: CPT

## 2018-12-01 PROCEDURE — 80053 COMPREHEN METABOLIC PANEL: CPT

## 2018-12-01 PROCEDURE — 74011250636 HC RX REV CODE- 250/636: Performed by: EMERGENCY MEDICINE

## 2018-12-01 PROCEDURE — 65660000000 HC RM CCU STEPDOWN

## 2018-12-01 PROCEDURE — 74011250637 HC RX REV CODE- 250/637: Performed by: HOSPITALIST

## 2018-12-01 PROCEDURE — 85576 BLOOD PLATELET AGGREGATION: CPT

## 2018-12-01 PROCEDURE — 85670 THROMBIN TIME PLASMA: CPT

## 2018-12-01 PROCEDURE — 51701 INSERT BLADDER CATHETER: CPT

## 2018-12-01 PROCEDURE — 87040 BLOOD CULTURE FOR BACTERIA: CPT

## 2018-12-01 PROCEDURE — 83605 ASSAY OF LACTIC ACID: CPT

## 2018-12-01 PROCEDURE — 84443 ASSAY THYROID STIM HORMONE: CPT

## 2018-12-01 PROCEDURE — 84439 ASSAY OF FREE THYROXINE: CPT

## 2018-12-01 PROCEDURE — 70450 CT HEAD/BRAIN W/O DYE: CPT

## 2018-12-01 PROCEDURE — 93005 ELECTROCARDIOGRAM TRACING: CPT

## 2018-12-01 PROCEDURE — 99285 EMERGENCY DEPT VISIT HI MDM: CPT

## 2018-12-01 PROCEDURE — 85384 FIBRINOGEN ACTIVITY: CPT

## 2018-12-01 PROCEDURE — 83735 ASSAY OF MAGNESIUM: CPT

## 2018-12-01 PROCEDURE — 74011000258 HC RX REV CODE- 258: Performed by: HOSPITALIST

## 2018-12-01 PROCEDURE — 81001 URINALYSIS AUTO W/SCOPE: CPT

## 2018-12-01 PROCEDURE — 70544 MR ANGIOGRAPHY HEAD W/O DYE: CPT

## 2018-12-01 PROCEDURE — 70551 MRI BRAIN STEM W/O DYE: CPT

## 2018-12-01 PROCEDURE — 80307 DRUG TEST PRSMV CHEM ANLYZR: CPT

## 2018-12-01 PROCEDURE — 85025 COMPLETE CBC W/AUTO DIFF WBC: CPT

## 2018-12-01 PROCEDURE — 71045 X-RAY EXAM CHEST 1 VIEW: CPT

## 2018-12-01 PROCEDURE — 82962 GLUCOSE BLOOD TEST: CPT

## 2018-12-01 PROCEDURE — 85610 PROTHROMBIN TIME: CPT

## 2018-12-01 PROCEDURE — 77030021352 HC CBL LD SYS DISP COVD -B

## 2018-12-01 PROCEDURE — 84484 ASSAY OF TROPONIN QUANT: CPT

## 2018-12-01 PROCEDURE — 86141 C-REACTIVE PROTEIN HS: CPT

## 2018-12-01 PROCEDURE — 77030005563 HC CATH URETH INT MMGH -A

## 2018-12-01 PROCEDURE — 86900 BLOOD TYPING SEROLOGIC ABO: CPT

## 2018-12-01 PROCEDURE — 74011250637 HC RX REV CODE- 250/637: Performed by: EMERGENCY MEDICINE

## 2018-12-01 RX ORDER — ONDANSETRON 2 MG/ML
2 INJECTION INTRAMUSCULAR; INTRAVENOUS
Status: DISCONTINUED | OUTPATIENT
Start: 2018-12-01 | End: 2018-12-04 | Stop reason: HOSPADM

## 2018-12-01 RX ORDER — ACETAMINOPHEN 650 MG/1
650 SUPPOSITORY RECTAL
Status: DISCONTINUED | OUTPATIENT
Start: 2018-12-01 | End: 2018-12-04 | Stop reason: HOSPADM

## 2018-12-01 RX ORDER — AMOXICILLIN 250 MG
2 CAPSULE ORAL
Status: DISCONTINUED | OUTPATIENT
Start: 2018-12-01 | End: 2018-12-04 | Stop reason: HOSPADM

## 2018-12-01 RX ORDER — CLOPIDOGREL BISULFATE 75 MG/1
75 TABLET ORAL DAILY
Status: DISCONTINUED | OUTPATIENT
Start: 2018-12-02 | End: 2018-12-04 | Stop reason: HOSPADM

## 2018-12-01 RX ORDER — ACETAMINOPHEN 325 MG/1
650 TABLET ORAL
Status: DISCONTINUED | OUTPATIENT
Start: 2018-12-01 | End: 2018-12-04 | Stop reason: HOSPADM

## 2018-12-01 RX ORDER — DEXTROSE MONOHYDRATE AND SODIUM CHLORIDE 5; .9 G/100ML; G/100ML
0.75 INJECTION, SOLUTION INTRAVENOUS CONTINUOUS
Status: DISCONTINUED | OUTPATIENT
Start: 2018-12-01 | End: 2018-12-04 | Stop reason: HOSPADM

## 2018-12-01 RX ORDER — GUAIFENESIN 100 MG/5ML
324 LIQUID (ML) ORAL
Status: COMPLETED | OUTPATIENT
Start: 2018-12-01 | End: 2018-12-01

## 2018-12-01 RX ORDER — CLOPIDOGREL BISULFATE 75 MG/1
75 TABLET ORAL
Status: COMPLETED | OUTPATIENT
Start: 2018-12-01 | End: 2018-12-01

## 2018-12-01 RX ORDER — CARVEDILOL 3.12 MG/1
3.12 TABLET ORAL 2 TIMES DAILY WITH MEALS
Status: DISCONTINUED | OUTPATIENT
Start: 2018-12-02 | End: 2018-12-04 | Stop reason: HOSPADM

## 2018-12-01 RX ORDER — CEFTRIAXONE 1 G/1
1 INJECTION, POWDER, FOR SOLUTION INTRAMUSCULAR; INTRAVENOUS
Status: COMPLETED | OUTPATIENT
Start: 2018-12-01 | End: 2018-12-01

## 2018-12-01 RX ORDER — TAMSULOSIN HYDROCHLORIDE 0.4 MG/1
0.4 CAPSULE ORAL
Status: DISCONTINUED | OUTPATIENT
Start: 2018-12-01 | End: 2018-12-04 | Stop reason: HOSPADM

## 2018-12-01 RX ORDER — ASPIRIN 325 MG
325 TABLET ORAL DAILY
Status: DISCONTINUED | OUTPATIENT
Start: 2018-12-02 | End: 2018-12-04 | Stop reason: HOSPADM

## 2018-12-01 RX ORDER — ATORVASTATIN CALCIUM 40 MG/1
80 TABLET, FILM COATED ORAL
Status: DISCONTINUED | OUTPATIENT
Start: 2018-12-01 | End: 2018-12-04 | Stop reason: HOSPADM

## 2018-12-01 RX ADMIN — ATORVASTATIN CALCIUM 80 MG: 40 TABLET, FILM COATED ORAL at 17:37

## 2018-12-01 RX ADMIN — CEFTRIAXONE 1 G: 1 INJECTION, POWDER, FOR SOLUTION INTRAMUSCULAR; INTRAVENOUS at 14:01

## 2018-12-01 RX ADMIN — DEXTROSE MONOHYDRATE AND SODIUM CHLORIDE 0.75 ML/KG/HR: 5; .9 INJECTION, SOLUTION INTRAVENOUS at 17:35

## 2018-12-01 RX ADMIN — ASPIRIN 81 MG 324 MG: 81 TABLET ORAL at 14:00

## 2018-12-01 RX ADMIN — CLOPIDOGREL BISULFATE 75 MG: 75 TABLET ORAL at 17:37

## 2018-12-01 RX ADMIN — TAMSULOSIN HYDROCHLORIDE 0.4 MG: 0.4 CAPSULE ORAL at 21:29

## 2018-12-01 RX ADMIN — DOCUSATE SODIUM AND SENNOSIDES 2 TABLET: 8.6; 5 TABLET, FILM COATED ORAL at 21:29

## 2018-12-01 NOTE — H&P
Internal Medicine History and Physical 
   
 
 
Subjective HPI: Lexi Liu is a [de-identified] y.o. male with significant PMHx as listed below who presented to the ED with weakness/fatigue and change in speech earlier this AM. The patient is well known to me and was recently discharged home earlier this week after treatment for inferior STEMI treated with medical management. The recommendation at the time of discharge was inpatient rehab, but the patient refused. He states that he was doing OK on Thursday, but then yesterday began to feel more weak and fatigued. Then, this morning, his friend noticed a change in his speech and he was brought to the ED for further evaluation. A code S was called. CT head was negative. MRI brain was negative as well for acute stroke. He was back to his baseline in the ED per patient. During my evaluation, he appeared at his baseline as well. He was found to have UA consistent with a UTI and started on IVAB as well. PMHx: 
Past Medical History:  
Diagnosis Date  Advance directive in chart 6/13/2016  CAD (coronary artery disease)  Cancer [facility]) 2003 Colon  CKD (chronic kidney disease), stage III ([address] 3/27/2016  CVA (cerebral vascular accident) ([address] 3/26/2016  Femoral artery aneurysm, left ([address]  Heart attack [facility]) 2008  Heart attack ([address]  Hernia  History of TIAs  Hyperlipidemia  Hypertension 3/27/2016  Iliac artery aneurysm, left ([address]  Pure hypercholesterolemia 9/14/2012  PVD (peripheral vascular disease) ([address] 3/27/2016  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 
Past Surgical History:  
Procedure Laterality Date  ABDOMEN SURGERY PROC UNLISTED  2011  
 3 stents placed into abdomen (groin)  CARDIAC SURG PROCEDURE UNLIST  2005 Quadruple Bypass  CARDIAC SURG PROCEDURE UNLIST  2011 Aortic pig valve placed  COLONOSCOPY N/A 6/29/2016 COLON[facility] ENDOSCOPY  ENDOSCOPY, COLON, DIAGNOSTIC    
 HX AAA REPAIR    
 HX ORTHOPAEDIC  2008 Right Leg Amputated SocialHx: 
Social History Socioeconomic History  Marital status:  Spouse name: Not on file  Number of children: Not on file  Years of education: Not on file  Highest education level: Not on file Social Needs  Financial resource strain: Not on file  Food insecurity - worry: Not on file  Food insecurity - inability: Not on file  Transportation needs - medical: Not on file  Transportation needs - non-medical: Not on file Occupational History  Not on file Tobacco Use  Smoking status: Former Smoker Last attempt to quit: 1987 Years since quittin.2  Smokeless tobacco: Never Used Substance and Sexual Activity  Alcohol use: No  
  Comment: wine 1-2x per week  Drug use: No  
 Sexual activity: Not Currently Other Topics Concern  Not on file Social History Narrative  Not on file Allergies: Allergies Allergen Reactions  Ace Inhibitors Other (comments) Per pt, Cardiologist states he cannot have ACE inhibitors FamilyHx: 
Family History Problem Relation Age of Onset  Hypertension Mother  Hypertension Father  Heart Disease Father  Heart Disease Brother Prior to Admission Medications Prescriptions Last Dose Informant Patient Reported? Taking? NITROGLYCERIN (NITROSTAT SL)   Yes No  
Si Tab by SubLINGual route as needed (chest pain). aspirin (ASPIRIN) 325 mg tablet   No No  
Sig: Take 1 Tab by mouth daily. atorvastatin (LIPITOR) 20 mg tablet   No No  
Sig: Take 1 Tab by mouth daily. carvedilol (COREG) 3.125 mg tablet   No No  
Sig: Take 1 Tab by mouth two (2) times daily (with meals).   
clopidogrel (PLAVIX) 75 mg tab   No No  
Sig: TAKE 1 TABLET BY MOUTH DAILY  
donepezil (ARICEPT) 10 mg tablet   No No  
 Sig: TAKE 2 TABLETS BY MOUTH EVERY MORNING AFTER A MEAL  
omega-3 fatty acids-vitamin e (FISH OIL) 1,000 mg cap   No No  
Sig: Take 1 Cap by mouth two (2) times a day. tamsulosin (FLOMAX) 0.4 mg capsule   No No  
Sig: Take 1 Cap by mouth nightly. Facility-Administered Medications: None Review of Systems: 
CONST: Fever, weight loss, fatigue or chills HEENT: Recent changes in vision, vertigo, epistaxis, dysphagia and hoarseness CV: Chest pain, palpitations, edema and varicosities RESP: Cough, shortness of breath, wheezing, hemoptysis, snoring and reactive airway disease GI: Nausea, vomiting, abdominal pain, change in bowel habits, hematochezia, melena, and GERD  
: Hematuria, dysuria, frequency, urgency, nocturia and stress urinary incontinence MS: Weakness, joint pain and arthritis ENDO: Polyuria, polydipsia, polyphagia, poor wound healing, heat intolerance, cold intolerance LYMPH/HEME: Anemia, bruising and history of blood transfusions INTEG: Dermatitis, abnormal moles NEURO: Dizziness, headache, fainting, seizures and stroke PSYCH: Anxiety and depression Objective Visit Vitals /69 Pulse 69 Temp 99.3 °F (37.4 °C) Resp 18 Ht 5' 8\" (1.727 m) Wt 99.8 kg (220 lb) SpO2 92% BMI 33.45 kg/m² Physical Exam: 
General Appearance: NAD, conversant HENT: normocephalic/atraumatic, dry mucus membranes Lungs: CTA with normal respiratory effort Cardiovascular: RRR, no m/r/g, capillary refill < 2 sec, B/L DP/PT pulses +3/4 Abdomen: soft, non-tender, normal bowel sounds Extremities: no cyanosis, R BKA, no edema Neuro: moves all extremities, residual L-sided weakness Psych: appropriate affect, alert and oriented to person, place and time Laboratory Studies: 
BMP:  
Lab Results Component Value Date/Time   (L) 12/01/2018 11:35 AM  
 K 3.8 12/01/2018 11:35 AM  
  12/01/2018 11:35 AM  
 CO2 27 12/01/2018 11:35 AM  
 AGAP 5 12/01/2018 11:35 AM  
  (H) 12/01/2018 11:35 AM  
 BUN 34 (H) 12/01/2018 11:35 AM  
 CREA 2.03 (H) 12/01/2018 11:35 AM  
 GFRAA 38 (L) 12/01/2018 11:35 AM  
 GFRNA 32 (L) 12/01/2018 11:35 AM  
 
CBC:  
Lab Results Component Value Date/Time WBC 21.1 (H) 12/01/2018 11:35 AM  
 HGB 12.7 (L) 12/01/2018 11:35 AM  
 HCT 37.4 12/01/2018 11:35 AM  
  12/01/2018 11:35 AM  
 
 
Imaging Reviewed: 
Janeth Christine Brain Wo Cont Result Date: 12/1/2018 MR angiogram of the [place] without contrast 12/1/2018. CLINICAL INDICATION/HISTORY:  Previous stroke. Acute onset of slurred speech. Stroke alert. TECHNIQUE: 3-D time-of-flight imaging of the intracranial vasculature was performed. MIP reconstructions were obtained from the source images. COMPARISON:  None. FINDINGS: Evaluation of the intracranial vasculature is unremarkable. Specifically, there is no evidence of hemodynamically significant stenosis or occlusion involving the main intracranial branches. There is a 1-2 mm outpouching arising inferiorly from the supraclinoid segment of the right internal carotid artery. This appears to be just proximal to the origin of the right posterior communicating artery and a definite vessel was not identified emanating from its apex. This could represent an incompletely visualized arterial infundibulum but a small saccular aneurysm cannot be excluded. The appearance is retrospectively unchanged. There are no additional areas suspicious for aneurysm formation and there is no underlying vascular malformation. IMPRESSION:  1. No evidence of hemodynamically significant stenosis or occlusion involving the main intracranial branches. 2.  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 Mri Brain Wo Cont Result Date: 12/1/2018 EXAM: MRI brain without contrast  12/1/2018.  CLINICAL INDICATION/HISTORY: Acute onset of slurred speech. Stroke alert. COMPARISON: CT scan of the head from 12/1/2018 and the prior MRI of the brain from 4/14/2018 TECHNIQUE: Multiplanar multisequential images of the brain were obtained without intravenous contrast. _______________ FINDINGS: BRAIN AND POSTERIOR FOSSA: Diffuse cerebral and cerebellar atrophy are again noted. Areas of T2 prolongation are again identified throughout the periventricular and subcortical white matter of both cerebral hemispheres with a pattern and distribution which are most consistent with moderate to severe chronic small vessel changes which do not appear to have significantly changed in the interim. Areas of old infarction are again noted in the right parietal-occipital region, as well as along the superior aspect of the right parietal lobe. An area of old infarct is also noted in the anterior superior aspect of the right frontal lobe. A smaller area of old infarction is identified involving the right upper basal ganglia and adjacent corona radiata white matter. Patchy old blood breakdown products are noted in association with the areas of old infarction. There is no acute intracranial hemorrhage. There is no mass effect or midline shift. No extra-axial fluid collections are identified. There are no significant additional areas of abnormal parenchymal signal.  There is no restricted diffusion to suggest acute infarct. EXTRA-AXIAL SPACES AND MENINGES: There are no abnormal extra-axial fluid collections. VASCULAR: The visualized portions of the intracranial carotid and vertebrobasilar systems demonstrate patent appearing flow voids. CALVARIA: Unremarkable. SINUSES: Clear, as visualized. CRANIOCERVICAL JUNCTION: Within normal limits for age. OTHER: None. _______________ IMPRESSION: 1. Atrophy and chronic small vessel changes with areas of old infarction, as described above and without significant interval change.  2. Otherwise, unremarkable evaluation. Specifically, no acute intracranial abnormalities are identified. Ct Head Wo Cont Result Date: 12/1/2018 EXAM: CT head without contrast 12/1/2018. CLINICAL INDICATION/HISTORY: : Stroke alert. Slurred speech. COMPARISON: CT scan of the head from 4/14/2018. TECHNIQUE: Serial axial images of the head were performed without intravenous contrast. Dose reduction techniques:  Automated exposure control, mAs and/or kVp reductions based on patient size, and iterative reconstruction. The specific techniques utilized on this CT exam have been documented in the patient's electronic medical record. _______________ FINDINGS: BRAIN AND POSTERIOR FOSSA: Diffuse atrophy and chronic small vessel changes are again noted. There is a small to moderate-sized area of old infarct in the anterior superior aspect of the right frontal lobe. A moderate to large area of old infarct is noted involving the right parietal and occipital regions. There is also an old right basal ganglia and corona radiata white matter infarct. There is no intracranial hemorrhage, mass effect, or midline shift. There are no significant additional areas of abnormal parenchymal attenuation. EXTRA-AXIAL SPACES AND MENINGES: There are no abnormal extra-axial fluid collections. CALVARIA: Intact. OTHER: The visualized portions of the paranasal sinuses and mastoid air cells are clear. _______________ IMPRESSION: 1. Atrophy and chronic small vessel changes with areas of old infarction, unchanged. 2.  Otherwise, unremarkable noncontrast head CT. Specifically, no acute intracranial abnormalities are identified. Critical results, as per code S protocol, were communicated directly to the emergency room physician at the time of interpretation (12:10 PM). Xr Chest [facility] Result Date: 12/1/2018 EXAM: Portable chest radiograph 12/1/2018 CLINICAL INDICATION/HISTORY: Weakness. TECHNIQUE: A semierect portable radiograph was obtained. COMPARISON:  11/24/2018. FINDINGS: Evaluation is limited due to leftward patient rotation which distorts the cardiomediastinal contours. Postsurgical changes are again noted status post coronary artery bypass grafting. The lungs are also distorted by leftward patient rotation but they are grossly clear. There is no pneumothorax or pleural effusion. IMPRESSION:  1. Rotated exam.  No evidence of acute pulmonary disease. Assessment/Plan Active Hospital Problems Diagnosis Date Noted  History of CVA (cerebrovascular accident) 12/01/2018  History of MI (myocardial infarction) 12/01/2018  Sepsis ([address] 12/01/2018  UTI (urinary tract infection) 12/01/2018  Acute metabolic encephalopathy 66/75/4819  CAD (coronary artery disease) 11/24/2018  Vascular dementia without behavioral disturbance 03/08/2017  AAA (abdominal aortic aneurysm) without rupture ([address] 01/04/2017  PAD (peripheral artery disease) [address] 09/21/2016  CKD (chronic kidney disease), stage III ([address] 03/27/2016  Hypertension 03/27/2016  Pure hypercholesterolemia 09/14/2012  
 
- CT head and MRI negative for acute stroke - No further work up needed for this as sx likely infection related and volume related as patient appears back to baseline - Cont IVAB for UTI 
- Urine culture - Gentle IVFs 
- PT/OT 
- Cont acceptable home medications for chronic conditions  
- DVT protocol I have personally reviewed all pertinent labs, films and EKGs that have officially resulted. I reviewed available electronic documentation outlining the initial presentation as well as the emergency room physician's encounter. Anabela Galarza DO Internal Medicine, Hospitalist 
Pager: [phone] [facility]

## 2018-12-01 NOTE — PROGRESS NOTES
@ 1630 4 eye assessment complete with JORDEN RN. No pressure sores noted. Skin WNL. Noted right BKA with prosthesis. @ 1730  Swallow eval/sceen complete. No distress noted, tolerated apple sauce and apple juice. No s/s of dysphagia, tolerated diet without cough or clearing of throat. @ 1740 oral medications administered, per orders after swallow screen complete. @ [phone] informed by charge nurse, patient to transfer to [facility] neuro unit, noted room assignment. @ 1910  Received MD orders to discontinue stroke protocol, NIH scale, stroke screen. Charge nurse updated, will monitor.

## 2018-12-01 NOTE — ED NOTES
TRANSFER - ED to INPATIENT REPORT: 
 
SBAR report made available to receiving floor on this patient being transferred to [address] [place] [address] ([facility]  for routine post - op Admitting diagnosis CVA (cerebral vascular accident) ([facility] [address] [I63.9] Information from the following report(s) SBAR was made available to receiving floor. Lines:  
Peripheral IV 12/01/18 Right Antecubital (Active) Site Assessment Clean, dry, & intact 12/1/2018 11:35 AM  
Phlebitis Assessment 0 12/1/2018 11:35 AM  
Infiltration Assessment 0 12/1/2018 11:35 AM  
Dressing Status Clean, dry, & intact 12/1/2018 11:35 AM  
Dressing Type Transparent 12/1/2018 11:35 AM  
   
Peripheral IV 12/01/18 Right Antecubital (Active) Site Assessment Clean, dry, & intact 12/1/2018  1:12 PM  
Phlebitis Assessment 0 12/1/2018  1:12 PM  
Infiltration Assessment 0 12/1/2018  1:12 PM  
Dressing Status Clean, dry, & intact 12/1/2018  1:12 PM  
Dressing Type Transparent 12/1/2018  1:12 PM  
Hub Color/Line Status Pink;Flushed 12/1/2018  1:12 PM  
Action Taken Blood drawn 12/1/2018  1:12 PM  
  
 
Medication list updated today Opportunity for questions and clarification was provided. Patient is oriented to time, place, person and situation Last [facility] 32 [phone] Patient is  incontinent and ambulatory with assistance Valuables transported with patient Patient transported with: 
 Monitor Vitals w/ MEWS Score (last day) Date/Time MEWS Score Pulse Resp Temp BP Level of Consciousness SpO2  
 12/01/18 1545    73  17    101/61      
 12/01/18 1544          99/65      
 12/01/18 1420    66  18    111/63    99 % 12/01/18 1340    70  20    109/59    97 % 12/01/18 1315    72  18    112/61    97 % 12/01/18 1310    72  20    107/64    97 % 12/01/18 1300    75  21    114/78    98 % 12/01/18 1240    87  19    117/69    99 % 12/01/18 1230    74  20    110/57    99 % 12/01/18 1220    77  21    112/60     12/01/18 1208    79  19        98 % 12/01/18 1205    78  21    118/68    99 % 12/01/18 1204    81  18    115/71    100 % 12/01/18 1200    79  20    113/60    97 % 12/01/18 1150          113/97  (Abnormal)       
 12/01/18 1149    78  16    118/71      
 12/01/18 1134    80  18    120/74    96 % 12/01/18 1129    87  18    115/78      
 12/01/18 1128    82  18        89 %  (Abnormal) 12/01/18 1124    81  22    121/84      
 12/01/18 1120    88  17    126/63    91 %  12/01/18 1113  1  79  16  98.8 °F (37.1 °C)  114/53  Alert  

## 2018-12-01 NOTE — ED TRIAGE NOTES
\"I was admitted for a heart attack and felt weak then. But I was discharged Wednesday and have just been weaker since. \" 
 
Pt family member reports not speaking correctly last night.

## 2018-12-01 NOTE — ED NOTES
Per friend of family woke up and his speech was off around 5 this morning. Speech is clear for me. JOSELO x4.

## 2018-12-01 NOTE — PROGRESS NOTES
Date of previous inpatient admission/ ED visit? 11/24- 11/28 at [facility] 32 What brought the patient back to ED? CVA Did patient decline recommended services during last admission/ ED visit (if yes, what)? No 
 
Has patient seen a provider since their last inpatient admission/ED visit (if yes, when)? Has seen Home Health CM Interventions: 
From previous inpatient admission/ED visit: [facility] It was identified from previous admission that patient has the following DME: walker, power scooter and cane From current inpatient admission/ED visit: pending Once admission order placed, attempted to interview patient, however he had been taken to MRI. Case-management will need to assess DC needs, might benefit from SNF. Will follow-up tomorrow with patient. Zainab Toscano RN Outcomes Manager 
[phone]

## 2018-12-01 NOTE — ED NOTES
I performed a brief history of the patient here in triage and I have determined that pt will need further treatment and evaluation from the main side ER physician. I have placed initial orders based on the history to help in expediting patients care.    JP Betancourt 11:19 AM

## 2018-12-01 NOTE — PROGRESS NOTES
@ [id number] TRANSFER - IN REPORT: 
 
Verbal report received from Renee Cortez RN(name) on [employer]  being received from ED(unit) for routine progression of care Report consisted of patients Situation, Background, Assessment and  
Recommendations(SBAR). Information from the following report(s) Kardex, ED Summary, Intake/Output, MAR and Recent Results was reviewed with the receiving nurse. Opportunity for questions and clarification was provided. Assessment completed upon patients arrival to unit and care assumed. Arrived to unit via stretcher in no acute distress. IV's intact/patent. Family remain at bedside. Orientated to unit/room, safety measures in place, call bell in reach.  
 
 
 
 
\

## 2018-12-02 LAB
ANION GAP SERPL CALC-SCNC: 9 MMOL/L (ref 3–18)
ATRIAL RATE: 84 BPM
BASOPHILS # BLD: 0 K/UL (ref 0–0.1)
BASOPHILS NFR BLD: 0 % (ref 0–3)
BUN SERPL-MCNC: 36 MG/DL (ref 7–18)
BUN/CREAT SERPL: 20 (ref 12–20)
CALCIUM SERPL-MCNC: 8.9 MG/DL (ref 8.5–10.1)
CALCULATED P AXIS, ECG09: 42 DEGREES
CALCULATED R AXIS, ECG10: 119 DEGREES
CALCULATED T AXIS, ECG11: -107 DEGREES
CHLORIDE SERPL-SCNC: 104 MMOL/L (ref 100–108)
CO2 SERPL-SCNC: 24 MMOL/L (ref 21–32)
CREAT SERPL-MCNC: 1.8 MG/DL (ref 0.6–1.3)
DIAGNOSIS, 93000: NORMAL
DIFFERENTIAL METHOD BLD: ABNORMAL
EOSINOPHIL # BLD: 0.1 K/UL (ref 0–0.4)
EOSINOPHIL NFR BLD: 1 % (ref 0–5)
ERYTHROCYTE [DISTWIDTH] IN BLOOD BY AUTOMATED COUNT: 14.8 % (ref 11.6–14.5)
GLUCOSE BLD STRIP.AUTO-MCNC: 137 MG/DL (ref 70–110)
GLUCOSE SERPL-MCNC: 109 MG/DL (ref 74–99)
HCT VFR BLD AUTO: 34.2 % (ref 36–48)
HGB BLD-MCNC: 11.7 G/DL (ref 13–16)
LYMPHOCYTES # BLD: 1.5 K/UL (ref 0.8–3.5)
LYMPHOCYTES NFR BLD: 10 % (ref 20–51)
MCH RBC QN AUTO: 33 PG (ref 24–34)
MCHC RBC AUTO-ENTMCNC: 34.2 G/DL (ref 31–37)
MCV RBC AUTO: 96.3 FL (ref 74–97)
MONOCYTES # BLD: 1.2 K/UL (ref 0–1)
MONOCYTES NFR BLD: 8 % (ref 2–9)
NEUTS SEG # BLD: 11.8 K/UL (ref 1.8–8)
NEUTS SEG NFR BLD: 81 % (ref 42–75)
P-R INTERVAL, ECG05: 168 MS
PLATELET # BLD AUTO: 276 K/UL (ref 135–420)
PLATELET COMMENTS,PCOM: ABNORMAL
PMV BLD AUTO: 11.8 FL (ref 9.2–11.8)
POTASSIUM SERPL-SCNC: 4.1 MMOL/L (ref 3.5–5.5)
Q-T INTERVAL, ECG07: 366 MS
QRS DURATION, ECG06: 112 MS
QTC CALCULATION (BEZET), ECG08: 432 MS
RBC # BLD AUTO: 3.55 M/UL (ref 4.7–5.5)
RBC MORPH BLD: ABNORMAL
RBC MORPH BLD: ABNORMAL
SODIUM SERPL-SCNC: 137 MMOL/L (ref 136–145)
VENTRICULAR RATE, ECG03: 84 BPM
WBC # BLD AUTO: 14.6 K/UL (ref 4.6–13.2)

## 2018-12-02 PROCEDURE — 82962 GLUCOSE BLOOD TEST: CPT

## 2018-12-02 PROCEDURE — 85025 COMPLETE CBC W/AUTO DIFF WBC: CPT

## 2018-12-02 PROCEDURE — 77030010545

## 2018-12-02 PROCEDURE — 74011250636 HC RX REV CODE- 250/636: Performed by: HOSPITALIST

## 2018-12-02 PROCEDURE — 36415 COLL VENOUS BLD VENIPUNCTURE: CPT

## 2018-12-02 PROCEDURE — 74011000258 HC RX REV CODE- 258: Performed by: HOSPITALIST

## 2018-12-02 PROCEDURE — 65660000000 HC RM CCU STEPDOWN

## 2018-12-02 PROCEDURE — 80048 BASIC METABOLIC PNL TOTAL CA: CPT

## 2018-12-02 PROCEDURE — 74011250637 HC RX REV CODE- 250/637: Performed by: HOSPITALIST

## 2018-12-02 PROCEDURE — 77030037878 HC DRSG MEPILEX >48IN BORD MOLN -B

## 2018-12-02 RX ADMIN — DOCUSATE SODIUM AND SENNOSIDES 2 TABLET: 8.6; 5 TABLET, FILM COATED ORAL at 22:07

## 2018-12-02 RX ADMIN — ASPIRIN 325 MG ORAL TABLET 325 MG: 325 PILL ORAL at 09:57

## 2018-12-02 RX ADMIN — TAMSULOSIN HYDROCHLORIDE 0.4 MG: 0.4 CAPSULE ORAL at 22:07

## 2018-12-02 RX ADMIN — CARVEDILOL 3.12 MG: 3.12 TABLET, FILM COATED ORAL at 09:57

## 2018-12-02 RX ADMIN — CLOPIDOGREL BISULFATE 75 MG: 75 TABLET ORAL at 09:57

## 2018-12-02 RX ADMIN — DEXTROSE MONOHYDRATE AND SODIUM CHLORIDE 0.75 ML/KG/HR: 5; .9 INJECTION, SOLUTION INTRAVENOUS at 09:37

## 2018-12-02 RX ADMIN — CARVEDILOL 3.12 MG: 3.12 TABLET, FILM COATED ORAL at 18:08

## 2018-12-02 RX ADMIN — ATORVASTATIN CALCIUM 80 MG: 40 TABLET, FILM COATED ORAL at 22:07

## 2018-12-02 RX ADMIN — CEFTRIAXONE 1 G: 1 INJECTION, POWDER, FOR SOLUTION INTRAMUSCULAR; INTRAVENOUS at 14:38

## 2018-12-02 NOTE — PROGRESS NOTES
2000-no signs of distress. Ordered meds given throughout shift. Patient slept entire night without incident. Bedside shift change report given to RN JORDEN (oncoming nurse) by Dona Avila (offgoing nurse). Report included the following information SBAR.

## 2018-12-02 NOTE — PROGRESS NOTES
Problem: Falls - Risk of 
Goal: *Absence of Falls Document Ebb Telugu Fall Risk and appropriate interventions in the flowsheet. Outcome: Progressing Towards Goal 
Fall Risk Interventions: 
Mobility Interventions: Patient to call before getting OOB Mentation Interventions: Door open when patient unattended Elimination Interventions: Call light in reach Problem: Pressure Injury - Risk of 
Goal: *Prevention of pressure injury Document Sylvester Scale and appropriate interventions in the flowsheet. Outcome: Progressing Towards Goal 
Pressure Injury Interventions: 
Sensory Interventions: Assess changes in LOC Moisture Interventions: Absorbent underpads Activity Interventions: Increase time out of bed Mobility Interventions: HOB 30 degrees or less Nutrition Interventions: Document food/fluid/supplement intake

## 2018-12-02 NOTE — PROGRESS NOTES
Bedside and Verbal shift change report given to JORDEN (oncoming nurse) by Bijal Renee (offgoing nurse). Report included the following information SBAR, Kardex and MAR. Pt is AOx4 on room air with no complaints at this time.

## 2018-12-02 NOTE — PROGRESS NOTES
Internal Medicine Progress Note Patient's Name: Matthieu Sevilla Admit Date: 12/1/2018 Length of Stay: 1 Assessment/Plan Active Hospital Problems Diagnosis Date Noted  History of CVA (cerebrovascular accident) 12/01/2018  History of MI (myocardial infarction) 12/01/2018  Sepsis ([facility] [address] 12/01/2018  UTI (urinary tract infection) 12/01/2018  Acute metabolic encephalopathy 32/50/0065  CAD (coronary artery disease) 11/24/2018  Vascular dementia without behavioral disturbance 03/08/2017  AAA (abdominal aortic aneurysm) without rupture ([facility] [address] 01/04/2017  PAD (peripheral artery disease) ([facility] 09/21/2016  CKD (chronic kidney disease), stage III ([facility] 03/27/2016  Hypertension 03/27/2016  Pure hypercholesterolemia 09/14/2012  
 
- Cont IVAB for UTI 
- F/u urine cult - Trend CBC 
- PT/OT 
- Cont cardiac meds - Will need SNF at d/c 
- Cont acceptable home medications for chronic conditions  
- DVT protocol I have personally reviewed all pertinent labs and films that have officially resulted over the last 24 hours. I have personally checked for all pending labs that are awaiting final results. Subjective Pt s/e @ bedside No major events overnight Pt offers no complaints this AM other than generalized weakness Denies CP or SOB Objective Visit Vitals /78 (BP 1 Location: Left arm, BP Patient Position: Head of bed elevated (Comment degrees)) Pulse 73 Temp 99.3 °F (37.4 °C) Resp 20 Ht 5' 8\" (1.727 m) Wt 99.8 kg (220 lb) SpO2 95% BMI 33.45 kg/m² Physical Exam: 
General Appearance: NAD, conversant Lungs: CTA with normal respiratory effort CV: RRR, no m/r/g Abdomen: soft, non-tender, normal bowel sounds Extremities: no cyanosis, R BKA Neuro: R-sided residual weakness, no difficulties w/ speech Lab/Data Reviewed: 
BMP:  
Lab Results Component Value Date/Time   12/02/2018 06:26 AM  
 K 4.1 12/02/2018 06:26 AM  
  12/02/2018 06:26 AM  
 CO2 24 12/02/2018 06:26 AM  
 AGAP 9 12/02/2018 06:26 AM  
  (H) 12/02/2018 06:26 AM  
 BUN 36 (H) 12/02/2018 06:26 AM  
 CREA 1.80 (H) 12/02/2018 06:26 AM  
 GFRAA 44 (L) 12/02/2018 06:26 AM  
 GFRNA 36 (L) 12/02/2018 06:26 AM  
 
CBC:  
Lab Results Component Value Date/Time WBC 14.6 (H) 12/02/2018 06:26 AM  
 HGB 11.7 (L) 12/02/2018 06:26 AM  
 HCT 34.2 (L) 12/02/2018 06:26 AM  
  12/02/2018 06:26 AM  
 
 
Imaging Reviewed: 
Marcela Daily Brain Wo Cont Result Date: 12/1/2018 MR angiogram of the [place] without contrast 12/1/2018. CLINICAL INDICATION/HISTORY:  Previous stroke. Acute onset of slurred speech. Stroke alert. TECHNIQUE: 3-D time-of-flight imaging of the intracranial vasculature was performed. MIP reconstructions were obtained from the source images. COMPARISON:  None. FINDINGS: Evaluation of the intracranial vasculature is unremarkable. Specifically, there is no evidence of hemodynamically significant stenosis or occlusion involving the main intracranial branches. There is a 1-2 mm outpouching arising inferiorly from the supraclinoid segment of the right internal carotid artery. This appears to be just proximal to the origin of the right posterior communicating artery and a definite vessel was not identified emanating from its apex. This could represent an incompletely visualized arterial infundibulum but a small saccular aneurysm cannot be excluded. The appearance is retrospectively unchanged. There are no additional areas suspicious for aneurysm formation and there is no underlying vascular malformation. IMPRESSION:  1. No evidence of hemodynamically significant stenosis or occlusion involving the main intracranial branches. 2.  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 Mri Brain Wo Cont Result Date: 12/1/2018 EXAM: MRI brain without contrast  12/1/2018. CLINICAL INDICATION/HISTORY: Acute onset of slurred speech. Stroke alert. COMPARISON: CT scan of the head from 12/1/2018 and the prior MRI of the brain from 4/14/2018 TECHNIQUE: Multiplanar multisequential images of the brain were obtained without intravenous contrast. _______________ FINDINGS: BRAIN AND POSTERIOR FOSSA: Diffuse cerebral and cerebellar atrophy are again noted. Areas of T2 prolongation are again identified throughout the periventricular and subcortical white matter of both cerebral hemispheres with a pattern and distribution which are most consistent with moderate to severe chronic small vessel changes which do not appear to have significantly changed in the interim. Areas of old infarction are again noted in the right parietal-occipital region, as well as along the superior aspect of the right parietal lobe. An area of old infarct is also noted in the anterior superior aspect of the right frontal lobe. A smaller area of old infarction is identified involving the right upper basal ganglia and adjacent corona radiata white matter. Patchy old blood breakdown products are noted in association with the areas of old infarction. There is no acute intracranial hemorrhage. There is no mass effect or midline shift. No extra-axial fluid collections are identified. There are no significant additional areas of abnormal parenchymal signal.  There is no restricted diffusion to suggest acute infarct. EXTRA-AXIAL SPACES AND MENINGES: There are no abnormal extra-axial fluid collections. VASCULAR: The visualized portions of the intracranial carotid and vertebrobasilar systems demonstrate patent appearing flow voids. CALVARIA: Unremarkable. SINUSES: Clear, as visualized. CRANIOCERVICAL JUNCTION: Within normal limits for age. OTHER: None. _______________ IMPRESSION: 1.   Atrophy and chronic small vessel changes with areas of old infarction, as described above and without significant interval change. 2.  Otherwise, unremarkable evaluation. Specifically, no acute intracranial abnormalities are identified. Ct Head Wo Cont Result Date: 12/1/2018 EXAM: CT head without contrast 12/1/2018. CLINICAL INDICATION/HISTORY: : Stroke alert. Slurred speech. COMPARISON: CT scan of the head from 4/14/2018. TECHNIQUE: Serial axial images of the head were performed without intravenous contrast. Dose reduction techniques:  Automated exposure control, mAs and/or kVp reductions based on patient size, and iterative reconstruction. The specific techniques utilized on this CT exam have been documented in the patient's electronic medical record. _______________ FINDINGS: BRAIN AND POSTERIOR FOSSA: Diffuse atrophy and chronic small vessel changes are again noted. There is a small to moderate-sized area of old infarct in the anterior superior aspect of the right frontal lobe. A moderate to large area of old infarct is noted involving the right parietal and occipital regions. There is also an old right basal ganglia and corona radiata white matter infarct. There is no intracranial hemorrhage, mass effect, or midline shift. There are no significant additional areas of abnormal parenchymal attenuation. EXTRA-AXIAL SPACES AND MENINGES: There are no abnormal extra-axial fluid collections. CALVARIA: Intact. OTHER: The visualized portions of the paranasal sinuses and mastoid air cells are clear. _______________ IMPRESSION: 1. Atrophy and chronic small vessel changes with areas of old infarction, unchanged. 2.  Otherwise, unremarkable noncontrast head CT. Specifically, no acute intracranial abnormalities are identified. Critical results, as per code S protocol, were communicated directly to the emergency room physician at the time of interpretation (12:10 PM). Xr Chest [facility] Result Date: 12/1/2018 EXAM: Portable chest radiograph 12/1/2018 CLINICAL INDICATION/HISTORY: Weakness. TECHNIQUE: A semierect portable radiograph was obtained. COMPARISON:  11/24/2018. FINDINGS: Evaluation is limited due to leftward patient rotation which distorts the cardiomediastinal contours. Postsurgical changes are again noted status post coronary artery bypass grafting. The lungs are also distorted by leftward patient rotation but they are grossly clear. There is no pneumothorax or pleural effusion. IMPRESSION:  1. Rotated exam.  No evidence of acute pulmonary disease. Medications Reviewed: 
Current Facility-Administered Medications Medication Dose Route Frequency  aspirin tablet 325 mg  325 mg Oral DAILY  clopidogrel (PLAVIX) tablet 75 mg  75 mg Oral DAILY  tamsulosin (FLOMAX) capsule 0.4 mg  0.4 mg Oral QHS  dextrose 5% and 0.9% NaCl infusion  0.75 mL/kg/hr IntraVENous CONTINUOUS  
 ondansetron (ZOFRAN) injection 2 mg  2 mg IntraVENous Q6H PRN  
 atorvastatin (LIPITOR) tablet 80 mg  80 mg Oral QHS  acetaminophen (TYLENOL) tablet 650 mg  650 mg Oral Q4H PRN  
 acetaminophen (TYLENOL) suppository 650 mg  650 mg Rectal Q4H PRN  
 senna-docusate (PERICOLACE) 8.6-50 mg per tablet 2 Tab  2 Tab Oral QHS  cefTRIAXone (ROCEPHIN) 1 g in 0.9% sodium chloride (MBP/ADV) 50 mL MBP  1 g IntraVENous Q24H  carvedilol (COREG) tablet 3.125 mg  3.125 mg Oral BID WITH MEALS Shyann Roper DO Internal Medicine, Hospitalist 
Pager: [phone] [facility]

## 2018-12-02 NOTE — PROGRESS NOTES
Nutrition initial assessment/Plan of care RECOMMENDATIONS:  
 
1. Cardiac diet 2. Monitor weight, labs and PO intake 3. RD to follow GOALS:  
 
1. PO intake meets >75% of protein/calorie needs by 12/9 
2. Weight Maintenance (+/- 1-2 lb) by 12/9 ASSESSMENT: 
 
Weight status is classified as obese per BMI of 33.5. Adequate PO intake. Labs noted. Pt w/elevated WBC (14.6), elevated LFTs and  elevated BUN/Cr; GFR (36). Nutrition recommendations listed. RD to follow. Nutrition Diagnoses:  
Obesity related to prior excessive energy intake PTA as evidenced by BMI of 33.5. Nutrition Risk:  [] High  [] Moderate [x]  Low SUBJECTIVE/OBJECTIVE: 
  
Admitted for CVA. PMHx including hyperlipidemia,CAD and prior CVA and TIA's. Patient reports having a good appetite and weight has been stable at 220 lb. Denies food allergies or problems chewing/swallowing. Reports following a low Na/low sat fat diet at home. Will monitor. Information Obtained from:  
 [x] Chart Review [x] Patient 
 [] Family/Caregiver 
 [] Nurse/Physician 
 [] Interdisciplinary Meeting/Rounds Diet: Cardiac diet Medications: [x] Reviewed Allergies: [x] Reviewed Patient Active Problem List  
Diagnosis Code  Pure hypercholesterolemia E78.00  Coronary artery disease involving native coronary artery without angina pectoris I25.10  Hypertension I10  
 CKD (chronic kidney disease), stage III ([facility]) N18.3  
 S/P AVR Z95.2  Advance directive in chart Z68.5  
 PAD (peripheral artery disease) ([facility]) I73.9  AAA (abdominal aortic aneurysm) without rupture ([facility]) I71.4  Diastolic dysfunction O40.6  Vascular dementia without behavioral disturbance F01.50  Atherosclerosis of native artery of left lower extremity with intermittent claudication ([facility]) T15.829  Chest pain R07.9  CAD (coronary artery disease) I25.10  Hypotension I95.9  Cerebrovascular disease I67.9  Acute MI ([facility] [address] I21.9  S/P CABG x 4 Z95.1  History of CVA (cerebrovascular accident) Z80.78  
 History of MI (myocardial infarction) I25.2  Sepsis ([address] A41.9  
 UTI (urinary tract infection) N39.0  Acute metabolic encephalopathy N47.10 Past Medical History:  
Diagnosis Date  Advance directive in chart 6/13/2016  CAD (coronary artery disease)  Cancer [facility]) 2003 Colon  CKD (chronic kidney disease), stage III ([facility] [address] 3/27/2016  CVA (cerebral vascular accident) ([facility] [address] 3/26/2016  Femoral artery aneurysm, left ([facility] [address]  Heart attack [facility]) 2008  Heart attack ([facility] [address]  Hernia  History of TIAs  Hyperlipidemia  Hypertension 3/27/2016  Iliac artery aneurysm, left ([facility] [address]  Pure hypercholesterolemia 9/14/2012  PVD (peripheral vascular disease) ([facility] 3/27/2016  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   
Lab Results Component Value Date/Time Sodium 137 12/02/2018 06:26 AM  
 Potassium 4.1 12/02/2018 06:26 AM  
 Chloride 104 12/02/2018 06:26 AM  
 CO2 24 12/02/2018 06:26 AM  
 Anion gap 9 12/02/2018 06:26 AM  
 Glucose 109 (H) 12/02/2018 06:26 AM  
 BUN 36 (H) 12/02/2018 06:26 AM  
 Creatinine 1.80 (H) 12/02/2018 06:26 AM  
 Calcium 8.9 12/02/2018 06:26 AM  
 Magnesium 2.4 12/01/2018 11:35 AM  
 Phosphorus 2.9 04/14/2018 09:27 AM  
 Albumin 3.4 12/01/2018 11:35 AM  
 
Lab Results Component Value Date/Time Cholesterol, total 129 04/14/2018 09:27 AM  
 HDL Cholesterol 38 (L) 04/14/2018 09:27 AM  
 LDL, calculated 49.6 04/14/2018 09:27 AM  
 VLDL, calculated 41.4 04/14/2018 09:27 AM  
 Triglyceride 207 (H) 04/14/2018 09:27 AM  
 CHOL/HDL Ratio 3.4 04/14/2018 09:27 AM  
 
Anthropometrics: BMI (calculated): 33.5 Last 3 Recorded Weights in this Encounter 12/01/18 1113 Weight: 99.8 kg (220 lb) Ht Readings from Last 1 Encounters:  
12/01/18 5' 8\" (1.727 m) Patient Vitals for the past 100 hrs: 
 % Diet Eaten 12/02/18 1432 75 % 12/02/18 1023 100 % IBW: 154 lb %IBW: 143% UBW: 220 lb  
[] Weight Loss [] Weight Gain [x] Weight Stable Estimated Nutrition Needs: [x] MSJ  [] Other: 
Calories: 7438-7041 Kcal Based on:   [x] Actual BW  
Protein:    g Based on:   [x] Actual BW Fluid:      5095-2060 ml Based on:   [x] Actual BW  
 
 [x] No Cultural, Sabianist or ethnic dietary need identified. [] Cultural, Sabianist and ethnic food preferences identified and addressed Wt Status:  [] Normal (18.6 - 24.9) [] Underweight (<18.5) [] Overweight (25 - 29.9) [x] Mild Obesity (30 - 34.9)  [] Moderate Obesity (35 - 39.9) [] Morbid Obesity (40+) Nutrition Problems Identified:  
[] Suboptimal PO intake  
[] Food Allergies [] Difficulty chewing/swallowing/poor dentition 
[] Constipation/Diarrhea  
[] Nausea/Vomiting  
[x] None 
[] Other:  
 
Plan:  
[x] Therapeutic Diet 
[]  Obtained/adjusted food preferences/tolerances and/or snacks options  
[]  Supplements added  
[] Occupational therapy following for feeding techniques []  HS snack added  
[]  Modify diet texture  
[]  Modify diet for food allergies []  Educate patient  
[]  Assist with menu selection  
[x]  Monitor PO intake on meal rounds  
[x]  Continue inpatient monitoring and intervention  
[]  Participated in discharge planning/Interdisciplinary rounds/Team meetings  
[]  Other:  
 
Education Needs: 
 [] Not appropriate for teaching at this time due to: 
 [x] Identified and addressed Nutrition Monitoring and Evaluation: 
[x] Continue ongoing monitoring and intervention 
[] Joseluis Thomas

## 2018-12-02 NOTE — PROGRESS NOTES
Problem: Falls - Risk of 
Goal: *Absence of Falls Document Sharon Calderon Fall Risk and appropriate interventions in the flowsheet. Outcome: Progressing Towards Goal 
Fall Risk Interventions: 
Mobility Interventions: Patient to call before getting OOB Mentation Interventions: Door open when patient unattended Elimination Interventions: Call light in reach Problem: Pressure Injury - Risk of 
Goal: *Prevention of pressure injury Document Sylvester Scale and appropriate interventions in the flowsheet. Outcome: Progressing Towards Goal 
Pressure Injury Interventions: 
Sensory Interventions: Assess changes in LOC Moisture Interventions: Absorbent underpads Activity Interventions: Increase time out of bed Mobility Interventions: HOB 30 degrees or less Nutrition Interventions: Document food/fluid/supplement intake

## 2018-12-02 NOTE — PROGRESS NOTES
met with Patient completed the initial Spiritual Assessment of the patient, and offered Pastoral Care, see flow sheets for interventions. Patient does not have any Mormonism/cultural needs that will affect patients preferences in health care. Charted reviewed. Chaplains will continue to follow and will provide pastoral care on an as needed/requested basis. [id number] [facility] Paresh Lux, MPH  
M [facility] [phone]

## 2018-12-02 NOTE — PROGRESS NOTES
Reason for Readmission:     cva 
      
RRAT Score and Risk Level:     33 Level of Readmission:    1 Care Conference scheduled:   no 
    
Resources/supports as identified by patient/family:   Wife, son, hh 
    
Top Challenges facing patient (as identified by patient/family and CM): Finances/Medication cost?      
Transportation Support system or lack thereof? Living arrangements? Lives with wife and son Self-care/ADLs/Cognition? Needs assist 
    
Current Advanced Directive/Advance Care Plan:  Living will on file Plan for utilizing home health: Active with  services Likelihood of additional readmission:   low Transition of Care Plan:    Based on readmission, the patient's previous Plan of Care 
 has been evaluated and/or modified. The current Transition of Care Plan is:    Spoke with pt, lives with spouse and son mary jane. Designates spouse for dcp. Has walker, cane, power scooter and wc at home. Was dc'd home 11/28, he prev refused snf. Has now returned. He now agrees to snf, wants [facility] posted in edc. snf list given, copy to chart. Will need ins auth. . Plan snf. Patient has designated ___________spouse/son_____________ to participate in his/her discharge plan and to receive any needed information. Name: Jaime wadsworth/ flora wadsworth Address: 
Phone number: [phone]

## 2018-12-03 ENCOUNTER — HOME CARE VISIT (OUTPATIENT)
Dept: HOME HEALTH SERVICES | Facility: HOME HEALTH | Age: 78
End: 2018-12-03
Payer: MEDICARE

## 2018-12-03 LAB
ANION GAP SERPL CALC-SCNC: 6 MMOL/L (ref 3–18)
BASOPHILS # BLD: 0.1 K/UL (ref 0–0.1)
BASOPHILS NFR BLD: 1 % (ref 0–2)
BUN SERPL-MCNC: 33 MG/DL (ref 7–18)
BUN/CREAT SERPL: 21 (ref 12–20)
CALCIUM SERPL-MCNC: 8.6 MG/DL (ref 8.5–10.1)
CHLORIDE SERPL-SCNC: 109 MMOL/L (ref 100–108)
CO2 SERPL-SCNC: 26 MMOL/L (ref 21–32)
CREAT SERPL-MCNC: 1.55 MG/DL (ref 0.6–1.3)
DIFFERENTIAL METHOD BLD: ABNORMAL
EOSINOPHIL # BLD: 0.3 K/UL (ref 0–0.4)
EOSINOPHIL NFR BLD: 3 % (ref 0–5)
ERYTHROCYTE [DISTWIDTH] IN BLOOD BY AUTOMATED COUNT: 15.2 % (ref 11.6–14.5)
GLUCOSE SERPL-MCNC: 98 MG/DL (ref 74–99)
HCT VFR BLD AUTO: 31.9 % (ref 36–48)
HGB BLD-MCNC: 10.7 G/DL (ref 13–16)
LYMPHOCYTES # BLD: 1.1 K/UL (ref 0.9–3.6)
LYMPHOCYTES NFR BLD: 10 % (ref 21–52)
MCH RBC QN AUTO: 32.9 PG (ref 24–34)
MCHC RBC AUTO-ENTMCNC: 33.5 G/DL (ref 31–37)
MCV RBC AUTO: 98.2 FL (ref 74–97)
MONOCYTES # BLD: 1.2 K/UL (ref 0.05–1.2)
MONOCYTES NFR BLD: 12 % (ref 3–10)
NEUTS SEG # BLD: 7.8 K/UL (ref 1.8–8)
NEUTS SEG NFR BLD: 74 % (ref 40–73)
PLATELET # BLD AUTO: 307 K/UL (ref 135–420)
PMV BLD AUTO: 11.8 FL (ref 9.2–11.8)
POTASSIUM SERPL-SCNC: 4.4 MMOL/L (ref 3.5–5.5)
RBC # BLD AUTO: 3.25 M/UL (ref 4.7–5.5)
SODIUM SERPL-SCNC: 141 MMOL/L (ref 136–145)
WBC # BLD AUTO: 10.5 K/UL (ref 4.6–13.2)

## 2018-12-03 PROCEDURE — 85025 COMPLETE CBC W/AUTO DIFF WBC: CPT

## 2018-12-03 PROCEDURE — 97166 OT EVAL MOD COMPLEX 45 MIN: CPT

## 2018-12-03 PROCEDURE — 74011250636 HC RX REV CODE- 250/636: Performed by: HOSPITALIST

## 2018-12-03 PROCEDURE — 80048 BASIC METABOLIC PNL TOTAL CA: CPT

## 2018-12-03 PROCEDURE — 97162 PT EVAL MOD COMPLEX 30 MIN: CPT

## 2018-12-03 PROCEDURE — 97530 THERAPEUTIC ACTIVITIES: CPT

## 2018-12-03 PROCEDURE — 74011000258 HC RX REV CODE- 258: Performed by: HOSPITALIST

## 2018-12-03 PROCEDURE — 36415 COLL VENOUS BLD VENIPUNCTURE: CPT

## 2018-12-03 PROCEDURE — 74011250637 HC RX REV CODE- 250/637: Performed by: HOSPITALIST

## 2018-12-03 PROCEDURE — 65660000000 HC RM CCU STEPDOWN

## 2018-12-03 RX ADMIN — DOCUSATE SODIUM AND SENNOSIDES 2 TABLET: 8.6; 5 TABLET, FILM COATED ORAL at 21:29

## 2018-12-03 RX ADMIN — CEFTRIAXONE 1 G: 1 INJECTION, POWDER, FOR SOLUTION INTRAMUSCULAR; INTRAVENOUS at 13:24

## 2018-12-03 RX ADMIN — DEXTROSE MONOHYDRATE AND SODIUM CHLORIDE 0.75 ML/KG/HR: 5; .9 INJECTION, SOLUTION INTRAVENOUS at 00:39

## 2018-12-03 RX ADMIN — ATORVASTATIN CALCIUM 80 MG: 40 TABLET, FILM COATED ORAL at 21:29

## 2018-12-03 RX ADMIN — CARVEDILOL 3.12 MG: 3.12 TABLET, FILM COATED ORAL at 17:54

## 2018-12-03 RX ADMIN — CARVEDILOL 3.12 MG: 3.12 TABLET, FILM COATED ORAL at 09:03

## 2018-12-03 RX ADMIN — DEXTROSE MONOHYDRATE AND SODIUM CHLORIDE 0.75 ML/KG/HR: 5; .9 INJECTION, SOLUTION INTRAVENOUS at 17:56

## 2018-12-03 RX ADMIN — TAMSULOSIN HYDROCHLORIDE 0.4 MG: 0.4 CAPSULE ORAL at 21:28

## 2018-12-03 RX ADMIN — ASPIRIN 325 MG ORAL TABLET 325 MG: 325 PILL ORAL at 09:03

## 2018-12-03 RX ADMIN — CLOPIDOGREL BISULFATE 75 MG: 75 TABLET ORAL at 09:03

## 2018-12-03 NOTE — PROGRESS NOTES
Progress Note Patient: Georgette Agudelo               Sex: male          DOA: 12/1/2018 YOB: 1940      Age:  [de-identified] y.o.        LOS:  LOS: 2 days CHIEF COMPLAINT:  Metabolic encephalopathy, UTI Subjective:  
 
Patient awake and talking. No distress Objective:  
  
Visit Vitals /59 Pulse (!) 51 Temp 98.1 °F (36.7 °C) Resp 18 Ht 5' 8\" (1.727 m) Wt 99.8 kg (220 lb) SpO2 97% BMI 33.45 kg/m² Physical Exam: 
Gen:  No distress, no complaint Lungs:  Clear bilaterally, no wheeze or rhonchi Heart:  Regular rate and rhythm, no murmurs or gallops Abdomen:  Soft, non-tender, normal bowel sounds Lab/Data Reviewed: All lab results for the last 24 hours reviewed. Assessment/Plan Principal Problem: 
  Acute metabolic encephalopathy (71/5/2405) Active Problems: 
  CAD (coronary artery disease) (11/24/2018) Hypertension (3/27/2016) CKD (chronic kidney disease), stage III ([address] (3/27/2016) AAA (abdominal aortic aneurysm) without rupture ([address] (1/4/2017) Vascular dementia without behavioral disturbance (3/8/2017) Pure hypercholesterolemia (9/14/2012) PAD (peripheral artery disease) ([address] (9/21/2016) History of CVA (cerebrovascular accident) (12/1/2018) History of MI (myocardial infarction) (12/1/2018) Sepsis ([address] (12/1/2018) UTI (urinary tract infection) (12/1/2018) Plan: 
Continue with antibiotics Follow mental status BP control DVT prophylaxis.

## 2018-12-03 NOTE — MANAGEMENT PLAN
Discharge/Transition Planning 
 
1000: Reviewed chart. CM spoke with pt and verified he is agreeable to SNF rehab. [facility] is #1 choice, but agreeable to be matched out. Notified Jonny Garnica, Admission Coordinator of [facility] of referral 
 
33 [phone]: [facility] Has accepted pt 
Dixie Espinoza RN BSN Outcomes Manager Pager # [phone]

## 2018-12-03 NOTE — PROGRESS NOTES
Problem: Falls - Risk of 
Goal: *Absence of Falls Document Catalina Luong Fall Risk and appropriate interventions in the flowsheet. Outcome: Progressing Towards Goal 
Fall Risk Interventions: 
Mobility Interventions: Patient to call before getting OOB, PT Consult for mobility concerns Mentation Interventions: Adequate sleep, hydration, pain control, Door open when patient unattended, Room close to nurse's station Medication Interventions: Teach patient to arise slowly, Patient to call before getting OOB Elimination Interventions: Call light in reach, Patient to call for help with toileting needs Problem: Pressure Injury - Risk of 
Goal: *Prevention of pressure injury Document Sylvester Scale and appropriate interventions in the flowsheet. Outcome: Progressing Towards Goal 
Pressure Injury Interventions: 
Sensory Interventions: Keep linens dry and wrinkle-free, Minimize linen layers, Maintain/enhance activity level, Assess changes in LOC Moisture Interventions: Internal/External urinary devices, Absorbent underpads Activity Interventions: Increase time out of bed, Pressure redistribution bed/mattress(bed type), PT/OT evaluation Mobility Interventions: HOB 30 degrees or less, Pressure redistribution bed/mattress (bed type) Nutrition Interventions: Document food/fluid/supplement intake

## 2018-12-03 NOTE — PROGRESS NOTES
1907  Assumed care of pt, bedside shift report received from [address] Pt is A&Ox4, resting in bed and denies pain. 1940  Shift assessment completed. 0500  No complains voiced during the night, pt slept through most part of the night. Bedside shift change report given to Devyn Friedman (oncoming nurse) by Dawna Vásquez (offgoing nurse). Report included the following information SBAR, Kardex, Intake/Output, MAR and Cardiac Rhythm NSR w/BBB.

## 2018-12-03 NOTE — PROGRESS NOTES
Problem: Mobility Impaired (Adult and Pediatric) Goal: *Acute Goals and Plan of Care (Insert Text) Physical Therapy Goals Initiated 12/3/2018 and to be accomplished within 7 days. 1.  Patient will complete all bed mobility with supervision/set-up in order to prepare for EOB/OOB activity. 2.  Patient will perform sit <> stand with minimal assistance/contact guard assist in order to prepare for OOB/gait activity. 3.  Patient will perform bed to chair transfers with minimal assistance/contact guard assist in order to promote mobility and encourage seated activity to progress towards their prior level of function. 4.  Patient will ambulate 25 feet with modified independence using LRAD in order to prepare for safe negotiation of their environment. Outcome: Progressing Towards Goal 
PHYSICAL THERAPY: Initial Assessment INPATIENT: Medicare: Hospital Day: 3 Patient: Asim Richmond (38 y.o. male)    Date: 12/3/2018 Primary Diagnosis: CVA (cerebral vascular accident) ([facility] [I63.9]  
,    
Precautions: Fall PLOF: Ambulation with R prosthetic and RW 
 
ASSESSMENT : 
Patient sitting up in chair, agreeable to participation with PT. Reports that he lives with wife in a single story home with wife. L LE strength 4/5. MIN A x 1 for sit <> stand with RW for support. Demo's fair static standing balance with RW. Ambulation x 5 ft with rollator with MIN A x 1. Patient's with unsteady gait and poor dynamic standing balance. Patient demo's posterior trunk lean, decreased step clearance, asymmetrical step length throughout gait. Appears to circumduct the R LE. Patient has near [address] with gait, MOD A x 1 to lower to bed. MIN A x 1 for sit <> stand to return to chair. Patient educated on benefit of skilled rehab for balance and gait training. Patient left sitting in chair with all needs within reach. No c/o pain. Recommend d/c to SNF. Patient presents with deficits in: Bed Mobility, Transfers, Gait, Strength, Range of Motion and Balance Patient will benefit from skilled intervention to address the above impairments. Patients rehabilitation potential is considered to be Good Factors which may influence rehabilitation potential include:  
[]         None noted 
[]         Mental ability/status [x]         Medical condition 
[]         Home/family situation and support systems 
[x]         Safety awareness 
[]         Pain tolerance/management 
[]         Other: PLAN : 
Recommendations and Planned Interventions: 
[x]           Bed Mobility Training             [x]    Neuromuscular Re-Education 
[x]           Transfer Training                   []    Orthotic/Prosthetic Training 
[x]           Gait Training                          []    Modalities [x]           Therapeutic Exercises          []    Edema Management/Control 
[x]           Therapeutic Activities            [x]    Patient and Family Training/Education 
[]           Other (comment): EDUCATION:  
Education:  Patient was educated on the following topics: Purpose of PT, PT POC, safety with mobility, strategy for balance, gait with RW. Verbalizes understanding. Barriers to Learning/Limitations: None Compensate with: visual, verbal, tactile, kinesthetic cues/model Recommendations for the next treatment session: Gait Frequency/Duration: Patient will be followed by physical therapy 3 - 5 times a week to address goals. Discharge Recommendations:  Nik Fonseca Further Equipment Recommendations for Discharge: rolling walker Factors which may impact discharge planning: N/A  
 
SUBJECTIVE:  
Patient stated I know I need to go to rehab.  OBJECTIVE DATA SUMMARY:  
 
Past Medical History:  
Diagnosis Date  Advance directive in chart 6/13/2016  CAD (coronary artery disease)  Cancer [facility]) 2003 Colon  CKD (chronic kidney disease), stage III ([facility] 3/27/2016  CVA (cerebral vascular accident) [facility] 3/26/2016  Femoral artery aneurysm, left ([facility]  Heart attack [facility]) 2008  Heart attack ([facility]  Hernia  History of TIAs  Hyperlipidemia  Hypertension 3/27/2016  Iliac artery aneurysm, left ([facility]  Pure hypercholesterolemia 9/14/2012  PVD (peripheral vascular disease) (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  
Procedure Laterality Date  ABDOMEN SURGERY PROC UNLISTED  2011  
 3 stents placed into abdomen (groin)  CARDIAC SURG PROCEDURE UNLIST  2005 Quadruple Bypass  CARDIAC SURG PROCEDURE UNLIST  2011 Aortic pig valve placed  COLONOSCOPY N/A 6/29/2016 COLONOSCOPY performed by [facility]phane [facility]epeda [facility] at [facility] ENDOSCOPY  ENDOSCOPY, COLON, DIAGNOSTIC    
 HX AAA REPAIR    
 HX ORTHOPAEDIC  2008 Right Leg Amputated Eval Complexity: History: MEDIUM  Complexity : 1-2 comorbidities / personal factors will impact the outcome/ POC Exam:MEDIUM Complexity : 3 Standardized tests and measures addressing body structure, function, activity limitation and / or participation in recreation  Presentation: MEDIUM Complexity : Evolving with changing characteristics  Clinical Decision Making:Medium Complexity MIN - MOD A for mobility, impaired balance Overall Complexity:MEDIUM 
 
G CODES:Mobility  Current  CK= 40-59%   Goal  CI= 1-19%. The severity rating is based on the Other MIN A - MOD A for mobility, impaired balance, L LE strength 4/5 Prior Level of Function/Home Situation:  
Home Situation Home Environment: Private residence # Steps to Enter: 0 One/Two Story Residence: One story Living Alone: No 
Support Systems: Family member(s) Patient Expects to be Discharged to[de-identified] Rehabilitation facility Current DME Used/Available at Home: Wheelchair, power, Wheelchair, Walker, rollingCritical Behavior: 
Neurologic State: Alert Orientation Level: Oriented X4 Cognition: Follows commands Psychosocial 
Patient Behaviors: Calm; Cooperative Purposeful Interaction: Yes 
Manual Muscle Testing (LE) 
       R     L Hip Flexion:   4/5  4/5 Knee EXT:   NT  4/5 Knee FLEX:   NT  4/5 Ankle DF:   NT  4/5 
_________________________________________________ Tone : Normal on L Sensation: NT 
Range Of Motion: WFL on L Functional Mobility: 
 
 
Functional Status Indep (I) Mod I Super-vision Min A Mod A Max A Total A Assist x2 Verbal cues Additional time Not tested Comments Rolling []  []  [] []    []    []  []  [] [] [] [x] Supine to sit []  []  [] []  []  []  []  [] [] [] [x] Sit to supine []  []  [] []  []  []  []  [] [] [] [x] Sit to stand []  []  [] [x]  []  []  []  [] [] [] []   
Stand to sit []  []  [] [x]  []  []  []  [] [] [] [] Bed to chair transfers []  []  [] []  []  []  []  [] [] [] [x] Balance Good Willadean Zeyad Poor Unable Not tested Comments Sitting static [x]  []  []  []  [] Sitting dynamic [x]  []  []  []  []   
Standing static []  [x]  []  []  []   
Standing dynamic []  []  [x]  []  [] Mobility/Gait:  
Level of Assistance: Minimum assistance Assistive Device: rolling walker Distance Ambulated: 5 feet Left Lower Extremity: FWB Right Lower Extremity: FWB Base of Support: center of gravity altered Speed/Ebony: fluctuations and pace decreased (<100 feet/min) Step Length: left shortened and right shortened Swing Pattern: right asymmetrical 
Stance: [facility] Gait Abnormalities: altered arm swing, circumduction, decreased step clearance and trunk sway increase Pain: 
None Vital Signs Temp: 97.9 °F (36.6 °C) Pulse (Heart Rate): (!) 51 BP: 113/70 Resp Rate: 18    
O2 Sat (%): 97 % Activity Tolerance:  
Good Please refer to the flowsheet for vital signs taken during this treatment. After treatment: []         Patient left in no apparent distress sitting up in chair 
[x]         Patient left in no apparent distress in bed 
[x]         Call bell left within reach 
[]         Nursing notified 
[]         Caregiver present 
[]         Bed alarm activated COMMUNICATION/EDUCATION:  
[x]         Fall prevention education was provided and the patient/caregiver indicated understanding. [x]         Patient/family have participated as able in goal setting and plan of care. [x]         Patient/family agree to work toward stated goals and plan of care. []         Patient understands intent and goals of therapy, but is neutral about his/her participation. []         Patient is unable to participate in goal setting and plan of care. Thank you for this referral. 
Elizabeth Hung Time Calculation: 12 mins

## 2018-12-03 NOTE — PROGRESS NOTES
Problem: Self Care Deficits Care Plan (Adult) Goal: *Acute Goals and Plan of Care (Insert Text) Occupational Therapy Goals Initiated 12/3/2018 within 7 day(s). 1.  Patient will perform grooming tasks while standing with minimal assistance for balance. 2.  Patient will perform lower body dressing with minimal assistance utilizing AE, prn. 
3.  Patient will perform functional task in standing for 8 minutes with minimal assistance and minimal verbal cues for safety in prep for ADLs. 4.  Patient will perform toilet transfers with minimal assistance. 5.  Patient will perform all aspects of toileting with minimal assistance/contact guard assist. 
6.  Patient will participate in upper extremity therapeutic exercise/activities with supervision/set-up for 8 minutes to maintain BUE strength for functional transfers and ADLs. 7.  Patient will utilize energy conservation techniques during functional activities with verbal minimal cues. Outcome: Not Progressing Towards Goal 
[facility] Occupational THERAPY: Initial Assessment INPATIENT: Medicare: Hospital Day: 3 Patient: Sukhjinder Rosales (46 y.o. male)    Date: 12/3/2018 Primary Diagnosis: CVA (cerebral vascular accident) ([facility] [I63.9]  
,  ,  
Precautions: Falls; previous R BK 
PLOF: Pt reports independence with most ADLs; use of RW and power WC PTA. ASSESSMENT:  
Based on the objective data described below, the patient presents with impairments with regard to bed mobility, functional transfers, and independence in ADLs. Pt supine on arrival, agreeable to therapy, no c/o pain. Pt motivated to work with therapy. Min A to don RLE prosthesis while long sitting in bed. Mod/max A & additional time for supine-->poor sitting balance initially, eventually progressing to fair sitting balance. Functional transfer x2 trials with mod/max A.  Max A & additional time to maneuver to chair in prep for [facility] transfer; max vc's for pacing and positioning due to posterior lean. Pt left up in chair; recommend 2 person assist for transfer back to bed. Jose Rivera RN and CNA aware. Recommend continued therapy. Recommendations for the next treatment session: BSC transfer; LB dressing; grooming tasks. Mr. Kennedi Galvez will benefit from skilled intervention to address the above impairments. His rehabilitation potential is considered to be Good. EDUCATION Education:  Patient was educated on the following topics: proper transfer techniques, importance of OOB activity Barriers to Learning/Limitations: None Compensate with: visual, verbal, tactile, kinesthetic cues/model PLAN OF CARE:  
Problems:  Decreased ROM, Decreased strength affecting function, Decreased ADL/functioning of activities, Decreased transfer abilities and Decreased activity tolerance Recommendations and Planned Interventions: 
[x]                  Self Care Training                   [x]         Therapeutic Activities [x]                  Functional Mobility Training    []          Cognitive Retraining 
[x]                  Therapeutic Exercises            [x]          Endurance Activities [x]                  Balance Training                     []          Neuromuscular Re-ed []                  Visual/Perceptual Training      [x]       Home Safety Training 
[x]                  Patient Education                    [x]          Family Training/Education []                  Other (comment): Frequency/Duration: Patient will be followed by occupational therapy 3-5 times a week to address goals. Discharge Recommendations: Inpatient Rehab Further Equipment Recommendations for Discharge: anticipate none SUBJECTIVE:  
Patient stated: \"I can't seem to find my balance. \" OBJECTIVE/TREATMENT:  
 
Past Medical History:  
Diagnosis Date  Advance directive in chart 6/13/2016  CAD (coronary artery disease)  Cancer [facility]) 2003 Colon  CKD (chronic kidney disease), stage III ([facility] 3/27/2016  CVA (cerebral vascular accident) ([facility] 3/26/2016  Femoral artery aneurysm, left ([facility]  Heart attack [facility]) 2008  Heart attack ([facility]  Hernia  History of TIAs  Hyperlipidemia  Hypertension 3/27/2016  Iliac artery aneurysm, left ([facility]  Pure hypercholesterolemia 9/14/2012  PVD (peripheral vascular disease) ([facility] 3/27/2016  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  
Procedure Laterality Date  ABDOMEN SURGERY PROC UNLISTED  2011  
 3 stents placed into abdomen (groin)  CARDIAC SURG PROCEDURE UNLIST  2005 Quadruple Bypass  CARDIAC SURG PROCEDURE UNLIST  2011 Aortic pig valve placed  COLONOSCOPY N/A 6/29/2016 COLONOSCOPY performed by Rudy Palumbo MD at [facility] ENDOSCOPY  ENDOSCOPY, COLON, DIAGNOSTIC    
 HX AAA REPAIR    
 HX ORTHOPAEDIC  2008 Right Leg Amputated Eval Complexity: History: MEDIUM Complexity : Expanded review of history including physical, cognitive and psychosocial  history ; Examination: MEDIUM Complexity : 3-5 performance deficits relating to physical, cognitive , or psychosocial skils that result in activity limitations and / or participation restrictions; Decision Making:MEDIUM Complexity : Patient may present with comorbidities that affect occupational performnce. Miniml to moderate modification of tasks or assistance (eg, physical or verbal ) with assesment(s) is necessary to enable patient to complete evaluation G CODES: Self Care  Current  CK= 40-59%  Goal  CJ= 20-39%. The severity rating is based on the Other Functional Assessment, MMT, ROM Prior Level of Function/Home Situation: Ambulatory and capable of self-care but unable to carry out any work activities. Up and about more than 50% of waking hours. Lives with Spouse Lives with Family 1 story Entrance steps 1 Tom Sees W/C 
 
 Cognitive/Behavioral Status:  
Neurologic State: alert Orientation: oriented to time, place, person and situation Cognition:   appropriate decision making, appropriate for age attention/concentration and following commands  follows multi-step simple commands/direction and follows multi-step complex commands/direction Safety/Judgement: Insight into deficits ROM: within normal limits (BUEs) MMT: 5/5 (BUEs) Coordination: WFL (BUEs) Hand dominance: Right Skin: Intact (BUEs) Edema: None noted (BUEs) Sensation: Intact (BUEs) Vision/Perceptual: normal 
 
 
Functional Status Indep Mod I  
Sup. / 
Set- Up SBA CGA Min Assist  
Mod Assist  
Max assist  
Total Assist  
Assist x2 Additional Time NT Comments Rolling []  []  []  []  []    []    [x]    []  []  []  []  [] Supine to sit []  []  []  []  []  []  []  [x]  []  []  [x]  [] Sit to supine []  []  []  []  []  []  []  []  []  []  []  [x]  Left up in chair Sit to stand []  []  []  []  []  []  [x]  []  []  []  [x]  [] Toilet Transfer []  []  []  []  []  []  [x]  [x]  []  []  [x]  [] Feeding []  []  [x]  []  []  []  []  []  []  []  []  []    
Grooming []  []  [x]  []  []  []  []  []  []  []  []  [] Bathing  []  []  []  []  []  []  [x]  []  []  []  []  []    
UB Dressing  []  []  [x]  []  []  []  []  []  []  []  []  []    
LB Dressing  []  []  []  []  []  []  []  [x]  []  []  []  [] Toileting []  []  []  []  []  []  []  []  [x]  []  []  []  Condom catheter Balance Good Dot Console Poor Unable Comments Sitting static []  [x]  []  [] Sitting dynamic []  [x]  []  []  Fair-  
Standing static []  [x]  []  []    
Standing dynamic []  []  [x]  []  Poor+ Therapeutic Activity:  
Functional reaching out of LAUREL towards floor in prep for LB dressing with min vc's for activity pacing & breathing techniques.  Functional transfer x2 trials with mod/max A. Max A & additional time to maneuver to chair in prep for [facility] transfer; max vc's for pacing and positioning due to posterior lean. Pain:  
Pre treatment: 0/10 Post treatment: 0/10 Scale: numeric Activity tolerance:  fair COMMUNICATION/EDUCATION: Pt educated on role of OT, POC and home safety. He verbalized understanding. [x]         Fall prevention education was provided and the patient/caregiver indicated understanding. [x]         Patient/family have participated as able in goal setting and plan of care. [x]         Patient/family agree to work toward stated goals and plan of care. []         Patient understands intent and goals of therapy, but is neutral about his/her participation The patients plan of care was also discussed with: Physical Therapist, Certified Occupational Therapy Assistant and Registered Nurse. · After treatment position/precautions:  
o Up in chair 
o Bed in low position 
o Call light within reach 
o RN notified Recommendations for nursing: up with assist x2 & RW 
Written on communication board: Yes Verbally communicated to: Erika Schultz; Marzena Prasad CNA Thank you for this referral. 
Bart Martinez MS OTR/L Time Calculation: 25 mins

## 2018-12-04 ENCOUNTER — HOME CARE VISIT (OUTPATIENT)
Dept: HOME HEALTH SERVICES | Facility: HOME HEALTH | Age: 78
End: 2018-12-04
Payer: MEDICARE

## 2018-12-04 ENCOUNTER — HOSPITAL ENCOUNTER (INPATIENT)
Age: 78
LOS: 17 days | Discharge: HOME HEALTH | End: 2018-12-21
Attending: INTERNAL MEDICINE | Admitting: INTERNAL MEDICINE

## 2018-12-04 VITALS
TEMPERATURE: 97.8 F | OXYGEN SATURATION: 95 % | WEIGHT: 217.3 LBS | RESPIRATION RATE: 18 BRPM | HEIGHT: 68 IN | SYSTOLIC BLOOD PRESSURE: 120 MMHG | BODY MASS INDEX: 32.93 KG/M2 | HEART RATE: 61 BPM | DIASTOLIC BLOOD PRESSURE: 71 MMHG

## 2018-12-04 LAB
BASOPHILS # BLD: 0.1 K/UL (ref 0–0.1)
BASOPHILS NFR BLD: 1 % (ref 0–2)
CRP SERPL HS-MCNC: 292.28 MG/L (ref 0–3)
DIFFERENTIAL METHOD BLD: ABNORMAL
EOSINOPHIL # BLD: 0.4 K/UL (ref 0–0.4)
EOSINOPHIL NFR BLD: 4 % (ref 0–5)
ERYTHROCYTE [DISTWIDTH] IN BLOOD BY AUTOMATED COUNT: 14.9 % (ref 11.6–14.5)
HCT VFR BLD AUTO: 36.9 % (ref 36–48)
HGB BLD-MCNC: 12.6 G/DL (ref 13–16)
LYMPHOCYTES # BLD: 1.8 K/UL (ref 0.9–3.6)
LYMPHOCYTES NFR BLD: 19 % (ref 21–52)
MCH RBC QN AUTO: 33.5 PG (ref 24–34)
MCHC RBC AUTO-ENTMCNC: 34.1 G/DL (ref 31–37)
MCV RBC AUTO: 98.1 FL (ref 74–97)
MONOCYTES # BLD: 0.8 K/UL (ref 0.05–1.2)
MONOCYTES NFR BLD: 8 % (ref 3–10)
NEUTS SEG # BLD: 6.7 K/UL (ref 1.8–8)
NEUTS SEG NFR BLD: 68 % (ref 40–73)
PLATELET # BLD AUTO: 342 K/UL (ref 135–420)
PMV BLD AUTO: 11.3 FL (ref 9.2–11.8)
RBC # BLD AUTO: 3.76 M/UL (ref 4.7–5.5)
WBC # BLD AUTO: 9.7 K/UL (ref 4.6–13.2)

## 2018-12-04 PROCEDURE — 85025 COMPLETE CBC W/AUTO DIFF WBC: CPT

## 2018-12-04 PROCEDURE — 97535 SELF CARE MNGMENT TRAINING: CPT

## 2018-12-04 PROCEDURE — 97530 THERAPEUTIC ACTIVITIES: CPT

## 2018-12-04 PROCEDURE — 97110 THERAPEUTIC EXERCISES: CPT

## 2018-12-04 PROCEDURE — 80048 BASIC METABOLIC PNL TOTAL CA: CPT

## 2018-12-04 PROCEDURE — 74011250637 HC RX REV CODE- 250/637: Performed by: HOSPITALIST

## 2018-12-04 PROCEDURE — 74011000258 HC RX REV CODE- 258: Performed by: HOSPITALIST

## 2018-12-04 PROCEDURE — 36415 COLL VENOUS BLD VENIPUNCTURE: CPT

## 2018-12-04 PROCEDURE — 74011250636 HC RX REV CODE- 250/636: Performed by: HOSPITALIST

## 2018-12-04 RX ORDER — ATORVASTATIN CALCIUM 20 MG/1
20 TABLET, FILM COATED ORAL
Status: DISCONTINUED | OUTPATIENT
Start: 2018-12-04 | End: 2018-12-21 | Stop reason: HOSPADM

## 2018-12-04 RX ORDER — NITROGLYCERIN 0.4 MG/1
0.4 TABLET SUBLINGUAL AS NEEDED
Status: DISCONTINUED | OUTPATIENT
Start: 2018-12-04 | End: 2018-12-21 | Stop reason: HOSPADM

## 2018-12-04 RX ORDER — LEVOFLOXACIN 750 MG/1
750 TABLET ORAL DAILY
Qty: 5 TAB | Refills: 0 | Status: SHIPPED | OUTPATIENT
Start: 2018-12-04 | End: 2018-12-21

## 2018-12-04 RX ORDER — ASPIRIN 325 MG
325 TABLET ORAL DAILY
Status: DISCONTINUED | OUTPATIENT
Start: 2018-12-05 | End: 2018-12-21 | Stop reason: HOSPADM

## 2018-12-04 RX ORDER — ACETAMINOPHEN 325 MG/1
650 TABLET ORAL
Status: DISCONTINUED | OUTPATIENT
Start: 2018-12-04 | End: 2018-12-21 | Stop reason: HOSPADM

## 2018-12-04 RX ORDER — DONEPEZIL HYDROCHLORIDE 5 MG/1
10 TABLET, FILM COATED ORAL DAILY
Status: DISCONTINUED | OUTPATIENT
Start: 2018-12-05 | End: 2018-12-21 | Stop reason: HOSPADM

## 2018-12-04 RX ORDER — ADHESIVE BANDAGE
30 BANDAGE TOPICAL DAILY PRN
Status: DISCONTINUED | OUTPATIENT
Start: 2018-12-04 | End: 2018-12-21 | Stop reason: HOSPADM

## 2018-12-04 RX ORDER — TAMSULOSIN HYDROCHLORIDE 0.4 MG/1
0.4 CAPSULE ORAL
Status: DISCONTINUED | OUTPATIENT
Start: 2018-12-04 | End: 2018-12-21 | Stop reason: HOSPADM

## 2018-12-04 RX ORDER — LEVOFLOXACIN 750 MG/1
750 TABLET ORAL EVERY 24 HOURS
Status: COMPLETED | OUTPATIENT
Start: 2018-12-04 | End: 2018-12-08

## 2018-12-04 RX ORDER — CLOPIDOGREL BISULFATE 75 MG/1
75 TABLET ORAL DAILY
Status: DISCONTINUED | OUTPATIENT
Start: 2018-12-05 | End: 2018-12-21 | Stop reason: HOSPADM

## 2018-12-04 RX ORDER — FACIAL-BODY WIPES
10 EACH TOPICAL DAILY PRN
Status: DISCONTINUED | OUTPATIENT
Start: 2018-12-04 | End: 2018-12-21 | Stop reason: HOSPADM

## 2018-12-04 RX ORDER — CARVEDILOL 3.12 MG/1
3.12 TABLET ORAL 2 TIMES DAILY WITH MEALS
Status: DISCONTINUED | OUTPATIENT
Start: 2018-12-05 | End: 2018-12-21 | Stop reason: HOSPADM

## 2018-12-04 RX ADMIN — CLOPIDOGREL BISULFATE 75 MG: 75 TABLET ORAL at 09:19

## 2018-12-04 RX ADMIN — CARVEDILOL 3.12 MG: 3.12 TABLET, FILM COATED ORAL at 16:07

## 2018-12-04 RX ADMIN — ASPIRIN 325 MG ORAL TABLET 325 MG: 325 PILL ORAL at 09:19

## 2018-12-04 RX ADMIN — CEFTRIAXONE 1 G: 1 INJECTION, POWDER, FOR SOLUTION INTRAMUSCULAR; INTRAVENOUS at 16:07

## 2018-12-04 NOTE — PROGRESS NOTES
Problem: Falls - Risk of 
Goal: *Absence of Falls Document Camilla Carrera Fall Risk and appropriate interventions in the flowsheet. Fall Risk Interventions: 
Mobility Interventions: Patient to call before getting OOB Mentation Interventions: Adequate sleep, hydration, pain control, Door open when patient unattended, Room close to nurse's station Medication Interventions: Patient to call before getting OOB, Teach patient to arise slowly Elimination Interventions: Call light in reach, Patient to call for help with toileting needs, Urinal in reach Problem: Pressure Injury - Risk of 
Goal: *Prevention of pressure injury Document Sylvester Scale and appropriate interventions in the flowsheet. Pressure Injury Interventions: 
Sensory Interventions: Maintain/enhance activity level, Minimize linen layers, Check visual cues for pain, Assess changes in LOC Moisture Interventions: Absorbent underpads, Internal/External urinary devices, Maintain skin hydration (lotion/cream), Minimize layers Activity Interventions: Increase time out of bed, Pressure redistribution bed/mattress(bed type) Mobility Interventions: HOB 30 degrees or less, Pressure redistribution bed/mattress (bed type) Nutrition Interventions: Document food/fluid/supplement intake

## 2018-12-04 NOTE — PROGRESS NOTES
1930  Bedside shift change report given to Angela ORTIZ (oncoming nurse) by Ceasar Dorsey RN (offgoing nurse). Report included the following information SBAR, Kardex, Intake/Output, MAR and Cardiac Rhythm NSR w/BBB. 1935  Shift assessment, denies pain, resting in bed. 
 
0440  In bed resting quietly, denies pain. Bedside shift change report given to JORDEN(oncoming nurse) by Banadr Guerrier (offgoing nurse). Report included the following information SBAR, Kardex, Intake/Output, MAR and Cardiac Rhythm NSR w/BBB.

## 2018-12-04 NOTE — ROUTINE PROCESS
Bedside and Verbal shift change report given to Angela RN (oncoming nurse) by Lilly Angelo RN (offgoing nurse). Report included the following information SBAR, Kardex and MAR.

## 2018-12-04 NOTE — MANAGEMENT PLAN
Discharge/Transition Planning Pt discharging to [facility] for SNF rehab. RN to call report. Care Management Interventions PCP Verified by CM: Yes 
Palliative Care Criteria Met (RRAT>21 & CHF Dx)?: No 
Mode of Transport at Discharge: Other (see comment) Transition of Care Consult (CM Consult): Discharge Planning Discharge Durable Medical Equipment: No 
Physical Therapy Consult: Yes Occupational Therapy Consult: Yes Speech Therapy Consult: No 
Current Support Network: Lives with Spouse Confirm Follow Up Transport: Family Plan discussed with Pt/Family/Caregiver: Yes Discharge Location Discharge Placement: Skilled nursing facility Tracy Khan RN BSN Outcomes Manager Pager # [phone]

## 2018-12-04 NOTE — PROGRESS NOTES
Problem: Mobility Impaired (Adult and Pediatric) Goal: *Acute Goals and Plan of Care (Insert Text) Physical Therapy Goals Initiated 12/3/2018 and to be accomplished within 7 days. 1.  Patient will complete all bed mobility with supervision/set-up in order to prepare for EOB/OOB activity. 2.  Patient will perform sit <> stand with minimal assistance/contact guard assist in order to prepare for OOB/gait activity. 3.  Patient will perform bed to chair transfers with minimal assistance/contact guard assist in order to promote mobility and encourage seated activity to progress towards their prior level of function. 4.  Patient will ambulate 25 feet with modified independence using LRAD in order to prepare for safe negotiation of their environment. Outcome: Progressing Towards Goal 
 
PHYSICAL THERAPY: Daily TREATMENT Note INPATIENT: Medicare: Hospital Day: 4 Patient: Prisca Delatorre (62 y.o. male)    Date: 12/4/2018 Primary Diagnosis: CVA (cerebral vascular accident) ([facility] [facility] [I63.9]  
,  ,  
Precautions: Fall Chart, physical therapy assessment, plan of care and goals were reviewed. PLOF:with Rollator at home ASSESSMENT: 
Pt pleasant, cooperative, eager to participate in therapy today. Pt required CGA for bed mobility and transfer to seated EOB. Pt reports using wall grab bars to pull to stand, instructed pt in weight shift through LE's and safe hand placement to perform sit>stand transfer. In standing pt with posterior lean, reports baseline amb with rollator at home and associated forward flexed posture, unable to maintain upright standing posture without assist. Instructed in standing weight shifts to improve standing balance; in standing pt with majority of weight bearing on R prosthetic LE, with L knee in flexion. Pt required mod a X2 for gait X3 ft bed>chair with verbal cues for walker management, safety during stand>sit transfers. Progression toward goals: Improving appropriately and progressing toward goals PLAN: 
Patient continues to benefit from skilled intervention to address the above impairments. Continue treatment per established plan of care. EDUCATION:  
Education:  Patient was educated on the following topics: goals of therapy Barriers to Learning/Limitations: None Compensate with: visual, verbal, tactile, kinesthetic cues/model Discharge Recommendations:  Nik Fonseca Further Equipment Recommendations for Discharge:  N/A Factors which may impact discharge planning: none SUBJECTIVE:  
Patient stated I usually can just get up and go on my rollator at home.  OBJECTIVE DATA SUMMARY:  
Critical Behavior: 
Neurologic State: Alert Orientation Level: Oriented X4 Cognition: Appropriate safety awareness, Follows commands G CODE:Mobility [id number] Current  CM= 80-99%   Goal  CK= 40-59%. The severity rating is based on the Other [address] Standing Balance Scale 
0: Pt performs 25% or less of standing activity (Max assist) CN, 100% impaired. 1: Pt supports self with upper extremities but requires therapist assistance. Pt performs 25-50% of effort (Mod assist) CM, 80% to <100% impaired. 1+: Pt supports self with upper extremities but requires therapist assistance. Pt performs >50% effort. (Min assist). CL, 60% to <80% impaired. 2: Pt supports self independently with both upper extremities (walker, crutches, parallel bars). CL, 60% to <80% impaired. 2+: Pt support self independently with 1 upper extremity (cane, crutch, 1 parallel bar). CK, 40% to <60% impaired. 3: Pt stands without upper extremity support for up to 30 seconds. CK, 40% to <60% impaired. 3+: Pt stands without upper extremity support for 30 seconds or greater. CJ, 20% to <40% impaired. 4: Pt independently moves and returns center of gravity 1-2 inches in one plane. CJ, 20% to <40% impaired. 4+: Pt independently moves and returns center of gravity 1-2 inches in multiple planes. CI, 1% to <20% impaired. 5: Pt independently moves and returns center of gravity in all planes greater than 2 inches. CH, 0% impaired. Functional Mobility: 
 
 
Functional Status Indep (I) Mod I Super-vision/CGA Min A Mod A Max A Total A Assist x2 Verbal cues Additional time Not tested Comments Rolling []  []  [x] []    []    []  []  [] [x] [x] [] Supine to sit []  []  [x] []  []  []  []  [] [x] [x] [] Sit to supine []  []  [] []  []  []  []  [] [] [] [x] Sit to stand []  []  [] []  [x]  []  []  [x] [x] [x] [] Stand to sit []  []  [] []  [x]  []  []  [x] [x] [x] [] Bed to chair transfers []  []  [] []  [x]  []  []  [x] [x] [x] [] Balance Good Beuford Bustard Poor Unable Not tested Comments Sitting static [x]  []  []  []  [] Sitting dynamic [x]  []  []  []  []   
Standing static []  [x]  []  []  []   
Standing dynamic []  [x]  []  []  []   
 
 
Vital Signs Temp: 97.4 °F (36.3 °C) Pulse (Heart Rate): (!) 58 BP: 116/69 Resp Rate: 18    
O2 Sat (%): 99 %Pain:Pre treatment pain level:0 Post treatment pain level:0Pain Scale 1: Numeric (0 - 10) Pain Intensity 1: 0 Activity Tolerance:  
Good After treatment:  
Patient left in no apparent distress sitting up in chair Call bell left within reach Caregiver present Isa Corona PTA Time Calculation: 23 mins

## 2018-12-04 NOTE — PROGRESS NOTES
Bedside and Verbal shift change report given to JORDEN (oncoming nurse) by Tasneem Templeton (offgoing nurse). Report included the following information SBAR, Kardex and MAR. Pt is AOx4 on 2L/min NC prn. He may be discharged today to [facility] for rehab. Pt has no needs at this time.

## 2018-12-04 NOTE — DISCHARGE SUMMARY
[address] [place], [place] [address] Discharge SummaryPatient: Abdulaziz Escudero MRN: [id number]  CSN: [id number] YOB: 1940  Age: [de-identified] y.o. Sex: male DOA: 12/1/2018 LOS:  LOS: 3 days   Discharge Date:   
 
Admission Diagnoses: CVA (cerebral vascular accident) ([facility] [address] [I63.9] Discharge Diagnoses:   
Problem List as of 12/4/2018 Date Reviewed: 10/2/2018 Codes Class Noted - Resolved History of CVA (cerebrovascular accident) ICD-10-CM: Z86.73 
ICD-9-CM: V12.54  12/1/2018 - Present History of MI (myocardial infarction) ICD-10-CM: I25.2 ICD-9-CM: 012  12/1/2018 - Present Sepsis ([facility] [address] ICD-10-CM: A41.9 ICD-9-CM: 038.9, 995.91  12/1/2018 - Present UTI (urinary tract infection) ICD-10-CM: N39.0 ICD-9-CM: 599.0  12/1/2018 - Present * (Principal) Acute metabolic encephalopathy [facility]-MB: G93.41 
ICD-9-CM: 348.31  12/1/2018 - Present Cerebrovascular disease ICD-10-CM: I67.9 ICD-9-CM: 437.9  11/25/2018 - Present Acute MI ([facility] [address] ICD-10-CM: I21.9 ICD-9-CM: 410.90  11/25/2018 - Present S/P CABG x 4 ICD-10-CM: Z95.1 ICD-9-CM: V45.81  11/25/2018 - Present Overview Signed 11/25/2018 10:41 AM by Serena Ko MD  
  [facility] [place], [place] Chest pain ICD-10-CM: R07.9 ICD-9-CM: 786.50  11/24/2018 - Present CAD (coronary artery disease) ICD-10-CM: I25.10 ICD-9-CM: 414.00  11/24/2018 - Present Hypotension ICD-10-CM: I95.9 ICD-9-CM: 458.9  11/24/2018 - Present Atherosclerosis of native artery of left lower extremity with intermittent claudication (HCC) ICD-10-CM: B87.502 ICD-9-CM: 440.21  7/9/2018 - Present Vascular dementia without behavioral disturbance ICD-10-CM: F01.50 ICD-9-CM: 290.40  3/8/2017 - Present Diastolic dysfunction [address]: I51.9 ICD-9-CM: 429.9  2/26/2017 - Present  Overview Signed 2/26/2017  9:29 AM by Serena Ko MD  
 Grade 1 on echo 12/2016 AAA (abdominal aortic aneurysm) without rupture (HCC) ICD-10-CM: I71.4 ICD-9-CM: 441.4  1/4/2017 - Present PAD (peripheral artery disease) (HCC) ICD-10-CM: I73.9 ICD-9-CM: 443.9  9/21/2016 - Present Advance directive in chart ICD-10-CM: Z78.9 ICD-9-CM: V49.89  6/13/2016 - Present S/P AVR ICD-10-CM: Z95.2 ICD-9-CM: V43.3  4/13/2016 - Present Overview Addendum 2/26/2017  9:32 AM by Tasha Mitchell MD  
  2011 [facility] [place] porcine bioprostheses Transvalvular velocity 2.8 m/s, mean gradient 15 mmHg on 12/31/2016 Hypertension ICD-10-CM: I10 
ICD-9-CM: 401.9  3/27/2016 - Present CKD (chronic kidney disease), stage III ([facility]) ICD-10-CM: N18.3 ICD-9-CM: 585.3  3/27/2016 - Present Coronary artery disease involving native coronary artery without angina pectoris ICD-10-CM: I25.10 ICD-9-CM: 414.01  1/27/2016 - Present Pure hypercholesterolemia ICD-10-CM: E78.00 ICD-9-CM: 272.0  9/14/2012 - Present RESOLVED: TIA (transient ischemic attack) ICD-10-CM: G45.9 ICD-9-CM: 435.9  4/14/2018 - 10/2/2018 RESOLVED: Type 2 diabetes mellitus with nephropathy [facility] ICD-10-CM: E11.21 
ICD-9-CM: 250.40, 583.81  1/3/2018 - 4/4/2018 RESOLVED: Urinary frequency ICD-10-CM: R35.0 ICD-9-CM: 788.41  3/8/2017 - 4/20/2017 RESOLVED: Femoral artery aneurysm, left (HCC) ICD-10-CM: I72.4 ICD-9-CM: 442.3  1/4/2017 - 5/18/2017 RESOLVED: Iliac aneurysm ([facility] ICD-10-CM: I72.3 ICD-9-CM: 442.2  11/8/2016 - 11/29/2016 RESOLVED: Femoral artery aneurysm (HCC) ICD-10-CM: I72.4 ICD-9-CM: 442.3  11/8/2016 - 11/29/2016 RESOLVED: Atherosclerosis of both lower extremities with intermittent claudication ([facility] [address] ICD-10-CM: M48.535 ICD-9-CM: 440.21  11/8/2016 - 11/29/2016 RESOLVED: Preoperative cardiovascular examination ICD-10-CM: Z01.810 ICD-9-CM: V72.81  10/30/2016 - 11/29/2016 RESOLVED: Iliac artery aneurysm, left (HCC) ICD-10-CM: I72.3 ICD-9-CM: 442.2  10/19/2016 - 11/29/2016 RESOLVED: Femoral artery aneurysm, left (HCC) ICD-10-CM: I72.4 ICD-9-CM: 442.3  10/19/2016 - 11/29/2016 RESOLVED: Hemispheric carotid artery syndrome ICD-10-CM: G45.1 ICD-9-CM: 435.8  9/21/2016 - 11/29/2016 RESOLVED: Carotid stenosis, symptomatic w/o infarct ICD-10-CM: I65.29 ICD-9-CM: 433.10  9/21/2016 - 11/29/2016 RESOLVED: Atherosclerosis of native arteries of extremities with intermittent claudication, bilateral legs ([facility] ICD-10-CM: O77.874 ICD-9-CM: 440.21  9/21/2016 - 11/29/2016 RESOLVED: History of AAA (abdominal aortic aneurysm) repair ICD-10-CM: X05.678 ICD-9-CM: V45.89  9/21/2016 - 11/29/2016 RESOLVED: Colon cancer [facility] ICD-10-CM: C18.9 ICD-9-CM: 153.9  4/13/2016 - 6/13/2016 RESOLVED: Diabetes mellitus type 2, controlled ([facility] ICD-10-CM: E11.9 ICD-9-CM: 250.00  3/27/2016 - 4/4/2018 RESOLVED: 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 (HCC) ICD-10-CM: J45.831 ICD-9-CM: 434.11  3/26/2016 - 6/13/2016 RESOLVED: Essential hypertension, malignant ICD-10-CM: I10 
ICD-9-CM: 401.0  9/14/2012 - 12/16/2013 Discharge Condition: Stable Discharge To: SNF Consults: None Hospital Course: Taniya Norris is a [de-identified] y.o. male with PMHx of HTN, CAD, hx of CVA, hx of MI as listed below who presented to the ED on 12/1 with weakness/fatigue and change in speech earlier this AM. The patient was recently discharged home earlier this week after treatment for inferior STEMI treated with medical management. The recommendation at the time of discharge was inpatient rehab, but the patient refused. He states that he was doing OK on Thursday, but then yesterday began to feel more weak and fatigued. Then, this morning, his friend noticed a change in his speech and he was brought to the ED for further evaluation.  A code S was called. CT head was negative. MRI brain was negative as well for acute stroke. He was back to his baseline in the ED per patient. Pt back to baseline on admission. He was found to have UA consistent with a UTI and started on IVAB as well. Leukocytosis improving. Pt feeling significantly better. VSS. All labs and imaging reviewed. Physical Exam: 
General appearance: alert, cooperative, no distress, appears stated age Head: Normocephalic, without obvious abnormality, atraumatic Lungs: clear to auscultation bilaterally Heart: regular rate and rhythm, S1, S2 normal, no murmur, click, rub or gallop Abdomen: soft, non-tender. Bowel sounds normal. No masses,  no organomegaly Extremities: extremities normal, atraumatic, no cyanosis or edema Skin: Skin color, texture, turgor normal. No rashes or lesions Significant Diagnostic Studies:  
 
BMP: No results found for: NA, K, CL, CO2, AGAP, GLU, BUN, CREA, GFRAA, GFRNA 
CBC: No results found for: WBC, HGB, HGBEXT, HCT, HCTEXT, PLT, PLTEXT, HGBEXT, HCTEXT, PLTEXT No results found. Discharge Medications:    
Current Discharge Medication List  
  
START taking these medications Details  
levoFLOXacin (LEVAQUIN) 750 mg tablet Take 1 Tab by mouth daily. Qty: 5 Tab, Refills: 0 CONTINUE these medications which have NOT CHANGED Details  
aspirin (ASPIRIN) 325 mg tablet Take 1 Tab by mouth daily. Qty: 90 Tab, Refills: 4 Associated Diagnoses: Coronary artery disease involving native coronary artery of native heart without angina pectoris; Pure hypercholesterolemia; AAA (abdominal aortic aneurysm) without rupture ([address] Essential hypertension; CKD (chronic kidney disease), stage III ([address] Diastolic dysfunction; Vascular dementia without behavioral disturbance; Transient cerebral ischemia, unspecified type  
  
carvedilol (COREG) 3.125 mg tablet Take 1 Tab by mouth two (2) times daily (with meals). Qty: 30 Tab, Refills: 0 clopidogrel (PLAVIX) 75 mg tab TAKE 1 TABLET BY MOUTH DAILY Qty: 90 Tab, Refills: 4 Comments: **Patient requests 90 days supply**  
  
atorvastatin (LIPITOR) 20 mg tablet Take 1 Tab by mouth daily. Qty: 90 Tab, Refills: 4 Associated Diagnoses: Coronary artery disease involving native coronary artery of native heart without angina pectoris; Pure hypercholesterolemia; AAA (abdominal aortic aneurysm) without rupture ([facility] Essential hypertension; CKD (chronic kidney disease), stage III ([facility] Diastolic dysfunction; Vascular dementia without behavioral disturbance  
  
tamsulosin (FLOMAX) 0.4 mg capsule Take 1 Cap by mouth nightly. Qty: 90 Cap, Refills: 4 Associated Diagnoses: Benign prostatic hyperplasia with urinary frequency; AAA (abdominal aortic aneurysm) without rupture (HCC); PAD (peripheral artery disease) ([facility] Vascular dementia without behavioral disturbance; Diastolic dysfunction; Essential hypertension; CKD (chronic kidney disease), stage III ([facility] [address] Coronary artery disease involving native coronary artery of native heart without angina pectoris; Pure hypercholesterolemia  
  
donepezil (ARICEPT) 10 mg tablet TAKE 2 TABLETS BY MOUTH EVERY MORNING AFTER A MEAL Qty: 180 Tab, Refills: 1 Comments: **Patient requests 90 days supply** NITROGLYCERIN (NITROSTAT SL) 1 Tab by SubLINGual route as needed (chest pain). omega-3 fatty acids-vitamin e (FISH OIL) 1,000 mg cap Take 1 Cap by mouth two (2) times a day. Qty: 180 Cap, Refills: 4 Associated Diagnoses: Pure hypercholesterolemia; Routine general medical examination at a health care facility Activity: PT/OT Eval and Treat Diet: Cardiac Diet Wound Care: None needed Follow-up: 1 week after discharged to discuss recent admission Discharge time: >35 minutes Jose Perdomo PA-C [facility] Office:  [phone] Pager: [phone] 
 
 
12/4/2018, 1:03 PM

## 2018-12-04 NOTE — PROGRESS NOTES
Problem: Self Care Deficits Care Plan (Adult) Goal: *Acute Goals and Plan of Care (Insert Text) Occupational Therapy Goals Initiated 12/3/2018 within 7 day(s). 1.  Patient will perform grooming tasks while standing with minimal assistance for balance. 2.  Patient will perform lower body dressing with minimal assistance utilizing AE, prn. 
3.  Patient will perform functional task in standing for 8 minutes with minimal assistance and minimal verbal cues for safety in prep for ADLs. 4.  Patient will perform toilet transfers with minimal assistance. 5.  Patient will perform all aspects of toileting with minimal assistance/contact guard assist. 
6.  Patient will participate in upper extremity therapeutic exercise/activities with supervision/set-up for 8 minutes to maintain BUE strength for functional transfers and ADLs. 7.  Patient will utilize energy conservation techniques during functional activities with verbal minimal cues. Outcome: Progressing Towards Goal 
Occupational Therapy TREATMENT Patient: Jose Luis Sparks ([de-identified] y.o. male) Date: 12/4/2018 Diagnosis: CVA (cerebral vascular accident) ([facility] [address] [O50.2] Acute metabolic encephalopathy Precautions: Fall Chart, occupational therapy assessment, plan of care, and goals were reviewed. PLOF: Independent ASSESSMENT: 
Pt pleasant and cooperative. Reports decrease  strength LUE. Provided resistive \"glove ball\" for strengthening and encouraged to use throughout the day. Pt tolerates sitting EOB ~ 15 minutes performing ADL grooming task. Kyphotic posture requires vc's for BUE shoulder retraction. Pt demonstrates compensatory strategy using LUE as stabilizer w/container mgt and oral care applying toothpaste to toothbrush. Pt returned to supine in preparation for d/c to SNF. No c/o pain. EDUCATION Pt educated on energy conservation techniques w/ADLs and importance of UE TherEx Progression toward goals: [x]          Improving appropriately and progressing toward goals 
[]          Improving slowly and progressing toward goals 
[]          Not making progress toward goals and plan of care will be adjusted PLAN: 
Patient continues to benefit from skilled intervention to address the above impairments. Continue treatment per established plan of care. Discharge Recommendations:  Nik Fonseca Further Equipment Recommendations for Discharge:  N/A, pt reports he has DME SUBJECTIVE:  
Patient stated Sleepy.  when asked how he felt OBJECTIVE DATA SUMMARY: 
  
Cognitive/Behavioral Status: 
Neurologic State: Alert Orientation Level: Oriented X4 Cognition: Follows commands Functional Mobility and Transfers for ADLs: 
 Bed Mobility: 
Supine to Sit: Additional time;Stand-by assistance Sit to Supine: Additional time;Stand-by assistance Scooting: Additional time;Minimum assistance(along EOB) Balance: 
Sitting: Intact ADL Intervention: 
Grooming Brushing Teeth: Minimum assistance(assist w/dentures for thoroughness) Therapeutic Exercises: AROM BUE shoulder flexion AROM BUE elbow flexion/extension LUE hand squeezes w/resistive \"glove ball\" Pain: 
Pre Treatment:0 Post Treatment:0 Pain Scale 1: Numeric (0 - 10) Pain Intensity 1: 0 Activity Tolerance:   
Good Please refer to the flowsheet for vital signs taken during this treatment. After treatment:  
[]  Patient left in no apparent distress sitting up in chair 
[x]  Patient left in no apparent distress in bed 
[x]  Call bell left within reach [x]  Nursing notified 
[]  Caregiver present 
[]  Bed alarm activated Kayden Sandoval Time Calculation: 24 mins

## 2018-12-04 NOTE — ROUTINE PROCESS
0800 - assumed care of patient - alert and oriented. Wife and son at bedside. Patient denies any pain 0930 - PT at bedside - patient up to chair.  
 
1200 - continues in chair for lunch - IV R AC infiltrated - 
 
1430 - back to bed with assist - Rad Nurse at bedside to insert peripheral  - does not want condom cath back on and using urinal

## 2018-12-04 NOTE — PROGRESS NOTES
Problem: Falls - Risk of 
Goal: *Absence of Falls Document Heide Blanca Fall Risk and appropriate interventions in the flowsheet. Outcome: Progressing Towards Goal 
Fall Risk Interventions: 
Mobility Interventions: Patient to call before getting OOB Mentation Interventions: Adequate sleep, hydration, pain control, Door open when patient unattended, Room close to nurse's station Medication Interventions: Patient to call before getting OOB, Teach patient to arise slowly Elimination Interventions: Call light in reach, Patient to call for help with toileting needs, Urinal in reach Problem: Pressure Injury - Risk of 
Goal: *Prevention of pressure injury Document Sylvester Scale and appropriate interventions in the flowsheet. Outcome: Progressing Towards Goal 
Pressure Injury Interventions: 
Sensory Interventions: Maintain/enhance activity level, Minimize linen layers, Check visual cues for pain, Assess changes in LOC Moisture Interventions: Absorbent underpads, Internal/External urinary devices, Maintain skin hydration (lotion/cream), Minimize layers Activity Interventions: Increase time out of bed, Pressure redistribution bed/mattress(bed type) Mobility Interventions: HOB 30 degrees or less, Pressure redistribution bed/mattress (bed type) Nutrition Interventions: Document food/fluid/supplement intake

## 2018-12-05 ENCOUNTER — PATIENT OUTREACH (OUTPATIENT)
Dept: FAMILY MEDICINE CLINIC | Age: 78
End: 2018-12-05

## 2018-12-05 LAB
ANION GAP SERPL CALC-SCNC: 11 MMOL/L (ref 3–18)
BUN SERPL-MCNC: 32 MG/DL (ref 7–18)
BUN/CREAT SERPL: 22 (ref 12–20)
CALCIUM SERPL-MCNC: 9 MG/DL (ref 8.5–10.1)
CHLORIDE SERPL-SCNC: 110 MMOL/L (ref 100–108)
CO2 SERPL-SCNC: 21 MMOL/L (ref 21–32)
CREAT SERPL-MCNC: 1.45 MG/DL (ref 0.6–1.3)
GLUCOSE SERPL-MCNC: 90 MG/DL (ref 74–99)
POTASSIUM SERPL-SCNC: 4.5 MMOL/L (ref 3.5–5.5)
SODIUM SERPL-SCNC: 142 MMOL/L (ref 136–145)

## 2018-12-05 PROCEDURE — 74011250637 HC RX REV CODE- 250/637: Performed by: INTERNAL MEDICINE

## 2018-12-05 RX ADMIN — TAMSULOSIN HYDROCHLORIDE 0.4 MG: 0.4 CAPSULE ORAL at 00:38

## 2018-12-05 RX ADMIN — ASPIRIN 325 MG ORAL TABLET 325 MG: 325 PILL ORAL at 09:26

## 2018-12-05 RX ADMIN — CARVEDILOL 3.12 MG: 3.12 TABLET, FILM COATED ORAL at 18:06

## 2018-12-05 RX ADMIN — DONEPEZIL HYDROCHLORIDE 10 MG: 5 TABLET, FILM COATED ORAL at 09:26

## 2018-12-05 RX ADMIN — ATORVASTATIN CALCIUM 20 MG: 20 TABLET, FILM COATED ORAL at 21:58

## 2018-12-05 RX ADMIN — Medication 1 CAPSULE: at 09:26

## 2018-12-05 RX ADMIN — TAMSULOSIN HYDROCHLORIDE 0.4 MG: 0.4 CAPSULE ORAL at 21:58

## 2018-12-05 RX ADMIN — CLOPIDOGREL BISULFATE 75 MG: 75 TABLET ORAL at 09:26

## 2018-12-05 RX ADMIN — LEVOFLOXACIN 750 MG: 750 TABLET, FILM COATED ORAL at 22:00

## 2018-12-05 RX ADMIN — LEVOFLOXACIN 750 MG: 750 TABLET, FILM COATED ORAL at 00:38

## 2018-12-05 RX ADMIN — Medication 1 CAPSULE: at 18:07

## 2018-12-05 RX ADMIN — ATORVASTATIN CALCIUM 20 MG: 20 TABLET, FILM COATED ORAL at 00:38

## 2018-12-05 RX ADMIN — CARVEDILOL 3.12 MG: 3.12 TABLET, FILM COATED ORAL at 09:26

## 2018-12-05 NOTE — PROGRESS NOTES
Dietary Orders: 
   ?    Regular ? Diabetic: X    Cardiac: 
   ?    Other: ?    Tube feeding ? IV fluids for hydration ? Fluid restrictions:  
 
Residents dietary preferences: 
 
 
Reason for modified diet: 
 
 Dietary Risks ? Weight loss ? Swallowing problems ? Chewing problems ? Missing teeth/poor dentition ? Edentulous ? Mouth pain/discomfort    
    ? Other Residents dietary goal: X      Maintain current weight/gradual loss ? Prevent weight loss 
   ? Other Dietary interventions ? Eats in dining room ? Eats in room ? Dentures/partials ? Specialty utensils or devices: 
   ?     Other

## 2018-12-05 NOTE — PROGRESS NOTES
conducted an initial consultation and Spiritual Assessment for [employer], who is a [de-identified] y.o.,male. Patients Primary Language is: [place]. According to the patients EMR Restoration Affiliation is: [place]. The reason the Patient came to the hospital is:  
Patient Active Problem List  
 Diagnosis Date Noted  History of CVA (cerebrovascular accident) 12/01/2018  History of MI (myocardial infarction) 12/01/2018  Sepsis ([address] 12/01/2018  UTI (urinary tract infection) 12/01/2018  Acute metabolic encephalopathy 54/25/5622  Cerebrovascular disease 11/25/2018  Acute MI ([address] 11/25/2018  S/P CABG x 4 11/25/2018  Chest pain 11/24/2018  CAD (coronary artery disease) 11/24/2018  Hypotension 11/24/2018  Atherosclerosis of native artery of left lower extremity with intermittent claudication ([address] 07/09/2018  Vascular dementia without behavioral disturbance 03/08/2017  Diastolic dysfunction 15/48/9358  AAA (abdominal aortic aneurysm) without rupture ([address] 01/04/2017  PAD (peripheral artery disease) ([address] 09/21/2016  Advance directive in chart 06/13/2016  S/P AVR 04/13/2016  Hypertension 03/27/2016  CKD (chronic kidney disease), stage III ([address] 03/27/2016  Coronary artery disease involving native coronary artery without angina pectoris 01/27/2016  Pure hypercholesterolemia 09/14/2012 The  provided the following Interventions: 
Initiated a relationship of care and support. Explored issues of sky, spirituality and/or Mosque needs while hospitalized. Listened empathically. Provided chaplaincy education. Provided information about Spiritual Care Services. Offered prayer and assurance of continued prayers on patient's behalf. Chart reviewed. The following outcomes were achieved: 
Patient shared some information about their medical narrative and spiritual journey/beliefs. Patient processed feeling about current hospitalization. Patient expressed gratitude for the 's visit. Assessment: 
Patient did not indicate any spiritual or Yazdanism issues which require Spiritual Care Services interventions at this time. Patient does not have any Yazdanism/cultural needs that will affect patients preferences in health care. Plan: 
Chaplains will continue to follow and will provide pastoral care on an as needed or requested basis.  recommends bedside caregivers page  on duty if patient shows signs of acute spiritual or emotional distress. [facility] Staff  Spiritual Care  
[phone]

## 2018-12-05 NOTE — ROUTINE PROCESS
Bedside and Verbal shift change report given to maria esther ace (oncoming nurse) by Alonso Joseph (offgoing nurse). Report included the following information SBAR, Intake/Output and Recent Results.

## 2018-12-05 NOTE — PROGRESS NOTES
Report called to [facility]. Report was given to Darin Cespedes RN and included SBAR, MAR, and Kardex. According to Darin Cespedes, pts room is not ready. 1900: [facility] was called and the room is still not ready or clean.

## 2018-12-05 NOTE — ROUTINE PROCESS
Bedside and Verbal shift change report given to Wendi Meraz RN (oncoming nurse) by Theresa Julian LPN  (offgoing nurse). Report included the following information SBAR, Kardex, MAR and Recent Results.

## 2018-12-05 NOTE — PROGRESS NOTES
Nutrition initial assessment/Plan of care tcc RECOMMENDATIONS:  
 
1. Cardiac diet 2. Monitor weight, labs and PO intake 3. RD to follow GOALS:  
 
1. PO intake meets >75% of protein/calorie needs by 12/12 2. Weight Maintenance/gradual loss (1-2 lb/week) by 12/12 ASSESSMENT: 
 
Weight status is classified as obese per BMI of 33.3. Adequate PO intake. Labs noted. Pt w/ elevated BUN/Cr; GFR (47). Nutrition recommendations listed. RD to follow. Nutrition Diagnoses:  
Obesity related to prior excessive energy intake PTA as evidenced by BMI of 33.3 and 142% IBW. Nutrition Risk:  [] High  [] Moderate [x]  Low SUBJECTIVE/OBJECTIVE:  
  
(12/2): Admitted for CVA. PMHx including hyperlipidemia,CAD and prior CVA and TIA's. Patient reports having a good appetite and weight has been stable at 220 lb. Denies food allergies or problems chewing/swallowing. Reports following a low Na/low sat fat diet at home. Will monitor. (12/5): Pt transferred from [address] [place], [place] [address] on 12/4/2018. Pt seen in room OOB in chair. Reports he is doing well and his appetite is great. Nothing to address at this time. Will monitor. Information Obtained from:  
 [x] Chart Review [x] Patient 
 [] Family/Caregiver 
 [] Nurse/Physician 
 [] Interdisciplinary Meeting/Rounds Diet: Cardiac Diet Medications: [x] Reviewed Lipitor, Coreg, Plavix, Fish Oil Allergies: [x] Reviewed Patient Active Problem List  
Diagnosis Code  Pure hypercholesterolemia E78.00  Coronary artery disease involving native coronary artery without angina pectoris I25.10  Hypertension I10  
 CKD (chronic kidney disease), stage III ([facility]) N18.3  
 S/P AVR Z95.2  Advance directive in chart Z68.5  
 PAD (peripheral artery disease) ([facility]) I73.9  AAA (abdominal aortic aneurysm) without rupture ([facility]) I71.4  Diastolic dysfunction R40.2  Vascular dementia without behavioral disturbance F01.50  Atherosclerosis of native artery of left lower extremity with intermittent claudication (HCC) X67.013  Chest pain R07.9  CAD (coronary artery disease) I25.10  Hypotension I95.9  Cerebrovascular disease I67.9  Acute MI ([address] I21.9  S/P CABG x 4 Z95.1  History of CVA (cerebrovascular accident) Z80.78  
 History of MI (myocardial infarction) I25.2  Sepsis ([facility] A41.9  
 UTI (urinary tract infection) N39.0  Acute metabolic encephalopathy O73.61 Past Medical History:  
Diagnosis Date  Advance directive in chart 6/13/2016  CAD (coronary artery disease)  Cancer [facility]) 2003 Colon  CKD (chronic kidney disease), stage III ([facility] 3/27/2016  CVA (cerebral vascular accident) ([facility] 3/26/2016  Femoral artery aneurysm, left ([facility]  Heart attack [facility]) 2008  Heart attack ([facility]  Hernia  History of TIAs  Hyperlipidemia  Hypertension 3/27/2016  Iliac artery aneurysm, left ([facility]  Pure hypercholesterolemia 9/14/2012  PVD (peripheral vascular disease) ([facility] 3/27/2016  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 ([facility] 3/26/2016 Labs:   
Lab Results Component Value Date/Time Sodium 142 12/04/2018 11:30 PM  
 Potassium 4.5 12/04/2018 11:30 PM  
 Chloride 110 (H) 12/04/2018 11:30 PM  
 CO2 21 12/04/2018 11:30 PM  
 Anion gap 11 12/04/2018 11:30 PM  
 Glucose 90 12/04/2018 11:30 PM  
 BUN 32 (H) 12/04/2018 11:30 PM  
 Creatinine 1.45 (H) 12/04/2018 11:30 PM  
 Calcium 9.0 12/04/2018 11:30 PM  
 Magnesium 2.4 12/01/2018 11:35 AM  
 Phosphorus 2.9 04/14/2018 09:27 AM  
 Albumin 3.4 12/01/2018 11:35 AM  
 
Lab Results Component Value Date/Time  Cholesterol, total 129 04/14/2018 09:27 AM  
 HDL Cholesterol 38 (L) 04/14/2018 09:27 AM  
 LDL, calculated 49.6 04/14/2018 09:27 AM  
 VLDL, calculated 41.4 04/14/2018 09:27 AM  
 Triglyceride 207 (H) 04/14/2018 09:27 AM  
 CHOL/HDL Ratio 3.4 04/14/2018 09:27 AM  
 
 Anthropometrics: BMI (calculated): 33.3 Last 3 Recorded Weights in this Encounter 12/04/18 2225 Weight: 99.3 kg (219 lb) Ht Readings from Last 1 Encounters:  
12/04/18 5' 8\" (1.727 m) Weight Metrics 12/4/2018 12/4/2018 11/27/2018 10/2/2018 9/7/2018 7/30/2018 7/19/2018 Weight 219 lb 217 lb 4.8 oz 220 lb 0.3 oz 206 lb 12.8 oz 205 lb 220 lb 220 lb BMI 33.3 kg/m2 33.04 kg/m2 33.45 kg/m2 29.67 kg/m2 29.41 kg/m2 31.57 kg/m2 31.57 kg/m2 No data found. IBW: 154 lb %IBW: 142% UBW: 220 lb  
[] Weight Loss [] Weight Gain [x] Weight Stable Estimated Nutrition Needs: [x] MSJ  [] Other: 
Calories: 9475-3556 Kcal Based on:   [x] Actual BW  
Protein:    g Based on:   [x] Actual BW Fluid:      2651-1408 ml Based on:   [x] Actual BW  
 
 [x] No Cultural, Congregational or ethnic dietary need identified. [] Cultural, Congregational and ethnic food preferences identified and addressed Wt Status:  [] Normal (18.6 - 24.9) [] Underweight (<18.5) [] Overweight (25 - 29.9) [x] Mild Obesity (30 - 34.9)  [] Moderate Obesity (35 - 39.9) [] Morbid Obesity (40+) Nutrition Problems Identified:  
[] Suboptimal PO intake  
[] Food Allergies [] Difficulty chewing/swallowing/poor dentition 
[] Constipation/Diarrhea  
[] Nausea/Vomiting  
[x] None 
[] Other:  
 
Plan:  
[x] Therapeutic Diet 
[]  Obtained/adjusted food preferences/tolerances and/or snacks options  
[]  Supplements added  
[] Occupational therapy following for feeding techniques []  HS snack added  
[]  Modify diet texture  
[]  Modify diet for food allergies []  Educate patient  
[]  Assist with menu selection  
[x]  Monitor PO intake on meal rounds  
[x]  Continue inpatient monitoring and intervention  
[x]  Participated in discharge planning/Interdisciplinary rounds/Team meetings  
[]  Other:  
 
Education Needs: 
 [] Not appropriate for teaching at this time due to: 
 [x] Identified and addressed Nutrition Monitoring and Evaluation: [x] Continue ongoing monitoring and intervention 
[] Other [place]

## 2018-12-05 NOTE — PROGRESS NOTES
Problem: Self Care Deficits Care Plan (Adult) Goal: *Acute Goals and Plan of Care (Insert Text) OCCUPATIONAL THERAPY SHORT TERM GOALS Initiated 12/5/2018 and to be accomplished within 2 [facility]) 1. Patient will perform Lower body bathing with adaptive equipment as needed for energy conservation with supervision/set up. 2.  Patient will perform Lower body dressing with adaptive equipment as needed for energy conservation with supervision/set-up . 3. Patient will perform toileting task with supervision/set-up with Good safety to reduce falls risk. 4.  Patient will perform functional transfers to commode/shower chair with LRAD and supervision/setup. 5.  Patient will perform standing static/dynamic balance activities for improved ADL/IADL function with supervision/set-up and Fair +/Good - balance and safety awarenes. 6.  Patient will improve Barthel index scores to atleast 70/100 to improve functional mobility. 7.  Patient will perform bathroom mobility with LRAD and Supervision. OCCUPATIONAL THERAPY LONG TERM GOALS Initiated 12/5/2018 and to be accomplished within 4 Week(s) 1. Patient will perform Lower body bathing with adaptive equipment as needed for ebergy conservation with modified independence. 2.  Patient will perform Lower body dressing with adaptive equipment as needed for energy conservation with modified independence. 3.  Patient will perform toileting task with modified independence with Good safety to reduce falls risk. 4.  Patient will perform functional transfers to commode and shower chair with LRAD and  modified independence and Good balance and safety awareness. 5.  Patient will perform standing static/dynamic activity for improved ADL/IADL function    with modified independence an and Good balance and safety awareness. 6.  Patient will improve Barthel index score to 80/100 to improve independence with mobility.  
7. Patient will perform functional mobility within kitchen environment/bathroom with LRAD and modified independence. Therapist: Tonya Barron OT    12/5/2018 [address] [place] [address] Occupational Therapy EVALUATION Patient: Andres Roy ([de-identified] y.o. male) Start of Care Date: 12/5/2018                         Onset Date:  12/1/2018 Referred by : Christianne Hickey MD        
Primary Diagnosis: UTI Altered Mental Status      Treatment Diagnosis Treatment Diagnosis: muscle weakness Treatment Diagnosis: muscle weakness Pt is a [de-identified] yo male who experienced a MI with admission to this hospital, however, returned home declining SNF admission. Pt returned 12/1/2018 with complaints of fatigue and diagnosed with AMS and UTI. Pt has a PM hx significant for RBKA with prothesis, CVA, TIA. Pt is unsafe to dc home at this time due to decreased independence and safety with self care and mobility. Precautions: Fall Eval Complexity: History: MEDIUM Complexity : Expanded review of history including physical, cognitive and psychosocial  history . History of present problem reveals MEDIUM  Complexity : 1-2 comorbidities / personal factors will impact the outcome/ POC . Past Medical history includes:  
Past Medical History:  
Diagnosis Date  Advance directive in chart 6/13/2016  CAD (coronary artery disease)  Cancer [facility]) 2003 Colon  CKD (chronic kidney disease), stage III ([address] 3/27/2016  CVA (cerebral vascular accident) ([address] 3/26/2016  Femoral artery aneurysm, left ([address]  Heart attack [facility]) 2008  Heart attack ([address]  Hernia  History of TIAs  Hyperlipidemia  Hypertension 3/27/2016  Iliac artery aneurysm, left ([address]  Pure hypercholesterolemia 9/14/2012  PVD (peripheral vascular disease) ([address] 3/27/2016  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  
Procedure Laterality Date [address] UNLISTED  2011 3 stents placed into abdomen (groin)  CARDIAC SURG PROCEDURE UNLIST  2005 Quadruple Bypass  CARDIAC SURG PROCEDURE UNLIST  2011 Aortic pig valve placed  COLONOSCOPY N/A 6/29/2016 COLONOSCOPY performed by Nel Mesa MD at [facility] ENDOSCOPY  ENDOSCOPY, COLON, DIAGNOSTIC    
 HX AAA REPAIR    
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 Miniml to moderate modification of tasks or assistance (eg, physical or verbal ) with assesment(s) is necessary to enable patient to complete evaluation . Patient is referred to [address] [place] [address] due to a functional decline in self care, IADLs, functional mobility. Prior Level of Function/Home Situation:  
Home Situation Home Environment: Private residence # Steps to Enter: (threshold) One/Two Story Residence: Two story, live on 1st floor # of Interior Steps: (5) Interior Rails: Both Living Alone: No 
Support Systems: Family member(s) Patient Expects to be Discharged to[de-identified] Private residence Current DME Used/Available at Home:  Pine, rollator, Grab bars, Other (comment), Shower chair, [employer], straight(power scooter) Tub or Shower Type: Shower Level of Assistance received at Home: Prior to this current hospitalization, the patient required  MI in ADLs, participation in some cooking (grilling) and MI in functional ambulation in the home. OBJECTIVE DATA SUMMARY:  
Vital Signs: 
Resting BP:  BP: 123/71  HR: Pulse (Heart Rate): 63    
Pain: 
Pain Screen Pain Scale 1: Numeric (0 - 10) Pain Intensity 1: 0 Patient Stated Pain Goal: 0 Pain Reassessment 1: Patient sleeping Auditory:Auditory Auditory Impairment: Hard of hearing, bilateral;Hearing aid(s) Hearing Aids/Status: With patient Examination: MEDIUM Complexity : 3-5 performance deficits relating to physical, cognitive , or psychosocial skils that result in activity limitations and / or participation restrictions; Coordination:Coordination Fine Motor Skills-Upper: Left Intact; Right Intact Gross Motor Skills-Upper: Left Intact; Right Intact Fine Motor Skills-Upper: Left Intact; Right Intact Gross Motor Skills-Upper: Left Intact; Right IntactBed Mobility:Bed Mobility Rolling: Supervision Supine to Sit: Supervision Rolling: Supervision Transfers:Functional Transfers Sit to Stand: Contact guard assistance Stand to Sit: Contact guard assistance Bathroom Mobility: Contact guard assistance Toilet Transfer : Contact guard assistance Balance:Balance Sitting: Intact Standing: With support; Impaired Standing - Static: Fair Standing - Dynamic : Fair(-) Gross Assesment: 
  
ADL self care:Basic ADL Feeding: Independent Oral Facial Hygiene/Grooming: Independent Bathing: Contact guard assistance(juliane area only) Type of Bath: Basin/Soap/Water Upper Body Dressing: Setup Lower Body Dressing: Minimum assistance Toileting: Contact guard assistance ADL Intervention:Upper Body Bathing, Lower body bathing, dressing, toileting Position Performed: (seated on commode) See end of note for levelsASSESSMENT:  
A clinical decision making of MEDIUM complexity was conducted, which includes an analysis of the Occupational profile, standardized assessments, data and treatment options. THE BARTHEL INDEXACTIVITY 
 SCORE FEEDING 
0=unable 5=needs help cutting,spreading butter,etc., or modified diet 10= independent  
5 BATHING 
0=dependent 5=independent (or in shower  
0  
GROOMING 
0=needs help 5=independent face/hair/teeth/shaving (implements provided) 5 DRESSING 
0=dependent 5=needs help but can do about half unaided 10=independent(including buttons, zips,laces etc.) 5 BOWELS 
0=incontinent 5=occasional accident 10=continent 10 BLADDER 
0=incontinent, or catheterized and unable to manage alone 5=occasional accident 10=continent  
5  
TOILET USE 
0=dependent 5=needs some help, but can do something alone 10=independent (on and off, dressing, wiping) 5 TRANSFER (BED TO CHAIR AND BACK) 0=unable, no sitting balance 5=major help(one or two people,physical), can sit 10=minor help(verbal or physical) 15=independent 10 MOBILITY (ON LEVEL SURFACES) 0=immobile or <50 yards 5=wheelchair independent,including corners,>50 yards 10=walkes with help of one person (verbal or physical) >50 yards 15=independent(but may use any aid; for example, stick) >50 yards  
0 STAIRS 
0=unable 5=needs help (verbal, physical, carrying aid) 10=independent  
0  
   
      TOTAL:             45/100 Additional comments:  Pt approached for OT session laying supine in bed with wife and son visiting. Pt agreeable to OT eval and educated on the role of OT and process of evaluation. Pt performed supine>sit with SUP. Pt demo intact sitting balance. Pt demonstartes WFL AROM of BUE and 4/5 B shld strength, 4+/5 B elbow strength. Pt performed sit>stand form bed with CGA and performed functional ambulation with RW to bathroom with CGA and v/c for managing AD (close to body, threshold management) as well as encouraging L step length. Pt states he has had a problem with dragging the L foot due to previous CVA deficits. Pt is noted to have leg length discrepancy as pt keeps L knee flexed in stance to keep R prosthetic foot on ground.  Pt prothesis has electronic dorsiflexion component that reuqires battery charge as currently not activated. Pt performed toilet transfer CGA and managed brief CGA. Pt urinated into urinal due to large scrotum that does not completley fit into commode area. Pt performed juliane area hygiene in stance with CGA. Pt performed LB dressing with min A for pants and s/u socks with noted SOB during task and UB dressing and bathing with MI/s/u. Following short rest break pt performed functional ambulation to chair with CGA and CGA for stand>sit. Pt with breakfast at end of session and call bell in reach. Pt states he owuld like to work on performing activities without fatigue and increased independence. TREATMENT PLAN : Rehab Potential : Good Skilled Occupational Therapy Services may include:  
[x] Self Care/Home Mgmt                    []Cognitive Perceptual Re-training                             
[x] Adaptive Equip Training                 [x]Therapeutic Exercise []Sensory Integration                           []Community Work Integration 
[x]Therapeutic Activities                     [x]Other/modalities []Neuromuscular Re-education         [x] Wheelchair Mgmt/propulsion   
[x]Patient/Family/Staff Instruction      : 
[]Orthotics/Prosthetic Fitting & training Frequency/Duration: Patient will be followed by Occupational therapy for 1-2 times a day for a minimum of 3 days per week for 4 weeks to address goals. Yarelis Chlids Discharge Recommendations: Home Health Patient expected Discharge Location: [x]Private Residence  [] [facility]  []LTC  [] Senior [facility] COMMUNICATION/EDUCATION:  Patient/Family Agreement with Treatment Plan :  
 
[x]   OT Evaluation/POC has been reviewed with the BUTLER. [x]        Fall prevention education was provided and the patient/caregiver indicated understanding 
[x]        Patient/family have participated as able in goal setting and plan of care. Patient/family agree to work toward stated goals and plan of care. []        Patient is unable to participate in goal setting and plan of care. Therapist will contact SW/POA. Treatment session:  
Overall Complexity:MEDIUM Evaluation:  38 mins. Treatment :   62 mins. Occupational Therapist:   Flor Reyes          12/5/2018 Thank You for this referral.

## 2018-12-05 NOTE — PROGRESS NOTES
I have reviewed this patient's current medication list and recent laboratory results. At this time, I do not suggest any drug therapy adjustments or additional laboratory monitoring. Thank you, 
Marcelino TEE Ph. M. S. 
12/5/2018

## 2018-12-05 NOTE — PROGRESS NOTES
12/5/18 1:20PM    NN aware of readmission to [facility] 12/1/18 for CVA. D/C 12/4/18 to [facility] for rehab, will follow.

## 2018-12-05 NOTE — ROUTINE PROCESS
TRANSFER - IN REPORT: 
 
Verbal report received from BETSEYρικάλamrit Donnelly, RN (name) on [employer]  being received from [phone] (unit) for routine progression of care Report consisted of patients Situation, Background, Assessment and  
Recommendations(SBAR). Information from the following report(s) SBAR, Kardex and MAR was reviewed with the receiving nurse. Opportunity for questions and clarification was provided. Assessment completed upon patients arrival to unit via w/c at 2150p and care assumed.

## 2018-12-05 NOTE — PROGRESS NOTES
The patient was offered a group activity on 2018, of the morning news, during this group patients watched the  of [facility]  on television. The patient declined this group activity. The patient was also offered a relaxation coloring group. The patient also declined this activity. The Recreation Therapist will continue to offer the patient group and 1:1 activities and encourage participation.

## 2018-12-05 NOTE — PROGRESS NOTES
Problem: Mobility Impaired (Adult and Pediatric) Goal: *Acute Goals and Plan of Care (Insert Text) PHYSICAL THERAPY STG GOALS : 
Initiated 12/5/2018 and to be accomplished within 2 Weeks 1. Patient will move from supine to sit and sit to supine  in bed with close supervision. 2.  Patient will transfer from bed to chair and chair to bed with close supervision using RW. 3.  Patient will perform sit to stand with RW close super vision with Fair balance and safety awareness. 4.  Patient will ambulate with close supervision for 100 feet with RW on level surfaces with 2 turns. 5.  Patient will ascend/descend 1 stairs with bilateral handrail(s) with SBA to allow for safe home access/exit. 6.  Patient will improve standardized test score for 3/5 209 [address] Standing Balance Scale. PHYSICAL THERAPY LTG GOALS : 
Initiated 12/5/2018 and to be accomplished within 4 Weeks 1. Patient will move from supine to sit and sit to supine  in bed with modified independence. 2.  Patient will transfer from bed to chair and chair to bed with supervision/set-up using RW. 3.  Patient will perform sit to stand with RW supervision/set-up with Good balance and safety awareness. 4.  Patient will ambulate with supervision/set-up for 200 feet with RW on level surfaces and be able to maneuver through narrow spaces and obstacles without loss of balance. 5.  Patient will ascend/descend 1 stairs with bilateral handrail(s) with supervision/set-up to allow for safe home access/exit. 6.  Patient will improve standardized test score for 4/5 [address] Standing Balance Scale. Physical Therapist:   Nabor Alvarez  [facility]  
physical Therapy EVALUATION Patient: Roxi Fan ([de-identified] y.o. male)                Start of Care Date: 12/5/2018 Referred by : Kelle Lerma MD                
Precautions: Luma Jones Treatment Diagnosis: Muscle weakness, Difficulty in Walking History:  Patient was [facility] with c/o weakness and fatigue with change of speech. Dx of Acute metabolic encephalopathy, and UTI. Upon acute discharge it was unsafe for pt to return home, pt was transferred to [facility] for skilled PT services. History of present problem reveals HIGH Complexity :3+ comorbidities / personal factors will impact the outcome/ POC . Past Medical history includes:  
Past Medical History:  
Diagnosis Date  Advance directive in chart 6/13/2016  CAD (coronary artery disease)  Cancer [facility]) 2003 Colon  CKD (chronic kidney disease), stage III ([address] 3/27/2016  CVA (cerebral vascular accident) ([address] 3/26/2016  Femoral artery aneurysm, left ([address]  Heart attack [facility]) 2008  Heart attack ([address]  Hernia  History of TIAs  Hyperlipidemia  Hypertension 3/27/2016  Iliac artery aneurysm, left ([address]  Pure hypercholesterolemia 9/14/2012  PVD (peripheral vascular disease) ([address] 3/27/2016  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  
Procedure Laterality Date  ABDOMEN SURGERY PROC UNLISTED  2011  
 3 stents placed into abdomen (groin)  CARDIAC SURG PROCEDURE UNLIST  2005 Quadruple Bypass  CARDIAC SURG PROCEDURE UNLIST  2011 Aortic pig valve placed  COLONOSCOPY N/A 6/29/2016 COLONOSCOPY performed by Rosalina Arriaga MD at [facility] ENDOSCOPY  ENDOSCOPY, COLON, DIAGNOSTIC    
 HX AAA REPAIR    
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 
One/Two Story Residence: (3 stoeris) # of Interior Steps: (5) Interior Rails: Both Living Alone: No 
Support Systems: Family member(s) Patient Expects to be Discharged to[de-identified] Private residence Current DME Used/Available at Home: Martín Pancho, rollator Tub or Shower Type: Tub/Shower combination Level of Assistance received at Home:   Prior to this current hospitalization, the patient required supervision to modified independence with use of Rollator around the house. Justification for Evaluation complexity:  Patient is referred to Skilled Physical Therapy services due a functional decline in gait, muscle strength, muscle endurance, stair negotiation, static and dynamic sitting/standing balance and required an overall HIGH  complexity evaluation examination. Eval Complexity: History: HIGH Complexity :3+ comorbidities / personal factors will impact the outcome/ POC Exam:HIGH Complexity : 4+ Standardized tests and measures addressing body structure, function, activity limitation and / or participation in recreation  Presentation: MEDIUM Complexity : Evolving with changing characteristics OBJECTIVE DATA SUMMARY:  
 
Vital Signs: 
Resting BP:  BP: 123/71  HR: Pulse (Heart Rate): 63   
Pain:Pain Screen Pain Scale 1: Numeric (0 - 10) Pain Intensity 1: 0 Patient Stated Pain Goal: 0 Pain Reassessment 1: Patient sleepingGross Assessment: 
Gross Assessment AROM: Generally decreased, functional 
PROM: Generally decreased, functional 
Strength: Generally decreased, functional(B hip flex 4+,L knee flex 4, L knee ext 4/5) Bed Mobility: 
Bed Mobility Rolling: Supervision Supine to Sit: Supervision Balance:Balance Sitting: Without support Sitting - Static: Good (unsupported) Sitting - Dynamic: Fair (occasional) Standing: With support; Impaired Standing - Static: Fair Standing - Dynamic : Fair(-) Transfers: 
Sit to Stand: Contact guard assistance Gait: 
Gait Base of Support: Center of gravity altered;Narrowed Speed/Ebony: Slow;Shuffled Step Length: Left shortened Gait Abnormalities: Decreased step clearance; Foot drop Ambulation - Level of Assistance: Contact guard assistance Distance (ft): 22 Feet (ft) Assistive Device: Gait belt;Walker, rolling With 3 turns. Wheelchair Management:  with 0 turns. Assessment:  
Clinical Decision Making:Medium Complexity 2/5 using standardized patient assessment instrument and /or measurable assessement of functional outcome of [employer] Balance Positioning needs/Additional Comments :  Pt was presented in sitting in a chair eating breakfast. Pt was agreeable in participating for an evaluation in skilled PT services. Pt's vitals were assessed (seen above). Pt performed MMT (seen above), however R knee flex and extension strength were not assessed due to pt's status as a BKA. Pt sit> stand was CGA w/ RW demonstrates forward head posture and fair static balance. During gait training left foot was caught one time due to foot drop, pt exhibits forward head posture and was provided verbal cues to maintain upright posture. Pt from sit> stand was CGA w/ use of rolling walker, provided verbal cues to reach for chair w/ both hands before sitting. Pt was educated on HEP exercise for muscle strength and muscle endurance; pt demonstrated understanding by demonstrating: 10 reps of heel raises, toe raises, and LAQ. Pt educated on the purpose of skilled PT, pt said his goal is to \"build his leg strength to walk again. \"  Pt educated on use of call bell. To address deficits, and restore function skilled Pt services is recommended. TREATMENT PLAN: Rehab Potential : Good Skilled Physical Therapy Services may include: [x]           Bed Mobility Training             [x]    Neuromuscular Re-Education 
[x]           Transfer Training                   [x]    Orthotic/Prosthetic Training 
[x]           Gait Training                          [x]    Modalities [x]           Therapeutic Procedures        [x]    Manual Therapy [x]           Therapeutic Activities            [x]    Patient and Family Training/Education 
[]           Other (comment): Frequency/Duration: Patient will be followed by physical therapy for 1-2 times a day for a minimum of 3 days per week for 4 weeks to address goals. Discharge Recommendations: Home Health Patient's expected Discharge Location:  [x]Private Residence  [] SHEEBA/ILF  []LTC  []Other: COMMUNICATION/EDUCATION:  Patient/Family Agreement with Treatment Plan :  
 
[x]         The PT evaluation/POC has been reviewed with PT assistant. [x]         Fall prevention education was provided and the patient/caregiver indicated understanding. [x]         Patient/family have participated as able in goal setting and plan of care and Patient/family agree to work toward stated goals and plan of care. []         Patient is unable to participate in goal setting and plan of care. Therapist/SW will contact family/POA. Treatment session: 
Overall Complexity: MEDIUM Evaluation:  20 mins./ Treatment:  29 mins. Physical Therapist:   Eb Cardona       12/5/2018 Thank you for this referral.

## 2018-12-06 LAB
ANION GAP SERPL CALC-SCNC: 8 MMOL/L (ref 3–18)
BASOPHILS # BLD: 0.1 K/UL (ref 0–0.1)
BASOPHILS NFR BLD: 1 % (ref 0–2)
BUN SERPL-MCNC: 26 MG/DL (ref 7–18)
BUN/CREAT SERPL: 19 (ref 12–20)
CALCIUM SERPL-MCNC: 8.9 MG/DL (ref 8.5–10.1)
CHLORIDE SERPL-SCNC: 109 MMOL/L (ref 100–108)
CO2 SERPL-SCNC: 24 MMOL/L (ref 21–32)
CREAT SERPL-MCNC: 1.4 MG/DL (ref 0.6–1.3)
DIFFERENTIAL METHOD BLD: ABNORMAL
EOSINOPHIL # BLD: 0.4 K/UL (ref 0–0.4)
EOSINOPHIL NFR BLD: 4 % (ref 0–5)
ERYTHROCYTE [DISTWIDTH] IN BLOOD BY AUTOMATED COUNT: 15 % (ref 11.6–14.5)
GLUCOSE SERPL-MCNC: 86 MG/DL (ref 74–99)
HCT VFR BLD AUTO: 33.5 % (ref 36–48)
HGB BLD-MCNC: 11.4 G/DL (ref 13–16)
LYMPHOCYTES # BLD: 1.7 K/UL (ref 0.9–3.6)
LYMPHOCYTES NFR BLD: 15 % (ref 21–52)
MCH RBC QN AUTO: 33.4 PG (ref 24–34)
MCHC RBC AUTO-ENTMCNC: 34 G/DL (ref 31–37)
MCV RBC AUTO: 98.2 FL (ref 74–97)
MONOCYTES # BLD: 0.8 K/UL (ref 0.05–1.2)
MONOCYTES NFR BLD: 7 % (ref 3–10)
NEUTS SEG # BLD: 8 K/UL (ref 1.8–8)
NEUTS SEG NFR BLD: 73 % (ref 40–73)
PLATELET # BLD AUTO: 399 K/UL (ref 135–420)
PMV BLD AUTO: 11 FL (ref 9.2–11.8)
POTASSIUM SERPL-SCNC: 4.6 MMOL/L (ref 3.5–5.5)
RBC # BLD AUTO: 3.41 M/UL (ref 4.7–5.5)
SODIUM SERPL-SCNC: 141 MMOL/L (ref 136–145)
WBC # BLD AUTO: 10.9 K/UL (ref 4.6–13.2)

## 2018-12-06 PROCEDURE — 74011250637 HC RX REV CODE- 250/637: Performed by: INTERNAL MEDICINE

## 2018-12-06 PROCEDURE — 80048 BASIC METABOLIC PNL TOTAL CA: CPT

## 2018-12-06 PROCEDURE — 85025 COMPLETE CBC W/AUTO DIFF WBC: CPT

## 2018-12-06 PROCEDURE — 36415 COLL VENOUS BLD VENIPUNCTURE: CPT

## 2018-12-06 RX ADMIN — LEVOFLOXACIN 750 MG: 750 TABLET, FILM COATED ORAL at 22:06

## 2018-12-06 RX ADMIN — CARVEDILOL 3.12 MG: 3.12 TABLET, FILM COATED ORAL at 17:57

## 2018-12-06 RX ADMIN — ATORVASTATIN CALCIUM 20 MG: 20 TABLET, FILM COATED ORAL at 21:27

## 2018-12-06 RX ADMIN — DONEPEZIL HYDROCHLORIDE 10 MG: 5 TABLET, FILM COATED ORAL at 09:39

## 2018-12-06 RX ADMIN — Medication 1 CAPSULE: at 09:39

## 2018-12-06 RX ADMIN — Medication 1 CAPSULE: at 17:57

## 2018-12-06 RX ADMIN — ASPIRIN 325 MG ORAL TABLET 325 MG: 325 PILL ORAL at 09:39

## 2018-12-06 RX ADMIN — CLOPIDOGREL BISULFATE 75 MG: 75 TABLET ORAL at 09:39

## 2018-12-06 RX ADMIN — TAMSULOSIN HYDROCHLORIDE 0.4 MG: 0.4 CAPSULE ORAL at 21:27

## 2018-12-06 RX ADMIN — CARVEDILOL 3.12 MG: 3.12 TABLET, FILM COATED ORAL at 09:39

## 2018-12-06 NOTE — ROUTINE PROCESS
Bedside and Verbal shift change report given to maria esther ace (oncoming nurse) by Armani Vinson (offgoing nurse). Report included the following information SBAR, Intake/Output and Recent Results.

## 2018-12-06 NOTE — ROUTINE PROCESS
Bedside and verbal shift report given to [address] [place] [address] [id number], RN (oncoming nurse) by NAMAN Parks LPN (off going nurse). Report included SBAR, MAR and Kardex

## 2018-12-06 NOTE — PROGRESS NOTES
Problem: Mobility Impaired (Adult and Pediatric) Goal: *Acute Goals and Plan of Care (Insert Text) PHYSICAL THERAPY STG GOALS : 
Initiated 12/5/2018 and to be accomplished within 2 Weeks 1. Patient will move from supine to sit and sit to supine  in bed with SBA. 2.  Patient will transfer from bed to chair and chair to bed with SBA using RW. 3.  Patient will perform sit to stand with RW close super vision with Fair balance and safety awareness. 4.  Patient will ambulate with close supervision for 100 feet with RW on level surfaces with 2 turns. 5.  Patient will ascend/descend 1 stairs with bilateral handrail(s) with SBA to allow for safe home access/exit. 6.  Patient will improve standardized test score for 3/5 [facility] Standing Balance Scale. PHYSICAL THERAPY LTG GOALS : 
Initiated 12/5/2018 and to be accomplished within 4 Weeks 1. Patient will move from supine to sit and sit to supine  in bed with modified independence. 2.  Patient will transfer from bed to chair and chair to bed with supervision/set-up using RW. 3.  Patient will perform sit to stand with RW supervision/set-up with Good balance and safety awareness. 4.  Patient will ambulate with supervision/set-up for 200 feet with RW on level surfaces and be able to maneuver through narrow spaces and obstacles without loss of balance. 5.  Patient will ascend/descend 1 stairs with bilateral handrail(s) with supervision/set-up to allow for safe home access/exit. 6.  Patient will improve standardized test score for 4/5 [facility] Standing Balance Scale. Physical Therapist:   Maura Decker  [facility]  
physical Therapy Daily Treatment note Patient: Zack Bernheim (8 y.o. male)               Date: 12/6/2018 Physician: Martín Guidry MD 
Primary Diagnosis: UTI Altered Mental Status          Treatment Diagnosis Treatment Diagnosis: muscle weakness Precautions: Fall Vital SignsVital Signs Temp: 97.8 °F (36.6 °C) Pulse (Heart Rate): 79 Resp Rate: 20 
O2 Sat (%): 98 % BP: 125/64 MAP (Calculated): 84 
  
Cognitive Status:Mental Status Neurologic State: Alert Orientation Level: Oriented X4 Cognition: Follows commands PainPain Screen Pain Scale 1: Numeric (0 - 10) Pain Intensity 1: 0 Patient Stated Pain Goal: 0 Bed Mobility TrainingBed Mobility Training Supine to Sit: Supervision BalanceSitting: Without support Sitting - Static: Good (unsupported) Sitting - Dynamic: Fair (occasional)((+)) Standing: With support Standing - Static: Fair((+)) Standing - Dynamic : Fair Transfer Training Transfer Training Sit to Stand: Contact guard assistance Stand to Sit: Contact guard assistance Sit to Stand: Contact guard assistance Gait TrainingGait Base of Support: Center of gravity altered;Narrowed Speed/Ebony: Slow Step Length: Left shortened Gait Abnormalities: Decreased step clearance Ambulation - Level of Assistance: Contact guard assistance Distance (ft): 258 Feet (ft)(+165) Assistive Device: Gait belt;Walker, rolling;Prosthetic device Rail Use: Both Stairs - Level of Assistance: Contact guard assistance Number of Stairs Trained: 10 Interventions: Verbal cues; Safety awareness training Gait Abnormalities: Decreased step clearance With 4 turns. Treatment:  
 Pt presented supine in bed and demonstrated good bed mobility moving supine>sit with supervision. Sit>stand to RW required Mod A due to moving from soft surface. Pt transferred to w/c with CGA and transported to PT gym due to wet floor. Gait training performed for 258' with CGA for sit<>stand and ambulation with aforementioned details before c/o of LUE fatigue and requiring to sit down. Pt transported back to PT gym for additional services.  Stair training performed with pt negotiating 10 stairs B HR step to pattern with verbal cues for increased step height for 6\" steps. Standing balance activity performed for 3'55\" with no UE support and no LOB. Additional gait training performed with CGA with pt ambulating 165' before c/o fatigue and requiring w/c transport remainder of way to room. Pt required restroom services and ambulated CGA to restroom and then to bedside chair per request. Call bell left within reach. Patient/Caregiver Education:  
Pt /Caregiver Education on safety and fall prevention with gait training to reduce shuffling on L by increasing step height. ASSESSMENT: 
Patient continues to benefit from Skilled PT services to improve gait pattern, safe stair negotiation for home access, and LE strength. Progression toward goals: 
[x]      Improving appropriately and progressing toward goals 
[]      Improving slowly and progressing toward goals 
[]      Not making progress toward goals and plan of care will be adjusted Treatment session: 70 minutes. Therapist:   YUNG Jc         12/6/2018

## 2018-12-06 NOTE — PROGRESS NOTES
Problem: Self Care Deficits Care Plan (Adult) Goal: *Acute Goals and Plan of Care (Insert Text) OCCUPATIONAL THERAPY SHORT TERM GOALS Initiated 12/5/2018 and to be accomplished within 2 [facility]) 1. Patient will perform Lower body bathing with adaptive equipment as needed for energy conservation with supervision/set up. 2.  Patient will perform Lower body dressing with adaptive equipment as needed for energy conservation with supervision/set-up . 3. Patient will perform toileting task with supervision/set-up with Good safety to reduce falls risk. 4.  Patient will perform functional transfers to commode/shower chair with LRAD and supervision/setup. 5.  Patient will perform standing static/dynamic balance activities for improved ADL/IADL function with supervision/set-up and Fair +/Good - balance and safety awarenes. 6.  Patient will improve Barthel index scores to atleast 70/100 to improve functional mobility. 7.  Patient will perform bathroom mobility with LRAD and Supervision. OCCUPATIONAL THERAPY LONG TERM GOALS Initiated 12/5/2018 and to be accomplished within 4 Week(s) 1. Patient will perform Lower body bathing with adaptive equipment as needed for ebergy conservation with modified independence. 2.  Patient will perform Lower body dressing with adaptive equipment as needed for energy conservation with modified independence. 3.  Patient will perform toileting task with modified independence with Good safety to reduce falls risk. 4.  Patient will perform functional transfers to commode and shower chair with LRAD and  modified independence and Good balance and safety awareness. 5.  Patient will perform standing static/dynamic activity for improved ADL/IADL function    with modified independence an and Good balance and safety awareness. 6.  Patient will improve Barthel index score to 80/100 to improve independence with mobility.  
7. Patient will perform functional mobility within kitchen environment/bathroom with LRAD and modified independence. Therapist: Deanna Victor OT    12/5/2018 [address] [place] [address] Occupational Therapy Daily Treatment note Patient: Alexy Dias (31 y.o. male)       Date: 12/6/2018 Attending Physician: Shashank Khan MD 
Primary Diagnosis: UTI Altered Mental Status Treatment Diagnosis Treatment Diagnosis: muscle weakness Precautions : Precautions at Admission: Fall Vital Signs: 
Vital Signs Temp: 97.8 °F (36.6 °C) Pulse (Heart Rate): 79 Resp Rate: 20 
O2 Sat (%): 98 % BP: 125/64 MAP (Calculated): 84  
Cognitive Status: 
Mental Status Neurologic State: Alert Orientation Level: Oriented X4 Cognition: Follows commands Pain:Pain Screen Pain Scale 1: Numeric (0 - 10) Pain Intensity 1: 0 Patient Stated Pain Goal: 0 Pain Scale 1: Numeric (0 - 10) Gross Assessment:  
Coordination:  
Bed Mobility:Bed Mobility Supine to Sit: Supervision Transfers:Functional Transfers Sit to Stand: Contact guard assistance Stand to Sit: Contact guard assistance Bed to Chair: Contact guard assistance Bathroom Mobility: Contact guard assistance Toilet Transfer : Contact guard assistance Functional Transfers Bathroom Mobility: Contact guard assistance Toilet Transfer : Contact guard assistance Balance:Balance Sitting: Without support Sitting - Static: Good (unsupported) Sitting - Dynamic: Fair (occasional)((+)) Standing: With support Standing - Static: Fair((+)) Standing - Dynamic : Fair ADL Self Care: ADL Intervention: Toileting Toileting Assistance: Stand-by assistance(close) Bladder Hygiene: Stand-by assistance(close) Clothing Management: Stand-by assistance(close) Therapeutic Activities: 
Assisted patient with bathroom mobility utilizing RW, toileting routine, and handwashing, standing sinkside, in order to assess safety and independence during task. See above for levels of A needed.  Patient demonstrated increased dragging of L LE during mobility and cueing needed to stay close to RW, slow down and increase step. Patient reports his L LE sagar at times due to weakness and caused falls in the past.Static standing activity with one hand release in order to increase standing balance and tolerance needed for ADLS. Patient was able to tolerate standing for 2:30 prior to requesting to sit due to increase L LE unsteadiness. Therapeutic Exercises: 
UB strengthening with 4#, 2 sets x 20 reps in order to increase functional activity tolerance and UB muscle strength needed for ADLs Patient/Caregiver Education:   
PtMartin Quintana Education on completing half of turns during pivot vs full was provided for optimal safety. ASSESSMENT: 
Patient continues to demonstrate the need for skilled Occupational Therapy services to improve static stanidng balance needed for toileting Progression toward goals: 
[x]      Improving appropriately and progressing toward goals 
[]      Improving slowly and progressing toward goals 
[]      Not making progress toward goals and plan of care will be adjusted Treatment session:   55 minutes Therapist:    Chepe Mendoza,  12/6/2018

## 2018-12-07 LAB
BACTERIA SPEC CULT: NORMAL
BACTERIA SPEC CULT: NORMAL
SERVICE CMNT-IMP: NORMAL
SERVICE CMNT-IMP: NORMAL

## 2018-12-07 PROCEDURE — 74011250637 HC RX REV CODE- 250/637: Performed by: INTERNAL MEDICINE

## 2018-12-07 RX ADMIN — CARVEDILOL 3.12 MG: 3.12 TABLET, FILM COATED ORAL at 17:37

## 2018-12-07 RX ADMIN — Medication 1 CAPSULE: at 19:37

## 2018-12-07 RX ADMIN — Medication 1 CAPSULE: at 09:47

## 2018-12-07 RX ADMIN — CLOPIDOGREL BISULFATE 75 MG: 75 TABLET ORAL at 09:47

## 2018-12-07 RX ADMIN — ASPIRIN 325 MG ORAL TABLET 325 MG: 325 PILL ORAL at 09:47

## 2018-12-07 RX ADMIN — CARVEDILOL 3.12 MG: 3.12 TABLET, FILM COATED ORAL at 09:47

## 2018-12-07 RX ADMIN — ATORVASTATIN CALCIUM 20 MG: 20 TABLET, FILM COATED ORAL at 21:21

## 2018-12-07 RX ADMIN — TAMSULOSIN HYDROCHLORIDE 0.4 MG: 0.4 CAPSULE ORAL at 21:21

## 2018-12-07 RX ADMIN — LEVOFLOXACIN 750 MG: 750 TABLET, FILM COATED ORAL at 22:15

## 2018-12-07 RX ADMIN — DONEPEZIL HYDROCHLORIDE 10 MG: 5 TABLET, FILM COATED ORAL at 09:47

## 2018-12-07 NOTE — H&P
[facility] HISTORY AND PHYSICAL Alma Storey 
MR#: [id number] : 1940 ACCOUNT #: [de-identified] ADMIT DATE: 2018 REASON FOR ADMISSION:  TIA. HISTORY OF PRESENT ILLNESS:  The patient is an 57-year-old male with past medical history significant for coronary artery disease with history of CABG, aortic valve replacement, abdominal aortic aneurysm repair, chronic kidney disease, hypertension, TIA in the past, presented to the ER due to weakness and change in the speech prior to admission. Patient has had a recent hospitalization for inferior ST-elevation MI, for which he was treated medically. Code S was followed. Patient had a CT of the head which was negative. The patient had an MRI which was negative. However, he was diagnosed with a UTI and started on IV antibiotics. Patient had leukocytosis which improved and now he presents to [facility] to continue his Levaquin for UTI treatment. At time of my evaluation, the patient is alert, awake, oriented. Denies any chest pain, shortness of breath, or palpitation. He is tolerating his physical therapy without any weakness and his speech is back to normal. 
 
PAST MEDICAL HISTORY: 
1. History of TIA. 2.  Coronary disease with history of CABG. 3.  Recent UTI. 4.  Metabolic encephalopathy due to UTI. 5.  Hypertension. 6.  Abdominal aortic aneurysm. 7.  Peripheral vascular disease. 8.  History of valve replacement. 9.  Chronic kidney disease. 10.  Hyperlipidemia. PAST SURGICAL HISTORY:  The patient has had CABG, aortic valve replacement, aneurysm repair. SOCIAL HISTORY:  Patient is , has 2 children. He is a nonsmoker, nondrinker. FAMILY HISTORY:  Both parents  from coronary artery disease. ALLERGIES:  ACE INHIBITORS. MEDICATIONS:  List reviewed. REVIEW OF SYSTEMS:  No fever or chills. No weight loss or headache. No sore throat. No chest pain. No palpitation. No shortness of breath or cough. No nausea, vomiting, diarrhea. No dysuria or polyuria. No back pain or leg pain. No heat or cold intolerance. No anxiety or depression. PHYSICAL EXAMINATION: 
GENERAL:  This is a well-nourished, well-developed male in no apparent distress. VITAL SIGNS:  Temperature 97.9, blood pressure 113/67, respiratory rate is 18, satting 98%. HEENT:  Normocephalic, atraumatic. Sclerae are anicteric. Oropharynx clear. Mucous membrane moist. 
NECK:  No JVD or thyromegaly. HEART:  S1, S2.  Regular rate and rhythm. LUNGS:  Clear to auscultation bilaterally. No wheezing. ABDOMEN:  Nontender, nondistended. Normoactive bowel sounds. EXTREMITIES:  No edema or clubbing. Motor strength is 5/5 in all 4 extremities. NEUROLOGIC:  Cranial nerves are grossly intact. ASSESSMENT AND PLAN: 
1. Urinary tract infection. Complete antibiotic with Levaquin as directed. 2.  History of transient ischemic attack, currently on aspirin, Plavix, and statin. 3.  Hypertension. 4.  Coronary artery disease. Continue with aspirin, Plavix, and beta-blocker. 5.  Mild dementia, on Aricept. 6.  History of aortic valve replacement. 7.  Deconditioning. 8.  Chronic kidney disease, with a last creatinine 1.40. PLAN:  Continue physical therapy, occupational therapy. Complete antibiotic as directed. MD DAISY Ozuna/JENIFER 
D: 12/06/2018 18:53    
T: 12/06/2018 19:50 
JOB #: [id number]

## 2018-12-07 NOTE — PROGRESS NOTES
The patient was offered a group activity of decorating holiday crafts to hang around the unit on December 6, 2018. The patient declined this activity. The patient was also offered a group activity in the afternoon to play Dominos. The patient declined this activity also. The Recreation Therapist will continue to offer the patient group and 1:1 activities and encourage participation in the activities provided.

## 2018-12-07 NOTE — PROGRESS NOTES
The patient was offered a group activity of playing games and working on puzzles of their choice on December 7, 2018. The patient declined this group activity. The patient was also offered 1:1 activities with the Recreation Therapist. The patient declined 1:1 activities also. The Recreation Therapist will continue to offer the patient group and 1:1 activities and encourage participation in the activities provided.

## 2018-12-07 NOTE — PROGRESS NOTES
Problem: Self Care Deficits Care Plan (Adult) Goal: *Acute Goals and Plan of Care (Insert Text) OCCUPATIONAL THERAPY SHORT TERM GOALS Initiated 12/5/2018 and to be accomplished within 2 [facility]) 1. Patient will perform Lower body bathing with adaptive equipment as needed for energy conservation with supervision/set up. 2.  Patient will perform Lower body dressing with adaptive equipment as needed for energy conservation with supervision/set-up . 3. Patient will perform toileting task with supervision/set-up with Good safety to reduce falls risk. 4.  Patient will perform functional transfers to commode/shower chair with LRAD and supervision/setup. 5.  Patient will perform standing static/dynamic balance activities for improved ADL/IADL function with supervision/set-up and Fair +/Good - balance and safety awarenes. 6.  Patient will improve Barthel index scores to atleast 70/100 to improve functional mobility. 7.  Patient will perform bathroom mobility with LRAD and Supervision. OCCUPATIONAL THERAPY LONG TERM GOALS Initiated 12/5/2018 and to be accomplished within 4 Week(s) 1. Patient will perform Lower body bathing with adaptive equipment as needed for ebergy conservation with modified independence. 2.  Patient will perform Lower body dressing with adaptive equipment as needed for energy conservation with modified independence. 3.  Patient will perform toileting task with modified independence with Good safety to reduce falls risk. 4.  Patient will perform functional transfers to commode and shower chair with LRAD and  modified independence and Good balance and safety awareness. 5.  Patient will perform standing static/dynamic activity for improved ADL/IADL function    with modified independence an and Good balance and safety awareness. 6.  Patient will improve Barthel index score to 80/100 to improve independence with mobility.  
7. Patient will perform functional mobility within kitchen environment/bathroom with LRAD and modified independence. Therapist: Hank Mccarthy OT    12/5/2018 [address] [place] [address] Occupational Therapy Daily Treatment note Patient: Estee Sexton (33 y.o. male)       Date: 12/7/2018 Attending Physician: Marc Sanchez MD 
Primary Diagnosis: UTI Altered Mental Status Treatment Diagnosis Treatment Diagnosis: muscle weakness Precautions : Precautions at Admission: Fall Vital Signs: 
Vital Signs Temp: 97.7 °F (36.5 °C) Temp Source: Oral 
Pulse (Heart Rate): 63 Resp Rate: 18 
O2 Sat (%): 100 % BP: 129/61 MAP (Calculated): 84  
Cognitive Status: 
Mental Status Neurologic State: Alert Orientation Level: Oriented X4 Cognition: Follows commands; Appropriate safety awareness; Appropriate for age attention/concentration Pain:Pain Screen Pain Scale 1: Numeric (0 - 10) Pain Intensity 1: 0 Patient Stated Pain Goal: 0 Pain Scale 1: Numeric (0 - 10) Gross Assessment:  
Coordination:  
Bed Mobility:  
Transfers:Functional Transfers Sit to Stand: Contact guard assistance Bed to Chair: Contact guard assistance Balance: ADL Self Care: ADL Intervention:  
  
  
  
  
  
 Therapeutic Activities: 
Assisted patient with bed mobility and bed>w/c transfer utilizing RW in order to assess safety and independence during task. Assisted patient with bathroom mobility utilizing RW and toileting routine in order to assess safety and independence during routine. Cueing needed for patient to keep RW close during mobility as well as increasing L LE step during task for optimal safety during mobility Therapeutic Exercises: 
 UB restorator x 10 mins in order to increase functional activity tolerance needed for ADLS. No rest break needed. Patient/Caregiver Education:   
[place]. Delicia Pires Education on see above.    
 
ASSESSMENT: 
Patient continues to demonstrate the need for skilled Occupational Therapy services to improve dynamic standing balance needed for toileting Progression toward goals: 
[x]      Improving appropriately and progressing toward goals 
[]      Improving slowly and progressing toward goals 
[]      Not making progress toward goals and plan of care will be adjusted Treatment session:  56 minutes Therapist:    Katty Walker,  12/7/2018

## 2018-12-07 NOTE — ROUTINE PROCESS
Bedside and verbal shift report given to Roni Castillo RN (oncoming nurse) by NAMAN Collazo LPN (off going nurse). Report included SBAR, MAR and Kardex

## 2018-12-07 NOTE — PROGRESS NOTES
Problem: Mobility Impaired (Adult and Pediatric) Goal: *Acute Goals and Plan of Care (Insert Text) PHYSICAL THERAPY STG GOALS : 
Initiated 12/5/2018 and to be accomplished within 2 Weeks 1. Patient will move from supine to sit and sit to supine  in bed with SBA. 2.  Patient will transfer from bed to chair and chair to bed with SBA using RW. 3.  Patient will perform sit to stand with RW close super vision with Fair balance and safety awareness. 4.  Patient will ambulate with close supervision for 100 feet with RW on level surfaces with 2 turns. 5.  Patient will ascend/descend 1 stairs with bilateral handrail(s) with SBA to allow for safe home access/exit. 6.  Patient will improve standardized test score for 3/5 [address] Standing Balance Scale. PHYSICAL THERAPY LTG GOALS : 
Initiated 12/5/2018 and to be accomplished within 4 Weeks 1. Patient will move from supine to sit and sit to supine  in bed with modified independence. 2.  Patient will transfer from bed to chair and chair to bed with supervision/set-up using RW. 3.  Patient will perform sit to stand with RW supervision/set-up with Good balance and safety awareness. 4.  Patient will ambulate with supervision/set-up for 200 feet with RW on level surfaces and be able to maneuver through narrow spaces and obstacles without loss of balance. 5.  Patient will ascend/descend 1 stairs with bilateral handrail(s) with supervision/set-up to allow for safe home access/exit. 6.  Patient will improve standardized test score for 4/5 [address] Standing Balance Scale. Physical Therapist:   Eb Cardona  on 12/5/2018 [facility]  
physical Therapy Daily Treatment note Patient: Clay Barr (12 y.o. male)               Date: 12/7/2018 Physician: Shanae Solomon MD 
Primary Diagnosis: UTI Altered Mental Status          Treatment Diagnosis Treatment Diagnosis: muscle weakness Precautions: Fall Vital SignsVital Signs Temp: 97.7 °F (36.5 °C) Temp Source: Oral 
Pulse (Heart Rate): 63 Resp Rate: 18 
O2 Sat (%): 100 % BP: 129/61 MAP (Calculated): 84 
  
Cognitive Status:Mental Status Neurologic State: Alert Orientation Level: Oriented X4 Cognition: Follows commands PainPain Screen Pain Scale 1: Numeric (0 - 10) Pain Intensity 1: 0 Patient Stated Pain Goal: 0 Bed Mobility Training BalanceSitting: With support Sitting - Static: Good (unsupported) Sitting - Dynamic: Fair (occasional)((+)) Standing: With support Standing - Static: Fair((+)) Standing - Dynamic : Fair Transfer Training Transfer Training Sit to Stand: Contact guard assistance Bed to Chair: Contact guard assistance Sit to Stand: Contact guard assistance Gait TrainingGait Base of Support: Center of gravity altered; Widened Gait Abnormalities: Decreased step clearance; Other(short prosthetic causing decreased clearance on L) Ambulation - Level of Assistance: Stand-by assistance Distance (ft): 251 Feet (ft)(x2) 
Assistive Device: Gait belt;Prosthetic device; Walker, rolling Rail Use: Both Stairs - Level of Assistance: Stand-by assistance Number of Stairs Trained: 10 Interventions: Verbal cues; Safety awareness training Gait Abnormalities: Decreased step clearance; Other(short prosthetic causing decreased clearance on L) With 5 turns. Treatment:  
 Pt presented supine in bed with family present, but willing to participate in PT. Supine>sit performed with supervision. Sit>stand to RW performed with Mod A for gait training. Pt ambulated 26' with SBA and RW to PT gym for additional services. Stair training performed for 10 steps with B HR and SBA, with pt demonstrating good sequencing. Gait training performed to promote increased step clearance with small objects to step over for two bouts of 25' each with a seated rest break between.  Pt displayed inability to clear objects on first bout, but displayed good step clearance at end of second bout. Additional gait training performed with pt ambulating 251' back to room. Pt required verbal cues for increased step clearance during all ambulation. Pt transferred to supine in bed with SBA. Call bell left within reach. Patient/Caregiver Education:  
Pt /Caregiver Education on safety and fall prevention with cues for increased step clearance to reduce fall risk. ASSESSMENT: 
Patient continues to benefit from Skilled PT services to improve gait pattern, safe stair negotiation, LE strength and mobility. Progression toward goals: 
[x]      Improving appropriately and progressing toward goals 
[]      Improving slowly and progressing toward goals 
[]      Not making progress toward goals and plan of care will be adjusted Treatment session: 60 minutes. Therapist:   YUNG Spann         12/7/2018

## 2018-12-08 PROCEDURE — 74011250637 HC RX REV CODE- 250/637: Performed by: INTERNAL MEDICINE

## 2018-12-08 RX ADMIN — TAMSULOSIN HYDROCHLORIDE 0.4 MG: 0.4 CAPSULE ORAL at 21:33

## 2018-12-08 RX ADMIN — LEVOFLOXACIN 750 MG: 750 TABLET, FILM COATED ORAL at 23:00

## 2018-12-08 RX ADMIN — Medication 1 CAPSULE: at 09:13

## 2018-12-08 RX ADMIN — CLOPIDOGREL BISULFATE 75 MG: 75 TABLET ORAL at 09:13

## 2018-12-08 RX ADMIN — ASPIRIN 325 MG ORAL TABLET 325 MG: 325 PILL ORAL at 09:13

## 2018-12-08 RX ADMIN — Medication 1 CAPSULE: at 17:56

## 2018-12-08 RX ADMIN — ATORVASTATIN CALCIUM 20 MG: 20 TABLET, FILM COATED ORAL at 21:33

## 2018-12-08 RX ADMIN — CARVEDILOL 3.12 MG: 3.12 TABLET, FILM COATED ORAL at 17:56

## 2018-12-08 RX ADMIN — CARVEDILOL 3.12 MG: 3.12 TABLET, FILM COATED ORAL at 09:13

## 2018-12-08 RX ADMIN — DONEPEZIL HYDROCHLORIDE 10 MG: 5 TABLET, FILM COATED ORAL at 09:13

## 2018-12-08 NOTE — ROUTINE PROCESS
Bedside and Verbal shift change report given to Florin Fagan RN (oncoming nurse) by Jessica Palumbo LPN  (offgoing nurse). Report included the following information SBAR, Kardex, MAR and Recent Results.

## 2018-12-08 NOTE — PROGRESS NOTES
Problem: Self Care Deficits Care Plan (Adult) Goal: *Acute Goals and Plan of Care (Insert Text) OCCUPATIONAL THERAPY SHORT TERM GOALS Initiated 12/5/2018 and to be accomplished within 2 [facility]) 1. Patient will perform Lower body bathing with adaptive equipment as needed for energy conservation with supervision/set up. 2.  Patient will perform Lower body dressing with adaptive equipment as needed for energy conservation with supervision/set-up . 3. Patient will perform toileting task with supervision/set-up with Good safety to reduce falls risk. 4.  Patient will perform functional transfers to commode/shower chair with LRAD and supervision/setup. 5.  Patient will perform standing static/dynamic balance activities for improved ADL/IADL function with supervision/set-up and Fair +/Good - balance and safety awarenes. 6.  Patient will improve Barthel index scores to atleast 70/100 to improve functional mobility. 7.  Patient will perform bathroom mobility with LRAD and Supervision. OCCUPATIONAL THERAPY LONG TERM GOALS Initiated 12/5/2018 and to be accomplished within 4 Week(s) 1. Patient will perform Lower body bathing with adaptive equipment as needed for ebergy conservation with modified independence. 2.  Patient will perform Lower body dressing with adaptive equipment as needed for energy conservation with modified independence. 3.  Patient will perform toileting task with modified independence with Good safety to reduce falls risk. 4.  Patient will perform functional transfers to commode and shower chair with LRAD and  modified independence and Good balance and safety awareness. 5.  Patient will perform standing static/dynamic activity for improved ADL/IADL function    with modified independence an and Good balance and safety awareness. 6.  Patient will improve Barthel index score to 80/100 to improve independence with mobility.  
7. Patient will perform functional mobility within kitchen environment/bathroom with LRAD and modified independence. Therapist: Chon Jamison OT    12/5/2018 [address] [place] [address] Occupational Therapy Daily Treatment note Patient: Nadine Butt (13 y.o. male)       Date: 12/8/2018 Attending Physician: Amanda Meek MD 
Primary Diagnosis: UTI Altered Mental Status Treatment Diagnosis Treatment Diagnosis: muscle weakness Precautions : Precautions at Admission: Fall Vital Signs: 
Vital Signs Temp: 97.4 °F (36.3 °C) Temp Source: Oral 
Pulse (Heart Rate): (!) 56 Heart Rate Source: Monitor Cardiac Rhythm: Sinus bradycardia Resp Rate: 18 Level of Consciousness: Alert BP: 150/78 MAP (Calculated): 102 Cognitive Status: 
Mental Status Neurologic State: Alert; Appropriate for age Orientation Level: Oriented X4 Cognition: Follows commands Transfers:Functional Transfers Sit to Stand: Contact guard assistance Stand to Sit: Contact guard assistance Balance:Balance Sitting: With support Sitting - Static: Good (unsupported) Sitting - Dynamic: Fair (occasional)(+) Standing: With support Standing - Static: Fair(+) Standing - Dynamic : Fair Therapeutic Activities: Pt performed FM task with emphasis on L UE to complete puzzle activity to increase strength and finger dexterity to improve clothing mgmt tasks. Pt stood ~ 9 mins SBA x 2 trials with 1 UE support to complete table top activity to increase standing tolerance and challenge balance for increased (I) and safety during all ADL tasks. Pt with slight c/o L UE fatigue. Therapeutic Exercises: BUE ex with arm cycle x 10 mins with no rest break and 5# dowel bar 4 x 20 chest press, rows, curls, and shoulder flexion to increase strength, ROM, and endurance for ADL carryover. Patient/Caregiver Education:   
Pt educated on fall prevention and safety to reduce fall risk.   
 
ASSESSMENT: 
Patient continues to demonstrate the need for skilled Occupational Therapy services to improve strength, balance, and endurance. Progression toward goals: 
[x]      Improving appropriately and progressing toward goals 
[]      Improving slowly and progressing toward goals 
[]      Not making progress toward goals and plan of care will be adjusted Treatment session:  57 minutes Therapist:    LUKE Dong,  12/8/2018

## 2018-12-08 NOTE — ROUTINE PROCESS
Bedside and Verbal shift change report given to [place] lpn (oncoming nurse) by flavio ace (offgoing nurse). Report included the following information Kardex, MAR and Recent Results.

## 2018-12-08 NOTE — ROUTINE PROCESS
Bedside and Verbal shift change report given to kar rn (oncoming nurse) by Vidhi Piña (offgoing nurse). Report included the following information SBAR, Intake/Output and Recent Results.

## 2018-12-09 PROCEDURE — 74011250637 HC RX REV CODE- 250/637: Performed by: INTERNAL MEDICINE

## 2018-12-09 RX ADMIN — TAMSULOSIN HYDROCHLORIDE 0.4 MG: 0.4 CAPSULE ORAL at 21:45

## 2018-12-09 RX ADMIN — Medication 1 CAPSULE: at 17:29

## 2018-12-09 RX ADMIN — DONEPEZIL HYDROCHLORIDE 10 MG: 5 TABLET, FILM COATED ORAL at 10:17

## 2018-12-09 RX ADMIN — CARVEDILOL 3.12 MG: 3.12 TABLET, FILM COATED ORAL at 10:17

## 2018-12-09 RX ADMIN — CARVEDILOL 3.12 MG: 3.12 TABLET, FILM COATED ORAL at 17:29

## 2018-12-09 RX ADMIN — ATORVASTATIN CALCIUM 20 MG: 20 TABLET, FILM COATED ORAL at 21:45

## 2018-12-09 RX ADMIN — ASPIRIN 325 MG ORAL TABLET 325 MG: 325 PILL ORAL at 10:17

## 2018-12-09 RX ADMIN — CLOPIDOGREL BISULFATE 75 MG: 75 TABLET ORAL at 10:16

## 2018-12-09 RX ADMIN — Medication 1 CAPSULE: at 10:17

## 2018-12-09 NOTE — ROUTINE PROCESS
Bedside and Verbal shift change report given to [place] lpn (oncoming nurse) by flavio ace (offgoing nurse). Report included the following information SBAR, MAR and Recent Results.

## 2018-12-09 NOTE — ROUTINE PROCESS
Bedside and Verbal shift change report given to [place] (oncoming nurse) by Marco A Avitia LPN  (offgoing nurse). Report included the following information SBAR, Kardex, MAR and Recent Results.

## 2018-12-09 NOTE — PROGRESS NOTES
Problem: Self Care Deficits Care Plan (Adult) Goal: *Acute Goals and Plan of Care (Insert Text) OCCUPATIONAL THERAPY SHORT TERM GOALS Initiated 12/5/2018 and to be accomplished within 2 [facility]) 1. Patient will perform Lower body bathing with adaptive equipment as needed for energy conservation with supervision/set up. 2.  Patient will perform Lower body dressing with adaptive equipment as needed for energy conservation with supervision/set-up . 3. Patient will perform toileting task with supervision/set-up with Good safety to reduce falls risk. 4.  Patient will perform functional transfers to commode/shower chair with LRAD and supervision/setup. 5.  Patient will perform standing static/dynamic balance activities for improved ADL/IADL function with supervision/set-up and Fair +/Good - balance and safety awarenes. 6.  Patient will improve Barthel index scores to atleast 70/100 to improve functional mobility. 7.  Patient will perform bathroom mobility with LRAD and Supervision. OCCUPATIONAL THERAPY LONG TERM GOALS Initiated 12/5/2018 and to be accomplished within 4 Week(s) 1. Patient will perform Lower body bathing with adaptive equipment as needed for ebergy conservation with modified independence. 2.  Patient will perform Lower body dressing with adaptive equipment as needed for energy conservation with modified independence. 3.  Patient will perform toileting task with modified independence with Good safety to reduce falls risk. 4.  Patient will perform functional transfers to commode and shower chair with LRAD and  modified independence and Good balance and safety awareness. 5.  Patient will perform standing static/dynamic activity for improved ADL/IADL function    with modified independence an and Good balance and safety awareness. 6.  Patient will improve Barthel index score to 80/100 to improve independence with mobility.  
7. Patient will perform functional mobility within kitchen environment/bathroom with LRAD and modified independence. Therapist: Shukri Garsia OT    12/5/2018 [facility] Occupational Therapy Daily Treatment note Patient: Faye Stover (43 y.o. male)       Date: 12/9/2018 Attending Physician: Radha Plascencia MD 
Primary Diagnosis: UTI Altered Mental Status Treatment Diagnosis Treatment Diagnosis: muscle weakness Precautions : Precautions at Admission: Fall Vital Signs: 
  
Cognitive Status: 
Mental Status Neurologic State: Alert Orientation Level: Oriented X4 Cognition: Appropriate safety awareness Pain:Pain Screen Pain Scale 1: Visual 
Pain Intensity 1: 0 Patient Stated Pain Goal: 0 Pain Scale 1: Visual 
Transfers:Functional Transfers Sit to Stand: Contact guard assistance Stand to Sit: Contact guard assistance Balance:Balance Sitting: With support Sitting - Static: Good (unsupported) Sitting - Dynamic: Fair (occasional)(+) Standing: With support Standing - Static: Fair(+) Standing - Dynamic : Fair ADL Self Care: 
Basic ADL Lower Body Dressing: Supervision(donning socks @ w/c level with leg cross tech) Therapeutic Activities: CGA fnxl room mobility with RW ~ 15 ft x to increase activity tolerance and endurance needed for ADLs. Pt stood ~ 6 mins SBA with 1 UE support to complete table top activity to challenge balance and stability for increased (I) and safety during all ADL tasks, no LOB noted. FM task with green theraputty and small item retrieval to increase pinch strength and dexterity to improve (I) with clothing mgmt tasks. Therapeutic Exercises: BUE ex with arm bike x 10 mins and 2# dowel bar 3 x 25 chest press, rows, and curls to increase strength and endurance for ADL carryover. Patient/Caregiver Education:   
Pt educated on importance of utilizing call bell for A to reduce fall risk, pt requires reinforcement.   
 
ASSESSMENT: 
 Patient continues to demonstrate the need for skilled Occupational Therapy services to improve strength, balance, and endurance. Progression toward goals: 
[x]      Improving appropriately and progressing toward goals 
[]      Improving slowly and progressing toward goals 
[]      Not making progress toward goals and plan of care will be adjusted Treatment session:   50 minutes Therapist:    LUKE Clemons,  12/9/2018

## 2018-12-09 NOTE — PROGRESS NOTES
Problem: Mobility Impaired (Adult and Pediatric) Goal: *Acute Goals and Plan of Care (Insert Text) PHYSICAL THERAPY STG GOALS : 
Initiated 12/5/2018 and to be accomplished within 2 Weeks 1. Patient will move from supine to sit and sit to supine  in bed with SBA. 2.  Patient will transfer from bed to chair and chair to bed with SBA using RW. 3.  Patient will perform sit to stand with RW close super vision with Fair balance and safety awareness. 4.  Patient will ambulate with close supervision for 100 feet with RW on level surfaces with 2 turns. 5.  Patient will ascend/descend 1 stairs with bilateral handrail(s) with SBA to allow for safe home access/exit. 6.  Patient will improve standardized test score for 3/5 [address] Standing Balance Scale. PHYSICAL THERAPY LTG GOALS : 
Initiated 12/5/2018 and to be accomplished within 4 Weeks 1. Patient will move from supine to sit and sit to supine  in bed with modified independence. 2.  Patient will transfer from bed to chair and chair to bed with supervision/set-up using RW. 3.  Patient will perform sit to stand with RW supervision/set-up with Good balance and safety awareness. 4.  Patient will ambulate with supervision/set-up for 200 feet with RW on level surfaces and be able to maneuver through narrow spaces and obstacles without loss of balance. 5.  Patient will ascend/descend 1 stairs with bilateral handrail(s) with supervision/set-up to allow for safe home access/exit. 6.  Patient will improve standardized test score for 4/5 [address] Standing Balance Scale. Physical Therapist:   Jovita Patel  on 12/5/2018 Outcome: [address]   
physical Therapy Daily Treatment note Patient: Lanie Smyth (49 y.o. male)               Date: 12/9/2018 Physician: Will Varela MD 
Primary Diagnosis: UTI Altered Mental Status          Treatment Diagnosis Treatment Diagnosis: muscle weakness Precautions: Fall Vital SignsCognitive Status:Mental Status Neurologic State: Alert Orientation Level: Oriented X4 Cognition: Appropriate safety awareness PainPain Screen Pain Scale 1: Visual 
Pain Intensity 1: 0 Patient Stated Pain Goal: 0 Bed Mobility TrainingBed Mobility Training Rolling: Stand-by assistance Supine to Sit: Stand-by assistance Interventions: Safety awareness training;Verbal cues BalanceSitting: With support Sitting - Static: Good (unsupported) Sitting - Dynamic: Fair (occasional)(+) Standing: With support Standing - Static: Fair(+) Standing - Dynamic : Fair Transfer Training Transfer Training Sit to Stand: Contact guard assistance Stand to Sit: Contact guard assistance Sit to Stand: Contact guard assistance Gait TrainingGait Base of Support: Center of gravity altered Step Length: Left shortened Gait Abnormalities: Decreased step clearance Ambulation - Level of Assistance: Stand-by assistance Distance (ft): 248 Feet (ft) Assistive Device: Gait belt;Prosthetic device; Walker, rolling Gait Abnormalities: Decreased step clearance Treatment:  
Pt is progressing well towards reaching functional goals with improvement in activity tolerance with performing dynamic standing tasks. Pt amb 248ft SBA using FWW, requiring VC's/demonstration cues for increasing L step length/L step clearance during swing phase, keeping LAUREL in close proximity within AD, and maintaining upright posture. Pt performed dynamic gait CGA using FWW with focus on forward stepping over cones to increase L hip flexion ROM and increase step clearance during gait. Pt also participated in dynamic standing CGA with 1-UE reaching outside LAUREL in multiple planes and at times without B UE support x 4 mins, 34 sec to improve standing tolerance and overall dynamic balance.  Pt completed nu-step L2 B UE/LE's x15 mins to improve LE ROM, strength, and endurance in prep for negotiating community distances. Patient/Caregiver Education:  
Pt /Caregiver Education on safety sequencing techniques during functional transfers, benefits of exercise, and importance of activity pacing to maximize functional gains in strength and functional mobility upon return to home. ASSESSMENT: 
Patient continues to benefit from Skilled PT services to improve strength, endurance, balance, and functional (I) with transfers/gait activities upon d/c. Progression toward goals: 
[x]      Improving appropriately and progressing toward goals 
[]      Improving slowly and progressing toward goals 
[]      Not making progress toward goals and plan of care will be adjusted Treatment session: 61 minutes. Therapist:   Danya Gilbert PTA,          12/9/2018

## 2018-12-10 LAB
ANION GAP SERPL CALC-SCNC: 8 MMOL/L (ref 3–18)
BASOPHILS # BLD: 0.1 K/UL (ref 0–0.1)
BASOPHILS NFR BLD: 1 % (ref 0–2)
BUN SERPL-MCNC: 27 MG/DL (ref 7–18)
BUN/CREAT SERPL: 16 (ref 12–20)
CALCIUM SERPL-MCNC: 9.7 MG/DL (ref 8.5–10.1)
CHLORIDE SERPL-SCNC: 108 MMOL/L (ref 100–108)
CO2 SERPL-SCNC: 23 MMOL/L (ref 21–32)
CREAT SERPL-MCNC: 1.69 MG/DL (ref 0.6–1.3)
DIFFERENTIAL METHOD BLD: ABNORMAL
EOSINOPHIL # BLD: 0.3 K/UL (ref 0–0.4)
EOSINOPHIL NFR BLD: 2 % (ref 0–5)
ERYTHROCYTE [DISTWIDTH] IN BLOOD BY AUTOMATED COUNT: 14.8 % (ref 11.6–14.5)
GLUCOSE SERPL-MCNC: 91 MG/DL (ref 74–99)
HCT VFR BLD AUTO: 36.8 % (ref 36–48)
HGB BLD-MCNC: 12.6 G/DL (ref 13–16)
LYMPHOCYTES # BLD: 1.6 K/UL (ref 0.9–3.6)
LYMPHOCYTES NFR BLD: 13 % (ref 21–52)
MCH RBC QN AUTO: 33.4 PG (ref 24–34)
MCHC RBC AUTO-ENTMCNC: 34.2 G/DL (ref 31–37)
MCV RBC AUTO: 97.6 FL (ref 74–97)
MONOCYTES # BLD: 0.7 K/UL (ref 0.05–1.2)
MONOCYTES NFR BLD: 6 % (ref 3–10)
NEUTS SEG # BLD: 9.5 K/UL (ref 1.8–8)
NEUTS SEG NFR BLD: 78 % (ref 40–73)
PLATELET # BLD AUTO: 541 K/UL (ref 135–420)
PMV BLD AUTO: 11 FL (ref 9.2–11.8)
POTASSIUM SERPL-SCNC: 5 MMOL/L (ref 3.5–5.5)
RBC # BLD AUTO: 3.77 M/UL (ref 4.7–5.5)
SODIUM SERPL-SCNC: 139 MMOL/L (ref 136–145)
WBC # BLD AUTO: 12.1 K/UL (ref 4.6–13.2)

## 2018-12-10 PROCEDURE — 74011250637 HC RX REV CODE- 250/637: Performed by: INTERNAL MEDICINE

## 2018-12-10 PROCEDURE — 36415 COLL VENOUS BLD VENIPUNCTURE: CPT

## 2018-12-10 PROCEDURE — 80048 BASIC METABOLIC PNL TOTAL CA: CPT

## 2018-12-10 PROCEDURE — 85025 COMPLETE CBC W/AUTO DIFF WBC: CPT

## 2018-12-10 RX ADMIN — Medication 1 CAPSULE: at 17:46

## 2018-12-10 RX ADMIN — TAMSULOSIN HYDROCHLORIDE 0.4 MG: 0.4 CAPSULE ORAL at 21:57

## 2018-12-10 RX ADMIN — ATORVASTATIN CALCIUM 20 MG: 20 TABLET, FILM COATED ORAL at 21:57

## 2018-12-10 RX ADMIN — DONEPEZIL HYDROCHLORIDE 10 MG: 5 TABLET, FILM COATED ORAL at 09:03

## 2018-12-10 RX ADMIN — ASPIRIN 325 MG ORAL TABLET 325 MG: 325 PILL ORAL at 09:03

## 2018-12-10 RX ADMIN — CLOPIDOGREL BISULFATE 75 MG: 75 TABLET ORAL at 09:03

## 2018-12-10 RX ADMIN — CARVEDILOL 3.12 MG: 3.12 TABLET, FILM COATED ORAL at 17:46

## 2018-12-10 RX ADMIN — Medication 1 CAPSULE: at 09:03

## 2018-12-10 NOTE — PROGRESS NOTES
Problem: Self Care Deficits Care Plan (Adult) Goal: *Acute Goals and Plan of Care (Insert Text) OCCUPATIONAL THERAPY SHORT TERM GOALS Initiated 12/5/2018 and to be accomplished within 2 [facility]) 1. Patient will perform Lower body bathing with adaptive equipment as needed for energy conservation with supervision/set up. 2.  Patient will perform Lower body dressing with adaptive equipment as needed for energy conservation with supervision/set-up . 3. Patient will perform toileting task with supervision/set-up with Good safety to reduce falls risk. 4.  Patient will perform functional transfers to commode/shower chair with LRAD and supervision/setup. 5.  Patient will perform standing static/dynamic balance activities for improved ADL/IADL function with supervision/set-up and Fair +/Good - balance and safety awarenes. 6.  Patient will improve Barthel index scores to atleast 70/100 to improve functional mobility. 7.  Patient will perform bathroom mobility with LRAD and Supervision. OCCUPATIONAL THERAPY LONG TERM GOALS Initiated 12/5/2018 and to be accomplished within 4 Week(s) 1. Patient will perform Lower body bathing with adaptive equipment as needed for ebergy conservation with modified independence. 2.  Patient will perform Lower body dressing with adaptive equipment as needed for energy conservation with modified independence. 3.  Patient will perform toileting task with modified independence with Good safety to reduce falls risk. 4.  Patient will perform functional transfers to commode and shower chair with LRAD and  modified independence and Good balance and safety awareness. 5.  Patient will perform standing static/dynamic activity for improved ADL/IADL function    with modified independence an and Good balance and safety awareness. 6.  Patient will improve Barthel index score to 80/100 to improve independence with mobility.  
7. Patient will perform functional mobility within kitchen environment/bathroom with LRAD and modified independence. Therapist: Janessa Sawyer OT    12/5/2018 [address] [place] [address] Occupational Therapy Daily Treatment note Patient: Dawn Jefferson (17 y.o. male)       Date: 12/10/2018 Attending Physician: Nadiya Walker MD 
Primary Diagnosis: UTI Altered Mental Status Treatment Diagnosis Treatment Diagnosis: muscle weakness Precautions : Precautions at Admission: Fall Vital Signs: 
Vital Signs Pulse (Heart Rate): 64 
BP: 93/56 Cognitive Status: 
Mental Status Neurologic State: Alert Orientation Level: Oriented X4 Cognition: Appropriate for age attention/concentration; Follows commands Pain:Pain Screen Pain Scale 1: Visual 
Pain Intensity 1: 0 Patient Stated Pain Goal: 0 Pain Reassessment 1: Patient sleeping Pain Scale 1: Visual 
Gross Assessment:  
Coordination:  
Bed Mobility:  
Transfers:Functional Transfers Sit to Stand: Contact guard assistance Balance:Balance Sitting: With support Sitting - Static: Good (unsupported) Sitting - Dynamic: Fair (occasional) Standing: With support Standing - Static: Fair(+) Standing - Dynamic : Fair Therapeutic Activities: 
 Functional mobility utilizing RW from patient's room to therapy room in order to increase functional activity tolerance and independence during task. Patient was able to complete full mobility with SBA and cueing needed for one standing rest break due to increased L LE dragging limiting safety. BUTLER encouraged patient to initiate self rest breaks during mobility at home for optimal safety. Discussed with patient previously roles and routine with ADL tasks and transfers prior to hospitalization to determine appropriate cueing and training needed for optimal safety. Therapeutic Exercises: 
UB restorator x 10 mins in order to increase functional activity tolerance needed for ADLS. No rest break needed. Patient/Caregiver Education: Pt. Urszula Bowden Education on see above. ASSESSMENT: 
Patient continues to demonstrate the need for skilled Occupational Therapy services to improve dynamic standing balance needed for simple home management Progression toward goals: 
[x]      Improving appropriately and progressing toward goals 
[]      Improving slowly and progressing toward goals 
[]      Not making progress toward goals and plan of care will be adjusted Treatment session:   55 minutes Therapist:    Jose Spence,  12/10/2018

## 2018-12-10 NOTE — PROGRESS NOTES
Late entry: YUMIKO met with pt to complete the social evaluation. YUMIKO verified demographic information on the face sheet. Pt lives with his wife Robert Robb, son Jeimy De Dios, daughter-in-law and 4 grandchildren. Pt used and walker, cane and wheelchair for mobility. He was independent with adl's. His goal for rehab is \" to gain more strength in my legs and arms and walker further and play golf again\". His plan is to return home with his family at d/c. YUMIKO informed pt of the rehab process and MD visits. YUMIKO informed pt that his I nsurance determines his length of stay and that YUMIKO CALDERON was sent update today to [employer] to request an extension of his rehab stay. YUMIKO also informed pt that YUMIKO was informed that days 1-100 are covered at 100%. YUMIKO agreed to get back with pt re: decision re: extension request. YUMIKO will follow.

## 2018-12-10 NOTE — PROGRESS NOTES
Problem: Mobility Impaired (Adult and Pediatric) Goal: *Acute Goals and Plan of Care (Insert Text) PHYSICAL THERAPY STG GOALS : 
Initiated 12/5/2018 and to be accomplished within 2 Weeks 1. Patient will move from supine to sit and sit to supine  in bed with SBA. 2.  Patient will transfer from bed to chair and chair to bed with SBA using RW. 3.  Patient will perform sit to stand with RW close super vision with Fair balance and safety awareness. 4.  Patient will ambulate with close supervision for 100 feet with RW on level surfaces with 2 turns. 5.  Patient will ascend/descend 1 stairs with bilateral handrail(s) with SBA to allow for safe home access/exit. 6.  Patient will improve standardized test score for 3/5 [facility] Standing Balance Scale. PHYSICAL THERAPY LTG GOALS : 
Initiated 12/5/2018 and to be accomplished within 4 Weeks 1. Patient will move from supine to sit and sit to supine  in bed with modified independence. 2.  Patient will transfer from bed to chair and chair to bed with supervision/set-up using RW. 3.  Patient will perform sit to stand with RW supervision/set-up with Good balance and safety awareness. 4.  Patient will ambulate with supervision/set-up for 200 feet with RW on level surfaces and be able to maneuver through narrow spaces and obstacles without loss of balance. 5.  Patient will ascend/descend 1 stairs with bilateral handrail(s) with supervision/set-up to allow for safe home access/exit. 6.  Patient will improve standardized test score for 4/5 [facility] Standing Balance Scale. Physical Therapist:   Nabor Alvarez  on 12/5/2018 [facility]  
physical Therapy Daily Treatment note Patient: Roxi Fan (16 y.o. male)               Date: 12/10/2018 Physician: Sukhi Nagel MD 
Primary Diagnosis: UTI Altered Mental Status          Treatment Diagnosis Treatment Diagnosis: muscle weakness Precautions: Fall Vital SignsVital Signs Pulse (Heart Rate): 64 
BP: 93/56 Cognitive Status:Mental Status Neurologic State: Alert Orientation Level: Oriented X4 Cognition: Appropriate for age attention/concentration PainPain Screen Pain Scale 1: Numeric (0 - 10) Pain Intensity 1: 0 Patient Stated Pain Goal: 0 Pain Reassessment 1: Patient sleeping Bed Mobility TrainingBed Mobility Training Supine to Sit: Supervision Scooting: Supervision BalanceSitting: Without support Sitting - Static: Good (unsupported) Sitting - Dynamic: Fair (occasional)(((+))) Standing: With support Standing - Static: Fair(((+))) Standing - Dynamic : Fair Transfer Training Transfer Training Sit to Stand: Stand-by assistance Stand to Sit: Stand-by assistance Sit to Stand: Stand-by assistance Gait TrainingGait Base of Support: Center of gravity altered Speed/Ebony: Slow Step Length: Right shortened;Left shortened Gait Abnormalities: Decreased step clearance Ambulation - Level of Assistance: Stand-by assistance Distance (ft): 261 Feet (ft)(x2) 
Assistive Device: Gait belt;Walker, rolling Rail Use: Both Stairs - Level of Assistance: Stand-by assistance Number of Stairs Trained: 10 Interventions: Safety awareness training Gait Abnormalities: Decreased step clearance With 4 turns. Treatment: Pt presented supine in bed. Pt demonstrated good unsupported seated balance at EOB while donning R LE prosthetic and shoes in preparation for gait training. Gait training rendered to promote step height and proximity to RW, especially with turns. Therapist provided visual demo and noted slight improved return demo following instruction. Pt may have increased R LE shuffling as L prosthetic appears slightly short. Pt reported being aware and stated \"I have to go into the [place] so they can adjust if for me\". Pt also stated \"I feel like the ankle component is a bit mushy\".  Pt negotiated 10 steps as noted above with step-over pattern and SBA. Pt required no cues for sequencing, and only one cue for safety awareness with descending. Additional gait training rendered 4x15ft with stepping over obstacles utilizing RW to promote step height with obstacle negotiation for reduced risk of falls. Standing marches and hamstring curls with B UE support and SBA x15, seated LAQ with focus on eccentric control x15 with 3\" holds. Pt remained sitting in w/c at bedside, call bell at side. Patient/Caregiver Education:  
Pt /Caregiver Education on safety and fall prevention, and visual demonstration for improved turns to promote step height and proximity to AD for reduced risk of falls. ASSESSMENT: 
Patient continues to benefit from Skilled PT services to improve strength, balance, gait and stair training. Progression toward goals: 
[x]      Improving appropriately and progressing toward goals 
[]      Improving slowly and progressing toward goals 
[]      Not making progress toward goals and plan of care will be adjusted Treatment session: 60 minutes. Therapist:   Bharath Resendez PTA,          12/10/2018

## 2018-12-10 NOTE — ROUTINE PROCESS
Bedside and Verbal shift change report given to [address] (oncoming nurse) by George Castillo RN (offgoing nurse). Report included the following information SBAR, Kardex, MAR and Recent Results.  visual checks and 24chart checks done.

## 2018-12-10 NOTE — PROGRESS NOTES
conducted a Follow up consultation and Spiritual Assessment for [employer], who is a [de-identified] y.o.,male. The  provided the following Interventions: 
Continued the relationship of care and support. Listened empathically. Offered prayer and assurance of continued prayer on patients behalf. Chart reviewed. The following outcomes were achieved: 
Patient expressed gratitude for pastoral care visit. Assessment: 
There are no further spiritual or Christianity issues which require Spiritual Care Services interventions at this time. Plan: 
Chaplains will continue to follow and will provide pastoral care on an as needed/requested basis.  recommends bedside caregivers page  on duty if patient shows signs of acute spiritual or emotional distress. [facility] Staff  Spiritual Care  
[phone]

## 2018-12-10 NOTE — ROUTINE PROCESS
Bedside nad verbal shift report given to KAYLA Franks (oncoming nurse) by NAMAN Cortez LPN (off going nurse). Report included SBAR, MAR and Kardex

## 2018-12-11 PROCEDURE — 74011250637 HC RX REV CODE- 250/637: Performed by: INTERNAL MEDICINE

## 2018-12-11 RX ADMIN — DONEPEZIL HYDROCHLORIDE 10 MG: 5 TABLET, FILM COATED ORAL at 09:09

## 2018-12-11 RX ADMIN — ASPIRIN 325 MG ORAL TABLET 325 MG: 325 PILL ORAL at 09:09

## 2018-12-11 RX ADMIN — CLOPIDOGREL BISULFATE 75 MG: 75 TABLET ORAL at 09:09

## 2018-12-11 RX ADMIN — ATORVASTATIN CALCIUM 20 MG: 20 TABLET, FILM COATED ORAL at 22:10

## 2018-12-11 RX ADMIN — Medication 1 CAPSULE: at 09:09

## 2018-12-11 RX ADMIN — TAMSULOSIN HYDROCHLORIDE 0.4 MG: 0.4 CAPSULE ORAL at 20:11

## 2018-12-11 RX ADMIN — CARVEDILOL 3.12 MG: 3.12 TABLET, FILM COATED ORAL at 20:10

## 2018-12-11 RX ADMIN — Medication 1 CAPSULE: at 20:10

## 2018-12-11 NOTE — PROGRESS NOTES
The patient was offered a group activity of listening to a guitarist play music on December 10, 2018. The patient declined the activity. The patient was also offered 1:1 activities with the Recreation Therapist. The patient declined these activities also. The patient will continue to be offered group and 1:1 activities and encouraged to participate in the activities provided.

## 2018-12-11 NOTE — ROUTINE PROCESS
Bedside and Verbal shift change report given to [address] (oncoming nurse) by Drake Arango RN (offgoing nurse). Report included the following information SBAR, Kardex, MAR and Recent Results. QH visual checks and 24h chart checks done

## 2018-12-11 NOTE — PROGRESS NOTES
YUMIKO received a fax from [id number] on yesterday re: extension request.Pt was extended until 12/17/18 with update due on 12/17/18. YUMIKO met with pt , wife and son Nia Odom to inform them of the extension by [id number] and that YUMIKO will send an update on 12/17/18 to request additional time if needed. YUMIKO informed pt's wife and son of the rehab process and MD visits. YUMIKO will arrange a care plan meeting prior to pt's d/c to review pt's progress and address any concerns.

## 2018-12-11 NOTE — PROGRESS NOTES
The patient was offered a group activity of playing cards on December 11, 2018. The patient declined this activity. The patient was also offered a social group activity of using discussion dice to engage in conversations with other patients. The patient also declined to attend this group. The Recreation Therapist will continue to offer the patient group and 1:1 activities and encourage participation in the activities provided.

## 2018-12-11 NOTE — PROGRESS NOTES
Problem: Self Care Deficits Care Plan (Adult) Goal: *Acute Goals and Plan of Care (Insert Text) OCCUPATIONAL THERAPY SHORT TERM GOALS Updated 12/11/18 1. Patient will perform Upper body bathing with adaptive equipment as needed for energy conservation with Mod I. 
2.  Patient will perform Lower body dressing with adaptive equipment as needed for energy conservation with supervision/set-up . 3. Patient will perform toileting task with supervision/set-up with Good safety to reduce falls risk. 4.  Patient will perform functional transfers to commode/shower chair with LRAD and supervision/setup. 5.  Patient will perform standing static/dynamic balance activities for improved ADL/IADL function with supervision/set-up and Fair +/Good - balance and safety awarenes. 6.  Patient will improve Barthel index scores to atleast 70/100 to improve functional mobility. 7.  Patient will perform bathroom mobility with LRAD and Supervision. Initiated 12/5/2018 and to be accomplished within [address] [place] [address]) 1. Patient will perform Upper body bathing with adaptive equipment as needed for energy conservation with supervision/set up. (goal met-UPG Mod I) 2. Patient will perform Lower body dressing with adaptive equipment as needed for energy conservation with supervision/set-up . (progressing-currently SBA) 3. Patient will perform toileting task with supervision/set-up with Good safety to reduce falls risk. (progressing-currently SBA) 4. Patient will perform functional transfers to commode/shower chair with LRAD and supervision/setup.  (progressing-currently SBA) 5. Patient will perform standing static/dynamic balance activities for improved ADL/IADL function with supervision/set-up and Fair +/Good - balance and safety awarenes. (progressing-currently SBA) 6. Patient will improve Barthel index scores to atleast 70/100 to improve functional mobility.  (progressing) 7.  Patient will perform bathroom mobility with LRAD and Supervision.  (progressing-currently SBA) OCCUPATIONAL THERAPY LONG TERM GOALS Initiated 12/5/2018 and to be accomplished within 4 Week(s) 1. Patient will perform Upper  body bathing with adaptive equipment as needed for ebergy conservation with modified independence. 2.  Patient will perform Lower body dressing with adaptive equipment as needed for energy conservation with modified independence. 3.  Patient will perform toileting task with modified independence with Good safety to reduce falls risk. 4.  Patient will perform functional transfers to commode and shower chair with LRAD and  modified independence and Good balance and safety awareness. 5.  Patient will perform standing static/dynamic activity for improved ADL/IADL function    with modified independence an and Good balance and safety awareness. 6.  Patient will improve Barthel index score to 80/100 to improve independence with mobility. 7. Patient will perform functional mobility within kitchen environment/bathroom with LRAD and modified independence. Therapist: Deanna Victor OT    12/5/2018 [facility] 
OCCUPATIONAL THERAPY WEEKLY SUMMARY Reporting period:  from 12/05/18 through 12/11/18 Patient: Alexy Dias (67 y.o. male)   Date: 12/11/2018 Primary Diagnosis: UTI Altered Mental Status Attending Physician: Shashank Khan MD Treatment Diagnosis Treatment Diagnosis: muscle weakness Precautions: Fall Rehab Potential : Fair Skill interventions and education provided with clinical rationale (include individualized treatment techniques and standardized tests): 
Skilled Occupational services were provided utilizing ADL retraining, incorporating balance retraining, therapeutic exercise and activities incorporating strengthening in order to improve ADL performance skills and functional mobility. Using a comparative statement, summarize significant progress toward goals as a result of skilled intervention provided: 
Patient has made Fair progress towards their Occupational Therapy goals in the following areas: increase independence, performance, and safety with grooming, bathing, upper body dressing, lower body dressing, toileting, toilet transfer and shower transfer. Patient has met 1/6 STGS and making good progression towards LTGS. Identify remaining functional areas, impairments limiting progress and/or barriers to improvement: 
Patient would benefit from continued skilled Occupational Therapy Services to address the following functional deficits in  Decreased dynamic standing balance and tolerance needed for safely complete ADL/IADLS. OBJECTIVE DATA SUMMARY:  
 
 
INITIAL ASSESSMENT WEEKLY PROGRESS  
COGNITIVE STATUS: COGNITIVE STATUS:  
Neurologic State: Alert Orientation Level: Oriented X4 Cognition: Follows commands Perception: Appears intact Perseveration: No perseveration noted Neurologic State: Alert Orientation Level: Oriented X4 Cognition: Follows commands Perception: Appears intact Perseveration: No perseveration noted PAIN: PAIN:  
Pain Scale 1: Numeric (0 - 10) Pain Intensity 1: 0 Patient Stated Pain Goal: 0 Pain Reassessment 1: Yes 
 
 Pain Scale 1: Visual 
Pain Intensity 1: 0 Patient Stated Pain Goal: 0 Pain Reassessment 1: Patient sleeping BED MOBILITY BED MOBILITY Rolling: Supervision Supine to Sit: Supervision Scooting: Supervision Rolling: Stand-by assistance Supine to Sit: Supervision Scooting: Supervision ADL SELF CARE ADL SELF CARE Feeding: Independent Oral Facial Hygiene/Grooming: Independent Bathing: Contact guard assistance(juliane area only) Type of Bath: Basin/Soap/Water Upper Body Dressing: Setup Lower Body Dressing: Minimum assistance Toileting: Contact guard assistance Bathing Assistance: Modified independent Position Performed: Seated in chair Dressing Assistance: Modified independent Pullover Shirt: Modified independent Bathing Assistance: Stand-by assistance(close) Perineal  : Stand-by assistance Position Performed: Standing Lower Body : Supervision/set-up Position Performed: Seated in chair Toileting Assistance: Contact guard assistance Bladder Hygiene: Stand-by assistance(close) Bowel Hygiene: Contact guard assistance Clothing Management: Contact guard assistance Dressing Assistance: Stand-by assistance Underpants: Stand-by assistance Pants With Elastic Waist: Stand-by assistance Socks: Modified independent Leg Crossed Method Used: Yes Position Performed: Bending forward method, Seated in chair TRANSFERS TRANSFERS Sit to Stand: Contact guard assistance Stand to Sit: Contact guard assistance Bed to Chair: Contact guard assistance Bathroom Mobility: Contact guard assistance Toilet Transfer : Contact guard assistance Shower Transfer: Contact guard assistance Sit to Stand: Stand-by assistance Stand to Sit: Stand-by assistance Bed to Chair: Contact guard assistance Bathroom Mobility: Contact guard assistance Toilet Transfer : Contact guard assistance Shower Transfer: Contact guard assistance Bathroom Mobility: Contact guard assistance Toilet Transfer : Contact guard assistance Shower Transfer: Contact guard assistance Bathroom Mobility: Contact guard assistance Toilet Transfer : Contact guard assistance Shower Transfer: Contact guard assistance BALANCE BALANCE Sitting: Intact Sitting - Static: Good (unsupported) Sitting - Dynamic: Fair (occasional) Standing: With support, Impaired Standing - Static: Fair Standing - Dynamic : Fair(-) Sitting: Without support Sitting - Static: Good (unsupported) Sitting - Dynamic: Good (unsupported) Standing: With support Standing - Static: Fair(+) Standing - Dynamic : Fair GROSS ASSESSMENT GROSS ASSESSMENT AROM: Generally decreased, functional 
PROM: Generally decreased, functional 
Strength: Generally decreased, functional(B hip flex 4+,L knee flex 4, L knee ext 4/5) AROM: Generally decreased, functional 
PROM: Generally decreased, functional 
Strength: Generally decreased, functional(B hip flex 4+,L knee flex 4, L knee ext 4/5) COORDINATION COORDINATION Fine Motor Skills-Upper: Left Intact, Right Intact Gross Motor Skills-Upper: Left Intact, Right Intact Fine Motor Skills-Upper: Left Intact, Right Intact Gross Motor Skills-Upper: Left Intact, Right Intact VISUAL/PERCEPTUALVISUAL/PERCEPTUAL 
         
AUDITORY: AUDITORY:  
Auditory Impairment: Hard of hearing, bilateral, Hearing aid(s) Hearing Aids/Status: With patient Auditory Impairment: Hard of hearing, right side, Hearing aid(s), Hard of hearing, left side(deaf left ear) Hearing Aids/Status: At home INSTRUMENTAL  ADL'S: 
  INSTRUMENTAL ADL'S: 
   
 
THE BARTHEL INDEXACTIVITY 
 SCORE FEEDING 
0=unable 5=needs help cutting,spreading butter,etc., or modified diet 10= independent 10 BATHING 
0=dependent 5=independent (or in shower  
0  
GROOMING 
0=needs help 5=independent face/hair/teeth/shaving (implements provided) 5 DRESSING 
0=dependent 5=needs help but can do about half unaided 10=independent(including buttons, zips,laces etc.) 5 BOWELS 
0=incontinent 5=occasional accident 10=continent 10 BLADDER 
0=incontinent, or catheterized and unable to manage alone 5=occasional accident 10=continent 10 TOILET USE 
0=dependent 5=needs some help, but can do something alone 10=independent (on and off, dressing, wiping) 5 TRANSFER (BED TO CHAIR AND BACK) 0=unable, no sitting balance 5=major help(one or two people,physical), can sit 10=minor help(verbal or physical) 15=independent 10 MOBILITY (ON LEVEL SURFACES) 0=immobile or <50 yards 5=wheelchair independent,including corners,>50 yards 10=walkes with help of one person (verbal or physical) >50 yards 15=independent(but may use any aid; for example, stick) >50 yards 10 STAIRS 
0=unable 5=needs help (verbal, physical, carrying aid) 10=independent  
0  
   
     TOTAL:                  65/100 Treatment:  Completed shower assessment with patient in order to assess safety, independence and possible DME needed for optimal safety and independence. Upon arrival for shower assessment, patient has his prosthetic  on with a bag around the foot. Patient reports he complete showers prior to hospitalization with bag over prosthetic during all transfers and showering task. BUTLER informed patient she doesn't recommend due to increase risk for falls due to bag increasing slip during showering. Patient verbalized understanding however declined recommendations. Patient able to complete all aspects of showering with Supervision and close SBA during standing task due to prosthetic. Gary Horton continues to voiced safety concerns with prosthetic as well as complete lower LB bathing seated vs standing to reduce risk for falls. Patient's response to Treatment rendered:  Patient is pleasant and receptive to some recommendations during treatment session today. Patient expected Discharge Location:  [x]Private Residence  [] SHEEBA/ILF  []LTC  []Other: 
 
Plan: Continue OT services as established on the Plan of Care for 3-6 times a week. Treatment Minutes:  58 
OT and Assistant have had a weekly case conference regarding the above treatment:  [] Yes     [] No   
Therapist:   Gary Lomeli,         Date:12/11/2018 Forward to OT for co-signature when completed

## 2018-12-11 NOTE — PROGRESS NOTES
I have reviewed this patient's current medication list and recent laboratory results. At this time, I do not suggest any drug therapy adjustments or additional laboratory monitoring. Thank you,  Radha TEE  Ph. M. S.  12/11/2018

## 2018-12-11 NOTE — PROGRESS NOTES
Problem: Mobility Impaired (Adult and Pediatric) Goal: *Acute Goals and Plan of Care (Insert Text) PHYSICAL THERAPY STG GOALS : 
Initiated 12/5/2018 and to be accomplished within 2 Weeks (Updated 12/11/18) 1. Patient will move from supine to sit and sit to supine  in bed with SBA. (Achieved) 2. Patient will transfer from bed to chair and chair to bed with SBA using RW. (Achieved) 3. Patient will perform sit to stand with RW close super vision with Fair balance and safety awareness. (Achieved) 4. Patient will ambulate with close supervision for 100 feet with RW on level surfaces with 2 turns. (Achieved) 5. Patient will ascend/descend 1 stairs with bilateral handrail(s) with SBA to allow for safe home access/exit. (Achieved) 6. Patient will improve standardized test score for 3/5 [facility] Standing Balance Scale. (Achieved) PHYSICAL THERAPY LTG GOALS : 
Initiated 12/5/2018 and to be accomplished within 4 Weeks (Updated 12/11/18) 1. Patient will move from supine to sit and sit to supine  in bed with modified independence. (Progressing; (S)) 2.  Patient will transfer from bed to chair and chair to bed with supervision/set-up using RW. (Progressing; close (S)) 3.  Patient will perform sit to stand with RW supervision/set-up with Good balance and safety awareness. (Progressing; close (S)) 4. Patient will ambulate with supervision/set-up for 200 feet with RW on level surfaces and be able to maneuver through narrow spaces and obstacles without loss of balance. (Progressing; 261ft with RW and close (S), cues for safety with obstacles and narrow spaces) 5. Patient will ascend/descend 1 stairs with bilateral handrail(s) with supervision/set-up to allow for safe home access/exit. (Progressing; 10 steps with B HR and close (S)) 6. Patient will improve standardized test score for 4/5 [facility] Standing Balance Scale. (Progressing; 3+/5) Physical Therapist:   Pepe Anderson  on 12/5/2018 [facility] [facility] PHYSICAL THERAPY WEEKLY PROGRESS REPORT Reporting Period:  Date: 12/5/18 to 12/11/18 Patient: Giovani Ritchie (19 y.o. male)                         Date: 12/11/2018 Primary Diagnosis: UTI Altered Mental Status Attending Physician: Braulio Rose MD 
 Treatment Diagnosis Treatment Diagnosis: muscle weakness Precautions:  Fall Rehab Potential : Good: 
 
Skill interventions and education provided with clinical rationale (include individualized treatment techniques and standardized tests):  
Skilled Physical Therapy services were provided with TA to promote Mod (I) with bed mobility and transfers TE to build strength and endurance for improved functional mobility Gait training to address deficits and allow pt to return to PLOF Stair training to allow pt to safely return home upon DC 
NMR: to improve coordination and balance for reduced risk of falls. Using a comparative statement, summarize significant progress toward goals as a result of skilled intervention provided: 
Patient has made Good progress towards their Physical Therapy goals in the areas of bed mobility, sit<>stand, transfers, gait and stair negotiation. Identify remaining functional areas, impairments limiting progress and/or barriers to improvement: 
Patient would benefit from continues PT services to address the following functional deficits in quality of ambulation and stair negotiation, dynamic balance and strength. Pt is progressing well with therapy as evidenced by achieving all STG's this time and progress towards all LTG's. OBJECTIVE DATA SUMMARY:  
 
INITIAL ASSESSMENT WEEKLY ASSESSMENT  
COGNITIVE STATUS COGNITIVE STATUS Neurologic State: Alert Orientation Level: Oriented X4 Cognition: Follows commands Perception: Appears intact Perseveration: No perseveration notedNeurologic State: Alert Orientation Level: Oriented X4 Cognition: Follows commands Perception: Appears intact Perseveration: No perseveration noted PAIN PAIN Pain Scale 1: Numeric (0 - 10) Pain Intensity 1: 0 Patient Stated Pain Goal: 0 Pain Reassessment 1: YesPain Scale 1: Visual 
Pain Intensity 1: 0 Patient Stated Pain Goal: 0 Pain Reassessment 1: Patient sleeping GROSS ASSESSMENT GROSS ASSESSMENT  
AROM: Generally decreased, functional 
PROM: Generally decreased, functional 
Strength: Generally decreased, functional(B hip flex 4+,L knee flex 4, L knee ext 4/5)AROM: Generally decreased, functional 
PROM: Generally decreased, functional 
Strength: Generally decreased, functional(B hip flex 4+,L knee flex 4, L knee ext 4/5) BED MOBILITY BED MOBILITY Rolling: Supervision Supine to Sit: Supervision Scooting: Supervision Rolling: Stand-by assistance Supine to Sit: Supervision Scooting: Supervision GAIT GAIT Base of Support: Center of gravity altered, Narrowed Speed/Ebony: Slow, Shuffled Step Length: Left shortened Gait Abnormalities: Decreased step clearance, Foot drop Ambulation - Level of Assistance: Contact guard assistance Distance (ft): 22 Feet (ft) Assistive Device: Gait belt, Walker, rolling Rail Use: Both Stairs - Level of Assistance: Contact guard assistance Number of Stairs Trained: 10 Interventions: Verbal cues, Safety awareness training Base of Support: Center of gravity altered Speed/Ebony: Slow Step Length: Right shortened, Left shortened Gait Abnormalities: Decreased step clearance Ambulation - Level of Assistance: (close (S) ) Distance (ft): 261 Feet (ft)(x2) 
Assistive Device: Gait belt, Walker, rolling Rail Use: Both Stairs - Level of Assistance: (close (S) ) Number of Stairs Trained: 10 Interventions: Safety awareness training TRANSFERS TRANSFERS Sit to Stand: Contact guard assistance Stand to Sit: Contact guard assistance Bed to Chair: Contact guard assistance Sit to Stand: (close (S) ) Stand to Sit: (close (S) ) Bed to Chair: Contact guard assistance BALANCE BALANCE Sitting: Intact Sitting - Static: Good (unsupported) Sitting - Dynamic: Fair (occasional) Standing: With support, Impaired Standing - Static: Fair Standing - Dynamic : Fair(-) Sitting: Without support Sitting - Static: Good (unsupported) Sitting - Dynamic: Good (unsupported) Standing: With support Standing - Static: Good Standing - Dynamic : Fair(((+))) WHEELCHAIR MOBILITY/MGMT WHEELCHAIR MOBILITY/MGMT Activity Tolerance:  Fair Activity Tolerance: Good Visual/Perceptual  
     
 Visual/Perceptual  
     
  
Auditory: Auditory Impairment: Hard of hearing, bilateral, Hearing aid(s) Hearing Aids/Status: With patient Auditory: Auditory Auditory Impairment: Hard of hearing, right side, Hearing aid(s), Hard of hearing, left side(deaf left ear) Hearing Aids/Status: At home Steps: both Clinical Decision making:  Clinical Decision making: [place] standing balance score: 3+/5 Treatment:   Pt has progressed well over the past week as evidenced by achieving all STG's and progressing well towards LTG's at this time. During today's session, pt completed gait training [id number] with RW and close (S). Pt had a different prosthetic limb donned. Pt appeared to have a slightly improved gait pattern with less shuffling on L LE. Pt did require some cues to address intermittent shuffling and for proximity to RW, especially with turns. Stair training provided with B HR and close (S) x10 steps. Pt with noted improved stair negotiation and required no cues for sequencing today. Additional gait training rendered with obstacle negotiation and narrow spaces with use of RW and SBA. Pt with noted difficulty during tight turns and required much cues for proximity to RW for safety.  Pt demonstrated good progress with cues, but easily returns to unsafe turns when not provided with verbal cues. Walking marches completed with RW and CGA 2x20 to promote step height. At end of session, pt requested to go back to bed. Pt sitting at EOB with call bell at side. Patient's response to treatment rendered:  Good Patient expected Discharge Location:  [x]Private Residence  [] SHEEBA/ILF  []LTC  []Other: 
 
Plan: Continue Skilled PT services as established by the Plan of Care for 3-6 times a week. PT and Assistant have had a weekly case conference regarding the above treatment:  [x] Yes     [] No    
 
Treatment session:  60 minutes. Therapist: Gume Pittman, PTA       Date:12/11/2018 Forward to PT for co-signature when completed.

## 2018-12-12 PROCEDURE — 74011250637 HC RX REV CODE- 250/637: Performed by: INTERNAL MEDICINE

## 2018-12-12 RX ADMIN — CARVEDILOL 3.12 MG: 3.12 TABLET, FILM COATED ORAL at 17:24

## 2018-12-12 RX ADMIN — Medication 1 CAPSULE: at 09:59

## 2018-12-12 RX ADMIN — ASPIRIN 325 MG ORAL TABLET 325 MG: 325 PILL ORAL at 09:59

## 2018-12-12 RX ADMIN — TAMSULOSIN HYDROCHLORIDE 0.4 MG: 0.4 CAPSULE ORAL at 20:24

## 2018-12-12 RX ADMIN — CLOPIDOGREL BISULFATE 75 MG: 75 TABLET ORAL at 09:59

## 2018-12-12 RX ADMIN — CARVEDILOL 3.12 MG: 3.12 TABLET, FILM COATED ORAL at 09:59

## 2018-12-12 RX ADMIN — ATORVASTATIN CALCIUM 20 MG: 20 TABLET, FILM COATED ORAL at 21:20

## 2018-12-12 RX ADMIN — Medication 1 CAPSULE: at 18:24

## 2018-12-12 RX ADMIN — DONEPEZIL HYDROCHLORIDE 10 MG: 5 TABLET, FILM COATED ORAL at 09:59

## 2018-12-12 NOTE — PROGRESS NOTES
Nutrition follow-up/Plan of care tcc     RECOMMENDATIONS:     1. Cardiac Diet  2. Monitor weight, labs and PO intake  3. RD to follow     GOALS:     1. Met/Ongoing: PO intake meets >75% of protein/calorie needs by 12/19  2. Ongoing: Weight Maintenance/gradual loss (1-2 lb/week) by 12/19    ASSESSMENT:     Weight status is classified as obese per BMI of 31.5. Adequate PO intake. Labs noted. Pt w/ elevated BUN/Cr; GFR (39). Nutrition recommendations listed. RD to follow. Nutrition Risk:  [] High  [] Moderate [x]  Low    SUBJECTIVE/OBJECTIVE:      (12/2): Admitted for CVA. PMHx including hyperlipidemia,CAD and prior CVA and TIA's. Patient reports having a good appetite and weight has been stable at 220 lb. Denies food allergies or problems chewing/swallowing. Reports following a low Na/low sat fat diet at home. Will monitor. (12/5): Pt transferred from  to [address] [place] [address] on 12/4/2018. Pt seen in room OOB in chair. Reports he is doing well and his appetite is great. Nothing to address at this time. Will monitor. (12/12): Pt seen in room resting after lunch; observed 100% of meal consumed. Reports he is doing well and has a good appetite. Weight loss of 11.6 lb or 5.2% x 1 week noted, but question accuracy of new weight recorded as Pt consumes 3 meals per day. Will monitor.       Information Obtained from:    [x] Chart Review   [x] Patient   [] Family/Caregiver   [] Nurse/Physician   [] Interdisciplinary Meeting/Rounds    Diet: Cardiac Diet  Medications: [x] Reviewed   Lipitor, Coreg, Plavix, Fish Oil  Allergies: [x] Reviewed   Patient Active Problem List   Diagnosis Code    Pure hypercholesterolemia E78.00    Coronary artery disease involving native coronary artery without angina pectoris I25.10    Hypertension I10    CKD (chronic kidney disease), stage III ([facility] N18.3    S/P AVR Z95.2    Advance directive in chart Z78.9    PAD (peripheral artery disease) ([facility] I73.9    AAA (abdominal aortic aneurysm) without rupture ([facility]) R33.4    Diastolic dysfunction V96.9    Vascular dementia without behavioral disturbance F01.50    Atherosclerosis of native artery of left lower extremity with intermittent claudication ([facility]) I70.212    Chest pain R07.9    CAD (coronary artery disease) I25.10    Hypotension I95.9    Cerebrovascular disease I67.9    Acute MI ([facility]) I21.9    S/P CABG x 4 Z95.1    History of CVA (cerebrovascular accident) Z80.78    History of MI (myocardial infarction) I25.2    Sepsis ([facility]) A41.9    UTI (urinary tract infection) N39.0    Acute metabolic encephalopathy I35.17     Past Medical History:   Diagnosis Date    Advance directive in chart 6/13/2016    CAD (coronary artery disease)     Cancer ([facility] 2003    Colon    CKD (chronic kidney disease), stage III ([facility] 3/27/2016    CVA (cerebral vascular accident) ([facility] 3/26/2016    Femoral artery aneurysm, left ([facility]     Heart attack ([facility] 2008    Heart attack ([facility]     Hernia     History of TIAs     Hyperlipidemia     Hypertension 3/27/2016    Iliac artery aneurysm, left ([facility])     Pure hypercholesterolemia 9/14/2012    PVD (peripheral vascular disease) ([facility] 3/27/2016    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 ([facility] 3/26/2016      Labs:    Lab Results   Component Value Date/Time    Sodium 139 12/10/2018 08:55 AM    Potassium 5.0 12/10/2018 08:55 AM    Chloride 108 12/10/2018 08:55 AM    CO2 23 12/10/2018 08:55 AM    Anion gap 8 12/10/2018 08:55 AM    Glucose 91 12/10/2018 08:55 AM    BUN 27 (H) 12/10/2018 08:55 AM    Creatinine 1.69 (H) 12/10/2018 08:55 AM    Calcium 9.7 12/10/2018 08:55 AM    Magnesium 2.4 12/01/2018 11:35 AM    Phosphorus 2.9 04/14/2018 09:27 AM    Albumin 3.4 12/01/2018 11:35 AM     Lab Results   Component Value Date/Time    Cholesterol, total 129 04/14/2018 09:27 AM    HDL Cholesterol 38 (L) 04/14/2018 09:27 AM    LDL, calculated 49.6 04/14/2018 09:27 AM    VLDL, calculated 41.4 04/14/2018 09:27 AM Triglyceride 207 (H) 04/14/2018 09:27 AM    CHOL/HDL Ratio 3.4 04/14/2018 09:27 AM     Anthropometrics: BMI (calculated): 31.5  Last 3 Recorded Weights in this Encounter    12/04/18 2225 12/12/18 1349   Weight: 99.3 kg (219 lb) 94.1 kg (207 lb 6.4 oz)      Ht Readings from Last 1 Encounters:   12/04/18 5' 8\" (1.727 m)     Weight Metrics 12/12/2018 12/4/2018 12/4/2018 12/4/2018 11/27/2018 10/2/2018 9/7/2018   Weight 207 lb 6.4 oz - 217 lb 4.8 oz - 220 lb 0.3 oz 206 lb 12.8 oz 205 lb   BMI - 31.54 kg/m2 - 33.04 kg/m2 33.45 kg/m2 29.67 kg/m2 29.41 kg/m2       No data found. UBW: 220 lb   [x] Weight Loss (11.6 lb or 5.2% x 1 week) ? ? accuracy  [] Weight Gain  [] Weight Stable    Estimated Nutrition Needs: [x] MSJ  [] Other:  Calories: 8872-6138 Kcal Based on:   [x] Actual BW   Protein:    g Based on:   [x] Actual BW    Fluid:      7037-2599 ml Based on:   [x] Actual BW        Nutrition Problems Identified:   [] Suboptimal PO intake   [] Food Allergies  [] Difficulty chewing/swallowing/poor dentition  [] Constipation/Diarrhea   [] Nausea/Vomiting   [x] None  [] Other:     Plan:   [x] Therapeutic Diet  []  Obtained/adjusted food preferences/tolerances and/or snacks options   []  Supplements added   [] Occupational therapy following for feeding techniques  []  HS snack added   []  Modify diet texture   []  Modify diet for food allergies   []  Educate patient   []  Assist with menu selection   [x]  Monitor PO intake on meal rounds   [x]  Continue inpatient monitoring and intervention   [x]  Participated in discharge planning/Interdisciplinary rounds/Team meetings   []  Other:     Education Needs:   [] Not appropriate for teaching at this time due to:   [x] Identified and addressed    Nutrition Monitoring and Evaluation:  [x] Continue ongoing monitoring and intervention  [] Other    Cali Lee

## 2018-12-12 NOTE — ROUTINE PROCESS
Bedside and Verbal shift change report given to Rudy Zhao LPN (oncoming nurse) by Sandie Coughlin LPN   (offgoing nurse). Report included the following information SBAR, Kardex, MAR and Recent Results.

## 2018-12-12 NOTE — PROGRESS NOTES
Problem: Mobility Impaired (Adult and Pediatric)  Goal: *Acute Goals and Plan of Care (Insert Text)  PHYSICAL THERAPY STG GOALS :  Initiated 12/5/2018 and to be accomplished within 2 Weeks (Updated 12/11/18)     1. Patient will move from supine to sit and sit to supine  in bed with SBA. (Achieved)   2. Patient will transfer from bed to chair and chair to bed with SBA using RW. (Achieved)   3. Patient will perform sit to stand with RW close super vision with Fair balance and safety awareness. (Achieved)   4. Patient will ambulate with close supervision for 100 feet with RW on level surfaces with 2 turns. (Achieved)   5. Patient will ascend/descend 1 stairs with bilateral handrail(s) with SBA to allow for safe home access/exit. (Achieved)   6. Patient will improve standardized test score for 3/5 [address] Standing Balance Scale. (Achieved)     PHYSICAL THERAPY LTG GOALS :  Initiated 12/5/2018 and to be accomplished within 4 Weeks (Updated 12/11/18)     1. Patient will move from supine to sit and sit to supine  in bed with modified independence. (Progressing; (S))  2.  Patient will transfer from bed to chair and chair to bed with supervision/set-up using RW. (Progressing; close (S))  3.  Patient will perform sit to stand with RW supervision/set-up with Good balance and safety awareness. (Progressing; close (S))  4. Patient will ambulate with supervision/set-up for 200 feet with RW on level surfaces and be able to maneuver through narrow spaces and obstacles without loss of balance. (Progressing; 261ft with RW and close (S), cues for safety with obstacles and narrow spaces)   5. Patient will ascend/descend 1 stairs with bilateral handrail(s) with supervision/set-up to allow for safe home access/exit. (Progressing; 10 steps with B HR and close (S))  6. Patient will improve standardized test score for 4/5 [address] Standing Balance Scale.  (Progressing; 3+/5)    Physical Therapist:   Shayna Show Ulisses  on 12/5/2018             [address]  [facility]   physical Therapy Daily Treatment note      Patient: Alex Cai (50 y.o. male)               Date: 12/12/2018    Physician: Carroll Sullivan MD  Primary Diagnosis: UTI  Altered Mental Status          Treatment Diagnosis  Treatment Diagnosis: muscle weakness  Precautions: Fall  Vital Signs  Vital Signs  Pulse (Heart Rate): 60  BP: 138/75  Cognitive Status:  Mental Status  Neurologic State: Confused(hanging off the bed. wasn't sure what was going on.)  Orientation Level: Oriented to person;Oriented to place  Cognition: Decreased attention/concentration  Pain  Bed Mobility Training  Bed Mobility Training  Supine to Sit: Modified independent  Scooting: Supervision  Balance  Sitting: Without support  Sitting - Static: Good (unsupported)  Sitting - Dynamic: Good (unsupported)  Standing: With support  Standing - Static: Good  Standing - Dynamic : Fair(((+)))  Transfer Training  Transfer Training  Sit to Stand: (close (S) )  Stand to Sit: Supervision  Sit to Stand: (close (S) )  Gait Training  Gait  Base of Support: Center of gravity altered  Speed/Ebony: Slow  Step Length: Left shortened;Right shortened  Gait Abnormalities: Decreased step clearance  Ambulation - Level of Assistance: (amy (S) )  Distance (ft): 262 Feet (ft)  Assistive Device: Gait belt;Walker, rolling  Rail Use: Both  Stairs - Level of Assistance: (close (S) )  Number of Stairs Trained: 10  Gait Abnormalities: Decreased step clearance     With 3 turns. Treatment: Pt is now at Mod (I) for bed mobility. Sit<>stand throughout session at close (S)/(S). Gait training rendered with abovementioned details to focus on step height, proximity to RW and safety with turns. Pt ambulates with close (S), minimal verbal cues for step height required throughout. Pt negotiated 10 steps with B HR and step-over pattern with close (S), no verbal cues required.  Pt had one instance of stepping on heel of R shoe and was educated on safety awareness. Pt stated \"I should be wearing my walking shoes instead of these slippers\". Shelbi Nearing with RW and SBA 4x25ft to promote step height for improved safety with ambulation. Seated B LE TE's rendered with 2# AW at L LE, LAQ, resisted hip abd (blue band), ball squeezes x20. Pt ambulated an additional 260ft back to his room with (S) and intermittent verbal cues for L step height. Patient/Caregiver Education:   Pt /Caregiver Education on safety and fall prevention, and safety with DC planning, not date provided at this time. Pt reported living with his wife and son, and stated \"I also have two grown grandchildren that help whenever we need them\". ASSESSMENT:  Patient continues to benefit from Skilled PT services to improve quality of ambulation to allow pt to progress to PLOF, stair negotiation for greater independence, dynamic balance. Progression toward goals:  [x]      Improving appropriately and progressing toward goals  []      Improving slowly and progressing toward goals  []      Not making progress toward goals and plan of care will be adjusted     Treatment session: 60 minutes.   Therapist:   Shannan Costa PTA,          12/12/2018

## 2018-12-13 LAB
ANION GAP SERPL CALC-SCNC: 6 MMOL/L (ref 3–18)
BASOPHILS # BLD: 0.1 K/UL (ref 0–0.1)
BASOPHILS NFR BLD: 1 % (ref 0–2)
BUN SERPL-MCNC: 30 MG/DL (ref 7–18)
BUN/CREAT SERPL: 20 (ref 12–20)
CALCIUM SERPL-MCNC: 9.7 MG/DL (ref 8.5–10.1)
CHLORIDE SERPL-SCNC: 107 MMOL/L (ref 100–108)
CO2 SERPL-SCNC: 27 MMOL/L (ref 21–32)
CREAT SERPL-MCNC: 1.53 MG/DL (ref 0.6–1.3)
DIFFERENTIAL METHOD BLD: ABNORMAL
EOSINOPHIL # BLD: 0.4 K/UL (ref 0–0.4)
EOSINOPHIL NFR BLD: 4 % (ref 0–5)
ERYTHROCYTE [DISTWIDTH] IN BLOOD BY AUTOMATED COUNT: 15.2 % (ref 11.6–14.5)
GLUCOSE SERPL-MCNC: 86 MG/DL (ref 74–99)
HCT VFR BLD AUTO: 35.6 % (ref 36–48)
HGB BLD-MCNC: 11.9 G/DL (ref 13–16)
LYMPHOCYTES # BLD: 1.8 K/UL (ref 0.9–3.6)
LYMPHOCYTES NFR BLD: 17 % (ref 21–52)
MCH RBC QN AUTO: 32.9 PG (ref 24–34)
MCHC RBC AUTO-ENTMCNC: 33.4 G/DL (ref 31–37)
MCV RBC AUTO: 98.3 FL (ref 74–97)
MONOCYTES # BLD: 0.8 K/UL (ref 0.05–1.2)
MONOCYTES NFR BLD: 8 % (ref 3–10)
NEUTS SEG # BLD: 7.6 K/UL (ref 1.8–8)
NEUTS SEG NFR BLD: 70 % (ref 40–73)
PLATELET # BLD AUTO: 483 K/UL (ref 135–420)
PMV BLD AUTO: 10.8 FL (ref 9.2–11.8)
POTASSIUM SERPL-SCNC: 5.3 MMOL/L (ref 3.5–5.5)
RBC # BLD AUTO: 3.62 M/UL (ref 4.7–5.5)
SODIUM SERPL-SCNC: 140 MMOL/L (ref 136–145)
WBC # BLD AUTO: 10.7 K/UL (ref 4.6–13.2)

## 2018-12-13 PROCEDURE — 74011250637 HC RX REV CODE- 250/637: Performed by: INTERNAL MEDICINE

## 2018-12-13 PROCEDURE — 85025 COMPLETE CBC W/AUTO DIFF WBC: CPT

## 2018-12-13 PROCEDURE — 36415 COLL VENOUS BLD VENIPUNCTURE: CPT

## 2018-12-13 PROCEDURE — 80048 BASIC METABOLIC PNL TOTAL CA: CPT

## 2018-12-13 RX ADMIN — CLOPIDOGREL BISULFATE 75 MG: 75 TABLET ORAL at 10:26

## 2018-12-13 RX ADMIN — DONEPEZIL HYDROCHLORIDE 10 MG: 5 TABLET, FILM COATED ORAL at 10:26

## 2018-12-13 RX ADMIN — Medication 1 CAPSULE: at 18:34

## 2018-12-13 RX ADMIN — TAMSULOSIN HYDROCHLORIDE 0.4 MG: 0.4 CAPSULE ORAL at 21:39

## 2018-12-13 RX ADMIN — ASPIRIN 325 MG ORAL TABLET 325 MG: 325 PILL ORAL at 10:26

## 2018-12-13 RX ADMIN — ATORVASTATIN CALCIUM 20 MG: 20 TABLET, FILM COATED ORAL at 21:39

## 2018-12-13 RX ADMIN — Medication 1 CAPSULE: at 10:26

## 2018-12-13 RX ADMIN — CARVEDILOL 3.12 MG: 3.12 TABLET, FILM COATED ORAL at 10:26

## 2018-12-13 RX ADMIN — CARVEDILOL 3.12 MG: 3.12 TABLET, FILM COATED ORAL at 18:34

## 2018-12-13 NOTE — PROGRESS NOTES
The patient was offered a group activity of making holiday cards in the dining room on December 12, 2018 at 11:00 A. M. The did not participate in this activity due to being asleep. The patient was also offered an outing to the main lobby for the [facility] of the [employer] tree at 4:00. The patient did not participate in this activity due to being asleep. The Recreation Therapist will continue to offer the patient group and 1:1 activities and encourage the patient to attend.

## 2018-12-13 NOTE — ROUTINE PROCESS
Bedside and Verbal shift change report given to SHANON Espinoza LPN (oncoming nurse) by Tony Mario RN  (offgoing nurse). Report included the following information SBAR, Kardex, MAR and Recent Results. Qh visual checks and 24h chart checks done.

## 2018-12-13 NOTE — PROGRESS NOTES
Problem: Mobility Impaired (Adult and Pediatric)  Goal: *Acute Goals and Plan of Care (Insert Text)  PHYSICAL THERAPY STG GOALS :  Initiated 12/5/2018 and to be accomplished within 2 Weeks (Updated 12/11/18)     1. Patient will move from supine to sit and sit to supine  in bed with SBA. (Achieved)   2. Patient will transfer from bed to chair and chair to bed with SBA using RW. (Achieved)   3. Patient will perform sit to stand with RW close super vision with Fair balance and safety awareness. (Achieved)   4. Patient will ambulate with close supervision for 100 feet with RW on level surfaces with 2 turns. (Achieved)   5. Patient will ascend/descend 1 stairs with bilateral handrail(s) with SBA to allow for safe home access/exit. (Achieved)   6. Patient will improve standardized test score for 3/5 [facility] Standing Balance Scale. (Achieved)     PHYSICAL THERAPY LTG GOALS :  Initiated 12/5/2018 and to be accomplished within 4 Weeks (Updated 12/11/18)     1. Patient will move from supine to sit and sit to supine  in bed with modified independence. (Progressing; (S))  2.  Patient will transfer from bed to chair and chair to bed with supervision/set-up using RW. (Progressing; close (S))  3.  Patient will perform sit to stand with RW supervision/set-up with Good balance and safety awareness. (Progressing; close (S))  4. Patient will ambulate with supervision/set-up for 200 feet with RW on level surfaces and be able to maneuver through narrow spaces and obstacles without loss of balance. (Progressing; 261ft with RW and close (S), cues for safety with obstacles and narrow spaces)   5. Patient will ascend/descend 1 stairs with bilateral handrail(s) with supervision/set-up to allow for safe home access/exit. (Progressing; 10 steps with B HR and close (S))  6. Patient will improve standardized test score for 4/5 [facility] Standing Balance Scale.  (Progressing; 3+/5)    Physical Therapist:   Cindy Molina Ulisses  on 12/5/2018             [address]  [facility]   physical Therapy Daily Treatment note      Patient: Giovani Ritchie (50 y.o. male)               Date: 12/13/2018    Physician: Braulio Rose MD  Primary Diagnosis: UTI  Altered Mental Status          Treatment Diagnosis  Treatment Diagnosis: muscle weakness  Precautions: Fall  Vital Signs  Cognitive Status:  Mental Status  Neurologic State: Alert  Orientation Level: Oriented to person;Oriented to place;Oriented to situation  Cognition: Decreased attention/concentration  Pain  Pain Screen  Pain Scale 1: Visual  Pain Intensity 1: 0  Patient Stated Pain Goal: 0  Pain Reassessment 1: Patient sleeping  Bed Mobility Training  Balance  Sitting: With support  Sitting - Static: Good (unsupported)  Sitting - Dynamic: Good (unsupported)  Standing: With support  Standing - Static: Good  Standing - Dynamic : Fair(((+)))  Transfer Training  Transfer Training  Sit to Stand: Supervision  Stand to Sit: Supervision  Sit to Stand: Supervision  Gait Training  Gait  Base of Support: Center of gravity altered  Speed/Ebony: Slow  Step Length: Left shortened  Gait Abnormalities: Decreased step clearance  Ambulation - Level of Assistance: Supervision  Distance (ft): 261 Feet (ft)(x2)  Assistive Device: Gait belt;Walker, rolling  Rail Use: Both  Stairs - Level of Assistance: Supervision  Number of Stairs Trained: 10  Gait Abnormalities: Decreased step clearance  With 4 turns. Treatment: Co-treat with OT to maximize functional gains with all standing activities. Pt's wife present at beginning of session and reported that pt is improving and doing much better. Gait training rendered with abovementioned details and less frequent cues for L step height allowing pt to progress from close (S) to (S). Pt negotiated 10 steps with no cues required for sequencing, but verbal cues to slowdown for safety.  Standing dynamic balance challenged with no UE support and balloon toss game. Pt stood x4'15\" with SBA and no noted LOB while reaching out of LAUREL. Obstacle negotiation with item retrieval utilizing RW and close (S), verbal cues required for safety with tight turns and narrowed spaces. Additional gait training to promote step height by stepping over 1\" items consecutively with RW and close (S). NuStep LV 2 x12' to promote B LE strength and endurance for improved functional mobility. Patient/Caregiver Education:   Pt /Caregiver Education on safety and fall prevention, and safety with allow pt to progress to (S) with stair negotiation and ambulation. Pt demonstrated good carryover from last session. ASSESSMENT:  Patient continues to benefit from Skilled PT services to improve quality of ambulation and stair negotiation, dynamic balance, and strength. Progression toward goals:  [x]      Improving appropriately and progressing toward goals  []      Improving slowly and progressing toward goals  []      Not making progress toward goals and plan of care will be adjusted     Treatment session: 61 minutes.   Therapist:   Taniya Corado, SWETHA,          12/13/2018

## 2018-12-13 NOTE — PROGRESS NOTES
Problem: Self Care Deficits Care Plan (Adult)  Goal: *Acute Goals and Plan of Care (Insert Text)  OCCUPATIONAL THERAPY SHORT TERM GOALS      Updated 12/11/18    1. Patient will perform Upper body bathing with adaptive equipment as needed for energy conservation with Mod I.  2.  Patient will perform Lower body dressing with adaptive equipment as needed for energy conservation with supervision/set-up . 3. Patient will perform toileting task with supervision/set-up with Good safety to reduce falls risk. 4.  Patient will perform functional transfers to commode/shower chair with LRAD and supervision/setup. 5.  Patient will perform standing static/dynamic balance activities for improved ADL/IADL function with supervision/set-up and Fair +/Good - balance and safety awarenes. 6.  Patient will improve Barthel index scores to atleast 70/100 to improve functional mobility. 7.  Patient will perform bathroom mobility with LRAD and Supervision. Initiated 12/5/2018 and to be accomplished within 2 Week(s)    1. Patient will perform Upper body bathing with adaptive equipment as needed for energy conservation with supervision/set up. (goal met-UPG Mod I)  2. Patient will perform Lower body dressing with adaptive equipment as needed for energy conservation with supervision/set-up . (progressing-currently SBA)  3. Patient will perform toileting task with supervision/set-up with Good safety to reduce falls risk. (progressing-currently SBA)  4. Patient will perform functional transfers to commode/shower chair with LRAD and supervision/setup.  (progressing-currently SBA)  5. Patient will perform standing static/dynamic balance activities for improved ADL/IADL function with supervision/set-up and Fair +/Good - balance and safety awarenes. (progressing-currently SBA)  6. Patient will improve Barthel index scores to atleast 70/100 to improve functional mobility.  (progressing)  7.   Patient will perform bathroom mobility with LRAD and Supervision.  (progressing-currently SBA)    OCCUPATIONAL THERAPY LONG TERM GOALS   Initiated 12/5/2018 and to be accomplished within 4 Week(s)    1. Patient will perform Upper  body bathing with adaptive equipment as needed for ebergy conservation with modified independence. 2.  Patient will perform Lower body dressing with adaptive equipment as needed for energy conservation with modified independence. 3.  Patient will perform toileting task with modified independence with Good safety to reduce falls risk. 4.  Patient will perform functional transfers to commode and shower chair with LRAD and  modified independence and Good balance and safety awareness. 5.  Patient will perform standing static/dynamic activity for improved ADL/IADL function    with modified independence an and Good balance and safety awareness. 6.  Patient will improve Barthel index score to 80/100 to improve independence with mobility. 7. Patient will perform functional mobility within kitchen environment/bathroom with LRAD and modified independence.      Therapist: Adams Miller OT    12/5/2018           [facility]   Occupational Therapy Daily Treatment note      Patient: Maida Dubois (89 y.o. male)       Date: 12/13/2018  Attending Physician: David Pierre MD  Primary Diagnosis: UTI  Altered Mental Status    Treatment Diagnosis  Treatment Diagnosis: muscle weakness   Precautions : Precautions at Admission: Fall  Vital Signs:       Cognitive Status:  Mental Status  Neurologic State: Alert  Orientation Level: Oriented to person;Oriented to place;Oriented to situation  Cognition: Decreased attention/concentration  Pain:  Pain Screen  Pain Scale 1: Visual  Pain Intensity 1: 0  Patient Stated Pain Goal: 0  Pain Reassessment 1: Patient sleeping  Pain Scale 1: Visual  Gross Assessment:     Coordination:     Bed Mobility:     Transfers:  Functional Transfers  Sit to Stand: Supervision  Stand to Sit: Supervision     Balance:  Balance  Sitting: With support  Sitting - Static: Good (unsupported)  Sitting - Dynamic: Good (unsupported)  Standing: With support  Standing - Static: Good  Standing - Dynamic : Fair(((+)))  Therapeutic Activities:  Co-treated with PT for optimal functional gains with dynamic standing balance needed for bathing routine. Functional mobility utilizing RW from patient's room to therapy room in order to increase functional activity tolerance and independence during task. Patient able to complete full mobility with SBA/close Supervision. Cueing needed during turns for optimal safety. Item retrieval, at high and low levels in order to challenge balance and stability needed for home management task. Cueing needed for patient to pick items up within self and RW for optimal safety. Therapeutic Exercises:  Nustep x 10 mins in order to increase functional activity tolerance needed for ADLS. Patient/Caregiver Education:    Pt. Salud Sutton Education on see above.       ASSESSMENT:  Patient continues to demonstrate the need for skilled Occupational Therapy services to improve dynamic standing balance needed for bathing  Progression toward goals:  [x]      Improving appropriately and progressing toward goals  []      Improving slowly and progressing toward goals  []      Not making progress toward goals and plan of care will be adjusted     Treatment session:   60 minutes    Therapist:    LUKE Hernandez,  12/13/2018

## 2018-12-13 NOTE — PROGRESS NOTES
The patient was offered a social group activity of discussing the current news on December 13, 2018. The patient declined to participate in this activity. The patient was also offered an active group activity of ball toss. The patient declined this activity also. The Recreation Therapist will continue to offer the patient group and 1:1 activities and encourage participation in activities provided.

## 2018-12-13 NOTE — ROUTINE PROCESS
Bedside and Verbal shift change report given to Renee Kc (oncoming nurse) by  Cheryle Merck LPN   (offgoing nurse). Report included the following information SBAR, Kardex, MAR and Recent Results.

## 2018-12-14 PROCEDURE — 74011250637 HC RX REV CODE- 250/637: Performed by: INTERNAL MEDICINE

## 2018-12-14 RX ADMIN — Medication 1 CAPSULE: at 08:54

## 2018-12-14 RX ADMIN — CARVEDILOL 3.12 MG: 3.12 TABLET, FILM COATED ORAL at 18:28

## 2018-12-14 RX ADMIN — ASPIRIN 325 MG ORAL TABLET 325 MG: 325 PILL ORAL at 08:54

## 2018-12-14 RX ADMIN — DONEPEZIL HYDROCHLORIDE 10 MG: 5 TABLET, FILM COATED ORAL at 08:54

## 2018-12-14 RX ADMIN — ATORVASTATIN CALCIUM 20 MG: 20 TABLET, FILM COATED ORAL at 22:40

## 2018-12-14 RX ADMIN — Medication 1 CAPSULE: at 18:28

## 2018-12-14 RX ADMIN — CLOPIDOGREL BISULFATE 75 MG: 75 TABLET ORAL at 08:54

## 2018-12-14 RX ADMIN — TAMSULOSIN HYDROCHLORIDE 0.4 MG: 0.4 CAPSULE ORAL at 22:40

## 2018-12-14 NOTE — PROGRESS NOTES
Problem: Mobility Impaired (Adult and Pediatric)  Goal: *Acute Goals and Plan of Care (Insert Text)  PHYSICAL THERAPY STG GOALS :  Initiated 12/5/2018 and to be accomplished within 2 Weeks (Updated 12/11/18)     1. Patient will move from supine to sit and sit to supine  in bed with SBA. (Achieved)   2. Patient will transfer from bed to chair and chair to bed with SBA using RW. (Achieved)   3. Patient will perform sit to stand with RW close super vision with Fair balance and safety awareness. (Achieved)   4. Patient will ambulate with close supervision for 100 feet with RW on level surfaces with 2 turns. (Achieved)   5. Patient will ascend/descend 1 stairs with bilateral handrail(s) with SBA to allow for safe home access/exit. (Achieved)   6. Patient will improve standardized test score for 3/5 [facility] Standing Balance Scale. (Achieved)     PHYSICAL THERAPY LTG GOALS :  Initiated 12/5/2018 and to be accomplished within 4 Weeks (Updated 12/11/18)     1. Patient will move from supine to sit and sit to supine  in bed with modified independence. (Progressing; (S))  2.  Patient will transfer from bed to chair and chair to bed with supervision/set-up using RW. (Progressing; close (S))  3.  Patient will perform sit to stand with RW supervision/set-up with Good balance and safety awareness. (Progressing; close (S))  4. Patient will ambulate with supervision/set-up for 200 feet with RW on level surfaces and be able to maneuver through narrow spaces and obstacles without loss of balance. (Progressing; 261ft with RW and close (S), cues for safety with obstacles and narrow spaces)   5. Patient will ascend/descend 1 stairs with bilateral handrail(s) with supervision/set-up to allow for safe home access/exit. (Progressing; 10 steps with B HR and close (S))  6. Patient will improve standardized test score for 4/5 [facility] Standing Balance Scale.  (Progressing; 3+/5)    Physical Therapist:   Estefania Cheema Ulisses  on 12/5/2018             [address] [place] [address]   physical Therapy Daily Treatment note      Patient: Ayaka Marie (4 y.o. male)               Date: 12/14/2018    Physician: Jessica Sepulveda MD  Primary Diagnosis: UTI  Altered Mental Status          Treatment Diagnosis  Treatment Diagnosis: muscle weakness  Precautions: Fall  Vital Signs  Vital Signs  Temp: 97.6 °F (36.4 °C)  Pulse (Heart Rate): 73  Resp Rate: 18  O2 Sat (%): 98 %  BP: 98/70  Cognitive Status:  Mental Status  Neurologic State: Alert  Orientation Level: Oriented X4  Cognition: Follows commands  Pain  Pain Screen  Pain Scale 1: Numeric (0 - 10)  Pain Intensity 1: 0  Patient Stated Pain Goal: 0  Bed Mobility Training  Bed Mobility Training  Supine to Sit: Modified independent  Balance  Sitting: With support  Sitting - Static: Good (unsupported)  Sitting - Dynamic: Good (unsupported)  Standing: With support  Standing - Static: Good  Standing - Dynamic : Fair  Transfer Training  Transfer Training  Sit to Stand: Modified independent  Stand to Sit: Modified independent  Sit to Stand: Modified independent  Gait Training  Gait  Base of Support: Center of gravity altered  Speed/Ebony: Slow  Step Length: Right shortened  Gait Abnormalities: Decreased step clearance  Ambulation - Level of Assistance: Supervision  Distance (ft): 261 Feet (ft)(x2)  Assistive Device: Gait belt;Walker, rolling  Rail Use: Both  Stairs - Level of Assistance: Supervision  Number of Stairs Trained: 10  Gait Abnormalities: Decreased step clearance   With 4 turns. Treatment: Pt is now at Mod (I) for ambulation in his room. Therapist discussed safety with obstacle negotiation. Pt reported no concerns and feeling confident walking in his room unassisted. Gait training rendered with abovementioned details and only two instances of cues for L step height. Pt negotiated 10 steps with step-over for ascending and step-to for descending due to pt's comfort level. Therapist advised pt to bring full foot onto step, as noted pt with heels handing off steps on occasion. Pt demonstrated improved safety following training. Additional gait training rendered at parallel bars for side step walking, high knee walking and backward walking for improved quality of ambulation and to facilitate functional muscle strength. Standing marches x20. Pt sitting in w/c at bedside at end of session, call bell at side. Patient/Caregiver Education:   Pt /Caregiver Education on safety and fall prevention with obstacle negotiation. Pt reported no difficulty walking in his room at Mod (I). ASSESSMENT:  Patient continues to benefit from Skilled PT services to improve quality of ambulation with longer distance, narrow spaces and obstacles, stair negotiation to promote Mod (I) and dynamic standing balance. Progression toward goals:  [x]      Improving appropriately and progressing toward goals  []      Improving slowly and progressing toward goals  []      Not making progress toward goals and plan of care will be adjusted     Treatment session: 60 minutes.   Therapist:   Calixto Funez PTA,          12/14/2018

## 2018-12-14 NOTE — PROGRESS NOTES
[facility]    Daily progress Note    Patient: Alex Cai MRN: [id number]  CSN: [id number]    YOB: 1940  Age: [de-identified] y.o. Sex: male    DOA: 12/4/2018 LOS:  LOS: 10 days                    Subjective:     CC: new patient eval    Mr. Tad Olivares is a [de-identified] yr old male patient. Patient was recently hospitalized 12/1- 12/4. Patient was seen ER for eval for weakness, fatigue and change in speech. Patient had CT of head , which was (-), MRI of brain (-) for acute stroke. . Report that he was back to baseline in the ER. Patient was found to have UTI and was started on IVabx. He improved, leukocytosis also improved. Patient was sent to [facility] for rehab due to deconditioning. I examined him today, he was laying in bed, NAD. He was alert and oriented times 3 and answered questions approp. No NVD, fever, chills or CP. PAST MEDICAL HX : CAD, CKD, CVA, Hx of TIA, HTN, hyperlipidemia, PVD, Heart Attack    PAST SURGICAL hx: Abdomen surgery 3 stent, colonoscopy, hx AAA, hx Orthopedic    SOCIAL hx:      ALLERGIES: ACE inhibitor    FAMILY hx: HTN, heart Disease    ROS: No fever or chills. No weight loss or headache. No sore throat. No chest pain. No palpitation. No shortness of breath or cough. No nausea, vomiting, diarrhea. No dysuria or polyuria. No back pain or leg pain. No heat or cold intolerance. No anxiety or depression.     Objective:      Visit Vitals  BP 98/70   Pulse 73   Temp 97.6 °F (36.4 °C)   Resp 18   Ht 5' 8\" (1.727 m)   Wt 94.1 kg (207 lb 6.4 oz)   SpO2 98%   BMI 31.54 kg/m²       Physical Exam:  General appearance: alert, cooperative, no distress, appears stated age  HEENT: negative  Neck: supple, symmetrical, trachea midline, no adenopathy, thyroid: not enlarged, symmetric, no tenderness/mass/nodules, no carotid bruit and no JVD  Lungs: clear to auscultation bilaterally  Heart: regular rate and rhythm, S1, S2 normal, no murmur, click, rub or gallop  Abdomen: soft, non-tender. Bowel sounds normal. No masses,  no organomegaly  Pulses: 2+ and symmetric  Skin: Skin color, texture, turgor normal. No rashes or lesions  Extre: R BKA      Intake and Output:  Current Shift:  No intake/output data recorded. Last three shifts:  No intake/output data recorded. No results found for this or any previous visit (from the past 24 hour(s)).       Current Facility-Administered Medications:     [facility] ANESTHESIA, , Other, PRNJamarcus MD    [facility] EMERGENCY/STAT BOX, , Other, PRN, Jamarcus John MD    magnesium hydroxide (MILK OF MAGNESIA) 400 mg/5 mL oral suspension 30 mL, 30 mL, Oral, DAILY PRN, Jamarcus John MD, Stopped at 12/06/18 0537    bisacodyl (DULCOLAX) suppository 10 mg, 10 mg, Rectal, DAILY PRN, Jamarcus John MD    sodium phosphate (FLEET'S) enema 1 Enema, 1 Enema, Rectal, PRLEÓN, Jamarcus John MD    aspirin tablet 325 mg, 325 mg, Oral, DAILY, Radha Sarmiento MD, 325 mg at 12/14/18 0854    carvedilol (COREG) tablet 3.125 mg, 3.125 mg, Oral, BID WITH MEALS, Jamarcus John MD, Stopped at 12/14/18 0854    clopidogrel (PLAVIX) tablet 75 mg, 75 mg, Oral, DAILY, Jamarcus John MD, 75 mg at 12/14/18 0854    atorvastatin (LIPITOR) tablet 20 mg, 20 mg, Oral, QHS, Radha Sarmiento MD, 20 mg at 12/13/18 2139    tamsulosin (FLOMAX) capsule 0.4 mg, 0.4 mg, Oral, QHS, Radha Sarmiento MD, 0.4 mg at 12/13/18 2139    donepezil (ARICEPT) tablet 10 mg, 10 mg, Oral, DAILY, Radha Sarmiento MD, 10 mg at 12/14/18 0854    nitroglycerin (NITROSTAT) tablet 0.4 mg, 0.4 mg, SubLINGual, PRN, Radha Sarmiento MD    fish oil-omega-3 fatty acids 340-1,000 mg capsule 1 Cap, 1 Cap, Oral, BID, Jamarcus John MD, 1 Cap at 12/14/18 0854    acetaminophen (TYLENOL) tablet 650 mg, 650 mg, Oral, Q6H PRN, Jamarcus John MD    Lab Results   Component Value Date/Time    Glucose 86 12/13/2018 04:25 AM    Glucose 91 12/10/2018 08:55 AM Glucose 86 12/06/2018 05:35 AM    Glucose 90 12/04/2018 11:30 PM    Glucose 98 12/03/2018 04:00 AM        Assessment/Plan   Weakness : stroke work up (-)  UTI: continue Levaquin   Hx of TIA: he is on ASA,Plavix and STATIN  CAD: continue ASA  BPH: continue Flomax  CKD: will monitor renal function  Decon: continue PT/OT    Trisha Morrison NP  12/14/2018, 2:17 PM

## 2018-12-14 NOTE — PROGRESS NOTES
conducted a Follow up consultation and Spiritual Assessment for [employer], who is a [de-identified] y.o.,male. The  provided the following Interventions:  Continued the relationship of care and support. Listened empathically. Offered prayer and assurance of continued prayer on patients behalf. Chart reviewed. The following outcomes were achieved:  Patient expressed gratitude for pastoral care visit. Assessment:  There are no further spiritual or L[employer] day issues which require Spiritual Care Services interventions at this time. Plan:  Chaplains will continue to follow and will provide pastoral care on an as needed/requested basis.  recommends bedside caregivers page  on duty if patient shows signs of acute spiritual or emotional distress.      [address] [facility]   Staff [address] [facility]   [phone]

## 2018-12-14 NOTE — ROUTINE PROCESS
Bedside and Verbal shift change report given to Jacqui Echols RN (oncoming nurse) by Slick Haji LPN   (offgoing nurse). Report included the following information SBAR, Kardex, MAR and Recent Results.

## 2018-12-14 NOTE — ROUTINE PROCESS
Bedside shift change report given to [address] (oncoming nurse) by Drake Arango RN (offgoing nurse). Report included the following information SBAR, Kardex, MAR and Recent Results. Qh visual checks and 24h chart checks done.

## 2018-12-14 NOTE — PROGRESS NOTES
Problem: Self Care Deficits Care Plan (Adult)  Goal: *Acute Goals and Plan of Care (Insert Text)  OCCUPATIONAL THERAPY SHORT TERM GOALS      Updated 12/11/18    1. Patient will perform Upper body bathing with adaptive equipment as needed for energy conservation with Mod I.  2.  Patient will perform Lower body dressing with adaptive equipment as needed for energy conservation with supervision/set-up . 3. Patient will perform toileting task with supervision/set-up with Good safety to reduce falls risk. 4.  Patient will perform functional transfers to commode/shower chair with LRAD and supervision/setup. 5.  Patient will perform standing static/dynamic balance activities for improved ADL/IADL function with supervision/set-up and Fair +/Good - balance and safety awarenes. 6.  Patient will improve Barthel index scores to atleast 70/100 to improve functional mobility. 7.  Patient will perform bathroom mobility with LRAD and Supervision. Initiated 12/5/2018 and to be accomplished within 2 Week(s)    1. Patient will perform Upper body bathing with adaptive equipment as needed for energy conservation with supervision/set up. (goal met-UPG Mod I)  2. Patient will perform Lower body dressing with adaptive equipment as needed for energy conservation with supervision/set-up . (progressing-currently SBA)  3. Patient will perform toileting task with supervision/set-up with Good safety to reduce falls risk. (progressing-currently SBA)  4. Patient will perform functional transfers to commode/shower chair with LRAD and supervision/setup.  (progressing-currently SBA)  5. Patient will perform standing static/dynamic balance activities for improved ADL/IADL function with supervision/set-up and Fair +/Good - balance and safety awarenes. (progressing-currently SBA)  6. Patient will improve Barthel index scores to atleast 70/100 to improve functional mobility.  (progressing)  7.   Patient will perform bathroom mobility with LRAD and Supervision.  (progressing-currently SBA)    OCCUPATIONAL THERAPY LONG TERM GOALS   Initiated 12/5/2018 and to be accomplished within 4 Week(s)    1. Patient will perform Upper  body bathing with adaptive equipment as needed for ebergy conservation with modified independence. 2.  Patient will perform Lower body dressing with adaptive equipment as needed for energy conservation with modified independence. 3.  Patient will perform toileting task with modified independence with Good safety to reduce falls risk. 4.  Patient will perform functional transfers to commode and shower chair with LRAD and  modified independence and Good balance and safety awareness. 5.  Patient will perform standing static/dynamic activity for improved ADL/IADL function    with modified independence an and Good balance and safety awareness. 6.  Patient will improve Barthel index score to 80/100 to improve independence with mobility. 7. Patient will perform functional mobility within kitchen environment/bathroom with LRAD and modified independence.      Therapist: Felicia Yang OT    12/5/2018           [facility]   Occupational Therapy Daily Treatment note      Patient: Kayla Dunaway [de-identified][de-identified] y.o. male)       Date: 12/14/2018  Attending Physician: Corrinne Harbour, MD  Primary Diagnosis: UTI  Altered Mental Status    Treatment Diagnosis  Treatment Diagnosis: muscle weakness   Precautions : Precautions at Admission: Fall  Vital Signs:  Vital Signs  Temp: 97.6 °F (36.4 °C)  Pulse (Heart Rate): 73  Resp Rate: 18  O2 Sat (%): 98 %  BP: 98/70     Cognitive Status:  Mental Status  Neurologic State: Alert  Orientation Level: Oriented X4  Cognition: Follows commands  Pain:  Pain Screen  Pain Scale 1: Numeric (0 - 10)  Pain Intensity 1: 0  Patient Stated Pain Goal: 0  Pain Scale 1: Numeric (0 - 10)  Gross Assessment:     Coordination:     Bed Mobility:  Bed Mobility  Supine to Sit: Modified independent  Transfers:  Functional Transfers  Sit to Stand: Modified independent  Bathroom Mobility: Modified independent  Toilet Transfer : Modified independent  Functional Transfers  Bathroom Mobility: Modified independent  Toilet Transfer : Modified independent  Balance:  Balance  Sitting: With support  Sitting - Static: Good (unsupported)  Sitting - Dynamic: Good (unsupported)  Standing: With support  Standing - Static: Good  Standing - Dynamic : Fair(+)  ADL Self Care:     ADL Intervention:           Toileting  Toileting Assistance: Modified independent  Bladder Hygiene: Modified independent  Clothing Management: Modified independent         Therapeutic Activities:   Shadowed patient with bathroom mobility utilizing RW and toileting routine in order to assess safety and independence. See above for levels of A needed. BUTLER cleared patient to complete toileting and bathroom mobility utilizing RW with Mod I. Therapeutic Exercises:  UB restorator x 10 mins in order to increase functional activity tolerance needed for ADLS. Red digiflex, 2 sets x 15 reps in order to increase  strengthening and hand dexterity needed for UB ADLS. Patient/Caregiver Education:    PtMartin Ferro Education on cautious during turns  was provided for optimal safety.       ASSESSMENT:  Patient continues to demonstrate the need for skilled Occupational Therapy services to improve dynamic standing balance needed for simple home management  Progression toward goals:  [x]      Improving appropriately and progressing toward goals  []      Improving slowly and progressing toward goals  []      Not making progress toward goals and plan of care will be adjusted     Treatment session:   61 minutes    Therapist:    LUKE Camargo,  12/14/2018

## 2018-12-15 LAB — GLUCOSE BLD STRIP.AUTO-MCNC: 88 MG/DL (ref 70–110)

## 2018-12-15 PROCEDURE — 82962 GLUCOSE BLOOD TEST: CPT

## 2018-12-15 PROCEDURE — 74011250637 HC RX REV CODE- 250/637: Performed by: INTERNAL MEDICINE

## 2018-12-15 RX ADMIN — ASPIRIN 325 MG ORAL TABLET 325 MG: 325 PILL ORAL at 09:47

## 2018-12-15 RX ADMIN — Medication 1 CAPSULE: at 09:46

## 2018-12-15 RX ADMIN — CLOPIDOGREL BISULFATE 75 MG: 75 TABLET ORAL at 09:46

## 2018-12-15 RX ADMIN — ATORVASTATIN CALCIUM 20 MG: 20 TABLET, FILM COATED ORAL at 21:31

## 2018-12-15 RX ADMIN — TAMSULOSIN HYDROCHLORIDE 0.4 MG: 0.4 CAPSULE ORAL at 21:31

## 2018-12-15 RX ADMIN — CARVEDILOL 3.12 MG: 3.12 TABLET, FILM COATED ORAL at 17:37

## 2018-12-15 RX ADMIN — CARVEDILOL 3.12 MG: 3.12 TABLET, FILM COATED ORAL at 09:46

## 2018-12-15 RX ADMIN — Medication 1 CAPSULE: at 18:37

## 2018-12-15 RX ADMIN — DONEPEZIL HYDROCHLORIDE 10 MG: 5 TABLET, FILM COATED ORAL at 09:46

## 2018-12-15 NOTE — ROUTINE PROCESS
Bedside and Verbal shift change report given to LORAINE Maria (oncoming nurse) by NAY Neumann RN (offgoing nurse). Report included the following information SBAR, Kardex and MAR.

## 2018-12-15 NOTE — PROGRESS NOTES
Problem: Self Care Deficits Care Plan (Adult)  Goal: *Acute Goals and Plan of Care (Insert Text)  OCCUPATIONAL THERAPY SHORT TERM GOALS      Updated 12/11/18    1. Patient will perform Upper body bathing with adaptive equipment as needed for energy conservation with Mod I.  2.  Patient will perform Lower body dressing with adaptive equipment as needed for energy conservation with supervision/set-up . 3. Patient will perform toileting task with supervision/set-up with Good safety to reduce falls risk. 4.  Patient will perform functional transfers to commode/shower chair with LRAD and supervision/setup. 5.  Patient will perform standing static/dynamic balance activities for improved ADL/IADL function with supervision/set-up and Fair +/Good - balance and safety awarenes. 6.  Patient will improve Barthel index scores to atleast 70/100 to improve functional mobility. 7.  Patient will perform bathroom mobility with LRAD and Supervision. Initiated 12/5/2018 and to be accomplished within 2 Week(s)    1. Patient will perform Upper body bathing with adaptive equipment as needed for energy conservation with supervision/set up. (goal met-UPG Mod I)  2. Patient will perform Lower body dressing with adaptive equipment as needed for energy conservation with supervision/set-up . (progressing-currently SBA)  3. Patient will perform toileting task with supervision/set-up with Good safety to reduce falls risk. (progressing-currently SBA)  4. Patient will perform functional transfers to commode/shower chair with LRAD and supervision/setup.  (progressing-currently SBA)  5. Patient will perform standing static/dynamic balance activities for improved ADL/IADL function with supervision/set-up and Fair +/Good - balance and safety awarenes. (progressing-currently SBA)  6. Patient will improve Barthel index scores to atleast 70/100 to improve functional mobility.  (progressing)  7.   Patient will perform bathroom mobility with LRAD and Supervision.  (progressing-currently SBA)    OCCUPATIONAL THERAPY LONG TERM GOALS   Initiated 12/5/2018 and to be accomplished within 4 Week(s)    1. Patient will perform Upper  body bathing with adaptive equipment as needed for ebergy conservation with modified independence. 2.  Patient will perform Lower body dressing with adaptive equipment as needed for energy conservation with modified independence. 3.  Patient will perform toileting task with modified independence with Good safety to reduce falls risk. 4.  Patient will perform functional transfers to commode and shower chair with LRAD and  modified independence and Good balance and safety awareness. 5.  Patient will perform standing static/dynamic activity for improved ADL/IADL function    with modified independence an and Good balance and safety awareness. 6.  Patient will improve Barthel index score to 80/100 to improve independence with mobility. 7. Patient will perform functional mobility within kitchen environment/bathroom with LRAD and modified independence.      Therapist: Sarita Combs OT    12/5/2018           [facility]   Occupational Therapy Daily Treatment note      Patient: Madelin Boykin [de-identified][de-identified] y.o. male)       Date: 12/15/2018  Attending Physician: Phyllis Barrera MD  Primary Diagnosis: UTI  Altered Mental Status    Treatment Diagnosis  Treatment Diagnosis: muscle weakness   Precautions : Precautions at Admission: Fall  Vital Signs:  Vital Signs  Temp: 96.5 °F (35.8 °C)  Temp Source: Oral  Pulse (Heart Rate): 73  Heart Rate Source: Monitor  Resp Rate: 14  BP: 105/79  MAP (Calculated): 88     Transfers:  Functional Transfers  Sit to Stand: Modified independent  Stand to Sit: Modified independent     Balance:  Balance  Sitting: With support  Sitting - Static: Good (unsupported)  Sitting - Dynamic: Good (unsupported)  Standing: With support  Standing - Static: Good  Standing - Dynamic : Fair(+)    Instrumental ADL's:  Instrumental ADL  Homemaking: Supervision(washing dishes w/ RW)  Therapeutic Activities:  Pt presented sitting EOB upon therapist arrival and agreeable to participate with skilled OT interventions. MOD I donning velcro shoes sitting EOB. SBA fnxl room and hallway mobility w/ RW from room <-> OT gym to increase activity tolerance and endurance needed for ADL routines. Pt performed simple home mgmt task Supervision washing dishes w/ RW requiring min cueing for proper safety. Therapeutic Exercises:   BUE ex with arm cycle x 10 mins with no rest break and 4# dowel bar 4 x 15 chest press, rows, curls, and shoulder flexion to increase strength, ROM, and endurance for ADL carryover. Patient/Caregiver Education:    Pt educated on fall prevention and safety to reduce fall risk, pt returned understanding through verbalization. ASSESSMENT:  Patient continues to demonstrate the need for skilled Occupational Therapy services to improve strength, balance, and endurance.  Progression toward goals:  [x]      Improving appropriately and progressing toward goals  []      Improving slowly and progressing toward goals  []      Not making progress toward goals and plan of care will be adjusted     Treatment session:   57 minutes    Therapist:    LUKE Dong,  12/15/2018

## 2018-12-16 PROCEDURE — 74011250637 HC RX REV CODE- 250/637: Performed by: INTERNAL MEDICINE

## 2018-12-16 RX ADMIN — ASPIRIN 325 MG ORAL TABLET 325 MG: 325 PILL ORAL at 09:37

## 2018-12-16 RX ADMIN — CLOPIDOGREL BISULFATE 75 MG: 75 TABLET ORAL at 09:37

## 2018-12-16 RX ADMIN — DONEPEZIL HYDROCHLORIDE 10 MG: 5 TABLET, FILM COATED ORAL at 09:37

## 2018-12-16 RX ADMIN — Medication 1 CAPSULE: at 09:37

## 2018-12-16 RX ADMIN — Medication 1 CAPSULE: at 19:07

## 2018-12-16 RX ADMIN — ATORVASTATIN CALCIUM 20 MG: 20 TABLET, FILM COATED ORAL at 21:26

## 2018-12-16 RX ADMIN — TAMSULOSIN HYDROCHLORIDE 0.4 MG: 0.4 CAPSULE ORAL at 21:26

## 2018-12-16 NOTE — ROUTINE PROCESS
Bedside and Verbal shift change report given to Teri Riley LPN (oncoming nurse) by Osei Piper LPN  (offgoing nurse). Report included the following information SBAR, Kardex, MAR and Recent Results.

## 2018-12-16 NOTE — PROGRESS NOTES
[facility]   physical Therapy Daily Treatment note      Patient: Alexy Dias [de-identified][de-identified] y.o. male)               Date: 12/16/2018    Physician: Shashank Khan MD  Primary Diagnosis: UTI  Altered Mental Status          Treatment Diagnosis  Treatment Diagnosis: muscle weakness  Precautions: Fall  Vital Signs  Vital Signs  Temp: 97.1 °F (36.2 °C)  Temp Source: Oral  Pulse (Heart Rate): (!) 53  Heart Rate Source: Monitor  Resp Rate: 14  BP: 115/59  MAP (Calculated): 78            Cognitive Status:  Mental Status  Neurologic State: Alert  Orientation Level: Oriented X4  Cognition: Follows commands  Pain  Pain Screen  Pain Scale 1: Numeric (0 - 10)  Pain Intensity 1: 0  Patient Stated Pain Goal: 0  Bed Mobility Training  Bed Mobility Training  Rolling: Contact guard assistance  Supine to Sit: Setup  Sit to Supine: Stand-by assistance  Scooting: Setup  Balance  Sitting: Intact  Sitting - Static: Good (unsupported)  Sitting - Dynamic: Good (unsupported)  Standing: With support  Standing - Static: Good  Standing - Dynamic : Fair(+)  Transfer Training  Transfer Training  Sit to Stand: Modified independent  Stand to Sit: Modified independent  Sit to Stand: Modified independent  Gait Training  Gait  Distance (ft): 350 Feet (ft)  Assistive Device: Walker, rolling            Treatment:    Pt performed 15 minutes of standing balance and reaching activities. Pt demonstrated improved tolerance to standing activities. Pt left in bed with call bell and phone. Patient/Caregiver Education:   Pt /Caregiver Education on safety and fall prevention. ASSESSMENT:  Patient continues to benefit from Skilled PT services to improve trunk control, gait training, and balance training.     Progression toward goals:  [x]      Improving appropriately and progressing toward goals  []      Improving slowly and progressing toward goals  []      Not making progress toward goals and plan of care will be adjusted     Treatment session: 60 minutes.   Therapist:   Mary Smith, ZOFIA,          12/16/2018

## 2018-12-17 LAB
ANION GAP SERPL CALC-SCNC: 9 MMOL/L (ref 3–18)
BASOPHILS # BLD: 0.2 K/UL (ref 0–0.1)
BASOPHILS NFR BLD: 1 % (ref 0–2)
BUN SERPL-MCNC: 28 MG/DL (ref 7–18)
BUN/CREAT SERPL: 19 (ref 12–20)
CALCIUM SERPL-MCNC: 9.7 MG/DL (ref 8.5–10.1)
CHLORIDE SERPL-SCNC: 110 MMOL/L (ref 100–108)
CO2 SERPL-SCNC: 22 MMOL/L (ref 21–32)
CREAT SERPL-MCNC: 1.44 MG/DL (ref 0.6–1.3)
DIFFERENTIAL METHOD BLD: ABNORMAL
EOSINOPHIL # BLD: 0.4 K/UL (ref 0–0.4)
EOSINOPHIL NFR BLD: 4 % (ref 0–5)
ERYTHROCYTE [DISTWIDTH] IN BLOOD BY AUTOMATED COUNT: 15.2 % (ref 11.6–14.5)
GLUCOSE SERPL-MCNC: 87 MG/DL (ref 74–99)
HCT VFR BLD AUTO: 37.7 % (ref 36–48)
HGB BLD-MCNC: 12.9 G/DL (ref 13–16)
LYMPHOCYTES # BLD: 2.3 K/UL (ref 0.9–3.6)
LYMPHOCYTES NFR BLD: 21 % (ref 21–52)
MCH RBC QN AUTO: 33.2 PG (ref 24–34)
MCHC RBC AUTO-ENTMCNC: 34.2 G/DL (ref 31–37)
MCV RBC AUTO: 97.2 FL (ref 74–97)
MONOCYTES # BLD: 0.8 K/UL (ref 0.05–1.2)
MONOCYTES NFR BLD: 7 % (ref 3–10)
NEUTS SEG # BLD: 7 K/UL (ref 1.8–8)
NEUTS SEG NFR BLD: 67 % (ref 40–73)
PLATELET # BLD AUTO: 411 K/UL (ref 135–420)
PMV BLD AUTO: 11.8 FL (ref 9.2–11.8)
POTASSIUM SERPL-SCNC: 5 MMOL/L (ref 3.5–5.5)
RBC # BLD AUTO: 3.88 M/UL (ref 4.7–5.5)
SODIUM SERPL-SCNC: 141 MMOL/L (ref 136–145)
WBC # BLD AUTO: 10.6 K/UL (ref 4.6–13.2)

## 2018-12-17 PROCEDURE — 80048 BASIC METABOLIC PNL TOTAL CA: CPT

## 2018-12-17 PROCEDURE — 36415 COLL VENOUS BLD VENIPUNCTURE: CPT

## 2018-12-17 PROCEDURE — 85025 COMPLETE CBC W/AUTO DIFF WBC: CPT

## 2018-12-17 PROCEDURE — 74011250637 HC RX REV CODE- 250/637: Performed by: INTERNAL MEDICINE

## 2018-12-17 RX ADMIN — ASPIRIN 325 MG ORAL TABLET 325 MG: 325 PILL ORAL at 09:41

## 2018-12-17 RX ADMIN — CARVEDILOL 3.12 MG: 3.12 TABLET, FILM COATED ORAL at 09:41

## 2018-12-17 RX ADMIN — TAMSULOSIN HYDROCHLORIDE 0.4 MG: 0.4 CAPSULE ORAL at 22:11

## 2018-12-17 RX ADMIN — Medication 1 CAPSULE: at 17:50

## 2018-12-17 RX ADMIN — DONEPEZIL HYDROCHLORIDE 10 MG: 5 TABLET, FILM COATED ORAL at 09:41

## 2018-12-17 RX ADMIN — ATORVASTATIN CALCIUM 20 MG: 20 TABLET, FILM COATED ORAL at 22:11

## 2018-12-17 RX ADMIN — Medication 1 CAPSULE: at 09:41

## 2018-12-17 RX ADMIN — CLOPIDOGREL BISULFATE 75 MG: 75 TABLET ORAL at 09:41

## 2018-12-17 RX ADMIN — CARVEDILOL 3.12 MG: 3.12 TABLET, FILM COATED ORAL at 17:50

## 2018-12-17 NOTE — PROGRESS NOTES
Problem: Mobility Impaired (Adult and Pediatric)  Goal: *Acute Goals and Plan of Care (Insert Text)  PHYSICAL THERAPY STG GOALS :  Initiated 12/5/2018 and to be accomplished within 2 Weeks (Updated 12/11/18)     1. Patient will move from supine to sit and sit to supine  in bed with SBA. (Achieved)   2. Patient will transfer from bed to chair and chair to bed with SBA using RW. (Achieved)   3. Patient will perform sit to stand with RW close super vision with Fair balance and safety awareness. (Achieved)   4. Patient will ambulate with close supervision for 100 feet with RW on level surfaces with 2 turns. (Achieved)   5. Patient will ascend/descend 1 stairs with bilateral handrail(s) with SBA to allow for safe home access/exit. (Achieved)   6. Patient will improve standardized test score for 3/5 [facility] Standing Balance Scale. (Achieved)     PHYSICAL THERAPY LTG GOALS :  Initiated 12/5/2018 and to be accomplished within 4 Weeks (Updated 12/11/18)     1. Patient will move from supine to sit and sit to supine  in bed with modified independence. (Progressing; (S))  2.  Patient will transfer from bed to chair and chair to bed with supervision/set-up using RW. (Progressing; close (S))  3.  Patient will perform sit to stand with RW supervision/set-up with Good balance and safety awareness. (Progressing; close (S))  4. Patient will ambulate with supervision/set-up for 200 feet with RW on level surfaces and be able to maneuver through narrow spaces and obstacles without loss of balance. (Progressing; 261ft with RW and close (S), cues for safety with obstacles and narrow spaces)   5. Patient will ascend/descend 1 stairs with bilateral handrail(s) with supervision/set-up to allow for safe home access/exit. (Progressing; 10 steps with B HR and close (S))  6. Patient will improve standardized test score for 4/5 [facility] Standing Balance Scale.  (Progressing; 3+/5)    Physical Therapist:   Cindy Molina Ulisses  on 12/5/2018             [facility]   physical Therapy Daily Treatment note      Patient: Sidney Rushing (89 y.o. male)               Date: 12/17/2018    Physician: Susana Díaz MD  Primary Diagnosis: UTI  Altered Mental Status          Treatment Diagnosis  Treatment Diagnosis: muscle weakness  Precautions: Fall  Vital Signs  Cognitive Status:  Mental Status  Neurologic State: Alert  Orientation Level: Oriented X4  Cognition: Appropriate for age attention/concentration; Appropriate safety awareness  Pain  Bed Mobility Training  Bed Mobility Training  Supine to Sit: Modified independent  Scooting: Modified independent  Balance  Sitting: Intact  Sitting - Static: Good (unsupported)  Sitting - Dynamic: Good (unsupported)  Standing: With support  Standing - Static: Good  Standing - Dynamic : Fair(((+)))  Transfer Training  Transfer Training  Sit to Stand: Modified independent  Stand to Sit: Modified independent  Sit to Stand: Modified independent  Gait Training  Gait  Base of Support: Center of gravity altered  Speed/Ebony: Slow  Step Length: Right shortened;Left shortened  Gait Abnormalities: Decreased step clearance  Ambulation - Level of Assistance: Supervision  Distance (ft): 265 Feet (ft)(x2)  Assistive Device: Gait belt;Walker, rolling  Rail Use: Both  Stairs - Level of Assistance: Supervision  Number of Stairs Trained: 10  Interventions: Safety awareness training  Gait Abnormalities: Decreased step clearance   With 4 turns. Treatment: Session one: pt demonstrated Mod (I) with bed mobility, sit<>stand and ambulation in room with RW. Additional ambulation of ~200ft with RW at (S) on tiled surface with obstacle negotiation and 3 turns. Pt sitting in w/c at bedside for lunch, call bell at side. Session Two: Gait training rendered with abovementioned details and (S), cues required intermittently for L foot clearance.  Stair training x10 steps as noted above with no verbal cues required and (S) for one recommendation to keep RW more to side as not to impede getting to first step safely. Additional gait training with high knee walking 4x25ft with CGA/SBA for balance to promote L hip flexion and L foot clearance. NuStep Lv 2 x15' to promote B LE strength and overall cardiovascular endurance for improved functional mobility. Pt completed an additional 5' on NuStep at Lv 4 for additional strengthening. Therapist discussed DC planning with pt. Pt reported good support system at home and the want for home health or OP therapy upon DC to home. Patient/Caregiver Education:   Pt /Caregiver Education on safety and fall prevention, and DC planning. Pt reported feeling ready to go home and stated \"I think my walking has gotten much better since I've been here\". Pt reported that his wife, son and daughter-in-law are all available to assist him at home as needed. ASSESSMENT:  Patient continues to benefit from Skilled PT services to improve quality of ambulation and stair negotiation, dynamic balance. Progression toward goals:  [x]      Improving appropriately and progressing toward goals  []      Improving slowly and progressing toward goals  []      Not making progress toward goals and plan of care will be adjusted     Treatment session: 61 minutes.   Therapist:   Martha Jimenez, PTA,          12/17/2018

## 2018-12-17 NOTE — PROGRESS NOTES
I have reviewed this patient's current medication list and recent laboratory results. At this time, I do not suggest any drug therapy adjustments or additional laboratory monitoring. Thank you,  Lissette TEE  Ph. M. S.  12/17/2018

## 2018-12-17 NOTE — ROUTINE PROCESS
Bedside and Verbal shift change report given to Jony Beatty RN (oncoming nurse) by Slick Haji LPN  (offgoing nurse). Report included the following information SBAR, Kardex, MAR and Recent Results.

## 2018-12-17 NOTE — ROUTINE PROCESS
. Bedside and Verbal shift change report given to Evette (oncoming nurse) by marianela (offgoing nurse). Report included the following information Kardex, MAR and Recent Results.

## 2018-12-17 NOTE — PROGRESS NOTES
Problem: Self Care Deficits Care Plan (Adult)  Goal: *Acute Goals and Plan of Care (Insert Text)  OCCUPATIONAL THERAPY SHORT TERM GOALS      Updated 12/11/18    1. Patient will perform Upper body bathing with adaptive equipment as needed for energy conservation with Mod I.  2.  Patient will perform Lower body dressing with adaptive equipment as needed for energy conservation with supervision/set-up . 3. Patient will perform toileting task with supervision/set-up with Good safety to reduce falls risk. 4.  Patient will perform functional transfers to commode/shower chair with LRAD and supervision/setup. 5.  Patient will perform standing static/dynamic balance activities for improved ADL/IADL function with supervision/set-up and Fair +/Good - balance and safety awarenes. 6.  Patient will improve Barthel index scores to atleast 70/100 to improve functional mobility. 7.  Patient will perform bathroom mobility with LRAD and Supervision. Initiated 12/5/2018 and to be accomplished within 2 Week(s)    1. Patient will perform Upper body bathing with adaptive equipment as needed for energy conservation with supervision/set up. (goal met-UPG Mod I)  2. Patient will perform Lower body dressing with adaptive equipment as needed for energy conservation with supervision/set-up . (progressing-currently SBA)  3. Patient will perform toileting task with supervision/set-up with Good safety to reduce falls risk. (progressing-currently SBA)  4. Patient will perform functional transfers to commode/shower chair with LRAD and supervision/setup.  (progressing-currently SBA)  5. Patient will perform standing static/dynamic balance activities for improved ADL/IADL function with supervision/set-up and Fair +/Good - balance and safety awarenes. (progressing-currently SBA)  6. Patient will improve Barthel index scores to atleast 70/100 to improve functional mobility.  (progressing)  7.   Patient will perform bathroom mobility with LRAD and Supervision.  (progressing-currently SBA)    OCCUPATIONAL THERAPY LONG TERM GOALS   Initiated 12/5/2018 and to be accomplished within 4 Week(s)    1. Patient will perform Upper  body bathing with adaptive equipment as needed for ebergy conservation with modified independence. 2.  Patient will perform Lower body dressing with adaptive equipment as needed for energy conservation with modified independence. 3.  Patient will perform toileting task with modified independence with Good safety to reduce falls risk. 4.  Patient will perform functional transfers to commode and shower chair with LRAD and  modified independence and Good balance and safety awareness. 5.  Patient will perform standing static/dynamic activity for improved ADL/IADL function    with modified independence an and Good balance and safety awareness. 6.  Patient will improve Barthel index score to 80/100 to improve independence with mobility. 7. Patient will perform functional mobility within kitchen environment/bathroom with LRAD and modified independence. Therapist: Chon Jamison OT    12/5/2018           [facility]   Occupational Therapy Daily Treatment note      Patient: Nadine Butt (77 y.o. male)       Date: 12/17/2018  Attending Physician: Amanda Meek MD  Primary Diagnosis: UTI  Altered Mental Status    Treatment Diagnosis  Treatment Diagnosis: muscle weakness   Precautions : Precautions at Admission: Fall  Vital Signs:  Vital Signs  Cardiac Rhythm: Sinus bradycardia     Cognitive Status:  Mental Status  Neurologic State: Alert; Appropriate for age  Orientation Level: Oriented X4  Cognition: Appropriate for age attention/concentration; Follows commands  Pain:        Gross Assessment:     Coordination:     Bed Mobility:  Bed Mobility  Supine to Sit: Modified independent  Transfers:  Functional Transfers  Sit to Stand: Modified independent     Balance:  Balance  Sitting: Intact  Sitting - Static: Good (unsupported)  Sitting - Dynamic: Good (unsupported)  Standing: With support  Standing - Static: Good  Standing - Dynamic : Fair(+)  ADL Self Care:     ADL Intervention:                 Lower Body Dressing Assistance  Dressing Assistance: Modified independent  Shoes with Velcro: Modified independent  Leg Crossed Method Used: No  Position Performed: Seated edge of bed;Bending forward method     Instrumental ADL  Meal Preparation: Supervision(Distant)    Instrumental ADL's:  Instrumental ADL  Meal Preparation: Supervision(Distant)  Therapeutic Activities:  Shadowed patient with bed mobility and donning shoes, seated EOB, in order to assess safety and independence during routine. See above for levels of A needed. Functional mobility utilizing RW from patient's room to therapy room in order to increase functional activity tolerance and independence during task. Shadowed patient with kitchen mobility and microwave management in order to assess dynamic standing balance as well as safety during task. Patient able to complete with Distant Supervision. Cueing needed for patient to stay close to RW during turns as well as increase L stepping when fatigue for optimal safety. Therapeutic Exercises:  UB restorator x 12 mins with 1# in order to increase functional activity tolerance needed for ADLS. No rest break needed. Patient/Caregiver Education:    Meseret Ortiz Drafts Education on see above.       ASSESSMENT:  Patient continues to demonstrate the need for skilled Occupational Therapy services to improve dynamic standing balance needed for simple home management  Progression toward goals:  [x]      Improving appropriately and progressing toward goals  []      Improving slowly and progressing toward goals  []      Not making progress toward goals and plan of care will be adjusted     Treatment session:   60 minutes    Therapist:    LUKE Daly,  12/17/2018

## 2018-12-18 ENCOUNTER — PATIENT OUTREACH (OUTPATIENT)
Dept: FAMILY MEDICINE CLINIC | Age: 78
End: 2018-12-18

## 2018-12-18 PROCEDURE — 74011250637 HC RX REV CODE- 250/637: Performed by: INTERNAL MEDICINE

## 2018-12-18 RX ADMIN — DONEPEZIL HYDROCHLORIDE 10 MG: 5 TABLET, FILM COATED ORAL at 09:05

## 2018-12-18 RX ADMIN — CARVEDILOL 3.12 MG: 3.12 TABLET, FILM COATED ORAL at 09:05

## 2018-12-18 RX ADMIN — CLOPIDOGREL BISULFATE 75 MG: 75 TABLET ORAL at 09:05

## 2018-12-18 RX ADMIN — TAMSULOSIN HYDROCHLORIDE 0.4 MG: 0.4 CAPSULE ORAL at 21:44

## 2018-12-18 RX ADMIN — ATORVASTATIN CALCIUM 20 MG: 20 TABLET, FILM COATED ORAL at 21:44

## 2018-12-18 RX ADMIN — Medication 1 CAPSULE: at 17:54

## 2018-12-18 RX ADMIN — Medication 1 CAPSULE: at 09:05

## 2018-12-18 RX ADMIN — ASPIRIN 325 MG ORAL TABLET 325 MG: 325 PILL ORAL at 09:05

## 2018-12-18 RX ADMIN — CARVEDILOL 3.12 MG: 3.12 TABLET, FILM COATED ORAL at 17:54

## 2018-12-18 NOTE — PROGRESS NOTES
Problem: Mobility Impaired (Adult and Pediatric)  Goal: *Acute Goals and Plan of Care (Insert Text)  PHYSICAL THERAPY LTG GOALS :  Initiated 12/5/2018 and to be accomplished within 4 Weeks (Updated 12/18/18)     1. Patient will move from supine to sit and sit to supine  in bed with modified independence. (Achieved)  2. Patient will transfer from bed to chair and chair to bed with supervision/set-up using RW. (Achieved)  3. Patient will perform sit to stand with RW supervision/set-up with Good balance and safety awareness. (Achieved)  4. Patient will ambulate with supervision/set-up for 200 feet with RW on level surfaces and be able to maneuver through narrow spaces and obstacles without loss of balance. (Progressing; 270ft with RW and (S), cues for safety with obstacles and narrow spaces)   5. Patient will ascend/descend 1 stairs with bilateral handrail(s) with supervision/set-up to allow for safe home access/exit. (Achieved)  6. Patient will improve standardized test score for 4/5 [facility] Standing Balance Scale. (Achieved)    PHYSICAL THERAPY STG GOALS :  Initiated 12/5/2018 and to be accomplished within 2 Weeks (Updated 12/11/18)     1. Patient will move from supine to sit and sit to supine  in bed with SBA. (Achieved)   2. Patient will transfer from bed to chair and chair to bed with SBA using RW. (Achieved)   3. Patient will perform sit to stand with RW close super vision with Fair balance and safety awareness. (Achieved)   4. Patient will ambulate with close supervision for 100 feet with RW on level surfaces with 2 turns. (Achieved)   5. Patient will ascend/descend 1 stairs with bilateral handrail(s) with SBA to allow for safe home access/exit. (Achieved)   6. Patient will improve standardized test score for 3/5 [facility] Standing Balance Scale. (Achieved)     PHYSICAL THERAPY LTG GOALS :  Initiated 12/5/2018 and to be accomplished within 4 Weeks (Updated 12/11/18)     1.   Patient will move from supine to sit and sit to supine  in bed with modified independence. (Progressing; (S))  2.  Patient will transfer from bed to chair and chair to bed with supervision/set-up using RW. (Progressing; close (S))  3.  Patient will perform sit to stand with RW supervision/set-up with Good balance and safety awareness. (Progressing; close (S))  4. Patient will ambulate with supervision/set-up for 200 feet with RW on level surfaces and be able to maneuver through narrow spaces and obstacles without loss of balance. (Progressing; 261ft with RW and close (S), cues for safety with obstacles and narrow spaces)   5. Patient will ascend/descend 1 stairs with bilateral handrail(s) with supervision/set-up to allow for safe home access/exit. (Progressing; 10 steps with B HR and close (S))  6. Patient will improve standardized test score for 4/5 [facility] Standing Balance Scale. (Progressing; 3+/5)    Physical Therapist:   Win Vidal  on 12/5/2018             [facility]   PHYSICAL THERAPY WEEKLY PROGRESS REPORT  Reporting Period:  Date:  12/11/18  to 12/18/18      Patient: Madelin Boykin ([de-identified] y.o. male)                         Date: 12/18/2018    Primary Diagnosis: UTI  Altered Mental Status      Attending Physician: Phyllis Barrera MD   Treatment Diagnosis  Treatment Diagnosis: muscle weakness  Precautions:  Fall  Rehab Potential : Excellent:    Skill interventions and education provided with clinical rationale (include individualized treatment techniques and standardized tests):   Skilled Physical Therapy services were provided with TA: pt is now Mod (I) for bed mobility and transfers  TE to build strength for improved quality of ambulation and ease of stair negotiation.    Gait training to address deficits and allow pt to return to [facility] training to allow pt to safely return home upon DC  NMR of movement, coordination and balance for reduced risk of falls        Using a comparative statement, summarize significant progress toward goals as a result of skilled intervention provided:  Patient has made Good progress towards their Physical Therapy goals in the areas of bed mobility, sit<>stand, transfers, balance, gait and stair training. Identify remaining functional areas, impairments limiting progress and/or barriers to improvement:  Patient would benefit from continues PT services to address the following functional deficits in Pt has progressed well with therapy as evidence by achieving all STG's and 5/6 LTG's at this time.  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     OBJECTIVE DATA SUMMARY:     INITIAL ASSESSMENT WEEKLY ASSESSMENT   COGNITIVE STATUS COGNITIVE STATUS   Neurologic State: Alert  Orientation Level: Oriented X4  Cognition: Follows commands  Perception: Appears intact  Perseveration: No perseveration noted Neurologic State: Alert  Orientation Level: Oriented X4  Cognition: Appropriate for age attention/concentration, Follows commands  Perception: Appears intact  Perseveration: No perseveration noted   PAIN PAIN   Pain Scale 1: Numeric (0 - 10)  Pain Intensity 1: 0  Patient Stated Pain Goal: 0  Pain Reassessment 1: Yes Pain Scale 1: Numeric (0 - 10)  Pain Intensity 1: 0  Patient Stated Pain Goal: 0  Pain Reassessment 1: Patient sleeping   GROSS ASSESSMENT GROSS ASSESSMENT   AROM: Generally decreased, functional  PROM: Generally decreased, functional  Strength: Generally decreased, functional(B hip flex 4+,L knee flex 4, L knee ext 4/5) AROM: Generally decreased, functional  PROM: Generally decreased, functional  Strength: Generally decreased, functional(B hip flex 4+,L knee flex 4, L knee ext 4/5)   BED MOBILITY BED MOBILITY   Rolling: Supervision  Supine to Sit: Supervision  Sit to Supine: Stand-by assistance  Scooting: Supervision Rolling: Contact guard assistance  Supine to Sit: Modified independent  Sit to Supine: Stand-by assistance  Scooting: Modified independent   GAIT GAIT   Base of Support: Center of gravity altered, Narrowed  Speed/Ebony: Slow, Shuffled  Step Length: Left shortened  Gait Abnormalities: Decreased step clearance, Foot drop  Ambulation - Level of Assistance: Contact guard assistance  Distance (ft): 22 Feet (ft)  Assistive Device: Gait belt, Walker, rolling  Rail Use: Both  Stairs - Level of Assistance: Contact guard assistance  Number of Stairs Trained: 10  Interventions: Verbal cues, Safety awareness training Base of Support: Center of gravity altered  Speed/Ebony: Slow  Step Length: Right shortened, Left shortened  Gait Abnormalities: Decreased step clearance  Ambulation - Level of Assistance: Supervision  Distance (ft): 270 Feet (ft)(+261)  Assistive Device: Gait belt, Walker, rolling  Rail Use: Both  Stairs - Level of Assistance: Supervision  Number of Stairs Trained: 10  Interventions: Safety awareness training                     TRANSFERS TRANSFERS   Sit to Stand: Contact guard assistance  Stand to Sit: Contact guard assistance  Bed to Chair: Contact guard assistance Sit to Stand: Modified independent  Stand to Sit: Modified independent  Bed to Chair: Contact guard assistance   BALANCE BALANCE   Sitting: Intact  Sitting - Static: Good (unsupported)  Sitting - Dynamic: Fair (occasional)  Standing: With support, Impaired  Standing - Static: Fair  Standing - Dynamic : Fair(-) Sitting: Intact  Sitting - Static: Good (unsupported)  Sitting - Dynamic: Good (unsupported)  Standing: With support  Standing - Static: Good  Standing - Dynamic : Fair   WHEELCHAIR MOBILITY/MGMT WHEELCHAIR MOBILITY/MGMT         Activity Tolerance:  Fair Activity Tolerance: Good   Visual/Perceptual          Visual/Perceptual            Auditory:   Auditory Impairment: Hard of hearing, bilateral, Hearing aid(s)  Hearing Aids/Status: With patient    Auditory:   Auditory  Auditory Impairment: Hard of hearing, right side, Hearing aid(s), Hard of hearing, left side(deaf left ear)  Hearing Aids/Status: At home        Steps: both   Clinical Decision making:   Clinical Decision making: [place] standing balance score: 4/5     Treatment:   Pt has progressed well with therapy as evidenced by achieving all STG's and 5/6 LTG's at this time. Pt is scheduled for DC to home on 12/21/18. During today's treatment session: Pt demonstrated Mod (I) with all aspects of bed mobility, sit<>stand, and transfers. Gait training rendered with (S) [id number]. Pt continues to require intermittent verbal cues for L foot clearance. Additional gait training with obstacle negotiation and narrow spaces. Pt completed all obstacles with (S), but required verbal cues for L foot clearance and proximity to RW with tight turns. Pt negotiated 10 steps with B HR and (S) with no verbal cues required for sequencing, good safety awareness noted. NuStep Lv 3 x15' for B LE strengthening and functional capacity for improved functional mobility. Patient's response to treatment rendered:  Good     Patient expected Discharge Location:  [x]Private Residence  [] long term/ILF  []LTC  []Other:    Plan: Continue Skilled PT services as established by the Plan of Care for 3-6 times a week. PT and Assistant have had a weekly case conference regarding the above treatment:  [x] Yes     [] No       Treatment session:  60 minutes. Therapist: Herlinda Smith, PTA       Date:12/18/2018  Forward to PT for co-signature when completed.

## 2018-12-18 NOTE — ROUTINE PROCESS
Bedside and verbal shift report given to Claudia Solis RN (oncoming nurse) by NAMAN Pino LPN (off going nurse). Report included SBAR, MAR and Kardex.

## 2018-12-18 NOTE — ROUTINE PROCESS
Bedside and Verbal shift change report given to kar rn(oncoming nurse) by Zeke Shine (offgoing nurse). Report included the following information SBAR, Intake/Output and Recent Results.

## 2018-12-18 NOTE — PROGRESS NOTES
Nutrition follow-up/Plan of care tcc     RECOMMENDATIONS:     1. Cardiac Diet  2. Monitor weight, labs and PO intake  3. RD to follow     GOALS:     1. Met/Ongoing: PO intake meets >75% of protein/calorie needs by 12/25  2. Ongoing: Weight Maintenance/gradual loss (1-2 lb/week) by 12/25    ASSESSMENT:     Weight status is classified as obese per BMI of 31.5. Adequate PO intake. Labs noted. Pt w/ elevated BUN/Cr; GFR (57). Nutrition recommendations listed. RD to follow. Nutrition Risk:  [] High  [] Moderate [x]  Low    SUBJECTIVE/OBJECTIVE:      (12/2): Admitted for CVA. PMHx including R BKA,  hyperlipidemia,CAD and prior CVA and TIA's. Patient reports having a good appetite and weight has been stable at 220 lb. Denies food allergies or problems chewing/swallowing. Reports following a low Na/low sat fat diet at home. Will monitor. (12/5): Pt transferred from  to [facility] on 12/4[facility]2018. Pt seen in room OOB in chair. Reports he is doing well and his appetite is great. Nothing to address at this time. Will monitor. (12/12): Pt seen in room resting after lunch; observed 100% of meal consumed. Reports he is doing well and has a good appetite. Weight loss of 11.6 lb or 5.2% x 1 week noted, but question accuracy of new weight recorded as Pt consumes 3 meals per day. Will monitor. (12/18): Pt seen in room at lunch; observed 100% of meal consumed. Weight is stable x 1 week. Reports he is doing well and nothing to address at this time. Will monitor.      Information Obtained from:    [x] Chart Review   [x] Patient   [] Family/Caregiver   [] Nurse/Physician   [] Interdisciplinary Meeting/Rounds    Diet: Cardiac Diet  Medications: [x] Reviewed   Lipitor, Coreg, Plavix, Fish Oil  Allergies: [x] Reviewed   Patient Active Problem List   Diagnosis Code    Pure hypercholesterolemia E78.00    Coronary artery disease involving native coronary artery without angina pectoris I25.10    Hypertension I10    CKD (chronic kidney disease), stage III ([address] N18.3    S/P AVR Z95.2    Advance directive in chart Z78.9    PAD (peripheral artery disease) ([facility]) I73.9    AAA (abdominal aortic aneurysm) without rupture ([facility]) N44.2    Diastolic dysfunction I27.2    Vascular dementia without behavioral disturbance F01.50    Atherosclerosis of native artery of left lower extremity with intermittent claudication ([facility]) I70.212    Chest pain R07.9    CAD (coronary artery disease) I25.10    Hypotension I95.9    Cerebrovascular disease I67.9    Acute MI ([facility]) I21.9    S/P CABG x 4 Z95.1    History of CVA (cerebrovascular accident) Z80.78    History of MI (myocardial infarction) I25.2    Sepsis ([facility]) A41.9    UTI (urinary tract infection) N39.0    Acute metabolic encephalopathy Z24.11     Past Medical History:   Diagnosis Date    Advance directive in chart 6/13/2016    CAD (coronary artery disease)     Cancer ([address] 2003    Colon    CKD (chronic kidney disease), stage III ([address] 3/27/2016    CVA (cerebral vascular accident) ([address] 3/26/2016    Femoral artery aneurysm, left ([address]     Heart attack ([address] 2008    Heart attack ([address]     Hernia     History of TIAs     Hyperlipidemia     Hypertension 3/27/2016    Iliac artery aneurysm, left ([facility])     Pure hypercholesterolemia 9/14/2012    PVD (peripheral vascular disease) ([address] 3/27/2016    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 ([address] 3/26/2016      Labs:    Lab Results   Component Value Date/Time    Sodium 141 12/17/2018 06:46 AM    Potassium 5.0 12/17/2018 06:46 AM    Chloride 110 (H) 12/17/2018 06:46 AM    CO2 22 12/17/2018 06:46 AM    Anion gap 9 12/17/2018 06:46 AM    Glucose 87 12/17/2018 06:46 AM    BUN 28 (H) 12/17/2018 06:46 AM    Creatinine 1.44 (H) 12/17/2018 06:46 AM    Calcium 9.7 12/17/2018 06:46 AM    Magnesium 2.4 12/01/2018 11:35 AM    Phosphorus 2.9 04/14/2018 09:27 AM    Albumin 3.4 12/01/2018 11:35 AM     Lab Results   Component Value Date/Time Cholesterol, total 129 04/14/2018 09:27 AM    HDL Cholesterol 38 (L) 04/14/2018 09:27 AM    LDL, calculated 49.6 04/14/2018 09:27 AM    VLDL, calculated 41.4 04/14/2018 09:27 AM    Triglyceride 207 (H) 04/14/2018 09:27 AM    CHOL/HDL Ratio 3.4 04/14/2018 09:27 AM     Anthropometrics: BMI (calculated): 31.5  Last 3 Recorded Weights in this Encounter    12/04/18 2225 12/12/18 1349 12/18/18 1451   Weight: 99.3 kg (219 lb) 94.1 kg (207 lb 6.4 oz) 93.9 kg (207 lb)      Ht Readings from Last 1 Encounters:   12/04/18 5' 8\" (1.727 m)     Weight Metrics 12/18/2018 12/4/2018 12/4/2018 12/4/2018 11/27/2018 10/2/2018 9/7/2018   Weight 207 lb - 217 lb 4.8 oz - 220 lb 0.3 oz 206 lb 12.8 oz 205 lb   BMI - 31.47 kg/m2 - 33.04 kg/m2 33.45 kg/m2 29.67 kg/m2 29.41 kg/m2       No data found. UBW: 220 lb   [x] Weight Loss (11.6 lb or 5.2% from admission weight/UBW) ? ? accuracy  [] Weight Gain  [x] Weight Stable x 1 week    Estimated Nutrition Needs: [x] MSJ  [] Other:  Calories: 9663-9901 Kcal Based on:   [x] Actual BW   Protein:    g Based on:   [x] Actual BW    Fluid:      6804-6371 ml Based on:   [x] Actual BW        Nutrition Problems Identified:   [] Suboptimal PO intake   [] Food Allergies  [] Difficulty chewing/swallowing/poor dentition  [] Constipation/Diarrhea   [] Nausea/Vomiting   [x] None  [] Other:     Plan:   [x] Therapeutic Diet  []  Obtained/adjusted food preferences/tolerances and/or snacks options   []  Supplements added   [] Occupational therapy following for feeding techniques  []  HS snack added   []  Modify diet texture   []  Modify diet for food allergies   []  Educate patient   []  Assist with menu selection   [x]  Monitor PO intake on meal rounds   [x]  Continue inpatient monitoring and intervention   [x]  Participated in discharge planning/Interdisciplinary rounds/Team meetings   []  Other:     Education Needs:   [] Not appropriate for teaching at this time due to:   [x] Identified and addressed    Nutrition Monitoring and Evaluation:  [x] Continue ongoing monitoring and intervention  [] Other    Alferd Lights

## 2018-12-18 NOTE — PROGRESS NOTES
conducted a Follow up consultation and Spiritual Assessment for [employer], who is a [de-identified] y.o.,male. The  provided the following Interventions:  Continued the relationship of care and support. Listened empathically. Offered prayer and assurance of continued prayer on patients behalf. Chart reviewed. The following outcomes were achieved:  Patient expressed gratitude for pastoral care visit. Assessment:  There are no further spiritual or Hindu issues which require Spiritual Care Services interventions at this time. Plan:  Chaplains will continue to follow and will provide pastoral care on an as needed/requested basis.  recommends bedside caregivers page  on duty if patient shows signs of acute spiritual or emotional distress.      [address] [facility]   Staff [address] [facility] [place]   [phone]

## 2018-12-18 NOTE — PROGRESS NOTES
Problem: Self Care Deficits Care Plan (Adult)  Goal: *Acute Goals and Plan of Care (Insert Text)  OCCUPATIONAL THERAPY SHORT TERM GOALS      Updated 12/18/18    1. Patient will perform Lower body dressing with adaptive equipment as needed for energy conservation with Mod I.  2.  Patient will perform toileting task with Mod I with Good safety to reduce falls risk. 3.  Patient will perform functional transfers to commode/shower chair with LRAD and Mod I.  4.  Patient will perform standing static/dynamic balance activities for improved ADL/IADL function with Mod I and Good  balance and safety awarenes. 5.  Patient will improve Barthel index scores to atleast 90/100 to improve functional mobility. 6.  Patient will perform bathroom mobility with LRAD and Mod I     Updated 12/11/18    1. Patient will perform Upper body bathing with adaptive equipment as needed for energy conservation with Mod I. (goal met-D/C goal)  2. Patient will perform Lower body dressing with adaptive equipment as needed for energy conservation with supervision/set-up. (goal met-UPG Mod I)  3. Patient will perform toileting task with supervision/set-up with Good safety to reduce falls risk. (goal met-UPG Mod I)  4. Patient will perform functional transfers to commode/shower chair with LRAD and supervision/setup.  (goal met-UPG Mod I)  5. Patient will perform standing static/dynamic balance activities for improved ADL/IADL function with supervision/set-up and Fair +/Good - balance and safety awarenes. (goal met-UPG Mod I)  6. Patient will improve Barthel index scores to atleast 70/100 to improve functional mobility. (goal met-UPG 90/100)  7. Patient will perform bathroom mobility with LRAD and Supervision. (goal met-UPG Mod I)     Initiated 12/5/2018 and to be accomplished within 2 Week(s)    1.  Patient will perform Upper body bathing with adaptive equipment as needed for energy conservation with supervision/set up. (goal met-UPG Mod I)  2.  Patient will perform Lower body dressing with adaptive equipment as needed for energy conservation with supervision/set-up . (progressing-currently SBA)  3. Patient will perform toileting task with supervision/set-up with Good safety to reduce falls risk. (progressing-currently SBA)  4. Patient will perform functional transfers to commode/shower chair with LRAD and supervision/setup.  (progressing-currently SBA)  5. Patient will perform standing static/dynamic balance activities for improved ADL/IADL function with supervision/set-up and Fair +/Good - balance and safety awarenes. (progressing-currently SBA)  6. Patient will improve Barthel index scores to atleast 70/100 to improve functional mobility.  (progressing)  7. Patient will perform bathroom mobility with LRAD and Supervision.  (progressing-currently SBA)    OCCUPATIONAL THERAPY LONG TERM GOALS   Initiated 12/5/2018 and to be accomplished within 4 Week(s)    1. Patient will perform Upper  body bathing with adaptive equipment as needed for ebergy conservation with modified independence. 2.  Patient will perform Lower body dressing with adaptive equipment as needed for energy conservation with modified independence. 3.  Patient will perform toileting task with modified independence with Good safety to reduce falls risk. 4.  Patient will perform functional transfers to commode and shower chair with LRAD and  modified independence and Good balance and safety awareness. 5.  Patient will perform standing static/dynamic activity for improved ADL/IADL function    with modified independence an and Good balance and safety awareness. 6.  Patient will improve Barthel index score to 80/100 to improve independence with mobility. 7. Patient will perform functional mobility within kitchen environment/bathroom with LRAD and modified independence.      Therapist: Elizabeth Juarez OT    12/5/2018           [facility]  OCCUPATIONAL THERAPY WEEKLY SUMMARY   Reporting period:  from 12/11/18 through 12/18/18       Patient: Lanie Smyth ([de-identified] y.o. male)   Date: 12/18/2018    Primary Diagnosis: UTI  Altered Mental Status       Attending Physician: Will Varela MD Treatment Diagnosis  Treatment Diagnosis: muscle weakness  Precautions: Fall  Rehab Potential : Good    Skill interventions and education provided with clinical rationale (include individualized treatment techniques and standardized tests):  Skilled Occupational services were provided utilizing ADL retraining, incorporating balance retraining, therapeutic exercise and activities incorporating strengthening in order to improve ADL performance skills and functional mobility. Using a comparative statement, summarize significant progress toward goals as a result of skilled intervention provided:  Patient has made Good progress towards their Occupational Therapy goals in the following areas: increase independence, performance, and static standing balance needed for grooming, bathing, upper body dressing, lower body dressing, toileting, toilet transfer, shower transfer and simple home management. Patient has met 6/6 STGS and making good progression towards LTGS. Identify remaining functional areas, impairments limiting progress and/or barriers to improvement:  Patient would benefit from continued skilled Occupational Therapy Services to address the following functional deficits in decreased dynamic standing balance and tolerance needed for safely complete ADL/IADLS.         OBJECTIVE DATA SUMMARY:       INITIAL ASSESSMENT WEEKLY PROGRESS   COGNITIVE STATUS: COGNITIVE STATUS:   Neurologic State: Alert  Orientation Level: Oriented X4  Cognition: Follows commands  Perception: Appears intact  Perseveration: No perseveration noted Neurologic State: Alert  Orientation Level: Oriented X4  Cognition: Appropriate for age attention/concentration, Follows commands  Perception: Appears intact  Perseveration: No perseveration noted   PAIN: PAIN:   Pain Scale 1: Numeric (0 - 10)  Pain Intensity 1: 0  Patient Stated Pain Goal: 0  Pain Reassessment 1: Yes     Pain Scale 1: Numeric (0 - 10)  Pain Intensity 1: 0  Patient Stated Pain Goal: 0  Pain Reassessment 1: Patient sleeping   BED MOBILITY BED MOBILITY   Rolling: Supervision  Supine to Sit: Supervision  Sit to Supine: Stand-by assistance  Scooting: Supervision Rolling: Contact guard assistance  Supine to Sit: Modified independent  Sit to Supine: Stand-by assistance  Scooting: Modified independent   ADL SELF CARE ADL SELF CARE   Feeding: Independent  Oral Facial Hygiene/Grooming: Independent  Bathing: Contact guard assistance(juliane area only)  Type of Bath: Basin/Soap/Water  Upper Body Dressing: Setup  Lower Body Dressing: Minimum assistance  Toileting: Contact guard assistance Bathing Assistance: Modified independent  Position Performed: Seated in chair    Dressing Assistance: Modified independent  Pullover Shirt: Modified independent    Bathing Assistance: Stand-by assistance(close)  Perineal  : Stand-by assistance  Position Performed: Standing  Lower Body : Supervision/set-up  Position Performed: Seated in chair    Toileting Assistance: Modified independent  Bladder Hygiene: Modified independent  Bowel Hygiene: Contact guard assistance  Clothing Management: Modified independent         Dressing Assistance: Modified independent  Underpants: Stand-by assistance  Pants With Elastic Waist: Stand-by assistance  Socks: Modified independent  Shoes with Velcro:  Modified independent  Leg Crossed Method Used: No  Position Performed: Seated edge of bed, Bending forward method        TRANSFERS TRANSFERS   Sit to Stand: Contact guard assistance  Stand to Sit: Contact guard assistance  Bed to Chair: Contact guard assistance  Bathroom Mobility: Contact guard assistance  Toilet Transfer : Contact guard assistance  Shower Transfer: Contact guard assistance Sit to Stand: Modified independent  Stand to Sit: Modified independent  Bed to Chair: Contact guard assistance  Bathroom Mobility: Modified independent  Toilet Transfer : Modified independent  Shower Transfer: Contact guard assistance   Bathroom Mobility: Contact guard assistance  Toilet Transfer : Contact guard assistance  Shower Transfer: Contact guard assistance Bathroom Mobility: Modified independent  Toilet Transfer : Modified independent  Shower Transfer: Contact guard assistance   BALANCE BALANCE   Sitting: Intact  Sitting - Static: Good (unsupported)  Sitting - Dynamic: Fair (occasional)  Standing: With support, Impaired  Standing - Static: Fair  Standing - Dynamic : Fair(-) Sitting: Intact  Sitting - Static: Good (unsupported)  Sitting - Dynamic: Good (unsupported)  Standing: With support  Standing - Static: Good  Standing - Dynamic : Fair       GROSS ASSESSMENT  GROSS ASSESSMENT   AROM: Generally decreased, functional  PROM: Generally decreased, functional  Strength: Generally decreased, functional(B hip flex 4+,L knee flex 4, L knee ext 4/5) AROM: Generally decreased, functional  PROM: Generally decreased, functional  Strength: Generally decreased, functional(B hip flex 4+,L knee flex 4, L knee ext 4/5)   COORDINATION COORDINATION   Fine Motor Skills-Upper: Left Intact, Right Intact  Gross Motor Skills-Upper: Left Intact, Right Intact Fine Motor Skills-Upper: Left Intact, Right Intact  Gross Motor Skills-Upper: Left Intact, Right Intact   VISUAL/PERCEPTUAL VISUAL/PERCEPTUAL             AUDITORY: AUDITORY:   Auditory Impairment: Hard of hearing, bilateral, Hearing aid(s)  Hearing Aids/Status: With patient Auditory Impairment: Hard of hearing, right side, Hearing aid(s), Hard of hearing, left side(deaf left ear)  Hearing Aids/Status:  At home       INSTRUMENTAL  ADL'S:    INSTRUMENTAL ADL'S:          THE BARTHEL INDEX  ACTIVITY   SCORE   FEEDING  0=unable  5=needs help cutting,spreading butter,etc., or modified diet  10= independent   10   BATHING  0=dependent  5=independent (or in shower   5   GROOMING  0=needs help  5=independent face/hair/teeth/shaving (implements provided)   5   DRESSING  0=dependent  5=needs help but can do about half unaided  10=independent(including buttons, zips,laces etc.)   10   BOWELS  0=incontinent  5=occasional accident  10=continent   10   BLADDER  0=incontinent, or catheterized and unable to manage alone  5=occasional accident  10=continent   10   TOILET USE  0=dependent  5=needs some help, but can do something alone  10=independent (on and off, dressing, wiping)   10   TRANSFER (BED TO CHAIR AND BACK)  0=unable, no sitting balance  5=major help(one or two people,physical), can sit  10=minor help(verbal or physical)  15=independent   10   MOBILITY (ON LEVEL SURFACES)  0=immobile or <50 yards  5=wheelchair independent,including corners,>50 yards  10=walkes with help of one person (verbal or physical) >50 yards  15=independent(but may use any aid; for example, stick) >50 yards   10   STAIRS  0=unable  5=needs help (verbal, physical, carrying aid)  10=independent   5            TOTAL:                  85/100     Treatment:  Functional mobility utilizing RW from patient's room to therapy room in order to increase functional activity tolerance and independence during task. Patient able to complete with Supervision and min cueing to increase L LE step for optimal safety during mobility. UB restorator x 15 mins utilizing 1# on B UE, in order to increase functional activity tolerance needed for ADLS. Reviewed any OT concerns    Patient's response to Treatment rendered:  Patient is pleasant and receptive to therapist cueing and recommendations.      Patient expected Discharge Location:  [x]Private Residence  [] [facility]/ILF  []LTC  []Other:    Plan: Continue OT services as established on the Plan of Care    Treatment Minutes:  64  OT and Assistant have had a weekly case conference regarding the above treatment:  [x] Yes     [] No    Therapist:   LUKE Rubio,         Date:12/18/2018     Forward to OT for co-signature when completed

## 2018-12-18 NOTE — PROGRESS NOTES
Late entry: SW received the Notice of Medicare Non-Coverage from [employer]. SW attempted to call pt's wife on a conference call to review the notice but had to leave a message that pt's continued stay has been denied and pt's last covered day will be on 12/20/18 with d/c on 12/21/8. SW reviewed the notice and the appeal process with pt. Pt signed the notice and was given a copy. YUM[employer]KO informed pt that he will have until 12:00noon tomorrow to file an appeal. Pt stated that he doesn't plan to appeal his decision. SW left message for pt's son Joelle Leal re: denial by pt's insurance and d/c on 12/21/18.

## 2018-12-18 NOTE — PROGRESS NOTES
Sw attempted to return call to pt's son but had to leave a message informing him due that due   To SW's  schedule on Wed  a meeting can't be arranged between 8:00-9:00am.  Sw requested that pt's son leave another time that will be more suitable to his schedule. Pt's son stated in the message that his mother wasn't going to appeal the decision by pt's insurance.

## 2018-12-18 NOTE — ROUTINE PROCESS
Bedside and Verbal shift change report given to [place] lpn (oncoming nurse) by flavio ace (offgoing nurse). Report included the following information Kardex, MAR and Recent Results.

## 2018-12-19 PROCEDURE — 74011250637 HC RX REV CODE- 250/637: Performed by: INTERNAL MEDICINE

## 2018-12-19 RX ORDER — CARVEDILOL 3.12 MG/1
3.12 TABLET ORAL 2 TIMES DAILY WITH MEALS
Qty: 60 TAB | Refills: 0 | Status: SHIPPED | OUTPATIENT
Start: 2018-12-19 | End: 2018-12-20

## 2018-12-19 RX ORDER — CLOPIDOGREL BISULFATE 75 MG/1
75 TABLET ORAL DAILY
Qty: 30 TAB | Refills: 0 | Status: SHIPPED | OUTPATIENT
Start: 2018-12-20 | End: 2018-12-20

## 2018-12-19 RX ADMIN — CARVEDILOL 3.12 MG: 3.12 TABLET, FILM COATED ORAL at 17:26

## 2018-12-19 RX ADMIN — Medication 1 CAPSULE: at 09:28

## 2018-12-19 RX ADMIN — CLOPIDOGREL BISULFATE 75 MG: 75 TABLET ORAL at 09:28

## 2018-12-19 RX ADMIN — ATORVASTATIN CALCIUM 20 MG: 20 TABLET, FILM COATED ORAL at 21:15

## 2018-12-19 RX ADMIN — Medication 1 CAPSULE: at 17:45

## 2018-12-19 RX ADMIN — DONEPEZIL HYDROCHLORIDE 10 MG: 5 TABLET, FILM COATED ORAL at 09:28

## 2018-12-19 RX ADMIN — ASPIRIN 325 MG ORAL TABLET 325 MG: 325 PILL ORAL at 09:28

## 2018-12-19 RX ADMIN — TAMSULOSIN HYDROCHLORIDE 0.4 MG: 0.4 CAPSULE ORAL at 21:15

## 2018-12-19 NOTE — ROUTINE PROCESS
Bedside and Verbal shift change report given to [address] [place] [address] (oncoming nurse) by Mecca Cassidy RN (offgoing nurse). Report included the following information SBAR, Kardex and MAR.

## 2018-12-19 NOTE — PROGRESS NOTES
SW faxed the signed notice that was signed by pt on yesterday back to  [facility]. SW faxed the signed copy yesterday but they requested that it be sent again today.

## 2018-12-19 NOTE — PROGRESS NOTES
Problem: Self Care Deficits Care Plan (Adult)  Goal: *Acute Goals and Plan of Care (Insert Text)  OCCUPATIONAL THERAPY SHORT TERM GOALS      Updated 12/18/18    1. Patient will perform Lower body dressing with adaptive equipment as needed for energy conservation with Mod I.  2.  Patient will perform toileting task with Mod I with Good safety to reduce falls risk. 3.  Patient will perform functional transfers to commode/shower chair with LRAD and Mod I.  4.  Patient will perform standing static/dynamic balance activities for improved ADL/IADL function with Mod I and Good  balance and safety awarenes. 5.  Patient will improve Barthel index scores to atleast 90/100 to improve functional mobility. 6.  Patient will perform bathroom mobility with LRAD and Mod I     Updated 12/11/18    1. Patient will perform Upper body bathing with adaptive equipment as needed for energy conservation with Mod I. (goal met-D/C goal)  2. Patient will perform Lower body dressing with adaptive equipment as needed for energy conservation with supervision/set-up. (goal met-UPG Mod I)  3. Patient will perform toileting task with supervision/set-up with Good safety to reduce falls risk. (goal met-UPG Mod I)  4. Patient will perform functional transfers to commode/shower chair with LRAD and supervision/setup.  (goal met-UPG Mod I)  5. Patient will perform standing static/dynamic balance activities for improved ADL/IADL function with supervision/set-up and Fair +/Good - balance and safety awarenes. (goal met-UPG Mod I)  6. Patient will improve Barthel index scores to atleast 70/100 to improve functional mobility. (goal met-UPG 90/100)  7. Patient will perform bathroom mobility with LRAD and Supervision. (goal met-UPG Mod I)     Initiated 12/5/2018 and to be accomplished within 2 Week(s)    1.  Patient will perform Upper body bathing with adaptive equipment as needed for energy conservation with supervision/set up. (goal met-UPG Mod I)  2.  Patient will perform Lower body dressing with adaptive equipment as needed for energy conservation with supervision/set-up . (progressing-currently SBA)  3. Patient will perform toileting task with supervision/set-up with Good safety to reduce falls risk. (progressing-currently SBA)  4. Patient will perform functional transfers to commode/shower chair with LRAD and supervision/setup.  (progressing-currently SBA)  5. Patient will perform standing static/dynamic balance activities for improved ADL/IADL function with supervision/set-up and Fair +/Good - balance and safety awarenes. (progressing-currently SBA)  6. Patient will improve Barthel index scores to atleast 70/100 to improve functional mobility.  (progressing)  7. Patient will perform bathroom mobility with LRAD and Supervision.  (progressing-currently SBA)    OCCUPATIONAL THERAPY LONG TERM GOALS   Initiated 12/5/2018 and to be accomplished within 4 Week(s)    1. Patient will perform Upper  body bathing with adaptive equipment as needed for ebergy conservation with modified independence. 2.  Patient will perform Lower body dressing with adaptive equipment as needed for energy conservation with modified independence. 3.  Patient will perform toileting task with modified independence with Good safety to reduce falls risk. 4.  Patient will perform functional transfers to commode and shower chair with LRAD and  modified independence and Good balance and safety awareness. 5.  Patient will perform standing static/dynamic activity for improved ADL/IADL function    with modified independence an and Good balance and safety awareness. 6.  Patient will improve Barthel index score to 80/100 to improve independence with mobility. 7. Patient will perform functional mobility within kitchen environment/bathroom with LRAD and modified independence.      Therapist: Geraldine Wilcox OT    12/5/2018            [facility]   Occupational Therapy Daily Treatment note      Patient: Estee Sexton [de-identified][de-identified] y.o. male)       Date: 12/19/2018  Attending Physician: Marc Sanchez MD  Primary Diagnosis: UTI  Altered Mental Status    Treatment Diagnosis  Treatment Diagnosis: muscle weakness   Precautions : Precautions at Admission: Fall  Vital Signs:  Vital Signs  Temp: 97.9 °F (36.6 °C)  Pulse (Heart Rate): (!) 50  Resp Rate: 20  O2 Sat (%): 99 %  BP: 120/64  MAP (Calculated): 83     Cognitive Status:  Mental Status  Neurologic State: Sleeping  Pain:  Pain Screen  Pain Scale 1: Numeric (0 - 10)  Pain Intensity 1: 0  Patient Stated Pain Goal: 0  Pain Scale 1: Numeric (0 - 10)  Gross Assessment:     Coordination:     Bed Mobility:  Bed Mobility  Supine to Sit: Modified independent  Transfers:  Functional Transfers  Sit to Stand: Modified independent  Bed to Chair: Modified independent     Balance:  Balance  Sitting: Intact  Sitting - Static: Good (unsupported)  Sitting - Dynamic: Good (unsupported)  Standing: With support  Standing - Static: Good  Standing - Dynamic : Fair       Therapeutic Activities:  Shadowed patient with bed mobility and bed>w/c transfers in order to assess safety and independence during task. See above for levels of A needed. BUTLER called and left a message with patient's son Peter Font regarding patient's current level of functional for ADL tasks and transfers. Therapeutic Exercises:   UB restorator x 15 mins, utilizing 1# on B UE in order to increase functional activity tolerance needed for ADLS. Patient/Caregiver Education:    Meseret Pires Education on see above.       ASSESSMENT:  Patient continues to demonstrate the need for skilled Occupational Therapy services to improve dynamic standing balance needed for bathing  Progression toward goals:  [x]      Improving appropriately and progressing toward goals  []      Improving slowly and progressing toward goals  []      Not making progress toward goals and plan of care will be adjusted Treatment session:   40 minutes    Therapist:    LUKE Bolanos,  12/19/2018

## 2018-12-19 NOTE — PROGRESS NOTES
Brooklynn attempted to call pt's son but had to leave a message that Brooklynn will request that therapy contact him re: pt's progress since it's difficult trying to arrange a time to meet due to his availability. BROOKLYNN gave sons contact information to LUKE to request that pt's son be contacted with an update re: pt's progress.

## 2018-12-19 NOTE — PROGRESS NOTES
Problem: Mobility Impaired (Adult and Pediatric)  Goal: *Acute Goals and Plan of Care (Insert Text)  PHYSICAL THERAPY LTG GOALS :  Initiated 12/5/2018 and to be accomplished within 4 Weeks (Updated 12/18/18)     1. Patient will move from supine to sit and sit to supine  in bed with modified independence. (Achieved)  2. Patient will transfer from bed to chair and chair to bed with supervision/set-up using RW. (Achieved)  3. Patient will perform sit to stand with RW supervision/set-up with Good balance and safety awareness. (Achieved)  4. Patient will ambulate with supervision/set-up for 200 feet with RW on level surfaces and be able to maneuver through narrow spaces and obstacles without loss of balance. (Progressing; 270ft with RW and (S), cues for safety with obstacles and narrow spaces)   5. Patient will ascend/descend 1 stairs with bilateral handrail(s) with supervision/set-up to allow for safe home access/exit. (Achieved)  6. Patient will improve standardized test score for 4/5 [address] Standing Balance Scale. (Achieved)    PHYSICAL THERAPY STG GOALS :  Initiated 12/5/2018 and to be accomplished within 2 Weeks (Updated 12/11/18)     1. Patient will move from supine to sit and sit to supine  in bed with SBA. (Achieved)   2. Patient will transfer from bed to chair and chair to bed with SBA using RW. (Achieved)   3. Patient will perform sit to stand with RW close super vision with Fair balance and safety awareness. (Achieved)   4. Patient will ambulate with close supervision for 100 feet with RW on level surfaces with 2 turns. (Achieved)   5. Patient will ascend/descend 1 stairs with bilateral handrail(s) with SBA to allow for safe home access/exit. (Achieved)   6. Patient will improve standardized test score for 3/5 [address] Standing Balance Scale. (Achieved)     PHYSICAL THERAPY LTG GOALS :  Initiated 12/5/2018 and to be accomplished within 4 Weeks (Updated 12/11/18)     1.   Patient will move from supine to sit and sit to supine  in bed with modified independence. (Progressing; (S))  2.  Patient will transfer from bed to chair and chair to bed with supervision/set-up using RW. (Progressing; close (S))  3.  Patient will perform sit to stand with RW supervision/set-up with Good balance and safety awareness. (Progressing; close (S))  4. Patient will ambulate with supervision/set-up for 200 feet with RW on level surfaces and be able to maneuver through narrow spaces and obstacles without loss of balance. (Progressing; 261ft with RW and close (S), cues for safety with obstacles and narrow spaces)   5. Patient will ascend/descend 1 stairs with bilateral handrail(s) with supervision/set-up to allow for safe home access/exit. (Progressing; 10 steps with B HR and close (S))  6. Patient will improve standardized test score for 4/5 [facility] Standing Balance Scale. (Progressing; 3+/5)    Physical Therapist:   Arron Wise  on 12/5/2018              [facility] [place]   physical Therapy Daily Treatment note      Patient: Krystal Olson (71 y.o. male)               Date: 12/19/2018    Physician: Marina Dickerson MD  Primary Diagnosis: UTI  Altered Mental Status          Treatment Diagnosis  Treatment Diagnosis: muscle weakness  Precautions: Fall  Vital Signs  Vital Signs  Temp: 97.4 °F (36.3 °C)  Temp Source: Oral  Pulse (Heart Rate): (!) 47  Resp Rate: 14  O2 Sat (%): 99 %  BP: 127/66  MAP (Calculated): 86  Cognitive Status:  Mental Status  Neurologic State: Sleeping  Orientation Level: Oriented X4  Cognition: Appropriate for age attention/concentration; Follows commands  Pain  Pain Screen  Pain Scale 1: Numeric (0 - 10)  Pain Intensity 1: 0  Bed Mobility Training  Bed Mobility Training  Supine to Sit: Modified independent  Scooting: Modified independent  Balance  Sitting: Intact  Sitting - Static: Good (unsupported)  Sitting - Dynamic: Good (unsupported)  Standing: With support  Standing - Static: Good  Standing - Dynamic : Fair(((+)))  Transfer Training  Transfer Training  Sit to Stand: Modified independent  Stand to Sit: Modified independent  Bed to Chair: Modified independent  Sit to Stand: Modified independent  Gait Training  Gait  Base of Support: Center of gravity altered  Speed/Ebony: Slow  Step Length: Left shortened  Gait Abnormalities: Decreased step clearance  Ambulation - Level of Assistance: Supervision  Distance (ft): 270 Feet (ft)(x2)  Assistive Device: Gait belt;Walker, rolling  Rail Use: Both  Stairs - Level of Assistance: Supervision  Number of Stairs Trained: 10  Gait Abnormalities: Decreased step clearance   With 4 turns. Treatment:  Pt is at Mod (I) for all aspects of bed mobility, sit<>stand, transfers and gait of short distance. Therapist discussed pt's progress over phone with pt's son Alek Maria. Therapist advised home health therapy upon DC to address any continued concerns pt may have upon returning home. Pt's son reported he and his wife will be available to assist pt as needed also. Therapist advised pt with (S) for stair negotiation at this time and requires occasional cues for to improve left foot clearance with ambulation. Gait training rendered with abovementioned details and noted improved quality of turns today and less frequent cues for L step height. Additional gait training to promote step height for reduced risk of falls. Pt completed stepping over 1\" items without AD and CGA. Side stepping over items with hand held assist and verbal/visual cues for proper technique. NuStep Lv 3 x15' and Lv 4 x5' to promote B LE strength and overall functional capacity. Patient/Caregiver Education:   Pt /Caregiver Education on safety and fall prevention. DC planning discussed with pt and pt's son, see note above.      ASSESSMENT:  Patient continues to benefit from Skilled PT services for one additional session with DC planned for 12/21/18  Progression toward goals:  [x]      Improving appropriately and progressing toward goals  []      Improving slowly and progressing toward goals  []      Not making progress toward goals and plan of care will be adjusted     Treatment session: 60 minutes.   Therapist:   Sj Gaffney PTA,          12/19/2018

## 2018-12-20 LAB
ANION GAP SERPL CALC-SCNC: 7 MMOL/L (ref 3–18)
BASOPHILS # BLD: 0.2 K/UL (ref 0–0.1)
BASOPHILS NFR BLD: 2 % (ref 0–2)
BUN SERPL-MCNC: 30 MG/DL (ref 7–18)
BUN/CREAT SERPL: 20 (ref 12–20)
CALCIUM SERPL-MCNC: 9.1 MG/DL (ref 8.5–10.1)
CHLORIDE SERPL-SCNC: 109 MMOL/L (ref 100–108)
CO2 SERPL-SCNC: 24 MMOL/L (ref 21–32)
CREAT SERPL-MCNC: 1.53 MG/DL (ref 0.6–1.3)
DIFFERENTIAL METHOD BLD: ABNORMAL
EOSINOPHIL # BLD: 0.4 K/UL (ref 0–0.4)
EOSINOPHIL NFR BLD: 4 % (ref 0–5)
ERYTHROCYTE [DISTWIDTH] IN BLOOD BY AUTOMATED COUNT: 15.2 % (ref 11.6–14.5)
GLUCOSE SERPL-MCNC: 82 MG/DL (ref 74–99)
HCT VFR BLD AUTO: 35 % (ref 36–48)
HGB BLD-MCNC: 11.7 G/DL (ref 13–16)
LYMPHOCYTES # BLD: 2.7 K/UL (ref 0.9–3.6)
LYMPHOCYTES NFR BLD: 27 % (ref 21–52)
MCH RBC QN AUTO: 33.1 PG (ref 24–34)
MCHC RBC AUTO-ENTMCNC: 33.4 G/DL (ref 31–37)
MCV RBC AUTO: 98.9 FL (ref 74–97)
MONOCYTES # BLD: 0.9 K/UL (ref 0.05–1.2)
MONOCYTES NFR BLD: 9 % (ref 3–10)
NEUTS SEG # BLD: 5.7 K/UL (ref 1.8–8)
NEUTS SEG NFR BLD: 58 % (ref 40–73)
PLATELET # BLD AUTO: 305 K/UL (ref 135–420)
PMV BLD AUTO: 12 FL (ref 9.2–11.8)
POTASSIUM SERPL-SCNC: 4.6 MMOL/L (ref 3.5–5.5)
RBC # BLD AUTO: 3.54 M/UL (ref 4.7–5.5)
SODIUM SERPL-SCNC: 140 MMOL/L (ref 136–145)
WBC # BLD AUTO: 9.8 K/UL (ref 4.6–13.2)

## 2018-12-20 PROCEDURE — 80048 BASIC METABOLIC PNL TOTAL CA: CPT

## 2018-12-20 PROCEDURE — 74011250637 HC RX REV CODE- 250/637: Performed by: INTERNAL MEDICINE

## 2018-12-20 PROCEDURE — 85025 COMPLETE CBC W/AUTO DIFF WBC: CPT

## 2018-12-20 PROCEDURE — 36415 COLL VENOUS BLD VENIPUNCTURE: CPT

## 2018-12-20 RX ORDER — ATORVASTATIN CALCIUM 20 MG/1
20 TABLET, FILM COATED ORAL
Qty: 30 TAB | Refills: 0 | Status: SHIPPED | OUTPATIENT
Start: 2018-12-20 | End: 2019-01-08

## 2018-12-20 RX ORDER — CLOPIDOGREL BISULFATE 75 MG/1
75 TABLET ORAL DAILY
Qty: 30 TAB | Refills: 0 | Status: SHIPPED | OUTPATIENT
Start: 2018-12-20 | End: 2019-01-08 | Stop reason: SDUPTHER

## 2018-12-20 RX ORDER — CARVEDILOL 3.12 MG/1
3.12 TABLET ORAL 2 TIMES DAILY WITH MEALS
Qty: 60 TAB | Refills: 0 | Status: SHIPPED | OUTPATIENT
Start: 2018-12-20 | End: 2019-01-08 | Stop reason: SDUPTHER

## 2018-12-20 RX ADMIN — DONEPEZIL HYDROCHLORIDE 10 MG: 5 TABLET, FILM COATED ORAL at 08:49

## 2018-12-20 RX ADMIN — CARVEDILOL 3.12 MG: 3.12 TABLET, FILM COATED ORAL at 08:49

## 2018-12-20 RX ADMIN — Medication 1 CAPSULE: at 08:49

## 2018-12-20 RX ADMIN — CLOPIDOGREL BISULFATE 75 MG: 75 TABLET ORAL at 08:51

## 2018-12-20 RX ADMIN — ATORVASTATIN CALCIUM 20 MG: 20 TABLET, FILM COATED ORAL at 21:08

## 2018-12-20 RX ADMIN — ASPIRIN 325 MG ORAL TABLET 325 MG: 325 PILL ORAL at 08:49

## 2018-12-20 RX ADMIN — TAMSULOSIN HYDROCHLORIDE 0.4 MG: 0.4 CAPSULE ORAL at 21:08

## 2018-12-20 RX ADMIN — Medication 1 CAPSULE: at 17:51

## 2018-12-20 NOTE — PROGRESS NOTES
Problem: Self Care Deficits Care Plan (Adult)  Goal: *Acute Goals and Plan of Care (Insert Text)  OCCUPATIONAL THERAPY SHORT TERM GOALS      Updated 12/18/18    1. Patient will perform Lower body dressing with adaptive equipment as needed for energy conservation with Mod I. (goal met)  2. Patient will perform toileting task with Mod I with Good safety to reduce falls risk.  (goal met)  3. Patient will perform functional transfers to commode/shower chair with LRAD and Mod I. (goal met)  4. Patient will perform standing static/dynamic balance activities for improved ADL/IADL function with Mod I and Good  balance and safety awarenes. (goal met)  5. Patient will improve Barthel index scores to atleast 90/100 to improve functional mobility. (goal met)  6. Patient will perform bathroom mobility with LRAD and Mod I (goal met)    Updated 12/11/18    1. Patient will perform Upper body bathing with adaptive equipment as needed for energy conservation with Mod I. (goal met-D/C goal)  2. Patient will perform Lower body dressing with adaptive equipment as needed for energy conservation with supervision/set-up. (goal met-UPG Mod I)  3. Patient will perform toileting task with supervision/set-up with Good safety to reduce falls risk. (goal met-UPG Mod I)  4. Patient will perform functional transfers to commode/shower chair with LRAD and supervision/setup.  (goal met-UPG Mod I)  5. Patient will perform standing static/dynamic balance activities for improved ADL/IADL function with supervision/set-up and Fair +/Good - balance and safety awarenes. (goal met-UPG Mod I)  6. Patient will improve Barthel index scores to atleast 70/100 to improve functional mobility. (goal met-UPG 90/100)  7. Patient will perform bathroom mobility with LRAD and Supervision. (goal met-UPG Mod I)     Initiated 12/5/2018 and to be accomplished within 2 Week(s)    1.  Patient will perform Upper body bathing with adaptive equipment as needed for energy conservation with supervision/set up. (goal met-UPG Mod I)  2. Patient will perform Lower body dressing with adaptive equipment as needed for energy conservation with supervision/set-up . (progressing-currently SBA)  3. Patient will perform toileting task with supervision/set-up with Good safety to reduce falls risk. (progressing-currently SBA)  4. Patient will perform functional transfers to commode/shower chair with LRAD and supervision/setup.  (progressing-currently SBA)  5. Patient will perform standing static/dynamic balance activities for improved ADL/IADL function with supervision/set-up and Fair +/Good - balance and safety awarenes. (progressing-currently SBA)  6. Patient will improve Barthel index scores to atleast 70/100 to improve functional mobility.  (progressing)  7. Patient will perform bathroom mobility with LRAD and Supervision.  (progressing-currently SBA)    OCCUPATIONAL THERAPY LONG TERM GOALS   Initiated 12/5/2018 and to be accomplished within 4 Week(s)    1. Patient will perform Upper  body bathing with adaptive equipment as needed for ebergy conservation with modified independence. (goal met)  2. Patient will perform Lower body dressing with adaptive equipment as needed for energy conservation with modified independence. (goal met)  3. Patient will perform toileting task with modified independence with Good safety to reduce falls risk. (goal met)  4. Patient will perform functional transfers to commode and shower chair with LRAD and modified independence and Good balance and safety awareness. (goal met)  5. Patient will perform standing static/dynamic activity for improved ADL/IADL function with modified independence an and Good balance and safety awareness. (goal met)   6. Patient will improve Barthel index score to 80/100 to improve independence with mobility. (goal met)  7.  Patient will perform functional mobility within kitchen environment/bathroom with LRAD and modified independence. (goal met)    Therapist: Wang Huerta OT    2018            [facility]  OCCUPATIONAL THERAPY DISCHARGE SUMMARY      Patient: Alpesh Longo ([de-identified] y.o. male)               Date: 2018    Attending Physician: Jamarcus John MD  Primary Diagnosis: UTI  Altered Mental Status      Treatment Diagnosis  Treatment Diagnosis: muscle weakness         Precautions: Fall     Medical History:   Past Medical History:   Diagnosis Date    Advance directive in chart 2016    CAD (coronary artery disease)     Cancer ([address]     Colon    CKD (chronic kidney disease), stage III ([address] 3/27/2016    CVA (cerebral vascular accident) ([address] 3/26/2016    Femoral artery aneurysm, left ([address]     Heart attack ([address]     Heart attack ([address]     Hernia     History of TIAs     Hyperlipidemia     Hypertension 3/27/2016    Iliac artery aneurysm, left ([address]     Pure hypercholesterolemia 2012    PVD (peripheral vascular disease) ([address] 3/27/2016    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 ([address] 3/26/2016     Past Surgical History:   Procedure Laterality Date    ABDOMEN SURGERY PROC UNLISTED      3 stents placed into abdomen (groin)    CARDIAC SURG PROCEDURE UNLIST      Quadruple Bypass    CARDIAC SURG PROCEDURE UNLIST      Aortic pig valve placed    COLONOSCOPY N/A 2016    COLONOSCOPY performed by Shannon Lind MD at [facility], COLON, DIAGNOSTIC      HX AAA REPAIR      HX ORTHOPAEDIC      Right Leg Amputated       Reason for Discharge: [x]Goals Met   []Met highest potential  []Hospitalized   []  Progress ceased  []   []Other: Patient/family requesting D/C from facility. Discharge Location:  [x]Private Residence  [] penitentiary []LTC  [] [facility] [] 24 hr.  Supervision for safety    Summarize skilled services provided and significant progress attained since last daily/weekly note (include individualized treatment techniques and standardized tests):   Patient has received skilled OT services from 12/05/18 to 12/20/18 and has made Good progress towards their OT goals.   Improvements noted in: increased independence, performance, static standing balance needed for grooming, bathing, upper body dressing, lower body dressing, toileting, toilet transfer and simple home management    Summary of education/recommendation provided to Patient/Caregivers in the following areas: Remove barriers/rugs,  mobility w/Rolling Walker for grooming, bathing, upper body dressing, lower body dressing, toileting, toilet transfer and simple home management       Objective Data:      INITIAL ASSESSMENT DISCHARGE ASSESSMENT   COGNITIVE STATUS: COGNITIVE STATUS:   Neurologic State: Alert  Orientation Level: Oriented X4  Cognition: Follows commands  Perception: Appears intact  Perseveration: No perseveration noted Mental Status  Neurologic State: Alert  Orientation Level: Oriented X4  Cognition: Appropriate for age attention/concentration, Follows commands  Perception: Appears intact  Perseveration: No perseveration noted   PAIN: PAIN:   Pain Scale 1: Numeric (0 - 10)  Pain Intensity 1: 0  Patient Stated Pain Goal: 0  Pain Reassessment 1: Yes Pain Screen  Pain Scale 1: Numeric (0 - 10)  Pain Intensity 1: 0  Patient Stated Pain Goal: 0  Pain Reassessment 1: Patient sleeping   BED MOBILITY BED MOBILITY   Rolling: Supervision  Supine to Sit: Supervision  Sit to Supine: Stand-by assistance  Scooting: Supervision Bed Mobility  Rolling: Contact guard assistance  Supine to Sit: Modified independent  Sit to Supine: Stand-by assistance  Scooting: Modified independent   ADL SELF CARE ADL SELF CARE   Feeding: Independent  Oral Facial Hygiene/Grooming: Independent  Bathing: Contact guard assistance(juliane area only)  Type of Bath: Basin/Soap/Water  Upper Body Dressing: Setup  Lower Body Dressing: Minimum assistance  Toileting: Contact guard assistance Basic ADL  Feeding: Independent  Oral Facial Hygiene/Grooming: Independent  Bathing: Contact guard assistance(juliane area only)  Type of Bath: Basin/Soap/Water  Upper Body Dressing: Setup  Lower Body Dressing: Supervision(donning socks @ w/c level with leg cross tech)  Toileting: Contact guard assistance   ADL INTERVENTION ADL INTERVENTION   Bathing Assistance: Modified independent  Position Performed: (seated on commode) Upper Body Bathing  Bathing Assistance: Modified independent  Position Performed: Seated in chair    Upper Body Dressing Assistance  Dressing Assistance: Modified independent  Pullover Shirt: Modified independent    Lower Body Bathing  Bathing Assistance: Stand-by assistance(close)  Perineal  : Stand-by assistance  Position Performed: Standing  Lower Body : Supervision/set-up  Position Performed: Seated in chair    Toileting  Toileting Assistance: Modified independent  Bladder Hygiene: Modified independent  Bowel Hygiene: Contact guard assistance  Clothing Management: Modified independent             TRANSFERS TRANSFERS   Sit to Stand: Contact guard assistance  Stand to Sit: Contact guard assistance  Bed to Chair: Contact guard assistance  Bathroom Mobility: Contact guard assistance  Toilet Transfer : Contact guard assistance  Shower Transfer: Contact guard assistance Functional Transfers  Sit to Stand: Modified independent  Stand to Sit: Modified independent  Bed to Chair: Modified independent  Bathroom Mobility: Modified independent  Toilet Transfer : Modified independent  Shower Transfer: Contact guard assistance   BALANCE BALANCE   Sitting: Intact  Sitting - Static: Good (unsupported)  Sitting - Dynamic: Fair (occasional)  Standing: With support, Impaired  Standing - Static: Fair  Standing - Dynamic : Fair(-) Balance  Sitting: Intact  Sitting - Static: Good (unsupported)  Sitting - Dynamic: Good (unsupported)  Standing: With support  Standing - Static: Good  Standing - Dynamic : Fair   GROSS ASSESSMENT GROSS ASSESSMENT   AROM: Generally decreased, functional  PROM: Generally decreased, functional  Strength: Generally decreased, functional(B hip flex 4+,L knee flex 4, L knee ext 4/5) Gross Assessment  AROM: Generally decreased, functional  PROM: Generally decreased, functional  Strength: Generally decreased, functional(B hip flex 4+,L knee flex 4, L knee ext 4/5)   COORDINATION COORDINATION   Fine Motor Skills-Upper: Left Intact, Right Intact  Gross Motor Skills-Upper: Left Intact, Right Intact Coordination  Fine Motor Skills-Upper: Left Intact, Right Intact  Gross Motor Skills-Upper: Left Intact, Right Intact   VISUAL/PERCEPTUAL VISUAL/PERCEPTUAL             AUDITORY: AUDITORY:   Auditory Impairment: Hard of hearing, bilateral, Hearing aid(s)  Hearing Aids/Status: With patient Auditory  Auditory Impairment: Hard of hearing, right side, Hearing aid(s), Hard of hearing, left side(deaf left ear)  Hearing Aids/Status:  At home   I ADL'S:  I ADL'S:           THE BARTHEL INDEX    ACTIVITY   SCORE   FEEDING  0=unable  5=needs help cutting,spreading butter,etc., or modified diet  10= independent   10   BATHING  0=dependent  5=independent (or in shower   5   GROOMING  0=needs help  5=independent face/hair/teeth/shaving (implements provided)   10   DRESSING  0=dependent  5=needs help but can do about half unaided  10=independent(including buttons, zips,laces etc.)   10   BOWELS  0=incontinent  5=occasional accident  10=continent   10   BLADDER  0=incontinent, or catheterized and unable to manage alone  5=occasional accident  10=continent   10   TOILET USE  0=dependent  5=needs some help, but can do something alone  10=independent (on and off, dressing, wiping)   10   TRANSFER (BED TO CHAIR AND BACK)  0=unable, no sitting balance  5=major help(one or two people,physical), can sit  10=minor help(verbal or physical)  15=independent   15   MOBILITY (ON LEVEL SURFACES)  0=immobile or <50 yards  5=wheelchair independent,including corners,>50 yards  10=walkes with help of one person (verbal or physical) >50 yards  15=independent(but may use any aid; for example, stick) >50 yards   10   STAIRS  0=unable  5=needs help (verbal, physical, carrying aid)  10=independent   5              TOTAL:               95/100     Treatment :  Provided patient with UB HEP and green T-Band in order to increase good UB muscle strength and functional activity tolerance upon discharge home needed for ADL/IADL task. Patient verbalized understanding and demonstrated 100% carryover with each exercise. Discharge Recommendations:  [x] Home Exercise Program    [] Elevated toilet seat/3 in 1 commode   [] Shower Chair      []Shower Bench    [] Life Line/alert   [] Splint/orthotic application/schedule  [] Grab Bars: Location   [x] Home Health OT       [x] Rolling Walker      Treatment session:  30 minutes.     Therapist: Ezra Galloway, [address]      Date:12/20/2018

## 2018-12-20 NOTE — DISCHARGE SUMMARY
[facility]  Discharge Summary    Patient: Dawn Jefferson MRN: [id number]  CSN: [id number]    YOB: 1940  Age: [de-identified] y.o.   Sex: male    DOA: 12/4/2018 LOS:  LOS: 16 days   Discharge Date:      Admission Diagnoses: UTI  Altered Mental Status    Discharge Diagnoses:    Problem List as of 12/20/2018 Date Reviewed: 10/2/2018          Codes Class Noted - Resolved    History of CVA (cerebrovascular accident) ICD-10-CM: Z86.73  ICD-9-CM: V12.54  12/1/2018 - Present        History of MI (myocardial infarction) ICD-10-CM: I25.2  ICD-9-CM: 412  12/1/2018 - Present        Sepsis ([address] ICD-10-CM: A41.9  ICD-9-CM: 038.9, 995.91  12/1/2018 - Present        UTI (urinary tract infection) ICD-10-CM: N39.0  ICD-9-CM: 599.0  12/1/2018 - Present        Acute metabolic encephalopathy DILAN-79-SL: G93.41  ICD-9-CM: 348.31  12/1/2018 - Present        Cerebrovascular disease ICD-10-CM: I67.9  ICD-9-CM: 437.9  11/25/2018 - Present        Acute MI ([facility] ICD-10-CM: I21.9  ICD-9-CM: 410.90  11/25/2018 - Present        S/P CABG x 4 ICD-10-CM: Z95.1  ICD-9-CM: V45.81  11/25/2018 - Present    Overview Signed 11/25/2018 10:41 AM by Quincy Stephenson MD     [place], [place]             Chest pain ICD-10-CM: R07.9  ICD-9-CM: 786.50  11/24/2018 - Present        CAD (coronary artery disease) ICD-10-CM: I25.10  ICD-9-CM: 414.00  11/24/2018 - Present        Hypotension ICD-10-CM: I95.9  ICD-9-CM: 458.9  11/24/2018 - Present        Atherosclerosis of native artery of left lower extremity with intermittent claudication (HCC) ICD-10-CM: O12.490  ICD-9-CM: 440.21  7/9/2018 - Present        Vascular dementia without behavioral disturbance ICD-10-CM: F01.50  ICD-9-CM: 290.40  3/8/2017 - Present        Diastolic dysfunction [address]: I51.9  ICD-9-CM: 429.9  2/26/2017 - Present    Overview Signed 2/26/2017  9:29 AM by Quincy Stephenson MD     Grade 1 on echo 12/2016             AAA (abdominal aortic aneurysm) without rupture [facility]) ICD-10-CM: I71.4  ICD-9-CM: 441.4  1/4/2017 - Present        PAD (peripheral artery disease) ([facility] ICD-10-CM: I73.9  ICD-9-CM: 443.9  9/21/2016 - Present        Advance directive in chart ICD-10-CM: Z78.9  ICD-9-CM: V49.89  6/13/2016 - Present        S/P AVR ICD-10-CM: Z95.2  ICD-9-CM: V43.3  4/13/2016 - Present    Overview Addendum 2/26/2017  9:32 AM by Micky Le MD     2011 [place], [place] porcine bioprostheses  Transvalvular velocity 2.8 m/s, mean gradient 15 mmHg on 12/31/2016             Hypertension ICD-10-CM: I10  ICD-9-CM: 401.9  3/27/2016 - Present        CKD (chronic kidney disease), stage III ([facility] ICD-10-CM: N18.3  ICD-9-CM: 585.3  3/27/2016 - Present        Coronary artery disease involving native coronary artery without angina pectoris ICD-10-CM: I25.10  ICD-9-CM: 414.01  1/27/2016 - Present        Pure hypercholesterolemia ICD-10-CM: E78.00  ICD-9-CM: 272.0  9/14/2012 - Present        RESOLVED: TIA (transient ischemic attack) ICD-10-CM: G45.9  ICD-9-CM: 435.9  4/14/2018 - 10/2/2018        RESOLVED: Type 2 diabetes mellitus with nephropathy ([facility] ICD-10-CM: E11.21  ICD-9-CM: 250.40, 583.81  1/3/2018 - 4/4/2018        RESOLVED: Urinary frequency ICD-10-CM: R35.0  ICD-9-CM: 788.41  3/8/2017 - 4/20/2017        RESOLVED: Femoral artery aneurysm, left (HCC) ICD-10-CM: I72.4  ICD-9-CM: 442.3  1/4/2017 - 5/18/2017        RESOLVED: Iliac aneurysm ([facility] ICD-10-CM: I72.3  ICD-9-CM: 442.2  11/8/2016 - 11/29/2016        RESOLVED: Femoral artery aneurysm (HCC) ICD-10-CM: I72.4  ICD-9-CM: 442.3  11/8/2016 - 11/29/2016        RESOLVED: Atherosclerosis of both lower extremities with intermittent claudication ([facility] [address] [address] ICD-10-CM: Q29.749  ICD-9-CM: 440.21  11/8/2016 - 11/29/2016        RESOLVED: Preoperative cardiovascular examination ICD-10-CM: Z01.810  ICD-9-CM: V72.81  10/30/2016 - 11/29/2016        RESOLVED: Iliac artery aneurysm, left (HCC) ICD-10-CM: I72.3  ICD-9-CM: 442.2  10/19/2016 - 11/29/2016        RESOLVED: Femoral artery aneurysm, left (HCC) ICD-10-CM: I72.4  ICD-9-CM: 442.3  10/19/2016 - 11/29/2016        RESOLVED: Hemispheric carotid artery syndrome ICD-10-CM: G45.1  ICD-9-CM: 435.8  9/21/2016 - 11/29/2016        RESOLVED: Carotid stenosis, symptomatic w/o infarct ICD-10-CM: I65.29  ICD-9-CM: 433.10  9/21/2016 - 11/29/2016        RESOLVED: Atherosclerosis of native arteries of extremities with intermittent claudication, bilateral legs ([facility] ICD-10-CM: P09.573  ICD-9-CM: 440.21  9/21/2016 - 11/29/2016        RESOLVED: History of AAA (abdominal aortic aneurysm) repair ICD-10-CM: K46.108  ICD-9-CM: V45.89  9/21/2016 - 11/29/2016        RESOLVED: Colon cancer ([facility] ICD-10-CM: C18.9  ICD-9-CM: 153.9  4/13/2016 - 6/13/2016        RESOLVED: Diabetes mellitus type 2, controlled ([facility] ICD-10-CM: E11.9  ICD-9-CM: 250.00  3/27/2016 - 4/4/2018        RESOLVED: 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 ([facility] ICD-10-CM: I63.411  ICD-9-CM: 434.11  3/26/2016 - 6/13/2016        RESOLVED: Essential hypertension, malignant ICD-10-CM: I10  ICD-9-CM: 401.0  9/14/2012 - 12/16/2013              Discharge Condition: Good    Discharge To: Home with [facility] Course: Mr. Elisabeth Arroyo is a [de-identified] yr old male patient. Patient was recently hospitalized 12/1- 12/4. Patient was seen ER for eval for weakness, fatigue and change in speech. Patient had CT of head , which was (-), MRI of brain (-) for acute stroke. . Report that he was back to baseline in the ER. Patient was found to have UTI and was started on IVabx. He improved, leukocytosis also improved. Patient was sent to [facility] for rehab due to deconditioning. Since admitted to [facility], no reported issues. No report of NVD, fever or chills. No report of ER visit or hospitalization. His appetite is good, no drastic weight loss or gain. VSS remains stable. Patient received PT from 12/5-12/20 and tolerated and did make progress toward.  His stay was uneventful. Patient is stable for d/c home with [place], he agree on 12/201/2018      PAST MEDICAL HX : CAD, CKD, CVA, Hx of TIA, HTN, hyperlipidemia, PVD, Heart Attack     PAST SURGICAL hx: Abdomen surgery 3 stent, colonoscopy, hx AAA, hx Orthopedic     SOCIAL hx:       ALLERGIES: ACE inhibitor     FAMILY hx: HTN, heart Disease     ROS: No fever or chills.  No weight loss or headache.  No sore throat. No chest pain.  No palpitation.  No shortness of breath or cough.  No nausea, vomiting, diarrhea.  No dysuria or polyuria.  No back pain or leg pain.  No heat or cold intolerance.  No anxiety or depression. Consults: None    Significant Diagnostic Studies: labs: see below    Discharge Medications:     Current Discharge Medication List      START taking these medications    Details   !! atorvastatin (LIPITOR) 20 mg tablet Take 1 Tab by mouth nightly. Qty: 30 Tab, Refills: 0      fish oil-omega-3 fatty acids 340-1,000 mg capsule Take 1 Cap by mouth two (2) times a day. Qty: 60 Cap, Refills: 0       !! - Potential duplicate medications found. Please discuss with provider. CONTINUE these medications which have CHANGED    Details   carvedilol (COREG) 3.125 mg tablet Take 1 Tab by mouth two (2) times daily (with meals). Qty: 60 Tab, Refills: 0      clopidogrel (PLAVIX) 75 mg tab Take 1 Tab by mouth daily. Qty: 30 Tab, Refills: 0         CONTINUE these medications which have NOT CHANGED    Details   !! atorvastatin (LIPITOR) 20 mg tablet Take 1 Tab by mouth daily. Qty: 80 Tab, Refills: 4    Associated Diagnoses: Coronary artery disease involving native coronary artery of native heart without angina pectoris; Pure hypercholesterolemia; AAA (abdominal aortic aneurysm) without rupture ([address] Essential hypertension; CKD (chronic kidney disease), stage III ([address] Diastolic dysfunction; Vascular dementia without behavioral disturbance      tamsulosin (FLOMAX) 0.4 mg capsule Take 1 Cap by mouth nightly.   Qty: 90 Cap, Refills: 4    Associated Diagnoses: Benign prostatic hyperplasia with urinary frequency; AAA (abdominal aortic aneurysm) without rupture (HCC); PAD (peripheral artery disease) ([facility] Vascular dementia without behavioral disturbance; Diastolic dysfunction; Essential hypertension; CKD (chronic kidney disease), stage III ([address] Coronary artery disease involving native coronary artery of native heart without angina pectoris; Pure hypercholesterolemia      aspirin (ASPIRIN) 325 mg tablet Take 1 Tab by mouth daily. Qty: 80 Tab, Refills: 4    Associated Diagnoses: Coronary artery disease involving native coronary artery of native heart without angina pectoris; Pure hypercholesterolemia; AAA (abdominal aortic aneurysm) without rupture ([address] Essential hypertension; CKD (chronic kidney disease), stage III ([address] Diastolic dysfunction; Vascular dementia without behavioral disturbance; Transient cerebral ischemia, unspecified type      donepezil (ARICEPT) 10 mg tablet TAKE 2 TABLETS BY MOUTH EVERY MORNING AFTER A MEAL  Qty: 180 Tab, Refills: 1    Comments: **Patient requests 90 days supply**      NITROGLYCERIN (NITROSTAT SL) 1 Tab by SubLINGual route as needed (chest pain). 1 tab q5 minutes as needed for chest pain and call 911      omega-3 fatty acids-vitamin e (FISH OIL) 1,000 mg cap Take 1 Cap by mouth two (2) times a day. Qty: 180 Cap, Refills: 4    Associated Diagnoses: Pure hypercholesterolemia; Routine general medical examination at a health care facility       ! ! - Potential duplicate medications found. Please discuss with provider.       STOP taking these medications       levoFLOXacin (LEVAQUIN) 750 mg tablet Comments:   Reason for Stopping:             Objective:      Visit Vitals  /60   Pulse 73   Temp 97.4   Resp 18   Ht 5' 8\" (1.727 m)   Wt 94.1 kg (207 lb 6.4 oz)   SpO2 98%   BMI 31.54 kg/m²         Physical Exam:  General appearance: alert, cooperative, no distress, appears stated age  [de-identified]: negative  Neck: supple, symmetrical, trachea midline, no adenopathy, thyroid: not enlarged, symmetric, no tenderness/mass/nodules, no carotid bruit and no JVD  Lungs: clear to auscultation bilaterally  Heart: regular rate and rhythm, S1, S2 normal, no murmur, click, rub or gallop  Abdomen: soft, non-tender. Bowel sounds normal. No masses,  no organomegaly  Pulses: 2+ and symmetric  Skin: Skin color, texture, turgor normal. No rashes or lesions  Extre: R BKA         Results for Shahid Vidal (MRN [id number]) as of 12/20/2018 15:25   Ref. Range 12/20/2018 03:55   WBC Latest Ref Range: 4.6 - 13.2 K/uL 9.8   RBC Latest Ref Range: 4.70 - 5.50 M/uL 3.54 (L)   HGB Latest Ref Range: 13.0 - 16.0 g/dL 11.7 (L)   HCT Latest Ref Range: 36.0 - 48.0 % 35.0 (L)   MCV Latest Ref Range: 74.0 - 97.0 FL 98.9 (H)   MCH Latest Ref Range: 24.0 - 34.0 PG 33.1   MCHC Latest Ref Range: 31.0 - 37.0 g/dL 33.4   RDW Latest Ref Range: 11.6 - 14.5 % 15.2 (H)   PLATELET Latest Ref Range: 135 - 420 K/uL 305   MPV Latest Ref Range: 9.2 - 11.8 FL 12.0 (H)   NEUTROPHILS Latest Ref Range: 40 - 73 % 58   LYMPHOCYTES Latest Ref Range: 21 - 52 % 27   MONOCYTES Latest Ref Range: 3 - 10 % 9   EOSINOPHILS Latest Ref Range: 0 - 5 % 4   BASOPHILS Latest Ref Range: 0 - 2 % 2   DF Latest Units:   AUTOMATED   ABS. NEUTROPHILS Latest Ref Range: 1.8 - 8.0 K/UL 5.7   ABS. LYMPHOCYTES Latest Ref Range: 0.9 - 3.6 K/UL 2.7   ABS. MONOCYTES Latest Ref Range: 0.05 - 1.2 K/UL 0.9   ABS. EOSINOPHILS Latest Ref Range: 0.0 - 0.4 K/UL 0.4   ABS.  BASOPHILS Latest Ref Range: 0.0 - 0.1 K/UL 0.2 (H)   Sodium Latest Ref Range: 136 - 145 mmol/L 140   Potassium Latest Ref Range: 3.5 - 5.5 mmol/L 4.6   Chloride Latest Ref Range: 100 - 108 mmol/L 109 (H)   CO2 Latest Ref Range: 21 - 32 mmol/L 24   Anion gap Latest Ref Range: 3.0 - 18 mmol/L 7   Glucose Latest Ref Range: 74 - 99 mg/dL 82   BUN Latest Ref Range: 7.0 - 18 MG/DL 30 (H)   Creatinine Latest Ref Range: 0.6 - 1.3 MG/DL 1.53 (H)   BUN/Creatinine ratio Latest Ref Range: 12 - 20   20   Calcium Latest Ref Range: 8.5 - 10.1 MG/DL 9.1   GFR est non-AA Latest Ref Range: >60 ml/min/1.73m2 44 (L)   GFR est AA Latest Ref Range: >60 ml/min/1.73m2 53 (L)       ASSESSMENT/PLANS  Weakness : stroke work up (-)  UTI: completed abx  Hx of TIA: he is on ASA,Plavix and STATIN  CAD: continue ASA  BPH: continue Flomax  CKD: follow with pcp for continue monitoring , creatinine 1.53 upon d/c   History of aortic valve replacement. HTN:  bp stable not on any meds  Home health: Home health for eval of PT/OT  DME: rolling walker  Activity: Activity as tolerated  Diet: Cardiac Diet  Wound Care: None needed  Follow-up: Follow up with PCP Md. Frannie Jimenez in 7-10 days after rehab.     Flu test 10/17/2018  Time spent >30mins  Zeyad Dias NP  12/20/2018, 3:26 PM

## 2018-12-20 NOTE — PROGRESS NOTES
Problem: Mobility Impaired (Adult and Pediatric)  Goal: *Acute Goals and Plan of Care (Insert Text)  PHYSICAL THERAPY LTG GOALS :  Initiated 12/5/2018 and to be accomplished within 4 Weeks (Updated 12/20/18)     1. Patient will move from supine to sit and sit to supine  in bed with modified independence. (Achieved)  2. Patient will transfer from bed to chair and chair to bed with supervision/set-up using RW. (Achieved)  3. Patient will perform sit to stand with RW supervision/set-up with Good balance and safety awareness. (Achieved)  4. Patient will ambulate with supervision/set-up for 200 feet with RW on level surfaces and be able to maneuver through narrow spaces and obstacles without loss of balance. (Progressing; 270ft with RW and (S), cues for safety with obstacles and narrow spaces)   5. Patient will ascend/descend 1 stairs with bilateral handrail(s) with supervision/set-up to allow for safe home access/exit. (Achieved)  6. Patient will improve standardized test score for 4/5 [facility] Standing Balance Scale. (Achieved)    PHYSICAL THERAPY LTG GOALS :  Initiated 12/5/2018 and to be accomplished within 4 Weeks (Updated 12/18/18)     1. Patient will move from supine to sit and sit to supine  in bed with modified independence. (Achieved)  2. Patient will transfer from bed to chair and chair to bed with supervision/set-up using RW. (Achieved)  3. Patient will perform sit to stand with RW supervision/set-up with Good balance and safety awareness. (Achieved)  4. Patient will ambulate with supervision/set-up for 200 feet with RW on level surfaces and be able to maneuver through narrow spaces and obstacles without loss of balance. (Progressing; 270ft with RW and (S), cues for safety with obstacles and narrow spaces)   5. Patient will ascend/descend 1 stairs with bilateral handrail(s) with supervision/set-up to allow for safe home access/exit. (Achieved)  6.   Patient will improve standardized test score for 4/5 [facility] Standing Balance Scale. (Achieved)    PHYSICAL THERAPY STG GOALS :  Initiated 12/5/2018 and to be accomplished within 2 Weeks (Updated 12/11/18)     1. Patient will move from supine to sit and sit to supine  in bed with SBA. (Achieved)   2. Patient will transfer from bed to chair and chair to bed with SBA using RW. (Achieved)   3. Patient will perform sit to stand with RW close super vision with Fair balance and safety awareness. (Achieved)   4. Patient will ambulate with close supervision for 100 feet with RW on level surfaces with 2 turns. (Achieved)   5. Patient will ascend/descend 1 stairs with bilateral handrail(s) with SBA to allow for safe home access/exit. (Achieved)   6. Patient will improve standardized test score for 3/5 [facility] Standing Balance Scale. (Achieved)     PHYSICAL THERAPY LTG GOALS :  Initiated 12/5/2018 and to be accomplished within 4 Weeks (Updated 12/11/18)     1. Patient will move from supine to sit and sit to supine  in bed with modified independence. (Progressing; (S))  2.  Patient will transfer from bed to chair and chair to bed with supervision/set-up using RW. (Progressing; close (S))  3.  Patient will perform sit to stand with RW supervision/set-up with Good balance and safety awareness. (Progressing; close (S))  4. Patient will ambulate with supervision/set-up for 200 feet with RW on level surfaces and be able to maneuver through narrow spaces and obstacles without loss of balance. (Progressing; 261ft with RW and close (S), cues for safety with obstacles and narrow spaces)   5. Patient will ascend/descend 1 stairs with bilateral handrail(s) with supervision/set-up to allow for safe home access/exit. (Progressing; 10 steps with B HR and close (S))  6. Patient will improve standardized test score for 4/5 [facility] Standing Balance Scale.  (Progressing; 3+/5)    Physical Therapist:   Bennie Bonilla  on [facility]/5/[facility] Discharge Summary      Patient: Zack Spencer [de-identified][de-identified] y.o. male)                  Date: 2018    Attending Physician: Tiny Travis MD  Primary Diagnosis: UTI  Altered Mental Status      Treatment Diagnosis  Treatment Diagnosis: muscle weakness         Precautions: Fall   MEDICAL HISTORY:   Past Medical History:   Diagnosis Date    Advance directive in chart 2016    CAD (coronary artery disease)     Cancer ([address]     Colon    CKD (chronic kidney disease), stage III ([address] 3/27/2016    CVA (cerebral vascular accident) ([address] 3/26/2016    Femoral artery aneurysm, left ([address]     Heart attack ([address]     Heart attack ([address]     Hernia     History of TIAs     Hyperlipidemia     Hypertension 3/27/2016    Iliac artery aneurysm, left ([address]     Pure hypercholesterolemia 2012    PVD (peripheral vascular disease) ([address] 3/27/2016    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 ([address] 3/26/2016     Past Surgical History:   Procedure Laterality Date    ABDOMEN SURGERY PROC UNLISTED      3 stents placed into abdomen (groin)    CARDIAC SURG PROCEDURE UNLIST      Quadruple Bypass    CARDIAC SURG PROCEDURE UNLIST      Aortic pig valve placed    COLONOSCOPY N/A 2016    COLONOSCOPY performed by Rosalina Arriaga MD at [facility], COLON, DIAGNOSTIC      HX AAA REPAIR      HX ORTHOPAEDIC      Right Leg Amputated       Reason for Discharge: [x] Goals Met   []Met highest potential  []    [] Progress ceased  []Hospitalized  []Other: Pt/family request for early D/C from facility. Discharge Location:  [x] Private Residence  [] [facility]  [] LTC  []  [address] []Other: 24 hr.supervision for safety. Summarize skilled services provided and significant progress attained since last daily/weekly note (include individualized treatment techniques and standardized tests):   This Patient has received skilled PT services from 12/5/18  through 12/20/18 and has made Good progress towards their PT goals.     Improvements noted in sit<>stand, transfers, balance, gait and stair negotiation   Summary of education/recommendation provided to Patient/Caregivers:    Pt. Education provided to use Rolling Walker      Objective Data:     INITIAL  ASSESSMENT DISCHARGE ASSESSMENT   COGNITIVE STATUS COGNITIVE STATUS   Neurologic State: Alert  Orientation Level: Oriented X4  Cognition: Follows commands  Perception: Appears intact  Perseveration: No perseveration noted Neurologic State: Alert  Orientation Level: Oriented X4  Cognition: Appropriate for age attention/concentration, Follows commands  Perception: Appears intact  Perseveration: No perseveration noted   PAIN PAIN   Pain Scale 1: Numeric (0 - 10)  Pain Intensity 1: 0  Patient Stated Pain Goal: 0  Pain Reassessment 1: Yes Pain Scale 1: Numeric (0 - 10)  Pain Intensity 1: 0  Patient Stated Pain Goal: 0  Pain Reassessment 1: Patient sleeping   GROSS ASSESSMENT GROSS ASSESSMENT   AROM: Generally decreased, functional  PROM: Generally decreased, functional  Strength: Generally decreased, functional(B hip flex 4+,L knee flex 4, L knee ext 4/5) AROM: Generally decreased, functional  PROM: Generally decreased, functional  Strength: Generally decreased, functional(B hip flex 4+,L knee flex 4, L knee ext 4/5)   BED MOBILITY BED MOBILITY   Rolling: Supervision  Supine to Sit: Supervision  Sit to Supine: Stand-by assistance  Scooting: Supervision Rolling: Contact guard assistance  Supine to Sit: Modified independent  Sit to Supine: Stand-by assistance  Scooting: Modified independent   GAIT GAIT   Base of Support: Center of gravity altered, Narrowed  Speed/Ebony: Slow, Shuffled  Step Length: Left shortened  Gait Abnormalities: Decreased step clearance, Foot drop  Ambulation - Level of Assistance: Contact guard assistance  Distance (ft): 22 Feet (ft)  Assistive Device: Gait belt, Walker, rolling  Rail Use: Both  Stairs - Level of Assistance: Contact guard assistance  Number of Stairs Trained: 10  Interventions: Verbal cues, Safety awareness training Base of Support: Center of gravity altered  Speed/Ebony: Slow  Step Length: Left shortened  Gait Abnormalities: Decreased step clearance  Ambulation - Level of Assistance: Supervision  Distance (ft): 270 Feet (ft)(x2)  Assistive Device: Gait belt, Walker, rolling  Rail Use: Both  Stairs - Level of Assistance: Supervision  Number of Stairs Trained: 10  Interventions: Safety awareness training                      With 4 turns. TRANSFERS TRANSFERS   Sit to Stand: Contact guard assistance  Stand to Sit: Contact guard assistance  Bed to Chair: Contact guard assistance Sit to Stand: Modified independent  Stand to Sit: Modified independent  Bed to Chair: Modified independent   BALANCE BALANCE   Sitting: Intact  Sitting - Static: Good (unsupported)  Sitting - Dynamic: Fair (occasional)  Standing: With support, Impaired  Standing - Static: Fair  Standing - Dynamic : Fair(-) Sitting: Intact  Sitting - Static: Good (unsupported)  Sitting - Dynamic: Good (unsupported)  Standing: With support  Standing - Static: Good  Standing - Dynamic : Fair   WHEELCHAIR MOBILITY/MGMT WHEELCHAIR MOBILITY/MGMT          With 0 turns   Visual/Perceptual           Auditory:   Auditory  Auditory Impairment: Hard of hearing, right side, Hearing aid(s), Hard of hearing, left side(deaf left ear)  Hearing Aids/Status: At home         Visual/Perceptual           Auditory:   Auditory  Auditory Impairment: Hard of hearing, right side, Hearing aid(s), Hard of hearing, left side(deaf left ear)  Hearing Aids/Status: At home          Activity Tolerance:  Good                     Treatment:   Pt demonstrated Mod (I) with all aspects of bed mobility, sit<>stand, transfers, ambulation of short distances, and donning of R prosthetic limb.  Pt completed gait training [id number] with RW and (S) with no noted LOB, intermittent verbal cues for L foot clearance. Therapist provided pt with HEP and reviewed TE's with pt. Pt advised if doing high knee walking as done during therapy, pt should have SBA/CGA and not do this exercise unassisted. Pt reported no additional questions/concners with therapy at this time. Discharge Recommendations:  [x]  Home Exercise Program     [x]  Home Health PT   [x]  Remove throw rugs/clear environmental barriers    [x]  Assistance with: (S) with steps      [x]  Ambulation Device: Rolling Walker   [x]  Steps with both & (S)   [x]  Wheelchair (Pt reported having a w/c and power scooter at home)   []  Life Line/alert   [x]  WC  Ramp       Treatment minutes: 30 minutes.     Therapist :  Martha Jimenez, SWETHA            Date:12/20/2018

## 2018-12-20 NOTE — ROUTINE PROCESS
Bedside and Verbal shift change report given to Cristhian Matias RN (oncoming nurse) by Jan Whaley LPN  (offgoing nurse). Report included the following information SBAR, Kardex, MAR and Recent Results.

## 2018-12-21 VITALS
RESPIRATION RATE: 12 BRPM | WEIGHT: 207 LBS | DIASTOLIC BLOOD PRESSURE: 71 MMHG | TEMPERATURE: 98.6 F | BODY MASS INDEX: 31.37 KG/M2 | SYSTOLIC BLOOD PRESSURE: 139 MMHG | HEIGHT: 68 IN | OXYGEN SATURATION: 99 % | HEART RATE: 59 BPM

## 2018-12-21 PROCEDURE — 74011250637 HC RX REV CODE- 250/637: Performed by: INTERNAL MEDICINE

## 2018-12-21 RX ADMIN — ASPIRIN 325 MG ORAL TABLET 325 MG: 325 PILL ORAL at 08:59

## 2018-12-21 RX ADMIN — Medication 1 CAPSULE: at 08:59

## 2018-12-21 RX ADMIN — DONEPEZIL HYDROCHLORIDE 10 MG: 5 TABLET, FILM COATED ORAL at 08:59

## 2018-12-21 RX ADMIN — CLOPIDOGREL BISULFATE 75 MG: 75 TABLET ORAL at 08:59

## 2018-12-21 NOTE — PROGRESS NOTES
Home care referral sent [facility] via [facility] and called to intake rep, Reynaldo Olson.     Care Management Interventions  Transition of Care Consult (CM Consult): [address]: Yes  Plan discussed with Pt/Family/Caregiver: Yes  Freedom of Choice Offered: Yes  Discharge Location  Discharge Placement: Home with home health

## 2018-12-21 NOTE — ROUTINE PROCESS
Bedside and Verbal shift change report given to Nickolas Carballo RN (oncoming nurse) by Tomer Abel LPN  (offgoing nurse). Report included the following information SBAR, Kardex, MAR and Recent Results.

## 2018-12-21 NOTE — PROGRESS NOTES
YUMIKO spoke with pt re: home health referral. Pt requested a referral to [address] [place] [address] and was given a copy. YUMIKO left message for the home care liaison re: referral for home health and pt's d/c home today. Home health for PT/OT eval and treat and skilled nursing for medication management and medical monitoring. Pt to follow-up with PCP in 7-10 days. Unit secretary to obtain follow-up appt.

## 2018-12-21 NOTE — ROUTINE PROCESS
Bedside and Verbal shift change report given to maria esther ace (oncoming nurse) by Israel Meadows (offgoing nurse). Report included the following information SBAR, Intake/Output and Recent Results.

## 2018-12-23 ENCOUNTER — HOME CARE VISIT (OUTPATIENT)
Dept: SCHEDULING | Facility: HOME HEALTH | Age: 78
End: 2018-12-23
Payer: MEDICARE

## 2018-12-23 PROCEDURE — G0299 HHS/HOSPICE OF RN EA 15 MIN: HCPCS

## 2018-12-24 ENCOUNTER — PATIENT OUTREACH (OUTPATIENT)
Dept: FAMILY MEDICINE CLINIC | Age: 78
End: 2018-12-24

## 2018-12-24 ENCOUNTER — TELEPHONE (OUTPATIENT)
Dept: FAMILY MEDICINE CLINIC | Age: 78
End: 2018-12-24

## 2018-12-24 VITALS
SYSTOLIC BLOOD PRESSURE: 128 MMHG | DIASTOLIC BLOOD PRESSURE: 78 MMHG | RESPIRATION RATE: 16 BRPM | OXYGEN SATURATION: 99 % | HEART RATE: 82 BPM | TEMPERATURE: 98 F

## 2018-12-24 NOTE — TELEPHONE ENCOUNTER
Yovany Harper from [facility] home health care called questioning why the home health order was denied. Please advise, thank you.

## 2018-12-24 NOTE — PROGRESS NOTES
12/24/18 11:05AM    Patient was a readmit to [facility] 12/1-12/4/18 for CVA, then transferred to [address] [place] [address],[address] and discharged 12/20/18. Follow up call placed to member, phone number was for [employer], on MARIOLA form. LALA w/ contact information. Patient was discharged home with [place].

## 2018-12-26 ENCOUNTER — HOME CARE VISIT (OUTPATIENT)
Dept: HOME HEALTH SERVICES | Facility: HOME HEALTH | Age: 78
End: 2018-12-26
Payer: MEDICARE

## 2018-12-26 ENCOUNTER — PATIENT OUTREACH (OUTPATIENT)
Dept: FAMILY MEDICINE CLINIC | Age: 78
End: 2018-12-26

## 2018-12-26 NOTE — PROGRESS NOTES
Hospital Discharge Follow-Up      Date/Time:  2018 1:05 PM    Patient was admitted to [facility] on 18 and discharged on 18 for MI, readmit 18-18 for CVA. The physician discharge summary was available at the time of outreach. Patient was contacted within 2 business days of discharge. Top Challenges reviewed with the provider   NA         Method of communication with provider :chart routing    Inpatient RRAT score: High Risk            45       Total Score        3 Has Seen PCP in Last 6 Months (Yes=3, No=0)    4 IP Visits Last 12 Months (1-3=4, 4=9, >4=11)    38 Charlson Comorbidity Score (Age + Comorbid Conditions)        Criteria that do not apply:    . Living with Significant Other. Assisted Living. LTAC. SNF. or   Rehab    Patient Length of Stay (>5 days = 3)    Pt. Coverage (Medicare=5 , Medicaid, or Self-Pay=4)        Was this a readmission? yes   Patient stated reason for the readmission: CVA    Nurse Navigator (NN) contacted the family by telephone to perform post hospital discharge assessment. Verified name and  with family as identifiers. Provided introduction to self, and explanation of the Nurse Navigator role. Reviewed discharge instructions and red flags with family who verbalized understanding. Family given an opportunity to ask questions and does not have any further questions or concerns at this time. The family agrees to contact the PCP office for questions related to their healthcare. NN provided contact information for future reference. Disease Specific:   N/A    Summary of patient's top problems:  1. Follow up appointments  2. Medication compliance  3.      Home Health orders at discharge: PT, [address]: [facility]  Date of initial visit: 18    Durable Medical Equipment ordered/company: NA  Durable Medical Equipment received: NA    Barriers to care? level of motivation    Advance Care Planning:   Does patient have an Advance Directive:  reviewed and current     Medication(s):   New Medications at Discharge: fish oil, lipitor and plavix  Changed Medications at Discharge: NA  Discontinued Medications at Discharge: [facility]    Medication reconciliation was performed with family, who verbalizes understanding of administration of home medications. There were no barriers to obtaining medications identified at this time. Referral to Pharm D needed: no     Current Outpatient Medications   Medication Sig    clopidogrel (PLAVIX) 75 mg tab Take 75 mg by mouth daily.  carvedilol (COREG) 3.125 mg tablet Take 3.125 mg by mouth two (2) times daily (with meals).  atorvastatin (LIPITOR) 20 mg tablet Take 1 Tab by mouth nightly.  carvedilol (COREG) 3.125 mg tablet Take 1 Tab by mouth two (2) times daily (with meals).  clopidogrel (PLAVIX) 75 mg tab Take 1 Tab by mouth daily.  fish oil-omega-3 fatty acids 340-1,000 mg capsule Take 1 Cap by mouth two (2) times a day.  atorvastatin (LIPITOR) 20 mg tablet Take 1 Tab by mouth daily.  tamsulosin (FLOMAX) 0.4 mg capsule Take 1 Cap by mouth nightly.  aspirin (ASPIRIN) 325 mg tablet Take 1 Tab by mouth daily.  donepezil (ARICEPT) 10 mg tablet TAKE 2 TABLETS BY MOUTH EVERY MORNING AFTER A MEAL    NITROGLYCERIN (NITROSTAT SL) 1 Tab by SubLINGual route as needed (chest pain). 1 tab q5 minutes as needed for chest pain and call 911    omega-3 fatty acids-vitamin e (FISH OIL) 1,000 mg cap Take 1 Cap by mouth two (2) times a day.  amLODIPine (NORVASC) 10 mg tablet Take 10 mg by mouth daily.  hydroCHLOROthiazide (HYDRODIURIL) 25 mg tablet Take 25 mg by mouth daily. No current facility-administered medications for this visit. There are no discontinued medications.     [facility] follow up appointment(s):   Future Appointments   Date Time Provider Susannah Beltre   1/1/2019 To Be Determined Arsen Mata LPN [facility]   1/3/2019 To Be Determined Arsen aMta LPN [place] [address]   1/8/2019 To Be Determined Caitie Lyons, LPN [address]   1/8/2019  8:30 AM Chadwick Pagan., DO [facility] BEATA SCHED   1/10/2019 To Be Determined Caitie Lyons, LPN [address]   1/21/2019  1:30 PM Chadwick Pagan., DO [facility] BEATA SCHED   5/9/2019  8:00 AM [facility] IM[facility] 2 2VV BEATA SCHED   5/9/2019  9:00 AM [facility] IMAGING 2 2VV BEATA SCHED   5/9/2019 10:00 AM [facility] IMAGING 2 2VV BEATA SCHED   5/9/2019 11:00 AM [facility] NONIMAGING 2VV BEATA SCHED   5/23/2019 10:45 AM Karina Robertson PA 2VV BEATA SCHED      Non-[facility] follow up appointment(s): NA  [facility]:  n/a       Goals      Understands red flags post discharge.       Patient will be able to verbalize s/s of stroke  Patient will be able to ambulate independently'  Patient will be able to return to pre stroke level of functioning

## 2018-12-27 ENCOUNTER — HOME CARE VISIT (OUTPATIENT)
Dept: SCHEDULING | Facility: HOME HEALTH | Age: 78
End: 2018-12-27
Payer: MEDICARE

## 2018-12-27 PROCEDURE — G0151 HHCP-SERV OF PT,EA 15 MIN: HCPCS

## 2018-12-27 NOTE — TELEPHONE ENCOUNTER
Returned call to Karri Higuerater from  [address] [facility] ph # [phone] (identified self and office) to let her know that per Dr. Shea Gravely he was not the person to initially start the order and hence the reason  the home health order was denied and that he saw the patient 3 months ago. As stated by Todd Norris the order was made by the hospital. She stated that then the patient will not have any more home health.  Mr. Graciela Carrel has an appointment scheduled with provider on 1/8/2019  and 1/21/2019 Please advise

## 2018-12-28 ENCOUNTER — HOME CARE VISIT (OUTPATIENT)
Dept: HOME HEALTH SERVICES | Facility: HOME HEALTH | Age: 78
End: 2018-12-28
Payer: MEDICARE

## 2018-12-31 ENCOUNTER — HOME CARE VISIT (OUTPATIENT)
Dept: SCHEDULING | Facility: HOME HEALTH | Age: 78
End: 2018-12-31
Payer: MEDICARE

## 2018-12-31 ENCOUNTER — HOME CARE VISIT (OUTPATIENT)
Dept: HOME HEALTH SERVICES | Facility: HOME HEALTH | Age: 78
End: 2018-12-31
Payer: MEDICARE

## 2018-12-31 VITALS
TEMPERATURE: 98.1 F | DIASTOLIC BLOOD PRESSURE: 84 MMHG | HEART RATE: 52 BPM | SYSTOLIC BLOOD PRESSURE: 142 MMHG | OXYGEN SATURATION: 98 %

## 2018-12-31 VITALS
TEMPERATURE: 98.1 F | OXYGEN SATURATION: 98 % | HEART RATE: 52 BPM | DIASTOLIC BLOOD PRESSURE: 84 MMHG | SYSTOLIC BLOOD PRESSURE: 142 MMHG

## 2018-12-31 PROCEDURE — G0157 HHC PT ASSISTANT EA 15: HCPCS

## 2018-12-31 PROCEDURE — G0152 HHCP-SERV OF OT,EA 15 MIN: HCPCS

## 2019-01-01 ENCOUNTER — HOME CARE VISIT (OUTPATIENT)
Dept: SCHEDULING | Facility: HOME HEALTH | Age: 79
End: 2019-01-01
Payer: MEDICARE

## 2019-01-01 VITALS
SYSTOLIC BLOOD PRESSURE: 118 MMHG | OXYGEN SATURATION: 98 % | DIASTOLIC BLOOD PRESSURE: 78 MMHG | TEMPERATURE: 98.6 F | HEART RATE: 56 BPM | RESPIRATION RATE: 20 BRPM

## 2019-01-01 PROCEDURE — G0496 LPN CARE TRAIN/EDU IN HH: HCPCS

## 2019-01-02 ENCOUNTER — HOME CARE VISIT (OUTPATIENT)
Dept: SCHEDULING | Facility: HOME HEALTH | Age: 79
End: 2019-01-02
Payer: MEDICARE

## 2019-01-02 VITALS
HEART RATE: 54 BPM | SYSTOLIC BLOOD PRESSURE: 118 MMHG | DIASTOLIC BLOOD PRESSURE: 90 MMHG | OXYGEN SATURATION: 95 % | TEMPERATURE: 97.9 F

## 2019-01-02 PROCEDURE — G0157 HHC PT ASSISTANT EA 15: HCPCS

## 2019-01-03 ENCOUNTER — HOME CARE VISIT (OUTPATIENT)
Dept: SCHEDULING | Facility: HOME HEALTH | Age: 79
End: 2019-01-03
Payer: MEDICARE

## 2019-01-03 VITALS
SYSTOLIC BLOOD PRESSURE: 120 MMHG | DIASTOLIC BLOOD PRESSURE: 82 MMHG | RESPIRATION RATE: 18 BRPM | OXYGEN SATURATION: 98 % | HEART RATE: 58 BPM | TEMPERATURE: 98.5 F

## 2019-01-03 PROCEDURE — G0496 LPN CARE TRAIN/EDU IN HH: HCPCS

## 2019-01-04 ENCOUNTER — HOME CARE VISIT (OUTPATIENT)
Dept: SCHEDULING | Facility: HOME HEALTH | Age: 79
End: 2019-01-04
Payer: MEDICARE

## 2019-01-04 VITALS
HEART RATE: 50 BPM | OXYGEN SATURATION: 98 % | DIASTOLIC BLOOD PRESSURE: 82 MMHG | SYSTOLIC BLOOD PRESSURE: 117 MMHG | TEMPERATURE: 97.7 F

## 2019-01-04 PROCEDURE — G0157 HHC PT ASSISTANT EA 15: HCPCS

## 2019-01-07 ENCOUNTER — HOME CARE VISIT (OUTPATIENT)
Dept: SCHEDULING | Facility: HOME HEALTH | Age: 79
End: 2019-01-07
Payer: MEDICARE

## 2019-01-07 ENCOUNTER — HOME CARE VISIT (OUTPATIENT)
Dept: HOME HEALTH SERVICES | Facility: HOME HEALTH | Age: 79
End: 2019-01-07
Payer: MEDICARE

## 2019-01-07 VITALS
TEMPERATURE: 97.9 F | SYSTOLIC BLOOD PRESSURE: 122 MMHG | HEART RATE: 5 BPM | DIASTOLIC BLOOD PRESSURE: 66 MMHG | OXYGEN SATURATION: 95 %

## 2019-01-07 VITALS
OXYGEN SATURATION: 98 % | DIASTOLIC BLOOD PRESSURE: 70 MMHG | HEART RATE: 52 BPM | SYSTOLIC BLOOD PRESSURE: 118 MMHG | RESPIRATION RATE: 18 BRPM | TEMPERATURE: 98.2 F

## 2019-01-07 PROCEDURE — G0157 HHC PT ASSISTANT EA 15: HCPCS

## 2019-01-07 PROCEDURE — G0299 HHS/HOSPICE OF RN EA 15 MIN: HCPCS

## 2019-01-08 ENCOUNTER — OFFICE VISIT (OUTPATIENT)
Dept: FAMILY MEDICINE CLINIC | Age: 79
End: 2019-01-08

## 2019-01-08 VITALS
BODY MASS INDEX: 31.37 KG/M2 | OXYGEN SATURATION: 100 % | DIASTOLIC BLOOD PRESSURE: 74 MMHG | HEIGHT: 68 IN | HEART RATE: 60 BPM | WEIGHT: 207 LBS | SYSTOLIC BLOOD PRESSURE: 110 MMHG | TEMPERATURE: 97.5 F | RESPIRATION RATE: 18 BRPM

## 2019-01-08 DIAGNOSIS — I25.10 CORONARY ARTERY DISEASE INVOLVING NATIVE CORONARY ARTERY OF NATIVE HEART WITHOUT ANGINA PECTORIS: ICD-10-CM

## 2019-01-08 DIAGNOSIS — I10 ESSENTIAL HYPERTENSION: ICD-10-CM

## 2019-01-08 DIAGNOSIS — E78.00 PURE HYPERCHOLESTEROLEMIA: ICD-10-CM

## 2019-01-08 DIAGNOSIS — I71.40 AAA (ABDOMINAL AORTIC ANEURYSM) WITHOUT RUPTURE: ICD-10-CM

## 2019-01-08 DIAGNOSIS — R35.0 BENIGN PROSTATIC HYPERPLASIA WITH URINARY FREQUENCY: ICD-10-CM

## 2019-01-08 DIAGNOSIS — I51.89 DIASTOLIC DYSFUNCTION: ICD-10-CM

## 2019-01-08 DIAGNOSIS — F01.50 VASCULAR DEMENTIA WITHOUT BEHAVIORAL DISTURBANCE (HCC): ICD-10-CM

## 2019-01-08 DIAGNOSIS — N40.1 BENIGN PROSTATIC HYPERPLASIA WITH URINARY FREQUENCY: ICD-10-CM

## 2019-01-08 DIAGNOSIS — N18.30 CKD (CHRONIC KIDNEY DISEASE), STAGE III (HCC): ICD-10-CM

## 2019-01-08 DIAGNOSIS — G45.9 TRANSIENT CEREBRAL ISCHEMIA, UNSPECIFIED TYPE: ICD-10-CM

## 2019-01-08 DIAGNOSIS — I73.9 PAD (PERIPHERAL ARTERY DISEASE) (HCC): ICD-10-CM

## 2019-01-08 PROBLEM — G93.41 ACUTE METABOLIC ENCEPHALOPATHY: Status: RESOLVED | Noted: 2018-12-01 | Resolved: 2019-01-08

## 2019-01-08 PROBLEM — N39.0 UTI (URINARY TRACT INFECTION): Status: RESOLVED | Noted: 2018-12-01 | Resolved: 2019-01-08

## 2019-01-08 PROBLEM — A41.9 SEPSIS (HCC): Status: RESOLVED | Noted: 2018-12-01 | Resolved: 2019-01-08

## 2019-01-08 PROBLEM — I95.9 HYPOTENSION: Status: RESOLVED | Noted: 2018-11-24 | Resolved: 2019-01-08

## 2019-01-08 PROBLEM — I67.9 CEREBROVASCULAR DISEASE: Status: RESOLVED | Noted: 2018-11-25 | Resolved: 2019-01-08

## 2019-01-08 PROBLEM — R07.9 CHEST PAIN: Status: RESOLVED | Noted: 2018-11-24 | Resolved: 2019-01-08

## 2019-01-08 PROBLEM — I21.9 ACUTE MI (HCC): Status: RESOLVED | Noted: 2018-11-25 | Resolved: 2019-01-08

## 2019-01-08 RX ORDER — ATORVASTATIN CALCIUM 20 MG/1
20 TABLET, FILM COATED ORAL DAILY
Qty: 90 TAB | Refills: 4 | Status: SHIPPED | OUTPATIENT
Start: 2019-01-08 | End: 2019-04-03 | Stop reason: SDUPTHER

## 2019-01-08 RX ORDER — HYDROCHLOROTHIAZIDE 25 MG/1
25 TABLET ORAL DAILY
Qty: 90 TAB | Refills: 4 | Status: SHIPPED | OUTPATIENT
Start: 2019-01-08 | End: 2019-04-03 | Stop reason: SDUPTHER

## 2019-01-08 RX ORDER — TAMSULOSIN HYDROCHLORIDE 0.4 MG/1
0.4 CAPSULE ORAL
Qty: 90 CAP | Refills: 4 | Status: SHIPPED | OUTPATIENT
Start: 2019-01-08 | End: 2019-04-03 | Stop reason: SDUPTHER

## 2019-01-08 RX ORDER — NITROGLYCERIN 0.4 MG/1
0.4 TABLET SUBLINGUAL AS NEEDED
Qty: 25 TAB | Refills: 12 | Status: SHIPPED | OUTPATIENT
Start: 2019-01-08 | End: 2019-07-22 | Stop reason: SDUPTHER

## 2019-01-08 RX ORDER — CLOPIDOGREL BISULFATE 75 MG/1
75 TABLET ORAL DAILY
Qty: 90 TAB | Refills: 4 | Status: SHIPPED | OUTPATIENT
Start: 2019-01-08 | End: 2019-12-24 | Stop reason: SDUPTHER

## 2019-01-08 RX ORDER — CARVEDILOL 3.12 MG/1
3.12 TABLET ORAL 2 TIMES DAILY WITH MEALS
Qty: 180 TAB | Refills: 4 | Status: SHIPPED | OUTPATIENT
Start: 2019-01-08

## 2019-01-08 RX ORDER — ASPIRIN 325 MG
325 TABLET ORAL DAILY
Qty: 90 TAB | Refills: 4 | Status: SHIPPED | OUTPATIENT
Start: 2019-01-08 | End: 2020-01-01 | Stop reason: ALTCHOICE

## 2019-01-08 NOTE — PROGRESS NOTES
1. Have you been to the ER, urgent care clinic since your last visit? Hospitalized since your last visit? Yes  [facility] admission for 3 weeks. See admission notes        2. Have you seen or consulted any other health care providers outside of the [address] [place] [address] since your last visit? Include any pap smears or colon screening.  No

## 2019-01-08 NOTE — PROGRESS NOTES
Alina Watts is a [de-identified] y.o.  male and presents with    Chief Complaint   Patient presents with    Cholesterol Problem    Abdominal Aortic Aneurysm    Chronic Kidney Disease    Hypertension    Coronary Artery Disease    Dementia           Subjective:  Pt admitted with MI on 11/24  D/c on 11/28  Then readmitted on 12/1 with CVA  D/c on `12/20  NN contact 2 days post  Here for gucci  Problems updated  hosp recoresd  Reviewed  Med updated and refresh    Cardiovascular Review:  The patient has hypertension, hyperlipidemia, coronary artery disease, history of prior stroke and hx of mi. Diet and Lifestyle: not attempting to follow a low fat, low cholesterol diet  Home BP Monitoring: is not measured at home. Pertinent ROS: taking medications as instructed, no medication side effects noted, no TIA's, no chest pain on exertion, no dyspnea on exertion, no swelling of ankles.    crd ondgiong  AAA ongiong  Dementia ongiong      Additional Concerns:          Patient Active Problem List   Diagnosis Code    Pure hypercholesterolemia E78.00    Hypertension I10    CKD (chronic kidney disease), stage III ([address] N18.3    S/P AVR Z95.2    Advance directive in chart Z78.9    AAA (abdominal aortic aneurysm) without rupture ([address] I71.4    Vascular dementia without behavioral disturbance F01.50    Atherosclerosis of native artery of left lower extremity with intermittent claudication ([address] I70.212    CAD (coronary artery disease) I25.10    S/P CABG x 4 Z95.1    History of CVA (cerebrovascular accident) Z80.78    History of MI (myocardial infarction) I25.2     Patient Active Problem List    Diagnosis Date Noted    History of CVA (cerebrovascular accident) 12/01/2018    History of MI (myocardial infarction) 12/01/2018    S/P CABG x 4 11/25/2018    CAD (coronary artery disease) 11/24/2018    Atherosclerosis of native artery of left lower extremity with intermittent claudication ([address] 07/09/2018    Vascular dementia without behavioral disturbance 03/08/2017    AAA (abdominal aortic aneurysm) without rupture ([address] 01/04/2017    Advance directive in chart 06/13/2016    S/P AVR 04/13/2016    Hypertension 03/27/2016    CKD (chronic kidney disease), stage III (HCC) 03/27/2016    Pure hypercholesterolemia 09/14/2012     Current Outpatient Medications   Medication Sig Dispense Refill    aspirin (ASPIRIN) 325 mg tablet Take 1 Tab by mouth daily. 90 Tab 4    tamsulosin (FLOMAX) 0.4 mg capsule Take 1 Cap by mouth nightly. 90 Cap 4    atorvastatin (LIPITOR) 20 mg tablet Take 1 Tab by mouth daily. 90 Tab 4    carvedilol (COREG) 3.125 mg tablet Take 1 Tab by mouth two (2) times daily (with meals). 180 Tab 4    hydroCHLOROthiazide (HYDRODIURIL) 25 mg tablet Take 1 Tab by mouth daily. 90 Tab 4    clopidogrel (PLAVIX) 75 mg tab Take 1 Tab by mouth daily. 90 Tab 4    nitroglycerin (NITROSTAT) 0.4 mg SL tablet 1 Tab by SubLINGual route as needed (chest pain).  1 tab q5 minutes as needed for chest pain and call 911 25 Tab 12     Allergies   Allergen Reactions    Ace Inhibitors Other (comments)     Per pt, Cardiologist states he cannot have ACE inhibitors     Past Medical History:   Diagnosis Date    Advance directive in chart 6/13/2016    CAD (coronary artery disease)     Cancer ([address] 2003    Colon    CKD (chronic kidney disease), stage III ([address] 3/27/2016    CVA (cerebral vascular accident) ([address] 3/26/2016    Femoral artery aneurysm, left ([address]     Heart attack ([address] 2008    Heart attack ([address]     Hernia     History of TIAs     Hyperlipidemia     Hypertension 3/27/2016    Iliac artery aneurysm, left ([address]     Pure hypercholesterolemia 9/14/2012    PVD (peripheral vascular disease) ([address] 3/27/2016    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 ([address] 3/26/2016     Past Surgical History:   Procedure Laterality Date    ABDOMEN SURGERY PROC UNLISTED  2011    3 stents placed into abdomen (groin)    CARDIAC SURG PROCEDURE UNLIST      Quadruple Bypass    CARDIAC SURG PROCEDURE UNLIST      Aortic pig valve placed    COLONOSCOPY N/A 2016    [facility]COPY    ENDOSCOPY, COLON, DIAGNOSTIC      HX AAA REPAIR      HX ORTHOPAEDIC  2008    Right Leg Amputated     Family History   Problem Relation Age of Onset    Hypertension Mother     Hypertension Father     Heart Disease Father     Heart Disease Brother      Social History     Tobacco Use    Smoking status: Former Smoker     Last attempt to quit: 1987     Years since quittin.3    Smokeless tobacco: Never Used   Substance Use Topics    Alcohol use: No     Comment: wine 1-2x per week       ROS       All other systems reviewed and are negative.       Objective:  Vitals:    19 0839   BP: 110/74   Pulse: 60   Resp: 18   Temp: 97.5 °F (36.4 °C)   TempSrc: Oral   SpO2: 100%   Weight: 207 lb (93.9 kg)   Height: 5' 8\" (1.727 m)   PainSc:   0 - No pain                 alert, well appearing, and in no distress, oriented to person, place, and time and normal appearing weight  Chest - clear to auscultation, no wheezes, rales or rhonchi, symmetric air entry  Heart - normal rate, regular rhythm, normal S1, S2, no murmurs, rubs, clicks or gallops  Abdomen - soft, nontender, nondistended, no masses or organomegaly        LABS   Component      Latest Ref Rng & Units 2018           3:55 AM  3:55 AM   WBC      4.6 - 13.2 K/uL 9.8    RBC      4.70 - 5.50 M/uL 3.54 (L)    HGB      13.0 - 16.0 g/dL 11.7 (L)    HCT      36.0 - 48.0 % 35.0 (L)    MCV      74.0 - 97.0 FL 98.9 (H)    MCH      24.0 - 34.0 PG 33.1    MCHC      31.0 - 37.0 g/dL 33.4    RDW      11.6 - 14.5 % 15.2 (H)    PLATELET      062 - 712 K/uL 305    MPV      9.2 - 11.8 FL 12.0 (H)    NEUTROPHILS      40 - 73 % 58    LYMPHOCYTES      21 - 52 % 27    MONOCYTES      3 - 10 % 9    EOSINOPHILS      0 - 5 % 4    BASOPHILS      0 - 2 % 2    ABS. NEUTROPHILS      1.8 - 8.0 K/UL 5.7    ABS. LYMPHOCYTES      0.9 - 3.6 K/UL 2.7    ABS. MONOCYTES      0.05 - 1.2 K/UL 0.9    ABS. EOSINOPHILS      0.0 - 0.4 K/UL 0.4    ABS. BASOPHILS      0.0 - 0.1 K/UL 0.2 (H)    DF       AUTOMATED    Sodium      136 - 145 mmol/L  140   Potassium      3.5 - 5.5 mmol/L  4.6   Chloride      100 - 108 mmol/L  109 (H)   CO2      21 - 32 mmol/L  24   Anion gap      3.0 - 18 mmol/L  7   Glucose      74 - 99 mg/dL  82   BUN      7.0 - 18 MG/DL  30 (H)   Creatinine      0.6 - 1.3 MG/DL  1.53 (H)   BUN/Creatinine ratio      12 - 20    20   GFR est AA      >60 ml/min/1.73m2  53 (L)   GFR est non-AA      >60 ml/min/1.73m2  44 (L)   Calcium      8.5 - 10.1 MG/DL  9.1   TESTS      Assessment/Plan:    Hypertension - stable  Hyperlipidemia - stable  Coronary artery disease - stable  AAA ongoing  [facility]    Lab review: labs are reviewed, up to date and normal    Diagnoses and all orders for this visit:    1. Coronary artery disease involving native coronary artery of native heart without angina pectoris  -     aspirin (ASPIRIN) 325 mg tablet; Take 1 Tab by mouth daily. -     tamsulosin (FLOMAX) 0.4 mg capsule; Take 1 Cap by mouth nightly. -     atorvastatin (LIPITOR) 20 mg tablet; Take 1 Tab by mouth daily. 2. Pure hypercholesterolemia  -     aspirin (ASPIRIN) 325 mg tablet; Take 1 Tab by mouth daily. -     tamsulosin (FLOMAX) 0.4 mg capsule; Take 1 Cap by mouth nightly. -     atorvastatin (LIPITOR) 20 mg tablet; Take 1 Tab by mouth daily. 3. AAA (abdominal aortic aneurysm) without rupture (HCC)  -     aspirin (ASPIRIN) 325 mg tablet; Take 1 Tab by mouth daily. -     tamsulosin (FLOMAX) 0.4 mg capsule; Take 1 Cap by mouth nightly. -     atorvastatin (LIPITOR) 20 mg tablet; Take 1 Tab by mouth daily. 4. Essential hypertension  -     aspirin (ASPIRIN) 325 mg tablet; Take 1 Tab by mouth daily. -     tamsulosin (FLOMAX) 0.4 mg capsule;  Take 1 Cap by mouth nightly. -     atorvastatin (LIPITOR) 20 mg tablet; Take 1 Tab by mouth daily. 5. CKD (chronic kidney disease), stage III (HCC)  -     aspirin (ASPIRIN) 325 mg tablet; Take 1 Tab by mouth daily. -     tamsulosin (FLOMAX) 0.4 mg capsule; Take 1 Cap by mouth nightly. -     atorvastatin (LIPITOR) 20 mg tablet; Take 1 Tab by mouth daily. 6. Diastolic dysfunction  -     aspirin (ASPIRIN) 325 mg tablet; Take 1 Tab by mouth daily. -     tamsulosin (FLOMAX) 0.4 mg capsule; Take 1 Cap by mouth nightly. -     atorvastatin (LIPITOR) 20 mg tablet; Take 1 Tab by mouth daily. 7. Vascular dementia without behavioral disturbance  -     aspirin (ASPIRIN) 325 mg tablet; Take 1 Tab by mouth daily. -     tamsulosin (FLOMAX) 0.4 mg capsule; Take 1 Cap by mouth nightly. -     atorvastatin (LIPITOR) 20 mg tablet; Take 1 Tab by mouth daily. 8. Transient cerebral ischemia, unspecified type  -     aspirin (ASPIRIN) 325 mg tablet; Take 1 Tab by mouth daily. 9. Benign prostatic hyperplasia with urinary frequency  -     tamsulosin (FLOMAX) 0.4 mg capsule; Take 1 Cap by mouth nightly. 10. PAD (peripheral artery disease) ([facility])  -     tamsulosin (FLOMAX) 0.4 mg capsule; Take 1 Cap by mouth nightly. Other orders  -     carvedilol (COREG) 3.125 mg tablet; Take 1 Tab by mouth two (2) times daily (with meals). -     hydroCHLOROthiazide (HYDRODIURIL) 25 mg tablet; Take 1 Tab by mouth daily. -     clopidogrel (PLAVIX) 75 mg tab; Take 1 Tab by mouth daily. -     nitroglycerin (NITROSTAT) 0.4 mg SL tablet; 1 Tab by SubLINGual route as needed (chest pain). 1 tab q5 minutes as needed for chest pain and call 911          I have discussed the diagnosis with the patient and the intended plan as seen in the above orders. The patient has received an after-visit summary and questions were answered concerning future plans. I have discussed medication side effects and warnings with the patient as well.  I have reviewed the plan of care with the patient, accepted their input and they are in agreement with the treatment goals.      Follow-up Disposition: Not on File

## 2019-01-09 ENCOUNTER — HOME CARE VISIT (OUTPATIENT)
Dept: SCHEDULING | Facility: HOME HEALTH | Age: 79
End: 2019-01-09
Payer: MEDICARE

## 2019-01-09 VITALS
TEMPERATURE: 97.6 F | OXYGEN SATURATION: 97 % | SYSTOLIC BLOOD PRESSURE: 119 MMHG | HEART RATE: 66 BPM | DIASTOLIC BLOOD PRESSURE: 80 MMHG

## 2019-01-09 PROCEDURE — G0151 HHCP-SERV OF PT,EA 15 MIN: HCPCS

## 2019-01-10 ENCOUNTER — HOME CARE VISIT (OUTPATIENT)
Dept: SCHEDULING | Facility: HOME HEALTH | Age: 79
End: 2019-01-10
Payer: MEDICARE

## 2019-01-10 VITALS
SYSTOLIC BLOOD PRESSURE: 106 MMHG | TEMPERATURE: 98.3 F | RESPIRATION RATE: 18 BRPM | OXYGEN SATURATION: 98 % | DIASTOLIC BLOOD PRESSURE: 58 MMHG | HEART RATE: 56 BPM

## 2019-01-10 PROCEDURE — G0494 LPN CARE EA 15MIN HH/HOSPICE: HCPCS

## 2019-01-11 ENCOUNTER — HOME CARE VISIT (OUTPATIENT)
Dept: SCHEDULING | Facility: HOME HEALTH | Age: 79
End: 2019-01-11
Payer: MEDICARE

## 2019-01-11 VITALS
DIASTOLIC BLOOD PRESSURE: 76 MMHG | TEMPERATURE: 97.6 F | HEART RATE: 66 BPM | OXYGEN SATURATION: 95 % | SYSTOLIC BLOOD PRESSURE: 114 MMHG

## 2019-01-11 PROCEDURE — G0157 HHC PT ASSISTANT EA 15: HCPCS

## 2019-01-14 ENCOUNTER — HOME CARE VISIT (OUTPATIENT)
Dept: SCHEDULING | Facility: HOME HEALTH | Age: 79
End: 2019-01-14
Payer: MEDICARE

## 2019-01-14 PROCEDURE — G0157 HHC PT ASSISTANT EA 15: HCPCS

## 2019-01-15 VITALS
TEMPERATURE: 97.6 F | DIASTOLIC BLOOD PRESSURE: 84 MMHG | HEART RATE: 81 BPM | OXYGEN SATURATION: 97 % | SYSTOLIC BLOOD PRESSURE: 128 MMHG

## 2019-01-17 ENCOUNTER — HOME CARE VISIT (OUTPATIENT)
Dept: SCHEDULING | Facility: HOME HEALTH | Age: 79
End: 2019-01-17
Payer: MEDICARE

## 2019-01-17 PROCEDURE — G0157 HHC PT ASSISTANT EA 15: HCPCS

## 2019-01-20 VITALS
DIASTOLIC BLOOD PRESSURE: 72 MMHG | OXYGEN SATURATION: 96 % | HEART RATE: 61 BPM | TEMPERATURE: 97.8 F | SYSTOLIC BLOOD PRESSURE: 120 MMHG

## 2019-01-21 ENCOUNTER — HOME CARE VISIT (OUTPATIENT)
Dept: SCHEDULING | Facility: HOME HEALTH | Age: 79
End: 2019-01-21
Payer: MEDICARE

## 2019-01-21 PROCEDURE — G0157 HHC PT ASSISTANT EA 15: HCPCS

## 2019-01-22 ENCOUNTER — DOCUMENTATION ONLY (OUTPATIENT)
Dept: FAMILY MEDICINE CLINIC | Age: 79
End: 2019-01-22

## 2019-01-22 NOTE — PROGRESS NOTES
Patient did not arrive to their scheduled appointment on 1/21/19. No show letter #1 sent on 01/22/19. Thank you.

## 2019-01-23 ENCOUNTER — HOME CARE VISIT (OUTPATIENT)
Dept: SCHEDULING | Facility: HOME HEALTH | Age: 79
End: 2019-01-23
Payer: MEDICARE

## 2019-01-23 VITALS — SYSTOLIC BLOOD PRESSURE: 141 MMHG | HEART RATE: 61 BPM | TEMPERATURE: 97.4 F | DIASTOLIC BLOOD PRESSURE: 82 MMHG

## 2019-01-23 PROCEDURE — G0151 HHCP-SERV OF PT,EA 15 MIN: HCPCS

## 2019-01-30 ENCOUNTER — OFFICE VISIT (OUTPATIENT)
Dept: FAMILY MEDICINE CLINIC | Age: 79
End: 2019-01-30

## 2019-01-30 ENCOUNTER — HOSPITAL ENCOUNTER (OUTPATIENT)
Dept: LAB | Age: 79
Discharge: HOME | End: 2019-01-30
Payer: MEDICARE

## 2019-01-30 VITALS
HEIGHT: 68 IN | SYSTOLIC BLOOD PRESSURE: 114 MMHG | RESPIRATION RATE: 18 BRPM | BODY MASS INDEX: 31.49 KG/M2 | HEART RATE: 57 BPM | TEMPERATURE: 97.3 F | DIASTOLIC BLOOD PRESSURE: 79 MMHG | WEIGHT: 207.8 LBS | OXYGEN SATURATION: 99 %

## 2019-01-30 DIAGNOSIS — F01.50 VASCULAR DEMENTIA WITHOUT BEHAVIORAL DISTURBANCE (HCC): ICD-10-CM

## 2019-01-30 DIAGNOSIS — G45.9 TRANSIENT CEREBRAL ISCHEMIA, UNSPECIFIED TYPE: ICD-10-CM

## 2019-01-30 DIAGNOSIS — I71.40 AAA (ABDOMINAL AORTIC ANEURYSM) WITHOUT RUPTURE: ICD-10-CM

## 2019-01-30 DIAGNOSIS — I73.9 PAD (PERIPHERAL ARTERY DISEASE) (HCC): ICD-10-CM

## 2019-01-30 DIAGNOSIS — N18.30 CKD (CHRONIC KIDNEY DISEASE), STAGE III (HCC): ICD-10-CM

## 2019-01-30 DIAGNOSIS — I10 ESSENTIAL HYPERTENSION: ICD-10-CM

## 2019-01-30 DIAGNOSIS — R35.0 BENIGN PROSTATIC HYPERPLASIA WITH URINARY FREQUENCY: ICD-10-CM

## 2019-01-30 DIAGNOSIS — N40.1 BENIGN PROSTATIC HYPERPLASIA WITH URINARY FREQUENCY: ICD-10-CM

## 2019-01-30 DIAGNOSIS — E78.00 PURE HYPERCHOLESTEROLEMIA: ICD-10-CM

## 2019-01-30 DIAGNOSIS — I51.89 DIASTOLIC DYSFUNCTION: ICD-10-CM

## 2019-01-30 DIAGNOSIS — I25.10 CORONARY ARTERY DISEASE INVOLVING NATIVE CORONARY ARTERY OF NATIVE HEART WITHOUT ANGINA PECTORIS: ICD-10-CM

## 2019-01-30 DIAGNOSIS — I25.10 CORONARY ARTERY DISEASE INVOLVING NATIVE CORONARY ARTERY OF NATIVE HEART WITHOUT ANGINA PECTORIS: Primary | ICD-10-CM

## 2019-01-30 LAB
ALBUMIN SERPL-MCNC: 4 G/DL (ref 3.4–5)
ALBUMIN/GLOB SERPL: 1.2 {RATIO} (ref 0.8–1.7)
ALP SERPL-CCNC: 138 U/L (ref 45–117)
ALT SERPL-CCNC: 20 U/L (ref 16–61)
ANION GAP SERPL CALC-SCNC: 6 MMOL/L (ref 3–18)
APPEARANCE UR: CLEAR
AST SERPL-CCNC: 13 U/L (ref 15–37)
BASOPHILS # BLD: 0.1 K/UL (ref 0–0.1)
BASOPHILS NFR BLD: 1 % (ref 0–2)
BILIRUB SERPL-MCNC: 0.5 MG/DL (ref 0.2–1)
BILIRUB UR QL: NEGATIVE
BUN SERPL-MCNC: 32 MG/DL (ref 7–18)
BUN/CREAT SERPL: 19 (ref 12–20)
CALCIUM SERPL-MCNC: 10.1 MG/DL (ref 8.5–10.1)
CHLORIDE SERPL-SCNC: 106 MMOL/L (ref 100–108)
CHOLEST SERPL-MCNC: 135 MG/DL
CO2 SERPL-SCNC: 29 MMOL/L (ref 21–32)
COLOR UR: YELLOW
CREAT SERPL-MCNC: 1.71 MG/DL (ref 0.6–1.3)
DIFFERENTIAL METHOD BLD: ABNORMAL
EOSINOPHIL # BLD: 0.3 K/UL (ref 0–0.4)
EOSINOPHIL NFR BLD: 3 % (ref 0–5)
ERYTHROCYTE [DISTWIDTH] IN BLOOD BY AUTOMATED COUNT: 15.4 % (ref 11.6–14.5)
GLOBULIN SER CALC-MCNC: 3.3 G/DL (ref 2–4)
GLUCOSE SERPL-MCNC: 124 MG/DL (ref 74–99)
GLUCOSE UR STRIP.AUTO-MCNC: NEGATIVE MG/DL
HCT VFR BLD AUTO: 41.1 % (ref 36–48)
HDLC SERPL-MCNC: 33 MG/DL (ref 40–60)
HDLC SERPL: 4.1 {RATIO} (ref 0–5)
HGB BLD-MCNC: 13.5 G/DL (ref 13–16)
HGB UR QL STRIP: NEGATIVE
KETONES UR QL STRIP.AUTO: NEGATIVE MG/DL
LDLC SERPL CALC-MCNC: 49.8 MG/DL (ref 0–100)
LEUKOCYTE ESTERASE UR QL STRIP.AUTO: NEGATIVE
LIPID PROFILE,FLP: ABNORMAL
LYMPHOCYTES # BLD: 2.2 K/UL (ref 0.9–3.6)
LYMPHOCYTES NFR BLD: 18 % (ref 21–52)
MCH RBC QN AUTO: 33 PG (ref 24–34)
MCHC RBC AUTO-ENTMCNC: 32.8 G/DL (ref 31–37)
MCV RBC AUTO: 100.5 FL (ref 74–97)
MONOCYTES # BLD: 0.6 K/UL (ref 0.05–1.2)
MONOCYTES NFR BLD: 5 % (ref 3–10)
NEUTS SEG # BLD: 9.4 K/UL (ref 1.8–8)
NEUTS SEG NFR BLD: 73 % (ref 40–73)
NITRITE UR QL STRIP.AUTO: NEGATIVE
PH UR STRIP: 5.5 [PH] (ref 5–8)
PLATELET # BLD AUTO: 363 K/UL (ref 135–420)
PMV BLD AUTO: 10.7 FL (ref 9.2–11.8)
POTASSIUM SERPL-SCNC: 5.3 MMOL/L (ref 3.5–5.5)
PROT SERPL-MCNC: 7.3 G/DL (ref 6.4–8.2)
PROT UR STRIP-MCNC: NEGATIVE MG/DL
RBC # BLD AUTO: 4.09 M/UL (ref 4.7–5.5)
SODIUM SERPL-SCNC: 141 MMOL/L (ref 136–145)
SP GR UR REFRACTOMETRY: 1.01 (ref 1–1.03)
TRIGL SERPL-MCNC: 261 MG/DL (ref ?–150)
TSH SERPL DL<=0.05 MIU/L-ACNC: 2.1 UIU/ML (ref 0.36–3.74)
UROBILINOGEN UR QL STRIP.AUTO: 0.2 EU/DL (ref 0.2–1)
VLDLC SERPL CALC-MCNC: 52.2 MG/DL
WBC # BLD AUTO: 12.7 K/UL (ref 4.6–13.2)

## 2019-01-30 PROCEDURE — 84443 ASSAY THYROID STIM HORMONE: CPT

## 2019-01-30 PROCEDURE — 80061 LIPID PANEL: CPT

## 2019-01-30 PROCEDURE — 85025 COMPLETE CBC W/AUTO DIFF WBC: CPT

## 2019-01-30 PROCEDURE — 80053 COMPREHEN METABOLIC PANEL: CPT

## 2019-01-30 PROCEDURE — 36415 COLL VENOUS BLD VENIPUNCTURE: CPT

## 2019-01-30 PROCEDURE — 87086 URINE CULTURE/COLONY COUNT: CPT

## 2019-01-30 PROCEDURE — 81003 URINALYSIS AUTO W/O SCOPE: CPT

## 2019-01-30 NOTE — PROGRESS NOTES
Hong Roe is a [de-identified] y.o.  male and presents with    Chief Complaint   Patient presents with    Chronic Kidney Disease    Dementia    Coronary Artery Disease    Hypertension    Cholesterol Problem    Abdominal Aortic Aneurysm           Subjective: In hosp last month with MI, CVA, and UTI  Here for f/u   Labs not done     Cardiovascular Review:  The patient has hypertension, hyperlipidemia, coronary artery disease, peripheral vascular disease and AAA. Diet and Lifestyle: not attempting to follow a low fat, low cholesterol diet  Home BP Monitoring: is not measured at home. Pertinent ROS: taking medications as instructed, no medication side effects noted, no TIA's, no chest pain on exertion, no dyspnea on exertion, no swelling of ankles. Chronic renal dz ongoing  uti needs recheck   Dementia ongoing     Additional Concerns:          Patient Active Problem List    Diagnosis Date Noted    History of CVA (cerebrovascular accident) 12/01/2018    History of MI (myocardial infarction) 12/01/2018    S/P CABG x 4 11/25/2018    CAD (coronary artery disease) 11/24/2018    Atherosclerosis of native artery of left lower extremity with intermittent claudication ([facility] 07/09/2018    Vascular dementia without behavioral disturbance 03/08/2017    AAA (abdominal aortic aneurysm) without rupture ([facility] 01/04/2017    Advance directive in chart 06/13/2016    S/P AVR 04/13/2016    Hypertension 03/27/2016    CKD (chronic kidney disease), stage III ([facility] 03/27/2016    Pure hypercholesterolemia 09/14/2012     Current Outpatient Medications   Medication Sig Dispense Refill    aspirin (ASPIRIN) 325 mg tablet Take 1 Tab by mouth daily. 90 Tab 4    tamsulosin (FLOMAX) 0.4 mg capsule Take 1 Cap by mouth nightly. 90 Cap 4    atorvastatin (LIPITOR) 20 mg tablet Take 1 Tab by mouth daily. 90 Tab 4    carvedilol (COREG) 3.125 mg tablet Take 1 Tab by mouth two (2) times daily (with meals).  180 Tab 4    hydroCHLOROthiazide (HYDRODIURIL) 25 mg tablet Take 1 Tab by mouth daily. 90 Tab 4    clopidogrel (PLAVIX) 75 mg tab Take 1 Tab by mouth daily. 90 Tab 4    nitroglycerin (NITROSTAT) 0.4 mg SL tablet 1 Tab by SubLINGual route as needed (chest pain).  1 tab q5 minutes as needed for chest pain and call 911 25 Tab 12     Allergies   Allergen Reactions    Ace Inhibitors Other (comments)     Per pt, Cardiologist states he cannot have ACE inhibitors     Past Medical History:   Diagnosis Date    Advance directive in chart 2016    CAD (coronary artery disease)     Cancer ([address]     Colon    CKD (chronic kidney disease), stage III ([address] 3/27/2016    CVA (cerebral vascular accident) ([address] 3/26/2016    Femoral artery aneurysm, left ([address]     Heart attack ([address]     Heart attack ([address]     Hernia     History of TIAs     Hyperlipidemia     Hypertension 3/27/2016    Iliac artery aneurysm, left ([address]     Pure hypercholesterolemia 2012    PVD (peripheral vascular disease) ([address] 3/27/2016    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 ([address] 3/26/2016     Past Surgical History:   Procedure Laterality Date    ABDOMEN SURGERY PROC UNLISTED      3 stents placed into abdomen (groin)    CARDIAC SURG PROCEDURE UNLIST      Quadruple Bypass    CARDIAC SURG PROCEDURE UNLIST      Aortic pig valve placed    COLONOSCOPY N/A 2016    COL[facility]re [facility] ENDOSCOPY    ENDOSCOPY, COLON, DIAGNOSTIC      HX AAA REPAIR      HX ORTHOPAEDIC  2008    Right Leg Amputated     Family History   Problem Relation Age of Onset    Hypertension Mother     Hypertension Father     Heart Disease Father     Heart Disease Brother      Social History     Tobacco Use    Smoking status: Former Smoker     Last attempt to quit: 1987     Years since quittin.4    Smokeless tobacco: Never Used   Substance Use Topics    Alcohol use: No     Comment: wine 1-2x per week ROS       All other systems reviewed and are negative. Objective:  Vitals:    01/30/19 0905   BP: 114/79   Pulse: (!) 57   Resp: 18   Temp: 97.3 °F (36.3 °C)   TempSrc: Oral   SpO2: 99%   Weight: 207 lb 12.8 oz (94.3 kg)   Height: 5' 8\" (1.727 m)   PainSc:   0 - No pain                 alert, well appearing, and in no distress and oriented to person, place, and time  Chest - clear to auscultation, no wheezes, rales or rhonchi, symmetric air entry  Heart - normal rate, regular rhythm, normal S1, S2, no murmurs, rubs, clicks or gallops  Abdomen - soft, nontender, nondistended, no masses or organomegaly        LABS     TESTS      Assessment/Plan:    Hypertension - stable  Hyperlipidemia - stable  chrnic renal dz ongoing  AAA ongiong  Dementia ongiong  CAD ongoing  uti recheck today. Lab review: orders written for new lab studies as appropriate; see orders    Diagnoses and all orders for this visit:    1. Coronary artery disease involving native coronary artery of native heart without angina pectoris    2. Pure hypercholesterolemia    3. AAA (abdominal aortic aneurysm) without rupture ([address]    4. Essential hypertension    5. CKD (chronic kidney disease), stage III ([address]    6. Diastolic dysfunction    7. Vascular dementia without behavioral disturbance    8. Transient cerebral ischemia, unspecified type    9. Benign prostatic hyperplasia with urinary frequency    10. PAD (peripheral artery disease) ([address]          I have discussed the diagnosis with the patient and the intended plan as seen in the above orders. The patient has received an after-visit summary and questions were answered concerning future plans. I have discussed medication side effects and warnings with the patient as well. I have reviewed the plan of care with the patient, accepted their input and they are in agreement with the treatment goals.      Follow-up Disposition: Not on File

## 2019-01-30 NOTE — PROGRESS NOTES
1. Have you been to the ER, urgent care clinic since your last visit? Hospitalized since your last visit? No    2. Have you seen or consulted any other health care providers outside of the [address] [place] [address] since your last visit? Include any pap smears or colon screening.  No

## 2019-02-01 LAB
BACTERIA SPEC CULT: NORMAL
SERVICE CMNT-IMP: NORMAL

## 2019-02-20 ENCOUNTER — OFFICE VISIT (OUTPATIENT)
Dept: CARDIOLOGY CLINIC | Age: 79
End: 2019-02-20

## 2019-02-20 VITALS
OXYGEN SATURATION: 97 % | HEART RATE: 54 BPM | DIASTOLIC BLOOD PRESSURE: 75 MMHG | SYSTOLIC BLOOD PRESSURE: 116 MMHG | WEIGHT: 208 LBS | BODY MASS INDEX: 31.63 KG/M2

## 2019-02-20 DIAGNOSIS — I10 ESSENTIAL HYPERTENSION: ICD-10-CM

## 2019-02-20 DIAGNOSIS — N18.30 CKD (CHRONIC KIDNEY DISEASE), STAGE III (HCC): ICD-10-CM

## 2019-02-20 DIAGNOSIS — I70.212 ATHEROSCLEROSIS OF NATIVE ARTERY OF LEFT LOWER EXTREMITY WITH INTERMITTENT CLAUDICATION (HCC): ICD-10-CM

## 2019-02-20 DIAGNOSIS — I25.10 CORONARY ARTERY DISEASE INVOLVING NATIVE HEART WITHOUT ANGINA PECTORIS, UNSPECIFIED VESSEL OR LESION TYPE: ICD-10-CM

## 2019-02-20 DIAGNOSIS — Z95.2 S/P AVR: ICD-10-CM

## 2019-02-20 DIAGNOSIS — Z86.73 HISTORY OF CVA (CEREBROVASCULAR ACCIDENT): ICD-10-CM

## 2019-02-20 DIAGNOSIS — Z95.1 S/P CABG X 4: ICD-10-CM

## 2019-02-20 DIAGNOSIS — I71.40 AAA (ABDOMINAL AORTIC ANEURYSM) WITHOUT RUPTURE: Primary | ICD-10-CM

## 2019-02-20 RX ORDER — AZITHROMYCIN 250 MG/1
250 TABLET, FILM COATED ORAL SEE ADMIN INSTRUCTIONS
Qty: 6 TAB | Refills: 0 | Status: SHIPPED | OUTPATIENT
Start: 2019-02-20 | End: 2019-07-15 | Stop reason: SDUPTHER

## 2019-02-20 NOTE — PROGRESS NOTES
1. Have you been to the ER, urgent care clinic since your last visit? Hospitalized since your last visit? No  
 
2. Have you seen or consulted any other health care providers outside of the [address] [place] [address] since your last visit? Include any pap smears or colon screening.  No

## 2019-02-23 NOTE — PROGRESS NOTES
Subjective:  
   Maria De Jesus Aguayo is in the office today for cardiac reevaluation. He is a 69-year-old man that had revascularization of his left lower extremity in October of 2016, aorto bi iliac endografting, prior angiplasty of the left SFA, and AKA of the RLE. Benna Him He has also had DVT of the left posterior tibial vein in 04/207 The patient has a history of known coronary artery disease and prior coronary bypass grafting done in 2005. He also had aortic valve replacement 2011. The most recent echocardiogram was done in November 2018 which demonstrated continued adequate function of the aortic prosthesis. The patient suffered a neurological event in December of 2016. He is followed by Dr. Elie Haskins in that regard. The patient had a MRI of brain with contrast on 12/30/2016. There was an old area of infarction involving the right frontal lobe extending into the corona radiata white matter. There was also an area of old infarction in the right parietal region. There was also an abnormal signal in the right sub-insular region, which was felt to possibly represent an area of old hemorrhagic infarct. There was no acute abnormality at that time. In the office today, we reviewed his symptoms that he has had since his myocardial infarction November 2018. Has had no chest discomfort reminiscent of that event. His main complaint today that of a sore throat and cough. Has had no fever chills. He is producing some yellowish colored secretions. He has no other specific complaints at this time. Patient Active Problem List  
 Diagnosis Date Noted  History of CVA (cerebrovascular accident) 12/01/2018  History of MI (myocardial infarction) 12/01/2018  S/P CABG x 4 11/25/2018  CAD (coronary artery disease) 11/24/2018  Atherosclerosis of native artery of left lower extremity with intermittent claudication ([facility] 07/09/2018  Vascular dementia without behavioral disturbance 03/08/2017  AAA (abdominal aortic aneurysm) without rupture ([address] 01/04/2017  Advance directive in chart 06/13/2016  S/P AVR 04/13/2016  Hypertension 03/27/2016  CKD (chronic kidney disease), stage III ([address] 03/27/2016  Pure hypercholesterolemia 09/14/2012 Current Outpatient Medications Medication Sig Dispense Refill  azithromycin (ZITHROMAX) 250 mg tablet Take 1 Tab by mouth See Admin Instructions for 5 days. 6 Tab 0  
 aspirin (ASPIRIN) 325 mg tablet Take 1 Tab by mouth daily. 90 Tab 4  
 tamsulosin (FLOMAX) 0.4 mg capsule Take 1 Cap by mouth nightly. 90 Cap 4  
 atorvastatin (LIPITOR) 20 mg tablet Take 1 Tab by mouth daily. 90 Tab 4  carvedilol (COREG) 3.125 mg tablet Take 1 Tab by mouth two (2) times daily (with meals). 180 Tab 4  
 hydroCHLOROthiazide (HYDRODIURIL) 25 mg tablet Take 1 Tab by mouth daily. 90 Tab 4  clopidogrel (PLAVIX) 75 mg tab Take 1 Tab by mouth daily. 90 Tab 4  
 nitroglycerin (NITROSTAT) 0.4 mg SL tablet 1 Tab by SubLINGual route as needed (chest pain). 1 tab q5 minutes as needed for chest pain and call 911 25 Tab 12 Allergies Allergen Reactions  Ace Inhibitors Other (comments) Per pt, Cardiologist states he cannot have ACE inhibitors Past Medical History:  
Diagnosis Date  Advance directive in chart 6/13/2016  CAD (coronary artery disease)  Cancer [facility]) 2003 Colon  CKD (chronic kidney disease), stage III ([address] 3/27/2016  CVA (cerebral vascular accident) ([address] 3/26/2016  Femoral artery aneurysm, left ([address]  Heart attack [facility]) 2008  Heart attack ([address]  Hernia  History of TIAs  Hyperlipidemia  Hypertension 3/27/2016  Iliac artery aneurysm, left ([address]  Pure hypercholesterolemia 9/14/2012  PVD (peripheral vascular disease) ([address] 3/27/2016  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  
Procedure Laterality Date  ABDOMEN SURGERY PROC UNLISTED    
 3 stents placed into abdomen (groin)  CARDIAC SURG PROCEDURE UNLIST   Quadruple Bypass  CARDIAC SURG PROCEDURE UNLIST   Aortic pig valve placed  COLONOSCOPY N/A 2016 COL[facility]COPY performed [facility] at [facility] ENDOSCOPY  ENDOSCOPY, COLON, DIAGNOSTIC    
 HX AAA REPAIR    
 HX ORTHOPAEDIC   Right Leg Amputated Family History Problem Relation Age of Onset  Hypertension Mother  Hypertension Father  Heart Disease Father  Heart Disease Brother Social History Tobacco Use Smoking Status Former Smoker  Last attempt to quit: 1987  Years since quittin.4 Smokeless Tobacco Never Used Review of Systems, additional: 
Constitutional: negative Eyes: negative Respiratory: negative Cardiovascular: negative Gastrointestinal: negative Musculoskeletal:weakness Neurological: negative Behvioral/Psych: negative Endocrine: negative ENT: negative Objective:  
 
Visit Vitals /75 Pulse (!) 54 Wt 208 lb (94.3 kg) SpO2 97% BMI 31.63 kg/m² General:  alert, cooperative, no distress Chest Wall: inspection normal - no chest wall deformities or tenderness, respiratory effort normal  
Lung: clear to auscultation bilaterally Heart:  normal rate and regular rhythm, S1 and S2 normal, systolic murmur 2/6 at 2nd right intercostal space and radiates to carotids Abdomen: soft, non-tender. Bowel sounds normal. No masses,  no organomegaly Extremities: Left extremity normal. There is a R BKA  no cyanosis . There is 1+ ankle edema on left Skin: no rashes Neuro: alert, oriented, normal speech, no focal findings or movement disorder noted EK2016; Sinus rhythm with PACs. LAD. Assessment/Plan: ICD-10-CM ICD-9-CM 1.  AAA (abdominal aortic aneurysm) without rupture ([address] S/P aorta bi iliac endograft I71.4 441.4 2. PAD (peripheral artery disease) ([facility]), S/P R AKA, S/P L SFA angioplasty I73.9 443.9 3. Femoral artery aneurysm, left ([facility]) I72.4 442.3 4. Pure hypercholesterolemia E78.00 272.0 5. CKD (chronic kidney disease), stage III N18.3 585.3 6. Controlled type 2 diabetes mellitus with diabetic nephropathy, without long-term current use of insulin ([facility]) E11.21 250.40   
  583.81   
7. Essential hypertension,  controlled in office today. I10 401.9 8. Aortic stenosis, moderate, reasonable aortic valvular parameters by recent Echo done 11/26/18. Ejection fraction 41-45%. I35.0 424.1 9. Diastolic dysfunction, grade 1 I51.9 429.9 10. S/P AVR (aortic valve replacement), [place], [place] 2011 Z95.2 V43.3 11. S/P CABG (coronary artery bypass graft), [place], [place] 2005. Recent inferior wall myocardial infarction. Troponin peak 71.80. It was felt the patient had completed his infarct the patient was extremely desirous of being discharged from the hospital.  Patient treated medically. No chest pain reported in visit today. Return in 6 weeks. Z95.1 V45.81   
12     CVA, followed by Dr Suzan Saldana

## 2019-02-25 ENCOUNTER — PATIENT OUTREACH (OUTPATIENT)
Dept: FAMILY MEDICINE CLINIC | Age: 79
End: 2019-02-25

## 2019-02-25 NOTE — PROGRESS NOTES
Patient has graduated from the Complex Case Management  program on 12/29/19. Patient's symptoms are stable at this time. Patient/family has the ability to self-manage. Care management goals have been completed at this time. No further nurse navigator follow up scheduled. Goals Addressed None Pt has nurse navigator's contact information for any further questions, concerns, or needs. Patients upcoming visits:   
Future Appointments Date Time Provider [facility] 4/3/2019 10:45 AM Felicitas Phalen, MD [address] [place] [address]  
4/24/2019  9:00 AM Stanley Thompson., DO [facility] BEATA SCHED  
5/9/2019  8:00 AM [facility] 2 [facility] BEATA SCHED  
5/9/2019  9:00 AM [facility] IM[facility] 2 2[facility] BEATA SCHED  
5/9/2019 10:00 AM [facility] IM[facility]ING 2 [facility] BEATA SCHED  
5/9/2019 11:00 AM [facility] NONIMAGING 2VV BEATA SCHED  
5/23/2019 10:45 AM Anoop Robertson

## 2019-02-27 NOTE — PROGRESS NOTES
Problem: Self Care Deficits Care Plan (Adult)  Goal: *Acute Goals and Plan of Care (Insert Text)  OCCUPATIONAL THERAPY SHORT TERM GOALS      Updated 12/11/18    1. Patient will perform Upper body bathing with adaptive equipment as needed for energy conservation with Mod I.  2.  Patient will perform Lower body dressing with adaptive equipment as needed for energy conservation with supervision/set-up . 3. Patient will perform toileting task with supervision/set-up with Good safety to reduce falls risk. 4.  Patient will perform functional transfers to commode/shower chair with LRAD and supervision/setup. 5.  Patient will perform standing static/dynamic balance activities for improved ADL/IADL function with supervision/set-up and Fair +/Good - balance and safety awarenes. 6.  Patient will improve Barthel index scores to atleast 70/100 to improve functional mobility. 7.  Patient will perform bathroom mobility with LRAD and Supervision. Initiated 12/5/2018 and to be accomplished within 2 Week(s)    1. Patient will perform Upper body bathing with adaptive equipment as needed for energy conservation with supervision/set up. (goal met-UPG Mod I)  2. Patient will perform Lower body dressing with adaptive equipment as needed for energy conservation with supervision/set-up . (progressing-currently SBA)  3. Patient will perform toileting task with supervision/set-up with Good safety to reduce falls risk. (progressing-currently SBA)  4. Patient will perform functional transfers to commode/shower chair with LRAD and supervision/setup.  (progressing-currently SBA)  5. Patient will perform standing static/dynamic balance activities for improved ADL/IADL function with supervision/set-up and Fair +/Good - balance and safety awarenes. (progressing-currently SBA)  6. Patient will improve Barthel index scores to atleast 70/100 to improve functional mobility.  (progressing)  7.   Patient will perform bathroom mobility with LRAD and Supervision.  (progressing-currently SBA)    OCCUPATIONAL THERAPY LONG TERM GOALS   Initiated 12/5/2018 and to be accomplished within 4 Week(s)    1. Patient will perform Upper  body bathing with adaptive equipment as needed for ebergy conservation with modified independence. 2.  Patient will perform Lower body dressing with adaptive equipment as needed for energy conservation with modified independence. 3.  Patient will perform toileting task with modified independence with Good safety to reduce falls risk. 4.  Patient will perform functional transfers to commode and shower chair with LRAD and  modified independence and Good balance and safety awareness. 5.  Patient will perform standing static/dynamic activity for improved ADL/IADL function    with modified independence an and Good balance and safety awareness. 6.  Patient will improve Barthel index score to 80/100 to improve independence with mobility. 7. Patient will perform functional mobility within kitchen environment/bathroom with LRAD and modified independence.      Therapist: Elizabeth Juarez OT    12/5/2018           [facility] [place]   Occupational Therapy Daily Treatment note      Patient: Tyree Smaller (20 y.o. male)       Date: 12/16/2018  Attending Physician: Jackelyn Jones MD  Primary Diagnosis: UTI  Altered Mental Status    Treatment Diagnosis  Treatment Diagnosis: muscle weakness   Precautions : Precautions at Admission: Fall  Vital Signs:        Cognitive Status:  Mental Status  Neurologic State: Alert  Orientation Level: Oriented X4  Transfers:  Functional Transfers  Sit to Stand: Modified independent  Stand to Sit: Modified independent     Balance:  Balance  Sitting: With support  Sitting - Static: Good (unsupported)  Sitting - Dynamic: Good (unsupported)  Standing: With support  Standing - Static: Good  Standing - Dynamic : Fair(+)  Therapeutic Activities:  Supervision fn room and hallway mobility w/ RW from room <->OT gym to increase activity tolerance and endurance needed for ADLs. Pt performed various dyn standing task SBA with 1/0 UE support reaching forward and across mid line ~ 6 mins x 3 trials to challenge balance and stability for increased (I) and safety during all ADL/IADL tasks. Therapeutic Exercises:   BUE ex with arm bike x 12 mins with 1# wrist weights to increase strength and endurance for ADL carryover. Patient/Caregiver Education:    Pt educated on pursed lip breathing tech for EC/WS, pt returned understanding through demo. ASSESSMENT:  Patient continues to demonstrate the need for skilled Occupational Therapy services to improve strength, balance, and endurance.    Progression toward goals:  [x]      Improving appropriately and progressing toward goals  []      Improving slowly and progressing toward goals  []      Not making progress toward goals and plan of care will be adjusted     Treatment session:   56 minutes    Therapist:    LUKE Trinidad,  12/16/2018 no

## 2019-04-03 DIAGNOSIS — N18.30 CKD (CHRONIC KIDNEY DISEASE), STAGE III (HCC): ICD-10-CM

## 2019-04-03 DIAGNOSIS — E78.00 PURE HYPERCHOLESTEROLEMIA: ICD-10-CM

## 2019-04-03 DIAGNOSIS — F01.50 VASCULAR DEMENTIA WITHOUT BEHAVIORAL DISTURBANCE (HCC): ICD-10-CM

## 2019-04-03 DIAGNOSIS — N40.1 BENIGN PROSTATIC HYPERPLASIA WITH URINARY FREQUENCY: ICD-10-CM

## 2019-04-03 DIAGNOSIS — R35.0 BENIGN PROSTATIC HYPERPLASIA WITH URINARY FREQUENCY: ICD-10-CM

## 2019-04-03 DIAGNOSIS — I73.9 PAD (PERIPHERAL ARTERY DISEASE) (HCC): ICD-10-CM

## 2019-04-03 DIAGNOSIS — I25.10 CORONARY ARTERY DISEASE INVOLVING NATIVE CORONARY ARTERY OF NATIVE HEART WITHOUT ANGINA PECTORIS: ICD-10-CM

## 2019-04-03 DIAGNOSIS — I10 ESSENTIAL HYPERTENSION: ICD-10-CM

## 2019-04-03 DIAGNOSIS — I71.40 AAA (ABDOMINAL AORTIC ANEURYSM) WITHOUT RUPTURE: ICD-10-CM

## 2019-04-03 DIAGNOSIS — I51.89 DIASTOLIC DYSFUNCTION: ICD-10-CM

## 2019-04-03 NOTE — TELEPHONE ENCOUNTER
Correspondence received from patient's [employer] order company requesting his Atorvastatin, hydrochlorothiaz, and tamsulosin capsule be sent.  Requested medications sent to MD.

## 2019-04-04 RX ORDER — HYDROCHLOROTHIAZIDE 25 MG/1
25 TABLET ORAL DAILY
Qty: 90 TAB | Refills: 4 | Status: SHIPPED | OUTPATIENT
Start: 2019-04-04 | End: 2019-04-25 | Stop reason: ALTCHOICE

## 2019-04-04 RX ORDER — TAMSULOSIN HYDROCHLORIDE 0.4 MG/1
0.4 CAPSULE ORAL
Qty: 90 CAP | Refills: 4 | Status: SHIPPED | OUTPATIENT
Start: 2019-04-04 | End: 2020-04-21 | Stop reason: SDUPTHER

## 2019-04-04 RX ORDER — ATORVASTATIN CALCIUM 20 MG/1
20 TABLET, FILM COATED ORAL DAILY
Qty: 90 TAB | Refills: 4 | Status: SHIPPED | OUTPATIENT
Start: 2019-04-04 | End: 2021-01-01

## 2019-04-25 ENCOUNTER — OFFICE VISIT (OUTPATIENT)
Dept: CARDIOLOGY CLINIC | Age: 79
End: 2019-04-25

## 2019-04-25 VITALS
BODY MASS INDEX: 32.84 KG/M2 | OXYGEN SATURATION: 100 % | HEART RATE: 61 BPM | DIASTOLIC BLOOD PRESSURE: 80 MMHG | SYSTOLIC BLOOD PRESSURE: 129 MMHG | WEIGHT: 216 LBS

## 2019-04-25 DIAGNOSIS — N18.30 CKD (CHRONIC KIDNEY DISEASE), STAGE III (HCC): ICD-10-CM

## 2019-04-25 DIAGNOSIS — I25.2 HISTORY OF MI (MYOCARDIAL INFARCTION): ICD-10-CM

## 2019-04-25 DIAGNOSIS — I71.40 AAA (ABDOMINAL AORTIC ANEURYSM) WITHOUT RUPTURE: ICD-10-CM

## 2019-04-25 DIAGNOSIS — E78.00 PURE HYPERCHOLESTEROLEMIA: ICD-10-CM

## 2019-04-25 DIAGNOSIS — I25.118 CORONARY ARTERY DISEASE OF NATIVE ARTERY OF NATIVE HEART WITH STABLE ANGINA PECTORIS (HCC): Primary | ICD-10-CM

## 2019-04-25 DIAGNOSIS — Z95.2 S/P AVR: ICD-10-CM

## 2019-04-25 DIAGNOSIS — Z95.1 S/P CABG X 4: ICD-10-CM

## 2019-04-25 DIAGNOSIS — I70.212 ATHEROSCLEROSIS OF NATIVE ARTERY OF LEFT LOWER EXTREMITY WITH INTERMITTENT CLAUDICATION (HCC): ICD-10-CM

## 2019-04-25 DIAGNOSIS — I10 ESSENTIAL HYPERTENSION: ICD-10-CM

## 2019-04-25 DIAGNOSIS — Z86.73 HISTORY OF CVA (CEREBROVASCULAR ACCIDENT): ICD-10-CM

## 2019-04-25 RX ORDER — FUROSEMIDE 20 MG/1
TABLET ORAL DAILY
COMMUNITY
End: 2019-04-25 | Stop reason: SDUPTHER

## 2019-04-25 NOTE — PROGRESS NOTES
1. Have you been to the ER, urgent care clinic since your last visit? Hospitalized since your last visit? No  
 
2. Have you seen or consulted any other health care providers outside of the [address] [place] [address] since your last visit? Include any pap smears or colon screening.   No

## 2019-04-26 RX ORDER — FUROSEMIDE 20 MG/1
20 TABLET ORAL DAILY
Qty: 90 TAB | Refills: 3 | Status: SHIPPED | OUTPATIENT
Start: 2019-04-26 | End: 2019-05-30 | Stop reason: SDDI

## 2019-04-28 NOTE — PROGRESS NOTES
Subjective:  
   Melody Pires is in the office today for cardiac reevaluation. He is a 59-year-old man that had revascularization of his left lower extremity in October of 2016, aorto bi iliac endografting, prior angiplasty of the left SFA, and AKA of the RLE. Colt Haywood He has also had DVT of the left posterior tibial vein in 04/207 The patient has a history of known coronary artery disease and prior coronary bypass grafting done in 2005. He also had aortic valve replacement 2011. The most recent echocardiogram was done in November 2018 which demonstrated continued adequate function of the aortic prosthesis. The patient had a neurological event in December of 2016. He is followed by Dr. Julia Bernstein in that regard. The patient had a MRI of brain with contrast on 12/30/2016. There was an old area of infarction involving the right frontal lobe extending into the corona radiata white matter. There was also an area of old infarction in the right parietal region. There was also an abnormal signal in the right sub-insular region, which was felt to possibly represent an area of old hemorrhagic infarct. There was no acute abnormality at that time. Patient had a myocardial infarction in November 2018. He was treated medically. The patient wanted to be discharged the day following his admission. In the office today he says he feels \"weak\". He has been wheezing a lot. He notices with any physical effort, he becomes short of breath and wheezes. Patient Active Problem List  
 Diagnosis Date Noted  History of CVA (cerebrovascular accident) 12/01/2018  History of MI (myocardial infarction) 12/01/2018  S/P CABG x 4 11/25/2018  CAD (coronary artery disease) 11/24/2018  Atherosclerosis of native artery of left lower extremity with intermittent claudication ([facility] 07/09/2018  Vascular dementia without behavioral disturbance 03/08/2017  AAA (abdominal aortic aneurysm) without rupture ([address] 01/04/2017  Advance directive in chart 06/13/2016  S/P AVR 04/13/2016  Hypertension 03/27/2016  CKD (chronic kidney disease), stage III ([address] 03/27/2016  Pure hypercholesterolemia 09/14/2012 Current Outpatient Medications Medication Sig Dispense Refill  CYANOCOBALAMIN, VITAMIN B-12, PO Take  by mouth.  docosahexanoic acid/epa (FISH OIL PO) Take  by mouth.  atorvastatin (LIPITOR) 20 mg tablet Take 1 Tab by mouth daily. 90 Tab 4  
 tamsulosin (FLOMAX) 0.4 mg capsule Take 1 Cap by mouth nightly. 90 Cap 4  
 aspirin (ASPIRIN) 325 mg tablet Take 1 Tab by mouth daily. 90 Tab 4  carvedilol (COREG) 3.125 mg tablet Take 1 Tab by mouth two (2) times daily (with meals). 180 Tab 4  clopidogrel (PLAVIX) 75 mg tab Take 1 Tab by mouth daily. 90 Tab 4  
 nitroglycerin (NITROSTAT) 0.4 mg SL tablet 1 Tab by SubLINGual route as needed (chest pain). 1 tab q5 minutes as needed for chest pain and call 911 25 Tab 12  
 furosemide (LASIX) 20 mg tablet Take 1 Tab by mouth daily. 90 Tab 3 Allergies Allergen Reactions  Ace Inhibitors Other (comments) Per pt, Cardiologist states he cannot have ACE inhibitors Past Medical History:  
Diagnosis Date  Advance directive in chart 6/13/2016  CAD (coronary artery disease)  Cancer [facility]) 2003 Colon  CKD (chronic kidney disease), stage III ([address] 3/27/2016  CVA (cerebral vascular accident) ([address] 3/26/2016  Femoral artery aneurysm, left ([address]  Heart attack [facility]) 2008  Heart attack ([address]  Hernia  History of TIAs  Hyperlipidemia  Hypertension 3/27/2016  Iliac artery aneurysm, left ([address]  Pure hypercholesterolemia 9/14/2012  PVD (peripheral vascular disease) ([address] 3/27/2016  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  
Procedure Laterality Date  ABDOMEN SURGERY PROC UNLISTED    
 3 stents placed into abdomen (groin)  CARDIAC SURG PROCEDURE UNLIST   Quadruple Bypass  CARDIAC SURG PROCEDURE UNLIST   Aortic pig valve placed  COLONOSCOPY N/A 2016 COLONOSCOPY performed by Osiris Eastman MD at [facility] ENDOSCOPY  ENDOSCOPY, COLON, DIAGNOSTIC    
 HX AAA REPAIR    
 HX ORTHOPAEDIC   Right Leg Amputated Family History Problem Relation Age of Onset  Hypertension Mother  Hypertension Father  Heart Disease Father  Heart Disease Brother Social History Tobacco Use Smoking Status Former Smoker  Last attempt to quit: 1987  Years since quittin.6 Smokeless Tobacco Never Used Review of Systems, additional: 
Constitutional: negative Eyes: negative Respiratory: negative Cardiovascular: negative Gastrointestinal: negative Musculoskeletal:weakness Neurological: negative Behvioral/Psych: negative Endocrine: negative ENT: negative Objective:  
 
Visit Vitals /80 Pulse 61 Wt 216 lb (98 kg) SpO2 100% BMI 32.84 kg/m² General:  alert, cooperative, no distress Chest Wall: inspection normal - no chest wall deformities or tenderness, respiratory effort normal  
Lung: clear to auscultation bilaterally Heart:  normal rate and regular rhythm, S1 and S2 normal, systolic murmur 2/6 at 2nd right intercostal space and radiates to carotids Abdomen: soft, non-tender. Bowel sounds normal. No masses,  no organomegaly Extremities: Left extremity normal. There is a R BKA  no cyanosis . There is 1+ ankle edema on left Skin: no rashes Neuro: alert, oriented, normal speech, no focal findings or movement disorder noted EK2016; Sinus rhythm with PACs. LAD. Assessment/Plan: ICD-10-CM ICD-9-CM 1. AAA (abdominal aortic aneurysm) without rupture ([address] S/P aorta bi iliac endograft I71.4 441.4 2. PAD (peripheral artery disease) ([facility]), S/P R AKA, S/P L SFA angioplasty I73.9 443.9 3. Femoral artery aneurysm, left ([facility]) I72.4 442.3 4. Pure hypercholesterolemia E78.00 272.0 5. CKD (chronic kidney disease), stage III,  N18.3 585.3 6. Controlled type 2 diabetes mellitus with diabetic nephropathy, without long-term current use of insulin ([facility]) E11.21 250.40   
  583.81   
7. Essential hypertension,  controlled in office today. I10 401.9 8. Aortic stenosis, moderate, reasonable aortic valvular parameters by recent Echo done 11/26/18. Ejection fraction 41-45%. I35.0 424.1 9. Diastolic dysfunction, grade 1 I51.9 429.9 10. S/P AVR (aortic valve replacement), [place], [place] 2011 Z95.2 V43.3 11. S[facility]. Recent inferior wall myocardial infarction. Troponin peak 71.80. It was felt the patient had completed his infarct the patient was extremely desirous of being discharged from the hospital.  Patient treated medically. Since his last appointment, the patient reports that he is frequently wheezing. Physical exertion will bring on the wheezing. Will start Lasix 20 mg daily. Discontinue hydrochlorothiazide. BMP and Mg prior to the next visit in 2 to 3 weeks. Z95.1 V45.81   
12     CVA, followed by Dr Tereza Saldaña

## 2019-05-30 ENCOUNTER — HOSPITAL ENCOUNTER (OUTPATIENT)
Dept: LAB | Age: 79
Discharge: HOME | End: 2019-05-30
Payer: MEDICARE

## 2019-05-30 ENCOUNTER — OFFICE VISIT (OUTPATIENT)
Dept: FAMILY MEDICINE CLINIC | Age: 79
End: 2019-05-30

## 2019-05-30 VITALS
HEIGHT: 68 IN | TEMPERATURE: 97.9 F | DIASTOLIC BLOOD PRESSURE: 78 MMHG | OXYGEN SATURATION: 99 % | HEART RATE: 59 BPM | RESPIRATION RATE: 19 BRPM | WEIGHT: 220 LBS | BODY MASS INDEX: 33.34 KG/M2 | SYSTOLIC BLOOD PRESSURE: 119 MMHG

## 2019-05-30 DIAGNOSIS — N18.30 CKD (CHRONIC KIDNEY DISEASE), STAGE III (HCC): ICD-10-CM

## 2019-05-30 DIAGNOSIS — E78.00 PURE HYPERCHOLESTEROLEMIA: ICD-10-CM

## 2019-05-30 DIAGNOSIS — I10 ESSENTIAL HYPERTENSION: ICD-10-CM

## 2019-05-30 DIAGNOSIS — I71.40 AAA (ABDOMINAL AORTIC ANEURYSM) WITHOUT RUPTURE: ICD-10-CM

## 2019-05-30 DIAGNOSIS — I25.118 CORONARY ARTERY DISEASE OF NATIVE ARTERY OF NATIVE HEART WITH STABLE ANGINA PECTORIS (HCC): ICD-10-CM

## 2019-05-30 DIAGNOSIS — F01.50 VASCULAR DEMENTIA WITHOUT BEHAVIORAL DISTURBANCE (HCC): ICD-10-CM

## 2019-05-30 DIAGNOSIS — I25.10 CORONARY ARTERY DISEASE INVOLVING NATIVE CORONARY ARTERY OF NATIVE HEART WITHOUT ANGINA PECTORIS: Primary | ICD-10-CM

## 2019-05-30 LAB
ANION GAP SERPL CALC-SCNC: 7 MMOL/L (ref 3–18)
BUN SERPL-MCNC: 24 MG/DL (ref 7–18)
BUN/CREAT SERPL: 14 (ref 12–20)
CALCIUM SERPL-MCNC: 9.4 MG/DL (ref 8.5–10.1)
CHLORIDE SERPL-SCNC: 109 MMOL/L (ref 100–108)
CO2 SERPL-SCNC: 26 MMOL/L (ref 21–32)
CREAT SERPL-MCNC: 1.76 MG/DL (ref 0.6–1.3)
GLUCOSE SERPL-MCNC: 90 MG/DL (ref 74–99)
MAGNESIUM SERPL-MCNC: 2.3 MG/DL (ref 1.6–2.6)
POTASSIUM SERPL-SCNC: 5.6 MMOL/L (ref 3.5–5.5)
SODIUM SERPL-SCNC: 142 MMOL/L (ref 136–145)

## 2019-05-30 PROCEDURE — 83735 ASSAY OF MAGNESIUM: CPT

## 2019-05-30 PROCEDURE — 36415 COLL VENOUS BLD VENIPUNCTURE: CPT

## 2019-05-30 PROCEDURE — 80048 BASIC METABOLIC PNL TOTAL CA: CPT

## 2019-05-30 NOTE — PROGRESS NOTES
Room #  Surgery room    SUBJECTIVE:    Deyanira Fontaine is a [de-identified] y.o. male who presents today for follow up for hypertension and labs    1. Have you been to the ER, urgent care clinic since your last visit? Hospitalized since your last visit? NO    2. Have you seen or consulted any other health care providers outside of the [address] [place] [address] since your last visit? Include any pap smears or colon screening. NO  When :  Reason:    Health Maintenance reviewed Yes    Health Maintenance Due   Topic Date Due    Shingrix Vaccine Age 49> (1 of 2) 09/30/1988    Pneumococcal 65+ years (2 of 2 - PPSV23) 10/07/2016    HEMOGLOBIN A1C Q6M  04/01/2019    COLONOSCOPY  06/29/2019

## 2019-05-30 NOTE — PROGRESS NOTES
Cat Lockwood is a [de-identified] y.o.  male and presents with    Chief Complaint   Patient presents with    Chronic Kidney Disease    CHF    Cholesterol Problem    Hypertension    Dementia           Subjective:    Cardiovascular Review:  The patient has hypertension and hyperlipidemia. Diet and Lifestyle: not attempting to follow a low fat, low cholesterol diet  Home BP Monitoring: is not measured at home. Pertinent ROS: taking medications as instructed, no medication side effects noted, no TIA's, no chest pain on exertion, no dyspnea on exertion, no swelling of ankles. Dementia ongiong  Renal dz ongiong  Cad stable      Additional Concerns: apparently seen by oleg -- given lasix didn't like it and stopped taking it         Patient Active Problem List    Diagnosis Date Noted    History of CVA (cerebrovascular accident) 12/01/2018    History of MI (myocardial infarction) 12/01/2018    S/P CABG x 4 11/25/2018    CAD (coronary artery disease) 11/24/2018    Atherosclerosis of native artery of left lower extremity with intermittent claudication ([facility] [address] 07/09/2018    Vascular dementia without behavioral disturbance 03/08/2017    AAA (abdominal aortic aneurysm) without rupture ([facility] [address] 01/04/2017    Advance directive in chart 06/13/2016    S/P AVR 04/13/2016    Hypertension 03/27/2016    CKD (chronic kidney disease), stage III ([facility] [address] 03/27/2016    Pure hypercholesterolemia 09/14/2012     Current Outpatient Medications   Medication Sig Dispense Refill    atorvastatin (LIPITOR) 20 mg tablet Take 1 Tab by mouth daily. 90 Tab 4    tamsulosin (FLOMAX) 0.4 mg capsule Take 1 Cap by mouth nightly. 90 Cap 4    aspirin (ASPIRIN) 325 mg tablet Take 1 Tab by mouth daily. 90 Tab 4    carvedilol (COREG) 3.125 mg tablet Take 1 Tab by mouth two (2) times daily (with meals). 180 Tab 4    clopidogrel (PLAVIX) 75 mg tab Take 1 Tab by mouth daily.  90 Tab 4    nitroglycerin (NITROSTAT) 0.4 mg SL tablet 1 Tab by SubLINGual route as needed (chest pain). 1 tab q5 minutes as needed for chest pain and call 911 25 Tab 12     Allergies   Allergen Reactions    Ace Inhibitors Other (comments)     Per pt, Cardiologist states he cannot have ACE inhibitors     Past Medical History:   Diagnosis Date    Advance directive in chart 2016    CAD (coronary artery disease)     Cancer ([address]     Colon    CKD (chronic kidney disease), stage III ([address] 3/27/2016    CVA (cerebral vascular accident) ([address] 3/26/2016    Femoral artery aneurysm, left ([address]     Heart attack ([address]     Heart attack ([address]     Hernia     History of TIAs     Hyperlipidemia     Hypertension 3/27/2016    Iliac artery aneurysm, left ([address]     Pure hypercholesterolemia 2012    PVD (peripheral vascular disease) ([address] 3/27/2016    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 ([address] 3/26/2016     Past Surgical History:   Procedure Laterality Date    ABDOMEN SURGERY PROC UNLISTED      3 stents placed into abdomen (groin)    CARDIAC SURG PROCEDURE UNLIST      Quadruple Bypass    CARDIAC SURG PROCEDURE UNLIST      Aortic pig valve placed    COLONOSCOPY N/A 2016    COLONOS[facility] Callejas [facility] at [facility] ENDOSCOPY    ENDOSCOPY, COLON, DIAGNOSTIC      HX AAA REPAIR      HX ORTHOPAEDIC  2008    Right Leg Amputated     Family History   Problem Relation Age of Onset    Hypertension Mother     Hypertension Father     Heart Disease Father     Heart Disease Brother      Social History     Tobacco Use    Smoking status: Former Smoker     Last attempt to quit: 1987     Years since quittin.7    Smokeless tobacco: Never Used   Substance Use Topics    Alcohol use: No     Comment: wine 1-2x per week       ROS       All other systems reviewed and are negative.       Objective:  Vitals:    19 0928   BP: 119/78   Pulse: (!) 59   Resp: 19   Temp: 97.9 °F (36.6 °C)   TempSrc: Oral   SpO2: 99%   Weight: 220 lb (99.8 kg)   Height: 5' 8\" (1.727 m)   PainSc:   0 - No pain                 alert, well appearing, and in no distress and oriented to person, place, and time  Chest - clear to auscultation, no wheezes, rales or rhonchi, symmetric air entry  Heart - normal rate, regular rhythm, normal S1, S2, no murmurs, rubs, clicks or gallops  Abdomen - soft, nontender, nondistended, no masses or organomegaly        LABS     TESTS      Assessment/Plan:    Hypertension - stable  Hyperlipidemia - stable  Cad ongiong seeing cardiology  Renal dz ongoing  Dementia nogoing    Lab review: labs are reviewed, up to date and normal    Diagnoses and all orders for this visit:    1. Coronary artery disease involving native coronary artery of native heart without angina pectoris    2. Pure hypercholesterolemia    3. AAA (abdominal aortic aneurysm) without rupture ([facility]    4. Essential hypertension    5. CKD (chronic kidney disease), stage III (HCC)    6. Vascular dementia without behavioral disturbance          I have discussed the diagnosis with the patient and the intended plan as seen in the above orders. The patient has received an after-visit summary and questions were answered concerning future plans. I have discussed medication side effects and warnings with the patient as well. I have reviewed the plan of care with the patient, accepted their input and they are in agreement with the treatment goals. Follow-up and Dispositions    · Return in about 1 month (around 6/27/2019) for EOV with LIONEL flores.

## 2019-06-04 ENCOUNTER — OFFICE VISIT (OUTPATIENT)
Dept: CARDIOLOGY CLINIC | Age: 79
End: 2019-06-04

## 2019-06-04 VITALS
SYSTOLIC BLOOD PRESSURE: 128 MMHG | HEART RATE: 54 BPM | WEIGHT: 219 LBS | HEIGHT: 68 IN | OXYGEN SATURATION: 98 % | DIASTOLIC BLOOD PRESSURE: 83 MMHG | BODY MASS INDEX: 33.19 KG/M2

## 2019-06-04 DIAGNOSIS — Z01.818 PREOPERATIVE CLEARANCE: Primary | ICD-10-CM

## 2019-06-04 RX ORDER — FUROSEMIDE 20 MG/1
20 TABLET ORAL
Qty: 30 TAB | Refills: 3 | Status: SHIPPED | OUTPATIENT
Start: 2019-06-04 | End: 2019-07-22

## 2019-06-04 RX ORDER — FUROSEMIDE 20 MG/1
TABLET ORAL
Refills: 3 | COMMUNITY
Start: 2019-04-26 | End: 2019-06-04 | Stop reason: SDUPTHER

## 2019-06-04 NOTE — PROGRESS NOTES
1. Have you been to the ER, urgent care clinic since your last visit? Hospitalized since your last visit? No    2. Have you seen or consulted any other health care providers outside of the [address] [place] [address] since your last visit? Include any pap smears or colon screening.  No

## 2019-06-04 NOTE — PROGRESS NOTES
Subjective:      Bright Sharma is in the office today for cardiac reevaluation. He is a 55-year-old man that had revascularization of his left lower extremity in October of 2016, aorto bi iliac endografting, prior angiplasty of the left SFA, and AKA of the RLE. Sadaf Rose He has also had DVT of the left posterior tibial vein in 04/207    The patient has a history of known coronary artery disease and prior coronary bypass grafting done in 2005. He also had aortic valve replacement 2011. The most recent echocardiogram was done in November 2018 which demonstrated continued adequate function of the aortic prosthesis. The patient had a neurological event in December of 2016. He is followed by Dr. Tonia Craig in that regard. The patient had a MRI of brain with contrast on 12/30/2016. There was an old area of infarction involving the right frontal lobe extending into the corona radiata white matter. There was also an area of old infarction in the right parietal region. There was also an abnormal signal in the right sub-insular region, which was felt to possibly represent an area of old hemorrhagic infarct. There was no acute abnormality at that time. Patient had a myocardial infarction in November 2018. He was treated medically. The patient wanted to be discharged the day following his admission. In the office today he continues to feel \"weak\" in his left leg. He reports that his shortness of breath and wheezing improved with Lasix, but he stopped taking it after 1-2 weeks due to increase in urination. He has resumed taking Lasix this morning. He notices the shortness of breath and wheezing especially when bending over. He reports that he walked in [employer] for 20 minutes yesterday.       Patient Active Problem List    Diagnosis Date Noted    History of CVA (cerebrovascular accident) 12/01/2018    History of MI (myocardial infarction) 12/01/2018    S/P CABG x 4 11/25/2018    CAD (coronary artery disease) 11/24/2018    Atherosclerosis of native artery of left lower extremity with intermittent claudication ([address] 07/09/2018    Vascular dementia without behavioral disturbance 03/08/2017    AAA (abdominal aortic aneurysm) without rupture ([address] 01/04/2017    Advance directive in chart 06/13/2016    S/P AVR 04/13/2016    Hypertension 03/27/2016    CKD (chronic kidney disease), stage III (HCC) 03/27/2016    Pure hypercholesterolemia 09/14/2012     Current Outpatient Medications   Medication Sig Dispense Refill    furosemide (LASIX) 20 mg tablet TK 1 T PO D  3    atorvastatin (LIPITOR) 20 mg tablet Take 1 Tab by mouth daily. 90 Tab 4    tamsulosin (FLOMAX) 0.4 mg capsule Take 1 Cap by mouth nightly. 90 Cap 4    aspirin (ASPIRIN) 325 mg tablet Take 1 Tab by mouth daily. 90 Tab 4    carvedilol (COREG) 3.125 mg tablet Take 1 Tab by mouth two (2) times daily (with meals). 180 Tab 4    clopidogrel (PLAVIX) 75 mg tab Take 1 Tab by mouth daily. 90 Tab 4    nitroglycerin (NITROSTAT) 0.4 mg SL tablet 1 Tab by SubLINGual route as needed (chest pain).  1 tab q5 minutes as needed for chest pain and call 911 25 Tab 12     Allergies   Allergen Reactions    Ace Inhibitors Other (comments)     Per pt, Cardiologist states he cannot have ACE inhibitors     Past Medical History:   Diagnosis Date    Advance directive in chart 6/13/2016    CAD (coronary artery disease)     Cancer ([address] 2003    Colon    CKD (chronic kidney disease), stage III ([address] 3/27/2016    CVA (cerebral vascular accident) ([address] 3/26/2016    Femoral artery aneurysm, left ([address]     Heart attack ([address] 2008    Heart attack ([address]     Hernia     History of TIAs     Hyperlipidemia     Hypertension 3/27/2016    Iliac artery aneurysm, left ([address]     Pure hypercholesterolemia 9/14/2012    PVD (peripheral vascular disease) ([address] 3/27/2016    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 ([address] 3/26/2016     Past Surgical History: Procedure Laterality Date    ABDOMEN SURGERY PROC UNLISTED      3 stents placed into abdomen (groin)    CARDIAC SURG PROCEDURE UNLIST      Quadruple Bypass    CARDIAC SURG PROCEDURE UNLIST      Aortic pig valve placed    COLONOSCOPY N/A 2016    COL[facility] ENDOSCOPY    ENDOSCOPY, COLON, DIAGNOSTIC      HX AAA REPAIR      HX ORTHOPAEDIC      Right Leg Amputated     Family History   Problem Relation Age of Onset    Hypertension Mother     Hypertension Father     Heart Disease Father     Heart Disease Brother      Social History     Tobacco Use   Smoking Status Former Smoker    Last attempt to quit: 1987    Years since quittin.7   Smokeless Tobacco Never Used          Review of Systems, additional:  Constitutional: negative  Eyes: negative  Respiratory: negative  Cardiovascular: negative  Gastrointestinal: negative  Musculoskeletal:weakness  Neurological: negative  Behvioral/Psych: negative  Endocrine: negative  ENT: negative    Objective:     Visit Vitals  /83   Pulse (!) 54   Ht 5' 8\" (1.727 m)   Wt 219 lb (99.3 kg)   SpO2 98%   BMI 33.30 kg/m²     General:  alert, cooperative, no distress   Chest Wall: inspection normal - no chest wall deformities or tenderness, respiratory effort normal   Lung: clear to auscultation bilaterally   Heart:  normal rate and regular rhythm, S1 and S2 normal, systolic murmur 2/6 at 2nd right intercostal space and radiates to carotids   Abdomen: soft, non-tender. Bowel sounds normal. No masses,  no organomegaly   Extremities: Trace edema to left lower extremity. There is a R BKA with prosthesis  no cyanosis. Skin: no rashes   Neuro: alert, oriented, normal speech, no focal findings or movement disorder noted     [id number]; Sinus rhythm with PACs. LAD. Assessment/Plan:       ICD-10-CM ICD-9-CM    1.  AAA (abdominal aortic aneurysm) without rupture ([address] S/P aorta bi iliac endograft I71.4 441.4 2. PAD (peripheral artery disease) ([facility]), S/P R AKA, S/P L SFA angioplasty I73.9 443.9    3. Femoral artery aneurysm, left ([facility]) I72.4 442.3    4. Pure hypercholesterolemia E78.00 272.0    5. CKD (chronic kidney disease), stage III,  N18.3 585.3    6. Controlled type 2 diabetes mellitus with diabetic nephropathy, without long-term current use of insulin ([facility]) E11.21 250.40      583.81    7. Essential hypertension,  controlled in office today. I10 401.9    8. Aortic stenosis, moderate, reasonable aortic valvular parameters by recent Echo done 11/26/18. Ejection fraction 41-45%. I35.0 424.1    9. Diastolic dysfunction, grade 1 I51.9 429.9    10. S/P AVR (aortic valve replacement), [place], [place] 2011 Z95.2 V43.3    11. S/P CABG (coronary artery bypass graft), [place], [place] 2005. Recent inferior wall myocardial infarction 11/2018, at which time Troponin peaked at 71.80. It was felt the patient had completed his infarct the patient was extremely desirous of being discharged from the hospital.  Patient treated medically. He reported shortness of breath and wheezing at his last visit; this improved with Rx Lasix 20 mg daily, but he only took it for 1-2 weeks as it was making him urinate to where he had to use a diaper. Bending over will bring on the wheezing. Pt reports resuming Lasix this AM.  Will have pt take Lasix 20 mg every other day. RTC in 3 months. Z95.1 V45.81    12     CVA, followed by Dr Nevin Higuera  13. Pre-operative evaluation, pending cataract surgery at [facility]. He has had no symptoms to suggest unstable angina, has not required SL NTG for at least 6 months. Does not have signs to suggest acutely decompensated heart failure. Do not plan further cardiac evaluation prior to cataract surgery.

## 2019-06-25 DIAGNOSIS — I72.4 POPLITEAL ARTERY ANEURYSM (HCC): ICD-10-CM

## 2019-06-25 DIAGNOSIS — I73.9 PVD (PERIPHERAL VASCULAR DISEASE) (HCC): ICD-10-CM

## 2019-06-25 DIAGNOSIS — I71.40 AAA (ABDOMINAL AORTIC ANEURYSM) WITHOUT RUPTURE: Primary | ICD-10-CM

## 2019-07-01 ENCOUNTER — HOSPITAL ENCOUNTER (OUTPATIENT)
Dept: CT IMAGING | Age: 79
Discharge: HOME | End: 2019-07-01
Attending: PHYSICIAN ASSISTANT
Payer: MEDICARE

## 2019-07-01 DIAGNOSIS — I73.9 PVD (PERIPHERAL VASCULAR DISEASE) (HCC): ICD-10-CM

## 2019-07-01 DIAGNOSIS — I71.40 AAA (ABDOMINAL AORTIC ANEURYSM) WITHOUT RUPTURE: ICD-10-CM

## 2019-07-01 DIAGNOSIS — I72.4 POPLITEAL ARTERY ANEURYSM (HCC): ICD-10-CM

## 2019-07-01 LAB — CREAT UR-MCNC: 1.7 MG/DL (ref 0.6–1.3)

## 2019-07-01 PROCEDURE — 74011636320 HC RX REV CODE- 636/320: Performed by: PHYSICIAN ASSISTANT

## 2019-07-01 PROCEDURE — 75635 CT ANGIO ABDOMINAL ARTERIES: CPT

## 2019-07-01 PROCEDURE — 82565 ASSAY OF CREATININE: CPT

## 2019-07-01 RX ADMIN — IOPAMIDOL 125 ML: 612 INJECTION, SOLUTION INTRAVENOUS at 15:15

## 2019-07-09 ENCOUNTER — OFFICE VISIT (OUTPATIENT)
Dept: FAMILY MEDICINE CLINIC | Age: 79
End: 2019-07-09

## 2019-07-09 VITALS
HEART RATE: 60 BPM | DIASTOLIC BLOOD PRESSURE: 88 MMHG | RESPIRATION RATE: 20 BRPM | BODY MASS INDEX: 33.04 KG/M2 | TEMPERATURE: 97.8 F | HEIGHT: 68 IN | OXYGEN SATURATION: 97 % | WEIGHT: 218 LBS | SYSTOLIC BLOOD PRESSURE: 144 MMHG

## 2019-07-09 DIAGNOSIS — I25.10 CORONARY ARTERY DISEASE INVOLVING NATIVE CORONARY ARTERY OF NATIVE HEART WITHOUT ANGINA PECTORIS: Primary | ICD-10-CM

## 2019-07-09 DIAGNOSIS — N39.498 OTHER URINARY INCONTINENCE: ICD-10-CM

## 2019-07-09 DIAGNOSIS — I73.9 PAD (PERIPHERAL ARTERY DISEASE) (HCC): ICD-10-CM

## 2019-07-09 DIAGNOSIS — L23.9 ALLERGIC DERMATITIS: ICD-10-CM

## 2019-07-09 DIAGNOSIS — I10 ESSENTIAL HYPERTENSION: ICD-10-CM

## 2019-07-09 RX ORDER — TRIAMCINOLONE ACETONIDE 1 MG/G
OINTMENT TOPICAL 2 TIMES DAILY
Qty: 30 G | Refills: 0 | Status: SHIPPED | OUTPATIENT
Start: 2019-07-09 | End: 2020-03-11 | Stop reason: ALTCHOICE

## 2019-07-09 NOTE — PATIENT INSTRUCTIONS

## 2019-07-09 NOTE — PROGRESS NOTES
1. Have you been to the ER, urgent care clinic since your last visit? Hospitalized since your last visit? No    2. Have you seen or consulted any other health care providers outside of the [address] [place] [address] since your last visit? Include any pap smears or colon screening.  No

## 2019-07-09 NOTE — PROGRESS NOTES
Subjective     Patient ID:  Tiburcio Dakin is a [de-identified] y.o. ( 1940) male who presents for the following:   Establish Care      HPI   His previous PCP was Dr. Howard Chance. Presents to establish care and for follow-up of chronic problems. He is here with his wife today. Urology:  Bladder control problems after stroke. Improved somewhat but worsening now after taking diuretics. Taking Flomax. Went to a urologist 3 years ago but would like to see one closer by. Cardiology: Followed by Dr. Sukhwinder Perez for CAD with history of MI. Vascular  Followed by Dr. Lori Rodriguez for peripheral arterial disease and popliteal artery aneurysm. - will be following up tomorrow. Review of Systems   Constitutional: Negative for appetite change, diaphoresis, fatigue and unexpected weight change. Eyes: Negative for visual disturbance. Respiratory: Negative for cough, chest tightness and shortness of breath. Cardiovascular: Negative for chest pain, palpitations and leg swelling. Gastrointestinal: Negative for abdominal distention, abdominal pain, blood in stool, constipation, diarrhea, nausea, rectal pain and vomiting. Endocrine: Negative for polydipsia, polyphagia and polyuria. Genitourinary: Positive for frequency and urgency. Negative for decreased urine volume, difficulty urinating and dysuria. Musculoskeletal: Negative for joint swelling and myalgias. Skin: Positive for rash (Left lower leg red and itchy since yesterday). Negative for wound. Neurological: Negative for dizziness, weakness, light-headedness, numbness and headaches. Psychiatric/Behavioral: Negative for dysphoric mood and sleep disturbance. The patient is not nervous/anxious. Past Medical History, Past Surgery History, Allergies, Social History, and Family History were reviewed and updated.       Past Medical History:   Diagnosis Date    Advance directive in chart 2016    CAD (coronary artery disease)     Cancer [facility])  Colon    CKD (chronic kidney disease), stage III ([address] 3/27/2016    CVA (cerebral vascular accident) ([address] 3/26/2016    Femoral artery aneurysm, left ([address]     Heart attack ([address]     Heart attack ([address]     Hernia     History of TIAs     Hyperlipidemia     Hypertension 3/27/2016    Iliac artery aneurysm, left ([address]     Psychiatric disorder     Vascular Dementia    Pure hypercholesterolemia 2012    PVD (peripheral vascular disease) ([address] 3/27/2016    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 ([address] 3/26/2016     Past Surgical History:   Procedure Laterality Date    ABDOMEN SURGERY PROC UNLISTED      3 stents placed into abdomen (groin)    CARDIAC SURG PROCEDURE UNLIST      Quadruple Bypass    CARDIAC SURG PROCEDURE UNLIST      Aortic pig valve placed    COLONOSCOPY N/A 2016    COL[facility] at [facility] ENDOSCOPY    ENDOSCOPY, COLON, DIAGNOSTIC      HX AAA REPAIR      HX GI      HX ORTHOPAEDIC      Right Leg Amputated    HX SMALL BOWEL RESECTION       Family History   Problem Relation Age of Onset    Hypertension Mother     Hypertension Father     Heart Disease Father     Heart Disease Brother      Social History     Socioeconomic History    Marital status:      Spouse name: Not on file    Number of children: Not on file    Years of education: Not on file    Highest education level: Not on file   Occupational History    Not on file   Social Needs    Financial resource strain: Not on file    Food insecurity:     Worry: Not on file     Inability: Not on file    Transportation needs:     Medical: Not on file     Non-medical: Not on file   Tobacco Use    Smoking status: Former Smoker     Last attempt to quit: 1987     Years since quittin.8    Smokeless tobacco: Never Used   Substance and Sexual Activity    Alcohol use: Yes     Comment: occasionally     Drug use: No    Sexual activity: Not Currently Lifestyle    Physical activity:     Days per week: Not on file     Minutes per session: Not on file    Stress: Not on file   Relationships    Social connections:     Talks on phone: Not on file     Gets together: Not on file     Attends Hindu service: Not on file     Active member of club or organization: Not on file     Attends meetings of clubs or organizations: Not on file     Relationship status: Not on file    Intimate partner violence:     Fear of current or ex partner: Not on file     Emotionally abused: Not on file     Physically abused: Not on file     Forced sexual activity: Not on file   Other Topics Concern    Not on file   Social History Narrative    Not on file     Allergies   Allergen Reactions    Ace Inhibitors Other (comments)     Per pt, Cardiologist states he cannot have ACE inhibitors     Current Outpatient Medications on File Prior to Visit   Medication Sig Dispense Refill    atorvastatin (LIPITOR) 20 mg tablet Take 1 Tab by mouth daily. 90 Tab 4    tamsulosin (FLOMAX) 0.4 mg capsule Take 1 Cap by mouth nightly. 90 Cap 4    aspirin (ASPIRIN) 325 mg tablet Take 1 Tab by mouth daily. 90 Tab 4    carvedilol (COREG) 3.125 mg tablet Take 1 Tab by mouth two (2) times daily (with meals). 180 Tab 4    clopidogrel (PLAVIX) 75 mg tab Take 1 Tab by mouth daily. 90 Tab 4     No current facility-administered medications on file prior to visit. Objective     Visit Vitals  /88   Pulse 60   Temp 97.8 °F (36.6 °C) (Oral)   Resp 20   Ht 5' 8\" (1.727 m)   Wt 218 lb (98.9 kg)   SpO2 97%   BMI 33.15 kg/m²     No LMP for male patient. Physical Exam   Constitutional: He is oriented to person, place, and time. He appears well-developed and well-nourished. No distress. Eyes: Conjunctivae and EOM are normal. Pupils are equal, round, and reactive to light. Neck: Carotid bruit is not present.    Cardiovascular: Normal rate, regular rhythm, normal heart sounds, intact distal pulses and normal pulses. No murmur heard. Pulmonary/Chest: Effort normal and breath sounds normal. No respiratory distress. Abdominal: Soft. Normal appearance and bowel sounds are normal. He exhibits no distension, no ascites and no mass. There is no hepatosplenomegaly. There is no tenderness. Musculoskeletal: He exhibits no edema. Neurological: He is alert and oriented to person, place, and time. Skin: Skin is warm and dry. Rash (Scattered papular rash of the left lower leg, does not appear infected.) noted. He is not diaphoretic. Psychiatric: He has a normal mood and affect. LABS     TESTS      Assessment and Plan     1. Coronary artery disease involving native coronary artery of native heart without angina pectoris  Continue follow-up with cardiology. 2. Allergic dermatitis  Kenalog ointment twice a day as needed to the affected area of the left lower leg. Monitor area closely for signs of infection, erythema, swelling, pain, heat. Seek care right away if these occur.  - triamcinolone acetonide (KENALOG) 0.1 % ointment; Apply  to affected area two (2) times a day. use thin layer to affected area of left lower leg. Dispense: 30 g; Refill: 0    3. Other urinary incontinence    - REFERRAL TO UROLOGY    4. Essential hypertension  Fairly controlled. Continue current medication. 5. PAD (peripheral artery disease) ([facility]  Continue follow-up with vascular surgeon. Follow-up and Dispositions    · Return in about 2 months (around 9/9/2019) for follow up, fasting for labs. Risks, benefits, and alternatives of the medications and treatment plan prescribed today were discussed, and patient expressed understanding. Printed after visit summary was given to patient and reviewed. All patient questions and concerns were addressed. Plan follow-up as discussed or as needed if any worsening symptoms or change in condition.            Signed electronically by Rich Kemp DNP, FNP-BC

## 2019-07-10 ENCOUNTER — OFFICE VISIT (OUTPATIENT)
Dept: VASCULAR SURGERY | Age: 79
End: 2019-07-10

## 2019-07-10 VITALS
DIASTOLIC BLOOD PRESSURE: 80 MMHG | RESPIRATION RATE: 16 BRPM | HEIGHT: 68 IN | WEIGHT: 218 LBS | SYSTOLIC BLOOD PRESSURE: 124 MMHG | BODY MASS INDEX: 33.04 KG/M2 | HEART RATE: 88 BPM

## 2019-07-10 DIAGNOSIS — N18.30 CKD (CHRONIC KIDNEY DISEASE), STAGE III (HCC): ICD-10-CM

## 2019-07-10 DIAGNOSIS — I70.212 ATHEROSCLEROSIS OF NATIVE ARTERY OF LEFT LOWER EXTREMITY WITH INTERMITTENT CLAUDICATION (HCC): ICD-10-CM

## 2019-07-10 DIAGNOSIS — I72.4 ANEURYSM OF LEFT POPLITEAL ARTERY (HCC): Primary | ICD-10-CM

## 2019-07-10 DIAGNOSIS — I71.40 AAA (ABDOMINAL AORTIC ANEURYSM) WITHOUT RUPTURE: ICD-10-CM

## 2019-07-10 NOTE — PROGRESS NOTES
Damir Albrecht Chief Complaint Patient presents with  Leg Pain History and Physical   
Kristian Guerrero is a [de-identified] y.o. male with AAA and right above-knee amputation. 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 He also has peripheral arterial disease with repeat stenosis of his proximal left superficial femoral artery and occlusion of his anterior tibial artery based on previous angiography. Patient continues to state that he seems to be doing okay but his son is in the room stating that he does have some claudication of the left lower extremity. No rest pain or ulcerations. No abdominal or back pain. No fevers or chills. Past Medical History:  
Diagnosis Date  Advance directive in chart 6/13/2016  CAD (coronary artery disease)  Cancer [facility]) 2003 Colon  CKD (chronic kidney disease), stage III ([address] 3/27/2016  CVA (cerebral vascular accident) ([address] 3/26/2016  Femoral artery aneurysm, left ([address]  Heart attack [facility]) 2008  Heart attack ([address]  Hernia  History of TIAs  Hyperlipidemia  Hypertension 3/27/2016  Iliac artery aneurysm, left ([address]  Pure hypercholesterolemia 9/14/2012  PVD (peripheral vascular disease) ([address] 3/27/2016  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  
Procedure Laterality Date  ABDOMEN SURGERY PROC UNLISTED  2011  
 3 stents placed into abdomen (groin)  CARDIAC SURG PROCEDURE UNLIST  2005 Quadruple Bypass  CARDIAC SURG PROCEDURE UNLIST  2011 Aortic pig valve placed  COLONOSCOPY N/A 6/29/2016 COLONOSCOPY performed by Smiley Lee MD at [facility] ENDOSCOPY  ENDOSCOPY, COLON, DIAGNOSTIC    
 HX AAA REPAIR    
 HX ORTHOPAEDIC  2008 Right Leg Amputated Patient Active Problem List  
Diagnosis Code  Pure hypercholesterolemia E78.00  Hypertension I10  
  CKD (chronic kidney disease), stage III ([facility]) N18.3  
 S/P AVR Z95.2  Advance directive in chart Z78.9  AAA (abdominal aortic aneurysm) without rupture ([facility]) I71.4  Vascular dementia without behavioral disturbance F01.50  Atherosclerosis of native artery of left lower extremity with intermittent claudication ([facility]) U99.250  CAD (coronary artery disease) I25.10  
 S/P CABG x 4 Z95.1  History of CVA (cerebrovascular accident) Z80.78  
 History of MI (myocardial infarction) I25.2  Aneurysm of left popliteal artery ([facility]) I72.4 Current Outpatient Medications Medication Sig Dispense Refill  triamcinolone acetonide (KENALOG) 0.1 % ointment Apply  to affected area two (2) times a day. use thin layer to affected area of left lower leg. 30 g 0  
 furosemide (LASIX) 20 mg tablet Take 1 Tab by mouth every fourty-eight (48) hours. 30 Tab 3  
 atorvastatin (LIPITOR) 20 mg tablet Take 1 Tab by mouth daily. 90 Tab 4  
 tamsulosin (FLOMAX) 0.4 mg capsule Take 1 Cap by mouth nightly. 90 Cap 4  
 aspirin (ASPIRIN) 325 mg tablet Take 1 Tab by mouth daily. 90 Tab 4  carvedilol (COREG) 3.125 mg tablet Take 1 Tab by mouth two (2) times daily (with meals). 180 Tab 4  clopidogrel (PLAVIX) 75 mg tab Take 1 Tab by mouth daily. 90 Tab 4  
 nitroglycerin (NITROSTAT) 0.4 mg SL tablet 1 Tab by SubLINGual route as needed (chest pain). 1 tab q5 minutes as needed for chest pain and call 911 25 Tab 12 Allergies Allergen Reactions  Ace Inhibitors Other (comments) Per pt, Cardiologist states he cannot have ACE inhibitors Social History Socioeconomic History  Marital status:  Spouse name: Not on file  Number of children: Not on file  Years of education: Not on file  Highest education level: Not on file Occupational History  Not on file Social Needs  Financial resource strain: Not on file  Food insecurity:  
  Worry: Not on file Inability: Not on file  Transportation needs:  
  Medical: Not on file Non-medical: Not on file Tobacco Use  Smoking status: Former Smoker Last attempt to quit: 1987 Years since quittin.8  Smokeless tobacco: Never Used Substance and Sexual Activity  Alcohol use: No  
  Comment: occasionally  Drug use: No  
 Sexual activity: Not Currently Lifestyle  Physical activity:  
  Days per week: Not on file Minutes per session: Not on file  Stress: Not on file Relationships  Social connections:  
  Talks on phone: Not on file Gets together: Not on file Attends Muslim service: Not on file Active member of club or organization: Not on file Attends meetings of clubs or organizations: Not on file Relationship status: Not on file  Intimate partner violence:  
  Fear of current or ex partner: Not on file Emotionally abused: Not on file Physically abused: Not on file Forced sexual activity: Not on file Other Topics Concern  Not on file Social History Narrative  Not on file Family History Problem Relation Age of Onset  Hypertension Mother  Hypertension Father  Heart Disease Father  Heart Disease Brother Physical Exam:   
Visit Vitals /80 (BP 1 Location: Left arm, BP Patient Position: Sitting) Pulse 88 Resp 16 Ht 5' 8\" (1.727 m) Wt 218 lb (98.9 kg) BMI 33.15 kg/m² General: Well-appearing male in no acute distress HEENT: EOMI no scleral icterus is noted moist mucous membranes are noted and the patient is wearing eyeglasses Pulmonary: No increased work of breathing is noted Extremities: Right above-knee amputation is present the left lower extremity is warm and perfused no ulcerations are identified there is trace edema to the left lower extremity. Neuro: Cranial nerves II through XII are grossly intact Impression and Plan: Radha Pena is a [de-identified] y.o. male with AAA as well as left popliteal artery aneurysm and peripheral arterial disease. We had an extensive discussion as to the multiple different ways of treating his popliteal artery aneurysm. We went over watchful waiting versus stenting versus bypass versus hybrid procedure. Based on all the risks and benefits of each option patient has elected to move forward with a hybrid procedure. We would perform a left superficial femoral endarterectomy with patch angioplasty at the proximal superficial femoral artery and then perform angiography intraoperatively with Viabahn stenting of the left popliteal artery. I believe we could do this with an 9 mm Viabahn. Patient was given the risks and benefits of the surgery including but not limited to bleeding, infection, damage to adjacent structures, MI, stroke, death, loss of lower extremity, need for further surgery. Patient is understanding all the risks and is willing to move forward with the surgery. We also talked about the fact that he would prefer this procedure to be done at our [facility] if possible otherwise we would do it at [facility]. We will look into this. I did review his CTA in clinic today that show that his AAA is appropriately occluded and the stent is working appropriately as well as within his common iliac arteries bilaterally. We reviewed the plan with the patient and the patient understands. We also gave the patient appropriate instructions on their disease process and when to call back. Greater than 50% of this visit was spent with face to face discussion. Lisa Gonsales MD 
 
PLEASE NOTE: 
This document has been produced using voice recognition software. Unrecognized errors in transcription may be present.

## 2019-07-10 NOTE — LETTER
7/10/19 Patient: Aster Galarza YOB: 1940 Date of Visit: 7/10/2019 Harper Swenson NP 
[address] [facility] [phone] VIA In Basket Dear Harper Swenson NP, Thank you for referring Mr. Josefa Ponce to [facility] for evaluation. My notes for this consultation are attached. If you have questions, please do not hesitate to call me. I look forward to following your patient along with you. Sincerely, Shamika Ponce MD

## 2019-07-10 NOTE — PROGRESS NOTES
1. Have you been to an emergency room or urgent care clinic since your last visit?  
no 
Hospitalized since your last visit? If yes, where, when, and reason for visit? yes 2. Have you seen or consulted any other health care providers outside of the [facility] since your last visit including any procedures, health maintenance items. If yes, where, when and reason for visit?

## 2019-07-15 RX ORDER — AZITHROMYCIN 250 MG/1
TABLET, FILM COATED ORAL
Qty: 6 TAB | Refills: 0 | Status: SHIPPED | OUTPATIENT
Start: 2019-07-15 | End: 2019-07-22

## 2019-07-22 ENCOUNTER — HOSPITAL ENCOUNTER (OUTPATIENT)
Dept: PREADMISSION TESTING | Age: 79
Discharge: HOME | End: 2019-07-22
Payer: MEDICARE

## 2019-07-22 ENCOUNTER — HOSPITAL ENCOUNTER (OUTPATIENT)
Dept: GENERAL RADIOLOGY | Age: 79
Discharge: HOME | End: 2019-07-22
Payer: MEDICARE

## 2019-07-22 ENCOUNTER — HOSPITAL ENCOUNTER (OUTPATIENT)
Dept: LAB | Age: 79
Discharge: HOME | End: 2019-07-22
Payer: MEDICARE

## 2019-07-22 DIAGNOSIS — I70.212 ATHEROSCLEROSIS OF NATIVE ARTERY OF LEFT LOWER EXTREMITY WITH INTERMITTENT CLAUDICATION (HCC): ICD-10-CM

## 2019-07-22 DIAGNOSIS — N18.30 CKD (CHRONIC KIDNEY DISEASE), STAGE III (HCC): ICD-10-CM

## 2019-07-22 DIAGNOSIS — I71.40 AAA (ABDOMINAL AORTIC ANEURYSM) WITHOUT RUPTURE: ICD-10-CM

## 2019-07-22 DIAGNOSIS — I72.4 ANEURYSM OF LEFT POPLITEAL ARTERY (HCC): ICD-10-CM

## 2019-07-22 LAB
ANION GAP SERPL CALC-SCNC: 5 MMOL/L (ref 3–18)
ATRIAL RATE: 51 BPM
BUN SERPL-MCNC: 34 MG/DL (ref 7–18)
BUN/CREAT SERPL: 18 (ref 12–20)
CALCIUM SERPL-MCNC: 9.6 MG/DL (ref 8.5–10.1)
CALCULATED P AXIS, ECG09: 27 DEGREES
CALCULATED R AXIS, ECG10: -31 DEGREES
CALCULATED T AXIS, ECG11: -51 DEGREES
CHLORIDE SERPL-SCNC: 109 MMOL/L (ref 100–111)
CO2 SERPL-SCNC: 27 MMOL/L (ref 21–32)
CREAT SERPL-MCNC: 1.86 MG/DL (ref 0.6–1.3)
DIAGNOSIS, 93000: NORMAL
ERYTHROCYTE [DISTWIDTH] IN BLOOD BY AUTOMATED COUNT: 15.3 % (ref 11.6–14.5)
GLUCOSE SERPL-MCNC: 94 MG/DL (ref 74–99)
HCT VFR BLD AUTO: 41 % (ref 36–48)
HGB BLD-MCNC: 13.7 G/DL (ref 13–16)
MCH RBC QN AUTO: 33.3 PG (ref 24–34)
MCHC RBC AUTO-ENTMCNC: 33.4 G/DL (ref 31–37)
MCV RBC AUTO: 99.5 FL (ref 74–97)
P-R INTERVAL, ECG05: 140 MS
PLATELET # BLD AUTO: 217 K/UL (ref 135–420)
PMV BLD AUTO: 11.6 FL (ref 9.2–11.8)
POTASSIUM SERPL-SCNC: 5.8 MMOL/L (ref 3.5–5.5)
Q-T INTERVAL, ECG07: 444 MS
QRS DURATION, ECG06: 112 MS
QTC CALCULATION (BEZET), ECG08: 409 MS
RBC # BLD AUTO: 4.12 M/UL (ref 4.7–5.5)
SODIUM SERPL-SCNC: 141 MMOL/L (ref 136–145)
VENTRICULAR RATE, ECG03: 51 BPM
WBC # BLD AUTO: 8.9 K/UL (ref 4.6–13.2)

## 2019-07-22 PROCEDURE — 93005 ELECTROCARDIOGRAM TRACING: CPT

## 2019-07-22 PROCEDURE — 71046 X-RAY EXAM CHEST 2 VIEWS: CPT

## 2019-07-22 PROCEDURE — 36415 COLL VENOUS BLD VENIPUNCTURE: CPT

## 2019-07-22 PROCEDURE — 85027 COMPLETE CBC AUTOMATED: CPT

## 2019-07-22 PROCEDURE — 80048 BASIC METABOLIC PNL TOTAL CA: CPT

## 2019-07-29 ENCOUNTER — ANESTHESIA EVENT (OUTPATIENT)
Dept: SURGERY | Age: 79
End: 2019-07-29

## 2019-07-30 ENCOUNTER — ANESTHESIA (OUTPATIENT)
Dept: SURGERY | Age: 79
End: 2019-07-30

## 2019-08-05 ENCOUNTER — OFFICE VISIT (OUTPATIENT)
Dept: CARDIOLOGY CLINIC | Age: 79
End: 2019-08-05

## 2019-08-05 VITALS
OXYGEN SATURATION: 97 % | WEIGHT: 218 LBS | HEART RATE: 97 BPM | DIASTOLIC BLOOD PRESSURE: 75 MMHG | BODY MASS INDEX: 33.15 KG/M2 | SYSTOLIC BLOOD PRESSURE: 109 MMHG

## 2019-08-05 DIAGNOSIS — I72.4 ANEURYSM OF LEFT POPLITEAL ARTERY (HCC): ICD-10-CM

## 2019-08-05 DIAGNOSIS — I10 ESSENTIAL HYPERTENSION: ICD-10-CM

## 2019-08-05 DIAGNOSIS — I25.10 CORONARY ARTERY DISEASE INVOLVING NATIVE HEART WITHOUT ANGINA PECTORIS, UNSPECIFIED VESSEL OR LESION TYPE: ICD-10-CM

## 2019-08-05 DIAGNOSIS — Z95.2 S/P AVR: ICD-10-CM

## 2019-08-05 DIAGNOSIS — I71.40 AAA (ABDOMINAL AORTIC ANEURYSM) WITHOUT RUPTURE: Primary | ICD-10-CM

## 2019-08-05 DIAGNOSIS — I70.212 ATHEROSCLEROSIS OF NATIVE ARTERY OF LEFT LOWER EXTREMITY WITH INTERMITTENT CLAUDICATION (HCC): ICD-10-CM

## 2019-08-05 DIAGNOSIS — N18.30 CKD (CHRONIC KIDNEY DISEASE), STAGE III (HCC): ICD-10-CM

## 2019-08-05 DIAGNOSIS — I25.2 HISTORY OF MI (MYOCARDIAL INFARCTION): ICD-10-CM

## 2019-08-05 DIAGNOSIS — Z86.73 HISTORY OF CVA (CEREBROVASCULAR ACCIDENT): ICD-10-CM

## 2019-08-05 DIAGNOSIS — Z95.1 S/P CABG X 4: ICD-10-CM

## 2019-08-05 NOTE — PROGRESS NOTES
1. Have you been to the ER, urgent care clinic since your last visit? Hospitalized since your last visit? No  
 
2. Have you seen or consulted any other health care providers outside of the [address] [place] [address] since your last visit? Include any pap smears or colon screening. yes

## 2019-08-08 ENCOUNTER — TELEPHONE (OUTPATIENT)
Dept: CARDIOLOGY CLINIC | Age: 79
End: 2019-08-08

## 2019-08-08 NOTE — TELEPHONE ENCOUNTER
Jacobo Dewey calling on behalf of terrie Sheppard to ask that patients cardiac clearance be faxed to Dr. Freida Pina so that his surgery can be schedule. Please fax to [phone].

## 2019-08-08 NOTE — TELEPHONE ENCOUNTER
Per Dr. Obadiah Castleman, patient is cleared from a cardiac standpoint for his upcoming surgery. Letter will be faxed to the number below.        Verbal order and read back per Alexia Miller MD

## 2019-08-08 NOTE — LETTER
8/8/2019 2:41 PM 
 
Mr. Esteban Peña 42 
[facility] [id number] Esteban Crisostomo was seen in our office on 8/5/19 for cardiac evaluation. From a cardiac standpoint he is cleared for his upcoming surgery and needs no further cardiac work up a this time. Please feel free to contact our office if you have any questions regarding this patient. Sincerely, Vikki Schroeder MD

## 2019-08-10 NOTE — PROGRESS NOTES
Subjective:  
   Shannan Woods is in the office today for cardiac reevaluation. He is in preoperative mode for left popliteal aneurysm repair He is a 78-year-old man that had revascularization of his left lower extremity in October of 2016, aorto bi iliac endografting, prior angiplasty of the left SFA, and AKA of the RLE. Lord Jake He has also had DVT of the left posterior tibial vein in 04/207 The patient has a history of known coronary artery disease and prior coronary bypass grafting done in 2005. He also had aortic valve replacement 2011. The most recent echocardiogram was done in November 2018 which demonstrated continued adequate function of the aortic prosthesis. The patient had a neurological event in December of 2016. He is followed by Dr. Berny Reynolds in that regard. The patient had a MRI of brain with contrast on 12/30/2016. There was an old area of infarction involving the right frontal lobe extending into the corona radiata white matter. There was also an area of old infarction in the right parietal region. There was also an abnormal signal in the right sub-insular region, which was felt to possibly represent an area of old hemorrhagic infarct. There was no acute abnormality at that time. Patient had a myocardial infarction in November 2018. He was treated medically. The patient wanted to be discharged the day following his admission. In the office today, the patient reports he is doing well. He has had no chest pain. He has taken any sublingual nitroglycerin. He denies any change in his breathing. He has had no PND or orthopnea. He has had no palpitations, near-syncope or syncope. Patient Active Problem List  
 Diagnosis Date Noted  Aneurysm of left popliteal artery ([facility] 07/10/2019  
 History of CVA (cerebrovascular accident) 12/01/2018  History of MI (myocardial infarction) 12/01/2018  S/P CABG x 4 11/25/2018  CAD (coronary artery disease) 11/24/2018  Atherosclerosis of native artery of left lower extremity with intermittent claudication ([address] 07/09/2018  Vascular dementia without behavioral disturbance 03/08/2017  AAA (abdominal aortic aneurysm) without rupture ([address] 01/04/2017  Advance directive in chart 06/13/2016  S/P AVR 04/13/2016  Hypertension 03/27/2016  CKD (chronic kidney disease), stage III ([address] 03/27/2016  Pure hypercholesterolemia 09/14/2012 Current Outpatient Medications Medication Sig Dispense Refill  nitroglycerin (NITROSTAT) 0.4 mg SL tablet by SubLINGual route.  triamcinolone acetonide (KENALOG) 0.1 % ointment Apply  to affected area two (2) times a day. use thin layer to affected area of left lower leg. 30 g 0  
 atorvastatin (LIPITOR) 20 mg tablet Take 1 Tab by mouth daily. 90 Tab 4  
 tamsulosin (FLOMAX) 0.4 mg capsule Take 1 Cap by mouth nightly. 90 Cap 4  
 aspirin (ASPIRIN) 325 mg tablet Take 1 Tab by mouth daily. 90 Tab 4  carvedilol (COREG) 3.125 mg tablet Take 1 Tab by mouth two (2) times daily (with meals). 180 Tab 4  clopidogrel (PLAVIX) 75 mg tab Take 1 Tab by mouth daily. 90 Tab 4 Allergies Allergen Reactions  Ace Inhibitors Other (comments) Per pt, Cardiologist states he cannot have ACE inhibitors Past Medical History:  
Diagnosis Date  Advance directive in chart 6/13/2016  CAD (coronary artery disease)  Cancer [facility]) 2003 Colon  CKD (chronic kidney disease), stage III ([address] 3/27/2016  CVA (cerebral vascular accident) ([address] 3/26/2016  Femoral artery aneurysm, left ([address]  Heart attack [facility]) 2008  Heart attack ([address]  Hernia  History of TIAs  Hyperlipidemia  Hypertension 3/27/2016  Iliac artery aneurysm, left ([address]  Psychiatric disorder Vascular Dementia  Pure hypercholesterolemia 9/14/2012  PVD (peripheral vascular disease) ([address] 3/27/2016  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  
Procedure Laterality Date  ABDOMEN SURGERY PROC UNLISTED    
 3 stents placed into abdomen (groin)  CARDIAC SURG PROCEDURE UNLIST   Quadruple Bypass  CARDIAC SURG PROCEDURE UNLIST   Aortic pig valve placed  COLONOSCOPY N/A 2016 COLONOSCOPY performed by Stephanie Arnold MD at [facility] ENDOSCOPY  ENDOSCOPY, COLON, DIAGNOSTIC    
 HX AAA REPAIR    
 HX GI    
 HX ORTHOPAEDIC   Right Leg Amputated  HX SMALL BOWEL RESECTION Family History Problem Relation Age of Onset  Hypertension Mother  Hypertension Father  Heart Disease Father  Heart Disease Brother Social History Tobacco Use Smoking Status Former Smoker  Last attempt to quit: 1987  Years since quittin.9 Smokeless Tobacco Never Used Review of Systems, additional: 
Constitutional: negative Eyes: negative Respiratory: negative Cardiovascular: negative Gastrointestinal: negative Musculoskeletal:weakness Neurological: negative Behvioral/Psych: negative Endocrine: negative ENT: negative Objective:  
 
Visit Vitals /75 Pulse 97 Wt 218 lb (98.9 kg) SpO2 97% BMI 33.15 kg/m² General:  alert, cooperative, no distress Chest Wall: inspection normal - no chest wall deformities or tenderness, respiratory effort normal  
Lung: clear to auscultation bilaterally Heart:  normal rate and regular rhythm, S1 and S2 normal, systolic murmur 2/6 at 2nd right intercostal space and radiates to carotids Abdomen: soft, non-tender. Bowel sounds normal. No masses,  no organomegaly Extremities: Trace edema to left lower extremity. There is a R BKA with prosthesis  no cyanosis. Skin: no rashes Neuro: alert, oriented, normal speech, no focal findings or movement disorder noted EK2016; Sinus rhythm with PACs. LAD. Assessment/Plan: ICD-10-CM ICD-9-CM 1. AAA (abdominal aortic aneurysm) without rupture ([facility] S/P aorta bi iliac endograft I71.4 441.4 2. PAD (peripheral artery disease) ([facility] S/P R AKA, S/P L SFA angioplasty. He now has a left popliteal aneurysm which needs repair I73.9 443.9 3. Femoral artery aneurysm, left (HCC) I72.4 442.3 4. Pure hypercholesterolemia E78.00 272.0 5. CKD (chronic kidney disease), stage III,  N18.3 585.3 6. Controlled type 2 diabetes mellitus with diabetic nephropathy, without long-term current use of insulin (HCC) E11.21 250.40   
  583.81   
7. Essential hypertension,  controlled in office today. I10 401.9 8. Aortic stenosis, moderate, reasonable aortic valvular parameters by recent Echo done 11/26/18. Ejection fraction 41-45%. I35.0 424.1 9. Diastolic dysfunction, grade 1 I51.9 429.9 10. S/P AVR (aortic valve replacement), [place], [place] 2011 Z95.2 V43.3 11. S/P CABG (coronary artery bypass graft), [place], [place] 2005. Recent inferior wall myocardial infarction 11/2018, at which time Troponin peaked at 71.80. It was felt the patient had completed his infarct. He did not want any further cardiac evaluation at that time Z95.1 V45.81   
12     CVA, followed by Dr Kathy David 13. Pre-operative evaluation, pending left popliteal aneurysm procedure. He continues to have no symptoms to suggest unstable angina. He has not required SL NTG for at least 6 months. He does not have signs or symptoms to suggest acutely decompensated heart failure. Will not plan further cardiac evaluation prior to vascular procedure. No absolute cardiac contraindication to proposed procedure.

## 2019-08-15 ENCOUNTER — ANESTHESIA EVENT (OUTPATIENT)
Dept: CARDIOTHORACIC SURGERY | Age: 79
End: 2019-08-15

## 2019-08-15 ENCOUNTER — HOSPITAL ENCOUNTER (OUTPATIENT)
Dept: PREADMISSION TESTING | Age: 79
Discharge: HOME | End: 2019-08-15
Payer: MEDICARE

## 2019-08-15 LAB
ANION GAP SERPL CALC-SCNC: 9 MMOL/L (ref 3–18)
BUN SERPL-MCNC: 30 MG/DL (ref 7–18)
BUN/CREAT SERPL: 15 (ref 12–20)
CALCIUM SERPL-MCNC: 8.7 MG/DL (ref 8.5–10.1)
CHLORIDE SERPL-SCNC: 108 MMOL/L (ref 100–111)
CO2 SERPL-SCNC: 24 MMOL/L (ref 21–32)
CREAT SERPL-MCNC: 2.04 MG/DL (ref 0.6–1.3)
ERYTHROCYTE [DISTWIDTH] IN BLOOD BY AUTOMATED COUNT: 14.8 % (ref 11.6–14.5)
GLUCOSE SERPL-MCNC: 101 MG/DL (ref 74–99)
HCT VFR BLD AUTO: 38.5 % (ref 36–48)
HGB BLD-MCNC: 13.1 G/DL (ref 13–16)
MCH RBC QN AUTO: 33.2 PG (ref 24–34)
MCHC RBC AUTO-ENTMCNC: 34 G/DL (ref 31–37)
MCV RBC AUTO: 97.7 FL (ref 74–97)
PLATELET # BLD AUTO: 226 K/UL (ref 135–420)
PMV BLD AUTO: 12.4 FL (ref 9.2–11.8)
POTASSIUM SERPL-SCNC: 5.6 MMOL/L (ref 3.5–5.5)
RBC # BLD AUTO: 3.94 M/UL (ref 4.7–5.5)
SODIUM SERPL-SCNC: 141 MMOL/L (ref 136–145)
WBC # BLD AUTO: 7.7 K/UL (ref 4.6–13.2)

## 2019-08-15 PROCEDURE — 85027 COMPLETE CBC AUTOMATED: CPT

## 2019-08-15 PROCEDURE — 36415 COLL VENOUS BLD VENIPUNCTURE: CPT

## 2019-08-15 PROCEDURE — 80048 BASIC METABOLIC PNL TOTAL CA: CPT

## 2019-08-16 ENCOUNTER — HOSPITAL ENCOUNTER (OUTPATIENT)
Dept: GENERAL RADIOLOGY | Age: 79
Discharge: HOME | End: 2019-08-16
Attending: SURGERY

## 2019-08-16 ENCOUNTER — ANESTHESIA (OUTPATIENT)
Dept: CARDIOTHORACIC SURGERY | Age: 79
End: 2019-08-16

## 2019-08-16 ENCOUNTER — HOSPITAL ENCOUNTER (INPATIENT)
Age: 79
LOS: 1 days | Discharge: HOME | DRG: 272 | End: 2019-08-17
Attending: SURGERY | Admitting: SURGERY
Payer: MEDICARE

## 2019-08-16 DIAGNOSIS — I72.4 POPLITEAL ARTERY ANEURYSM (HCC): Primary | ICD-10-CM

## 2019-08-16 PROBLEM — I70.213 ATHEROSCLEROSIS OF NATIVE ARTERY OF BOTH LOWER EXTREMITIES WITH INTERMITTENT CLAUDICATION (HCC): Status: ACTIVE | Noted: 2019-08-16

## 2019-08-16 PROBLEM — I73.9 PAD (PERIPHERAL ARTERY DISEASE) (HCC): Status: ACTIVE | Noted: 2019-08-16

## 2019-08-16 LAB
ABO + RH BLD: NORMAL
BLOOD GROUP ANTIBODIES SERPL: NORMAL
SPECIMEN EXP DATE BLD: NORMAL

## 2019-08-16 PROCEDURE — 04UL0JZ SUPPLEMENT LEFT FEMORAL ARTERY WITH SYNTHETIC SUBSTITUTE, OPEN APPROACH: ICD-10-PCS | Performed by: SURGERY

## 2019-08-16 PROCEDURE — C1769 GUIDE WIRE: HCPCS | Performed by: SURGERY

## 2019-08-16 PROCEDURE — 77030027138 HC INCENT SPIROMETER -A

## 2019-08-16 PROCEDURE — 77030018836 HC SOL IRR NACL ICUM -A: Performed by: SURGERY

## 2019-08-16 PROCEDURE — 77030010512 HC APPL CLP LIG J&J -C: Performed by: SURGERY

## 2019-08-16 PROCEDURE — 37248 TRLUML BALO ANGIOP 1ST VEIN: CPT

## 2019-08-16 PROCEDURE — C1725 CATH, TRANSLUMIN NON-LASER: HCPCS | Performed by: SURGERY

## 2019-08-16 PROCEDURE — 77030034850: Performed by: SURGERY

## 2019-08-16 PROCEDURE — 77030002996 HC SUT SLK J&J -A: Performed by: SURGERY

## 2019-08-16 PROCEDURE — 74011250636 HC RX REV CODE- 250/636: Performed by: NURSE ANESTHETIST, CERTIFIED REGISTERED

## 2019-08-16 PROCEDURE — 76060000036 HC ANESTHESIA 2.5 TO 3 HR: Performed by: SURGERY

## 2019-08-16 PROCEDURE — 74011250637 HC RX REV CODE- 250/637: Performed by: SURGERY

## 2019-08-16 PROCEDURE — B41G1ZZ FLUOROSCOPY OF LEFT LOWER EXTREMITY ARTERIES USING LOW OSMOLAR CONTRAST: ICD-10-PCS | Performed by: SURGERY

## 2019-08-16 PROCEDURE — 77030002986 HC SUT PROL J&J -A: Performed by: SURGERY

## 2019-08-16 PROCEDURE — 94762 N-INVAS EAR/PLS OXIMTRY CONT: CPT

## 2019-08-16 PROCEDURE — 65620000000 HC RM CCU GENERAL

## 2019-08-16 PROCEDURE — 74011250636 HC RX REV CODE- 250/636

## 2019-08-16 PROCEDURE — 77030010514 HC APPL CLP LIG COVD -B: Performed by: SURGERY

## 2019-08-16 PROCEDURE — 77030013058 HC DEV INFL ANGIO BSC -B: Performed by: SURGERY

## 2019-08-16 PROCEDURE — C1874 STENT, COATED/COV W/DEL SYS: HCPCS | Performed by: SURGERY

## 2019-08-16 PROCEDURE — 74011250636 HC RX REV CODE- 250/636: Performed by: SURGERY

## 2019-08-16 PROCEDURE — 77030039266 HC ADH SKN EXOFIN S2SG -A: Performed by: SURGERY

## 2019-08-16 PROCEDURE — C1894 INTRO/SHEATH, NON-LASER: HCPCS | Performed by: SURGERY

## 2019-08-16 PROCEDURE — C1768 GRAFT, VASCULAR: HCPCS | Performed by: SURGERY

## 2019-08-16 PROCEDURE — 74011250637 HC RX REV CODE- 250/637

## 2019-08-16 PROCEDURE — 04VN3DZ RESTRICTION OF LEFT POPLITEAL ARTERY WITH INTRALUMINAL DEVICE, PERCUTANEOUS APPROACH: ICD-10-PCS | Performed by: SURGERY

## 2019-08-16 PROCEDURE — 86900 BLOOD TYPING SEROLOGIC ABO: CPT

## 2019-08-16 PROCEDURE — 74011636320 HC RX REV CODE- 636/320: Performed by: SURGERY

## 2019-08-16 PROCEDURE — 77030003390 HC NDL ANGI MRTM -A: Performed by: SURGERY

## 2019-08-16 PROCEDURE — 76010000109 HC CV SURG 2.5 TO 3 HR: Performed by: SURGERY

## 2019-08-16 PROCEDURE — 77030031139 HC SUT VCRL2 J&J -A: Performed by: SURGERY

## 2019-08-16 PROCEDURE — 04CL0ZZ EXTIRPATION OF MATTER FROM LEFT FEMORAL ARTERY, OPEN APPROACH: ICD-10-PCS | Performed by: SURGERY

## 2019-08-16 DEVICE — XENOSURE BIOLOGIC PATCH, 0.8CM X 8CM, EIFU
Type: IMPLANTABLE DEVICE | Site: ARTERIAL | Status: FUNCTIONAL
Brand: XENOSURE BIOLOGIC PATCH

## 2019-08-16 DEVICE — VIABAHN SX ENDO HEPARIN 18 RO 8MMX15CM 7FR120CM CATH
Type: IMPLANTABLE DEVICE | Site: GROIN | Status: FUNCTIONAL
Brand: GORE VIABAHN ENDOPROSTHESIS WITH HEPARIN

## 2019-08-16 RX ORDER — TAMSULOSIN HYDROCHLORIDE 0.4 MG/1
0.4 CAPSULE ORAL
Status: DISCONTINUED | OUTPATIENT
Start: 2019-08-16 | End: 2019-08-17 | Stop reason: HOSPADM

## 2019-08-16 RX ORDER — NITROGLYCERIN 40 MG/100ML
INJECTION INTRAVENOUS
Status: DISCONTINUED
Start: 2019-08-16 | End: 2019-08-16

## 2019-08-16 RX ORDER — SODIUM CHLORIDE 0.9 % (FLUSH) 0.9 %
5-40 SYRINGE (ML) INJECTION EVERY 8 HOURS
Status: DISCONTINUED | OUTPATIENT
Start: 2019-08-16 | End: 2019-08-16 | Stop reason: HOSPADM

## 2019-08-16 RX ORDER — KETAMINE HCL 50MG/ML(1)
SYRINGE (ML) INTRAVENOUS AS NEEDED
Status: DISCONTINUED | OUTPATIENT
Start: 2019-08-16 | End: 2019-08-16 | Stop reason: HOSPADM

## 2019-08-16 RX ORDER — HEPARIN SODIUM 1000 [USP'U]/ML
INJECTION, SOLUTION INTRAVENOUS; SUBCUTANEOUS AS NEEDED
Status: DISCONTINUED | OUTPATIENT
Start: 2019-08-16 | End: 2019-08-16 | Stop reason: HOSPADM

## 2019-08-16 RX ORDER — ROCURONIUM BROMIDE 10 MG/ML
INJECTION, SOLUTION INTRAVENOUS AS NEEDED
Status: DISCONTINUED | OUTPATIENT
Start: 2019-08-16 | End: 2019-08-16 | Stop reason: HOSPADM

## 2019-08-16 RX ORDER — HEPARIN SODIUM 5000 [USP'U]/ML
5000 INJECTION, SOLUTION INTRAVENOUS; SUBCUTANEOUS EVERY 8 HOURS
Status: DISCONTINUED | OUTPATIENT
Start: 2019-08-16 | End: 2019-08-17 | Stop reason: HOSPADM

## 2019-08-16 RX ORDER — CEFAZOLIN SODIUM 2 G/50ML
2 SOLUTION INTRAVENOUS ONCE
Status: COMPLETED | OUTPATIENT
Start: 2019-08-16 | End: 2019-08-16

## 2019-08-16 RX ORDER — SODIUM CHLORIDE 0.9 % (FLUSH) 0.9 %
5-40 SYRINGE (ML) INJECTION AS NEEDED
Status: DISCONTINUED | OUTPATIENT
Start: 2019-08-16 | End: 2019-08-17 | Stop reason: HOSPADM

## 2019-08-16 RX ORDER — HEPARIN SODIUM 200 [USP'U]/100ML
INJECTION, SOLUTION INTRAVENOUS
Status: COMPLETED | OUTPATIENT
Start: 2019-08-16 | End: 2019-08-16

## 2019-08-16 RX ORDER — ONDANSETRON 2 MG/ML
INJECTION INTRAMUSCULAR; INTRAVENOUS AS NEEDED
Status: DISCONTINUED | OUTPATIENT
Start: 2019-08-16 | End: 2019-08-16 | Stop reason: HOSPADM

## 2019-08-16 RX ORDER — DIPHENHYDRAMINE HYDROCHLORIDE 50 MG/ML
12.5 INJECTION, SOLUTION INTRAMUSCULAR; INTRAVENOUS
Status: DISCONTINUED | OUTPATIENT
Start: 2019-08-16 | End: 2019-08-17 | Stop reason: HOSPADM

## 2019-08-16 RX ORDER — MORPHINE SULFATE 2 MG/ML
2 INJECTION, SOLUTION INTRAMUSCULAR; INTRAVENOUS
Status: DISCONTINUED | OUTPATIENT
Start: 2019-08-16 | End: 2019-08-17 | Stop reason: HOSPADM

## 2019-08-16 RX ORDER — CEFAZOLIN SODIUM 2 G/50ML
2 SOLUTION INTRAVENOUS EVERY 8 HOURS
Status: DISCONTINUED | OUTPATIENT
Start: 2019-08-16 | End: 2019-08-16

## 2019-08-16 RX ORDER — FAMOTIDINE 20 MG/1
20 TABLET, FILM COATED ORAL ONCE
Status: COMPLETED | OUTPATIENT
Start: 2019-08-16 | End: 2019-08-16

## 2019-08-16 RX ORDER — LIDOCAINE HYDROCHLORIDE 10 MG/ML
0.1 INJECTION, SOLUTION EPIDURAL; INFILTRATION; INTRACAUDAL; PERINEURAL AS NEEDED
Status: DISCONTINUED | OUTPATIENT
Start: 2019-08-16 | End: 2019-08-16 | Stop reason: HOSPADM

## 2019-08-16 RX ORDER — HEPARIN SODIUM 200 [USP'U]/100ML
INJECTION, SOLUTION INTRAVENOUS
Status: DISCONTINUED
Start: 2019-08-16 | End: 2019-08-16

## 2019-08-16 RX ORDER — ACETAMINOPHEN 325 MG/1
650 TABLET ORAL
Status: DISCONTINUED | OUTPATIENT
Start: 2019-08-16 | End: 2019-08-17 | Stop reason: HOSPADM

## 2019-08-16 RX ORDER — PROPOFOL 10 MG/ML
INJECTION, EMULSION INTRAVENOUS AS NEEDED
Status: DISCONTINUED | OUTPATIENT
Start: 2019-08-16 | End: 2019-08-16 | Stop reason: HOSPADM

## 2019-08-16 RX ORDER — CLOPIDOGREL BISULFATE 75 MG/1
75 TABLET ORAL DAILY
Status: DISCONTINUED | OUTPATIENT
Start: 2019-08-16 | End: 2019-08-17 | Stop reason: HOSPADM

## 2019-08-16 RX ORDER — SODIUM CHLORIDE 0.9 % (FLUSH) 0.9 %
5-40 SYRINGE (ML) INJECTION EVERY 8 HOURS
Status: DISCONTINUED | OUTPATIENT
Start: 2019-08-16 | End: 2019-08-17 | Stop reason: HOSPADM

## 2019-08-16 RX ORDER — ESMOLOL HYDROCHLORIDE 10 MG/ML
INJECTION INTRAVENOUS AS NEEDED
Status: DISCONTINUED | OUTPATIENT
Start: 2019-08-16 | End: 2019-08-16 | Stop reason: HOSPADM

## 2019-08-16 RX ORDER — FENTANYL CITRATE 50 UG/ML
INJECTION, SOLUTION INTRAMUSCULAR; INTRAVENOUS AS NEEDED
Status: DISCONTINUED | OUTPATIENT
Start: 2019-08-16 | End: 2019-08-16 | Stop reason: HOSPADM

## 2019-08-16 RX ORDER — IODIXANOL 320 MG/ML
INJECTION, SOLUTION INTRAVASCULAR
Status: DISCONTINUED
Start: 2019-08-16 | End: 2019-08-16

## 2019-08-16 RX ORDER — ATORVASTATIN CALCIUM 20 MG/1
20 TABLET, FILM COATED ORAL DAILY
Status: DISCONTINUED | OUTPATIENT
Start: 2019-08-16 | End: 2019-08-17 | Stop reason: HOSPADM

## 2019-08-16 RX ORDER — ASPIRIN 325 MG
325 TABLET ORAL DAILY
Status: DISCONTINUED | OUTPATIENT
Start: 2019-08-16 | End: 2019-08-17 | Stop reason: HOSPADM

## 2019-08-16 RX ORDER — NALOXONE HYDROCHLORIDE 0.4 MG/ML
0.4 INJECTION, SOLUTION INTRAMUSCULAR; INTRAVENOUS; SUBCUTANEOUS AS NEEDED
Status: DISCONTINUED | OUTPATIENT
Start: 2019-08-16 | End: 2019-08-17 | Stop reason: HOSPADM

## 2019-08-16 RX ORDER — DOCUSATE SODIUM 100 MG/1
100 CAPSULE, LIQUID FILLED ORAL 2 TIMES DAILY
Status: DISCONTINUED | OUTPATIENT
Start: 2019-08-16 | End: 2019-08-17 | Stop reason: HOSPADM

## 2019-08-16 RX ORDER — CARVEDILOL 3.12 MG/1
3.12 TABLET ORAL 2 TIMES DAILY WITH MEALS
Status: DISCONTINUED | OUTPATIENT
Start: 2019-08-16 | End: 2019-08-17 | Stop reason: HOSPADM

## 2019-08-16 RX ORDER — PROTAMINE SULFATE 10 MG/ML
INJECTION, SOLUTION INTRAVENOUS AS NEEDED
Status: DISCONTINUED | OUTPATIENT
Start: 2019-08-16 | End: 2019-08-16 | Stop reason: HOSPADM

## 2019-08-16 RX ORDER — LIDOCAINE HYDROCHLORIDE 10 MG/ML
INJECTION, SOLUTION EPIDURAL; INFILTRATION; INTRACAUDAL; PERINEURAL
Status: DISCONTINUED
Start: 2019-08-16 | End: 2019-08-16 | Stop reason: WASHOUT

## 2019-08-16 RX ORDER — SODIUM CHLORIDE 0.9 % (FLUSH) 0.9 %
5-40 SYRINGE (ML) INJECTION AS NEEDED
Status: DISCONTINUED | OUTPATIENT
Start: 2019-08-16 | End: 2019-08-16 | Stop reason: HOSPADM

## 2019-08-16 RX ORDER — GLYCOPYRROLATE 0.2 MG/ML
INJECTION INTRAMUSCULAR; INTRAVENOUS AS NEEDED
Status: DISCONTINUED | OUTPATIENT
Start: 2019-08-16 | End: 2019-08-16 | Stop reason: HOSPADM

## 2019-08-16 RX ORDER — SODIUM CHLORIDE, SODIUM LACTATE, POTASSIUM CHLORIDE, CALCIUM CHLORIDE 600; 310; 30; 20 MG/100ML; MG/100ML; MG/100ML; MG/100ML
50 INJECTION, SOLUTION INTRAVENOUS CONTINUOUS
Status: DISCONTINUED | OUTPATIENT
Start: 2019-08-16 | End: 2019-08-16 | Stop reason: HOSPADM

## 2019-08-16 RX ORDER — NITROGLYCERIN 0.4 MG/1
0.4 TABLET SUBLINGUAL
Status: DISCONTINUED | OUTPATIENT
Start: 2019-08-16 | End: 2019-08-17 | Stop reason: HOSPADM

## 2019-08-16 RX ORDER — LIDOCAINE HYDROCHLORIDE 20 MG/ML
INJECTION, SOLUTION EPIDURAL; INFILTRATION; INTRACAUDAL; PERINEURAL AS NEEDED
Status: DISCONTINUED | OUTPATIENT
Start: 2019-08-16 | End: 2019-08-16 | Stop reason: HOSPADM

## 2019-08-16 RX ORDER — CEFAZOLIN SODIUM 2 G/50ML
SOLUTION INTRAVENOUS
Status: COMPLETED
Start: 2019-08-16 | End: 2019-08-16

## 2019-08-16 RX ORDER — ONDANSETRON 2 MG/ML
4 INJECTION INTRAMUSCULAR; INTRAVENOUS
Status: DISCONTINUED | OUTPATIENT
Start: 2019-08-16 | End: 2019-08-17 | Stop reason: HOSPADM

## 2019-08-16 RX ORDER — FAMOTIDINE 20 MG/1
TABLET, FILM COATED ORAL
Status: COMPLETED
Start: 2019-08-16 | End: 2019-08-16

## 2019-08-16 RX ORDER — IODIXANOL 320 MG/ML
INJECTION, SOLUTION INTRAVASCULAR AS NEEDED
Status: DISCONTINUED | OUTPATIENT
Start: 2019-08-16 | End: 2019-08-16 | Stop reason: HOSPADM

## 2019-08-16 RX ORDER — OXYCODONE AND ACETAMINOPHEN 5; 325 MG/1; MG/1
1 TABLET ORAL
Status: DISCONTINUED | OUTPATIENT
Start: 2019-08-16 | End: 2019-08-17 | Stop reason: HOSPADM

## 2019-08-16 RX ADMIN — SODIUM CHLORIDE, SODIUM LACTATE, POTASSIUM CHLORIDE, AND CALCIUM CHLORIDE 50 ML/HR: 600; 310; 30; 20 INJECTION, SOLUTION INTRAVENOUS at 07:24

## 2019-08-16 RX ADMIN — ESMOLOL HYDROCHLORIDE 20 MG: 10 INJECTION INTRAVENOUS at 08:25

## 2019-08-16 RX ADMIN — Medication 10 ML: at 16:19

## 2019-08-16 RX ADMIN — FAMOTIDINE 20 MG: 20 TABLET ORAL at 07:33

## 2019-08-16 RX ADMIN — TAMSULOSIN HYDROCHLORIDE 0.4 MG: 0.4 CAPSULE ORAL at 21:14

## 2019-08-16 RX ADMIN — ASPIRIN 325 MG: 325 TABLET, FILM COATED ORAL at 16:11

## 2019-08-16 RX ADMIN — ATORVASTATIN CALCIUM 20 MG: 20 TABLET, FILM COATED ORAL at 16:11

## 2019-08-16 RX ADMIN — HEPARIN SODIUM 8000 UNITS: 1000 INJECTION, SOLUTION INTRAVENOUS; SUBCUTANEOUS at 08:55

## 2019-08-16 RX ADMIN — ROCURONIUM BROMIDE 10 MG: 10 INJECTION, SOLUTION INTRAVENOUS at 08:36

## 2019-08-16 RX ADMIN — PROTAMINE SULFATE 10 MG: 10 INJECTION, SOLUTION INTRAVENOUS at 09:40

## 2019-08-16 RX ADMIN — LIDOCAINE HYDROCHLORIDE 50 MG: 20 INJECTION, SOLUTION EPIDURAL; INFILTRATION; INTRACAUDAL; PERINEURAL at 08:05

## 2019-08-16 RX ADMIN — CARVEDILOL 3.12 MG: 3.12 TABLET, FILM COATED ORAL at 18:26

## 2019-08-16 RX ADMIN — ESMOLOL HYDROCHLORIDE 20 MG: 10 INJECTION INTRAVENOUS at 08:07

## 2019-08-16 RX ADMIN — ESMOLOL HYDROCHLORIDE 20 MG: 10 INJECTION INTRAVENOUS at 10:15

## 2019-08-16 RX ADMIN — DOCUSATE SODIUM 100 MG: 100 CAPSULE, LIQUID FILLED ORAL at 16:11

## 2019-08-16 RX ADMIN — ROCURONIUM BROMIDE 50 MG: 10 INJECTION, SOLUTION INTRAVENOUS at 08:05

## 2019-08-16 RX ADMIN — HEPARIN SODIUM 5000 UNITS: 5000 INJECTION INTRAVENOUS; SUBCUTANEOUS at 16:12

## 2019-08-16 RX ADMIN — Medication 10 ML: at 21:14

## 2019-08-16 RX ADMIN — CEFAZOLIN SODIUM 2 G: 2 SOLUTION INTRAVENOUS at 08:10

## 2019-08-16 RX ADMIN — CLOPIDOGREL BISULFATE 75 MG: 75 TABLET, FILM COATED ORAL at 16:11

## 2019-08-16 RX ADMIN — PROTAMINE SULFATE 20 MG: 10 INJECTION, SOLUTION INTRAVENOUS at 09:45

## 2019-08-16 RX ADMIN — GLYCOPYRROLATE 0.2 MG: 0.2 INJECTION INTRAMUSCULAR; INTRAVENOUS at 08:17

## 2019-08-16 RX ADMIN — FAMOTIDINE 20 MG: 20 TABLET, FILM COATED ORAL at 07:33

## 2019-08-16 RX ADMIN — Medication 50 MG: at 08:05

## 2019-08-16 RX ADMIN — PROPOFOL 25 MG: 10 INJECTION, EMULSION INTRAVENOUS at 08:05

## 2019-08-16 RX ADMIN — ONDANSETRON 4 MG: 2 INJECTION INTRAMUSCULAR; INTRAVENOUS at 10:12

## 2019-08-16 RX ADMIN — HEPARIN SODIUM 5000 UNITS: 5000 INJECTION INTRAVENOUS; SUBCUTANEOUS at 20:21

## 2019-08-16 RX ADMIN — FENTANYL CITRATE 50 MCG: 50 INJECTION, SOLUTION INTRAMUSCULAR; INTRAVENOUS at 08:05

## 2019-08-16 RX ADMIN — PROTAMINE SULFATE 20 MG: 10 INJECTION, SOLUTION INTRAVENOUS at 09:42

## 2019-08-16 NOTE — PROGRESS NOTES
1030 received from OR in stable condition report received from Dr Bon Gurrola. Rt pop pulse dopp;er, left DP/PT doppler, sites marked. Left groin noted large but soft, states that he has a hernia there. 1200 no change in status denies distress. 1400 no change in status  1600 cond reassessed with no new change noted.   1800 set up for meal

## 2019-08-16 NOTE — ROUTINE PROCESS
TRANSFER - OUT REPORT:    Verbal report given to [facility]  on [employer]  being transferred to CVT ICU for routine post - op       Report consisted of patients Situation, Background, Assessment and   Recommendations(SBAR). Information from the following report(s) SBAR, Kardex, OR Summary, Procedure Summary, MAR and Recent Results was reviewed with the receiving nurse. Lines:   Peripheral IV 08/16/19 Right; Inner Arm (Active)   Site Assessment Clean, dry, & intact 8/16/2019  8:13 AM   Phlebitis Assessment 0 8/16/2019  8:13 AM   Infiltration Assessment 0 8/16/2019  8:13 AM   Dressing Status Clean, dry, & intact 8/16/2019  8:13 AM   Dressing Type Transparent 8/16/2019  8:13 AM   Hub Color/Line Status Green 8/16/2019  8:13 AM       Peripheral IV 08/16/19 Right Wrist (Active)   Site Assessment Clean, dry, & intact 8/16/2019  8:13 AM   Phlebitis Assessment 0 8/16/2019  8:13 AM   Infiltration Assessment 0 8/16/2019  8:13 AM   Dressing Status Clean, dry, & intact 8/16/2019  8:13 AM   Dressing Type Transparent 8/16/2019  8:13 AM   Hub Color/Line Status Pink 8/16/2019  8:13 AM        Opportunity for questions and clarification was provided.       Patient transported with:   Monitor  Registered Nurse

## 2019-08-16 NOTE — PROGRESS NOTES
conducted an initial consultation and Spiritual Assessment for [employer], who is a [de-identified] y.o.,male. Patients Primary Language is: [place]. According to the patients EMR Buddhism Affiliation is: [place]. The reason the Patient came to the hospital is:   Patient Active Problem List    Diagnosis Date Noted    Popliteal artery aneurysm ([facility] 08/16/2019    PAD (peripheral artery disease) ([facility] 08/16/2019    Atherosclerosis of native artery of both lower extremities with intermittent claudication ([address] 08/16/2019    Aneurysm of left popliteal artery ([facility] 07/10/2019    History of CVA (cerebrovascular accident) 12/01/2018    History of MI (myocardial infarction) 12/01/2018    S/P CABG x 4 11/25/2018    CAD (coronary artery disease) 11/24/2018    Atherosclerosis of native artery of left lower extremity with intermittent claudication ([facility] 07/09/2018    Vascular dementia without behavioral disturbance 03/08/2017    AAA (abdominal aortic aneurysm) without rupture ([facility] 01/04/2017    Advance directive in chart 06/13/2016    S/P AVR 04/13/2016    Hypertension 03/27/2016    CKD (chronic kidney disease), stage III ([facility] 03/27/2016    Pure hypercholesterolemia 09/14/2012        The  provided the following Interventions:  Initiated a relationship of care and support. Explored issues of sky, belief, spirituality and Sabianist/ritual needs while hospitalized. Listened empathically. Provided chaplaincy education. Provided information about Spiritual Care Services. Offered prayer and assurance of continued prayers on patient's behalf. Chart reviewed. The following outcomes where achieved:  Patient shared limited information about both their medical narrative and spiritual journey/beliefs.  confirmed Patient's Buddhism Affiliation. Patient processed feeling about current hospitalization. Patient expressed gratitude for 's visit.     Assessment:  Patient does not have any Sabianism/cultural needs that will affect patients preferences in health care. There are no spiritual or Sabianism issues which require intervention at this time. Plan:  Chaplains will continue to follow and will provide pastoral care on an as needed/requested basis.  recommends bedside caregivers page  on duty if patient shows signs of acute spiritual or emotional distress.     Chaplain Resident [address] [place] [address]   [phone]

## 2019-08-16 NOTE — BRIEF OP NOTE
BRIEF OPERATIVE NOTE    Date of Procedure: 8/16/2019   Preoperative Diagnosis: I72.4, I70.212  Postoperative Diagnosis: I72.4, I70.212    Procedure(s):  Left Superficial Femoral Artery Endarterectomy with Patch Angioplasty Left Lower Extremity Angiogram with Viabahn stent of left popliteal artery aneurysm  Surgeon(s) and Role:     * Trisha Valdez MD - Primary         Surgical Assistant: none    Surgical Staff:  Circ-1: Kelsie Pritchard RN  Radiology Technician: Magnus Savage  Scrub Tech-1: Carlyn Sever A  Surg Asst-1: Winston Field  Event Time In Time Out   Incision Start 6514    Incision Close       Anesthesia: General   Estimated Blood Loss: 100mL  Specimens: * No specimens in log *   Findings: pop aneurysm   Complications: none  Implants:   Implant Name Type Inv.  Item Serial No.  Lot No. LRB No. Used Action   STENT VASC CATH 120CM [id number] -- [employer] [place] ENDOPROSTHESIS - [de-identified] Stent STENT VASC CATH 120CM [id number] -- [employer] [place] ENDOPROSTHESIS [de-identified] [employer]  Left 1 Implanted   GRAFT SYS [id number] VIA  -- VIABAHN - [de-identified]  GRAFT SYS [id number] VIA  -- VIABAHN [de-identified] [employer]  Left 1 Implanted   [facility] VASC PERIPATCH 0.8X8CM -- Roslyn Hatfield - [id number]  PATCH VASC PERIPATCH 0.8X8CM -- [employer]ULAR [employer] [id number] Left 1 Implanted

## 2019-08-16 NOTE — ANESTHESIA PREPROCEDURE EVALUATION
Relevant Problems   No relevant active problems       Anesthetic History   No history of anesthetic complications            Review of Systems / Medical History  Patient summary reviewed, nursing notes reviewed and pertinent labs reviewed    Pulmonary                   Neuro/Psych       CVA (Some leg weakness since last year. He relates it to his MI and rehab.)  TIA and psychiatric history     Cardiovascular    Hypertension  Valvular problems/murmurs (Bioprosthetic AVR)        CAD, PAD and CABG      Comments: MI Nov 2018    Preop evaluation by cardiology revealed no acute issues, no unstable angina. Further testing or preop optimization was deemed unnecessary. Last TTE was shortly after his MI and showed reduced LV function (est EF 40%) and no valvulopathy. The prosthetic AV is functioning, stenosis is mild. Mild MR. Known severe PVD with multiple prior interventions. Known cerebrovascular disease with prior stroke. AAA s/p EVAR, shown to be completely excluded by CTA 7/2019. GI/Hepatic/Renal         Renal disease: CRI       Endo/Other             Other Findings   Comments: Chronic hyperkalemia (5.3 - 5.6). Chronic renal insuufficiency (Cr 1.5 - 2.0). Physical Exam    Airway  Mallampati: II  TM Distance: > 6 cm  Neck ROM: normal range of motion        Cardiovascular    Rhythm: regular  Rate: normal         Dental    Dentition: Edentulous     Pulmonary  Breath sounds clear to auscultation               Abdominal         Other Findings            Anesthetic Plan    ASA: 4  Anesthesia type: general          Induction: Intravenous  Anesthetic plan and risks discussed with: Patient and Family      Reviewed planned general anesthetic and possibility of invasive monitors. Discussed common and rare risks specifically including prolonged hospital stay, MI, stroke, postop ventilation, prolonged hospital stay, organ damage, and death. Consent reviewed and signed. Questions answered.

## 2019-08-16 NOTE — ANESTHESIA POSTPROCEDURE EVALUATION
Procedure(s):  Left Superficial Femoral Artery Endarterectomy with Patch Angioplasty Left Lower Extremity Angiogram with Viabahn stent of left popliteal artery aneurysm. general    Anesthesia Post Evaluation      Multimodal analgesia: multimodal analgesia used between 6 hours prior to anesthesia start to PACU discharge  Patient location during evaluation: ICU  Patient participation: complete - patient participated  Level of consciousness: sleepy but conscious  Pain score: 0  Airway patency: patent  Anesthetic complications: no  Cardiovascular status: acceptable  Respiratory status: acceptable  Hydration status: euvolemic  Comments: Transported to ICU on monitors, VSS, report to RN. Post anesthesia nausea and vomiting:  none      No vitals data found for the desired time range.

## 2019-08-16 NOTE — H&P
Surgery History and Physical    Subjective:      Vielka Nassar is a [de-identified] y.o. male who presents with left popliteal artery aneurysm with PAD.     Patient Active Problem List    Diagnosis Date Noted    Popliteal artery aneurysm ([address] 08/16/2019    PAD (peripheral artery disease) ([address] 08/16/2019    Atherosclerosis of native artery of both lower extremities with intermittent claudication ([address] 08/16/2019    Aneurysm of left popliteal artery ([address] 07/10/2019    History of CVA (cerebrovascular accident) 12/01/2018    History of MI (myocardial infarction) 12/01/2018    S/P CABG x 4 11/25/2018    CAD (coronary artery disease) 11/24/2018    Atherosclerosis of native artery of left lower extremity with intermittent claudication ([address] 07/09/2018    Vascular dementia without behavioral disturbance 03/08/2017    AAA (abdominal aortic aneurysm) without rupture ([address] 01/04/2017    Advance directive in chart 06/13/2016    S/P AVR 04/13/2016    Hypertension 03/27/2016    CKD (chronic kidney disease), stage III ([address] 03/27/2016    Pure hypercholesterolemia 09/14/2012     Past Medical History:   Diagnosis Date    Advance directive in chart 6/13/2016    CAD (coronary artery disease)     Cancer ([address] 2003    Colon    CKD (chronic kidney disease), stage III ([address] 3/27/2016    CVA (cerebral vascular accident) ([address] 3/26/2016    Femoral artery aneurysm, left ([address]     Heart attack ([address] 2008    Heart attack ([address]     Hernia     History of TIAs     Hyperlipidemia     Hypertension 3/27/2016    Iliac artery aneurysm, left ([address]     Psychiatric disorder     Vascular Dementia    Pure hypercholesterolemia 9/14/2012    PVD (peripheral vascular disease) ([address] 3/27/2016    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 ([address] 3/26/2016      Past Surgical History:   Procedure Laterality Date    ABDOMEN SURGERY PROC UNLISTED  2011    3 stents placed into abdomen (groin)    CARDIAC SURG PROCEDURE UNLIST 2005    Quadruple Bypass    CARDIAC SURG PROCEDURE UNLIST      Aortic pig valve placed    COLONOSCOPY N/A 2016    COLON[facility]aleb [facility] at [facility] ENDOSCOPY    ENDOSCOPY, COLON, DIAGNOSTIC      HX AAA REPAIR      HX GI      HX ORTHOPAEDIC  2008    Right Leg Amputated    HX SMALL BOWEL RESECTION        Social History     Tobacco Use    Smoking status: Former Smoker     Last attempt to quit: 1987     Years since quittin.9    Smokeless tobacco: Never Used   Substance Use Topics    Alcohol use: Not Currently      Family History   Problem Relation Age of Onset    Hypertension Mother     Hypertension Father     Heart Disease Father     Heart Disease Brother       Prior to Admission medications    Medication Sig Start Date End Date Taking? Authorizing Provider   atorvastatin (LIPITOR) 20 mg tablet Take 1 Tab by mouth daily. 19  Yes Heena Pilon., DO   tamsulosin [facility]) 0.4 mg capsule Take 1 Cap by mouth nightly. 19  Yes Heena Pilon., DO   aspirin (ASPIRIN) 325 mg tablet Take 1 Tab by mouth daily. 19  Yes Heena Pilon., DO   carvedilol (COREG) 3.125 mg tablet Take 1 Tab by mouth two (2) times daily (with meals). 19  Yes Heena Pilon., DO   clopidogrel (PLAVIX) 75 mg tab Take 1 Tab by mouth daily. 19  Yes Heena Pilon., DO   nitroglycerin (NITROSTAT) 0.4 mg SL tablet by SubLINGual route every five (5) minutes as needed. Provider, Historical   triamcinolone acetonide (KENALOG) 0.1 % ointment Apply  to affected area two (2) times a day. use thin layer to affected area of left lower leg. 19   Leroy Finn NP     Allergies   Allergen Reactions    Ace Inhibitors Other (comments)     Per pt, Cardiologist states he cannot have ACE inhibitors         ROS:  Pertinent items are noted in HPI. Unless otherwise mentioned in the HPI.     Objective:     Patient Vitals for the past 8 hrs:   BP Temp Pulse Resp SpO2 Height Weight   19 0707 141/72 97.9 °F (36.6 °C) (!) 57 16 98 % 5' 8\" (1.727 m) 218 lb (98.9 kg)       Temp (24hrs), Av.9 °F (36.6 °C), Min:97.9 °F (36.6 °C), Max:97.9 °F (36.6 °C)      Physical Exam:  GENERAL: alert, cooperative, no distress, appears stated age, EYE: negative findings: anicteric sclera and EOMI, THROAT & NECK: normal and no erythema or exudates noted. , LUNG: clear to auscultation bilaterally, HEART: regular rate and rhythm, S1, S2 normal, no murmur, click, rub or gallop, ABDOMEN: soft, non-tender.  Bowel sounds normal. No masses,  no organomegaly    Labs:   Recent Results (from the past 24 hour(s))   CBC W/O DIFF    Collection Time: 08/15/19 12:20 PM   Result Value Ref Range    WBC 7.7 4.6 - 13.2 K/uL    RBC 3.94 (L) 4.70 - 5.50 M/uL    HGB 13.1 13.0 - 16.0 g/dL    HCT 38.5 36.0 - 48.0 %    MCV 97.7 (H) 74.0 - 97.0 FL    MCH 33.2 24.0 - 34.0 PG    MCHC 34.0 31.0 - 37.0 g/dL    RDW 14.8 (H) 11.6 - 14.5 %    PLATELET 500 796 - 970 K/uL    MPV 12.4 (H) 9.2 - 74.1 FL   METABOLIC PANEL, BASIC    Collection Time: 08/15/19 12:20 PM   Result Value Ref Range    Sodium 141 136 - 145 mmol/L    Potassium 5.6 (H) 3.5 - 5.5 mmol/L    Chloride 108 100 - 111 mmol/L    CO2 24 21 - 32 mmol/L    Anion gap 9 3.0 - 18 mmol/L    Glucose 101 (H) 74 - 99 mg/dL    BUN 30 (H) 7.0 - 18 MG/DL    Creatinine 2.04 (H) 0.6 - 1.3 MG/DL    BUN/Creatinine ratio 15 12 - 20      GFR est AA 38 (L) >60 ml/min/1.73m2    GFR est non-AA 32 (L) >60 ml/min/1.73m2    Calcium 8.7 8.5 - 10.1 MG/DL       Data Review:    CBC:   Lab Results   Component Value Date/Time    WBC 7.7 08/15/2019 12:20 PM    RBC 3.94 (L) 08/15/2019 12:20 PM    HGB 13.1 08/15/2019 12:20 PM    HCT 38.5 08/15/2019 12:20 PM    PLATELET 958  12:20 PM      BMP:   Lab Results   Component Value Date/Time    Glucose 101 (H) 08/15/2019 12:20 PM    Sodium 141 08/15/2019 12:20 PM    Potassium 5.6 (H) 08/15/2019 12:20 PM    Chloride 108 08/15/2019 12:20 PM    CO2 24 08/15/2019 12:20 PM    BUN 30 (H) 08/15/2019 12:20 PM    Creatinine 2.04 (H) 08/15/2019 12:20 PM    Calcium 8.7 08/15/2019 12:20 PM     Coagulation:   Lab Results   Component Value Date/Time    Prothrombin time 14.8 12/01/2018 11:35 AM    INR 1.2 12/01/2018 11:35 AM    aPTT 88.2 (H) 11/27/2018 06:11 AM       Assessment:     Active Problems:    Popliteal artery aneurysm ([address] (8/16/2019)      PAD (peripheral artery disease) ([address] (8/16/2019)      Atherosclerosis of native artery of both lower extremities with intermittent claudication ([address] (8/16/2019)        Plan:     Left sfa endarterectomy with patch and popliteal artery aneurysm stent repair.     Signed By: Love Decker MD     August 16, 2019

## 2019-08-17 VITALS
DIASTOLIC BLOOD PRESSURE: 71 MMHG | HEIGHT: 68 IN | OXYGEN SATURATION: 95 % | WEIGHT: 218 LBS | BODY MASS INDEX: 33.04 KG/M2 | TEMPERATURE: 98 F | RESPIRATION RATE: 14 BRPM | SYSTOLIC BLOOD PRESSURE: 130 MMHG | HEART RATE: 58 BPM

## 2019-08-17 LAB
ANION GAP SERPL CALC-SCNC: 7 MMOL/L (ref 3–18)
BUN SERPL-MCNC: 29 MG/DL (ref 7–18)
BUN/CREAT SERPL: 15 (ref 12–20)
CALCIUM SERPL-MCNC: 8.4 MG/DL (ref 8.5–10.1)
CHLORIDE SERPL-SCNC: 107 MMOL/L (ref 100–111)
CO2 SERPL-SCNC: 25 MMOL/L (ref 21–32)
CREAT SERPL-MCNC: 1.89 MG/DL (ref 0.6–1.3)
ERYTHROCYTE [DISTWIDTH] IN BLOOD BY AUTOMATED COUNT: 15 % (ref 11.6–14.5)
GLUCOSE SERPL-MCNC: 111 MG/DL (ref 74–99)
HCT VFR BLD AUTO: 36.2 % (ref 36–48)
HGB BLD-MCNC: 12.2 G/DL (ref 13–16)
MCH RBC QN AUTO: 32.6 PG (ref 24–34)
MCHC RBC AUTO-ENTMCNC: 33.7 G/DL (ref 31–37)
MCV RBC AUTO: 96.8 FL (ref 74–97)
PLATELET # BLD AUTO: 215 K/UL (ref 135–420)
PMV BLD AUTO: 11.9 FL (ref 9.2–11.8)
POTASSIUM SERPL-SCNC: 4.7 MMOL/L (ref 3.5–5.5)
RBC # BLD AUTO: 3.74 M/UL (ref 4.7–5.5)
SODIUM SERPL-SCNC: 139 MMOL/L (ref 136–145)
WBC # BLD AUTO: 9.4 K/UL (ref 4.6–13.2)

## 2019-08-17 PROCEDURE — 74011250637 HC RX REV CODE- 250/637: Performed by: SURGERY

## 2019-08-17 PROCEDURE — 85027 COMPLETE CBC AUTOMATED: CPT

## 2019-08-17 PROCEDURE — 94762 N-INVAS EAR/PLS OXIMTRY CONT: CPT

## 2019-08-17 PROCEDURE — 74011250636 HC RX REV CODE- 250/636: Performed by: SURGERY

## 2019-08-17 PROCEDURE — 80048 BASIC METABOLIC PNL TOTAL CA: CPT

## 2019-08-17 PROCEDURE — 36415 COLL VENOUS BLD VENIPUNCTURE: CPT

## 2019-08-17 RX ORDER — OXYCODONE AND ACETAMINOPHEN 5; 325 MG/1; MG/1
1 TABLET ORAL
Qty: 30 TAB | Refills: 0 | Status: SHIPPED | OUTPATIENT
Start: 2019-08-17 | End: 2019-08-20

## 2019-08-17 RX ADMIN — ACETAMINOPHEN 650 MG: 325 TABLET ORAL at 06:26

## 2019-08-17 RX ADMIN — CLOPIDOGREL BISULFATE 75 MG: 75 TABLET, FILM COATED ORAL at 09:42

## 2019-08-17 RX ADMIN — ATORVASTATIN CALCIUM 20 MG: 20 TABLET, FILM COATED ORAL at 09:42

## 2019-08-17 RX ADMIN — Medication 10 ML: at 05:05

## 2019-08-17 RX ADMIN — ASPIRIN 325 MG: 325 TABLET, FILM COATED ORAL at 09:42

## 2019-08-17 RX ADMIN — DOCUSATE SODIUM 100 MG: 100 CAPSULE, LIQUID FILLED ORAL at 09:42

## 2019-08-17 RX ADMIN — HEPARIN SODIUM 5000 UNITS: 5000 INJECTION INTRAVENOUS; SUBCUTANEOUS at 05:05

## 2019-08-17 RX ADMIN — CARVEDILOL 3.12 MG: 3.12 TABLET, FILM COATED ORAL at 09:42

## 2019-08-17 NOTE — DISCHARGE INSTRUCTIONS
DISCHARGE SUMMARY from Nurse    PATIENT INSTRUCTIONS:    After general anesthesia or intravenous sedation, for 24 hours or while taking prescription Narcotics:  · Limit your activities  · Do not drive and operate hazardous machinery  · Do not make important personal or business decisions  · Do  not drink alcoholic beverages  · If you have not urinated within 8 hours after discharge, please contact your surgeon on call. Report the following to your surgeon:  · Excessive pain, swelling, redness or odor of or around the surgical area  · Temperature over 100.5  · Nausea and vomiting lasting longer than 4 hours or if unable to take medications  · Any signs of decreased circulation or nerve impairment to extremity: change in color, persistent  numbness, tingling, coldness or increase pain  · Any questions    What to do at Home:  Recommended activity: Activity as tolerated and No lifting, Driving, or Strenuous exercise for until seen in clinic,     If you experience any of the following symptoms selling, bleeding, s/s of infection right groin incision, foot turning cold blue or purple. , please follow up with, Dr Darcy Carlisle or return to Emergency room,    *  Please give a list of your current medications to your Primary Care Provider. *  Please update this list whenever your medications are discontinued, doses are      changed, or new medications (including over-the-counter products) are added. *  Please carry medication information at all times in case of emergency situations. These are general instructions for a healthy lifestyle:    No smoking/ No tobacco products/ Avoid exposure to second hand smoke  Surgeon General's Warning:  Quitting smoking now greatly reduces serious risk to your health.     Obesity, smoking, and sedentary lifestyle greatly increases your risk for illness    A healthy diet, regular physical exercise & weight monitoring are important for maintaining a healthy lifestyle    You may be retaining fluid if you have a history of heart failure or if you experience any of the following symptoms:  Weight gain of 3 pounds or more overnight or 5 pounds in a week, increased swelling in our hands or feet or shortness of breath while lying flat in bed. Please call your doctor as soon as you notice any of these symptoms; do not wait until your next office visit. The discharge information has been reviewed with the patient and spouse. The patient and spouse verbalized understanding. Discharge medications reviewed with the patient and spouse and appropriate educational materials and side effects teaching were provided.   ___________________________________________________________________________________________________________________________________

## 2019-08-17 NOTE — OP NOTES
[facility]  OPERATIVE REPORT    Name:  Rin Fuchs  MR#:   [id number]  :  1940  ACCOUNT #:  [de-identified]  DATE OF SERVICE:  2019    PREOPERATIVE DIAGNOSIS:  Left lower extremity peripheral arterial disease with popliteal artery aneurysm. POSTOPERATIVE DIAGNOSIS:  Left lower extremity peripheral arterial disease with popliteal artery aneurysm. PROCEDURES PERFORMED:  1. Left superficial femoral artery patch angioplasty. 2.  Left lower extremity angiogram.  3.  Placement of 8 x 150 and 10 x 50 VIABAHN stent within the distal superficial femoral artery and above-knee popliteal artery for endovascular popliteal aneurysm repair. SURGEON:  Mignon Holcomb MD    ASSISTANT:  none. ANESTHESIA:  General.    COMPLICATIONS:  None. SPECIMENS REMOVED:  None. IMPLANTS:  Please see above. Patch was Bovine pericardium. ESTIMATED BLOOD LOSS:  100 mL. CULTURES:  None. DRAINS:  None. INDICATION FOR PROCEDURE:  The patient is an 41-year-old gentleman with popliteal artery aneurysm, with known claudication and peripheral arterial disease of the left lower extremity. The patient was given the risks and benefits of the procedure including, but not limited to, bleeding, infection, damage to adjacent structures, MI, stroke, and death, as well as loss of lower extremity and need for further surgery. The patient was understanding of all the risks and underwent the procedure. DESCRIPTION OF PROCEDURE:  The patient was correctly identified in the preoperative holding area and taken to the operating room in stable condition. The patient had pre-incision time-out prior to any incision. The patient was prepped and draped in normal sterile fashion according to CDC guidelines for aseptic technique. The patient also had preoperative antibiotics prior to any incision. We then were able to identify his previous femoral incision.   We extended this about 5 cm distally, dissected all the way down, and identified the superficial femoral artery. We did move further up but saw a heavily scarred in area assuming that this was closer to the common femoral artery and that we needed to be at least 4 cm distal to this to get to the area of concern on the superficial femoral artery. We were able to get down onto the SFA, loop it with red vessel loops in a Brooks fashion both proximally and distally. We did feel some plaque in this area but it did not feel nearly as bad as what things looked like on the CT scan, so a decision was then made to just go ahead and do an angiogram in the area to confirm that we were in the right spot and do our endovascular procedure first.  We took a single-wall entry needle to gain access into the superficial femoral artery. Once we had a positive blood return, a wire was placed and then our 7-Chinese sheath went in place. We heparinized the patient appropriately at this point and performed an angiogram showing the popliteal artery aneurysm. We placed a V-18 wire down and placed an 8 x 150 VIABAHN where we knew based on preop CT imaging everything should be okay. Repeat angiogram showed resolution of the aneurysm, but there was a slight type 1B endoleak, so we went ahead and extended with a 10 x 50 VIABAHN. This was done over an 11-Chinese sheath and Veebowson wire for the 10 x 50 balloon. We did post-dilate the 8 x 150 with an 8 x 40 balloon and then a 10 x 40 for the 10 x 50 VIABAHN which extended about 2.5 cm distally. At this point, everything looked good there. We did find our area of concern close to where we were, so we went ahead and removed our sheath.   We clamped our artery both proximally and distally, closed the sheath hole with 5-0 Prolene suture x2 and then we opened up the SFA longitudinally with an 11 blade and Brooks scissors, and actually just by opening the artery, the plaque was able to kind of open up and did not appear nearly as stenotic as prior, just seemed nice and open at this point, and there was no real intra-arterial plaque, possibly the calcific plaque was just constricting the artery, and then by opening it, it allowed the artery to open up, so we just went ahead and patched this with a Bovine pericardial patch with 5-0 Prolene suture, four repair sutures were required. We did appropriately de-air prior to opening, we had a great pulse on either side, and then we were able to use Surgicel, manual compression as well as fully reversing the heparin to gain hemostasis. We then copiously irrigated the wound, closed with multiple layers, 2-0 and 3-0 Vicryl layers, and then closed the skin with 4-0 Vicryl subcuticular stitch and Dermabond for dressing. The patient tolerated the procedure well and was taken to the ICU in stable condition.       Daniel Fernando MD [id number]  D:  08/16/2019 10:51  T:  08/16/2019 12:27  JOB #:  [id number]

## 2019-08-17 NOTE — PROGRESS NOTES
0730 SBAR report received from off going shift, initial assessment completed, denies pain and discomfort. Left surgical incision cdi with no s/s of bleeding or hematoma formation noted. Doppler pulses noted left pt/dp. Rt leg popliteal pulse doppler  1100 Dr Sisi Weir in to see d/c orders written. 1330 d/c to home in stable condition. D/c instructions given and verbalized good understanding.

## 2019-08-17 NOTE — DISCHARGE SUMMARY
Physician Discharge Summary     Patient ID:  Minh Gill  [id number]  36 y.o.  1940    Admit date: 8/16/2019    Discharge date: 8/17/2019      Admitting Physician: Batsheva Quiroz MD     Discharge Physician: Batsheva Quiroz MD     Admission Diagnoses: Popliteal artery aneurysm [facility]) [I72.4];PAD (peripheral artery disease) ([facility] [I73.9]; Atherosclerosis of native artery of both lower extremities with intermittent claudication ([facility] [I70.213]    Discharge Diagnoses: There are no discharge diagnoses documented for the most recent discharge. Procedures for this admission: Procedure(s):  Left Superficial Femoral Artery Endarterectomy with Patch Angioplasty Left Lower Extremity Angiogram with Viabahn stent of left popliteal artery aneurysm    Discharged Condition: stable    Hospital Course: admitted after SFA patch angioplasty and left popliteal artery aneurysm repair with stent. Did well overnight without issues. He is eating, voiding, pain controlled on oral meds and ready for discharge. Consults: None    Significant Diagnostic Studies: none    Treatments: IV hydration and analgesia: Morphine and Oxycodone    Discharge Exam: GEN: alert, cooperative, no distress, appears stated age  THROAT & NECK: normal and no erythema or exudates noted. LUNG: clear to auscultation bilaterally  HEART: regular rate and rhythm, S1, S2 normal, no murmur, click, rub or gallop  ABDOMEN:  no change  EXTREMITIES:   Right BKA intact, LLE is w/w/p with healing incisions that are c/d/i  NEURO: negative findings: alert, oriented x3  memory intact grossly  cranial nerves II-XII intact    Disposition: home    Patient Instructions:   Current Discharge Medication List      START taking these medications    Details   oxyCODONE-acetaminophen (PERCOCET) 5-325 mg per tablet Take 1 Tab by mouth every four (4) hours as needed for Pain for up to 3 days. Max Daily Amount: 6 Tabs.   Qty: 30 Tab, Refills: 0    Associated Diagnoses: Popliteal artery aneurysm ([facility]         CONTINUE these medications which have NOT CHANGED    Details   atorvastatin (LIPITOR) 20 mg tablet Take 1 Tab by mouth daily. Qty: 80 Tab, Refills: 4    Associated Diagnoses: Coronary artery disease involving native coronary artery of native heart without angina pectoris; Pure hypercholesterolemia; AAA (abdominal aortic aneurysm) without rupture ([facility] Essential hypertension; CKD (chronic kidney disease), stage III ([facility] Diastolic dysfunction; Vascular dementia without behavioral disturbance      tamsulosin (FLOMAX) 0.4 mg capsule Take 1 Cap by mouth nightly. Qty: 90 Cap, Refills: 4    Associated Diagnoses: Benign prostatic hyperplasia with urinary frequency; AAA (abdominal aortic aneurysm) without rupture (HCC); PAD (peripheral artery disease) ([facility] Vascular dementia without behavioral disturbance; Diastolic dysfunction; Essential hypertension; CKD (chronic kidney disease), stage III ([facility] Coronary artery disease involving native coronary artery of native heart without angina pectoris; Pure hypercholesterolemia      aspirin (ASPIRIN) 325 mg tablet Take 1 Tab by mouth daily. Qty: 80 Tab, Refills: 4    Associated Diagnoses: Coronary artery disease involving native coronary artery of native heart without angina pectoris; Pure hypercholesterolemia; AAA (abdominal aortic aneurysm) without rupture ([facility] Essential hypertension; CKD (chronic kidney disease), stage III ([facility] Diastolic dysfunction; Vascular dementia without behavioral disturbance; Transient cerebral ischemia, unspecified type      carvedilol (COREG) 3.125 mg tablet Take 1 Tab by mouth two (2) times daily (with meals). Qty: 180 Tab, Refills: 4      clopidogrel (PLAVIX) 75 mg tab Take 1 Tab by mouth daily. Qty: 90 Tab, Refills: 4      nitroglycerin (NITROSTAT) 0.4 mg SL tablet by SubLINGual route every five (5) minutes as needed.       triamcinolone acetonide (KENALOG) 0.1 % ointment Apply  to affected area two (2) times a day. use thin layer to affected area of left lower leg. Qty: 30 g, Refills: 0    Associated Diagnoses: Allergic dermatitis             Reference discharge instructions as provided by nursing for diet, labs, medications, activity, wound care and any outpatient referrals. Follow-up with Dr. Lila Stewart     Signed:   Love Decker MD  8/17/2019  10:47 AM

## 2019-08-17 NOTE — ROUTINE PROCESS
1910 Pt received after bedside shift report from Db Cary RN. Pt up in bed,  no acute address noted. Incision to left groin clean, dry and intact. VSS. Denies pain or need a this time. Bed locked and in low position. Call bell in reach. 1945 Gown changed. Repositioned self up in bed. 0000 Pt asleep. NAD    0200 Pt asleep. No change    0630. Pt up performing personal hygiene. Bedside shift change report given to Db Cary RN (oncoming nurse) by RESHMA Eaton (offgoing nurse). Report included the following information. SBAR, MAR, KARDEX, AND RECENT RESULTS.

## 2019-08-27 ENCOUNTER — OFFICE VISIT (OUTPATIENT)
Dept: FAMILY MEDICINE CLINIC | Age: 79
End: 2019-08-27

## 2019-08-27 VITALS
SYSTOLIC BLOOD PRESSURE: 112 MMHG | DIASTOLIC BLOOD PRESSURE: 75 MMHG | BODY MASS INDEX: 32.89 KG/M2 | HEIGHT: 68 IN | TEMPERATURE: 97.8 F | OXYGEN SATURATION: 97 % | WEIGHT: 217 LBS | RESPIRATION RATE: 20 BRPM | HEART RATE: 56 BPM

## 2019-08-27 DIAGNOSIS — I73.9 PAD (PERIPHERAL ARTERY DISEASE) (HCC): Primary | ICD-10-CM

## 2019-08-27 DIAGNOSIS — E11.8 CONTROLLED TYPE 2 DIABETES MELLITUS WITH COMPLICATION, WITHOUT LONG-TERM CURRENT USE OF INSULIN (HCC): ICD-10-CM

## 2019-08-27 DIAGNOSIS — Z85.038 HISTORY OF COLON CANCER: ICD-10-CM

## 2019-08-27 NOTE — PROGRESS NOTES
1. Have you been to the ER, urgent care clinic since your last visit? Hospitalized since your last visit? Yes When: 8/2019 Where: [facility] Reason for visit: surgery    2. Have you seen or consulted any other health care providers outside of the [address] [place] [address] since your last visit? Include any pap smears or colon screening.  No

## 2019-08-27 NOTE — PROGRESS NOTES
Subjective     Patient ID:  Vielka Nassar is a [de-identified] y.o. 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 Will follow up in 1-2 weeks. The left femoral access site is somewhat sore but overall feels fairly well at his baseline state of health. Did not need percocet. Taking tylenol as needed twice a day. Taking plavix and aspirin. He has a history of colon cancer and was told to follow up with oncology. He would like to establish with an oncologist who is more local.       Review of Systems   Constitutional: Negative for appetite change, diaphoresis, fatigue, fever and unexpected weight change. HENT: Negative for congestion and sore throat. Eyes: Negative for visual disturbance. Respiratory: Negative for cough, chest tightness and shortness of breath. Cardiovascular: Negative for chest pain, palpitations and leg swelling. Gastrointestinal: Negative for abdominal distention, abdominal pain, blood in stool, constipation, diarrhea, nausea, rectal pain and vomiting. Endocrine: Negative for polydipsia, polyphagia and polyuria. Genitourinary: Negative for decreased urine volume, dysuria and frequency. Musculoskeletal: Negative for back pain, joint swelling and myalgias. Skin: Negative for rash and wound. Neurological: Negative for dizziness, facial asymmetry, weakness, light-headedness, numbness and headaches. Hematological: Does not bruise/bleed easily. Psychiatric/Behavioral: Negative for dysphoric mood and sleep disturbance. The patient is not nervous/anxious. Past Medical History, Past Surgery History, Allergies, Social History, and Family History were reviewed and updated.       Past Medical History:   Diagnosis Date    Advance directive in chart 2016    CAD (coronary artery disease)     Cancer [facility])     Colon    CKD (chronic kidney disease), stage III ([address] 3/27/2016    CVA (cerebral vascular accident) ([address] 3/26/2016    Femoral artery aneurysm, left ([address]     Heart attack ([address]     Heart attack ([address]     Hernia     History of TIAs     Hyperlipidemia     Hypertension 3/27/2016    Iliac artery aneurysm, left ([address]     Psychiatric disorder     Vascular Dementia    Pure hypercholesterolemia 2012    PVD (peripheral vascular disease) ([address] 3/27/2016    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 ([address] 3/26/2016     Past Surgical History:   Procedure Laterality Date    ABDOMEN SURGERY PROC UNLISTED      3 stents placed into abdomen (groin)    CARDIAC SURG PROCEDURE UNLIST      Quadruple Bypass    CARDIAC SURG PROCEDURE UNLIST      Aortic pig valve placed    COLONOSCOPY N/A 2016    [facility] at [facility] ENDOSCOPY    ENDOSCOPY, COLON, DIAGNOSTIC      HX AAA REPAIR      HX GI      HX ORTHOPAEDIC      Right Leg Amputated    HX SMALL BOWEL RESECTION       Family History   Problem Relation Age of Onset    Hypertension Mother     Hypertension Father     Heart Disease Father     Heart Disease Brother      Social History     Socioeconomic History    Marital status:      Spouse name: Not on file    Number of children: Not on file    Years of education: Not on file    Highest education level: Not on file   Occupational History    Not on file   Social Needs    Financial resource strain: Not on file    Food insecurity:     Worry: Not on file     Inability: Not on file    Transportation needs:     Medical: Not on file     Non-medical: Not on file   Tobacco Use    Smoking status: Former Smoker     Last attempt to quit: 1987     Years since quittin.9    Smokeless tobacco: Never Used   Substance and Sexual Activity    Alcohol use: Not Currently    Drug use: No    Sexual activity: Not Currently   Lifestyle    Physical activity:     Days per week: Not on file     Minutes per session: Not on file    Stress: Not on file   Relationships    Social connections:     Talks on phone: Not on file     Gets together: Not on file     Attends [employer] service: Not on file     Active member of club or organization: Not on file     Attends meetings of clubs or organizations: Not on file     Relationship status: Not on file    Intimate partner violence:     Fear of current or ex partner: Not on file     Emotionally abused: Not on file     Physically abused: Not on file     Forced sexual activity: Not on file   Other Topics Concern    Not on file   Social History Narrative    Not on file     Allergies   Allergen Reactions    Ace Inhibitors Other (comments)     Per pt, Cardiologist states he cannot have ACE inhibitors     Current Outpatient Medications on File Prior to Visit   Medication Sig Dispense Refill    triamcinolone acetonide (KENALOG) 0.1 % ointment Apply  to affected area two (2) times a day. use thin layer to affected area of left lower leg. 30 g 0    atorvastatin (LIPITOR) 20 mg tablet Take 1 Tab by mouth daily. 90 Tab 4    tamsulosin (FLOMAX) 0.4 mg capsule Take 1 Cap by mouth nightly. 90 Cap 4    aspirin (ASPIRIN) 325 mg tablet Take 1 Tab by mouth daily. 90 Tab 4    carvedilol (COREG) 3.125 mg tablet Take 1 Tab by mouth two (2) times daily (with meals). 180 Tab 4    clopidogrel (PLAVIX) 75 mg tab Take 1 Tab by mouth daily. 90 Tab 4    nitroglycerin (NITROSTAT) 0.4 mg SL tablet by SubLINGual route every five (5) minutes as needed. No current facility-administered medications on file prior to visit. Objective     Visit Vitals  /75   Pulse (!) 56   Temp 97.8 °F (36.6 °C) (Oral)   Resp 20   Ht 5' 8\" (1.727 m)   Wt 217 lb (98.4 kg)   SpO2 97%   BMI 32.99 kg/m²     No LMP for male patient. Physical Exam   Constitutional: He is oriented to person, place, and time. He appears well-developed and well-nourished. No distress.    Eyes: Conjunctivae and EOM are normal. Pupils are equal, round, and reactive to light. Neck: Carotid bruit is not present. Cardiovascular: Normal rate, regular rhythm, normal heart sounds, intact distal pulses and normal pulses. No murmur heard. Pulmonary/Chest: Effort normal and breath sounds normal. No respiratory distress. Abdominal: Soft. Normal appearance and bowel sounds are normal. He exhibits no distension, no ascites and no mass. There is no hepatosplenomegaly. There is no tenderness. Musculoskeletal: He exhibits no edema. Neurological: He is alert and oriented to person, place, and time. Skin: Skin is warm and dry. No rash noted. He is not diaphoretic. The left femoral access site is free of redness, swelling, pain, tenderness, and discharge. Left foot with thick and overgrown toenails. Psychiatric: He has a normal mood and affect. LABS     TESTS      Assessment and Plan     1. PAD (peripheral artery disease) ([facility]  Follow up with vascular surgeon as planned. 2. Controlled type 2 diabetes mellitus with complication, without long-term current use of insulin ([facility]  Podiatry for diabetic foot care  - REFERRAL TO PODIATRY    3. History of colon cancer  Establish with local onc for monitoring  - REFERRAL TO HEMATOLOGY ONCOLOGY      Follow-up and Dispositions    · Return in about 6 weeks (around 10/8/2019) for Diabetes follow up, High blood pressure follow up. Risks, benefits, and alternatives of the medications and treatment plan prescribed today were discussed, and patient expressed understanding. Printed after visit summary was given to patient and reviewed. All patient questions and concerns were addressed. Plan follow-up as discussed or as needed if any worsening symptoms or change in condition.            Signed electronically by Stefanie Marshall DNP, FNP-BC

## 2019-09-04 ENCOUNTER — OFFICE VISIT (OUTPATIENT)
Dept: VASCULAR SURGERY | Age: 79
End: 2019-09-04

## 2019-09-04 VITALS
HEART RATE: 80 BPM | BODY MASS INDEX: 32.89 KG/M2 | SYSTOLIC BLOOD PRESSURE: 130 MMHG | HEIGHT: 68 IN | RESPIRATION RATE: 17 BRPM | WEIGHT: 217 LBS | DIASTOLIC BLOOD PRESSURE: 82 MMHG

## 2019-09-04 DIAGNOSIS — I71.40 AAA (ABDOMINAL AORTIC ANEURYSM) WITHOUT RUPTURE: ICD-10-CM

## 2019-09-04 DIAGNOSIS — I73.9 PVD (PERIPHERAL VASCULAR DISEASE) (HCC): ICD-10-CM

## 2019-09-04 DIAGNOSIS — I72.4 ANEURYSM OF LEFT POPLITEAL ARTERY (HCC): Primary | ICD-10-CM

## 2019-09-04 DIAGNOSIS — Z89.512 S/P BKA (BELOW KNEE AMPUTATION) UNILATERAL, LEFT (HCC): ICD-10-CM

## 2019-09-04 NOTE — PROGRESS NOTES
1. Have you been to an emergency room or urgent care clinic since your last visit? No    Hospitalized since your last visit? If yes, where, when, and reason for visit? NO  2. Have you seen or consulted any other health care providers outside of the [facility] since your last visit including any procedures, health maintenance items. If yes, where, when and reason for visit?  NO

## 2019-09-04 NOTE — PROGRESS NOTES
Dante Larger Complaint   Patient presents with    Leg Pain       History and Physical    Priscilla Howe is a [de-identified] y.o. male with AAA and right above-knee amputation. He also has left popliteal artery aneurysm measuring 3-1/2 cm in size as well as peripheral arterial disease with repeat stenosis of his proximal left superficial femoral artery and occlusion of his anterior tibial artery based on previous angiography. He had been experiencing some claudication of the left lower extremity. He therefore underwent left superficial femoral artery patch angioplasty; Left lower extremity angiogram; Placement of 8 x 150 and 10 x 50 VIABAHN stent within the distal superficial femoral artery and above-knee popliteal artery for endovascular popliteal aneurysm repair. He is doing very well postoperatively. He has no complaints in the office today. He denies any pain. No fevers or chills.       Past Medical History:   Diagnosis Date    Advance directive in chart 6/13/2016    CAD (coronary artery disease)     Cancer [facility]) 2003    Colon    CKD (chronic kidney disease), stage III ([address] 3/27/2016    CVA (cerebral vascular accident) ([address] 3/26/2016    Femoral artery aneurysm, left ([address]     Heart attack ([address] 2008    Heart attack ([address]     Hernia     History of TIAs     Hyperlipidemia     Hypertension 3/27/2016    Iliac artery aneurysm, left ([address]     Psychiatric disorder     Vascular Dementia    Pure hypercholesterolemia 9/14/2012    PVD (peripheral vascular disease) ([address] 3/27/2016    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 ([address] 3/26/2016     Past Surgical History:   Procedure Laterality Date    ABDOMEN SURGERY PROC UNLISTED  2011    3 stents placed into abdomen (groin)    CARDIAC SURG PROCEDURE UNLIST  2005    Quadruple Bypass    CARDIAC SURG PROCEDURE UNLIST  2011    Aortic pig valve placed    COLONOSCOPY N/A 6/29/2016    COLON[facility] performed by Mane O[facility]co MD at [facility] ENDOSCOPY    ENDOSCOPY, COLON, DIAGNOSTIC      HX AAA REPAIR      HX GI      HX ORTHOPAEDIC  2008    Right Leg Amputated    HX SMALL BOWEL RESECTION       Patient Active Problem List   Diagnosis Code    Pure hypercholesterolemia E78.00    Hypertension I10    CKD (chronic kidney disease), stage III ([facility] N18.3    S/P AVR Z95.2    Advance directive in chart Z78.9    AAA (abdominal aortic aneurysm) without rupture ([facility] I71.4    Vascular dementia without behavioral disturbance F01.50    Atherosclerosis of native artery of left lower extremity with intermittent claudication ([facility] I70.212    CAD (coronary artery disease) I25.10    S/P CABG x 4 Z95.1    History of CVA (cerebrovascular accident) Z80.78    History of MI (myocardial infarction) I25.2    Aneurysm of left popliteal artery (HCC) I72.4    Popliteal artery aneurysm (HCC) I72.4    PAD (peripheral artery disease) ([facility]) I73.9    Atherosclerosis of native artery of both lower extremities with intermittent claudication ([facility]) I70.213     Current Outpatient Medications   Medication Sig Dispense Refill    nitroglycerin (NITROSTAT) 0.4 mg SL tablet by SubLINGual route every five (5) minutes as needed.  triamcinolone acetonide (KENALOG) 0.1 % ointment Apply  to affected area two (2) times a day. use thin layer to affected area of left lower leg. 30 g 0    atorvastatin (LIPITOR) 20 mg tablet Take 1 Tab by mouth daily. 90 Tab 4    tamsulosin (FLOMAX) 0.4 mg capsule Take 1 Cap by mouth nightly. 90 Cap 4    aspirin (ASPIRIN) 325 mg tablet Take 1 Tab by mouth daily. 90 Tab 4    carvedilol (COREG) 3.125 mg tablet Take 1 Tab by mouth two (2) times daily (with meals). 180 Tab 4    clopidogrel (PLAVIX) 75 mg tab Take 1 Tab by mouth daily.  90 Tab 4     Allergies   Allergen Reactions    Ace Inhibitors Other (comments)     Per pt, Cardiologist states he cannot have ACE inhibitors     Social History     Socioeconomic History    Marital status:  Spouse name: Not on file    Number of children: Not on file    Years of education: Not on file    Highest education level: Not on file   Occupational History    Not on file   Social Needs    Financial resource strain: Not on file    Food insecurity:     Worry: Not on file     Inability: Not on file    Transportation needs:     Medical: Not on file     Non-medical: Not on file   Tobacco Use    Smoking status: Former Smoker     Last attempt to quit: 1987     Years since quittin.9    Smokeless tobacco: Never Used   Substance and Sexual Activity    Alcohol use: Not Currently    Drug use: No    Sexual activity: Not Currently   Lifestyle    Physical activity:     Days per week: Not on file     Minutes per session: Not on file    Stress: Not on file   Relationships    Social connections:     Talks on phone: Not on file     Gets together: Not on file     Attends [employer] service: Not on file     Active member of club or organization: Not on file     Attends meetings of clubs or organizations: Not on file     Relationship status: Not on file    Intimate partner violence:     Fear of current or ex partner: Not on file     Emotionally abused: Not on file     Physically abused: Not on file     Forced sexual activity: Not on file   Other Topics Concern    Not on file   Social History Narrative    Not on file      Family History   Problem Relation Age of Onset    Hypertension Mother     Hypertension Father     Heart Disease Father     Heart Disease Brother        Physical Exam:    Visit Vitals  /82 (BP 1 Location: Left arm, BP Patient Position: Sitting)   Pulse 80   Resp 17   Ht 5' 8\" (1.727 m)   Wt 217 lb (98.4 kg)   BMI 32.99 kg/m²      General: Well-appearing male in no acute distress  HEENT: EOMI no scleral icterus is noted   Pulmonary: No increased work of breathing is noted  Extremities: Right above-knee amputation is present.  Left lower extremity is warm and perfused no ulcerations are identified. There is trace edema to the left lower extremity. Neuro: Cranial nerves II through XII are grossly intact    Impression and Plan:  Rocío Calderón is a [de-identified] y.o. male with AAA as well as left popliteal artery aneurysm and peripheral arterial disease. He presents to the office today after left superficial femoral artery patch angioplasty with left lower extremity angiogram with placement of 8 x 150 and 10 x 50 VIABAHN stent within the distal superficial femoral artery and above-knee popliteal artery for endovascular popliteal aneurysm repair. He is doing very well postoperatively. I discussed that we will obtain a follow-up ultrasound in the next 3 to 4 weeks and I can call him with these results. If his study looks good and he continues to do well we will have him follow-up in 6 months with new studies. He did have a recent CTA in June that showed his AAA is appropriately occluded and the stent is working appropriately as well as within his common iliac arteries bilaterally. I discussed that this can be followed up in 1 years time. Patient and his son who presents with him to the office today expressed understanding both agree to the plan. [address] [place], [place]    PLEASE NOTE:  This document has been produced using voice recognition software. Unrecognized errors in transcription may be present.

## 2019-09-12 ENCOUNTER — HOSPITAL ENCOUNTER (OUTPATIENT)
Dept: VASCULAR SURGERY | Age: 79
Discharge: HOME | End: 2019-09-12
Attending: PHYSICIAN ASSISTANT
Payer: MEDICARE

## 2019-09-12 DIAGNOSIS — I73.9 PVD (PERIPHERAL VASCULAR DISEASE) (HCC): ICD-10-CM

## 2019-09-12 DIAGNOSIS — I72.4 ANEURYSM OF LEFT POPLITEAL ARTERY (HCC): ICD-10-CM

## 2019-09-12 LAB
LEFT ABI: 1
LEFT ANTERIOR TIBIAL: 148 MMHG
LEFT ARM BP: 140 MMHG
LEFT CFA DIST SYS PSV: 102.1 CM/S
LEFT DIST PTA PSV: 61.9 CM/S
LEFT POPLITEAL MID SYS PSV: 49.7 CM/S
LEFT POSTERIOR TIBIAL: 150 MMHG
LEFT PROX PFA A PSV: 43.9 CM/S
LEFT PROX PTA PSV: 81 CM/S
LEFT PTA MID SYS PSV: 87.5 CM/S
LEFT SFA DIST VEL RATIO: 0.98
LEFT SFA MID VEL RATIO: 1
LEFT SFA PROX VEL RATIO: 0.55
LEFT SUPER FEMORAL DIST SYS PSV: 55.3 CM/S
LEFT SUPER FEMORAL MID SYS PSV: 56.3 CM/S
LEFT SUPER FEMORAL PROX SYS PSV: 56.3 CM/S
RIGHT ARM BP: 150 MMHG
RIGHT DIST OUTFLOW PSV: 42.2 CM/S
RIGHT GRAFT 1 NAME: NORMAL
RIGHT INFLOW ARTERY PSV: 55.3 CM/S
RIGHT MID OUTFLOW PSV: 54.4 CM/S
RIGHT OUTFLOW VESSEL PSV: 41.3 CM/S
RIGHT PROX OUTFLOW PSV: 35.7 CM/S

## 2019-09-12 PROCEDURE — 93926 LOWER EXTREMITY STUDY: CPT

## 2019-09-17 NOTE — ROUTINE PROCESS
- Continue Thiamine and Folic Acid supplements   - Serax  has been weaned off  - Continue to monitor neurological examination Bedside and Verbal shift change report given to Rakan Castro RN (oncoming nurse) by Jan Whaley LPN   (offgoing nurse). Report included the following information SBAR, Kardex, MAR and Recent Results.

## 2019-09-18 ENCOUNTER — TELEPHONE (OUTPATIENT)
Dept: VASCULAR SURGERY | Age: 79
End: 2019-09-18

## 2019-09-18 DIAGNOSIS — I72.4 ANEURYSM OF LEFT POPLITEAL ARTERY (HCC): Primary | ICD-10-CM

## 2019-09-18 NOTE — TELEPHONE ENCOUNTER
called pt about results of arterial study. · Patent left distal femoral to popliteal artery stent. · No evidence of significant artery disease in the left lower extremity. Patient is doing very well and is without complaint. He would like to follow-up with Dr. Pasha Bosch in [facility] from here on out. We will arrange this and have our  call to schedule.

## 2019-10-03 ENCOUNTER — OFFICE VISIT (OUTPATIENT)
Dept: FAMILY MEDICINE CLINIC | Age: 79
End: 2019-10-03

## 2019-10-03 VITALS
HEART RATE: 57 BPM | DIASTOLIC BLOOD PRESSURE: 70 MMHG | TEMPERATURE: 98 F | SYSTOLIC BLOOD PRESSURE: 140 MMHG | RESPIRATION RATE: 20 BRPM | WEIGHT: 216.4 LBS | OXYGEN SATURATION: 98 % | BODY MASS INDEX: 32.8 KG/M2 | HEIGHT: 68 IN

## 2019-10-03 DIAGNOSIS — Z23 ENCOUNTER FOR IMMUNIZATION: ICD-10-CM

## 2019-10-03 DIAGNOSIS — R53.83 FATIGUE, UNSPECIFIED TYPE: Primary | ICD-10-CM

## 2019-10-03 NOTE — PROGRESS NOTES
1. Have you been to the ER, urgent care clinic since your last visit? Hospitalized since your last visit? No    2. Have you seen or consulted any other health care providers outside of the [address] [place] [address] since your last visit? Include any pap smears or colon screening.  No

## 2019-10-03 NOTE — PROGRESS NOTES
Subjective     Patient ID:  Margi Navarreet is a 78 y.o. ( 1940) male who presents for the following:   No chief complaint on file. HPI   He presents for follow-up. He is feeling fairly well although does report increased fatigue and generalized weakness. Vascular: Will be following up with Dr. Cheri Mosher. Recent surgery for popliteal artery aneurysm on 2019. His lab work after surgery showed mild anemia and mild hyperkalemia. CAD:   Followed by Dr. German Harrell. Hx of PVCs. Review of Systems   Constitutional: Positive for fatigue. Negative for appetite change, diaphoresis and unexpected weight change. Eyes: Negative for visual disturbance. Respiratory: Negative for cough, chest tightness and shortness of breath. Cardiovascular: Negative for chest pain, palpitations and leg swelling. Gastrointestinal: Negative for abdominal distention, abdominal pain, blood in stool, constipation, diarrhea, nausea, rectal pain and vomiting. Endocrine: Negative for polydipsia, polyphagia and polyuria. Genitourinary: Negative for decreased urine volume, dysuria and frequency. Musculoskeletal: Negative for joint swelling and myalgias. Skin: Negative for rash and wound. Neurological: Positive for weakness. Negative for dizziness, light-headedness, numbness and headaches. Psychiatric/Behavioral: Negative for dysphoric mood and sleep disturbance. The patient is not nervous/anxious. Past Medical History, Past Surgery History, Allergies, Social History, and Family History were reviewed and updated.       Past Medical History:   Diagnosis Date    Advance directive in chart 2016    CAD (coronary artery disease)     Cancer [facility])     Colon    CKD (chronic kidney disease), stage III ([address] 3/27/2016    CVA (cerebral vascular accident) ([address] 3/26/2016    Femoral artery aneurysm, left ([address]     Heart attack ([address] 2008    Heart attack ([address]     Hernia     History of TIAs  Hyperlipidemia     Hypertension 3/27/2016    [facility]     Psychiatric disorder     Vascular Dementia    Pure hypercholesterolemia 2012    PVD (peripheral vascular disease) ([facility] 3/27/2016    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 ENDOSCOPY    ENDOSCOPY, COLON, DIAGNOSTIC      HX AAA REPAIR      HX GI      HX ORTHOPAEDIC      Right Leg Amputated    HX SMALL BOWEL RESECTION       Family History   Problem Relation Age of Onset    Hypertension Mother     Hypertension Father     Heart Disease Father     Heart Disease Brother      Social History     Socioeconomic History    Marital status:      Spouse name: Not on file    Number of children: Not on file    Years of education: Not on file    Highest education level: Not on file   Occupational History    Not on file   Social Needs    Financial resource strain: Not on file    Food insecurity:     Worry: Not on file     Inability: Not on file    Transportation needs:     Medical: Not on file     Non-medical: Not on file   Tobacco Use    Smoking status: Former Smoker     Last attempt to quit: 1987     Years since quittin.0    Smokeless tobacco: Never Used   Substance and Sexual Activity    Alcohol use: Not Currently    Drug use: No    Sexual activity: Not Currently   Lifestyle    Physical activity:     Days per week: Not on file     Minutes per session: Not on file    Stress: Not on file   Relationships    Social connections:     Talks on phone: Not on file     Gets together: Not on file     Attends Sabianism service: Not on file     Active member of club or organization: Not on file     Attends meetings of clubs or organizations: Not on file     Relationship status: Not on file    Intimate partner violence:     Fear of current or ex partner: Not on file     Emotionally abused: Not on file     Physically abused: Not on file     Forced sexual activity: Not on file   Other Topics Concern    Not on file   Social History Narrative    Not on file     Allergies   Allergen Reactions    Ace Inhibitors Other (comments)     Per pt, Cardiologist states he cannot have ACE inhibitors     Current Outpatient Medications on File Prior to Visit   Medication Sig Dispense Refill    triamcinolone acetonide (KENALOG) 0.1 % ointment Apply  to affected area two (2) times a day. use thin layer to affected area of left lower leg. 30 g 0    atorvastatin (LIPITOR) 20 mg tablet Take 1 Tab by mouth daily. 90 Tab 4    tamsulosin (FLOMAX) 0.4 mg capsule Take 1 Cap by mouth nightly. 90 Cap 4    aspirin (ASPIRIN) 325 mg tablet Take 1 Tab by mouth daily. 90 Tab 4    carvedilol (COREG) 3.125 mg tablet Take 1 Tab by mouth two (2) times daily (with meals). 180 Tab 4    clopidogrel (PLAVIX) 75 mg tab Take 1 Tab by mouth daily. 90 Tab 4    nitroglycerin (NITROSTAT) 0.4 mg SL tablet by SubLINGual route every five (5) minutes as needed. No current facility-administered medications on file prior to visit. Objective     Visit Vitals  /70   Pulse (!) 57   Temp 98 °F (36.7 °C) (Oral)   Resp 20   Ht 5' 8\" (1.727 m)   Wt 216 lb 6.4 oz (98.2 kg)   SpO2 98%   BMI 32.90 kg/m²     No LMP for male patient. Physical Exam   Constitutional: He is oriented to person, place, and time. He appears well-developed and well-nourished. No distress. Eyes: Conjunctivae and EOM are normal. Pupils are equal, round, and reactive to light. Neck: Carotid bruit is not present. Cardiovascular: Normal rate, normal heart sounds, intact distal pulses and normal pulses.  An irregularly irregular rhythm present. No murmur heard. Pulmonary/Chest: Effort normal and breath sounds normal. No respiratory distress. Abdominal: Soft. Normal appearance and bowel sounds are normal. He exhibits no distension, no ascites and no mass. There is no hepatosplenomegaly. There is no tenderness. Musculoskeletal: He exhibits no edema. Neurological: He is alert and oriented to person, place, and time. Skin: Skin is warm and dry. No rash noted. He is not diaphoretic. Psychiatric: He has a normal mood and affect. LABS     TESTS      Assessment and Plan     1. Fatigue, unspecified type  Recheck CBC and BMP. Check iron levels. Further plan after results. Follow-up with cardiology.  - METABOLIC PANEL, BASIC; Future  - CBC WITH AUTOMATED DIFF; Future  - IRON; Future  - FERRITIN; Future    2. Encounter for immunization    - INFLUENZA VACCINE INACTIVATED (IIV), SUBUNIT, ADJUVANTED, IM      Follow-up and Dispositions    · Return in about 3 months (around 1/3/2020). Risks, benefits, and alternatives of the medications and treatment plan prescribed today were discussed, and patient expressed understanding. Printed after visit summary was given to patient and reviewed. All patient questions and concerns were addressed. Plan follow-up as discussed or as needed if any worsening symptoms or change in condition.            Signed electronically by Lakeisha Metzger DNP, FNP-BC

## 2019-10-07 ENCOUNTER — HOSPITAL ENCOUNTER (OUTPATIENT)
Dept: LAB | Age: 79
Discharge: HOME | End: 2019-10-07
Payer: MEDICARE

## 2019-10-07 DIAGNOSIS — R53.83 FATIGUE, UNSPECIFIED TYPE: ICD-10-CM

## 2019-10-07 LAB
ANION GAP SERPL CALC-SCNC: 7 MMOL/L (ref 3–18)
BASOPHILS # BLD: 0.1 K/UL (ref 0–0.1)
BASOPHILS NFR BLD: 1 % (ref 0–2)
BUN SERPL-MCNC: 36 MG/DL (ref 7–18)
BUN/CREAT SERPL: 21 (ref 12–20)
CALCIUM SERPL-MCNC: 9.6 MG/DL (ref 8.5–10.1)
CHLORIDE SERPL-SCNC: 109 MMOL/L (ref 100–111)
CO2 SERPL-SCNC: 26 MMOL/L (ref 21–32)
CREAT SERPL-MCNC: 1.71 MG/DL (ref 0.6–1.3)
DIFFERENTIAL METHOD BLD: ABNORMAL
EOSINOPHIL # BLD: 0.4 K/UL (ref 0–0.4)
EOSINOPHIL NFR BLD: 4 % (ref 0–5)
ERYTHROCYTE [DISTWIDTH] IN BLOOD BY AUTOMATED COUNT: 15.1 % (ref 11.6–14.5)
FERRITIN SERPL-MCNC: 168 NG/ML (ref 8–388)
GLUCOSE SERPL-MCNC: 90 MG/DL (ref 74–99)
HCT VFR BLD AUTO: 37.1 % (ref 36–48)
HGB BLD-MCNC: 12.3 G/DL (ref 13–16)
IRON SERPL-MCNC: 118 UG/DL (ref 50–175)
LYMPHOCYTES # BLD: 1.7 K/UL (ref 0.9–3.6)
LYMPHOCYTES NFR BLD: 17 % (ref 21–52)
MCH RBC QN AUTO: 33.4 PG (ref 24–34)
MCHC RBC AUTO-ENTMCNC: 33.2 G/DL (ref 31–37)
MCV RBC AUTO: 100.8 FL (ref 74–97)
MONOCYTES # BLD: 0.7 K/UL (ref 0.05–1.2)
MONOCYTES NFR BLD: 7 % (ref 3–10)
NEUTS SEG # BLD: 6.8 K/UL (ref 1.8–8)
NEUTS SEG NFR BLD: 71 % (ref 40–73)
PLATELET # BLD AUTO: 294 K/UL (ref 135–420)
PMV BLD AUTO: 11.7 FL (ref 9.2–11.8)
POTASSIUM SERPL-SCNC: 5.6 MMOL/L (ref 3.5–5.5)
RBC # BLD AUTO: 3.68 M/UL (ref 4.7–5.5)
SODIUM SERPL-SCNC: 142 MMOL/L (ref 136–145)
WBC # BLD AUTO: 9.7 K/UL (ref 4.6–13.2)

## 2019-10-07 PROCEDURE — 83540 ASSAY OF IRON: CPT

## 2019-10-07 PROCEDURE — 85025 COMPLETE CBC W/AUTO DIFF WBC: CPT

## 2019-10-07 PROCEDURE — 82728 ASSAY OF FERRITIN: CPT

## 2019-10-07 PROCEDURE — 36415 COLL VENOUS BLD VENIPUNCTURE: CPT

## 2019-10-07 PROCEDURE — 80048 BASIC METABOLIC PNL TOTAL CA: CPT

## 2019-11-04 ENCOUNTER — OFFICE VISIT (OUTPATIENT)
Dept: CARDIOLOGY CLINIC | Age: 79
End: 2019-11-04

## 2019-11-04 VITALS
HEIGHT: 68 IN | HEART RATE: 61 BPM | OXYGEN SATURATION: 95 % | SYSTOLIC BLOOD PRESSURE: 134 MMHG | BODY MASS INDEX: 32.58 KG/M2 | DIASTOLIC BLOOD PRESSURE: 75 MMHG | WEIGHT: 215 LBS

## 2019-11-04 DIAGNOSIS — I25.10 CORONARY ARTERY DISEASE INVOLVING NATIVE CORONARY ARTERY OF NATIVE HEART WITHOUT ANGINA PECTORIS: Primary | ICD-10-CM

## 2019-11-04 NOTE — PROGRESS NOTES
1. Have you been to the ER, urgent care clinic since your last visit? Hospitalized since your last visit? No    2. Have you seen or consulted any other health care providers outside of the [address] [place] [address] since your last visit? Include any pap smears or colon screening.  No

## 2019-11-09 NOTE — PROGRESS NOTES
Subjective:      Miles Easley is in the office today for cardiac reevaluation. He is in preoperative mode for left popliteal aneurysm repair     He is a 78year-old man that had revascularization of his left lower extremity in October of 2016, aorto bi iliac endografting, prior angiplasty of the left SFA, and AKA of the RLE. Mallory Pedro Pablo He has also had DVT of the left posterior tibial vein in 04/207    The patient has a history of known coronary artery disease and prior coronary bypass grafting done in 2005. He also had aortic valve replacement in 2011. The most recent echocardiogram was done in November 2018 which demonstrated continued adequate function of the aortic prosthesis. The patient had a neurological event in December of 2016. He has been followed by Dr. Murray Alamo in that regard. The patient had a MRI of brain with contrast on 12/30/2016. There was an old area of infarction involving the right frontal lobe extending into the corona radiata white matter. There was also an area of old infarction in the right parietal region. There was also an abnormal signal in the right sub-insular region, which was felt to possibly represent an area of old hemorrhagic infarct. There was no acute abnormality at that time. Patient had a myocardial infarction in November 2018. He was treated medically. The patient wanted to be discharged the day following his admission. In the office today, the patient reports that he did well with his recent popliteal aneurysm repair. His left leg does not seem to get as weak as it once did. His breathing has been good. He has had no chest pain. He has taken no sublingual nitroglycerin.     Patient Active Problem List    Diagnosis Date Noted    Popliteal artery aneurysm ([facility] 08/16/2019    PAD (peripheral artery disease) ([facility] 08/16/2019    Atherosclerosis of native artery of both lower extremities with intermittent claudication ([facility] 08/16/2019    Aneurysm of left popliteal artery ([facility] 07/10/2019    History of CVA (cerebrovascular accident) 12/01/2018    History of MI (myocardial infarction) 12/01/2018    S/P CABG x 4 11/25/2018    CAD (coronary artery disease) 11/24/2018    Atherosclerosis of native artery of left lower extremity with intermittent claudication ([facility] 07/09/2018    Vascular dementia without behavioral disturbance ([facility] 03/08/2017    AAA (abdominal aortic aneurysm) without rupture ([facility] 01/04/2017    Advance directive in chart 06/13/2016    S/P AVR 04/13/2016    Hypertension 03/27/2016    CKD (chronic kidney disease), stage III (HCC) 03/27/2016    Pure hypercholesterolemia 09/14/2012     Current Outpatient Medications   Medication Sig Dispense Refill    nitroglycerin (NITROSTAT) 0.4 mg SL tablet by SubLINGual route every five (5) minutes as needed.  triamcinolone acetonide (KENALOG) 0.1 % ointment Apply  to affected area two (2) times a day. use thin layer to affected area of left lower leg. 30 g 0    atorvastatin (LIPITOR) 20 mg tablet Take 1 Tab by mouth daily. 90 Tab 4    tamsulosin (FLOMAX) 0.4 mg capsule Take 1 Cap by mouth nightly. 90 Cap 4    aspirin (ASPIRIN) 325 mg tablet Take 1 Tab by mouth daily. 90 Tab 4    carvedilol (COREG) 3.125 mg tablet Take 1 Tab by mouth two (2) times daily (with meals). 180 Tab 4    clopidogrel (PLAVIX) 75 mg tab Take 1 Tab by mouth daily.  90 Tab 4     Allergies   Allergen Reactions    Ace Inhibitors Other (comments)     Per pt, Cardiologist states he cannot have ACE inhibitors     Past Medical History:   Diagnosis Date    Advance directive in chart 6/13/2016    CAD (coronary artery disease)     Cancer ([facility] 2003    Colon    CKD (chronic kidney disease), stage III ([facility] 3/27/2016    CVA (cerebral vascular accident) ([facility] 3/26/2016    Femoral artery aneurysm, left ([address]     Heart attack ([address] 2008    Heart attack ([address]     Hernia     History of TIAs     Hyperlipidemia     Hypertension 3/27/2016    Iliac artery aneurysm, left (HCC)     Psychiatric disorder     Vascular Dementia    Pure hypercholesterolemia 2012    PVD (peripheral vascular disease) ([facility] 3/27/2016    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 ([facility] 3/26/2016     Past Surgical History:   Procedure Laterality Date    ABDOMEN SURGERY PROC UNLISTED      3 stents placed into abdomen (groin)    CARDIAC SURG PROCEDURE UNLIST      Quadruple Bypass    CARDIAC SURG PROCEDURE UNLIST      Aortic pig valve placed    COLONOSCOPY N/A 2016    COLON[facility]land [facility] at [facility] ENDOSCOPY    ENDOSCOPY, COLON, DIAGNOSTIC      HX AAA REPAIR      HX GI      HX ORTHOPAEDIC      Right Leg Amputated    HX SMALL BOWEL RESECTION       Family History   Problem Relation Age of Onset    Hypertension Mother     Hypertension Father     Heart Disease Father     Heart Disease Brother      Social History     Tobacco Use   Smoking Status Former Smoker    Last attempt to quit: 1987    Years since quittin.1   Smokeless Tobacco Never Used          Review of Systems, additional:  Constitutional: negative  Eyes: negative  Respiratory: negative  Cardiovascular: negative  Gastrointestinal: negative  Musculoskeletal:weakness  Neurological: negative  Behvioral/Psych: negative  Endocrine: negative  ENT: negative    Objective:     Visit Vitals  /75   Pulse 61   Ht 5' 8\" (1.727 m)   Wt 97.5 kg (215 lb)   SpO2 95%   BMI 32.69 kg/m²     General:  alert, cooperative, no distress   Chest Wall: inspection normal - no chest wall deformities or tenderness, respiratory effort normal   Lung: clear to auscultation bilaterally   Heart:  normal rate and regular rhythm, S1 and S2 normal, systolic murmur 2/6 at 2nd right intercostal space and radiates to carotids   Abdomen: soft, non-tender. Bowel sounds normal. No masses,  no organomegaly   Extremities: Trace edema to left lower extremity.  There is a R BKA with prosthesis  no cyanosis. Skin: no rashes   Neuro: alert, oriented, normal speech, no focal findings or movement disorder noted     [id number]; Sinus rhythm with PACs. LAD. Assessment/Plan:       ICD-10-CM ICD-9-CM    1. AAA (abdominal aortic aneurysm) without rupture ([facility] S/P aorta bi iliac endograft I71.4 441.4    2. PAD (peripheral artery disease) ([address] S/P R AKA, S/P L SFA angioplasty. He did well with recent left popliteal aneurysm repair. I73.9 443.9    3. Femoral artery aneurysm, left (HCC) I72.4 442.3    4. Pure hypercholesterolemia E78.00 272.0    5. CKD (chronic kidney disease), stage III,  N18.3 585.3    6. Controlled type 2 diabetes mellitus with diabetic nephropathy, without long-term current use of insulin (HCC) E11.21 250.40      583.81    7. Essential hypertension,  controlled in office today. I10 401.9    8. Aortic stenosis, moderate, reasonable aortic valvular parameters by recent Echo done 18. Ejection fraction 41-45%. Decrease aspirin to 81 mg daily. I35.0 424.1    9. Diastolic dysfunction, grade 1 I51.9 429.9    10. S/P AVR (aortic valve replacement), [place], [place]  Z95.2 V43.3    11. S/P CABG (coronary artery bypass graft), [facility] . .  Inferior wall myocardial infarction 2018, at which time Troponin peaked at 71.80. It was felt the patient had completed his infarct.   He did not want any further cardiac evaluation at that time Z95.1 V45.81    12     CVA, followed by Dr Lex Gar

## 2019-11-12 ENCOUNTER — TELEPHONE (OUTPATIENT)
Dept: FAMILY MEDICINE CLINIC | Age: 79
End: 2019-11-12

## 2019-11-12 DIAGNOSIS — E87.5 HYPERKALEMIA: Primary | ICD-10-CM

## 2019-11-12 NOTE — TELEPHONE ENCOUNTER
Called patient told lab results and he will need to come in for recheck on labs in the next two weeks. States understanding.

## 2019-11-14 ENCOUNTER — NURSE NAVIGATOR (OUTPATIENT)
Dept: OTHER | Age: 79
End: 2019-11-14

## 2019-11-14 ENCOUNTER — HOSPITAL ENCOUNTER (OUTPATIENT)
Dept: LAB | Age: 79
Discharge: HOME | End: 2019-11-14
Payer: MEDICARE

## 2019-11-14 ENCOUNTER — OFFICE VISIT (OUTPATIENT)
Dept: ONCOLOGY | Age: 79
End: 2019-11-14

## 2019-11-14 VITALS
WEIGHT: 218 LBS | RESPIRATION RATE: 20 BRPM | HEIGHT: 68 IN | HEART RATE: 68 BPM | TEMPERATURE: 96.9 F | OXYGEN SATURATION: 98 % | BODY MASS INDEX: 33.04 KG/M2 | SYSTOLIC BLOOD PRESSURE: 125 MMHG | DIASTOLIC BLOOD PRESSURE: 74 MMHG

## 2019-11-14 DIAGNOSIS — D53.9 MACROCYTIC ANEMIA: ICD-10-CM

## 2019-11-14 DIAGNOSIS — Z86.010 HISTORY OF COLONIC POLYPS: Primary | ICD-10-CM

## 2019-11-14 LAB
BASOPHILS # BLD: 0.1 K/UL (ref 0–0.1)
BASOPHILS NFR BLD: 1 % (ref 0–2)
DIFFERENTIAL METHOD BLD: ABNORMAL
EOSINOPHIL # BLD: 0.3 K/UL (ref 0–0.4)
EOSINOPHIL NFR BLD: 4 % (ref 0–5)
ERYTHROCYTE [DISTWIDTH] IN BLOOD BY AUTOMATED COUNT: 15.3 % (ref 11.6–14.5)
FERRITIN SERPL-MCNC: 136 NG/ML (ref 8–388)
FOLATE SERPL-MCNC: >20 NG/ML (ref 3.1–17.5)
HCT VFR BLD AUTO: 40.6 % (ref 36–48)
HGB BLD-MCNC: 13.7 G/DL (ref 13–16)
IRON SATN MFR SERPL: 34 %
IRON SERPL-MCNC: 98 UG/DL (ref 50–175)
LYMPHOCYTES # BLD: 1.6 K/UL (ref 0.9–3.6)
LYMPHOCYTES NFR BLD: 22 % (ref 21–52)
MCH RBC QN AUTO: 34.2 PG (ref 24–34)
MCHC RBC AUTO-ENTMCNC: 33.7 G/DL (ref 31–37)
MCV RBC AUTO: 101.2 FL (ref 74–97)
MONOCYTES # BLD: 0.7 K/UL (ref 0.05–1.2)
MONOCYTES NFR BLD: 10 % (ref 3–10)
NEUTS SEG # BLD: 4.6 K/UL (ref 1.8–8)
NEUTS SEG NFR BLD: 63 % (ref 40–73)
PLATELET # BLD AUTO: 282 K/UL (ref 135–420)
PMV BLD AUTO: 11.9 FL (ref 9.2–11.8)
RBC # BLD AUTO: 4.01 M/UL (ref 4.7–5.5)
TIBC SERPL-MCNC: 292 UG/DL (ref 250–450)
TSH SERPL DL<=0.05 MIU/L-ACNC: 3.25 UIU/ML (ref 0.36–3.74)
VIT B12 SERPL-MCNC: 1150 PG/ML (ref 211–911)
WBC # BLD AUTO: 7.2 K/UL (ref 4.6–13.2)

## 2019-11-14 PROCEDURE — 82728 ASSAY OF FERRITIN: CPT

## 2019-11-14 PROCEDURE — 36415 COLL VENOUS BLD VENIPUNCTURE: CPT

## 2019-11-14 PROCEDURE — 85025 COMPLETE CBC W/AUTO DIFF WBC: CPT

## 2019-11-14 PROCEDURE — 83540 ASSAY OF IRON: CPT

## 2019-11-14 PROCEDURE — 82607 VITAMIN B-12: CPT

## 2019-11-14 PROCEDURE — 84443 ASSAY THYROID STIM HORMONE: CPT

## 2019-11-14 NOTE — PROGRESS NOTES
Veronica Mcbride is a 78 y.o. male presents in office for    Chief Complaint   Patient presents with   [address] [facility] [address] Patient    Colon Cancer        Visit Vitals  /74 (BP 1 Location: Right arm, BP Patient Position: Sitting)   Pulse 68   Temp 96.9 °F (36.1 °C) (Oral)   Resp 20   Ht 5' 8\" (1.727 m)   Wt 98.9 kg (218 lb)   SpO2 98%   BMI 33.15 kg/m²         Health Maintenance Due   Topic Date Due    Shingrix Vaccine Age 49> (1 of 2) 09/30/1988    Pneumococcal 65+ years (2 of 2 - PPSV23) 10/07/2016    HEMOGLOBIN A1C Q6M  04/01/2019    COLONOSCOPY  06/29/2019    GLAUCOMA SCREENING Q2Y  09/22/2019    MEDICARE YEARLY EXAM  10/03/2019             1. Have you been to the ER, urgent care clinic since your last visit? Hospitalized since your last visit? No    2. Have you seen or consulted any other health care providers outside of the [address] [place] [address] since your last visit? Include any pap smears or colon screening. No    3 most recent PHQ Screens 10/3/2019   PHQ Not Done -   Little interest or pleasure in doing things Not at all   Feeling down, depressed, irritable, or hopeless Not at all   Total Score PHQ 2 0       Abuse Screening Questionnaire 11/14/2019   Do you ever feel afraid of your partner? N   Are you in a relationship with someone who physically or mentally threatens you? N   Is it safe for you to go home? Y       Fall Risk Assessment, last 12 mths 10/3/2019   Able to walk? Yes   Fall in past 12 months?  No   Fall with injury? -   Number of falls in past 12 months -   Fall Risk Score -       Learning Assessment 7/10/2019   PRIMARY LEARNER Patient   BARRIERS PRIMARY LEARNER -   CO-LEARNER CAREGIVER -   PRIMARY LANGUAGE ENGLISH   LEARNER PREFERENCE PRIMARY LISTENING   ANSWERED BY patient   RELATIONSHIP SELF

## 2019-11-14 NOTE — PROGRESS NOTES
[facility]  [id number] [place] [address], [address]  [place], [facility] [address]  Office Phone: [phone]  Fax: [phone]      Reason for visit:  Colon cancer    HPI:   Shavonne Del Rosario is a 78 y.o.  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 Family history significant for paternal who had lung cancer (was heavy smoker)    Patient was seen and examined today. He is awake alert oriented x3. On review of system he denies any fevers, chills, shortness of breath, nausea vomiting or abdominal pain. No lumps and bumps. No focal neurologic deficit. Reports decreased activity due to right above-the-knee amputation. Has RLE prosthesis. No BRBPR or melena. Has increase urinary frequency but no pain or burning. Remaining 12 point review of system negative. ECOG performance status 2. Independent with ADLs and IADLs but gets occasional help from wife if needed. DX: history of colon cancer    STAGE:  Unknown-Get records    ONCOLOGY HISTORY:   2004: colon cancer diagnosed in 2004 at [place] in [place] s/p adjuvant chemotherapy x 6 months.    6/29/2016: SIGMOID COLON, BIOPSY:  TUBULAR ADENOMATA.        Past Medical History:   Diagnosis Date    Advance directive in chart 6/13/2016    CAD (coronary artery disease)     Cancer [facility]) 2003    Colon    CKD (chronic kidney disease), stage III ([address] 3/27/2016    CVA (cerebral vascular accident) ([address] 3/26/2016    Femoral artery aneurysm, left ([address]     Heart attack ([address] 2008    Heart attack ([address]     Hernia     History of TIAs     Hyperlipidemia     Hypertension 3/27/2016    Iliac artery aneurysm, left [facility])     Psychiatric disorder     Vascular Dementia    Pure hypercholesterolemia 2012    PVD (peripheral vascular disease) ([facility] 3/27/2016    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 ([facility] 3/26/2016     Past Surgical History:   Procedure Laterality Date    ABDOMEN SURGERY PROC UNLISTED      3 stents placed into abdomen (groin)    CARDIAC SURG PROCEDURE UNLIST      Quadruple Bypass    CARDIAC SURG PROCEDURE UNLIST      Aortic pig valve placed    COLONOSCOPY N/A 2016    COL[facility] Paula [facility]ann [facility] at [facility] ENDOSCOPY    ENDOSCOPY, COLON, DIAGNOSTIC      HX AAA REPAIR      HX GI      HX ORTHOPAEDIC      Right Leg Amputated    HX SMALL BOWEL RESECTION       Social History     Socioeconomic History    Marital status:      Spouse name: Not on file    Number of children: Not on file    Years of education: Not on file    Highest education level: Not on file   Tobacco Use    Smoking status: Former Smoker     Last attempt to quit: 1987     Years since quittin.1    Smokeless tobacco: Never Used   Substance and Sexual Activity    Alcohol use: Not Currently    Drug use: No    Sexual activity: Not Currently     Family History   Problem Relation Age of Onset    Hypertension Mother     Hypertension Father     Heart Disease Father     Heart Disease Brother        Current Outpatient Medications   Medication Sig Dispense Refill    nitroglycerin (NITROSTAT) 0.4 mg SL tablet by SubLINGual route every five (5) minutes as needed.  triamcinolone acetonide (KENALOG) 0.1 % ointment Apply  to affected area two (2) times a day. use thin layer to affected area of left lower leg. 30 g 0    atorvastatin (LIPITOR) 20 mg tablet Take 1 Tab by mouth daily. 90 Tab 4    tamsulosin (FLOMAX) 0.4 mg capsule Take 1 Cap by mouth nightly. 90 Cap 4    aspirin (ASPIRIN) 325 mg tablet Take 1 Tab by mouth daily.  90 Tab 4    carvedilol (COREG) 3.125 mg tablet Take 1 Tab by mouth two (2) times daily (with meals). 180 Tab 4    clopidogrel (PLAVIX) 75 mg tab Take 1 Tab by mouth daily. 90 Tab 4       Allergies   Allergen Reactions    Ace Inhibitors Other (comments)     Per pt, Cardiologist states he cannot have ACE inhibitors       Review of Systems  On review of system he denies any fevers, chills, shortness of breath, nausea vomiting or abdominal pain. No lumps and bumps. No focal neurologic deficit. Reports decreased activity due to right above-the-knee amputation. Has RLE prosthesis. No BRBPR or melena. Has increase urinary frequency but no pain or burning. Remaining 12 point review of system negative. ECOG performance status 2. Independent with ADLs and IADLs but gets occasional help from wife if needed. Objective:  Physical Exam:  Visit Vitals  /74 (BP 1 Location: Right arm, BP Patient Position: Sitting)   Pulse 68   Temp 96.9 °F (36.1 °C) (Oral)   Resp 20   Ht 5' 8\" (1.727 m)   Wt 98.9 kg (218 lb)   SpO2 98%   BMI 33.15 kg/m²         General:  Alert, cooperative, no distress, appears stated age. Head:  Normocephalic, without obvious abnormality, atraumatic. Eyes:  Conjunctivae/corneas clear. PERRL, EOMs intact. Throat: Lips, mucosa, and tongue normal.    Neck: Supple, symmetrical, trachea midline, no adenopathy, thyroid: no enlargement/tenderness/nodules   Back:   Symmetric, no curvature. ROM normal. No CVA tenderness. Lungs:   Clear to auscultation bilaterally. Chest wall:  No tenderness or deformity. Heart:  Regular rate and rhythm, S1, S2 normal, no murmur, click, rub or gallop. Abdomen:   Soft, non-tender. Bowel sounds normal. No masses,  No organomegaly. Extremities: RLE prosthesis   Skin: Skin color, texture, turgor normal. No rashes or lesions. Lymph nodes: Cervical, supraclavicular, and axillary nodes normal.   Neurologic: CNII-XII intact.        Diagnostic Imaging     Results for orders placed during the hospital encounter of 07/01/19   CTA ABD ART W RUNOFF W WO CONT    Narrative CT ANGIOGRAM OF THE ABDOMINAL AORTA AND BILATERAL LOWER EXTREMITIES:     Indication: Abdominal aortic aneurysm, status post EVAR. Left popliteal artery  aneurysm. Comparison: 12/20/2016    Technique: CT arteriogram of the abdominal aorta and both lower extremities was  performed to the level of the ankle/feet. After intravenous contrast  administration without complication. Sagittal and coronal MIP reconstructions  were performed to better evaluate arterial anatomy. Additional multiplanar  reformatted images were generated by an outside service on an independent  workstation and submitted to PACS. These included multiplanar reformatted  images, surface rendered models, curved planar reformats. One or more dose reduction techniques were used on this CT: automated exposure  control, adjustment of the mAs and/or kVp according to patient size, and  iterative reconstruction techniques. The specific techniques used on this CT  exam have been documented in the patient's electronic medical record. Digital  Imaging and Communications in the Medicine (DICOM) format image data are  available to nonaffiliated external healthcare facilities or entities on a  secure, media free, reciprocally searchable basis with patient authorization for  at least a 12 month period after this study. Findings:    CT ARTERIOGRAM ABDOMINAL AORTA - CTA PELVIS:    Noncontrast images demonstrate moderate scattered atherosclerotic vascular  calcifications including coronary arterial calcifications. No displaced intimal  calcification. No high density intramural hematoma or periaortic hemorrhage. Trans-renal fixation infrarenal abdominal aortic stent graft construct in place  with bilateral external iliac limbs. The abdominal aortic aneurysm appears completely excluded, measures 49 x 54 mm  maximally, minimally increased previously measured 46 x 50 mm.     Left common iliac artery aneurysm is completely excluded, 26 mm in diameter. Patent external iliac arteries which are moderately atherosclerotic. The celiac axis, superior mesenteric artery, bilateral renal arteries are  patent. Inferior mesenteric artery origin is occluded, arises from the excluded  aneurysm. CTA LOWER EXTREMITY RUNOFF:    Left common femoral artery is patent, mildly stenotic. The profundus femoris and  superficial femoral arteries origins are patent. There is a moderate focal  stenosis of the superficial femoral artery just pass its origin. The superficial  femoral artery is atherosclerotic, patent with mild regions of narrowing. There  is an above-knee left popliteal artery aneurysm which is largely occluded,  maximal diameter 33 x 34 mm. There is a mild stenosis of the residual lumen  within the distal aspect of the aneurysm. Popliteal artery remains patent. The  tibial trifurcation is patent although the anterior tibial artery occludes a few  centimeters past its origin. Runoff is via the posterior tibial and peroneal  arteries which demonstrated moderate atherosclerotic calcifications, they appear  patent with in-line flow to the ankle and foot. The right superficial femoral artery occludes at its origin. The profunda  femoris is patent. Thrombosed right above knee popliteal artery aneurysm, 20 x  24 mm. OTHER CT FINDINGS:    LOWER CHEST: Regions of basilar atelectasis. Scattered atherosclerotic vascular  calcifications are present including coronary arterial calcifications. Previous  sternotomy. LIVER, BILIARY: Liver is normal. No biliary dilation. Gallstones are present in  the gallbladder. Kadeem Rook PANCREAS: Normal.    SPLEEN: Previous splenectomy. ADRENALS: Normal.    KIDNEYS: No renal obstructive changes. Atherosclerotic vascular calcifications. Left renal cyst.    LYMPH NODES: No enlarged lymph nodes. GASTROINTESTINAL TRACT: No bowel obstruction.  Large left inguinal hernia  containing fat and small bowel. PELVIC ORGANS: Unremarkable. VASCULATURE: Unremarkable. BONES: Osseous degenerative changes. Endplate irregularities with multiple  vacuum discs. No acute or aggressive osseous abnormalities identified. OTHER: None.    _______________      Impression IMPRESSION:    1. Trans-renal fixation infrarenal abdominal aortic stent graft construct in  place with bilateral external iliac limbs. The abdominal aortic aneurysm  appears completely excluded, measures 49 x 54 mm maximally, minimally increased  previously measured 46 x 50 mm. Left common iliac artery aneurysm is completely  excluded, 26 mm in diameter. 2. There is an above-knee left popliteal artery aneurysm which is largely  occluded, maximal diameter 33 x 34 mm. There is a mild stenosis of the residual  lumen within the distal aspect of the aneurysm. Popliteal artery remains patent. 3. There is a moderate focal stenosis of the superficial femoral artery just  pass its origin. 4. The tibial trifurcation is patent although the anterior tibial artery  occludes a few centimeters past its origin. Left tibial runoff is via the  posterior tibial and peroneal arteries which demonstrated moderate  atherosclerotic calcifications, they appear patent with in-line flow to the  ankle and foot. 5. The right superficial femoral artery occludes at its origin. The profunda  femoris is patent. Thrombosed right above knee popliteal artery aneurysm, 20 x  24 mm. 6. Cholelithiasis. 7. Large left inguinal hernia containing fat and small bowel.        Lab Results  Lab Results   Component Value Date/Time    WBC 9.7 10/07/2019 10:12 AM    HGB 12.3 (L) 10/07/2019 10:12 AM    HCT 37.1 10/07/2019 10:12 AM    PLATELET 422 93/72/3108 10:12 AM    .8 (H) 10/07/2019 10:12 AM       Lab Results   Component Value Date/Time    Sodium 142 10/07/2019 10:12 AM    Potassium 5.6 (H) 10/07/2019 10:12 AM    Chloride 109 10/07/2019 10:12 AM    CO2 26 10/07/2019 10:12 AM    Anion gap 7 10/07/2019 10:12 AM    Glucose 90 10/07/2019 10:12 AM    BUN 36 (H) 10/07/2019 10:12 AM    Creatinine 1.71 (H) 10/07/2019 10:12 AM    BUN/Creatinine ratio 21 (H) 10/07/2019 10:12 AM    GFR est AA 47 (L) 10/07/2019 10:12 AM    GFR est non-AA 39 (L) 10/07/2019 10:12 AM    Calcium 9.6 10/07/2019 10:12 AM    AST (SGOT) 13 (L) 01/30/2019 09:30 AM    Alk. phosphatase 138 (H) 01/30/2019 09:30 AM    Protein, total 7.3 01/30/2019 09:30 AM    Albumin 4.0 01/30/2019 09:30 AM    Globulin 3.3 01/30/2019 09:30 AM    A-G Ratio 1.2 01/30/2019 09:30 AM    ALT (SGPT) 20 01/30/2019 09:30 AM     Follow-up with PCP for health maintenance. Assessment/Plan:  78 y.o. male  1. History of colon cancer  colon cancer diagnosed in 2004 at [place] in [place] s/p adjuvant chemotherapy x 6 months. Last cscope was in 2016. Recommend collagard. I will defer to PCP. Get records from [place]      2. Macrocytic anemia  Labs on 10/7/2019 showed ferritin 108, WBC 9.7, H&H 12.3/37.1, platelet 363. .8, BUN 36, creatinine 1.71. Mr. Enmanuel Justice macrocytic anemia (mild anemia) could be secondary to MDS versus B12/folate deficiency versus both. He has no known liver disease. He has no heavy alcohol intake. Plan is:  *Recheck CBC with differential, CMP, ferritin and iron panel  *Check B12 and folate    RTC 2 weeks    CC: Bert Carver

## 2019-11-14 NOTE — LETTER
11/14/19 Patient: Rubén Kidd YOB: 1940 Date of Visit: 11/14/2019 Dayton Garcia NP 
[id number] [id number] VIA In Basket Dear Dayton Garcia NP, Thank you for referring Mr. Karri Gilbert to [facility] for evaluation. My notes for this consultation are attached. If you have questions, please do not hesitate to call me. I look forward to following your patient along with you.  
 
 
Sincerely, 
 
Faizan Spencer MD

## 2019-11-25 NOTE — NURSE NAVIGATOR
Initial assessment for [employer], Hx diagnosed of colon cancer. Meeting with pt included pt and son. Patient was assessed for the following barriers:    Communication:       Yes    No  -Ability to read/write,understand [place]       [x]      []    -Ability to talk with family/children,friends about diagnosis     [x]      []    -Other:  Comments/Referrals/Services provided: Pt able to discuss diagnosis with family  Employment/Financial/Legal:     Yes    No  -Loss of employment/income         []      [x]    -Insurance coverage          [x]      []     -Needs help applying for Social Security/Disability/FMLA     []      [x]     -Help with Co-Pays for office visits         []      [x]    -Medication assistance/medical supplies or equipment     []      [x]    -Difficulty paying bills: utilities/housing       []      [x]    -Means to buy food                     [x]      []    -Legal issues           []      [x]    -Other:  Comments/Referrals/Services provided: 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 Lives with son and wife.    Psychosocial        Yes    No  Housing:  -Homeless           []      [x]    -Extended care needs: long term care/home care/Hospice     []      [x]    -Other:  Comments/Referrals/Services provided: Lives with Wife and son  Transportation:       Yes    No  -Lack of vehicle or public transportation options      []      [x]    -Funds need for public transportation: bus/taxi      []      [x]    -Other:  Comments/Referrals/Services provided: Wife drives pt to appointments  Support system:       Yes No  -Family members at home: spouse/significant other/children    [x]      []    -Has a support system         [x]      []    Other:  Comments/Referrals/Services provided: Wife nad children provides support  Spirituality:        Yes No  -Spiritual issues          []      [x]    -Cultural concerns          []      [x]    -Other:  -Comments/Referrals/Services provided: No concerns today  Sexuality:        Yes No  -Body image concerns                     []      [x]    -Relationships/significant other issues        []      [x]    -Other:  Comments/Referrals/Services provided: No concerns today   Coping:        Yes    No  -Able to manage emotions         [x]      []    -Feeling fearful or anxious         []      [x]    -Interest in attending support groups        []      [x]    -Cancer related pain/control         []      [x]    -Other:  Comments/Referrals/Services provided: Pt is calm today, no c/o pain voiced  Tobacco dependency:      Yes No  -Currently smokes: cigarettes/cigars        []      [x]    -Uses smokeless tobacco         []      [x]    -Other:  Comments/Referrals/Services provided: Pt stopped smoking in     Education/review of Disease process and Management   Yes No  -Explanation of navigator role/contact information      [x]      []    New Patient Guide for Colon Cancer provided                      [x]     []         -Pathology/Staging          []      [x]    -Diagnostic tests          [x]      []    -Genetic testing needed         []      [x]    -Treatment options/plan: surgery/chemotherapy/radiation     [x]      []    -Mediport placement as needed                              []      [x]    -Tobacco cessation as needed        []      [x]    -Need for a second opinion         []      [x]    -Importance of bringing family/friends to medical appts     [x]      []   -Other:  Patient/family verbalized understanding of treatment plan: Yes. [facility], cologuard test to be ordered by primary care MD. Labs today, RTC in 2 weeks. All questions answered. NCCN Distress Tool    Kristian Lyon was assessed for management of distress using the NCCN Distress Thermometer for Patients tool.       Mild to moderate distress  Scorin-4        Yes    No    -Practical/physical issues       []      [x]      -Provide community resources      [x]      []      -Provide list of support groups      []      [x]      -Provide list of national organizations and websites               [x]      []      -Provide ongoing supportive care by oncology medical team        [x]      []                  Comments/Referrals/Services provided:  Yes. Score 0, will reassess in the future    Moderate to severe distress  Scorin or greater                   Yes    No    -Navigator consulted with MD      []      []     -Navigator follow up         []      []     -Spiritual concerns        []      []     -Emotional/family concerns       []      []     -Refer to /mental health professional/PCP   []      []      Comments/Referral/Services provided:  Yes.

## 2019-12-03 ENCOUNTER — OFFICE VISIT (OUTPATIENT)
Dept: ONCOLOGY | Age: 79
End: 2019-12-03

## 2019-12-03 ENCOUNTER — NURSE NAVIGATOR (OUTPATIENT)
Dept: OTHER | Age: 79
End: 2019-12-03

## 2019-12-03 VITALS
WEIGHT: 218.8 LBS | SYSTOLIC BLOOD PRESSURE: 140 MMHG | TEMPERATURE: 97 F | DIASTOLIC BLOOD PRESSURE: 81 MMHG | OXYGEN SATURATION: 98 % | RESPIRATION RATE: 18 BRPM | BODY MASS INDEX: 33.27 KG/M2 | HEART RATE: 63 BPM

## 2019-12-03 DIAGNOSIS — D75.89 MACROCYTOSIS: Primary | ICD-10-CM

## 2019-12-03 RX ORDER — HYDROCHLOROTHIAZIDE 25 MG/1
TABLET ORAL
COMMUNITY
Start: 2019-10-22 | End: 2020-03-11 | Stop reason: ALTCHOICE

## 2019-12-03 NOTE — PROGRESS NOTES
Olamide Ji is a 78 y.o. 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     Family history significant for paternal who had lung cancer (was heavy smoker)     I ordered labs including B12 and folate, iron panel and ferritin, and patient is here today for follow-up and further plan.       DX: history of colon cancer     STAGE:  Unknown-Get records     ONCOLOGY HISTORY:   2004: colon cancer diagnosed in 2004 at [place] in [place] s/p adjuvant chemotherapy x 6 months.     6/29/2016: SIGMOID COLON, BIOPSY:  TUBULAR ADENOMATA    Past Medical History:   Diagnosis Date    Advance directive in chart 6/13/2016    CAD (coronary artery disease)     Cancer ([address] 2003    Colon    CKD (chronic kidney disease), stage III ([address] 3/27/2016    CVA (cerebral vascular accident) ([address] 3/26/2016    Femoral artery aneurysm, left ([address]     Heart attack ([address] 2008    Heart attack ([address]     Hernia     History of TIAs     Hyperlipidemia     Hypertension 3/27/2016    Iliac artery aneurysm, left ([address]     Psychiatric disorder     Vascular Dementia    Pure hypercholesterolemia 9/14/2012    PVD (peripheral vascular disease) ([address] 3/27/2016    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 ([address] 3/26/2016     Past Surgical History:   Procedure Laterality Date    ABDOMEN SURGERY PROC UNLISTED  2011    3 stents placed into abdomen (groin)    CARDIAC SURG PROCEDURE UNLIST  2005    Quadruple Bypass    CARDIAC SURG PROCEDURE UNLIST  2011    Aortic pig valve placed    COLONOSCOPY N/A 6/29/2016    COLONOSCOPY performed by Grace Hager MD at [id number] [facility], DIAGNOSTIC      HX AAA REPAIR      HX GI      HX ORTHOPAEDIC  2008    Right Leg Amputated    HX SMALL BOWEL RESECTION       Social History     Socioeconomic History    Marital status:      Spouse name: Not on file    Number of children: Not on file    Years of education: Not on file    Highest education level: Not on file   Tobacco Use    Smoking status: Former Smoker     Last attempt to quit: 1987     Years since quittin.2    Smokeless tobacco: Never Used   Substance and Sexual Activity    Alcohol use: Not Currently    Drug use: No    Sexual activity: Not Currently     Family History   Problem Relation Age of Onset    Hypertension Mother     Hypertension Father     Heart Disease Father     Heart Disease Brother     Lung Cancer Paternal Uncle        Current Outpatient Medications   Medication Sig Dispense Refill    nitroglycerin (NITROSTAT) 0.4 mg SL tablet by SubLINGual route every five (5) minutes as needed.  atorvastatin (LIPITOR) 20 mg tablet Take 1 Tab by mouth daily. 90 Tab 4    tamsulosin (FLOMAX) 0.4 mg capsule Take 1 Cap by mouth nightly. 90 Cap 4    aspirin (ASPIRIN) 325 mg tablet Take 1 Tab by mouth daily. 90 Tab 4    carvedilol (COREG) 3.125 mg tablet Take 1 Tab by mouth two (2) times daily (with meals). 180 Tab 4    clopidogrel (PLAVIX) 75 mg tab Take 1 Tab by mouth daily. 90 Tab 4    hydroCHLOROthiazide (HYDRODIURIL) 25 mg tablet       triamcinolone acetonide (KENALOG) 0.1 % ointment Apply  to affected area two (2) times a day. use thin layer to affected area of left lower leg. 30 g 0       Allergies   Allergen Reactions    Ace Inhibitors Other (comments)     Per pt, Cardiologist states he cannot have ACE inhibitors       Review of Systems  Patient was seen and examined today. He is awake alert oriented x3.    on review of system he denies any fevers, chills, shortness of breath, nausea vomiting or abdominal pain. No lumps and bumps.   No focal neurologic deficit. Still reports decreased activity due to right above-the-knee amputation. Has RLE prosthesis. No BRBPR or melena. Has increase urinary frequency but no pain or burning. All other 12 points of review of system have been reviewed and were negative. ECOG performance status 2. Independent with ADLs and IADLs but gets occasional help from wife if needed. Objective:  Visit Vitals  /81   Pulse 63   Temp 97 °F (36.1 °C) (Oral)   Resp 18   Wt 99.2 kg (218 lb 12.8 oz)   SpO2 98%   BMI 33.27 kg/m²         Physical Exam:   General appearance - alert, well appearing, and in no distress  Mental status - alert, oriented to person, place, and time  EYE-KANWAL, EOMI  ENT-ENT exam normal, no neck nodes or sinus tenderness  Mouth - mucous membranes moist, pharynx normal without lesions  Neck - supple, no significant adenopathy   Chest - clear to auscultation, no wheezes, rales or rhonchi, symmetric air entry   Heart - normal rate and regular rhythm   Abdomen - soft, nontender, nondistended, no masses or organomegaly  Lymph- no adenopathy palpable  Ext-Right  BKA  Skin-Warm and dry. Neuro -alert, oriented, normal speech, no focal findings or movement disorder noted      Diagnostic Imaging     No results found for this or any previous visit. Results for orders placed during the hospital encounter of 08/16/19   XR ANGIO TRANSLUMINAL BALLOON VENOUS    Narrative FLUOROSCOPY WAS PROVIDED FOR THIS PROCEDURE UNDER THE SUPERVISION OF THE  ATTENDING PROVIDER. Impression IMPRESSION:? FLUOROSCOPY WAS PROVIDED FOR THIS PROCEDURE UNDER THE SUPERVISION  OF THE ATTENDING PROVIDER. FLUORO TIME:  03.49    AJB       Results for orders placed during the hospital encounter of 07/01/19   CTA ABD ART W RUNOFF W WO CONT    Narrative CT ANGIOGRAM OF THE ABDOMINAL AORTA AND BILATERAL LOWER EXTREMITIES:     Indication: Abdominal aortic aneurysm, status post EVAR. Left popliteal artery  aneurysm.     Comparison: 12/20/2016    Technique: CT arteriogram of the abdominal aorta and both lower extremities was  performed to the level of the ankle/feet. After intravenous contrast  administration without complication. Sagittal and coronal MIP reconstructions  were performed to better evaluate arterial anatomy. Additional multiplanar  reformatted images were generated by an outside service on an independent  workstation and submitted to PACS. These included multiplanar reformatted  images, surface rendered models, curved planar reformats. One or more dose reduction techniques were used on this CT: automated exposure  control, adjustment of the mAs and/or kVp according to patient size, and  iterative reconstruction techniques. The specific techniques used on this CT  exam have been documented in the patient's electronic medical record. Digital  Imaging and Communications in the Medicine (DICOM) format image data are  available to nonaffiliated external healthcare facilities or entities on a  secure, media free, reciprocally searchable basis with patient authorization for  at least a 12 month period after this study. Findings:    CT ARTERIOGRAM ABDOMINAL AORTA - CTA PELVIS:    Noncontrast images demonstrate moderate scattered atherosclerotic vascular  calcifications including coronary arterial calcifications. No displaced intimal  calcification. No high density intramural hematoma or periaortic hemorrhage. Trans-renal fixation infrarenal abdominal aortic stent graft construct in place  with bilateral external iliac limbs. The abdominal aortic aneurysm appears completely excluded, measures 49 x 54 mm  maximally, minimally increased previously measured 46 x 50 mm. Left common iliac artery aneurysm is completely excluded, 26 mm in diameter. Patent external iliac arteries which are moderately atherosclerotic. The celiac axis, superior mesenteric artery, bilateral renal arteries are  patent.  Inferior mesenteric artery origin is occluded, arises from the excluded  aneurysm. CTA LOWER EXTREMITY RUNOFF:    Left common femoral artery is patent, mildly stenotic. The profundus femoris and  superficial femoral arteries origins are patent. There is a moderate focal  stenosis of the superficial femoral artery just pass its origin. The superficial  femoral artery is atherosclerotic, patent with mild regions of narrowing. There  is an above-knee left popliteal artery aneurysm which is largely occluded,  maximal diameter 33 x 34 mm. There is a mild stenosis of the residual lumen  within the distal aspect of the aneurysm. Popliteal artery remains patent. The  tibial trifurcation is patent although the anterior tibial artery occludes a few  centimeters past its origin. Runoff is via the posterior tibial and peroneal  arteries which demonstrated moderate atherosclerotic calcifications, they appear  patent with in-line flow to the ankle and foot. The right superficial femoral artery occludes at its origin. The profunda  femoris is patent. Thrombosed right above knee popliteal artery aneurysm, 20 x  24 mm. OTHER CT FINDINGS:    LOWER CHEST: Regions of basilar atelectasis. Scattered atherosclerotic vascular  calcifications are present including coronary arterial calcifications. Previous  sternotomy. LIVER, BILIARY: Liver is normal. No biliary dilation. Gallstones are present in  the gallbladder. [facility] PANCREAS: Normal.    SPLEEN: Previous splenectomy. ADRENALS: Normal.    KIDNEYS: No renal obstructive changes. Atherosclerotic vascular calcifications. Left renal cyst.    LYMPH NODES: No enlarged lymph nodes. GASTROINTESTINAL TRACT: No bowel obstruction. Large left inguinal hernia  containing fat and small bowel. PELVIC ORGANS: Unremarkable. VASCULATURE: Unremarkable. BONES: Osseous degenerative changes. Endplate irregularities with multiple  vacuum discs.  No acute or aggressive osseous abnormalities identified. OTHER: None.    _______________      Impression IMPRESSION:    1. Trans-renal fixation infrarenal abdominal aortic stent graft construct in  place with bilateral external iliac limbs. The abdominal aortic aneurysm  appears completely excluded, measures 49 x 54 mm maximally, minimally increased  previously measured 46 x 50 mm. Left common iliac artery aneurysm is completely  excluded, 26 mm in diameter. 2. There is an above-knee left popliteal artery aneurysm which is largely  occluded, maximal diameter 33 x 34 mm. There is a mild stenosis of the residual  lumen within the distal aspect of the aneurysm. Popliteal artery remains patent. 3. There is a moderate focal stenosis of the superficial femoral artery just  pass its origin. 4. The tibial trifurcation is patent although the anterior tibial artery  occludes a few centimeters past its origin. Left tibial runoff is via the  posterior tibial and peroneal arteries which demonstrated moderate  atherosclerotic calcifications, they appear patent with in-line flow to the  ankle and foot. 5. The right superficial femoral artery occludes at its origin. The profunda  femoris is patent. Thrombosed right above knee popliteal artery aneurysm, 20 x  24 mm. 6. Cholelithiasis. 7. Large left inguinal hernia containing fat and small bowel.        Lab Results  Lab Results   Component Value Date/Time    WBC 7.2 11/14/2019 12:06 PM    HGB 13.7 11/14/2019 12:06 PM    HCT 40.6 11/14/2019 12:06 PM    PLATELET 546 41/61/6144 12:06 PM    .2 (H) 11/14/2019 12:06 PM       Lab Results   Component Value Date/Time    Sodium 142 10/07/2019 10:12 AM    Potassium 5.6 (H) 10/07/2019 10:12 AM    Chloride 109 10/07/2019 10:12 AM    CO2 26 10/07/2019 10:12 AM    Anion gap 7 10/07/2019 10:12 AM    Glucose 90 10/07/2019 10:12 AM    BUN 36 (H) 10/07/2019 10:12 AM    Creatinine 1.71 (H) 10/07/2019 10:12 AM    BUN/Creatinine ratio 21 (H) 10/07/2019 10:12 AM    GFR est AA 47 (L) 10/07/2019 10:12 AM    GFR est non-AA 39 (L) 10/07/2019 10:12 AM    Calcium 9.6 10/07/2019 10:12 AM    AST (SGOT) 13 (L) 01/30/2019 09:30 AM    Alk. phosphatase 138 (H) 01/30/2019 09:30 AM    Protein, total 7.3 01/30/2019 09:30 AM    Albumin 4.0 01/30/2019 09:30 AM    Globulin 3.3 01/30/2019 09:30 AM    A-G Ratio 1.2 01/30/2019 09:30 AM    ALT (SGPT) 20 01/30/2019 09:30 AM       Assessment/Plan:  78 y.o. male  1. History of colon cancer  colon cancer diagnosed in 2004 at [place] in [place] s/p adjuvant chemotherapy x 6 months. Last cscope was in 2016. Recommend collagard. I will defer to PCP.       2. Macrocytosis  Labs on 10/7/2019 showed ferritin 108, WBC 9.7, H&H 12.3/37.1, platelet 965. .8, BUN 36, creatinine 1.71. Labs on 11/14/2019 showed vitamin B12 level 1150, folate greater than 20, ferritin 136, transferrin saturation 34%, WBC 7.2, H&H 13.7/40.6, platelet 593. . Mr. Laina Solares macrocytic anemia (mild anemia) could be secondary to MDS versus chemotherapy induced. He has no known liver disease. He has no heavy alcohol intake. *Refer to IR for bone marrow biopsy, however patient says that he does not want BMbx and would rather wait till his get bad. Continue surveillance.     RTC 6 months     CC: Jennie Estrada MD

## 2019-12-03 NOTE — NURSE NAVIGATOR
Saw pt in clinic with Dr. Renetta Davdi for f/u. No complaints voiced at this time. Pt decide to hold on bone marrow bx for now. Continue surveillance, RTC in 6 months with labs prior to visit. All questions answered.

## 2019-12-03 NOTE — PROGRESS NOTES
Adelfo Quintanilla is a 78 y.o. male presenting today for a follow-up appointment. Patient is ambulatory with a cane and reports a sore throat. Chief Complaint   Patient presents with    Colon Cancer       Visit Vitals  /81   Pulse 63   Temp 97 °F (36.1 °C) (Oral)   Resp 18   Wt 218 lb 12.8 oz (99.2 kg)   SpO2 98%   BMI 33.27 kg/m²       Current Outpatient Medications   Medication Sig    nitroglycerin (NITROSTAT) 0.4 mg SL tablet by SubLINGual route every five (5) minutes as needed.  atorvastatin (LIPITOR) 20 mg tablet Take 1 Tab by mouth daily.  tamsulosin (FLOMAX) 0.4 mg capsule Take 1 Cap by mouth nightly.  aspirin (ASPIRIN) 325 mg tablet Take 1 Tab by mouth daily.  carvedilol (COREG) 3.125 mg tablet Take 1 Tab by mouth two (2) times daily (with meals).  clopidogrel (PLAVIX) 75 mg tab Take 1 Tab by mouth daily.  hydroCHLOROthiazide (HYDRODIURIL) 25 mg tablet     triamcinolone acetonide (KENALOG) 0.1 % ointment Apply  to affected area two (2) times a day. use thin layer to affected area of left lower leg. No current facility-administered medications for this visit. Medications no longer taking/discontinued: kenalog ointment, hydrochlorothiazide    Fall Risk Assessment, last 12 mths 10/3/2019   Able to walk? Yes   Fall in past 12 months? No   Fall with injury? -   Number of falls in past 12 months -   Fall Risk Score -       3 most recent PHQ Screens 10/3/2019   PHQ Not Done -   Little interest or pleasure in doing things Not at all   Feeling down, depressed, irritable, or hopeless Not at all   Total Score PHQ 2 0       Abuse Screening Questionnaire 11/14/2019   Do you ever feel afraid of your partner? N   Are you in a relationship with someone who physically or mentally threatens you? N   Is it safe for you to go home? Y       1. Have you been to the ER, urgent care clinic since your last visit? Hospitalized since your last visit? No    2.  Have you seen or consulted any other health care providers outside of the [address] [place] [address] since your last visit? Include any pap smears or colon screening.  No

## 2019-12-06 RX ORDER — SODIUM CHLORIDE 9 MG/ML
20 INJECTION, SOLUTION INTRAVENOUS CONTINUOUS
Status: CANCELLED | OUTPATIENT
Start: 2019-12-06

## 2019-12-09 NOTE — TELEPHONE ENCOUNTER
Patients wife called to advise they have decided to hold off on getting the CT BX done until after the holidays

## 2019-12-11 ENCOUNTER — HOSPITAL ENCOUNTER (OUTPATIENT)
Dept: CT IMAGING | Age: 79
Discharge: HOME | End: 2019-12-11
Attending: INTERNAL MEDICINE

## 2019-12-24 NOTE — TELEPHONE ENCOUNTER
Requested Prescriptions     Pending Prescriptions Disp Refills    clopidogrel (PLAVIX) 75 mg tab 90 Tab 4     Sig: Take 1 Tab by mouth daily. Last appt.  10/3/19  Next appt.unknown

## 2019-12-27 RX ORDER — CLOPIDOGREL BISULFATE 75 MG/1
75 TABLET ORAL DAILY
Qty: 90 TAB | Refills: 4 | Status: SHIPPED | OUTPATIENT
Start: 2019-12-27 | End: 2021-01-01 | Stop reason: SDUPTHER

## 2020-01-01 ENCOUNTER — HOME CARE VISIT (OUTPATIENT)
Dept: SCHEDULING | Facility: HOME HEALTH | Age: 80
End: 2020-01-01
Payer: MEDICARE

## 2020-01-01 ENCOUNTER — VIRTUAL VISIT (OUTPATIENT)
Dept: ONCOLOGY | Age: 80
End: 2020-01-01

## 2020-01-01 ENCOUNTER — HOSPITAL ENCOUNTER (OUTPATIENT)
Dept: VASCULAR SURGERY | Age: 80
Discharge: HOME | End: 2020-09-14
Attending: SURGERY

## 2020-01-01 ENCOUNTER — HOME CARE VISIT (OUTPATIENT)
Dept: HOME HEALTH SERVICES | Facility: HOME HEALTH | Age: 80
End: 2020-01-01
Payer: MEDICARE

## 2020-01-01 ENCOUNTER — VIRTUAL VISIT (OUTPATIENT)
Dept: FAMILY MEDICINE CLINIC | Age: 80
End: 2020-01-01
Payer: MEDICARE

## 2020-01-01 ENCOUNTER — OFFICE VISIT (OUTPATIENT)
Dept: FAMILY MEDICINE CLINIC | Age: 80
End: 2020-01-01
Payer: MEDICARE

## 2020-01-01 ENCOUNTER — HOME HEALTH ADMISSION (OUTPATIENT)
Dept: HOME HEALTH SERVICES | Facility: HOME HEALTH | Age: 80
End: 2020-01-01
Payer: MEDICARE

## 2020-01-01 ENCOUNTER — VIRTUAL VISIT (OUTPATIENT)
Dept: CARDIOLOGY CLINIC | Age: 80
End: 2020-01-01
Payer: MEDICARE

## 2020-01-01 ENCOUNTER — TELEPHONE (OUTPATIENT)
Dept: VASCULAR SURGERY | Age: 80
End: 2020-01-01

## 2020-01-01 ENCOUNTER — HOSPITAL ENCOUNTER (OUTPATIENT)
Dept: INFUSION THERAPY | Age: 80
Discharge: HOME | End: 2020-05-27
Payer: MEDICARE

## 2020-01-01 ENCOUNTER — HOSPITAL ENCOUNTER (OUTPATIENT)
Dept: LAB | Age: 80
Discharge: HOME | End: 2020-11-30
Payer: MEDICARE

## 2020-01-01 ENCOUNTER — OFFICE VISIT (OUTPATIENT)
Dept: CARDIOLOGY CLINIC | Age: 80
End: 2020-01-01

## 2020-01-01 ENCOUNTER — TELEPHONE (OUTPATIENT)
Dept: FAMILY MEDICINE CLINIC | Age: 80
End: 2020-01-01

## 2020-01-01 ENCOUNTER — VIRTUAL VISIT (OUTPATIENT)
Dept: VASCULAR SURGERY | Age: 80
End: 2020-01-01

## 2020-01-01 ENCOUNTER — VIRTUAL VISIT (OUTPATIENT)
Dept: VASCULAR SURGERY | Age: 80
End: 2020-01-01
Payer: MEDICARE

## 2020-01-01 ENCOUNTER — VIRTUAL VISIT (OUTPATIENT)
Dept: CARDIOLOGY CLINIC | Age: 80
End: 2020-01-01

## 2020-01-01 VITALS
TEMPERATURE: 98.2 F | SYSTOLIC BLOOD PRESSURE: 118 MMHG | OXYGEN SATURATION: 97 % | DIASTOLIC BLOOD PRESSURE: 75 MMHG | HEART RATE: 53 BPM | RESPIRATION RATE: 20 BRPM

## 2020-01-01 VITALS
OXYGEN SATURATION: 95 % | DIASTOLIC BLOOD PRESSURE: 77 MMHG | TEMPERATURE: 98 F | HEART RATE: 66 BPM | SYSTOLIC BLOOD PRESSURE: 124 MMHG

## 2020-01-01 VITALS
DIASTOLIC BLOOD PRESSURE: 72 MMHG | HEIGHT: 68 IN | RESPIRATION RATE: 16 BRPM | BODY MASS INDEX: 31.22 KG/M2 | HEART RATE: 52 BPM | TEMPERATURE: 97.6 F | WEIGHT: 206 LBS | OXYGEN SATURATION: 98 % | SYSTOLIC BLOOD PRESSURE: 132 MMHG

## 2020-01-01 VITALS
SYSTOLIC BLOOD PRESSURE: 121 MMHG | HEART RATE: 50 BPM | OXYGEN SATURATION: 96 % | TEMPERATURE: 98.8 F | RESPIRATION RATE: 17 BRPM | DIASTOLIC BLOOD PRESSURE: 73 MMHG

## 2020-01-01 VITALS
DIASTOLIC BLOOD PRESSURE: 80 MMHG | SYSTOLIC BLOOD PRESSURE: 110 MMHG | TEMPERATURE: 97.9 F | OXYGEN SATURATION: 98 % | HEART RATE: 54 BPM

## 2020-01-01 VITALS
HEART RATE: 65 BPM | OXYGEN SATURATION: 98 % | RESPIRATION RATE: 18 BRPM | TEMPERATURE: 98.4 F | SYSTOLIC BLOOD PRESSURE: 110 MMHG | DIASTOLIC BLOOD PRESSURE: 82 MMHG

## 2020-01-01 VITALS
SYSTOLIC BLOOD PRESSURE: 131 MMHG | HEART RATE: 50 BPM | OXYGEN SATURATION: 95 % | RESPIRATION RATE: 19 BRPM | TEMPERATURE: 98.5 F | DIASTOLIC BLOOD PRESSURE: 65 MMHG

## 2020-01-01 VITALS
TEMPERATURE: 98.1 F | SYSTOLIC BLOOD PRESSURE: 110 MMHG | OXYGEN SATURATION: 99 % | HEART RATE: 60 BPM | DIASTOLIC BLOOD PRESSURE: 80 MMHG | RESPIRATION RATE: 20 BRPM

## 2020-01-01 VITALS — SYSTOLIC BLOOD PRESSURE: 120 MMHG | DIASTOLIC BLOOD PRESSURE: 80 MMHG | HEART RATE: 69 BPM | TEMPERATURE: 97.4 F

## 2020-01-01 VITALS
DIASTOLIC BLOOD PRESSURE: 70 MMHG | OXYGEN SATURATION: 98 % | HEART RATE: 87 BPM | TEMPERATURE: 97.3 F | SYSTOLIC BLOOD PRESSURE: 130 MMHG | RESPIRATION RATE: 16 BRPM

## 2020-01-01 VITALS
TEMPERATURE: 98.3 F | SYSTOLIC BLOOD PRESSURE: 130 MMHG | RESPIRATION RATE: 18 BRPM | DIASTOLIC BLOOD PRESSURE: 80 MMHG | HEART RATE: 81 BPM | OXYGEN SATURATION: 98 %

## 2020-01-01 VITALS
TEMPERATURE: 98.4 F | SYSTOLIC BLOOD PRESSURE: 121 MMHG | DIASTOLIC BLOOD PRESSURE: 63 MMHG | OXYGEN SATURATION: 97 % | HEART RATE: 61 BPM | RESPIRATION RATE: 19 BRPM

## 2020-01-01 VITALS
SYSTOLIC BLOOD PRESSURE: 111 MMHG | OXYGEN SATURATION: 95 % | HEART RATE: 71 BPM | RESPIRATION RATE: 16 BRPM | DIASTOLIC BLOOD PRESSURE: 64 MMHG | TEMPERATURE: 98.5 F

## 2020-01-01 VITALS
RESPIRATION RATE: 18 BRPM | DIASTOLIC BLOOD PRESSURE: 78 MMHG | HEART RATE: 58 BPM | SYSTOLIC BLOOD PRESSURE: 120 MMHG | OXYGEN SATURATION: 98 % | TEMPERATURE: 98.1 F

## 2020-01-01 VITALS
HEART RATE: 64 BPM | TEMPERATURE: 98.5 F | OXYGEN SATURATION: 96 % | SYSTOLIC BLOOD PRESSURE: 118 MMHG | DIASTOLIC BLOOD PRESSURE: 64 MMHG | RESPIRATION RATE: 16 BRPM

## 2020-01-01 VITALS
TEMPERATURE: 97.9 F | OXYGEN SATURATION: 91 % | SYSTOLIC BLOOD PRESSURE: 134 MMHG | HEART RATE: 54 BPM | DIASTOLIC BLOOD PRESSURE: 94 MMHG

## 2020-01-01 VITALS
TEMPERATURE: 98.2 F | SYSTOLIC BLOOD PRESSURE: 116 MMHG | HEART RATE: 61 BPM | DIASTOLIC BLOOD PRESSURE: 60 MMHG | OXYGEN SATURATION: 96 % | RESPIRATION RATE: 17 BRPM

## 2020-01-01 VITALS
DIASTOLIC BLOOD PRESSURE: 70 MMHG | SYSTOLIC BLOOD PRESSURE: 110 MMHG | TEMPERATURE: 98.1 F | OXYGEN SATURATION: 99 % | HEART RATE: 60 BPM | RESPIRATION RATE: 20 BRPM

## 2020-01-01 VITALS
DIASTOLIC BLOOD PRESSURE: 68 MMHG | SYSTOLIC BLOOD PRESSURE: 123 MMHG | TEMPERATURE: 98.3 F | HEART RATE: 57 BPM | RESPIRATION RATE: 18 BRPM | OXYGEN SATURATION: 96 %

## 2020-01-01 DIAGNOSIS — E78.00 PURE HYPERCHOLESTEROLEMIA: ICD-10-CM

## 2020-01-01 DIAGNOSIS — I73.9 PERIPHERAL ARTERIAL DISEASE (HCC): Primary | ICD-10-CM

## 2020-01-01 DIAGNOSIS — I71.40 AAA (ABDOMINAL AORTIC ANEURYSM) WITHOUT RUPTURE: ICD-10-CM

## 2020-01-01 DIAGNOSIS — N40.1 BENIGN PROSTATIC HYPERPLASIA WITH URINARY FREQUENCY: ICD-10-CM

## 2020-01-01 DIAGNOSIS — I73.9 PAD (PERIPHERAL ARTERY DISEASE) (HCC): Primary | ICD-10-CM

## 2020-01-01 DIAGNOSIS — I71.40 AAA (ABDOMINAL AORTIC ANEURYSM) WITHOUT RUPTURE: Primary | ICD-10-CM

## 2020-01-01 DIAGNOSIS — I51.89 DIASTOLIC DYSFUNCTION: ICD-10-CM

## 2020-01-01 DIAGNOSIS — Z85.038 HISTORY OF COLON CANCER: ICD-10-CM

## 2020-01-01 DIAGNOSIS — D75.89 MACROCYTOSIS WITHOUT ANEMIA: ICD-10-CM

## 2020-01-01 DIAGNOSIS — I25.118 CORONARY ARTERY DISEASE OF NATIVE ARTERY OF NATIVE HEART WITH STABLE ANGINA PECTORIS (HCC): ICD-10-CM

## 2020-01-01 DIAGNOSIS — R53.1 WEAKNESS GENERALIZED: ICD-10-CM

## 2020-01-01 DIAGNOSIS — E11.8 CONTROLLED TYPE 2 DIABETES MELLITUS WITH COMPLICATION, WITHOUT LONG-TERM CURRENT USE OF INSULIN (HCC): ICD-10-CM

## 2020-01-01 DIAGNOSIS — I10 ESSENTIAL HYPERTENSION: ICD-10-CM

## 2020-01-01 DIAGNOSIS — Z91.81 AT RISK FOR FALLS: ICD-10-CM

## 2020-01-01 DIAGNOSIS — D75.89 MACROCYTOSIS: ICD-10-CM

## 2020-01-01 DIAGNOSIS — I71.40 ABDOMINAL AORTIC ANEURYSM (AAA) WITHOUT RUPTURE: ICD-10-CM

## 2020-01-01 DIAGNOSIS — I73.9 PAD (PERIPHERAL ARTERY DISEASE) (HCC): ICD-10-CM

## 2020-01-01 DIAGNOSIS — Z71.89 ADVANCE CARE PLANNING: ICD-10-CM

## 2020-01-01 DIAGNOSIS — F01.50 VASCULAR DEMENTIA WITHOUT BEHAVIORAL DISTURBANCE (HCC): ICD-10-CM

## 2020-01-01 DIAGNOSIS — Z23 NEED FOR VACCINATION AGAINST STREPTOCOCCUS PNEUMONIAE: Primary | ICD-10-CM

## 2020-01-01 DIAGNOSIS — K21.9 GASTROESOPHAGEAL REFLUX DISEASE WITHOUT ESOPHAGITIS: ICD-10-CM

## 2020-01-01 DIAGNOSIS — I25.10 CORONARY ARTERY DISEASE INVOLVING NATIVE CORONARY ARTERY OF NATIVE HEART WITHOUT ANGINA PECTORIS: Primary | ICD-10-CM

## 2020-01-01 DIAGNOSIS — E66.9 OBESITY (BMI 30-39.9): ICD-10-CM

## 2020-01-01 DIAGNOSIS — I72.4 POPLITEAL ANEURYSM (HCC): ICD-10-CM

## 2020-01-01 DIAGNOSIS — R26.81 GAIT INSTABILITY: Primary | ICD-10-CM

## 2020-01-01 DIAGNOSIS — Z85.038 HISTORY OF COLON CANCER: Primary | ICD-10-CM

## 2020-01-01 DIAGNOSIS — G47.10 HYPERSOMNOLENCE: ICD-10-CM

## 2020-01-01 DIAGNOSIS — N18.31 STAGE 3A CHRONIC KIDNEY DISEASE (HCC): ICD-10-CM

## 2020-01-01 DIAGNOSIS — I70.212 ATHEROSCLEROSIS OF NATIVE ARTERY OF LEFT LOWER EXTREMITY WITH INTERMITTENT CLAUDICATION (HCC): ICD-10-CM

## 2020-01-01 DIAGNOSIS — R29.6 FREQUENT FALLS: ICD-10-CM

## 2020-01-01 DIAGNOSIS — Z89.611 S/P AKA (ABOVE KNEE AMPUTATION), RIGHT (HCC): ICD-10-CM

## 2020-01-01 DIAGNOSIS — R35.0 BENIGN PROSTATIC HYPERPLASIA WITH URINARY FREQUENCY: ICD-10-CM

## 2020-01-01 DIAGNOSIS — I72.4 ANEURYSM OF LEFT POPLITEAL ARTERY (HCC): ICD-10-CM

## 2020-01-01 DIAGNOSIS — I25.10 CORONARY ARTERY DISEASE INVOLVING NATIVE CORONARY ARTERY OF NATIVE HEART WITHOUT ANGINA PECTORIS: ICD-10-CM

## 2020-01-01 DIAGNOSIS — Z00.00 MEDICARE ANNUAL WELLNESS VISIT, INITIAL: Primary | ICD-10-CM

## 2020-01-01 DIAGNOSIS — L30.9 DERMATITIS: ICD-10-CM

## 2020-01-01 DIAGNOSIS — N18.30 CKD (CHRONIC KIDNEY DISEASE), STAGE III (HCC): ICD-10-CM

## 2020-01-01 DIAGNOSIS — Z89.512 S/P BKA (BELOW KNEE AMPUTATION) UNILATERAL, LEFT (HCC): ICD-10-CM

## 2020-01-01 LAB
ALBUMIN SERPL-MCNC: 3.7 G/DL (ref 3.4–5)
ALBUMIN/GLOB SERPL: 1 {RATIO} (ref 0.8–1.7)
ALP SERPL-CCNC: 90 U/L (ref 45–117)
ALT SERPL-CCNC: 32 U/L (ref 16–61)
ANION GAP SERPL CALC-SCNC: 7 MMOL/L (ref 3–18)
AST SERPL-CCNC: 28 U/L (ref 10–38)
BASO+EOS+MONOS # BLD AUTO: 0.9 K/UL (ref 0–2.3)
BASO+EOS+MONOS NFR BLD AUTO: 11 % (ref 0.1–17)
BASOPHILS # BLD: 0.1 K/UL (ref 0–0.1)
BASOPHILS NFR BLD: 1 % (ref 0–2)
BILIRUB SERPL-MCNC: 0.5 MG/DL (ref 0.2–1)
BUN SERPL-MCNC: 34 MG/DL (ref 7–18)
BUN/CREAT SERPL: 17 (ref 12–20)
CALCIUM SERPL-MCNC: 9 MG/DL (ref 8.5–10.1)
CHLORIDE SERPL-SCNC: 107 MMOL/L (ref 100–111)
CHOLEST SERPL-MCNC: 231 MG/DL
CO2 SERPL-SCNC: 24 MMOL/L (ref 21–32)
COPPER SERPL-MCNC: 120 UG/DL (ref 72–166)
CREAT SERPL-MCNC: 2.03 MG/DL (ref 0.6–1.3)
DIFFERENTIAL METHOD BLD: ABNORMAL
DIFFERENTIAL METHOD BLD: ABNORMAL
EOSINOPHIL # BLD: 0.3 K/UL (ref 0–0.4)
EOSINOPHIL NFR BLD: 4 % (ref 0–5)
ERYTHROCYTE [DISTWIDTH] IN BLOOD BY AUTOMATED COUNT: 14.8 % (ref 11.5–14.5)
ERYTHROCYTE [DISTWIDTH] IN BLOOD BY AUTOMATED COUNT: 15.2 % (ref 11.6–14.5)
FERRITIN SERPL-MCNC: 169 NG/ML (ref 8–388)
FOLATE SERPL-MCNC: >20 NG/ML (ref 3.1–17.5)
GLOBULIN SER CALC-MCNC: 3.6 G/DL (ref 2–4)
GLUCOSE SERPL-MCNC: 96 MG/DL (ref 74–99)
HCT VFR BLD AUTO: 38.1 % (ref 36–48)
HCT VFR BLD AUTO: 41 % (ref 36–48)
HDLC SERPL-MCNC: 33 MG/DL (ref 40–60)
HDLC SERPL: 7 {RATIO} (ref 0–5)
HGB BLD-MCNC: 12.9 G/DL (ref 13–16)
HGB BLD-MCNC: 14.1 G/DL (ref 12–16)
IRON SATN MFR SERPL: 34 % (ref 20–50)
IRON SERPL-MCNC: 100 UG/DL (ref 50–175)
LDLC SERPL CALC-MCNC: ABNORMAL MG/DL (ref 0–100)
LIPID PROFILE,FLP: ABNORMAL
LYMPHOCYTES # BLD: 1.8 K/UL (ref 1.1–5.9)
LYMPHOCYTES # BLD: 1.9 K/UL (ref 0.9–3.6)
LYMPHOCYTES NFR BLD: 20 % (ref 14–44)
LYMPHOCYTES NFR BLD: 20 % (ref 21–52)
MCH RBC QN AUTO: 34 PG (ref 24–34)
MCH RBC QN AUTO: 34.3 PG (ref 25–35)
MCHC RBC AUTO-ENTMCNC: 33.9 G/DL (ref 31–37)
MCHC RBC AUTO-ENTMCNC: 34.4 G/DL (ref 31–37)
MCV RBC AUTO: 100.5 FL (ref 74–97)
MCV RBC AUTO: 99.8 FL (ref 78–102)
MONOCYTES # BLD: 0.8 K/UL (ref 0.05–1.2)
MONOCYTES NFR BLD: 8 % (ref 3–10)
NEUTS SEG # BLD: 6 K/UL (ref 1.8–9.5)
NEUTS SEG # BLD: 6.7 K/UL (ref 1.8–8)
NEUTS SEG NFR BLD: 67 % (ref 40–73)
NEUTS SEG NFR BLD: 69 % (ref 40–70)
PLATELET # BLD AUTO: 221 K/UL (ref 135–420)
PLATELET # BLD AUTO: 232 K/UL (ref 135–420)
PMV BLD AUTO: 12.3 FL (ref 9.2–11.8)
POTASSIUM SERPL-SCNC: 5.2 MMOL/L (ref 3.5–5.5)
PROT SERPL-MCNC: 7.3 G/DL (ref 6.4–8.2)
RBC # BLD AUTO: 3.79 M/UL (ref 4.7–5.5)
RBC # BLD AUTO: 4.11 M/UL (ref 4.1–5.1)
SODIUM SERPL-SCNC: 138 MMOL/L (ref 136–145)
TIBC SERPL-MCNC: 298 UG/DL (ref 250–450)
TRIGL SERPL-MCNC: 530 MG/DL (ref ?–150)
VIT B12 SERPL-MCNC: >2000 PG/ML (ref 211–911)
VLDLC SERPL CALC-MCNC: ABNORMAL MG/DL
WBC # BLD AUTO: 8.7 K/UL (ref 4.5–13)
WBC # BLD AUTO: 9.8 K/UL (ref 4.6–13.2)

## 2020-01-01 PROCEDURE — 99212 OFFICE O/P EST SF 10 MIN: CPT | Performed by: SURGERY

## 2020-01-01 PROCEDURE — G0151 HHCP-SERV OF PT,EA 15 MIN: HCPCS

## 2020-01-01 PROCEDURE — G8427 DOCREV CUR MEDS BY ELIG CLIN: HCPCS | Performed by: FAMILY MEDICINE

## 2020-01-01 PROCEDURE — G8510 SCR DEP NEG, NO PLAN REQD: HCPCS | Performed by: FAMILY MEDICINE

## 2020-01-01 PROCEDURE — G0157 HHC PT ASSISTANT EA 15: HCPCS

## 2020-01-01 PROCEDURE — 85049 AUTOMATED PLATELET COUNT: CPT

## 2020-01-01 PROCEDURE — G8432 DEP SCR NOT DOC, RNG: HCPCS | Performed by: SURGERY

## 2020-01-01 PROCEDURE — G8417 CALC BMI ABV UP PARAM F/U: HCPCS | Performed by: FAMILY MEDICINE

## 2020-01-01 PROCEDURE — G8756 NO BP MEASURE DOC: HCPCS | Performed by: SURGERY

## 2020-01-01 PROCEDURE — 400013 HH SOC

## 2020-01-01 PROCEDURE — 85025 COMPLETE CBC W/AUTO DIFF WBC: CPT

## 2020-01-01 PROCEDURE — 99213 OFFICE O/P EST LOW 20 MIN: CPT | Performed by: INTERNAL MEDICINE

## 2020-01-01 PROCEDURE — 90732 PPSV23 VACC 2 YRS+ SUBQ/IM: CPT | Performed by: FAMILY MEDICINE

## 2020-01-01 PROCEDURE — G0009 ADMIN PNEUMOCOCCAL VACCINE: HCPCS | Performed by: FAMILY MEDICINE

## 2020-01-01 PROCEDURE — 1101F PT FALLS ASSESS-DOCD LE1/YR: CPT | Performed by: SURGERY

## 2020-01-01 PROCEDURE — 99214 OFFICE O/P EST MOD 30 MIN: CPT | Performed by: FAMILY MEDICINE

## 2020-01-01 PROCEDURE — G8754 DIAS BP LESS 90: HCPCS | Performed by: FAMILY MEDICINE

## 2020-01-01 PROCEDURE — G8756 NO BP MEASURE DOC: HCPCS | Performed by: INTERNAL MEDICINE

## 2020-01-01 PROCEDURE — G8428 CUR MEDS NOT DOCUMENT: HCPCS | Performed by: FAMILY MEDICINE

## 2020-01-01 PROCEDURE — 99497 ADVNCD CARE PLAN 30 MIN: CPT | Performed by: FAMILY MEDICINE

## 2020-01-01 PROCEDURE — G8427 DOCREV CUR MEDS BY ELIG CLIN: HCPCS | Performed by: SURGERY

## 2020-01-01 PROCEDURE — G8432 DEP SCR NOT DOC, RNG: HCPCS | Performed by: INTERNAL MEDICINE

## 2020-01-01 PROCEDURE — 1101F PT FALLS ASSESS-DOCD LE1/YR: CPT | Performed by: FAMILY MEDICINE

## 2020-01-01 PROCEDURE — G8432 DEP SCR NOT DOC, RNG: HCPCS | Performed by: FAMILY MEDICINE

## 2020-01-01 PROCEDURE — 83540 ASSAY OF IRON: CPT

## 2020-01-01 PROCEDURE — G8756 NO BP MEASURE DOC: HCPCS | Performed by: FAMILY MEDICINE

## 2020-01-01 PROCEDURE — G8536 NO DOC ELDER MAL SCRN: HCPCS | Performed by: FAMILY MEDICINE

## 2020-01-01 PROCEDURE — 82728 ASSAY OF FERRITIN: CPT

## 2020-01-01 PROCEDURE — G0438 PPPS, INITIAL VISIT: HCPCS | Performed by: FAMILY MEDICINE

## 2020-01-01 PROCEDURE — 36415 COLL VENOUS BLD VENIPUNCTURE: CPT

## 2020-01-01 PROCEDURE — 80061 LIPID PANEL: CPT

## 2020-01-01 PROCEDURE — G8752 SYS BP LESS 140: HCPCS | Performed by: FAMILY MEDICINE

## 2020-01-01 PROCEDURE — 82607 VITAMIN B-12: CPT

## 2020-01-01 PROCEDURE — 80053 COMPREHEN METABOLIC PANEL: CPT

## 2020-01-01 PROCEDURE — G8428 CUR MEDS NOT DOCUMENT: HCPCS | Performed by: INTERNAL MEDICINE

## 2020-01-01 PROCEDURE — 82525 ASSAY OF COPPER: CPT

## 2020-01-01 RX ORDER — FAMOTIDINE 40 MG/1
40 TABLET, FILM COATED ORAL 2 TIMES DAILY
Qty: 180 TAB | Refills: 3 | Status: SHIPPED | OUTPATIENT
Start: 2020-01-01 | End: 2021-01-01

## 2020-01-01 RX ORDER — ASPIRIN 81 MG/1
81 TABLET ORAL DAILY
COMMUNITY
End: 2021-01-01

## 2020-01-01 RX ORDER — BISMUTH SUBSALICYLATE 262 MG
1 TABLET,CHEWABLE ORAL DAILY
COMMUNITY
End: 2021-01-01

## 2020-01-01 RX ORDER — TAMSULOSIN HYDROCHLORIDE 0.4 MG/1
0.4 CAPSULE ORAL 2 TIMES DAILY
Qty: 180 CAP | Refills: 3 | Status: SHIPPED | OUTPATIENT
Start: 2020-01-01

## 2020-01-01 RX ORDER — FISH OIL/DHA/EPA 1200-144MG
1 CAPSULE ORAL 2 TIMES DAILY
COMMUNITY
End: 2021-01-01

## 2020-01-01 RX ORDER — LANOLIN ALCOHOL/MO/W.PET/CERES
500 CREAM (GRAM) TOPICAL DAILY
COMMUNITY
End: 2021-01-01

## 2020-01-31 DIAGNOSIS — Z12.11 SCREENING FOR COLON CANCER: Primary | ICD-10-CM

## 2020-03-03 ENCOUNTER — HOSPITAL ENCOUNTER (OUTPATIENT)
Dept: VASCULAR SURGERY | Age: 80
Discharge: HOME | End: 2020-03-03
Attending: PHYSICIAN ASSISTANT
Payer: MEDICARE

## 2020-03-03 DIAGNOSIS — I72.4 ANEURYSM OF LEFT POPLITEAL ARTERY (HCC): ICD-10-CM

## 2020-03-03 LAB
LEFT ABI: 1.01
LEFT ARM BP: 144 MMHG
LEFT CFA DIST SYS PSV: 95.5 CM/S
LEFT DIST OUTFLOW PSV: 56.6 CM/S
LEFT GRAFT 1 NAME: NORMAL
LEFT INFLOW ARTERY PSV: 48.4 CM/S
LEFT MID OUTFLOW PSV: 56.6 CM/S
LEFT OUTFLOW VESSEL PSV: 59.2 CM/S
LEFT POP A PROX VEL RATIO: 0.88
LEFT POPLITEAL DIST SYS PSV: 51.2 CM/S
LEFT POPLITEAL PROX SYS PSV: 50.3 CM/S
LEFT POSTERIOR TIBIAL: 148 MMHG
LEFT PROX OUTFLOW PSV: 44.3 CM/S
LEFT PROX PFA A PSV: 50 CM/S
LEFT PTA MID SYS PSV: 72.4 CM/S
LEFT SFA DIST VEL RATIO: 1.16
LEFT SFA MID VEL RATIO: 0.88
LEFT SFA PROX VEL RATIO: 0.59
LEFT SUPER FEMORAL DIST SYS PSV: 57 CM/S
LEFT SUPER FEMORAL MID SYS PSV: 49.3 CM/S
LEFT SUPER FEMORAL PROX SYS PSV: 55.9 CM/S
RIGHT ARM BP: 146 MMHG

## 2020-03-03 PROCEDURE — 93926 LOWER EXTREMITY STUDY: CPT

## 2020-03-11 ENCOUNTER — OFFICE VISIT (OUTPATIENT)
Dept: CARDIOLOGY CLINIC | Age: 80
End: 2020-03-11

## 2020-03-11 VITALS
DIASTOLIC BLOOD PRESSURE: 62 MMHG | HEART RATE: 67 BPM | WEIGHT: 222 LBS | OXYGEN SATURATION: 98 % | SYSTOLIC BLOOD PRESSURE: 117 MMHG | HEIGHT: 68 IN | BODY MASS INDEX: 33.65 KG/M2

## 2020-03-11 DIAGNOSIS — Z95.2 H/O AORTIC VALVE REPLACEMENT: Primary | ICD-10-CM

## 2020-03-11 NOTE — PROGRESS NOTES
1. Have you been to the ER, urgent care clinic since your last visit? Hospitalized since your last visit? No  
 
2. Have you seen or consulted any other health care providers outside of the [address] [place] [address] since your last visit? Include any pap smears or colon screening.   Yes,podiatrist

## 2020-03-16 NOTE — PROGRESS NOTES
Subjective:  
   Francisco Salomon is in the office today for cardiac reevaluation. He is a 78year-old man that had revascularization of his left lower extremity in October of 2016, aorto bi iliac endografting, prior angiplasty of the left SFA, and AKA of the RLE. Cheryl Dallas He has also had DVT of the left posterior tibial vein in 04/2017. He also had left popliteal aneurysm repair. The patient has a history of known coronary artery disease and prior coronary bypass grafting done in 2005. He also had aortic valve replacement in 2011. The most recent echocardiogram was done in November 2018 which demonstrated continued adequate function of the aortic prosthesis. The patient had a neurological event in December of 2016. He has been followed by Dr. Jesus Parsons in that regard. The patient had a MRI of brain with contrast on 12/30/2016. There was an old area of infarction involving the right frontal lobe extending into the corona radiata white matter. There was also an area of old infarction in the right parietal region. There was also an abnormal signal in the right sub-insular region, which was felt to possibly represent an area of old hemorrhagic infarct. There was no acute abnormality at that time. The patient had a myocardial infarction in November 2018. He was treated medically as he declined on further evaluation. The patient wanted to be discharged the day following his admission. In the office today, he reports he is doing well. He has no specific complaints. He does say that when he bends over he gets some mild shortness of breath. He has had no chest pain. Patient Active Problem List  
 Diagnosis Date Noted  History of colonic polyps 11/14/2019  Macrocytic anemia 11/14/2019  Popliteal artery aneurysm ([facility] 08/16/2019  [facility]) [facility] 08/16/2019  Atherosclerosis of native artery of both lower extremities with intermittent claudication ([facility] 08/16/2019  [facility]neurys[facility] left pop[facility]al artery [facility] 07/10/2019  
 History of CVA (cerebrovascular accident) 12/01/2018  History of MI (myocardial infarction) 12/01/2018  S/P CABG x 4 11/25/2018  CAD (coronary artery disease) 11/24/2018  Atherosclerosis of native artery of left lower extremity with intermittent claudication ([facility] 07/09/2018  Vascular dementia without behavioral disturbance ([facility] 03/08/2017  AAA (abdominal aortic aneurysm) [facility] rupture [facility] 01/04/2017  Advance directive in chart 06/13/2016  S/P AVR 04/13/2016  Hypertension 03/27/2016  CKD (chronic kidney disease), stage III ([facility] 03/27/2016  Pure hypercholesterolemia 09/14/2012 Current Outpatient Medications Medication Sig Dispense Refill  clopidogrel (PLAVIX) 75 mg tab Take 1 Tab by mouth daily. 90 Tab 4  
 nitroglycerin (NITROSTAT) 0.4 mg SL tablet by SubLINGual route every five (5) minutes as needed.  atorvastatin (LIPITOR) 20 mg tablet Take 1 Tab by mouth daily. 90 Tab 4  
 tamsulosin (FLOMAX) 0.4 mg capsule Take 1 Cap by mouth nightly. 90 Cap 4  
 aspirin (ASPIRIN) 325 mg tablet Take 1 Tab by mouth daily. 90 Tab 4  carvedilol (COREG) 3.125 mg tablet Take 1 Tab by mouth two (2) times daily (with meals). 180 Tab 4 Allergies Allergen Reactions  Ace Inhibitors Other (comments) Per pt, Cardiologist states he cannot have ACE inhibitors Past Medical History:  
Diagnosis Date  Advance directive in chart 6/13/2016  CAD (coronary artery disease)  Cancer [facility]) 2003 Colon  CKD (chronic kidney disease), stage III ([facility] 3/27/2016  CVA (cerebral vascular accident) ([facility] 3/26/2016  Femoral artery aneurysm, left ([facility]  Heart attack [facility]) 2008  Heart attack ([facility]  Hernia  History of TIAs  Hyperlipidemia  Hypertension 3/27/2016  Iliac artery aneurysm, left ([address]  Psychiatric disorder Vascular Dementia  Pure hypercholesterolemia 2012  PVD (peripheral vascular disease) ([facility] 3/27/2016  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 ([facility] 3/26/2016 Past Surgical History:  
Procedure Laterality Date  ABDOMEN SURGERY PROC UNLISTED    
 3 stents placed into abdomen (groin)  CARDIAC SURG PROCEDURE UNLIST   Quadruple Bypass  CARDIAC SURG PROCEDURE UNLIST   Aortic pig valve placed  COLONOSCOPY N/A 2016 COLONOSCOPY performed by Thad Hernandez [facility] at [facility] ENDOSCOPY  ENDOSCOPY, COLON, DIAGNOSTIC    
 HX AAA REPAIR    
 HX GI    
 HX ORTHOPAEDIC   Right Leg Amputated  HX SMALL BOWEL RESECTION Family History Problem Relation Age of Onset  Hypertension Mother  Hypertension Father  Heart Disease Father  Heart Disease Brother  Lung Cancer Paternal Uncle Social History Tobacco Use Smoking Status Former Smoker  Last attempt to quit: 1987  Years since quittin.5 Smokeless Tobacco Never Used Review of Systems, additional: 
Constitutional: negative Eyes: negative Respiratory: negative Cardiovascular: negative Gastrointestinal: negative Musculoskeletal:weakness Neurological: negative Behvioral/Psych: negative Endocrine: negative ENT: negative Objective:  
 
Visit Vitals /62 Pulse 67 Ht 5' 8\" (1.727 m) Wt 100.7 kg (222 lb) SpO2 98% BMI 33.75 kg/m² General:  alert, cooperative, no distress Chest Wall: inspection normal - no chest wall deformities or tenderness, respiratory effort normal  
Lung: clear to auscultation bilaterally Heart:  normal rate and regular rhythm, S1 and S2 normal, systolic murmur 2/6 at 2nd right intercostal space and radiates to carotids Abdomen: soft, non-tender. Bowel sounds normal. No masses,  no organomegaly Extremities: Trace edema to left lower extremity.  There is a R BKA with prosthesis  no cyanosis. Skin: no rashes Neuro: alert, oriented, normal speech, no focal findings or movement disorder noted [id number]; Sinus rhythm with PACs. LAD. Assessment/Plan: ICD-10-CM ICD-9-CM 1. AAA (abdominal aortic aneurysm) without rupture ([facility] S/P aorta bi iliac endograft I71.4 441.4 2. PAD (peripheral artery disease) ([facility] S/P R AKA, S/P L SFA angioplasty. He did well with  left popliteal aneurysm repair. I73.9 443.9 3. Femoral artery aneurysm, left (HCC) I72.4 442.3 4. Pure hypercholesterolemia E78.00 272.0 5. CKD (chronic kidney disease), stage III,  N18.3 585.3 6. Controlled type 2 diabetes mellitus with diabetic nephropathy, without long-term current use of insulin (HCC) E11.21 250.40   
  583.81   
7. Essential hypertension,  controlled in office today. I10 401.9 8. Aortic stenosis, moderate, reasonable aortic valvular parameters by recent Echo done 18. Ejection fraction 41-45%. Decrease aspirin to 81 mg daily. Will arrange for repeat echo at next visit I35.0 424.1 9. Diastolic dysfunction, grade 1 I51.9 429.9 10. S/P AVR (aortic valve replacement), [place], [place]  Z95.2 V43.3 11. S/P CABG (coronary artery bypass graft), [place], [place] . .  Inferior wall myocardial infarction 2018, at which time Troponin peaked at 71.80. It was felt the patient had completed his infarct. He did not want any further cardiac evaluation at that time. No recent chest pain. Stable. Return in 4 months Z95.1 V45.81   
12     CVA, followed by Dr Jarad Graves

## 2020-03-20 ENCOUNTER — TELEPHONE (OUTPATIENT)
Dept: FAMILY MEDICINE CLINIC | Age: 80
End: 2020-03-20

## 2020-03-20 NOTE — TELEPHONE ENCOUNTER
Please inform the patient that the Cologuard test was negative. However because of his history of colon cancer he should still follow-up with his oncologist.  I will leave it up to their recommendation whether he should still have a colonoscopy.

## 2020-04-08 ENCOUNTER — VIRTUAL VISIT (OUTPATIENT)
Dept: FAMILY MEDICINE CLINIC | Age: 80
End: 2020-04-08

## 2020-04-08 DIAGNOSIS — G47.33 OBSTRUCTIVE SLEEP APNEA SYNDROME: Primary | ICD-10-CM

## 2020-04-08 RX ORDER — NITROGLYCERIN 0.4 MG/1
0.4 TABLET SUBLINGUAL
Qty: 1 BOTTLE | Refills: 2 | Status: SHIPPED | OUTPATIENT
Start: 2020-04-08

## 2020-04-08 NOTE — PROGRESS NOTES
Samy Salgado is a 78 y.o. male evaluated via telephone on 4/8/2020. I was in the office. Patient was at home. Patient's wife was also a part of the phone call. Consent: 
He and/or health care decision maker is aware that that he may receive a bill for this telephone service, depending on his insurance coverage, and has provided verbal consent to proceed: Yes Documentation: I communicated with the patient and/or health care decision maker about follow up. Needs medication refills. Vital signs today were: Blood pressure 129/83. Heart rate 64. Temperature 97.5. Weakness: 
Intermittently having increased left side weakness and decreased mobility over the last few months progressively worse. Sometimes requires 2 person assist with a walker with ambulation. Previously needed assistance of 1 person. He will be seeing his prosthetic specialist tomorrow. Last saw his cardiologist, Dr. David Thompson 1 month ago. Per Dr. Alex Arriaga note, he said patient was stable from a cardiac standpoint. He is living with family. Respiratory: 
Patient's wife reports that during a recent hospitalization patient was told that he had sleep apnea. Has not been fitted for a CPAP. She does report that patient wheezes audibly when leaning forward. He denies any shortness of breath however. History of colon cancer: 
Cologuard was negative. Assessment and plan: 
Discussed options for evaluating weakness including in office or physical therapy eval.  Patient refused. Prefers to stay home during the coronavirus pandemic. If any severe or worsening symptoms he is to call here or seek emergency care immediately. Will refer to neurology nonurgently for sleep study and further evaluation. Patient is to discuss with his oncologist, Dr. Jennyfer Brar, whether colonoscopy is needed in addition to Cologuard because of his history of colon cancer. Refilled nitroglycerin tablets to be used as needed for chest pain. I affirm this is a Patient Initiated Episode with an Established Patient who has not had a related appointment within my department in the past 7 days or scheduled within the next 24 hours. Total Time: minutes: 11-20 minutes Note: not billable if this call serves to triage the patient into an appointment for the relevant concern Marcelo Smith NP

## 2020-04-08 NOTE — PROGRESS NOTES
Haritha Dixon presents today for Chief Complaint Patient presents with  Follow-up  Medication Refill Is someone accompanying this pt? no 
 
Is the patient using any DME equipment during [address]? no 
 
Depression Screening: 
3 most recent PHQ Screens 3/11/2020 PHQ Not Done - Little interest or pleasure in doing things Not at all Feeling down, depressed, irritable, or hopeless Not at all Total Score PHQ 2 0 Learning Assessment: 
Learning Assessment 7/10/2019 PRIMARY LEARNER Patient BARRIERS PRIMARY LEARNER -  
CO-LEARNER CAREGIVER -  
PRIMARY LANGUAGE ENGLISH  
LEARNER PREFERENCE PRIMARY LISTENING  
ANSWERED BY patient RELATIONSHIP SELF Abuse Screening: 
Abuse Screening Questionnaire 11/14/2019 Do you ever feel afraid of your partner? Ivin Drafts Are you in a relationship with someone who physically or mentally threatens you? Ivin Drafts Is it safe for you to go home? Isabel Hudson Fall Risk Fall Risk Assessment, last 12 mths 3/11/2020 Able to walk? Yes Fall in past 12 months? No  
Fall with injury? -  
Number of falls in past 12 months - Fall Risk Score - Health Maintenance reviewed and discussed and ordered per Provider. Health Maintenance Due Topic Date Due  Shingrix Vaccine Age 50> (1 of 2) 09/30/1988  Pneumococcal 65+ years (2 of 2 - PPSV23) 10/07/2016  GLAUCOMA SCREENING Q2Y  09/22/2019  Medicare Yearly Exam  10/03/2019  Lipid Screen  01/30/2020 Yanet Rodriguez Coordination of Care: 1. Have you been to the ER, urgent care clinic since your last visit? Hospitalized since your last visit? no 
 
2. Have you seen or consulted any other health care providers outside of the [address] [place] [address] since your last visit? Include any pap smears or colon screening. no 
 
 
Last  Checked na Last UDS Checked na Last Pain contract signed: na

## 2020-04-21 DIAGNOSIS — F01.50 VASCULAR DEMENTIA WITHOUT BEHAVIORAL DISTURBANCE (HCC): ICD-10-CM

## 2020-04-21 DIAGNOSIS — I71.40 AAA (ABDOMINAL AORTIC ANEURYSM) WITHOUT RUPTURE: ICD-10-CM

## 2020-04-21 DIAGNOSIS — R35.0 BENIGN PROSTATIC HYPERPLASIA WITH URINARY FREQUENCY: ICD-10-CM

## 2020-04-21 DIAGNOSIS — I25.10 CORONARY ARTERY DISEASE INVOLVING NATIVE CORONARY ARTERY OF NATIVE HEART WITHOUT ANGINA PECTORIS: ICD-10-CM

## 2020-04-21 DIAGNOSIS — I51.89 DIASTOLIC DYSFUNCTION: ICD-10-CM

## 2020-04-21 DIAGNOSIS — I73.9 PAD (PERIPHERAL ARTERY DISEASE) (HCC): ICD-10-CM

## 2020-04-21 DIAGNOSIS — I10 ESSENTIAL HYPERTENSION: ICD-10-CM

## 2020-04-21 DIAGNOSIS — N18.30 CKD (CHRONIC KIDNEY DISEASE), STAGE III (HCC): ICD-10-CM

## 2020-04-21 DIAGNOSIS — E78.00 PURE HYPERCHOLESTEROLEMIA: ICD-10-CM

## 2020-04-21 DIAGNOSIS — N40.1 BENIGN PROSTATIC HYPERPLASIA WITH URINARY FREQUENCY: ICD-10-CM

## 2020-04-21 RX ORDER — TAMSULOSIN HYDROCHLORIDE 0.4 MG/1
0.4 CAPSULE ORAL
Qty: 90 CAP | Refills: 4 | Status: SHIPPED | OUTPATIENT
Start: 2020-04-21 | End: 2020-01-01 | Stop reason: DRUGHIGH

## 2020-04-21 NOTE — TELEPHONE ENCOUNTER
Requested Prescriptions     Pending Prescriptions Disp Refills    tamsulosin (Flomax) 0.4 mg capsule 90 Cap 4     Sig: Take 1 Cap by mouth nightly.

## 2020-04-22 ENCOUNTER — VIRTUAL VISIT (OUTPATIENT)
Dept: NEUROLOGY | Age: 80
End: 2020-04-22

## 2020-04-22 VITALS — WEIGHT: 220 LBS | BODY MASS INDEX: 33.34 KG/M2 | HEIGHT: 68 IN

## 2020-04-22 DIAGNOSIS — E66.01 MORBID OBESITY (HCC): ICD-10-CM

## 2020-04-22 DIAGNOSIS — I67.9 CEREBROVASCULAR DISEASE: ICD-10-CM

## 2020-04-22 DIAGNOSIS — G47.8 UPPER AIRWAY RESISTANCE SYNDROME: ICD-10-CM

## 2020-04-22 DIAGNOSIS — F01.50 VASCULAR DEMENTIA WITHOUT BEHAVIORAL DISTURBANCE (HCC): Primary | ICD-10-CM

## 2020-04-22 DIAGNOSIS — I10 ESSENTIAL HYPERTENSION: ICD-10-CM

## 2020-04-22 NOTE — PATIENT INSTRUCTIONS
Thank you for choosing [employer] and [facility] for your  
 
care. You may receive a survey about your visit. We appreciate you taking time  
 
to complete this survey as we use your feedback to improve our services. We  
 
realize we are not perfect, but we strive to provide excellent care.

## 2020-04-22 NOTE — PROGRESS NOTES
HPI: 79 y/o male referred by Josiane Garcia NP for evaluation of question of JUSTIN and dementia. He has a dx of \"peripheral dementia\" according to his wife. A couple of years ago during a hospital stay it was noted that he would stop breathing in his sleep, something that his wife, who provides the history, corroborates is an ongoing thing. He is sleepy all the time, sleeps throughout the day. He goes to bed at 7pm, up at 7am, maybe 1-2 times to go urinate but then goes right back to sleep without problems. He drinks a little bit of coffee, no smoking no ETOH. Has never had a PSG, never dx with JUSTIN in the past.  
 
His wife reports he has a problem with STM and attention. He loses concentration. He is often not sure about night and day. They have been together for over 52 yrs. He used to be a  for [employer] for [employer]. Pt does notice his memory slips, his wife reports the big part started when she had two heart attacks and three mini strokes with the last 3-4 yrs. His walking is very weak, he has a prosthesis of the LLE after an a left BKA d/t blood clots and feels that his left side and particularly the left leg is weaker. I have reviewed records from Dr. Doyle Saul who saw him from 2017 through Feb 2018 and mentioned the aforementioned issues, wanted to check for JUSTIN, but this did not happen. His wife does mention that he had a study 10 or more years ago, but that is not aware of the results and he never tried CPAP. I have personally reviewed an MRI of the brain 2018 showing atrophy and chronic small vessel changes with areas of old infarction right parietal-occipital region, as well as along the superior aspect of the right parietal lobe. An area of old infarct is also noted in the anterior superior aspect of the right frontal lobe. A smaller area of old infarction is identified involving the right upper basal ganglia and adjacent corona radiata white matter. Social History Socioeconomic History  Marital status:  Spouse name: Not on file  Number of children: Not on file  Years of education: Not on file  Highest education level: Not on file Occupational History  Not on file Social Needs  Financial resource strain: Not on file  Food insecurity Worry: Not on file Inability: Not on file  Transportation needs Medical: Not on file Non-medical: Not on file Tobacco Use  Smoking status: Former Smoker Last attempt to quit: 1987 Years since quittin.6  Smokeless tobacco: Never Used Substance and Sexual Activity  Alcohol use: Not Currently  Drug use: No  
 Sexual activity: Not Currently Lifestyle  Physical activity Days per week: Not on file Minutes per session: Not on file  Stress: Not on file Relationships  Social connections Talks on phone: Not on file Gets together: Not on file Attends Buddhist service: Not on file Active member of club or organization: Not on file Attends meetings of clubs or organizations: Not on file Relationship status: Not on file  Intimate partner violence Fear of current or ex partner: Not on file Emotionally abused: Not on file Physically abused: Not on file Forced sexual activity: Not on file Other Topics Concern  Not on file Social History Narrative  Not on file Family History Problem Relation Age of Onset  Hypertension Mother  Hypertension Father  Heart Disease Father  Heart Disease Brother  Lung Cancer Paternal Uncle Current Outpatient Medications Medication Sig Dispense Refill  tamsulosin (Flomax) 0.4 mg capsule Take 1 Cap by mouth nightly. 90 Cap 4  
 nitroglycerin (NITROSTAT) 0.4 mg SL tablet 1 Tab by SubLINGual route every five (5) minutes as needed for Chest Pain.  1 Bottle 2  
  clopidogrel (PLAVIX) 75 mg tab Take 1 Tab by mouth daily. 90 Tab 4  
 atorvastatin (LIPITOR) 20 mg tablet Take 1 Tab by mouth daily. 90 Tab 4  
 aspirin (ASPIRIN) 325 mg tablet Take 1 Tab by mouth daily. 90 Tab 4  carvedilol (COREG) 3.125 mg tablet Take 1 Tab by mouth two (2) times daily (with meals). 180 Tab 4 Past Medical History:  
Diagnosis Date  Advance directive in chart 6/13/2016  Atherosclerosis of both lower extremities with intermittent claudication ([address]  Atherosclerosis of native arteries of extremities with intermittent claudication, bilateral legs ([address]  Atherosclerosis of native artery of both lower extremities with intermittent claudication ([address]  Atherosclerosis of native artery of left lower extremity with intermittent claudication ([address]  CAD (coronary artery disease)  Cancer [facility]) 2003 Colon  CKD (chronic kidney disease), stage III ([address] 3/27/2016  CVA (cerebral vascular accident) ([address] 3/26/2016  Femoral artery aneurysm, left ([address]  Heart attack [facility]) 2008  Heart attack ([address]  Hernia  History of TIAs  Hyperlipidemia  Hypertension 3/27/2016  Iliac artery aneurysm, left ([address]  Psychiatric disorder Vascular Dementia  Pure hypercholesterolemia 9/14/2012  PVD (peripheral vascular disease) ([address] 3/27/2016  Sleep apnea   
 refused CPAP  
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  
Procedure Laterality Date  ABDOMEN SURGERY PROC UNLISTED  2011  
 3 stents placed into abdomen (groin)  CARDIAC SURG PROCEDURE UNLIST  2005 Quadruple Bypass  CARDIAC SURG PROCEDURE UNLIST  2011 Aortic pig valve placed  COLONOSCOPY N/A 6/29/2016 COLONOSCOPY performed by Darion Fofana MD at [facility] ENDOSCOPY  ENDOSCOPY, COLON, DIAGNOSTIC    
 HX AAA REPAIR    
 HX GI    
 HX ORTHOPAEDIC  2008 Right Leg Amputated  HX SMALL BOWEL RESECTION    
 
 
 Allergies Allergen Reactions  Ace Inhibitors Other (comments) Per pt, Cardiologist states he cannot have ACE inhibitors Review of Systems:    
Constitutional: no fever or chills, positive fatigue Skin denies rash or itching HEENT:  Denies tinnitus, hearing loss, or visual changes Respiratory: denies shortness of breath Cardiovascular: denies chest pain, dyspnea on exertion Gastrointestinal: does not report nausea or vomiting Genitourinary: does not report dysuria or incontinence Musculoskeletal: does not report joint pain or swelling Endocrine: denies weight change Hematology: denies easy bruising or bleeding Neurological: as above in HPI PHYSICAL EXAMINATION:   
 
 
Vital signs:   
Visit Vitals Ht 5' 8\" (1.727 m) Wt 99.8 kg (220 lb) BMI 33.45 kg/m² RR- 
 
 
GENERAL:                  Well developed, well nourished, in no apparent distress. HEART:                       NOT VIRTUALLY POSSIBLE EXTREMITIES:            No edema observed, left below the knee amputation evident HEAD:                         Normocephalic, atraumatic. Mallampati 4, dentures present NEUROLOGIC EXAMINATION 
 
  
MENTAL STATUS:     Awake, alert, and oriented slowly x 4. Attention and STM are reduced, speech production is sparse and his wife provides most of the history as he has poor recollection of his medical history. He has 0/3 recall in 5 minutes. He cannot do serial sevens. He has a hard time remembering the president and eventually does. There is no aphasia. Fund of knowledge is reduced. Mood and affect are appropriate , word generation prompt is reduced. CRANIAL NERVES:   Visual fields are full to confrontation . Pupils are equal in size . Extraocular movements are intact and there is no nystagmus. Face is symmetrical.  
Hearing is present. SCM/TPZ 5/5 Tongue protrudes midline, palate elevates symmetrically.  
REST OF CN'S NOT VIRTUALLY POSSIBLE 
 MOTOR:           Mild left upper extremity droop present CEREBELLAR:   No tremors, normal coordination SENSORY:        NOT VIRTUALLY POSSIBLE  
  
            DTR's:                         NOT VIRTUALLY POSSIBLE 
  
            GAIT:                            Left hemiparetic with a left below the knee prostheses IMAGING REVIEW: MRI of the brain from late 2018 was personally reviewed. LAB REVIEW pertinent findings include an LDL of 49.8. TSH was 2.1 2019. Vitamin B12 1158 November 14. Last available hemoglobin A1c July 2018 was 0.4. Impression: Advanced cognitive impairment, with gait apraxia, and radiographic support for presence of cerebrovascular disease in a patient with multiple stroke risk factors that include hypertension, hyperlipidemia, and cardiovascular disease as well as highly probable obstructive sleep apnea, consistent with a moderate vascular dementia. Plan: 1attended overnight sleep study 2-Counseled pt at length about obstructive sleep apnea evaluation, treatment options, weight loss as appropriate, and consequences of untreated JUSTIN. 3-Counseled pt about sleep hygiene, including predictable bedtimes and rise times, avoidance of late meals near bedtime, no TV in bedroom, to get out of room if unable to fall asleep after 10-15 mins, and no napping. 4-counseled him in detail as well as his wife about stroke risk factor modification. Encouraged continuation of blood pressure treatment, statin use, and daily aspirin. 5 discussed use of cognitive enhancers, but given that his dementia is vascular and relatively advanced, avoid them. 6follow-up 2 weeks after sleep study PLEASE NOTE:  
Portions of this document may have been produced using voice recognition software. Unrecognized errors in transcription may be present. This note will not be viewable in [address] Noreen Monroe is a 78 y.o. male who was seen by synchronous (real-time) audio-video technology on 4/22/2020. Consent: 
Pt and/or healthcare decision maker is aware that this patient-initiated Telehealth encounter is a billable service, with coverage as determined by insurance carrier. Pt is aware he/she may receive a bill and has provided verbal consent to proceed: Yes I was in the office while conducting this encounter. Patient is location was at her home. I spent 45 minutes with this established patient, and 25 minutes of the time was spent counseling and/or coordinating care regarding the aforementioned issues as stated above. Pursuant to the emergency declaration under the [facility], 1135 waiver authority and the Virtual [employer] and [employer] Act, this Virtual  Visit was conducted, with patient's consent, to reduce the patient's risk of exposure to COVID-19 and provide continuity of care for an established patient. Services were provided through a video synchronous discussion virtually to substitute for in-person clinic visit.  
 
Madyson Willis MD

## 2020-05-27 NOTE — PROGRESS NOTES
[facility] Progress Note Date: May 27, 2020 Name: Destinee Mtz MRN: [id number] : 1940 Peripheral Lab Draw Mr. Mimi Arango to [facility], ambulatory at 0831 accompanied by self. Pt was assessed and education was provided. Mr. Xavi Gordon vitals were reviewed and patient was observed for 5 minutes prior to treatment. There were no vitals taken for this visit. Recent Results (from the past 12 hour(s)) CBC WITH 3 PART DIFF Collection Time: 20  8:35 AM  
Result Value Ref Range WBC 8.7 4.5 - 13.0 K/uL  
 RBC 4.11 4.10 - 5.10 M/uL  
 HGB 14.1 12.0 - 16.0 g/dL HCT 41.0 36 - 48 % MCV 99.8 78 - 102 FL  
 MCH 34.3 25.0 - 35.0 PG  
 MCHC 34.4 31 - 37 g/dL  
 RDW 14.8 (H) 11.5 - 14.5 % NEUTROPHILS 69 40 - 70 % MIXED CELLS 11 0.1 - 17 % LYMPHOCYTES 20 14 - 44 % ABS. NEUTROPHILS 6.0 1.8 - 9.5 K/UL  
 ABS. MIXED CELLS 0.9 0.0 - 2.3 K/uL  
 ABS. LYMPHOCYTES 1.8 1.1 - 5.9 K/UL  
 DF AUTOMATED PLATELET COUNT Collection Time: 20  8:35 AM  
Result Value Ref Range PLATELET [id number] - 745 K/uL Blood obtained peripherally from left arm x 1 attempt with butterfly needle and sent to lab for Cbc w/diff, Cmp, Iron profile, Ferritin, Copper, Vitamin B12 & Folate per written orders. No bleeding or hematoma noted at site. Gauze and coban applied. Mr. Mimi Arango tolerated the phlebotomy, and had no complaints. Patient armband removed and shredded. Mr. Mimi Arango was discharged from [place] [address] in stable condition at 3429. Monet Dominguez Phlebotomist PCT May 27, 2020 
2:25 PM

## 2020-06-02 NOTE — PROGRESS NOTES
Mark Wolff is a 78 y.o. male who was seen by synchronous (real-time) audio-video technology on 6/3/2020. Consent: Mark Wolff, who was seen by synchronous (real-time) audio-video technology, and/or his healthcare decision maker, is aware that this patient-initiated, Telehealth encounter on 6/3/2020 is a billable service, with coverage as determined by his insurance carrier. He is aware that he may receive a bill and has provided verbal consent to proceed: Yes. Assessment & Plan:  
Diagnoses and all orders for this visit: 
 
1. History of colon cancer 
-     CBC WITH AUTOMATED DIFF; Future -     FERRITIN; Future 
-     IRON PROFILE; Future -     METABOLIC PANEL, COMPREHENSIVE; Future -     VITAMIN B12 & FOLATE; Future 2. Macrocytosis without anemia 
-     CBC WITH AUTOMATED DIFF; Future -     FERRITIN; Future 
-     IRON PROFILE; Future -     METABOLIC PANEL, COMPREHENSIVE; Future -     VITAMIN B12 & FOLATE; Future The complexity of medical decision making for this visit is moderate 1. History of colon cancer 
colon cancer diagnosed in 2004 at [facility] in [place] s/p adjuvant chemotherapy x 6 months. Last cscope was in 2016. Cologuard in January 2020 was negative  
  
2. Macrocytosis Labs on 10/7/2019 showed ferritin 108, WBC 9.7, H&H 12.3/37.1, platelet 927.  .2, BUN 36, creatinine 1.71.   
Labs on 11/14/2019 showed vitamin B12 level 1150, folate greater than 20, ferritin 136, transferrin saturation 34%, WBC 7.2, H&H 13.7/40.6, platelet 080. . Labs on 5/27/2020 showed WBC 8.7, H&H 14.5/41, platelet 225, adequate B12 and folate, normal serum copper, MCV 99, ferritin 169, transferrin saturation 34%, BUN 34, creatinine 2.03. 
Mr. Johnson [place] macrocytic anemia has resolved. Could be secondary to MDS versus chemotherapy induced. He has no known liver disease.  He has no heavy alcohol intake. *Planned  for IR  bone marrow biopsy, however patient says that he does not want BMbx and would rather wait till his get bad. Continue surveillance. 
  
RTC 6 months 
  
CC: Joe [place] I spent at least 25 minutes on this visit with this established patient. 36 Subjective:  
Kristian Doshi is a 78 y.o. 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 
  
Family history significant for paternal who had lung cancer (was heavy smoker) 
  Today he denies any fevers, chills, shortness of breath, nausea vomiting or abdominal pain. Under complaint today is increased urinary frequency. No burning when urinating. Also c/o lower extremities weakness. All other points of review of system have been reviewed and were negative. ECOG performance status 1. Independent with ADLs and IADLs.   
DX: history of colon cancer 
 
  
ONCOLOGY HISTORY:  
2004: colon cancer diagnosed in 2004 at [facility] s/p adjuvant chemotherapy x 6 months. 
  
6/29/2016: SIGMOID COLON, BIOPSY:  TUBULAR ADENOMATA 
  
 
Prior to Admission medications Medication Sig Start Date End Date Taking? Authorizing Provider  
tamsulosin (Flomax) 0.4 mg capsule Take 1 Cap by mouth nightly. 4/21/20   Lizbet Camargo NP  
nitroglycerin (NITROSTAT) 0.4 mg SL tablet 1 Tab by SubLINGual route every five (5) minutes as needed for Chest Pain. 4/8/20   Lizbet Camargo NP  
clopidogrel (PLAVIX) 75 mg tab Take 1 Tab by mouth daily. 12/27/19   Marlee Carolina MD  
atorvastatin (LIPITOR) 20 mg tablet Take 1 Tab by mouth daily.  4/4/19   Patti Hull., DO  
 aspirin (ASPIRIN) 325 mg tablet Take 1 Tab by mouth daily. 1/8/19   Nick Blaze., DO  
carvedilol (COREG) 3.125 mg tablet Take 1 Tab by mouth two (2) times daily (with meals). 1/8/19   Nick Blaze., DO Allergies Allergen Reactions  Ace Inhibitors Other (comments) Per pt, Cardiologist states he cannot have ACE inhibitors Patient Active Problem List  
Diagnosis Code  Pure hypercholesterolemia E78.00  Hypertension I10  
 CKD (chronic kidney disease), stage III ([facility]) N18.3  
 S/P AVR Z95.2  Advance directive in chart Z78.9  AAA (abdominal aortic aneurysm) without rupture ([facility]) I71.4  Vascular dementia without behavioral disturbance ([facility]) F01.50  Atherosclerosis of native artery of left lower extremity with intermittent claudication ([facility]) I49.378  CAD (coronary artery disease) I25.10  
 S/P CABG x 4 Z95.1  History of CVA (cerebrovascular accident) Z80.78  
 History of MI (myocardial infarction) I25.2  Aneurysm of left popliteal artery ([facility]) I72.4  
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  
 PAD (peripheral artery disease) ([facility]) I73.9  Atherosclerosis of native artery of both lower extremities with intermittent claudication ([facility]) I70.213  
 History of colonic polyps Z86.010  
 Macrocytic anemia D53.9 Patient Active Problem List  
 Diagnosis Date Noted  History of colonic polyps 11/14/2019  Macrocytic anemia 11/14/2019  Popliteal artery aneurysm ([facility] 08/16/2019  PAD (peripheral artery disease) ([address] 08/16/2019  Atherosclerosis of native artery of both lower extremities with intermittent claudication ([address] 08/16/2019  Aneurysm of left popliteal artery ([facility] 07/10/2019  
 History of CVA (cerebrovascular accident) 12/01/2018  History of MI (myocardial infarction) 12/01/2018  S/P CABG x 4 11/25/2018  CAD (coronary artery disease) 11/24/2018  Atherosclerosis of native artery of left lower extremity with intermittent claudication ([address] 07/09/2018  Vascular dementia without behavioral disturbance ([address] 03/08/2017  AAA (abdominal aortic aneurysm) without rupture ([address] 01/04/2017  Advance directive in chart 06/13/2016  S/P AVR 04/13/2016  Hypertension 03/27/2016  CKD (chronic kidney disease), stage III ([address] 03/27/2016  Pure hypercholesterolemia 09/14/2012 Current Outpatient Medications Medication Sig Dispense Refill  tamsulosin (Flomax) 0.4 mg capsule Take 1 Cap by mouth nightly. 90 Cap 4  
 nitroglycerin (NITROSTAT) 0.4 mg SL tablet 1 Tab by SubLINGual route every five (5) minutes as needed for Chest Pain. 1 Bottle 2  clopidogrel (PLAVIX) 75 mg tab Take 1 Tab by mouth daily. 90 Tab 4  
 atorvastatin (LIPITOR) 20 mg tablet Take 1 Tab by mouth daily. 90 Tab 4  
 aspirin (ASPIRIN) 325 mg tablet Take 1 Tab by mouth daily. 90 Tab 4  carvedilol (COREG) 3.125 mg tablet Take 1 Tab by mouth two (2) times daily (with meals). 180 Tab 4 Allergies Allergen Reactions  Ace Inhibitors Other (comments) Per pt, Cardiologist states he cannot have ACE inhibitors Past Medical History:  
Diagnosis Date  Advance directive in chart 6/13/2016  Atherosclerosis of both lower extremities with intermittent claudication ([address]  Atherosclerosis of native arteries of extremities with intermittent claudication, bilateral legs ([address]  Atherosclerosis of native artery of both lower extremities with intermittent claudication ([address]  Atherosclerosis of native artery of left lower extremity with intermittent claudication ([address]  CAD (coronary artery disease)  Cancer [facility]) 2003 Colon  CKD (chronic kidney disease), stage III ([address] 3/27/2016  CVA (cerebral vascular accident) ([address] 3/26/2016  Femoral artery aneurysm, left ([address]  Heart attack [facility]) 2008  Heart attack ([address]  Hernia  History of TIAs  Hyperlipidemia  Hypertension 3/27/2016  Iliac artery aneurysm, left ([facility]  Psychiatric disorder Vascular Dementia  Pure hypercholesterolemia 2012  PVD (peripheral vascular disease) ([facility] 3/27/2016  Sleep apnea   
 refused CPAP  
 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 ([facility] 3/26/2016 Past Surgical History:  
Procedure Laterality Date  ABDOMEN SURGERY PROC UNLISTED    
 3 stents placed into abdomen (groin)  CARDIAC SURG PROCEDURE UNLIST   Quadruple Bypass  CARDIAC SURG PROCEDURE UNLIST   Aortic pig valve placed  COLONOSCOPY N/A 2016 COL[facility]OSCOPY performed by Denia Driver MD at [facility] ENDOSCOPY  ENDOSCOPY, COLON, DIAGNOSTIC    
 HX AAA REPAIR    
 HX GI    
 HX ORTHOPAEDIC   Right Leg Amputated  HX SMALL BOWEL RESECTION Family History Problem Relation Age of Onset  Hypertension Mother  Hypertension Father  Heart Disease Father  Heart Disease Brother  Lung Cancer Paternal Uncle Social History Tobacco Use  Smoking status: Former Smoker Last attempt to quit: 1987 Years since quittin.7  Smokeless tobacco: Never Used Substance Use Topics  Alcohol use: Not Currently ROS Objective: There were no vitals taken for this visit. General: alert, cooperative, no distress Mental  status: normal mood, behavior, speech, dress, motor activity, and thought processes, able to follow commands HENT: NCAT Neck: no visualized mass Resp: no respiratory distress Neuro: no gross deficits Skin: no discoloration or lesions of concern on visible areas Psychiatric: normal affect, consistent with stated mood, no evidence of hallucinations Additional exam findings: We discussed the expected course, resolution and complications of the diagnosis(es) in detail. Medication risks, benefits, costs, interactions, and alternatives were discussed as indicated.   I advised him to contact the office if his condition worsens, changes or fails to improve as anticipated. He expressed understanding with the diagnosis(es) and plan. Mango Parks is a 78 y.o. male who was evaluated by a video visit encounter for concerns as above. Patient identification was verified prior to start of the visit. A caregiver was present when appropriate. Due to this being a TeleHealth encounter (During [facility] public [facility] emergency), evaluation of the following organ systems was limited: Vitals/Constitutional/EENT/Resp/CV/GI//MS/Neuro/Skin/Heme-Lymph-Imm. Pursuant to the emergency declaration under the [facility] waiver authority and the [employer] Act, this Virtual  Visit was conducted, with patient's (and/or legal guardian's) consent, to reduce the patient's risk of exposure to COVID-19 and provide necessary medical care. Services were provided through a video synchronous discussion virtually to substitute for in-person clinic visit. Patient and provider were located at their individual homes.  
 
 
Casa Talamantes MD

## 2020-06-03 NOTE — PROGRESS NOTES
Dequan Thomas is a 78 y.o. male presenting today for a follow-up appointment via Virtual Visit. Verified 2 patient identifiers. Patient reports urinary frequency. Chief Complaint Patient presents with  Anemia Current Outpatient Medications Medication Sig  clopidogrel (PLAVIX) 75 mg tab Take 1 Tab by mouth daily.  atorvastatin (LIPITOR) 20 mg tablet Take 1 Tab by mouth daily.  aspirin (ASPIRIN) 325 mg tablet Take 1 Tab by mouth daily.  carvedilol (COREG) 3.125 mg tablet Take 1 Tab by mouth two (2) times daily (with meals).  tamsulosin (Flomax) 0.4 mg capsule Take 1 Cap by mouth nightly.  nitroglycerin (NITROSTAT) 0.4 mg SL tablet 1 Tab by SubLINGual route every five (5) minutes as needed for Chest Pain. No current facility-administered medications for this visit. Fall Risk Assessment, last 12 mths 3/11/2020 Able to walk? Yes Fall in past 12 months? No  
Fall with injury? -  
Number of falls in past 12 months - Fall Risk Score -  
 
 
3 most recent PHQ Screens 3/11/2020 PHQ Not Done - Little interest or pleasure in doing things Not at all Feeling down, depressed, irritable, or hopeless Not at all Total Score PHQ 2 0 Abuse Screening Questionnaire 11/14/2019 Do you ever feel afraid of your partner? Fredderick Cleverly Are you in a relationship with someone who physically or mentally threatens you? Fredderick Cleverly Is it safe for you to go home? Y  
 
 
1. Have you been to the ER, urgent care clinic since your last visit? Hospitalized since your last visit? No 
 
2. Have you seen or consulted any other health care providers outside of the [address] [place] [address] since your last visit? Include any pap smears or colon screening. Yes \"Doctor at the [facility] for my prosthesis, about 2 months ago\"

## 2020-08-17 NOTE — PROGRESS NOTES
Nathalie Reyes is a 78 y.o. male who was seen by synchronous (real-time) audio-video technology on 8/17/2020 for No chief complaint on file. Assessment & Plan:  
Diagnoses and all orders for this visit: 1. Peripheral arterial disease ([address] -     CTA ABD ART W RUNOFF W WO CONT; Future 2. Abdominal aortic aneurysm (AAA) without rupture ([address] -     CTA ABD ART W RUNOFF W WO CONT; Future 3. Popliteal aneurysm ([address] -     CTA ABD ART W RUNOFF W WO CONT; Future I told Mr. Elena Torres sure that his arterial duplex shows flow throughout his popliteal artery stent with biphasic waveforms throughout. There may be a more proximal stenosis in his left leg arterial circulation. I also let him know that on his duplex there was a finding of a fluid collection in the left groin that is most likely consistent with a seroma. I think this suggests a possible more proximal stenosis, this new finding of seroma, his history of a AAA repair that has not been investigated recently, and his new weakness in his left leg warrant a further evaluation. I think if we obtain a CTA of the aorta with runoff will be able to evaluate all these issues will be more closely at least state whether or not his weakness may be due to her circulation issues. We will obtain a CTA and I will see him back in my office in [place] in 2 to 3 weeks for follow-up and in person exam. 
 
I spent at least 10 minutes on this visit with this established patient. [address] Subjective:  
Mr. Elena Torres is seen today in a virtual visit to continue to monitor his peripheral arterial disease and his repaired aortic aneurysm. He states that he has had some recent left groin and leg pain that was associated with walking and occasional numbness that he wanted to be evaluated for. He states that the pain has improved somewhat over the past few days but he still gets those every time he walks.   He denies any ulcerations or wounds. His wife also states he has had some falls with the past few months and they state they have discussed this with his primary care doctor. He has not noticed any swelling or bulging in his groin area and denies any fevers or chills. Prior to Admission medications Medication Sig Start Date End Date Taking? Authorizing Provider  
tamsulosin (Flomax) 0.4 mg capsule Take 1 Cap by mouth nightly. 4/21/20   Cas Davis NP  
nitroglycerin (NITROSTAT) 0.4 mg SL tablet 1 Tab by SubLINGual route every five (5) minutes as needed for Chest Pain. 4/8/20   Cas Davis NP  
clopidogrel (PLAVIX) 75 mg tab Take 1 Tab by mouth daily. 12/27/19   Angela Hahn MD  
atorvastatin (LIPITOR) 20 mg tablet Take 1 Tab by mouth daily. 4/4/19   Velna Molt., DO  
aspirin (ASPIRIN) 325 mg tablet Take 1 Tab by mouth daily. 1/8/19   Velna Molt., DO  
carvedilol (COREG) 3.125 mg tablet Take 1 Tab by mouth two (2) times daily (with meals). 1/8/19   Velna Molt., DO Patient Active Problem List  
Diagnosis Code  Pure hypercholesterolemia E78.00  Hypertension I10  
 CKD (chronic kidney disease), stage III ([facility]) N18.3  
 S/P AVR Z95.2  Advance directive in chart Z78.9  AAA (abdominal aortic aneurysm) without rupture ([facility]) I71.4  Vascular dementia without behavioral disturbance ([facility]) F01.50  Atherosclerosis of native artery of left lower extremity with intermittent claudication ([facility]) H32.801  CAD (coronary artery disease) I25.10  
 S/P CABG x 4 Z95.1  History of CVA (cerebrovascular accident) Z80.78  
 History of MI (myocardial infarction) I25.2  Aneurysm of left popliteal artery ([facility]) I72.4  
 Popliteal artery aneurysm ([facility]) I72.4  
 PAD (peripheral artery disease) ([facility]) I73.9  Atherosclerosis of native artery of both lower extremities with intermittent claudication ([facility]) I70.213  
 History of colonic polyps Z86.010  
 Macrocytic anemia D53.9 Patient Active Problem List  
 Diagnosis Date Noted  History of colonic polyps 11/14/2019  Macrocytic anemia 11/14/2019  Popliteal artery aneurysm ([facility] 08/16/2019  PAD (peripheral artery disease) ([facility] 08/16/2019  Atherosclerosis of native artery of both lower extremities with intermittent claudication ([facility] 08/16/2019  Aneurysm of left popliteal artery ([facility] 07/10/2019  
 History of CVA (cerebrovascular accident) 12/01/2018  History of MI (myocardial infarction) 12/01/2018  S/P CABG x 4 11/25/2018  CAD (coronary artery disease) 11/24/2018  Atherosclerosis of native artery of left lower extremity with intermittent claudication ([facility] 07/09/2018  Vascular dementia without behavioral disturbance ([facility] 03/08/2017  AAA (abdominal aortic aneurysm) without rupture ([facility] 01/04/2017  Advance directive in chart 06/13/2016  S/P AVR 04/13/2016  Hypertension 03/27/2016  CKD (chronic kidney disease), stage III ([facility] 03/27/2016  Pure hypercholesterolemia 09/14/2012 Current Outpatient Medications Medication Sig Dispense Refill  tamsulosin (Flomax) 0.4 mg capsule Take 1 Cap by mouth nightly. 90 Cap 4  
 nitroglycerin (NITROSTAT) 0.4 mg SL tablet 1 Tab by SubLINGual route every five (5) minutes as needed for Chest Pain. 1 Bottle 2  clopidogrel (PLAVIX) 75 mg tab Take 1 Tab by mouth daily. 90 Tab 4  
 atorvastatin (LIPITOR) 20 mg tablet Take 1 Tab by mouth daily. 90 Tab 4  
 aspirin (ASPIRIN) 325 mg tablet Take 1 Tab by mouth daily. 90 Tab 4  carvedilol (COREG) 3.125 mg tablet Take 1 Tab by mouth two (2) times daily (with meals). 180 Tab 4 Allergies Allergen Reactions  Ace Inhibitors Other (comments) Per pt, Cardiologist states he cannot have ACE inhibitors Past Medical History:  
Diagnosis Date  Advance directive in chart 6/13/2016  Atherosclerosis of both lower extremities with intermittent claudication ([facility]  Atherosclerosis of native arteries of extremities with intermittent claudication, bilateral legs ([address]  Atherosclerosis of native artery of both lower extremities with intermittent claudication ([address]  Atherosclerosis of native artery of left lower extremity with intermittent claudication ([address]  CAD (coronary artery disease)  Cancer [facility])  Colon  CKD (chronic kidney disease), stage III ([address] 3/27/2016  CVA (cerebral vascular accident) ([address] 3/26/2016  Femoral artery aneurysm, left ([address]  Heart attack [facility])   Heart attack ([address]  Hernia  History of TIAs  Hyperlipidemia  Hypertension 3/27/2016  Iliac artery aneurysm, left ([address]  Psychiatric disorder Vascular Dementia  Pure hypercholesterolemia 2012  PVD (peripheral vascular disease) ([address] 3/27/2016  Sleep apnea   
 refused CPAP  
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  
Procedure Laterality Date  ABDOMEN SURGERY PROC UNLISTED    
 3 stents placed into abdomen (groin)  CARDIAC SURG PROCEDURE UNLIST   Quadruple Bypass  CARDIAC SURG PROCEDURE UNLIST   Aortic pig valve placed  COLONOSCOPY N/A 2016 [facility] Flower Winston [facility] ENDOSCOPY  ENDOSCOPY, COLON, DIAGNOSTIC    
 HX AAA REPAIR    
 HX GI    
 HX ORTHOPAEDIC   Right Leg Amputated  HX SMALL BOWEL RESECTION Family History Problem Relation Age of Onset  Hypertension Mother  Hypertension Father  Heart Disease Father  Heart Disease Brother  Lung Cancer Paternal Uncle Social History Tobacco Use  Smoking status: Former Smoker Last attempt to quit: 1987 Years since quittin.9  Smokeless tobacco: Never Used Substance Use Topics  Alcohol use: Not Currently Review of Systems Neurological: Positive for weakness. Objective: No flowsheet data found. General: alert, cooperative, no distress Mental  status: normal mood, behavior, speech, dress, motor activity, and thought processes, able to follow commands HENT: NCAT Neck: no visualized mass Resp: no respiratory distress Neuro: no gross deficits Skin: no discoloration or lesions of concern on visible areas Psychiatric: normal affect, consistent with stated mood, no evidence of hallucinations Additional exam findings: We discussed the expected course, resolution and complications of the diagnosis(es) in detail. Medication risks, benefits, costs, interactions, and alternatives were discussed as indicated. I advised him to contact the office if his condition worsens, changes or fails to improve as anticipated. He expressed understanding with the diagnosis(es) and plan. [id number] [address]OR[address] Saran, who was evaluated through a patient-initiated, synchronous (real-time) audio-video encounter, and/or his healthcare decision maker, is aware that it is a billable service, with coverage as determined by his insurance carrier. He provided verbal consent to proceed: Yes, and patient identification was verified. It was conducted pursuant to the emergency declaration under the [facility], [address] [place] [address] authority and the [employer] Act. A caregiver was present when appropriate. Ability to conduct physical exam was limited. I was in the office. The patient was at home.  
 
 
Lamonte Hammond MD

## 2020-08-19 NOTE — PROGRESS NOTES
Jose Lugo presents today for Chief Complaint Patient presents with  Follow-up Kristian Carvajal preferred language for health care discussion is english/other. Is someone accompanying this pt? Wife assisting Is the patient using any DME equipment during OV? [facility] Depression Screening: 
3 most recent PHQ Screens 8/19/2020 PHQ Not Done - Little interest or pleasure in doing things Not at all Feeling down, depressed, irritable, or hopeless Not at all Total Score PHQ 2 0 Learning Assessment: 
Learning Assessment 7/10/2019 PRIMARY LEARNER Patient BARRIERS PRIMARY LEARNER -  
CO-LEARNER CAREGIVER -  
PRIMARY LANGUAGE ENGLISH  
LEARNER PREFERENCE PRIMARY LISTENING  
ANSWERED BY patient RELATIONSHIP SELF Abuse Screening: 
Abuse Screening Questionnaire 11/14/2019 Do you ever feel afraid of your partner? Lea Beard Are you in a relationship with someone who physically or mentally threatens you? Lea Beard Is it safe for you to go home? Jordy Gutierrez Fall Risk Fall Risk Assessment, last 12 mths 8/19/2020 Able to walk? Yes Fall in past 12 months? No  
Fall with injury? -  
Number of falls in past 12 months - Fall Risk Score - Pt currently taking Anticoagulant therapy? no 
 
Coordination of Care: 1. Have you been to the ER, urgent care clinic since your last visit? Hospitalized since your last visit? no 
 
2. Have you seen or consulted any other health care providers outside of the [address] [place] [address] since your last visit? Include any pap smears or colon screening.  no

## 2020-08-28 NOTE — TELEPHONE ENCOUNTER
Yosef from [address] [place] [address] and pt is there for CTA with runoff and cret is 2.0 and cut off is 1.8 , advised to send pt home and we would call him to reschedule after discussing with Dr. Candance Leghorn. Message sent to Dr. Candance Leghorn.

## 2020-08-28 NOTE — TELEPHONE ENCOUNTER
Called wife and advised that we have sent message to Dr. Remington Marcus and are waiting to hear back.

## 2020-08-28 NOTE — TELEPHONE ENCOUNTER
Patient called and states that the CTA wasn't able to be done because of his creatinine. Please call the patient wife back.

## 2020-08-30 NOTE — PROGRESS NOTES
Agapito Longoria is a 78 y.o. male who was seen by synchronous (real-time) audio-video technology on 8/19/2020 for Follow-up Assessment & Plan: 1. AAA (abdominal aortic aneurysm) without rupture ([facility] [address] S/P aorta bi iliac endograft I71.4 441. 4    
2. PAD (peripheral artery disease) ([facility] [address] S/P R AKA, S/P L SFA angioplasty. He did well with  left popliteal aneurysm repair. I73.9 443. 9    
3. Femoral artery aneurysm, left (HCC) I72.4 442. 3    
4. Pure hypercholesterolemia E78.00 272.0    
5. CKD (chronic kidney disease), stage III,  N18.3 585. 3    
6. Controlled type 2 diabetes mellitus with diabetic nephropathy, without long-term current use of insulin (HCC) E11.21 250.40    
    583.81    
7. Essential hypertension,  controlled in the virtual visit today. I10 401. 9    
8. Aortic stenosis, moderate, reasonable aortic valvular parameters by recent Echo done 11/26/18. Ejection fraction 41-45%. Decrease aspirin to 81 mg daily. Will arrange for repeat echo at next visit I35.0 424. 1    
9. Diastolic dysfunction, grade 1 I51.9 429.9    
10. S/P AVR (aortic valve replacement), [place], [place] 2011 Z95.2 V43. 3    
11. S/P CABG (coronary artery bypass graft), [facility] 2005. .  Inferior wall myocardial infarction 11/2018, at which time Troponin peaked at 71.80. It was felt the patient had completed his infarct. He did not want any further cardiac evaluation at that time. Stable symptoms . Return in 10 weeks Z95.1 V45.81    
12     CVA, followed by Dr Lul Mukherjee Subjective:  
  Ragini Xiao is in the office today for cardiac reevaluation.    
He is a 78year-old man that had revascularization of his left lower extremity in October of 2016, aorto bi iliac endografting, prior angiplasty of the left SFA, and AKA of the RLE. [place] He has also had DVT of the left posterior tibial vein in 04/2017.   He also had left popliteal aneurysm repair. 
  
 The patient has a history of known coronary artery disease and prior coronary bypass grafting done in 2005. He also had an aortic valve replacement in 2011. The most recent echocardiogram was done in November 2018 which demonstrated continued adequate function of the aortic prosthesis.  
  
The patient had a neurological event in December of 2016. He has been followed by Dr. Shantelle Wiley in that regard. The patient had a MRI of brain with contrast on 12/30/2016. There was an old area of infarction involving the right frontal lobe extending into the corona radiata white matter. There was also an area of old infarction in the right parietal region. There was also an abnormal signal in the right sub-insular region, which was felt to possibly represent an area of old hemorrhagic infarct. There was no acute abnormality at that time.   
  
The patient had a myocardial infarction in November 2018. He was treated medically as he declined on further evaluation. The patient wanted to be discharged today following his admission. In the  virtual visit today. He reports he is doing \"good\". His wife who was in on the virtual visit reports otherwise. He has had no chest pain. She says he is very weak. He  does report weak legs. He has trouble with putting on his shoes and socks. He has had some lack of bladder control as well. He has fallen without injury. He follows with Dr. Tez Jeronimo of the vascular service. Reports having prior stenting in the lower extremity vasculature. There are no records. Prior to Admission medications Medication Sig Start Date End Date Taking? Authorizing Provider  
aspirin delayed-release 81 mg tablet Take 81 mg by mouth daily. Yes Provider, Historical  
multivitamin (ONE A DAY) tablet Take 1 Tab by mouth daily. Yes Provider, Historical  
cyanocobalamin (VITAMIN B12) 500 mcg tablet Take 500 mcg by mouth daily.    Yes Provider, Historical  
 tamsulosin (Flomax) 0.4 mg capsule Take 1 Cap by mouth nightly. 4/21/20  Yes Heidi Renee NP  
nitroglycerin (NITROSTAT) 0.4 mg SL tablet 1 Tab by SubLINGual route every five (5) minutes as needed for Chest Pain. 4/8/20  Yes Heidi Renee NP  
clopidogrel (PLAVIX) 75 mg tab Take 1 Tab by mouth daily. 12/27/19  Yes Shanika Figueroa MD  
atorvastatin (LIPITOR) 20 mg tablet Take 1 Tab by mouth daily. 4/4/19  Yes Imelda Gimenez., DO  
carvedilol (COREG) 3.125 mg tablet Take 1 Tab by mouth two (2) times daily (with meals). Patient taking differently: Take 1.5625 mg by mouth two (2) times daily (with meals). Indications: ventricular rate control in atrial fibrillation 1/8/19  Yes Imelda Gimenez., DO  
acetaminophen (TYLENOL) 500 mg tablet Take 500 mg by mouth daily. 2 tablets    Provider, Historical  
MULTIVITAMINS W/C PO Take 1 Tab by mouth daily. sentry    Provider, Historical  
pumpkin seed extract-soy germ (Azo Bladder ControL) 300 mg cap Take 2 Tabs by mouth daily. Provider, Historical  
 
 
 
Review of Systems Constitutional: Negative. HENT: Positive for hearing loss. Eyes: Negative. Respiratory: Negative. Cardiovascular: Negative. Gastrointestinal: Negative. Genitourinary: Positive for urgency. Skin: Negative. Neurological: Positive for weakness. Objective:  
 
Patient-Reported Vitals 8/18/2020 Patient-Reported Weight (No Data) Patient-Reported Height 5f8 Patient-Reported Pulse 76 Patient-Reported Temperature 97.3 Patient-Reported SpO2 (No Data) Patient-Reported Systolic  434 Patient-Reported Diastolic 66 Patient-Reported Peak Flow (No Data) Constitutional: [x] Appears well-developed and well-nourished [x] No apparent distress   
  [] Abnormal - Mental status: [x] Alert and awake  [x] Oriented to person/place/time [x] Able to follow commands   
[] Abnormal - Eyes:   EOM    [x]  Normal    [] Abnormal -  
 Sclera  [x]  Normal    [] Abnormal - 
        Discharge [x]  None visible   [] Abnormal - HENT: [x] Normocephalic, atraumatic  [] Abnormal -  
[x] Mouth/Throat: Mucous membranes are moist 
 
External Ears [x] Normal  [] Abnormal - Neck: [x] No visualized mass [] Abnormal - Pulmonary/Chest: [x] Respiratory effort normal   [x] No visualized signs of difficulty breathing or respiratory distress 
      [] Abnormal - Musculoskeletal:   [x] Normal gait with no signs of ataxia [x] Normal range of motion of neck [] Abnormal -  
 
Neurological:        [x] No Facial Asymmetry (Cranial nerve 7 motor function) (limited exam due to video visit) [x] No gaze palsy  
     [] Abnormal -   
     
Skin:        [x] No significant exanthematous lesions or discoloration noted on facial skin   
     [] Abnormal - Psychiatric:       [x] Normal Affect [] Abnormal -  
     [x] No Hallucinations Other pertinent observable physical exam findings:- 
 
 
 
We discussed the expected course, resolution and complications of the diagnosis(es) in detail. Medication risks, benefits, costs, interactions, and alternatives were discussed as indicated. I advised him to contact the office if his condition worsens, changes or fails to improve as anticipated. He expressed understanding with the diagnosis(es) and plan. [id number] [place], who was evaluated through a patient-initiated, synchronous (real-time) audio-video encounter, and/or his healthcare decision maker, is aware that it is a billable service, with coverage as determined by his insurance carrier. He provided verbal consent to proceed: Yes, and patient identification was verified. It was conducted pursuant to the emergency declaration under the [facility], [address] [place] [address] authority and the [employer] and [employer] General Act.  A caregiver was present when appropriate. Ability to conduct physical exam was limited. I was in the office. The patient was at home.  
 
 
Alan Reynolds MD

## 2020-08-31 NOTE — TELEPHONE ENCOUNTER
Called and left message for wife to call back Dr. Rubio Haider has changed the study to an [address] and needed to get it scheduled. Wife called back and got US scheduled with office. Pt will follow up .

## 2020-09-15 NOTE — TELEPHONE ENCOUNTER
Patient's CTA was cancelled due to creatinine but needs duplex aorta iliac graft instead to be completed prior to follow up appointment to follow up on AAA repair.

## 2020-09-15 NOTE — PROGRESS NOTES
Order switched from ancillary to clinic for Aorta Iliac Duplex per verbal order Dr. Turner Robertson.

## 2020-09-15 NOTE — TELEPHONE ENCOUNTER
Dolores Powersmyesha, wife called to schedule an appointment with Dr. Cheri Mosher at the [place] office. She stated that studies were completed and after reviewing the orders, dont see any results. I tried to call back patients wife to confirm what date and left message. Will have the nurse of Dr. Cheri Mosher review. She wants to scheduled her husbands appointment at the [place] office.

## 2020-10-12 NOTE — PROGRESS NOTES
Eva Barrientos presents today for Chief Complaint Patient presents with  Follow-up Kristianlinda Carvajal preferred language for health care discussion is english/other. Is someone accompanying this pt? Y, spouse Is the patient using any DME equipment during OV? no 
 
Depression Screening: 
3 most recent PHQ Screens 8/19/2020 PHQ Not Done - Little interest or pleasure in doing things Not at all Feeling down, depressed, irritable, or hopeless Not at all Total Score PHQ 2 0 Learning Assessment: 
Learning Assessment 7/10/2019 PRIMARY LEARNER Patient BARRIERS PRIMARY LEARNER -  
CO-LEARNER CAREGIVER -  
PRIMARY LANGUAGE ENGLISH  
LEARNER PREFERENCE PRIMARY LISTENING  
ANSWERED BY patient RELATIONSHIP SELF Abuse Screening: 
Abuse Screening Questionnaire 11/14/2019 Do you ever feel afraid of your partner? Nonah Shells Are you in a relationship with someone who physically or mentally threatens you? Nonah Shells Is it safe for you to go home? Hosea [place] Fall Risk Fall Risk Assessment, last 12 mths 8/19/2020 Able to walk? Yes Fall in past 12 months? No  
Fall with injury? -  
Number of falls in past 12 months - Fall Risk Score - Pt currently taking Anticoagulant therapy? no 
 
Coordination of Care: 1. Have you been to the ER, urgent care clinic since your last visit? Hospitalized since your last visit? n 
 
2. Have you seen or consulted any other health care providers outside of the [address] [place] [address] since your last visit? Include any pap smears or colon screening.  n

## 2020-10-18 NOTE — PROGRESS NOTES
Joao Fuller is a 78 y.o. male who was seen by synchronous (real-time) audio-video technology on 10/12/2020 for Follow-up Assessment & Plan: 1. AAA (abdominal aortic aneurysm) without rupture ([facility] [address] S/P aorta bi iliac endograft I71.4 441. 4    
2. PAD (peripheral artery disease) ([facility] [address] S/P R AKA, S/P L SFA angioplasty. He did well with  left popliteal aneurysm repair. I73.9 443. 9    
3. Femoral artery aneurysm, left (HCC) I72.4 442. 3    
4. Pure hypercholesterolemia E78.00 272.0    
5. CKD (chronic kidney disease), stage III,  N18.3 585. 3    
6. Controlled type 2 diabetes mellitus with diabetic nephropathy, without long-term current use of insulin (HCC) E11.21 250.40    
    583.81    
7. Essential hypertension,   123/78 mmHg in the virtual visit today. I10 401. 9    
8. Aortic stenosis, moderate, reasonable aortic valvular parameters by recent Echo done 11/26/18. Ejection fraction 41-45%. Decrease aspirin to 81 mg daily. Will arrange for repeat echo at next visit I35.0 424. 1    
9. Diastolic dysfunction, grade 1 I51.9 429.9    
10. S/P AVR (aortic valve replacement), [place], [place] 2011 Z95.2 V43. 3    
11. S/P CABG (coronary artery bypass graft), [facility] 2005. .  Inferior wall myocardial infarction 11/2018, at which time Troponin peaked at 71.80. It was felt the patient had completed his infarct. He did not want any further cardiac evaluation at that time. Stable symptoms . Return in 3 months for virtual visit Z95.1 V45.81    
12     CVA, followed by Dr Leticia Uriarte Subjective:  
  John Jenkins is in the office today for cardiac reevaluation.    
He is a 78year-old man that had revascularization of his left lower extremity in October of 2016, aorto bi iliac endografting, prior angiplasty of the left SFA, and AKA of the RLE. Collette Ruts He has also had DVT of the left posterior tibial vein in 04/2017.   He also had a left popliteal aneurysm repair. 
  
 The patient has a history of coronary artery disease and prior coronary bypass grafting done in 2005. He also had an aortic valve replacement in 2011. The most recent echocardiogram was done in November 2018 which demonstrated continued adequate function of the aortic prosthesis.  
  
The patient had a neurological event in December of 2016. He has been followed by Dr. Tc Stovall in that regard. The patient had a MRI of brain with contrast on 12/30/2016. There was an old area of infarction involving the right frontal lobe extending into the corona radiata white matter. There was also an area of old infarction in the right parietal region. There was also an abnormal signal in the right sub-insular region, which was felt to possibly represent an area of old hemorrhagic infarct. There was no acute abnormality at that time.   
  
The patient had a myocardial infarction in November 2018. He was treated medically as he declined on further evaluation. The patient wanted to be discharged the day following his admission. In the  virtual visit today,  he reports once again that  he is doing \"good\". He has had no chest pain. He reports shortness of breath only when he is putting on his shoes. His appetite has been very good. He has had no PND or orthopnea. He has no specific complaints in the office today. Prior to Admission medications Medication Sig Start Date End Date Taking? Authorizing Provider  
fish oil-dha-epa 1,200-144-216 mg cap Take  by mouth. Yes Provider, Historical  
acetaminophen (TYLENOL) 500 mg tablet Take 500 mg by mouth every six (6) hours as needed for Pain. Provider, Historical  
MULTIVITAMINS W/C PO Take 1 Tab by mouth daily. sentry    Provider, Historical  
pumpkin seed extract-soy germ (Azo Bladder ControL) 300 mg cap Take 2 Tabs by mouth daily. Provider, Historical  
aspirin delayed-release 81 mg tablet Take 81 mg by mouth daily.     Provider, Historical  
 multivitamin (ONE A DAY) tablet Take 1 Tab by mouth daily. Provider, Historical  
cyanocobalamin (VITAMIN B12) 500 mcg tablet Take 500 mcg by mouth daily. Provider, Historical  
tamsulosin (Flomax) 0.4 mg capsule Take 1 Cap by mouth nightly. 4/21/20   Jeremy Bertrand NP  
nitroglycerin (NITROSTAT) 0.4 mg SL tablet 1 Tab by SubLINGual route every five (5) minutes as needed for Chest Pain. 4/8/20   Jeremy Bertrand NP  
clopidogrel (PLAVIX) 75 mg tab Take 1 Tab by mouth daily. 12/27/19   Judithann Siemens, MD  
atorvastatin (LIPITOR) 20 mg tablet Take 1 Tab by mouth daily. 4/4/19   Rick Saravia DO  
carvedilol (COREG) 3.125 mg tablet Take 1 Tab by mouth two (2) times daily (with meals). Patient taking differently: Take 1.5625 mg by mouth two (2) times daily (with meals). Indications: ventricular rate control in atrial fibrillation 1/8/19   Rick Saravia DO Review of Systems Constitutional: Negative. HENT: Positive for hearing loss. Eyes: Negative. Respiratory: Negative. Cardiovascular: Negative. Gastrointestinal: Negative. Genitourinary: Positive for urgency. Skin: Negative. Neurological: Positive for weakness. Objective:  
 
Patient-Reported Vitals 10/14/2020 Patient-Reported Weight 220 Patient-Reported Height 5'8\" Patient-Reported Pulse 67 Patient-Reported Temperature 97,8 Patient-Reported SpO2 -  
Patient-Reported Systolic  814 Patient-Reported Diastolic 77 Patient-Reported Peak Flow - Constitutional: [x] Appears well-developed and well-nourished [x] No apparent distress   
  [] Abnormal - Mental status: [x] Alert and awake  [x] Oriented to person/place/time [x] Able to follow commands   
[] Abnormal - Eyes:   EOM    [x]  Normal    [] Abnormal -  
Sclera  [x]  Normal    [] Abnormal - 
        Discharge [x]  None visible   [] Abnormal - HENT: [x] Normocephalic, atraumatic  [] Abnormal -  
 [x] Mouth/Throat: Mucous membranes are moist 
 
External Ears [x] Normal  [] Abnormal - Neck: [x] No visualized mass [] Abnormal - Pulmonary/Chest: [x] Respiratory effort normal   [x] No visualized signs of difficulty breathing or respiratory distress 
      [] Abnormal - Musculoskeletal:   [x] Normal gait with no signs of ataxia [x] Normal range of motion of neck [] Abnormal -  
 
Neurological:        [x] No Facial Asymmetry (Cranial nerve 7 motor function) (limited exam due to video visit) [x] No gaze palsy  
     [] Abnormal -   
     
Skin:        [x] No significant exanthematous lesions or discoloration noted on facial skin   
     [] Abnormal - Psychiatric:       [x] Normal Affect [] Abnormal -  
     [x] No Hallucinations Other pertinent observable physical exam findings:- 
 
 
 
We discussed the expected course, resolution and complications of the diagnosis(es) in detail. Medication risks, benefits, costs, interactions, and alternatives were discussed as indicated. I advised him to contact the office if his condition worsens, changes or fails to improve as anticipated. He expressed understanding with the diagnosis(es) and plan. [id number] [place], who was evaluated through a patient-initiated, synchronous (real-time) audio-video encounter, and/or his healthcare decision maker, is aware that it is a billable service, with coverage as determined by his insurance carrier. He provided verbal consent to proceed: Yes, and patient identification was verified. It was conducted pursuant to the emergency declaration under the [facility], [address] [place] [address] authority and the [employer] General Act. A caregiver was present when appropriate. Ability to conduct physical exam was limited. I was in the office. The patient was at home.  
 
 
Leslie Campbell MD

## 2020-10-18 NOTE — PROGRESS NOTES
Sanjay Dodge is a 78 y.o. male who was seen by synchronous (real-time) audio-video technology on 10/14/2020 for No chief complaint on file. Assessment & Plan:  
Diagnoses and all orders for this visit: 1. Peripheral arterial disease (HCC) 
-     DUPLEX LOWER EXT ART LEFT W DIXON; Future I reviewed Mr. Jose Brumfield aortic duplex results with him. This shows that his aneurysm sac is stable without any evidence of endoleak. I am pleased with his continued good seal from his endograft. We will continue to follow Mr. 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 I spent at least 10 minutes on this visit with this established patient. [address] Subjective:  
 
Mr. Joelle Doty and I conducted a virtual visit utilizing [employer] for continued follow-up and management of his aneurysmal disease and peripheral arterial disease. He states he continues to have generalized weakness that makes it hard for him to be active during the day. He states after you walks or while he feels fatigued all over. He denies any signs of a GI bleed. Denies any fevers or chills. He denies any rest pain or ulcers. Prior to Admission medications Medication Sig Start Date End Date Taking? Authorizing Provider  
fish oil-dha-epa 1,200-144-216 mg cap Take  by mouth. Provider, Historical  
acetaminophen (TYLENOL) 500 mg tablet Take 500 mg by mouth every six (6) hours as needed for Pain. Provider, Historical  
MULTIVITAMINS W/C PO Take 1 Tab by mouth daily. sentry    Provider, Historical  
pumpkin seed extract-soy germ (Azo Bladder ControL) 300 mg cap Take 2 Tabs by mouth daily. Provider, Historical  
aspirin delayed-release 81 mg tablet Take 81 mg by mouth daily. Provider, Historical  
multivitamin (ONE A DAY) tablet Take 1 Tab by mouth daily.     Provider, Historical  
 cyanocobalamin (VITAMIN B12) 500 mcg tablet Take 500 mcg by mouth daily. Provider, Historical  
tamsulosin (Flomax) 0.4 mg capsule Take 1 Cap by mouth nightly. 4/21/20   Damon Camp NP  
nitroglycerin (NITROSTAT) 0.4 mg SL tablet 1 Tab by SubLINGual route every five (5) minutes as needed for Chest Pain. 4/8/20   Damon Camp NP  
clopidogrel (PLAVIX) 75 mg tab Take 1 Tab by mouth daily. 12/27/19   Israel Holcomb MD  
atorvastatin (LIPITOR) 20 mg tablet Take 1 Tab by mouth daily. 4/4/19   Farhat Damon DO  
carvedilol (COREG) 3.125 mg tablet Take 1 Tab by mouth two (2) times daily (with meals). Patient taking differently: Take 1.5625 mg by mouth two (2) times daily (with meals). Indications: ventricular rate control in atrial fibrillation 1/8/19   Farhat Damon DO Patient Active Problem List  
Diagnosis Code  Pure hypercholesterolemia E78.00  Hypertension I10  
 CKD (chronic kidney disease), stage III N18.30  
 S/P AVR Z95.2  Advance directive in chart Z78.9  AAA (abdominal aortic aneurysm) without rupture ([facility]) I71.4  Vascular dementia without behavioral disturbance ([facility]) F01.50  Atherosclerosis of native artery of left lower extremity with intermittent claudication ([facility]) C70.323  CAD (coronary artery disease) I25.10  
 S/P CABG x 4 Z95.1  History of CVA (cerebrovascular accident) Z80.78  
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  
 PAD (peripheral artery disease) ([facility]) I73.9  Atherosclerosis of native artery of both lower extremities with intermittent claudication ([facility]) I70.213  
 History of colonic polyps Z86.010  
 Macrocytic anemia D53.9 Patient Active Problem List  
 Diagnosis Date Noted  History of colonic polyps 11/14/2019  Macrocytic anemia 11/14/2019  Popliteal artery aneurysm ([facility] 08/16/2019  PAD (peripheral artery disease) ([facility] 08/16/2019  Atherosclerosis of native artery of both lower extremities with intermittent claudication ([facility] 08/16/2019  Aneurysm of left popliteal artery ([facility] 07/10/2019  
 History of CVA (cerebrovascular accident) 12/01/2018  History of MI (myocardial infarction) 12/01/2018  S/P CABG x 4 11/25/2018  CAD (coronary artery disease) 11/24/2018  Atherosclerosis of native artery of left lower extremity with intermittent claudication ([facility] 07/09/2018  Vascular dementia without behavioral disturbance ([facility] 03/08/2017  AAA (abdominal aortic aneurysm) without rupture ([facility] 01/04/2017  Advance directive in chart 06/13/2016  S/P AVR 04/13/2016  Hypertension 03/27/2016  CKD (chronic kidney disease), stage III 03/27/2016  Pure hypercholesterolemia 09/14/2012 Current Outpatient Medications Medication Sig Dispense Refill  fish oil-dha-epa 1,200-144-216 mg cap Take  by mouth.  acetaminophen (TYLENOL) 500 mg tablet Take 500 mg by mouth every six (6) hours as needed for Pain.  MULTIVITAMINS W/C PO Take 1 Tab by mouth daily. sentry  pumpkin seed extract-soy germ (Azo Bladder ControL) 300 mg cap Take 2 Tabs by mouth daily.  aspirin delayed-release 81 mg tablet Take 81 mg by mouth daily.  multivitamin (ONE A DAY) tablet Take 1 Tab by mouth daily.  cyanocobalamin (VITAMIN B12) 500 mcg tablet Take 500 mcg by mouth daily.  tamsulosin (Flomax) 0.4 mg capsule Take 1 Cap by mouth nightly. 90 Cap 4  
 nitroglycerin (NITROSTAT) 0.4 mg SL tablet 1 Tab by SubLINGual route every five (5) minutes as needed for Chest Pain. 1 Bottle 2  clopidogrel (PLAVIX) 75 mg tab Take 1 Tab by mouth daily. 90 Tab 4  
 atorvastatin (LIPITOR) 20 mg tablet Take 1 Tab by mouth daily. 90 Tab 4  carvedilol (COREG) 3.125 mg tablet Take 1 Tab by mouth two (2) times daily (with meals).  (Patient taking differently: Take 1.5625 mg by mouth two (2) times daily (with meals). Indications: ventricular rate control in atrial fibrillation) 180 Tab 4 Allergies Allergen Reactions  Ace Inhibitors Other (comments) Per pt, Cardiologist states he cannot have ACE inhibitors Past Medical History:  
Diagnosis Date  Advance directive in chart 6/13/2016  Atherosclerosis of both lower extremities with intermittent claudication ([address]  Atherosclerosis of native arteries of extremities with intermittent claudication, bilateral legs ([address]  Atherosclerosis of native artery of both lower extremities with intermittent claudication ([address]  Atherosclerosis of native artery of left lower extremity with intermittent claudication ([address]  CAD (coronary artery disease)  Cancer [facility]) 2003 Colon  CKD (chronic kidney disease), stage III ([address] 3/27/2016  CVA (cerebral vascular accident) ([address] 3/26/2016  Femoral artery aneurysm, left ([address]  Heart attack [facility]) 2008  Heart attack ([address]  Hernia  History of TIAs  Hyperlipidemia  Hypertension 3/27/2016  Iliac artery aneurysm, left ([address]  Psychiatric disorder Vascular Dementia  Pure hypercholesterolemia 9/14/2012  PVD (peripheral vascular disease) ([address] 3/27/2016  Sleep apnea   
 refused CPAP  
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  
Procedure Laterality Date  ABDOMEN SURGERY PROC UNLISTED  2011  
 3 stents placed into abdomen (groin)  CARDIAC SURG PROCEDURE UNLIST  2005 Quadruple Bypass  CARDIAC SURG PROCEDURE UNLIST  2011 Aortic pig valve placed  COLONOSCOPY N/A 6/29/2016 COLON[facility]COPY performed by Sarai [facility]ña MD at [facility] ENDOSCOPY  ENDOSCOPY, COLON, DIAGNOSTIC    
 HX AAA REPAIR    
 HX GI    
 HX ORTHOPAEDIC  2008 Right Leg Amputated  HX SMALL BOWEL RESECTION Family History Problem Relation Age of Onset  Hypertension Mother  Hypertension Father  Heart Disease Father  Heart Disease Brother  Lung Cancer Paternal Uncle Social History Tobacco Use  Smoking status: Former Smoker Last attempt to quit: 1987 Years since quittin.1  Smokeless tobacco: Never Used Substance Use Topics  Alcohol use: Not Currently Review of Systems Constitutional: Positive for malaise/fatigue. Negative for chills, diaphoresis, fever and weight loss. HENT: Negative for hearing loss and sore throat. Eyes: Negative for blurred vision, photophobia and redness. Respiratory: Negative for cough, hemoptysis, shortness of breath and wheezing. Cardiovascular: Negative for chest pain, palpitations and orthopnea. Gastrointestinal: Negative for abdominal pain, blood in stool, constipation, diarrhea, heartburn, nausea and vomiting. Genitourinary: Negative for dysuria, frequency, hematuria and urgency. Musculoskeletal: Negative for back pain and myalgias. Skin: Negative for itching and rash. Neurological: Negative for dizziness, speech change, focal weakness, weakness and headaches. Endo/Heme/Allergies: Does not bruise/bleed easily. Psychiatric/Behavioral: Negative for depression and suicidal ideas. Objective:  
 
Patient-Reported Vitals 10/14/2020 Patient-Reported Weight 220 Patient-Reported Height 5'8\" Patient-Reported Pulse 67 Patient-Reported Temperature 97,8 Patient-Reported SpO2 -  
Patient-Reported Systolic  190 Patient-Reported Diastolic 77 Patient-Reported Peak Flow - General: alert, cooperative, no distress Mental  status: normal mood, behavior, speech, dress, motor activity, and thought processes, able to follow commands HENT: NCAT Neck: no visualized mass Resp: no respiratory distress Neuro: no gross deficits Skin: no discoloration or lesions of concern on visible areas Psychiatric: normal affect, consistent with stated mood, no evidence of hallucinations Additional exam findings: We discussed the expected course, resolution and complications of the diagnosis(es) in detail. Medication risks, benefits, costs, interactions, and alternatives were discussed as indicated. I advised him to contact the office if his condition worsens, changes or fails to improve as anticipated. He expressed understanding with the diagnosis(es) and plan. [id number], who was evaluated through a patient-initiated, synchronous (real-time) audio-video encounter, and/or his healthcare decision maker, is aware that it is a billable service, with coverage as determined by his insurance carrier. He provided verbal consent to proceed: n/a- consent obtained within past 12 months, and patient identification was verified. It was conducted pursuant to the emergency declaration under the [facility], [address] [place] [address] authority and the [employer] General Act. A caregiver was present when appropriate. Ability to conduct physical exam was limited. I was in the office. The patient was at home.  
 
 
Milagros Greene MD

## 2020-11-12 NOTE — PROGRESS NOTES
(AWV) The Initial Medicare Annual Wellness Exam PROGRESS NOTE This is an Initial Sarah Exam (AWV) I have reviewed the patient's medical history in detail and updated the computerized patient record. Simran Lancaster is a 78 y.o.  male and presents for an annual wellness exam  
 
Simran Lancaster, who was evaluated through a synchronous (real-time) audio-video encounter, and/or his healthcare decision maker, is aware that it is a billable service, with coverage as determined by his insurance carrier. He provided verbal consent to proceed: Yes, and patient identification was verified. It was conducted pursuant to the emergency declaration under the [address] [place] [address] and the [employer] General Act. A caregiver was present when appropriate. Ability to conduct physical exam was limited. I was in the office. The patient was at home. ROS General ROS: negative for - chills or fever; + fatigue Psychological ROS: positive for - disorientation; he has been sitting on the side of the bed and disoriented Ophthalmic ROS: positive for - uses glasses ENT ROS: negative for - headaches Endocrine ROS: negative for - polydipsia/polyuria Respiratory ROS: no cough, shortness of breath, or wheezing Cardiovascular ROS: no chest pain or dyspnea on exertion; he has history of myocardial infarction Gastrointestinal ROS: no abdominal pain, change in bowel habits, or black or bloody stools Genito-Urinary ROS: no dysuria, trouble voiding, or hematuria Musculoskeletal ROS: positive for - muscular weakness; s/p amputation Neurological ROS: unsteady gait Dermatological ROS: negative for - rash or skin lesion changes All other systems reviewed and are negative. History Past Medical History:  
Diagnosis Date  Advance directive in chart 6/13/2016  Atherosclerosis of both lower extremities with intermittent claudication ([address]  Atherosclerosis of native arteries of extremities with intermittent claudication, bilateral legs ([address]  Atherosclerosis of native artery of both lower extremities with intermittent claudication ([address]  Atherosclerosis of native artery of left lower extremity with intermittent claudication ([address]  CAD (coronary artery disease)  Cancer [facility]) 2003 Colon  CKD (chronic kidney disease), stage III ([address] 3/27/2016  CVA (cerebral vascular accident) ([address] 3/26/2016  Femoral artery aneurysm, left ([address]  Heart attack [facility]) 2008  Heart attack ([address]  Hernia  History of TIAs  Hyperlipidemia  Hypertension 3/27/2016  Iliac artery aneurysm, left ([address]  Psychiatric disorder Vascular Dementia  Pure hypercholesterolemia 9/14/2012  PVD (peripheral vascular disease) ([facility] 3/27/2016  Sleep apnea   
 refused CPAP  
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  
Procedure Laterality Date  ABDOMEN SURGERY PROC UNLISTED  2011  
 3 stents placed into abdomen (groin)  CARDIAC SURG PROCEDURE UNLIST  2005 Quadruple Bypass  CARDIAC SURG PROCEDURE UNLIST  2011 Aortic pig valve placed  COLONOSCOPY N/A 6/29/2016 COLONOSCOPY performed by Geovanna Cain MD at [facility] ENDOSCOPY  ENDOSCOPY, COLON, DIAGNOSTIC    
 HX AAA REPAIR    
 HX GI    
 HX ORTHOPAEDIC  2008 Right Leg Amputated  HX SMALL BOWEL RESECTION Current Outpatient Medications Medication Sig Dispense Refill  fish oil-dha-epa 1,200-144-216 mg cap Take  by mouth.  acetaminophen (TYLENOL) 500 mg tablet Take 500 mg by mouth every six (6) hours as needed for Pain.  MULTIVITAMINS W/C PO Take 1 Tab by mouth daily. sentry  pumpkin seed extract-soy germ (Azo Bladder ControL) 300 mg cap Take 2 Tabs by mouth daily.  aspirin delayed-release 81 mg tablet Take 81 mg by mouth daily.  multivitamin (ONE A DAY) tablet Take 1 Tab by mouth daily.  cyanocobalamin (VITAMIN B12) 500 mcg tablet Take 500 mcg by mouth daily.  tamsulosin (Flomax) 0.4 mg capsule Take 1 Cap by mouth nightly. 90 Cap 4  
 nitroglycerin (NITROSTAT) 0.4 mg SL tablet 1 Tab by SubLINGual route every five (5) minutes as needed for Chest Pain. 1 Bottle 2  clopidogrel (PLAVIX) 75 mg tab Take 1 Tab by mouth daily. 90 Tab 4  
 atorvastatin (LIPITOR) 20 mg tablet Take 1 Tab by mouth daily. 90 Tab 4  carvedilol (COREG) 3.125 mg tablet Take 1 Tab by mouth two (2) times daily (with meals). (Patient taking differently: Take 1.5625 mg by mouth two (2) times daily (with meals). Indications: ventricular rate control in atrial fibrillation) 180 Tab 4 Allergies Allergen Reactions  Ace Inhibitors Other (comments) Per pt, Cardiologist states he cannot have ACE inhibitors Family History Problem Relation Age of Onset  Hypertension Mother  Hypertension Father  Heart Disease Father  Heart Disease Brother  Lung Cancer Paternal Uncle Social History Tobacco Use  Smoking status: Former Smoker Last attempt to quit: 1987 Years since quittin.1  Smokeless tobacco: Never Used Substance Use Topics  Alcohol use: Not Currently Patient Active Problem List  
Diagnosis Code  Pure hypercholesterolemia E78.00  Hypertension I10  
 CKD (chronic kidney disease), stage III N18.30  
 S/P AVR Z95.2  Advance directive in chart Z78.9  AAA (abdominal aortic aneurysm) without rupture ([facility]) I71.4  Vascular dementia without behavioral disturbance ([facility]) F01.50  Atherosclerosis of native artery of left lower extremity with intermittent claudication (HCC) W57.066  CAD (coronary artery disease) I25.10  
 S/P CABG x 4 Z95.1  History of CVA (cerebrovascular accident) S73.55  
 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  
 PAD (peripheral artery disease) ([facility]) I73.9  Atherosclerosis of native artery of both lower extremities with intermittent claudication ([facility]) I70.213  
 History of colonic polyps Z86.010  
 Macrocytic anemia D53.9 Health Maintenance History Immunizations reviewed, dtap up to date , pneumovax due, flu up to date, zoster due Colonoscopy: up to date, Eye exam: due Depression Risk Factor Screening:  
  
Patient Health Questionnaire (PHQ-2) Over the last 2 weeks, how often have you been bothered by any of the following problems? · Little interest or pleasure in doing things? · Not at all. [0] · Feeling down, depressed, or hopeless? · Several days. [1] Total Score: 1/6 PHQ-2 Assessment Scoring: A score of 2 or more requires further screening with the PHQ-9 Alcohol Risk Factor Screening:  
 
Men: On any occasion during the past 3 months, have you had more than 4 drinks containing alcohol? no 
Do you average more than 14 drinks per week? no 
 
Functional Ability and Level of Safety:  
 
Hearing Loss The patient needs further evaluation. Activities of Daily Living Partial assistance. Requires assistance with: ambulation, bathing and hygiene and grooming Fall Risk History of fall(s) in the past 3 months (25 pts) Secondary diagnoses (15 pts) Impaired (20 pts) Forgets limitations (15 pts) Score: 75 Abuse Screen Patient is not abused Examination Physical Examination There were no vitals filed for this visit. There is no height or weight on file to calculate BMI. Evaluation of Cognitive Function: 
Mood/affect:good mood with appropriate affect Appearance: well kempt Family member/caregiver input: he needs assitance 
 
alert, well appearing, and in no distress, oriented to person, place, and time and overweight Patient Care Team: 
Robert Dueñas NP as PCP - General (Nurse Practitioner) Robert Dueñas NP as PCP - [address] [place] [address]  [facility] Provider Sherry Duggan MD (Ophthalmology) Abdi Leroy MD as Physician (Urology) Danuta Lynn MD (Cardiology) [place], [address] [place], [place] (Physician Assistant) Nickie Hendrickson DPM (Podiatry) Paul Lorenz MD (Hematology and Oncology) Abelardo Dominguez MD (Neurology) End-of-life planning Advanced Directive in the case than an injury or illness causes the patient to be unable to make health care decisions Health Care Directive or Living Will: yes Advice/Referrals/Counselling/Plan:  
Education and counseling provided: 
End-of-Life planning (with patient's consent) Influenza Vaccine Diabetes screening test 
Include in education list (weight loss, physical activity, smoking cessation, fall prevention, and nutrition) ICD-10-CM ICD-9-CM 1. Medicare annual wellness visit, initial  Z00.00 V70.0 [id number] [employer] 2. Controlled type 2 diabetes mellitus with complication, without long-term current use of insulin ([facility])  E11.8 250.90 3. Coronary artery disease of native artery of native heart with stable angina pectoris ([facility]  I25.118 414.01   
  413.9 4. Advance care planning  Z71.89 V65.49 ADVANCE CARE PLANNING FIRST 30 MINS 5. AAA (abdominal aortic aneurysm) without rupture ([facility])  I71.4 441.4 6. PAD (peripheral artery disease) ([facility])  I73.9 443.9 7. Vascular dementia without behavioral disturbance ([facility])  F01.50 290.40 8. Stage 3a chronic kidney disease  N18.31 585.3 9. History of colon cancer  Z85.038 V10.05   
10. Frequent falls  R29.6 V15.88 [facility] Brief written plan, checklist 
 
I have discussed the diagnosis with the patient and the intended plan as seen in the above orders. The patient has received an after-visit summary and questions were answered concerning future plans.   I have discussed medication side effects and warnings with the patient as well. I have reviewed the plan of care with the patient, accepted their input and they are in agreement with the treatment goals. ____________________________________________________________ Problem Assessment 
 
for treatment of  
Chief Complaint Patient presents with  Lethargy  Fall SUBJECTIVE He has had multiple falls, he has right leg prosthesis, He is accompanied by his son, Fina Reynolds, who is his medical power of . He is followed by Dr. Ella Lr in cardiology for coronary artery disease and prior coronary bypass grafting done in 2005. [facility] also had an aortic valve replacement in 2011.  The most recent echocardiogram was done in November 2018 which demonstrated continued adequate function of the aortic prosthesis. He is followed by Dr. Lakisha Wilson in vascular suregry for peripheral arterial disease status post placement of a popliteal stent for popliteal artery aneurysm He is followed by Dr. Kan Garcia for colon cancer Diabetes Mellitus: He has diabetes mellitus, and  hypertension, hyperlipidemia, coronary artery disease, history of prior stroke and status post CABG. Diabetic ROS - medication compliance: compliant all of the time, diabetic diet compliance: noncompliant some of the time. Lab review: labs reviewed, I note that glycosylated hemoglobin mildly abnormal but acceptable. Mental status - normal mood, behavior, speech, dress, motor activity, and thought processes Chest - normal work of breathing Neurological - cranial nerves II through XII intact LABS TESTS Assessment/Plan: 1. Controlled type 2 diabetes mellitus with complication, without long-term current use of insulin ([address] Goal hgb a1c <7 2. Coronary artery disease of native artery of native heart with stable angina pectoris ([address] F/u with cardiology; continue current management 3. Medicare annual wellness visit, initial 
Reviewed preventive recommendations - [id number] [employer] 4. Advance care planning See ACP 
- ADVANCE CARE PLANNING FIRST 30 MINS 5. AAA (abdominal aortic aneurysm) without rupture ([address] F/u with vascular surgery; continue current management 6. PAD (peripheral artery disease) ([facility] Vascular surgery managing 7. Vascular dementia without behavioral disturbance ([facility] Family assisting 8. Stage 3a chronic kidney disease F/u with nephrology 9. History of colon cancer F/u with oncology 10. Frequent falls Pt did well with physical therapy; restart treatment 
- [facility] Lab review: labs reviewed, I note that hemogram normal

## 2020-11-12 NOTE — ACP (ADVANCE CARE PLANNING)
Advance Care Planning Advance Care Planning (ACP) Physician/NP/PA Conversation Date of Conversation: 11/12/2020 Conducted with: Patient with Decision Making Capacity Healthcare Decision Maker:  
 
Click here to complete [place] including selection of the Healthcare Decision Maker Relationship (ie \"Primary\") Today we documented Decision Maker(s) consistent with Legal Next of Kin hierarchy. Care Preferences: Hospitalization: \"If your health worsens and it becomes clear that your chance of recovery is unlikely, what would be your preference regarding hospitalization? \" The patient would prefer comfort-focused treatment without hospitalization. Ventilation: \"If you were unable to breathe on your own and your chance of recovery was unlikely, what would be your preference about the use of a ventilator (breathing machine) if it was available to you? \" The patient would NOT desire the use of a ventilator. Resuscitation: \"In the event your heart stopped as a result of an underlying serious health condition, would you want attempts to be made to restart your heart, or would you prefer a natural death? \" No, do NOT attempt to resuscitate. Additional topics discussed: treatment goals, benefit/burden of treatment options, end of life care preferences (vegetative state/imminent death) and hospice care Conversation Outcomes / Follow-Up Plan:  
ACP complete - no further action today Reviewed DNR/DNI and patient confirms current DNR status - completed forms on file (place new order if needed) Length of Voluntary ACP Conversation in minutes:  16 minutes Osvaldo Del Rio MD

## 2020-11-12 NOTE — PROGRESS NOTES
Arie Barrett presents today for Chief Complaint Patient presents with  Lethargy  Fall Is someone accompanying this pt? na 
 
Is the patient using any DME equipment during OV? na 
 
Depression Screening: 
3 most recent PHQ Screens 11/12/2020 PHQ Not Done - Little interest or pleasure in doing things Several days Feeling down, depressed, irritable, or hopeless Several days Total Score PHQ 2 2 Learning Assessment: 
Learning Assessment 7/10/2019 PRIMARY LEARNER Patient BARRIERS PRIMARY LEARNER -  
CO-LEARNER CAREGIVER -  
PRIMARY LANGUAGE ENGLISH  
LEARNER PREFERENCE PRIMARY LISTENING  
ANSWERED BY patient RELATIONSHIP SELF Abuse Screening: 
Abuse Screening Questionnaire 11/12/2020 Do you ever feel afraid of your partner? Dairl [place] Are you in a relationship with someone who physically or mentally threatens you? Dairl [place] Is it safe for you to go home? Luis M Cramer Fall Risk Fall Risk Assessment, last 12 mths 11/12/2020 Able to walk? Yes Fall in past 12 months? Yes Fall with injury? No  
Number of falls in past 12 months 2 Fall Risk Score 2 Health Maintenance reviewed and discussed and ordered per Provider. Health Maintenance Due Topic Date Due  Shingrix Vaccine Age 50> (1 of 2) 09/30/1988  Pneumococcal 65+ years (2 of 2 - PPSV23) 10/07/2016  GLAUCOMA SCREENING Q2Y  09/22/2019  Medicare Yearly Exam  10/03/2019  Lipid Screen  01/30/2020  Flu Vaccine (1) 09/01/2020 Karlie Munoz Coordination of Care: 1. Have you been to the ER, urgent care clinic since your last visit? Hospitalized since your last visit? no 
 
2. Have you seen or consulted any other health care providers outside of the [address] [place] [address] since your last visit? Include any pap smears or colon screening. no 
 
 
Last  Checked na Last UDS Checked na Last Pain contract signed: na 
 
Patients concerns today:  Fatigue and falls

## 2020-11-18 NOTE — TELEPHONE ENCOUNTER
Nurse attempted to call patient, advised to return call to schedule face to face appt to see Dr. Blanka Mcrae.

## 2020-11-30 PROBLEM — Z89.611 S/P AKA (ABOVE KNEE AMPUTATION), RIGHT (HCC): Status: ACTIVE | Noted: 2020-01-01

## 2020-11-30 NOTE — PROGRESS NOTES
Marcellus Gracia presents today for No chief complaint on file. Is someone accompanying this pt? Yes, wife Is the patient using any DME equipment during OV? Yes, wheelchair Depression Screening: 
3 most recent PHQ Screens 11/30/2020 PHQ Not Done - Little interest or pleasure in doing things Not at all Feeling down, depressed, irritable, or hopeless Not at all Total Score PHQ 2 0 Learning Assessment: 
Learning Assessment 7/10/2019 PRIMARY LEARNER Patient BARRIERS PRIMARY LEARNER -  
CO-LEARNER CAREGIVER -  
PRIMARY LANGUAGE ENGLISH  
LEARNER PREFERENCE PRIMARY LISTENING  
ANSWERED BY patient RELATIONSHIP SELF Abuse Screening: 
Abuse Screening Questionnaire 11/30/2020 Do you ever feel afraid of your partner? Yuval [place] Are you in a relationship with someone who physically or mentally threatens you? Yuval [place] Is it safe for you to go home? Chelsea Bell Fall Risk Fall Risk Assessment, last 12 mths 11/30/2020 Able to walk? Yes Fall in past 12 months? No  
Fall with injury? -  
Number of falls in past 12 months - Fall Risk Score - Health Maintenance reviewed and discussed and ordered per Provider. Health Maintenance Due Topic Date Due  Shingrix Vaccine Age 50> (1 of 2) 09/30/1988  Pneumococcal 65+ years (2 of 2 - PPSV23) 10/07/2016  GLAUCOMA SCREENING Q2Y  09/22/2019  Lipid Screen  01/30/2020  Flu Vaccine (1) 09/01/2020 Ezekiel Esquivel Coordination of Care: 1. Have you been to the ER, urgent care clinic since your last visit? Hospitalized since your last visit? no 
 
2. Have you seen or consulted any other health care providers outside of the [address] [place] [address] since your last visit? Include any pap smears or colon screening. no 
 
 
Last  Checked n/a Last UDS Checked n/a Last Pain contract signed: n/a

## 2020-11-30 NOTE — PROGRESS NOTES
Renetta Clark is a 78 y.o.  male and presents with Chief Complaint Patient presents with  Enuresis  Dry Skin Subjective: 
He c/o urinary urge incontinence. He is taking tamsulosin at bedtime with some improvement. He c/o dry skin with lesion which he scratches and causes bleeding. He has been using anti-itch cream which he receives through his medicare. He has minimal relief with this. Diabetes Mellitus: He has diabetes mellitus, and  hypertension, hyperlipidemia, coronary artery disease, history of prior stroke and status post CABG. Diabetic ROS - medication compliance: compliant all of the time, diabetic diet compliance: noncompliant some of the time. Lab review: labs reviewed, I note that glycosylated hemoglobin mildly abnormal but acceptable.  
  
ROS General ROS: negative for - chills or fever; + fatigue Psychological ROS: positive for - disorientation; he has been sitting on the side of the bed and disoriented Ophthalmic ROS: positive for - uses glasses ENT ROS: negative for - headaches Endocrine ROS: negative for - polydipsia/polyuria Respiratory ROS: no cough, shortness of breath, or wheezing Cardiovascular ROS: no chest pain or dyspnea on exertion; he has history of myocardial infarction Gastrointestinal ROS: no abdominal pain, change in bowel habits, or black or bloody stools Genito-Urinary ROS: no dysuria, trouble voiding, or hematuria Musculoskeletal ROS: positive for - muscular weakness; s/p amputation Neurological ROS: unsteady gait Dermatological ROS: positive for - rash and skin lesion changes All other systems reviewed and are negative. Objective: 
Vitals:  
 11/30/20 1125 11/30/20 1131 BP: (!) 143/85 132/72 Pulse: (!) 52 Resp: 16 Temp: 97.6 °F (36.4 °C) TempSrc: Temporal   
SpO2: 98% Weight: 206 lb (93.4 kg) Height: 5' 8\" (1.727 m) PainSc:   0 - No pain   0 - No pain alert, well appearing, and in no distress, oriented to person, place, and time and obese Mental status - normal mood, behavior, speech, dress, motor activity, and thought processes Chest - clear to auscultation, no wheezes, rales or rhonchi, symmetric air entry Heart - normal rate, regular rhythm, normal S1, S2, no murmurs, rubs, clicks or gallops Musculoskeletal - abnormal exam of right leg s/p Skin - LESIONS NOTED: crusted on the scalp, left chest and right arm LABS TESTS Assessment/Plan: 1. Need for vaccination against Streptococcus pneumoniae - ADMIN PNEUMOCOCCAL VACCINE 
- PNEUMOCOCCAL POLYSACCHARIDE VACCINE, 23-VALENT, ADULT OR IMMUNOSUPPRESSED PT DOSE, 2. Benign prostatic hyperplasia with urinary frequency Increase frequency of medication for symptom relief - tamsulosin (Flomax) 0.4 mg capsule; Take 1 Cap by mouth two (2) times a day. Dispense: 180 Cap; Refill: 3 3. Gastroesophageal reflux disease without esophagitis Start h2 blocker for symptoms 
- famotidine (PEPCID) 40 mg tablet; Take 1 Tab by mouth two (2) times a day. Dispense: 180 Tab; Refill: 3 
 
4. Hypersomnolence Cbc ordered to assess for anemia 5. Controlled type 2 diabetes mellitus with complication, without long-term current use of insulin ([address] Goal hgb a1c <7 
 
6. Vascular dementia without behavioral disturbance ([address] Home health to assist 
 
7. Pure hypercholesterolemia Assess for efficacy of statin - LIPID PANEL; Future 8. Obesity (BMI 30-39. 9) I have reviewed/discussed the above normal BMI with the patient and spouse. I have recommended the following interventions: dietary management education, guidance, and counseling and monitor weight . Osie Ra - CBC WITH AUTOMATED DIFF; Future 9. Dermatitis Continue anti itch and add petroleum jelly 10. AAA (abdominal aortic aneurysm) without rupture ([address] F/u with vascular 11. PAD (peripheral artery disease) ([address] F/u with vascular surgery 12. Diastolic dysfunction F/u with cardiology 13. Essential hypertension Goal <130/80 14. CKD (chronic kidney disease), stage III Avoid nephrotoxic medications 15. Coronary artery disease involving native coronary artery of native heart without angina pectoris Managed by cardiology 16. S/P AKA (above knee amputation), right ([address] Lab review: orders written for new lab studies as appropriate; see orders I have discussed the diagnosis with the patient and the intended plan as seen in the above orders. The patient has received an after-visit summary and questions were answered concerning future plans. I have discussed medication side effects and warnings with the patient as well. I have reviewed the plan of care with the patient, accepted their input and they are in agreement with the treatment goals.

## 2021-01-01 ENCOUNTER — HOME CARE VISIT (OUTPATIENT)
Dept: HOSPICE | Facility: HOSPICE | Age: 81
End: 2021-01-01
Payer: MEDICARE

## 2021-01-01 ENCOUNTER — HOME CARE VISIT (OUTPATIENT)
Dept: SCHEDULING | Facility: HOME HEALTH | Age: 81
End: 2021-01-01
Payer: MEDICARE

## 2021-01-01 ENCOUNTER — TELEPHONE (OUTPATIENT)
Dept: FAMILY MEDICINE CLINIC | Age: 81
End: 2021-01-01

## 2021-01-01 ENCOUNTER — VIRTUAL VISIT (OUTPATIENT)
Dept: CARDIOLOGY CLINIC | Age: 81
End: 2021-01-01

## 2021-01-01 ENCOUNTER — DOCUMENTATION ONLY (OUTPATIENT)
Dept: FAMILY MEDICINE CLINIC | Age: 81
End: 2021-01-01

## 2021-01-01 ENCOUNTER — DOCUMENTATION ONLY (OUTPATIENT)
Dept: VASCULAR SURGERY | Age: 81
End: 2021-01-01

## 2021-01-01 ENCOUNTER — VIRTUAL VISIT (OUTPATIENT)
Dept: FAMILY MEDICINE CLINIC | Age: 81
End: 2021-01-01
Payer: MEDICARE

## 2021-01-01 ENCOUNTER — HOSPICE ADMISSION (OUTPATIENT)
Dept: HOSPICE | Facility: HOSPICE | Age: 81
End: 2021-01-01
Payer: MEDICARE

## 2021-01-01 VITALS — RESPIRATION RATE: 18 BRPM | OXYGEN SATURATION: 87 % | HEART RATE: 100 BPM

## 2021-01-01 VITALS
RESPIRATION RATE: 16 BRPM | DIASTOLIC BLOOD PRESSURE: 62 MMHG | TEMPERATURE: 98.3 F | HEART RATE: 90 BPM | OXYGEN SATURATION: 88 % | SYSTOLIC BLOOD PRESSURE: 100 MMHG

## 2021-01-01 VITALS
HEART RATE: 80 BPM | DIASTOLIC BLOOD PRESSURE: 62 MMHG | OXYGEN SATURATION: 91 % | SYSTOLIC BLOOD PRESSURE: 120 MMHG | RESPIRATION RATE: 14 BRPM | TEMPERATURE: 97.2 F

## 2021-01-01 VITALS
SYSTOLIC BLOOD PRESSURE: 158 MMHG | TEMPERATURE: 97.2 F | OXYGEN SATURATION: 94 % | HEART RATE: 80 BPM | RESPIRATION RATE: 20 BRPM | DIASTOLIC BLOOD PRESSURE: 74 MMHG

## 2021-01-01 VITALS
DIASTOLIC BLOOD PRESSURE: 80 MMHG | OXYGEN SATURATION: 98 % | SYSTOLIC BLOOD PRESSURE: 153 MMHG | HEART RATE: 56 BPM | TEMPERATURE: 97.3 F

## 2021-01-01 VITALS
SYSTOLIC BLOOD PRESSURE: 142 MMHG | RESPIRATION RATE: 20 BRPM | TEMPERATURE: 97.7 F | HEART RATE: 60 BPM | OXYGEN SATURATION: 96 % | DIASTOLIC BLOOD PRESSURE: 88 MMHG

## 2021-01-01 VITALS
DIASTOLIC BLOOD PRESSURE: 50 MMHG | OXYGEN SATURATION: 91 % | TEMPERATURE: 101.9 F | RESPIRATION RATE: 20 BRPM | HEART RATE: 98 BPM | SYSTOLIC BLOOD PRESSURE: 100 MMHG

## 2021-01-01 VITALS
HEART RATE: 90 BPM | RESPIRATION RATE: 16 BRPM | OXYGEN SATURATION: 95 % | SYSTOLIC BLOOD PRESSURE: 130 MMHG | TEMPERATURE: 97.4 F | DIASTOLIC BLOOD PRESSURE: 62 MMHG

## 2021-01-01 VITALS
OXYGEN SATURATION: 91 % | HEART RATE: 90 BPM | DIASTOLIC BLOOD PRESSURE: 72 MMHG | TEMPERATURE: 98.3 F | SYSTOLIC BLOOD PRESSURE: 110 MMHG | RESPIRATION RATE: 16 BRPM

## 2021-01-01 VITALS
TEMPERATURE: 97.7 F | RESPIRATION RATE: 20 BRPM | HEART RATE: 58 BPM | SYSTOLIC BLOOD PRESSURE: 133 MMHG | OXYGEN SATURATION: 97 % | DIASTOLIC BLOOD PRESSURE: 57 MMHG

## 2021-01-01 VITALS
RESPIRATION RATE: 18 BRPM | OXYGEN SATURATION: 88 % | SYSTOLIC BLOOD PRESSURE: 100 MMHG | HEART RATE: 45 BPM | TEMPERATURE: 98.3 F | DIASTOLIC BLOOD PRESSURE: 62 MMHG

## 2021-01-01 VITALS
RESPIRATION RATE: 16 BRPM | TEMPERATURE: 98.2 F | DIASTOLIC BLOOD PRESSURE: 62 MMHG | HEART RATE: 90 BPM | OXYGEN SATURATION: 88 % | SYSTOLIC BLOOD PRESSURE: 130 MMHG

## 2021-01-01 DIAGNOSIS — R06.00 DYSPNEA, UNSPECIFIED TYPE: Primary | ICD-10-CM

## 2021-01-01 DIAGNOSIS — R06.09 DYSPNEA ON EXERTION: ICD-10-CM

## 2021-01-01 DIAGNOSIS — F01.50 VASCULAR DEMENTIA WITHOUT BEHAVIORAL DISTURBANCE (HCC): ICD-10-CM

## 2021-01-01 DIAGNOSIS — Z89.611 S/P AKA (ABOVE KNEE AMPUTATION), RIGHT (HCC): ICD-10-CM

## 2021-01-01 DIAGNOSIS — R29.6 FREQUENT FALLS: Primary | ICD-10-CM

## 2021-01-01 DIAGNOSIS — R06.00 DYSPNEA, UNSPECIFIED TYPE: ICD-10-CM

## 2021-01-01 DIAGNOSIS — E11.8 CONTROLLED TYPE 2 DIABETES MELLITUS WITH COMPLICATION, WITHOUT LONG-TERM CURRENT USE OF INSULIN (HCC): ICD-10-CM

## 2021-01-01 DIAGNOSIS — R29.6 FREQUENT FALLS: ICD-10-CM

## 2021-01-01 DIAGNOSIS — E66.01 MORBID OBESITY (HCC): ICD-10-CM

## 2021-01-01 DIAGNOSIS — I25.118 CORONARY ARTERY DISEASE OF NATIVE ARTERY OF NATIVE HEART WITH STABLE ANGINA PECTORIS (HCC): ICD-10-CM

## 2021-01-01 DIAGNOSIS — F01.50 VASCULAR DEMENTIA WITHOUT BEHAVIORAL DISTURBANCE (HCC): Primary | ICD-10-CM

## 2021-01-01 DIAGNOSIS — G47.10 HYPERSOMNOLENCE: ICD-10-CM

## 2021-01-01 DIAGNOSIS — G54.6 PHANTOM PAIN (HCC): Primary | ICD-10-CM

## 2021-01-01 DIAGNOSIS — I73.9 PAD (PERIPHERAL ARTERY DISEASE) (HCC): ICD-10-CM

## 2021-01-01 PROCEDURE — G0155 HHCP-SVS OF CSW,EA 15 MIN: HCPCS

## 2021-01-01 PROCEDURE — 0651 HSPC ROUTINE HOME CARE

## 2021-01-01 PROCEDURE — G0299 HHS/HOSPICE OF RN EA 15 MIN: HCPCS

## 2021-01-01 PROCEDURE — G0156 HHCP-SVS OF AIDE,EA 15 MIN: HCPCS

## 2021-01-01 PROCEDURE — 3336500001 HSPC ELECTION

## 2021-01-01 PROCEDURE — 3331090004 HSPC SERVICE INTENSITY ADD-ON

## 2021-01-01 PROCEDURE — G8428 CUR MEDS NOT DOCUMENT: HCPCS | Performed by: FAMILY MEDICINE

## 2021-01-01 PROCEDURE — 99214 OFFICE O/P EST MOD 30 MIN: CPT | Performed by: FAMILY MEDICINE

## 2021-01-01 PROCEDURE — 1101F PT FALLS ASSESS-DOCD LE1/YR: CPT | Performed by: FAMILY MEDICINE

## 2021-01-01 PROCEDURE — G8756 NO BP MEASURE DOC: HCPCS | Performed by: FAMILY MEDICINE

## 2021-01-01 PROCEDURE — HOSPICE MEDICATION HC HH HOSPICE MEDICATION

## 2021-01-01 PROCEDURE — G8432 DEP SCR NOT DOC, RNG: HCPCS | Performed by: FAMILY MEDICINE

## 2021-01-01 RX ORDER — GABAPENTIN 100 MG/1
100 CAPSULE ORAL 3 TIMES DAILY
Qty: 30 CAP | Refills: 0 | Status: SHIPPED | OUTPATIENT
Start: 2021-01-01 | End: 2021-01-01

## 2021-01-01 RX ORDER — CLOPIDOGREL BISULFATE 75 MG/1
TABLET ORAL
Qty: 90 TAB | Refills: 4 | Status: SHIPPED | OUTPATIENT
Start: 2021-01-01 | End: 2021-01-01

## 2021-01-01 RX ORDER — ALBUTEROL SULFATE 90 UG/1
2 AEROSOL, METERED RESPIRATORY (INHALATION)
Qty: 1 INHALER | Refills: 1 | Status: SHIPPED | OUTPATIENT
Start: 2021-01-01 | End: 2021-01-01

## 2021-01-01 RX ORDER — DONEPEZIL HYDROCHLORIDE 10 MG/1
TABLET, FILM COATED ORAL
Qty: 90 TAB | Refills: 1 | Status: SHIPPED | OUTPATIENT
Start: 2021-01-01 | End: 2021-01-01

## 2021-01-01 RX ORDER — ALBUTEROL SULFATE 90 UG/1
AEROSOL, METERED RESPIRATORY (INHALATION)
Qty: 54 G | Refills: 3 | Status: SHIPPED | OUTPATIENT
Start: 2021-01-01 | End: 2021-01-01

## 2021-01-01 RX ORDER — DONEPEZIL HYDROCHLORIDE 10 MG/1
10 TABLET, FILM COATED ORAL
Qty: 30 TAB | Refills: 0 | Status: SHIPPED | OUTPATIENT
Start: 2021-01-01 | End: 2021-01-01

## 2021-02-22 NOTE — PROGRESS NOTES
Marjorie Zhao presents today for Chief Complaint Patient presents with  Breathing Problem  Diabetes  GERD  Chronic Kidney Disease Is someone accompanying this pt? na 
 
Is the patient using any DME equipment during OV? na 
 
Depression Screening: 
3 most recent PHQ Screens 11/30/2020 PHQ Not Done - Little interest or pleasure in doing things Not at all Feeling down, depressed, irritable, or hopeless Not at all Total Score PHQ 2 0 Learning Assessment: 
Learning Assessment 7/10/2019 PRIMARY LEARNER Patient BARRIERS PRIMARY LEARNER -  
CO-LEARNER CAREGIVER -  
PRIMARY LANGUAGE ENGLISH  
LEARNER PREFERENCE PRIMARY LISTENING  
ANSWERED BY patient RELATIONSHIP SELF Abuse Screening: 
Abuse Screening Questionnaire 11/30/2020 Do you ever feel afraid of your partner? Yeimi Sill Are you in a relationship with someone who physically or mentally threatens you? Yeimi Sill Is it safe for you to go home? Jacinta Story Fall Risk Fall Risk Assessment, last 12 mths 11/30/2020 Able to walk? Yes Fall in past 12 months? No  
Number of falls in past 12 months - Fall with injury? -  
 
 
Health Maintenance reviewed and discussed and ordered per Provider. Health Maintenance Due Topic Date Due  
 COVID-19 Vaccine (1 of 2) 09/30/1956  Shingrix Vaccine Age 50> (1 of 2) 09/30/1988  GLAUCOMA SCREENING Q2Y  09/22/2019  Flu Vaccine (1) 09/01/2020  
 DTaP/Tdap/Td series (2 - Td) 09/14/2020 Santosh Flowers Coordination of Care: 1. Have you been to the ER, urgent care clinic since your last visit? Hospitalized since your last visit? no 
 
2. Have you seen or consulted any other health care providers outside of the [address] [place] [address] since your last visit? Include any pap smears or colon screening. no 
 
 
Last  Checked na Last UDS Checked na Last Pain contract signed: na 
 
Patients concerns today: weakness, multiple falls, and SOB

## 2021-02-22 NOTE — PROGRESS NOTES
Vickey Sacks is a 78 y.o.  male and presents with Chief Complaint Patient presents with  Breathing Problem  Diabetes  GERD  Chronic Kidney Disease Vickey Sacks, who was evaluated through a synchronous (real-time) audio-video encounter, and/or his healthcare decision maker, is aware that it is a billable service, with coverage as determined by his insurance carrier. He provided verbal consent to proceed: Yes, and patient identification was verified. It was conducted pursuant to the emergency declaration under the [address] [place] [address] and the [employer] and [employer] Act. A caregiver was present when appropriate. Ability to conduct physical exam was limited. I was in the office. The patient was at home. Subjective: He is accompanied by his son who reports that he has increased wheezing; he has spells and wakes a night; he reports that when he stretches his diaphragm he wheezes more with exertion. He denies cough. He is using albuterol rescue inhaler but reports that the rescue is not as effective. He has decreased strength. He has had falls 3-4 times per week which is up from once a week. He needs a hospital bed. A lot of his effort at night is rolling over. Diabetes Mellitus: He has diabetes mellitus, and  hypertension, hyperlipidemia, coronary artery disease, history of prior stroke and status post CABG. Diabetic ROS - medication compliance: compliant all of the time, diabetic diet compliance: noncompliant some of the time. Lab review: labs reviewed, I note that glycosylated hemoglobin mildly abnormal but acceptable.  
  
ROS General ROS: negative for - chills or fever; + fatigue Psychological ROS: positive for - disorientation; he has been sitting on the side of the bed and disoriented Ophthalmic ROS: positive for - uses glasses ENT ROS: negative for - headaches Endocrine ROS: negative for - polydipsia/polyuria Respiratory ROS: positive for - shortness of breath wheezing Cardiovascular ROS: no chest pain or dyspnea on exertion; he has history of myocardial infarction Gastrointestinal ROS: no abdominal pain, change in bowel habits, or black or bloody stools Genito-Urinary ROS: no dysuria, trouble voiding, or hematuria Musculoskeletal ROS: positive for - muscular weakness; s/p amputation Neurological ROS: unsteady gait Dermatological ROS: positive for - rash and skin lesion changes All other systems reviewed and are negative. Objective: There were no vitals filed for this visit. alert, well appearing, and in no distress, oriented to person, place, and time and overweight Mental status - confused Chest - normal work of breathing Neurological - cranial nerves II through XII intact LABS Triglycerides 530 TESTS Assessment/Plan: 1. Frequent falls Recommend hospital bed and increase assistance and evaluation for home health aid - AMB SUPPLY ORDER 
- REFERRAL TO HOME HEALTH 2. Dyspnea on exertion Start lama/laba for relief 
- glycopyrrolate-formoteroL (Bevespi Aerosphere) 9-4.8 mcg HFA inhaler; Take 2 Puffs by inhalation every twelve (12) hours every twelve (12) hours. Dispense: 1 Inhaler; Refill: 2 3. Vascular dementia without behavioral disturbance ([address] Start donepezil to slow progress 
- REFERRAL TO HOME HEALTH 
- donepeziL (ARICEPT) 10 mg tablet; Take 1 Tab by mouth nightly. Dispense: 30 Tab; Refill: 0 
 
4. Controlled type 2 diabetes mellitus with complication, without long-term current use of insulin ([address] Goal hgb a1c <7; continue medication and low carb diet 5. Hypersomnolence Encourage healthy diet 6. S/P AKA (above knee amputation), right ([address] Pt has prosthesis but now is too weak to walk and uses wheelchair 7. PAD (peripheral artery disease) ([address] F/u with vascular surgery 8. Morbid obesity ([facility] I have reviewed/discussed the above normal BMI with the patient, spouse and son. I have recommended the following interventions: dietary management education, guidance, and counseling and monitor weight . .   
 
 
9. Coronary artery disease of native artery of native heart with stable angina pectoris ([facility] F/u with cardiology as scheduled Hypertriglyceridemia on labs Encourage healthy diet Lab review: labs reviewed, I note that lipids triglycerides high I have discussed the diagnosis with the patient and the intended plan as seen in the above orders. I have discussed medication side effects and warnings with the patient as well. I have reviewed the plan of care with the patient, accepted their input and they are in agreement with the treatment goals.

## 2021-03-16 NOTE — PROGRESS NOTES
Message left on wife's phone. Please ask if they want palliative care. It would be the best option and the greatest amount of assistance due to his dementia.

## 2021-03-19 NOTE — TELEPHONE ENCOUNTER
Aly [facility] called. Dr. Bud Hill put in a referral for Hospice care, but wants pt to be evaluated for palliative care. If palliative care is recommended, asking for referral for palliative care to be placed.  Thank you

## 2021-03-30 PROBLEM — Z51.5 HOSPICE CARE: Status: ACTIVE | Noted: 2021-01-01

## 2021-04-16 NOTE — PROGRESS NOTES
Jus Stack presents today for Chief Complaint Patient presents with  Follow-up [phone] Kristian Carvajal preferred language for health care discussion is english/other. Is someone accompanying this pt? 550 Alciia Bowen wife Is the patient using any DME equipment during OV? 550 Alicia Bowen Depression Screening: 
3 most recent PHQ Screens 2/22/2021 PHQ Not Done - Little interest or pleasure in doing things Not at all Feeling down, depressed, irritable, or hopeless Not at all Total Score PHQ 2 0 Learning Assessment: 
Learning Assessment 7/10/2019 PRIMARY LEARNER Patient BARRIERS PRIMARY LEARNER -  
CO-LEARNER CAREGIVER -  
PRIMARY LANGUAGE ENGLISH  
LEARNER PREFERENCE PRIMARY LISTENING  
ANSWERED BY patient RELATIONSHIP SELF Abuse Screening: 
Abuse Screening Questionnaire 11/30/2020 Do you ever feel afraid of your partner? Marcella [place] Are you in a relationship with someone who physically or mentally threatens you? Marcella [place] Is it safe for you to go home? Berny Carranza Fall Risk Fall Risk Assessment, last 12 mths 2/22/2021 Able to walk? Yes Fall in past 12 months? 1 Do you feel unsteady? 1 Are you worried about falling 1 Number of falls in past 12 months 2 Fall with injury? 0 Pt currently taking Anticoagulant therapy? no 
 
Coordination of Care: 1. Have you been to the ER, urgent care clinic since your last visit? Hospitalized since your last visit? no 
 
2. Have you seen or consulted any other health care providers outside of the [address] [place] [address] since your last visit? Include any pap smears or colon screening. yes

## 2021-04-16 NOTE — PROGRESS NOTES
Spoke with patient's wife who ask if we could cancel all future appointments for now and would call us back if he needed to be seen.

## 2023-12-19 NOTE — ROUTINE PROCESS
Bedside and Verbal shift change report given to kar rn (oncoming nurse) by Obinna Styles (offgoing nurse). Report included the following information SBAR, Intake/Output and Recent Results. No

## 2024-01-25 NOTE — MR AVS SNAPSHOT
[address] [facility] 
 
 
 [address] [facility] [address] [place] [id number] 
[phone] Patient: Salima Morfin MRN: [id number] SHEEBA:[id number] Visit Information Date & Time Provider Department Dept. Phone Encounter #  
 10/2/2018  7:30 AM Serenity Hendrickson [phone] [id number] Follow-up Instructions Return in about 3 months (around 1/2/2019) for EOV. Your Appointments 5/9/2019  8:00 AM  
PROCEDURE with [facility] IMAGING 2 [facility] Appt Note: ENDOGRAFT/ 6 MOS [facility]  
 [facility] [place] [address] [facility] [address]  
[phone] [address] [place] [address]  
  
    
 5/9/2019  9:00 AM  
PROCEDURE with [facility] IMAGING 2 [facility] ([facility]) Appt Note: DUPLEX LOWER EXT ARTERY LEFT/YAMEL Dickson [address], [place] [address] [facility] [address]  
[phone]  
  
    
 5/9/2019 10:00 AM  
PROCEDURE with [facility] IMAGING 2 [facility] ([facility]) Appt Note: CV 6 MOS/YAMEL Dickson [facility], [place] [address] [facility] [address]  
[phone]  
  
    
 5/9/2019 11:00 AM  
PROCEDURE with [facility] NONIMAGING [facility] ([facility]) Appt Note: anitra wild 12[address] [facility] [address] [facility] [address]  
[phone] [address]  
  
    
 5/23/2019 10:45 AM  
Follow Up with Riki Park [facility] ([facility]) Appt Note: 6 MONTH FOLLOW UP [address] [place], [place] [address] [facility] [address]  
[phone] [facility], [facility] [address] [facility] [address] Upcoming Health Maintenance Date Due Shingrix Vaccine Age 50> (1 of 2) 9/30/1988 Influenza Age 5 to Adult 8/1/2018 MEDICARE YEARLY EXAM 9/26/2018 FOOT EXAM Q1 10/1/2028* MICROALBUMIN Q1 10/1/2028* EYE EXAM RETINAL OR DILATED Q1 10/1/2028* HEMOGLOBIN A1C Q6M 4/1/2019 LIPID PANEL Q1 4/14/2019 COLONOSCOPY 6/29/2019 GLAUCOMA SCREENING Q2Y 9/22/2019 DTaP/Tdap/Td series (2 - Td) 9/14/2020 *Topic was postponed. The date shown is not the original due date. Allergies as of 10/2/2018  Review Complete On: 10/2/2018 By: Richard Sesay.,  Severity Noted Reaction Type Reactions Ace Inhibitors  03/27/2016    Other (comments) Per pt, Cardiologist states he cannot have ACE inhibitors Current Immunizations  Reviewed on 4/15/2018 Name Date Influenza High Dose Vaccine PF 9/25/2017, 9/13/2016, 11/10/2015, 10/7/2015 Influenza Vaccine 11/12/2014, 12/16/2013  8:04 AM  
 Influenza Vaccine (Tri) Adjuvanted 10/2/2018 Influenza Vaccine Split 12/14/2012 Influenza Vaccine Whole 9/14/2010 Pneumococcal Conjugate (PCV-13) 10/7/2015 TDAP Vaccine 9/14/2010 ZZZ-RETIRED (DO NOT USE) Pneumococcal Vaccine (Unspecified Type) 9/14/2010 Zoster 9/14/2010 Not reviewed this visit You Were Diagnosed With   
  
 Codes Comments Routine general medical examination at a health care facility    -  Primary ICD-10-CM: Z00.00 ICD-9-CM: V70.0 TIA (transient ischemic attack)     ICD-10-CM: G45.9 ICD-9-CM: 435.9 Encounter for immunization     ICD-10-CM: D71 ICD-9-CM: V03.89 Coronary artery disease involving native coronary artery of native heart without angina pectoris     ICD-10-CM: I25.10 ICD-9-CM: 414.01   
 Pure hypercholesterolemia     ICD-10-CM: E78.00 ICD-9-CM: 272.0   
 AAA (abdominal aortic aneurysm) without rupture (HCC)     ICD-10-CM: I71.4 ICD-9-CM: 441.4 Essential hypertension     ICD-10-CM: I10 
ICD-9-CM: 401.9 CKD (chronic kidney disease), stage III (HCC)     ICD-10-CM: N18.3 ICD-9-CM: 644. 3 Diastolic dysfunction     SUB-06-HX: I51.9 ICD-9-CM: 429.9 Vascular dementia without behavioral disturbance     ICD-10-CM: F01.50 ICD-9-CM: 290.40 Benign prostatic hyperplasia with urinary frequency     ICD-10-CM: N40.1, R35.0 ICD-9-CM: 600.01, 788.41   
 PAD (peripheral artery disease) ([facility] 75.)     ICD-10-CM: I73.9 ICD-9-CM: 443. 9 Vitals BP Pulse Temp Resp Height(growth percentile) Weight(growth percentile) 107/62 (BP 1 Location: Right arm, BP Patient Position: Sitting) 66 97.2 °F (36.2 °C) (Oral) 16 5' 10\" (1.778 m) 206 lb 12.8 oz (93.8 kg) SpO2 BMI Smoking Status 97% 29.67 kg/m2 Former Smoker BMI and BSA Data Body Mass Index Body Surface Area  
 29.67 kg/m 2 2.15 m 2 Preferred Pharmacy Pharmacy Name Phone [facility] [address] [phone] Your Updated Medication List  
  
   
This list is accurate as of 10/2/18  8:10 AM.  Always use your most recent med list. amLODIPine 10 mg tablet Commonly known as:  Sean Elba Take 1 Tab by mouth daily. aspirin 325 mg tablet Commonly known as:  ASPIRIN Take 1 Tab by mouth daily. atorvastatin 20 mg tablet Commonly known as:  LIPITOR Take 1 Tab by mouth daily. clopidogrel 75 mg Tab Commonly known as:  PLAVIX TAKE 1 TABLET BY MOUTH DAILY  
  
 donepezil 10 mg tablet Commonly known as:  ARICEPT  
TAKE 2 TABLETS BY MOUTH EVERY MORNING AFTER A MEAL  
  
 NITROSTAT SL  
1 Tab by SubLINGual route as needed (chest pain). omega-3 fatty acids-vitamin e 1,000 mg Cap Commonly known as:  FISH OIL Take 1 Cap by mouth two (2) times a day. tamsulosin 0.4 mg capsule Commonly known as:  FLOMAX Take 1 Cap by mouth nightly. Prescriptions Sent to Pharmacy Refills  
 clopidogrel (PLAVIX) 75 mg tab 4 Sig: TAKE 1 TABLET BY MOUTH DAILY  Class: Normal  
 [facility] [address] [place] [address] Ph #: [phone]  
 amLODIPine (NORVASC) 10 mg tablet 4 Sig: Take 1 Tab by mouth daily. Class: Normal  
 Pharmacy: [facility] [address] Ph #: [phone] Route: Oral  
 atorvastatin (LIPITOR) 20 mg tablet 4 Sig: Take 1 Tab by mouth daily. Class: Normal  
 Pharmacy: [facility] [address] Ph #: [phone] Route: Oral  
 tamsulosin (FLOMAX) 0.4 mg capsule 4 Sig: Take 1 Cap by mouth nightly. Class: Normal  
 Pharmacy: [facility] [address] Ph #: [phone] Route: Oral  
  
We Performed the Following ADMIN INFLUENZA VIRUS VAC [ HCPCS] AMB POC EKG ROUTINE W/ 12 LEADS, INTER & REP [92408 CPT(R)] INFLUENZA VACCINE INACTIVATED (IIV), SUBUNIT, ADJUVANTED, IM [id number] CPT(R)] Follow-up Instructions Return in about 3 months (around 1/2/2019) for EOV. Introducing [facility]! Dear Ed Perdomo: Thank you for requesting a [employer] account. Our records indicate that you already have an active [employer] account. You can access your account anytime at [email or web address] Did you know that you can access your hospital and ER discharge instructions at any time in [employer]? You can also review all of your test results from your hospital stay or ER visit. Additional Information If you have questions, please visit the Frequently Asked Questions section of the [employer] website at [email or web address]/. Remember, [employer] is NOT to be used for urgent needs. For medical emergencies, dial 911. Now available from your iPhone and Android! Please provide this summary of care documentation to your next provider. Your primary care clinician is listed as [id number] [facility]. If you have any questions after today's visit, please call [phone]. negative

## 2025-05-12 NOTE — Clinical Note
-PO intake/appetite PTA:  eggs, clark, coffee, plantains for breakfast, protein for lunch and dinner. grapes.   -Diet PTA: see above  -Difficulty chewing or swallowing PTA and in hospital:  pt has dentures and as long as he has his dentures then he doesn't have any difficulty  -Food allergies/intolerances:  none  -Vitamins/minerals/oral supplements PTA:  none as per H&P     No specimen collected. Estimated Blood Loss: <30 mL.

## (undated) DEVICE — O.R TOWEL, X-RAY DETECTABLE: Brand: DEROYAL

## (undated) DEVICE — SUTURE PERMAHAND SZ 2-0 L30IN NONABSORBABLE BLK SILK W/O A305H

## (undated) DEVICE — PTA BALLOON DILATATION CATHETER: Brand: STERLING™

## (undated) DEVICE — TRAY CATH OD16FR SIL URIN M STATLOK STBL DEV SURSTP

## (undated) DEVICE — COVER US PRB W15XL120CM W/ GEL RUBBERBAND TAPE STRP FLD GEN

## (undated) DEVICE — INFLATION DEVICE: Brand: ENCORE™ 26

## (undated) DEVICE — PTA BALLOON DILATATION CATHETER: Brand: MUSTANG™

## (undated) DEVICE — Device

## (undated) DEVICE — SOLUTION IV 1000ML 0.9% SOD CHL

## (undated) DEVICE — KIT MIC INTR PERC 4F 60CM SMTH -- VSI

## (undated) DEVICE — SET FLD ADMIN 3 W STPCOCK FIX FEM L BOR 1IN

## (undated) DEVICE — SUTURE COAT VCRL PC 5 PRECIS COSM CONVENTIONAL CUT PRIM 3 8 J823H

## (undated) DEVICE — ANGIOGRAPHY KIT CUST VASC

## (undated) DEVICE — SUTURE PERMA HND SZ 0 L18IN NONABSORBABLE BLK L30MM PSL REV 580H

## (undated) DEVICE — 3M™ STERI-STRIP™ COMPOUND BENZOIN TINCTURE 40 BAGS/CARTON 4 CARTONS/CASE C1544: Brand: 3M™ STERI-STRIP™

## (undated) DEVICE — APPLICATOR BNDG 1MM ADH PREMIERPRO EXOFIN

## (undated) DEVICE — APPLIER CLP AUTO MED 9.75 IN TI SURGCLP SUPER INTLOK 20 DISP

## (undated) DEVICE — INTRODUCER SHTH 4FR CANN L11CM DIL TIP 25MM RED TUNGSTEN

## (undated) DEVICE — PACK PROCEDURE SURG VASC CATH 161 MMC LF

## (undated) DEVICE — REM POLYHESIVE ADULT PATIENT RETURN ELECTRODE: Brand: VALLEYLAB

## (undated) DEVICE — SUTURE ABSORBABLE BRAIDED 2-0 CT-1 27 IN UD VICRYL J259H

## (undated) DEVICE — APPLIER CLP L9.38IN M LIG TI DISP STR RNG HNDL LIGACLP

## (undated) DEVICE — CATHETER ANGIO 4FR L40CM 0.035IN KMP ANG SEL SFT RADPQ TIP

## (undated) DEVICE — GUIDEWIRE WITH ICE™ HYDROPHILIC COATING: Brand: V-18™ CONTROL WIRE™

## (undated) DEVICE — TABLE COVER: Brand: CONVERTORS

## (undated) DEVICE — SUTURE PROL SZ 5-0 L36IN NONABSORBABLE BLU L13MM C-1 3/8 8720H

## (undated) DEVICE — INTENDED FOR TISSUE SEPARATION, AND OTHER PROCEDURES THAT REQUIRE A SHARP SURGICAL BLADE TO PUNCTURE OR CUT.: Brand: BARD-PARKER SAFETY BLADES SIZE 11, STERILE

## (undated) DEVICE — COPE MANDRIL WIRE GUIDE STAINLESS STEEL: Brand: COPE

## (undated) DEVICE — INFLATION DEVICE: Brand: ENCORE 26

## (undated) DEVICE — SYR 10ML LUER LOK 1/5ML GRAD --

## (undated) DEVICE — INTENDED FOR TISSUE SEPARATION, AND OTHER PROCEDURES THAT REQUIRE A SHARP SURGICAL BLADE TO PUNCTURE OR CUT.: Brand: BARD-PARKER ® STAINLESS STEEL BLADES

## (undated) DEVICE — NEEDLE ANGIO 1 WALL ULT SMOOTH 19GAX7CM MERIT AVANCE

## (undated) DEVICE — X-RAY SPONGES,12 PLY: Brand: DERMACEA

## (undated) DEVICE — BLADE ASSEMB CLP HAIR FINE --

## (undated) DEVICE — SUTURE VCRL SZ 3-0 L27IN ABSRB UD L26MM SH 1/2 CIR J416H

## (undated) DEVICE — GEL US 20GM NONIRRITATING OVERWRAPPED FILE PCH TRNSMIT

## (undated) DEVICE — GUIDEWIRE VASC L180CM DIA0035IN L15CM STR TIP PTFE HEP S STL

## (undated) DEVICE — GLOVE SURG SZ 7 L11.33IN FNGR THK9.8MIL STRW LTX POLYMER

## (undated) DEVICE — SOLIDIFIER MEDC 15000ML -- MEDICHOICE

## (undated) DEVICE — (D)PREP SKN CHLRAPRP APPL 26ML -- CONVERT TO ITEM 371833

## (undated) DEVICE — SUT PROL 6-0 30IN C1 DA BLU --

## (undated) DEVICE — INTRODUCER SHTH 7FR CANN L11CM DIL TIP 35MM ORNG TUNGSTEN